# Patient Record
Sex: MALE | Race: WHITE | NOT HISPANIC OR LATINO | URBAN - METROPOLITAN AREA
[De-identification: names, ages, dates, MRNs, and addresses within clinical notes are randomized per-mention and may not be internally consistent; named-entity substitution may affect disease eponyms.]

---

## 2019-03-14 ENCOUNTER — APPOINTMENT (RX ONLY)
Dept: URBAN - METROPOLITAN AREA CLINIC 87 | Facility: CLINIC | Age: 74
Setting detail: DERMATOLOGY
End: 2019-03-14

## 2019-03-14 DIAGNOSIS — D22 MELANOCYTIC NEVI: ICD-10-CM

## 2019-03-14 DIAGNOSIS — L57.0 ACTINIC KERATOSIS: ICD-10-CM

## 2019-03-14 DIAGNOSIS — Z71.89 OTHER SPECIFIED COUNSELING: ICD-10-CM

## 2019-03-14 DIAGNOSIS — L57.8 OTHER SKIN CHANGES DUE TO CHRONIC EXPOSURE TO NONIONIZING RADIATION: ICD-10-CM

## 2019-03-14 DIAGNOSIS — L81.4 OTHER MELANIN HYPERPIGMENTATION: ICD-10-CM

## 2019-03-14 DIAGNOSIS — D18.0 HEMANGIOMA: ICD-10-CM

## 2019-03-14 DIAGNOSIS — L82.1 OTHER SEBORRHEIC KERATOSIS: ICD-10-CM

## 2019-03-14 DIAGNOSIS — L82.0 INFLAMED SEBORRHEIC KERATOSIS: ICD-10-CM

## 2019-03-14 PROBLEM — D22.62 MELANOCYTIC NEVI OF LEFT UPPER LIMB, INCLUDING SHOULDER: Status: ACTIVE | Noted: 2019-03-14

## 2019-03-14 PROBLEM — D18.01 HEMANGIOMA OF SKIN AND SUBCUTANEOUS TISSUE: Status: ACTIVE | Noted: 2019-03-14

## 2019-03-14 PROCEDURE — ? LIQUID NITROGEN

## 2019-03-14 PROCEDURE — ? SUNSCREEN RECOMMENDATIONS

## 2019-03-14 PROCEDURE — ? COUNSELING

## 2019-03-14 PROCEDURE — ? INVENTORY

## 2019-03-14 PROCEDURE — ? PRESCRIPTION

## 2019-03-14 PROCEDURE — 99214 OFFICE O/P EST MOD 30 MIN: CPT | Mod: 25

## 2019-03-14 PROCEDURE — 99202 OFFICE O/P NEW SF 15 MIN: CPT

## 2019-03-14 PROCEDURE — 17110 DESTRUCTION B9 LES UP TO 14: CPT

## 2019-03-14 RX ORDER — FLUOROURACIL 2 G/40G
CREAM TOPICAL BID
Qty: 1 | Refills: 0 | Status: ERX | COMMUNITY
Start: 2019-03-14

## 2019-03-14 RX ADMIN — FLUOROURACIL: 2 CREAM TOPICAL at 00:00

## 2019-03-14 ASSESSMENT — LOCATION DETAILED DESCRIPTION DERM
LOCATION DETAILED: LEFT RADIAL DORSAL HAND
LOCATION DETAILED: LEFT THENAR EMINENCE
LOCATION DETAILED: RIGHT ELBOW
LOCATION DETAILED: LEFT THENAR EMINENCE
LOCATION DETAILED: LEFT RADIAL PALM
LOCATION DETAILED: LEFT ULNAR DORSAL HAND
LOCATION DETAILED: LEFT ULNAR DORSAL HAND
LOCATION DETAILED: MID-FRONTAL SCALP
LOCATION DETAILED: LEFT RADIAL DORSAL HAND
LOCATION DETAILED: LEFT RADIAL PALM

## 2019-03-14 ASSESSMENT — LOCATION SIMPLE DESCRIPTION DERM
LOCATION SIMPLE: LEFT HAND
LOCATION SIMPLE: ANTERIOR SCALP
LOCATION SIMPLE: RIGHT ELBOW
LOCATION SIMPLE: LEFT HAND

## 2019-03-14 ASSESSMENT — LOCATION ZONE DERM
LOCATION ZONE: HAND
LOCATION ZONE: SCALP
LOCATION ZONE: ARM
LOCATION ZONE: HAND

## 2019-03-14 NOTE — PROCEDURE: LIQUID NITROGEN
Consent: The patient's consent was obtained including but not limited to risks of crusting, scabbing, blistering, scarring, darker or lighter pigmentary change, recurrence, incomplete removal and infection.
Detail Level: Detailed
Post-Care Instructions: I reviewed with the patient in detail post-care instructions. Patient is to wear sunprotection, and avoid picking at any of the treated lesions. Pt may apply Vaseline to crusted or scabbing areas.
Medical Necessity Information: It is in your best interest to select a reason for this procedure from the list below. All of these items fulfill various CMS LCD requirements except the new and changing color options.
Number Of Freeze-Thaw Cycles: 2 freeze-thaw cycles
Include Z78.9 (Other Specified Conditions Influencing Health Status) As An Associated Diagnosis?: No
Duration Of Freeze Thaw-Cycle (Seconds): 15
Medical Necessity Clause: This procedure was medically necessary because the lesions that were treated were:

## 2019-03-28 ENCOUNTER — APPOINTMENT (RX ONLY)
Dept: URBAN - METROPOLITAN AREA CLINIC 87 | Facility: CLINIC | Age: 74
Setting detail: DERMATOLOGY
End: 2019-03-28

## 2019-03-28 DIAGNOSIS — L57.0 ACTINIC KERATOSIS: ICD-10-CM

## 2019-03-28 PROCEDURE — ? TREATMENT REGIMEN

## 2019-03-28 PROCEDURE — 99213 OFFICE O/P EST LOW 20 MIN: CPT

## 2019-03-28 PROCEDURE — ? OTHER

## 2019-03-28 PROCEDURE — ? COUNSELING

## 2019-03-28 ASSESSMENT — LOCATION SIMPLE DESCRIPTION DERM
LOCATION SIMPLE: ANTERIOR SCALP
LOCATION SIMPLE: RIGHT CHEEK

## 2019-03-28 ASSESSMENT — LOCATION ZONE DERM
LOCATION ZONE: SCALP
LOCATION ZONE: FACE

## 2019-03-28 ASSESSMENT — LOCATION DETAILED DESCRIPTION DERM
LOCATION DETAILED: RIGHT INFERIOR CENTRAL MALAR CHEEK
LOCATION DETAILED: MID-FRONTAL SCALP

## 2019-09-16 ENCOUNTER — APPOINTMENT (RX ONLY)
Dept: URBAN - METROPOLITAN AREA CLINIC 87 | Facility: CLINIC | Age: 74
Setting detail: DERMATOLOGY
End: 2019-09-16

## 2019-09-16 DIAGNOSIS — Z71.89 OTHER SPECIFIED COUNSELING: ICD-10-CM

## 2019-09-16 DIAGNOSIS — D485 NEOPLASM OF UNCERTAIN BEHAVIOR OF SKIN: ICD-10-CM

## 2019-09-16 DIAGNOSIS — D18.0 HEMANGIOMA: ICD-10-CM

## 2019-09-16 DIAGNOSIS — D22 MELANOCYTIC NEVI: ICD-10-CM

## 2019-09-16 DIAGNOSIS — L259 CONTACT DERMATITIS AND OTHER ECZEMA, UNSPECIFIED CAUSE: ICD-10-CM

## 2019-09-16 DIAGNOSIS — L81.4 OTHER MELANIN HYPERPIGMENTATION: ICD-10-CM

## 2019-09-16 DIAGNOSIS — L57.0 ACTINIC KERATOSIS: ICD-10-CM

## 2019-09-16 DIAGNOSIS — L82.1 OTHER SEBORRHEIC KERATOSIS: ICD-10-CM

## 2019-09-16 DIAGNOSIS — L57.8 OTHER SKIN CHANGES DUE TO CHRONIC EXPOSURE TO NONIONIZING RADIATION: ICD-10-CM

## 2019-09-16 PROBLEM — D18.01 HEMANGIOMA OF SKIN AND SUBCUTANEOUS TISSUE: Status: ACTIVE | Noted: 2019-09-16

## 2019-09-16 PROBLEM — L23.9 ALLERGIC CONTACT DERMATITIS, UNSPECIFIED CAUSE: Status: ACTIVE | Noted: 2019-09-16

## 2019-09-16 PROBLEM — D48.5 NEOPLASM OF UNCERTAIN BEHAVIOR OF SKIN: Status: ACTIVE | Noted: 2019-09-16

## 2019-09-16 PROBLEM — D22.62 MELANOCYTIC NEVI OF LEFT UPPER LIMB, INCLUDING SHOULDER: Status: ACTIVE | Noted: 2019-09-16

## 2019-09-16 PROCEDURE — ? INVENTORY

## 2019-09-16 PROCEDURE — 17003 DESTRUCT PREMALG LES 2-14: CPT

## 2019-09-16 PROCEDURE — ? DEFER

## 2019-09-16 PROCEDURE — ? COUNSELING

## 2019-09-16 PROCEDURE — ? SUNSCREEN RECOMMENDATIONS

## 2019-09-16 PROCEDURE — ? PRESCRIPTION

## 2019-09-16 PROCEDURE — 99214 OFFICE O/P EST MOD 30 MIN: CPT | Mod: 25

## 2019-09-16 PROCEDURE — 17000 DESTRUCT PREMALG LESION: CPT

## 2019-09-16 PROCEDURE — ? LIQUID NITROGEN

## 2019-09-16 RX ORDER — TRIAMCINOLONE ACETONIDE 1 MG/G
CREAM TOPICAL
Qty: 1 | Refills: 0 | Status: ERX | COMMUNITY
Start: 2019-09-16

## 2019-09-16 RX ORDER — EMOLLIENT COMBINATION NO.53
CREAM (GRAM) TOPICAL
Qty: 1 | Refills: 0 | Status: ERX | COMMUNITY
Start: 2019-09-16

## 2019-09-16 RX ADMIN — Medication: at 00:00

## 2019-09-16 RX ADMIN — TRIAMCINOLONE ACETONIDE: 1 CREAM TOPICAL at 00:00

## 2019-09-16 ASSESSMENT — LOCATION SIMPLE DESCRIPTION DERM
LOCATION SIMPLE: INFERIOR FOREHEAD
LOCATION SIMPLE: LEFT SCALP
LOCATION SIMPLE: LEFT HAND
LOCATION SIMPLE: RIGHT CHEEK
LOCATION SIMPLE: LEFT FOREHEAD
LOCATION SIMPLE: RIGHT FOREHEAD
LOCATION SIMPLE: SUPERIOR FOREHEAD
LOCATION SIMPLE: LEFT EAR

## 2019-09-16 ASSESSMENT — LOCATION DETAILED DESCRIPTION DERM
LOCATION DETAILED: LEFT FOREHEAD
LOCATION DETAILED: INFERIOR MID FOREHEAD
LOCATION DETAILED: LEFT THENAR EMINENCE
LOCATION DETAILED: LEFT CENTRAL FRONTAL SCALP
LOCATION DETAILED: LEFT RADIAL DORSAL HAND
LOCATION DETAILED: LEFT SCAPHA
LOCATION DETAILED: RIGHT CENTRAL MALAR CHEEK
LOCATION DETAILED: LEFT RADIAL PALM
LOCATION DETAILED: LEFT SUPERIOR FOREHEAD
LOCATION DETAILED: RIGHT INFERIOR CENTRAL MALAR CHEEK
LOCATION DETAILED: LEFT ULNAR DORSAL HAND
LOCATION DETAILED: RIGHT SUPERIOR FOREHEAD
LOCATION DETAILED: SUPERIOR MID FOREHEAD

## 2019-09-16 ASSESSMENT — LOCATION ZONE DERM
LOCATION ZONE: EAR
LOCATION ZONE: HAND
LOCATION ZONE: SCALP
LOCATION ZONE: FACE

## 2019-09-16 NOTE — PROCEDURE: LIQUID NITROGEN
Post-Care Instructions: POST-PROCEDURE CARE\\nThe procedure you have just had using liquid nitrogen is called cryosurgery. Liquid nitrogen is minus (-) 195.8O C and must be stored in special\\ncontainers. It evaporates on contact with the air and becomes water vapor, which is why it appears to smoke.\\n\\nCryosurgery is a surgical technique that avoids cutting, burning or the need for anesthesia. For selected lesions or growths, cryosurgery is the best\\ntreatment because of its safety, minimal post-surgical care and excellent cosmetic results.\\n\\nFollowing treatment, the lesion or growth will appear unchanged. Soon afterwards, the area will become red and slightly swollen. Within 24 to 48\\nhours, a blister or water bubble often appears. THIS IS NORMAL AND EXPECTED. If left alone, the blister will slowly resolve and the entire area\\nwill turn into a scab. Regardless of whether the blister breaks or not, the healing will continue as expected. The scab will fall off by itself when it is\\nready. From the day of cryosurgery to the day when the scab falls off is usually about three weeks. A faint pink spot will be present that will slowly\\nfade away.\\n\\nYou may carry on normal activities immediately after therapy including bathing. There are no restrictions, however, you should be careful not to\\nirritate or traumatize the area.\\n\\nWhen used for Wart Treatment:\\nWhen cryotherapy is used for warts, keep in mind that warts may be very stubborn! The virus causing the wart may be a strong strain, so the\\ntreatment may need to be repeated. Usually you will know if the wart is still present within three to four weeks, so you may return for another\\ntreatment. It is normal to expect blistering which may fill with blood and appear black.
Duration Of Freeze Thaw-Cycle (Seconds): 15
Render Note In Bullet Format When Appropriate: No
Consent: The patient's consent was obtained including but not limited to risks of crusting, scabbing, blistering, scarring, darker or lighter pigmentary change, recurrence, incomplete removal and infection.
Detail Level: Simple
Render Post-Care Instructions In Note?: yes
Number Of Freeze-Thaw Cycles: 2 freeze-thaw cycles

## 2019-09-24 ENCOUNTER — APPOINTMENT (RX ONLY)
Dept: URBAN - METROPOLITAN AREA CLINIC 87 | Facility: CLINIC | Age: 74
Setting detail: DERMATOLOGY
End: 2019-09-24

## 2019-09-24 DIAGNOSIS — D485 NEOPLASM OF UNCERTAIN BEHAVIOR OF SKIN: ICD-10-CM

## 2019-09-24 PROBLEM — D48.5 NEOPLASM OF UNCERTAIN BEHAVIOR OF SKIN: Status: ACTIVE | Noted: 2019-09-24

## 2019-09-24 PROCEDURE — 11104 PUNCH BX SKIN SINGLE LESION: CPT | Mod: 79

## 2019-09-24 PROCEDURE — 11105 PUNCH BX SKIN EA SEP/ADDL: CPT | Mod: 79

## 2019-09-24 PROCEDURE — ? BIOPSY BY PUNCH METHOD

## 2019-09-24 ASSESSMENT — LOCATION SIMPLE DESCRIPTION DERM: LOCATION SIMPLE: RIGHT CHEEK

## 2019-09-24 ASSESSMENT — LOCATION DETAILED DESCRIPTION DERM: LOCATION DETAILED: RIGHT CENTRAL MALAR CHEEK

## 2019-09-24 ASSESSMENT — LOCATION ZONE DERM: LOCATION ZONE: FACE

## 2019-09-24 NOTE — PROCEDURE: BIOPSY BY PUNCH METHOD
Patient Will Remove Sutures At Home?: No
Anesthesia Volume In Cc (Will Not Render If 0): 0.5
Wound Care: Petrolatum
Billing Type: Third-Party Bill
Epidermal Sutures: 5-0 Nylon
X Depth Of Punch In Cm (Optional): 0
Post-Care Instructions: I reviewed with the patient in detail post-care instructions. Patient is to keep the biopsy site dry overnight, and then apply bacitracin twice daily until healed. Patient may apply hydrogen peroxide soaks to remove any crusting.
Dressing: bandage
Lab Facility: 3
Hemostasis: None
Lab: 6
Was A Bandage Applied: Yes
Biopsy Type: H and E
Size Of Lesion In Cm (Optional): 1
Body Location Override (Optional - Billing Will Still Be Based On Selected Body Map Location If Applicable): right central medial malar cheek
Consent: Written consent was obtained and risks were reviewed including but not limited to scarring, infection, bleeding, scabbing, incomplete removal, nerve damage and allergy to anesthesia.
Anesthesia Type: 1% lidocaine with epinephrine
Home Suture Removal Text: Patient was provided a home suture removal kit and will remove their sutures at home.  If they have any questions or difficulties they will call the office.
Detail Level: Detailed
Notification Instructions: Patient will be notified of biopsy results. However, patient instructed to call the office if not contacted within 2 weeks.
Suture Removal: 14 days
Epidermal Sutures: 4-0 Nylon
Body Location Override (Optional - Billing Will Still Be Based On Selected Body Map Location If Applicable): right central lateral malar cheek

## 2019-10-01 ENCOUNTER — APPOINTMENT (RX ONLY)
Dept: URBAN - METROPOLITAN AREA CLINIC 87 | Facility: CLINIC | Age: 74
Setting detail: DERMATOLOGY
End: 2019-10-01

## 2019-10-01 DIAGNOSIS — Z48.02 ENCOUNTER FOR REMOVAL OF SUTURES: ICD-10-CM

## 2019-10-01 PROCEDURE — ? SUTURE REMOVAL (GLOBAL PERIOD)

## 2019-10-01 PROCEDURE — 99024 POSTOP FOLLOW-UP VISIT: CPT

## 2019-10-01 ASSESSMENT — LOCATION SIMPLE DESCRIPTION DERM: LOCATION SIMPLE: RIGHT CHEEK

## 2019-10-01 ASSESSMENT — LOCATION ZONE DERM: LOCATION ZONE: FACE

## 2019-10-01 ASSESSMENT — LOCATION DETAILED DESCRIPTION DERM: LOCATION DETAILED: RIGHT CENTRAL MALAR CHEEK

## 2019-10-16 ENCOUNTER — APPOINTMENT (RX ONLY)
Dept: URBAN - METROPOLITAN AREA CLINIC 87 | Facility: CLINIC | Age: 74
Setting detail: DERMATOLOGY
End: 2019-10-16

## 2019-10-16 DIAGNOSIS — L57.0 ACTINIC KERATOSIS: ICD-10-CM

## 2019-10-16 PROCEDURE — ? COUNSELING

## 2019-10-16 PROCEDURE — ? TREATMENT REGIMEN

## 2019-10-16 PROCEDURE — ? OTHER

## 2019-10-16 PROCEDURE — 99213 OFFICE O/P EST LOW 20 MIN: CPT

## 2019-10-16 ASSESSMENT — LOCATION DETAILED DESCRIPTION DERM: LOCATION DETAILED: RIGHT CENTRAL MALAR CHEEK

## 2019-10-16 ASSESSMENT — LOCATION SIMPLE DESCRIPTION DERM: LOCATION SIMPLE: RIGHT CHEEK

## 2019-10-16 ASSESSMENT — LOCATION ZONE DERM: LOCATION ZONE: FACE

## 2019-10-16 NOTE — PROCEDURE: OTHER
Other (Free Text): RIGHT CENTRAL LATERAL MALAR CHEEK
Note Text (......Xxx Chief Complaint.): This diagnosis correlates with the
Detail Level: Zone

## 2019-10-16 NOTE — PROCEDURE: TREATMENT REGIMEN
Initiate Treatment: Efudex 5% cream Apply to AA on the right central lateral malar cheek twice daily x3 weeks.
Detail Level: Detailed
Plan: Patient has Efudex Rx at home to begin treatment.

## 2019-10-17 ENCOUNTER — RX ONLY (OUTPATIENT)
Age: 74
Setting detail: RX ONLY
End: 2019-10-17

## 2019-10-17 RX ORDER — FLUOROURACIL 2 G/40G
CREAM TOPICAL BID
Qty: 1 | Refills: 0 | Status: ERX | COMMUNITY
Start: 2019-10-17

## 2019-12-05 ENCOUNTER — APPOINTMENT (RX ONLY)
Dept: URBAN - METROPOLITAN AREA CLINIC 87 | Facility: CLINIC | Age: 74
Setting detail: DERMATOLOGY
End: 2019-12-05

## 2019-12-05 DIAGNOSIS — L57.0 ACTINIC KERATOSIS: ICD-10-CM

## 2019-12-05 PROCEDURE — ? TREATMENT REGIMEN

## 2019-12-05 PROCEDURE — ? COUNSELING

## 2019-12-05 PROCEDURE — 17000 DESTRUCT PREMALG LESION: CPT

## 2019-12-05 PROCEDURE — ? LIQUID NITROGEN

## 2019-12-05 PROCEDURE — ? OTHER

## 2019-12-05 ASSESSMENT — LOCATION ZONE DERM: LOCATION ZONE: FACE

## 2019-12-05 ASSESSMENT — LOCATION DETAILED DESCRIPTION DERM: LOCATION DETAILED: RIGHT CENTRAL MALAR CHEEK

## 2019-12-05 ASSESSMENT — LOCATION SIMPLE DESCRIPTION DERM: LOCATION SIMPLE: RIGHT CHEEK

## 2019-12-05 NOTE — PROCEDURE: TREATMENT REGIMEN
Plan: Patient has Efudex Rx at home to begin treatment.
Detail Level: Detailed
Discontinue Regimen: Efudex 5% cream Apply to AA on the right central lateral malar cheek twice daily x3 weeks.

## 2019-12-05 NOTE — PROCEDURE: OTHER
Note Text (......Xxx Chief Complaint.): This diagnosis correlates with the
Detail Level: Zone
Other (Free Text): RIGHT CENTRAL LATERAL MALAR CHEEK

## 2019-12-05 NOTE — PROCEDURE: LIQUID NITROGEN
Render Post-Care Instructions In Note?: yes
Number Of Freeze-Thaw Cycles: 2 freeze-thaw cycles
Consent: The patient's consent was obtained including but not limited to risks of crusting, scabbing, blistering, scarring, darker or lighter pigmentary change, recurrence, incomplete removal and infection.
Detail Level: Simple
Render Note In Bullet Format When Appropriate: No
Post-Care Instructions: POST-PROCEDURE CARE\\nThe procedure you have just had using liquid nitrogen is called cryosurgery. Liquid nitrogen is minus (-) 195.8O C and must be stored in special\\ncontainers. It evaporates on contact with the air and becomes water vapor, which is why it appears to smoke.\\n\\nCryosurgery is a surgical technique that avoids cutting, burning or the need for anesthesia. For selected lesions or growths, cryosurgery is the best\\ntreatment because of its safety, minimal post-surgical care and excellent cosmetic results.\\n\\nFollowing treatment, the lesion or growth will appear unchanged. Soon afterwards, the area will become red and slightly swollen. Within 24 to 48\\nhours, a blister or water bubble often appears. THIS IS NORMAL AND EXPECTED. If left alone, the blister will slowly resolve and the entire area\\nwill turn into a scab. Regardless of whether the blister breaks or not, the healing will continue as expected. The scab will fall off by itself when it is\\nready. From the day of cryosurgery to the day when the scab falls off is usually about three weeks. A faint pink spot will be present that will slowly\\nfade away.\\n\\nYou may carry on normal activities immediately after therapy including bathing. There are no restrictions, however, you should be careful not to\\nirritate or traumatize the area.\\n\\nWhen used for Wart Treatment:\\nWhen cryotherapy is used for warts, keep in mind that warts may be very stubborn! The virus causing the wart may be a strong strain, so the\\ntreatment may need to be repeated. Usually you will know if the wart is still present within three to four weeks, so you may return for another\\ntreatment. It is normal to expect blistering which may fill with blood and appear black.
Duration Of Freeze Thaw-Cycle (Seconds): 15

## 2020-03-10 ENCOUNTER — APPOINTMENT (RX ONLY)
Dept: URBAN - METROPOLITAN AREA CLINIC 87 | Facility: CLINIC | Age: 75
Setting detail: DERMATOLOGY
End: 2020-03-10

## 2020-03-10 DIAGNOSIS — L57.8 OTHER SKIN CHANGES DUE TO CHRONIC EXPOSURE TO NONIONIZING RADIATION: ICD-10-CM

## 2020-03-10 DIAGNOSIS — Z71.89 OTHER SPECIFIED COUNSELING: ICD-10-CM

## 2020-03-10 DIAGNOSIS — D18.0 HEMANGIOMA: ICD-10-CM

## 2020-03-10 DIAGNOSIS — L57.0 ACTINIC KERATOSIS: ICD-10-CM

## 2020-03-10 DIAGNOSIS — L91.8 OTHER HYPERTROPHIC DISORDERS OF THE SKIN: ICD-10-CM

## 2020-03-10 DIAGNOSIS — D22 MELANOCYTIC NEVI: ICD-10-CM

## 2020-03-10 DIAGNOSIS — L82.1 OTHER SEBORRHEIC KERATOSIS: ICD-10-CM

## 2020-03-10 DIAGNOSIS — L81.4 OTHER MELANIN HYPERPIGMENTATION: ICD-10-CM

## 2020-03-10 PROBLEM — D22.62 MELANOCYTIC NEVI OF LEFT UPPER LIMB, INCLUDING SHOULDER: Status: ACTIVE | Noted: 2020-03-10

## 2020-03-10 PROBLEM — D18.01 HEMANGIOMA OF SKIN AND SUBCUTANEOUS TISSUE: Status: ACTIVE | Noted: 2020-03-10

## 2020-03-10 PROCEDURE — 99214 OFFICE O/P EST MOD 30 MIN: CPT | Mod: 25

## 2020-03-10 PROCEDURE — ? COUNSELING

## 2020-03-10 PROCEDURE — 17000 DESTRUCT PREMALG LESION: CPT

## 2020-03-10 PROCEDURE — ? LIQUID NITROGEN

## 2020-03-10 PROCEDURE — ? SUNSCREEN RECOMMENDATIONS

## 2020-03-10 PROCEDURE — ? DEFER

## 2020-03-10 PROCEDURE — 17003 DESTRUCT PREMALG LES 2-14: CPT

## 2020-03-10 ASSESSMENT — LOCATION SIMPLE DESCRIPTION DERM
LOCATION SIMPLE: RIGHT AXILLARY VAULT
LOCATION SIMPLE: FRONTAL SCALP
LOCATION SIMPLE: LEFT HAND
LOCATION SIMPLE: LEFT AXILLARY VAULT
LOCATION SIMPLE: SCALP
LOCATION SIMPLE: LEFT SCALP
LOCATION SIMPLE: ANTERIOR SCALP
LOCATION SIMPLE: RIGHT SCALP
LOCATION SIMPLE: POSTERIOR SCALP
LOCATION SIMPLE: LEFT TEMPLE

## 2020-03-10 ASSESSMENT — LOCATION DETAILED DESCRIPTION DERM
LOCATION DETAILED: RIGHT SUPERIOR OCCIPITAL SCALP
LOCATION DETAILED: LEFT CENTRAL PARIETAL SCALP
LOCATION DETAILED: LEFT RADIAL PALM
LOCATION DETAILED: LEFT CENTRAL FRONTAL SCALP
LOCATION DETAILED: LEFT AXILLARY VAULT
LOCATION DETAILED: LEFT RADIAL DORSAL HAND
LOCATION DETAILED: RIGHT MEDIAL FRONTAL SCALP
LOCATION DETAILED: MID-FRONTAL SCALP
LOCATION DETAILED: MEDIAL FRONTAL SCALP
LOCATION DETAILED: RIGHT SUPERIOR PARIETAL SCALP
LOCATION DETAILED: POSTERIOR MID-PARIETAL SCALP
LOCATION DETAILED: LEFT ULNAR DORSAL HAND
LOCATION DETAILED: LEFT THENAR EMINENCE
LOCATION DETAILED: LEFT CENTRAL TEMPLE
LOCATION DETAILED: RIGHT AXILLARY VAULT

## 2020-03-10 ASSESSMENT — LOCATION ZONE DERM
LOCATION ZONE: FACE
LOCATION ZONE: SCALP
LOCATION ZONE: AXILLAE
LOCATION ZONE: HAND

## 2020-03-10 NOTE — PROCEDURE: LIQUID NITROGEN
Number Of Freeze-Thaw Cycles: 2 freeze-thaw cycles
Post-Care Instructions: POST-PROCEDURE CARE\\nThe procedure you have just had using liquid nitrogen is called cryosurgery. Liquid nitrogen is minus (-) 195.8O C and must be stored in special\\ncontainers. It evaporates on contact with the air and becomes water vapor, which is why it appears to smoke.\\n\\nCryosurgery is a surgical technique that avoids cutting, burning or the need for anesthesia. For selected lesions or growths, cryosurgery is the best\\ntreatment because of its safety, minimal post-surgical care and excellent cosmetic results.\\n\\nFollowing treatment, the lesion or growth will appear unchanged. Soon afterwards, the area will become red and slightly swollen. Within 24 to 48\\nhours, a blister or water bubble often appears. THIS IS NORMAL AND EXPECTED. If left alone, the blister will slowly resolve and the entire area\\nwill turn into a scab. Regardless of whether the blister breaks or not, the healing will continue as expected. The scab will fall off by itself when it is\\nready. From the day of cryosurgery to the day when the scab falls off is usually about three weeks. A faint pink spot will be present that will slowly\\nfade away.\\n\\nYou may carry on normal activities immediately after therapy including bathing. There are no restrictions, however, you should be careful not to\\nirritate or traumatize the area.\\n\\nWhen used for Wart Treatment:\\nWhen cryotherapy is used for warts, keep in mind that warts may be very stubborn! The virus causing the wart may be a strong strain, so the\\ntreatment may need to be repeated. Usually you will know if the wart is still present within three to four weeks, so you may return for another\\ntreatment. It is normal to expect blistering which may fill with blood and appear black.
Duration Of Freeze Thaw-Cycle (Seconds): 15
Render Post-Care Instructions In Note?: yes
Detail Level: Simple
Consent: The patient's consent was obtained including but not limited to risks of crusting, scabbing, blistering, scarring, darker or lighter pigmentary change, recurrence, incomplete removal and infection.
Render Note In Bullet Format When Appropriate: No

## 2020-03-10 NOTE — PROCEDURE: DEFER
Instructions (Optional): 2 hour incubation period
Introduction Text (Please End With A Colon): The following procedure was deferred:
Procedure To Be Performed At Next Visit: PDT Blue Light
Detail Level: Detailed

## 2021-01-29 ENCOUNTER — APPOINTMENT (RX ONLY)
Dept: URBAN - METROPOLITAN AREA CLINIC 87 | Facility: CLINIC | Age: 76
Setting detail: DERMATOLOGY
End: 2021-01-29

## 2021-01-29 VITALS — TEMPERATURE: 96.9 F

## 2021-01-29 DIAGNOSIS — L57.0 ACTINIC KERATOSIS: ICD-10-CM

## 2021-01-29 DIAGNOSIS — D18.0 HEMANGIOMA: ICD-10-CM

## 2021-01-29 DIAGNOSIS — L21.8 OTHER SEBORRHEIC DERMATITIS: ICD-10-CM | Status: INADEQUATELY CONTROLLED

## 2021-01-29 DIAGNOSIS — Z71.89 OTHER SPECIFIED COUNSELING: ICD-10-CM

## 2021-01-29 DIAGNOSIS — L81.4 OTHER MELANIN HYPERPIGMENTATION: ICD-10-CM

## 2021-01-29 DIAGNOSIS — L82.1 OTHER SEBORRHEIC KERATOSIS: ICD-10-CM

## 2021-01-29 DIAGNOSIS — D22 MELANOCYTIC NEVI: ICD-10-CM

## 2021-01-29 PROBLEM — D18.01 HEMANGIOMA OF SKIN AND SUBCUTANEOUS TISSUE: Status: ACTIVE | Noted: 2021-01-29

## 2021-01-29 PROBLEM — D22.5 MELANOCYTIC NEVI OF TRUNK: Status: ACTIVE | Noted: 2021-01-29

## 2021-01-29 PROBLEM — D22.72 MELANOCYTIC NEVI OF LEFT LOWER LIMB, INCLUDING HIP: Status: ACTIVE | Noted: 2021-01-29

## 2021-01-29 PROCEDURE — 17003 DESTRUCT PREMALG LES 2-14: CPT

## 2021-01-29 PROCEDURE — ? COUNSELING

## 2021-01-29 PROCEDURE — ? OBSERVATION AND MEASURE

## 2021-01-29 PROCEDURE — ? PHOTO-DOCUMENTATION

## 2021-01-29 PROCEDURE — 17000 DESTRUCT PREMALG LESION: CPT

## 2021-01-29 PROCEDURE — ? ADDITIONAL NOTES

## 2021-01-29 PROCEDURE — 99214 OFFICE O/P EST MOD 30 MIN: CPT | Mod: 25

## 2021-01-29 PROCEDURE — ? TREATMENT REGIMEN

## 2021-01-29 PROCEDURE — ? PRESCRIPTION

## 2021-01-29 PROCEDURE — ? LIQUID NITROGEN

## 2021-01-29 RX ORDER — KETOCONAZOLE 20 MG/G
CREAM TOPICAL QD
Qty: 1 | Refills: 5 | Status: ERX | COMMUNITY
Start: 2021-01-29

## 2021-01-29 RX ADMIN — KETOCONAZOLE: 20 CREAM TOPICAL at 00:00

## 2021-01-29 ASSESSMENT — LOCATION DETAILED DESCRIPTION DERM
LOCATION DETAILED: RIGHT INFERIOR LATERAL LOWER BACK
LOCATION DETAILED: RIGHT BUTTOCK
LOCATION DETAILED: LEFT LATERAL PLANTAR 1ST TOE
LOCATION DETAILED: RIGHT BUTTOCK
LOCATION DETAILED: RIGHT INFERIOR LATERAL LOWER BACK
LOCATION DETAILED: LEFT INFERIOR LATERAL LOWER BACK
LOCATION DETAILED: RIGHT MEDIAL FRONTAL SCALP
LOCATION DETAILED: RIGHT INFERIOR MEDIAL LOWER BACK
LOCATION DETAILED: LEFT LATERAL MALAR CHEEK
LOCATION DETAILED: LEFT LATERAL PLANTAR 1ST TOE
LOCATION DETAILED: RIGHT INFERIOR MEDIAL LOWER BACK
LOCATION DETAILED: LEFT MEDIAL FRONTAL SCALP
LOCATION DETAILED: LEFT INFERIOR LATERAL LOWER BACK
LOCATION DETAILED: MID-FRONTAL SCALP
LOCATION DETAILED: MID-FRONTAL SCALP
LOCATION DETAILED: RIGHT SUPERIOR FOREHEAD
LOCATION DETAILED: RIGHT INFERIOR TEMPLE
LOCATION DETAILED: LEFT MEDIAL FRONTAL SCALP

## 2021-01-29 ASSESSMENT — LOCATION ZONE DERM
LOCATION ZONE: TOE
LOCATION ZONE: SCALP
LOCATION ZONE: SCALP
LOCATION ZONE: TOE
LOCATION ZONE: TRUNK
LOCATION ZONE: TRUNK
LOCATION ZONE: FACE

## 2021-01-29 ASSESSMENT — LOCATION SIMPLE DESCRIPTION DERM
LOCATION SIMPLE: RIGHT TEMPLE
LOCATION SIMPLE: RIGHT FOREHEAD
LOCATION SIMPLE: LEFT LOWER BACK
LOCATION SIMPLE: LEFT LOWER BACK
LOCATION SIMPLE: ANTERIOR SCALP
LOCATION SIMPLE: RIGHT LOWER BACK
LOCATION SIMPLE: LEFT SCALP
LOCATION SIMPLE: PLANTAR SURFACE OF LEFT 1ST TOE
LOCATION SIMPLE: LEFT SCALP
LOCATION SIMPLE: RIGHT BUTTOCK
LOCATION SIMPLE: RIGHT LOWER BACK
LOCATION SIMPLE: PLANTAR SURFACE OF LEFT 1ST TOE
LOCATION SIMPLE: ANTERIOR SCALP
LOCATION SIMPLE: LEFT CHEEK
LOCATION SIMPLE: RIGHT BUTTOCK
LOCATION SIMPLE: RIGHT SCALP

## 2021-05-06 LAB
ALBUMIN SERPL-MCNC: NORMAL G/DL
ALP BLD-CCNC: NORMAL U/L
ALT SERPL-CCNC: NORMAL U/L
ANION GAP SERPL CALCULATED.3IONS-SCNC: NORMAL MMOL/L
AST SERPL-CCNC: NORMAL U/L
BILIRUB SERPL-MCNC: NORMAL MG/DL
BUN BLDV-MCNC: 17 MG/DL
CALCIUM SERPL-MCNC: 9.4 MG/DL
CHLORIDE BLD-SCNC: 100 MMOL/L
CHOLESTEROL, TOTAL: 216 MG/DL
CHOLESTEROL/HDL RATIO: ABNORMAL
CO2: 26 MMOL/L
CREAT SERPL-MCNC: 1.03 MG/DL
GFR CALCULATED: NORMAL
GLUCOSE BLD-MCNC: 98 MG/DL
HDLC SERPL-MCNC: 24 MG/DL (ref 35–70)
LDL CHOLESTEROL CALCULATED: 21.1 MG/DL (ref 0–160)
NONHDLC SERPL-MCNC: ABNORMAL MG/DL
POTASSIUM SERPL-SCNC: 4.9 MMOL/L
SODIUM BLD-SCNC: 140 MMOL/L
TOTAL PROTEIN: NORMAL
TRIGL SERPL-MCNC: 96 MG/DL
VLDLC SERPL CALC-MCNC: ABNORMAL MG/DL

## 2021-07-09 ENCOUNTER — OFFICE VISIT (OUTPATIENT)
Dept: FAMILY MEDICINE CLINIC | Age: 76
End: 2021-07-09
Payer: MEDICARE

## 2021-07-09 DIAGNOSIS — K44.9 HIATAL HERNIA: ICD-10-CM

## 2021-07-09 DIAGNOSIS — E78.49 OTHER HYPERLIPIDEMIA: ICD-10-CM

## 2021-07-09 DIAGNOSIS — I10 ESSENTIAL HYPERTENSION: Primary | ICD-10-CM

## 2021-07-09 DIAGNOSIS — I25.118 CORONARY ARTERY DISEASE OF NATIVE ARTERY OF NATIVE HEART WITH STABLE ANGINA PECTORIS (HCC): ICD-10-CM

## 2021-07-09 PROCEDURE — G8427 DOCREV CUR MEDS BY ELIG CLIN: HCPCS | Performed by: FAMILY MEDICINE

## 2021-07-09 PROCEDURE — 99203 OFFICE O/P NEW LOW 30 MIN: CPT | Performed by: FAMILY MEDICINE

## 2021-07-09 PROCEDURE — 1123F ACP DISCUSS/DSCN MKR DOCD: CPT | Performed by: FAMILY MEDICINE

## 2021-07-09 PROCEDURE — 4040F PNEUMOC VAC/ADMIN/RCVD: CPT | Performed by: FAMILY MEDICINE

## 2021-07-09 PROCEDURE — 3017F COLORECTAL CA SCREEN DOC REV: CPT | Performed by: FAMILY MEDICINE

## 2021-07-09 PROCEDURE — G8420 CALC BMI NORM PARAMETERS: HCPCS | Performed by: FAMILY MEDICINE

## 2021-07-09 PROCEDURE — 1036F TOBACCO NON-USER: CPT | Performed by: FAMILY MEDICINE

## 2021-07-09 RX ORDER — ASPIRIN 81 MG/1
81 TABLET ORAL DAILY
COMMUNITY

## 2021-07-09 RX ORDER — ATORVASTATIN CALCIUM 40 MG/1
40 TABLET, FILM COATED ORAL DAILY
COMMUNITY
End: 2022-06-01 | Stop reason: SDUPTHER

## 2021-07-09 RX ORDER — OMEPRAZOLE 20 MG/1
20 CAPSULE, DELAYED RELEASE ORAL
Qty: 30 CAPSULE | Refills: 5 | Status: ON HOLD | OUTPATIENT
Start: 2021-07-09 | End: 2021-10-27 | Stop reason: HOSPADM

## 2021-07-09 RX ORDER — TADALAFIL 10 MG/1
10 TABLET ORAL PRN
COMMUNITY
End: 2021-08-31

## 2021-07-09 NOTE — PROGRESS NOTES
OFFICE NOTE    7/9/21  Name: Venus Singleton. MWK:[id number]   Sex:male   Age:76 y.o. SUBJECTIVE  Chief Complaint   Patient presents with    Advice Only       HPI  Pedrito Burdick came in to establish. He and his wife former Refugio Elliott have lived in [place] for last 14 years. About that time was advised he had multivessel CAD but apparently wasn't good candidate for stenting or CABG at that time. Has lived a healthy lifestyle in [place] and stayed out of trouble. Stopped his sstatin about a year ago,, along with ASA which he felt was aggravating his HH. Developed substernal chest pain with heavy activity, relieved by belching which he attribute to his hiatal hernia. Went to PMD how sent him to Cardiolgist who rand an NM stress that indicated significant ischemia. He refused cath in [place] as they wanted to come [place] form [place] till Labor day    Review of Systems   Constitutional: Negative for appetite change, fever and unexpected weight change. HENT: Negative for congestion, ear pain, hearing loss, sinus pain, sore throat and trouble swallowing. Eyes: Negative for photophobia, redness and visual disturbance. Respiratory: Negative for cough, shortness of breath and wheezing. Cardiovascular: Positive for chest pain. Negative for palpitations and leg swelling. Gastrointestinal: Negative for abdominal pain, blood in stool, constipation, diarrhea and vomiting. Gas and belching from [facility]. Now with exertional chest pain   Endocrine: Negative for cold intolerance, polydipsia and polyuria. Genitourinary: Negative for difficulty urinating, genital sores, hematuria and urgency. Musculoskeletal: Negative for arthralgias, back pain and joint swelling. Skin: Negative for pallor and rash. Allergic/Immunologic: Negative for food allergies. Neurological: Negative for dizziness, tremors, syncope and headaches. Hematological: Negative for adenopathy. Does not bruise/bleed easily.    Psychiatric/Behavioral: Negative for agitation, dysphoric mood, hallucinations and sleep disturbance. All other systems reviewed and are negative. Current Outpatient Medications:     aspirin 81 MG EC tablet, Take 81 mg by mouth daily, Disp: , Rfl:     atorvastatin (LIPITOR) 40 MG tablet, Take 40 mg by mouth daily, Disp: , Rfl:     tadalafil (CIALIS) 10 MG tablet, Take 10 mg by mouth as needed for Erectile Dysfunction, Disp: , Rfl:     Coenzyme Q10 (COQ-10) 100 MG CAPS, Take by mouth, Disp: , Rfl:     omeprazole (PRILOSEC) 20 MG delayed release capsule, Take 1 capsule by mouth every morning (before breakfast), Disp: 30 capsule, Rfl: 5  Allergies   Allergen Reactions    Dust Mite Extract        No past medical history on file. No past surgical history on file. No family history on file. Social History     Tobacco History     Smoking Status  Never Smoker    Smokeless Tobacco Use  Never Used          Alcohol History     Alcohol Use Status  Not Asked          Drug Use     Drug Use Status  Not Asked          Sexual Activity     Sexually Active  Not Asked                OBJECTIVE  Vitals:    07/09/21 0020 07/09/21 1652   BP: 135/80    Pulse:  64   Temp:  97 °F (36.1 °C)   TempSrc:  Temporal   SpO2:  97%   Weight:  158 lb 9.6 oz (71.9 kg)   Height:  5' 8\" (1.727 m)        Body mass index is 24.12 kg/m².     Orders Placed This Encounter   Procedures    Lipid Panel     Standing Status:   Future     Standing Expiration Date:   7/9/2022     Order Specific Question:   Is Patient Fasting?/# of Hours     Answer:   fasting    ALT     Standing Status:   Future     Standing Expiration Date:   7/9/2022    AST     Standing Status:   Future     Standing Expiration Date:   7/9/2022    CBC Auto Differential     Standing Status:   Future     Standing Expiration Date:   7/9/2022    HIGH SENSITIVITY CRP     Standing Status:   Future     Standing Expiration Date:   7/9/2022   [id number], [address] [facility], DO, Cardiology, [place]     Referral Priority:   Routine     Referral Type:   Eval and Treat     Referral Reason:   Specialty Services Required     Referred to Provider:   Tameka Mota DO     Requested Specialty:   Cardiology     Number of Visits Requested:   1        EXAM   Physical Exam  Vitals reviewed. Constitutional:       Appearance: Normal appearance. He is normal weight. HENT:      Right Ear: Tympanic membrane normal.      Left Ear: Tympanic membrane normal.   Eyes:      Conjunctiva/sclera: Conjunctivae normal.   Neck:      Vascular: No carotid bruit. Cardiovascular:      Rate and Rhythm: Normal rate and regular rhythm. Pulses: Normal pulses. Heart sounds: No murmur heard. Pulmonary:      Effort: Pulmonary effort is normal.      Breath sounds: Normal breath sounds. No rales. Lymphadenopathy:      Cervical: No cervical adenopathy. Skin:     Coloration: Skin is not jaundiced. Findings: No bruising or rash. Neurological:      General: No focal deficit present. Mental Status: He is alert and oriented to person, place, and time. Psychiatric:         Mood and Affect: Mood normal.         Behavior: Behavior normal.           Melany Reilly was seen today for advice only. Diagnoses and all orders for this visit:    Essential hypertension   on metoprolol 25 qd of succinate and Isorbid 30 mg per day  Coronary artery disease of native artery of native heart with stable angina pectoris ([address]  -     Lipid Panel; Future  -     ALT; Future  -     AST; Future  -     CBC Auto Differential; Future  -     [address] DO, Cardiology, [place]  Will add ASA 81 mg per day  Other hyperlipidemia  -     Lipid Panel; Future  -     ALT; Future  -     AST; Future  -     HIGH SENSITIVITY CRP; Future    Hiatal hernia  -     omeprazole (PRILOSEC) 20 MG delayed release capsule; Take 1 capsule by mouth every morning (before breakfast)  -     CBC Auto Differential; Future  Will treat this empirically so can take ASA.  Will need formal exam to establish set up (Came in at 4:45 on Friday when I was on [employer]. If persistent chest pain to ER immediately        No follow-ups on file.     Electronically signed by Mackenzie Burch MD on 7/9/21 at 5:17 PM EDT

## 2021-07-10 VITALS
SYSTOLIC BLOOD PRESSURE: 135 MMHG | BODY MASS INDEX: 24.04 KG/M2 | DIASTOLIC BLOOD PRESSURE: 80 MMHG | HEIGHT: 68 IN | OXYGEN SATURATION: 97 % | HEART RATE: 64 BPM | TEMPERATURE: 97 F | WEIGHT: 158.6 LBS

## 2021-07-10 ASSESSMENT — ENCOUNTER SYMPTOMS
CONSTIPATION: 0
SORE THROAT: 0
BLOOD IN STOOL: 0
DIARRHEA: 0
WHEEZING: 0
SHORTNESS OF BREATH: 0
COUGH: 0
PHOTOPHOBIA: 0
ABDOMINAL PAIN: 0
EYE REDNESS: 0
BACK PAIN: 0
VOMITING: 0
SINUS PAIN: 0
TROUBLE SWALLOWING: 0

## 2021-07-12 DIAGNOSIS — E78.49 OTHER HYPERLIPIDEMIA: ICD-10-CM

## 2021-07-12 DIAGNOSIS — K44.9 HIATAL HERNIA: ICD-10-CM

## 2021-07-12 DIAGNOSIS — I25.118 CORONARY ARTERY DISEASE OF NATIVE ARTERY OF NATIVE HEART WITH STABLE ANGINA PECTORIS (HCC): ICD-10-CM

## 2021-07-12 LAB
ALT SERPL-CCNC: 21 U/L (ref 0–40)
AST SERPL-CCNC: 22 U/L (ref 0–39)
BASOPHILS ABSOLUTE: 0.04 E9/L (ref 0–0.2)
BASOPHILS RELATIVE PERCENT: 0.6 % (ref 0–2)
C-REACTIVE PROTEIN, HIGH SENSITIVITY: <0.3 MG/L (ref 0–3)
CHOLESTEROL, TOTAL: 115 MG/DL (ref 0–199)
EOSINOPHILS ABSOLUTE: 0.17 E9/L (ref 0.05–0.5)
EOSINOPHILS RELATIVE PERCENT: 2.5 % (ref 0–6)
HCT VFR BLD CALC: 44.3 % (ref 37–54)
HDLC SERPL-MCNC: 51 MG/DL
HEMOGLOBIN: 14.9 G/DL (ref 12.5–16.5)
IMMATURE GRANULOCYTES #: 0.02 E9/L
IMMATURE GRANULOCYTES %: 0.3 % (ref 0–5)
LDL CHOLESTEROL CALCULATED: 54 MG/DL (ref 0–99)
LYMPHOCYTES ABSOLUTE: 1.32 E9/L (ref 1.5–4)
LYMPHOCYTES RELATIVE PERCENT: 19.6 % (ref 20–42)
MCH RBC QN AUTO: 30.7 PG (ref 26–35)
MCHC RBC AUTO-ENTMCNC: 33.6 % (ref 32–34.5)
MCV RBC AUTO: 91.2 FL (ref 80–99.9)
MONOCYTES ABSOLUTE: 0.61 E9/L (ref 0.1–0.95)
MONOCYTES RELATIVE PERCENT: 9 % (ref 2–12)
NEUTROPHILS ABSOLUTE: 4.59 E9/L (ref 1.8–7.3)
NEUTROPHILS RELATIVE PERCENT: 68 % (ref 43–80)
PDW BLD-RTO: 12.8 FL (ref 11.5–15)
PLATELET # BLD: 145 E9/L (ref 130–450)
PMV BLD AUTO: 11 FL (ref 7–12)
RBC # BLD: 4.86 E12/L (ref 3.8–5.8)
TRIGL SERPL-MCNC: 51 MG/DL (ref 0–149)
VLDLC SERPL CALC-MCNC: 10 MG/DL
WBC # BLD: 6.8 E9/L (ref 4.5–11.5)

## 2021-07-21 ENCOUNTER — TELEPHONE (OUTPATIENT)
Dept: ADMINISTRATIVE | Age: 76
End: 2021-07-21

## 2021-07-21 NOTE — TELEPHONE ENCOUNTER
NP scheduled from the [facility]. Patient Appointment Form:      PCP: Dr. Rey Rodrigues  Referring: Dr. Rey Rodrigues    Has the Patient:    Seen a Cardiologist? Yes [place] Dr. Radha Carter May 2021     Had a heart catheterization? Yes 14 years ago - Pt bringing in disc    Had heart surgery? No    Had a stress test or nuclear stress test? Yes April 2021 [place] - brining in report to [place]     Had an echocardiogram?     Had a vascular ultrasound? No    Had a 24/48 heart monitor or extended cardiac event monitor? No    Had recent blood work in the last 6 months? Yes 7/12/21 Epic PCP     Had a pacemaker/ICD/ILR implant? No    Seen an Electrophysiologist? No        Will send records via: Pt to bring in [facility] recs/disc to [place] prior to his upcoming NP apt with Dr. Karo De Jesus. Date & time of appointment:  7/30/21 @ 1:15 PM with Dr. Karo De Jesus.

## 2021-08-31 ENCOUNTER — OFFICE VISIT (OUTPATIENT)
Dept: CARDIOLOGY CLINIC | Age: 76
End: 2021-08-31
Payer: MEDICARE

## 2021-08-31 VITALS
WEIGHT: 161.8 LBS | BODY MASS INDEX: 24.52 KG/M2 | HEART RATE: 58 BPM | RESPIRATION RATE: 18 BRPM | HEIGHT: 68 IN | SYSTOLIC BLOOD PRESSURE: 150 MMHG | DIASTOLIC BLOOD PRESSURE: 76 MMHG

## 2021-08-31 DIAGNOSIS — Z01.818 PREOPERATIVE TESTING: ICD-10-CM

## 2021-08-31 DIAGNOSIS — I25.5 ISCHEMIC CARDIOMYOPATHY: ICD-10-CM

## 2021-08-31 DIAGNOSIS — R07.2 PRECORDIAL PAIN: ICD-10-CM

## 2021-08-31 DIAGNOSIS — I25.10 CORONARY ARTERY DISEASE, UNSPECIFIED VESSEL OR LESION TYPE, UNSPECIFIED WHETHER ANGINA PRESENT, UNSPECIFIED WHETHER NATIVE OR TRANSPLANTED HEART: Primary | ICD-10-CM

## 2021-08-31 PROCEDURE — 3017F COLORECTAL CA SCREEN DOC REV: CPT | Performed by: INTERNAL MEDICINE

## 2021-08-31 PROCEDURE — 1036F TOBACCO NON-USER: CPT | Performed by: INTERNAL MEDICINE

## 2021-08-31 PROCEDURE — 4040F PNEUMOC VAC/ADMIN/RCVD: CPT | Performed by: INTERNAL MEDICINE

## 2021-08-31 PROCEDURE — G8427 DOCREV CUR MEDS BY ELIG CLIN: HCPCS | Performed by: INTERNAL MEDICINE

## 2021-08-31 PROCEDURE — 93000 ELECTROCARDIOGRAM COMPLETE: CPT | Performed by: INTERNAL MEDICINE

## 2021-08-31 PROCEDURE — G8420 CALC BMI NORM PARAMETERS: HCPCS | Performed by: INTERNAL MEDICINE

## 2021-08-31 PROCEDURE — 1123F ACP DISCUSS/DSCN MKR DOCD: CPT | Performed by: INTERNAL MEDICINE

## 2021-08-31 PROCEDURE — 99205 OFFICE O/P NEW HI 60 MIN: CPT | Performed by: INTERNAL MEDICINE

## 2021-08-31 RX ORDER — ZINC GLUCONATE 50 MG
50 TABLET ORAL DAILY
COMMUNITY
End: 2021-12-21

## 2021-08-31 RX ORDER — MULTIVIT-MIN/IRON/FOLIC ACID/K 18-600-40
500 CAPSULE ORAL 2 TIMES DAILY
COMMUNITY
End: 2021-12-21

## 2021-08-31 RX ORDER — CHLORAL HYDRATE 500 MG
1000 CAPSULE ORAL DAILY
COMMUNITY
End: 2021-11-19

## 2021-08-31 RX ORDER — METOPROLOL SUCCINATE 25 MG/1
25 TABLET, EXTENDED RELEASE ORAL DAILY
Status: ON HOLD | COMMUNITY
End: 2021-10-27 | Stop reason: HOSPADM

## 2021-08-31 RX ORDER — ISOSORBIDE MONONITRATE 30 MG/1
30 TABLET, EXTENDED RELEASE ORAL DAILY
Status: ON HOLD | COMMUNITY
End: 2021-10-27 | Stop reason: HOSPADM

## 2021-08-31 RX ORDER — FAMOTIDINE 20 MG/1
20 TABLET, FILM COATED ORAL PRN
COMMUNITY
End: 2021-12-03

## 2021-08-31 NOTE — PROGRESS NOTES
CHIEF COMPLAINT: CAD/Chest pain    HISTORY OF PRESENT ILLNESS: Patient is a 78 y.o. male seen at the request of Herbert Jackson MD.      Patient with long standing known CAD reported by 2007 cath at which time he had a  LAD. Was active and without issues for years. More recently he has been having chest pain and SOB with higher levels of exertion. Evaluation in [place] led to stress with septal reversible defect, apical scar and a severely impaired LVEF. Some CP with exertion. No SOB. History reviewed. No pertinent past medical history. There is no problem list on file for this patient. Allergies   Allergen Reactions    Dust Mite Extract        Current Outpatient Medications   Medication Sig Dispense Refill    Omega-3 Fatty Acids (FISH OIL) 1000 MG CAPS Take 1,000 mg by mouth daily      zinc gluconate 50 MG tablet Take 50 mg by mouth daily      Ascorbic Acid (VITAMIN C) 500 MG CAPS Take 500 mg by mouth 2 times daily      metoprolol succinate (TOPROL XL) 25 MG extended release tablet Take 25 mg by mouth daily      isosorbide mononitrate (IMDUR) 30 MG extended release tablet Take 30 mg by mouth daily      famotidine (PEPCID) 20 MG tablet Take 20 mg by mouth as needed      aspirin 81 MG EC tablet Take 81 mg by mouth daily      atorvastatin (LIPITOR) 40 MG tablet Take 40 mg by mouth daily      Coenzyme Q10 (COQ-10) 100 MG CAPS Take by mouth      omeprazole (PRILOSEC) 20 MG delayed release capsule Take 1 capsule by mouth every morning (before breakfast) 30 capsule 5     No current facility-administered medications for this visit.        Social History     Socioeconomic History    Marital status:      Spouse name: Not on file    Number of children: Not on file    Years of education: Not on file    Highest education level: Not on file   Occupational History    Not on file   Tobacco Use    Smoking status: Never Smoker    Smokeless tobacco: Never Used   Vaping Use    Vaping Use: Never used   Substance and Sexual Activity    Alcohol use: Yes     Comment: occ.  Drug use: Never    Sexual activity: Yes     Partners: Female   Other Topics Concern    Not on file   Social History Narrative    Not on file     Social Determinants of Health     Financial Resource Strain:     Difficulty of Paying Living Expenses:    Food Insecurity:     Worried About Running Out of Food in the Last Year:     [address] in the Last Year:    Transportation Needs:     Lack of Transportation (Medical):  Lack of Transportation (Non-Medical):    Physical Activity:     Days of Exercise per Week:     Minutes of Exercise per Session:    Stress:     Feeling of Stress :    Social Connections:     Frequency of Communication with Friends and Family:     Frequency of Social Gatherings with Friends and Family:     Attends Denominational Services:     Active Member of Clubs or Organizations:     Attends Club or Organization Meetings:     Marital Status:    Intimate Partner Violence:     Fear of Current or Ex-Partner:     Emotionally Abused:     Physically Abused:     Sexually Abused:        History reviewed. No pertinent family history. Review of Systems:  Heart: as above   Lungs: as above   Eyes: denies changes in vision or discharge. Ears: denies changes in hearing or pain. Nose: denies epistaxis or masses   Throat: denies sore throat or trouble swallowing. Neuro: denies numbness, tingling, tremors. Skin: denies rashes or itching. : denies hematuria, dysuria   GI: denies vomiting, diarrhea   Psych: denies mood changed, anxiety, depression. All other systems negative. Physical Exam   BP (!) 150/76   Pulse 58   Resp 18   Ht 5' 8\" (1.727 m)   Wt 161 lb 12.8 oz (73.4 kg)   BMI 24.60 kg/m²   Constitutional: Oriented to person, place, and time. Well-developed and well-nourished. No distress. Head: Normocephalic and atraumatic.    Eyes: EOM are normal. Pupils are equal, round, and reactive to light. Neck: Normal range of motion. Neck supple. No hepatojugular reflux and no JVD present. Carotid bruit is not present. No tracheal deviation present. No thyromegaly present. Cardiovascular: Normal rate, regular rhythm, normal heart sounds and intact distal pulses. Exam reveals no gallop and no friction rub. No murmur heard. Pulmonary/Chest: Effort normal and breath sounds normal. No respiratory distress. No wheezes. No rales. No tenderness. Abdominal: Soft. Bowel sounds are normal. No distension and no mass. No tenderness. No rebound and no guarding. Musculoskeletal: Normal range of motion. No edema and no tenderness. Lymphadenopathy:   No cervical adenopathy. No groin adenopathy. Neurological: Alert and oriented to person, place, and time. Skin: Skin is warm and dry. No rash noted. Not diaphoretic. No erythema. Psychiatric: Normal mood and affect. Behavior is normal.     EKG personally reviewed 09/01/21:  normal sinus rhythm, nonspecific ST and T waves changes, LBBB. Stress 4/28/2021 with partially reversible anteroapical (fixed) and septal (reversible) defect, LVEF 36% in [address] [place] [address]. ASSESSMENT AND PLAN:  There is no problem list on file for this patient. 1. CAD with  of LAD ([facility] cath 2007): Stress with LAD ischemia and impaired LVEF4/28/2021. Echo. Recommend cath. AUC 7/56. ASA/statin/imdur/BB. 2. VHD: Echo.     3. ICMP: Echo. Will apply guideline driven medical regimen accordingly. 4. HTN: Observe. 5. Lipids: Statin. Vonda Aguilar D.O.   Cardiologist  Cardiology, [address] [place] [address]

## 2021-08-31 NOTE — HPI: EVALUATION OF SKIN LESION(S)
Hpi Title: Evaluation of Skin Lesions
Have Your Spot(S) Been Treated In The Past?: has not been treated
Continue Regimen: Aklief 0.005 % topical cream \\nDays Supply: 30\\nSig: Apply to face daily at bedtime (apotheco)
Hide Differin Products: No
Action 1: Continue
Detail Level: Zone

## 2021-09-01 ENCOUNTER — TELEPHONE (OUTPATIENT)
Dept: CARDIOLOGY CLINIC | Age: 76
End: 2021-09-01

## 2021-09-01 NOTE — TELEPHONE ENCOUNTER
Patient is scheduled for a cardiac cath on 9/9 at   11:30am arrival time is 9:30am  Patient had COVID vaccine on 5/3  No prior auth required/Medicare  Labs to be drawn on 9/8  Patient was given and understood instructions. [facility] called for pt. 1 hour ETA.

## 2021-09-07 ENCOUNTER — TELEPHONE (OUTPATIENT)
Dept: CARDIOLOGY | Age: 76
End: 2021-09-07

## 2021-09-08 ENCOUNTER — TELEPHONE (OUTPATIENT)
Dept: CARDIAC CATH/INVASIVE PROCEDURES | Age: 76
End: 2021-09-08

## 2021-09-08 NOTE — TELEPHONE ENCOUNTER
Reminded patient of scheduled procedure on  9/9. Instructions given and COVID questionnaire completed.

## 2021-09-09 ENCOUNTER — APPOINTMENT (OUTPATIENT)
Dept: CT IMAGING | Age: 76
DRG: 287 | End: 2021-09-09
Attending: INTERNAL MEDICINE
Payer: MEDICARE

## 2021-09-09 ENCOUNTER — HOSPITAL ENCOUNTER (INPATIENT)
Dept: CARDIAC CATH/INVASIVE PROCEDURES | Age: 76
LOS: 1 days | Discharge: HOME | DRG: 287 | End: 2021-09-10
Attending: INTERNAL MEDICINE | Admitting: INTERNAL MEDICINE
Payer: MEDICARE

## 2021-09-09 DIAGNOSIS — I25.10 CAD IN NATIVE ARTERY: ICD-10-CM

## 2021-09-09 LAB
BACTERIA: NORMAL /HPF
BILIRUBIN URINE: NEGATIVE
BLOOD, URINE: ABNORMAL
CLARITY: CLEAR
COLOR: YELLOW
GLUCOSE URINE: NEGATIVE MG/DL
KETONES, URINE: NEGATIVE MG/DL
LEUKOCYTE ESTERASE, URINE: NEGATIVE
NITRITE, URINE: NEGATIVE
PH UA: 5.5 (ref 5–9)
PROTEIN UA: NEGATIVE MG/DL
RBC UA: NORMAL /HPF (ref 0–2)
SPECIFIC GRAVITY UA: 1.02 (ref 1–1.03)
UROBILINOGEN, URINE: 0.2 E.U./DL
WBC UA: NORMAL /HPF (ref 0–5)

## 2021-09-09 PROCEDURE — C1894 INTRO/SHEATH, NON-LASER: HCPCS

## 2021-09-09 PROCEDURE — C1769 GUIDE WIRE: HCPCS

## 2021-09-09 PROCEDURE — 2500000003 HC RX 250 WO HCPCS

## 2021-09-09 PROCEDURE — 93458 L HRT ARTERY/VENTRICLE ANGIO: CPT | Performed by: INTERNAL MEDICINE

## 2021-09-09 PROCEDURE — 71250 CT THORAX DX C-: CPT

## 2021-09-09 PROCEDURE — B2151ZZ FLUOROSCOPY OF LEFT HEART USING LOW OSMOLAR CONTRAST: ICD-10-PCS | Performed by: INTERNAL MEDICINE

## 2021-09-09 PROCEDURE — 2709999900 HC NON-CHARGEABLE SUPPLY

## 2021-09-09 PROCEDURE — 4A023N7 MEASUREMENT OF CARDIAC SAMPLING AND PRESSURE, LEFT HEART, PERCUTANEOUS APPROACH: ICD-10-PCS | Performed by: INTERNAL MEDICINE

## 2021-09-09 PROCEDURE — G0379 DIRECT REFER HOSPITAL OBSERV: HCPCS

## 2021-09-09 PROCEDURE — G0378 HOSPITAL OBSERVATION PER HR: HCPCS

## 2021-09-09 PROCEDURE — 6370000000 HC RX 637 (ALT 250 FOR IP): Performed by: FAMILY MEDICINE

## 2021-09-09 PROCEDURE — 81001 URINALYSIS AUTO W/SCOPE: CPT

## 2021-09-09 PROCEDURE — 6360000002 HC RX W HCPCS

## 2021-09-09 PROCEDURE — 87088 URINE BACTERIA CULTURE: CPT

## 2021-09-09 PROCEDURE — B2111ZZ FLUOROSCOPY OF MULTIPLE CORONARY ARTERIES USING LOW OSMOLAR CONTRAST: ICD-10-PCS | Performed by: INTERNAL MEDICINE

## 2021-09-09 RX ORDER — PANTOPRAZOLE SODIUM 40 MG/1
40 TABLET, DELAYED RELEASE ORAL
Status: DISCONTINUED | OUTPATIENT
Start: 2021-09-10 | End: 2021-09-10 | Stop reason: HOSPADM

## 2021-09-09 RX ORDER — ATORVASTATIN CALCIUM 40 MG/1
40 TABLET, FILM COATED ORAL DAILY
Status: DISCONTINUED | OUTPATIENT
Start: 2021-09-09 | End: 2021-09-10 | Stop reason: HOSPADM

## 2021-09-09 RX ORDER — SODIUM CHLORIDE 9 MG/ML
INJECTION, SOLUTION INTRAVENOUS CONTINUOUS
Status: DISCONTINUED | OUTPATIENT
Start: 2021-09-09 | End: 2021-09-10 | Stop reason: HOSPADM

## 2021-09-09 RX ORDER — METOPROLOL SUCCINATE 25 MG/1
25 TABLET, EXTENDED RELEASE ORAL DAILY
Status: DISCONTINUED | OUTPATIENT
Start: 2021-09-09 | End: 2021-09-10 | Stop reason: HOSPADM

## 2021-09-09 RX ORDER — ISOSORBIDE MONONITRATE 30 MG/1
30 TABLET, EXTENDED RELEASE ORAL DAILY
Status: DISCONTINUED | OUTPATIENT
Start: 2021-09-09 | End: 2021-09-10 | Stop reason: HOSPADM

## 2021-09-09 RX ORDER — FAMOTIDINE 20 MG/1
20 TABLET, FILM COATED ORAL DAILY
Status: DISCONTINUED | OUTPATIENT
Start: 2021-09-09 | End: 2021-09-10 | Stop reason: HOSPADM

## 2021-09-09 RX ORDER — SODIUM CHLORIDE 0.9 % (FLUSH) 0.9 %
5-40 SYRINGE (ML) INJECTION PRN
Status: CANCELLED | OUTPATIENT
Start: 2021-09-09

## 2021-09-09 RX ORDER — ACETAMINOPHEN 325 MG/1
650 TABLET ORAL EVERY 4 HOURS PRN
Status: CANCELLED | OUTPATIENT
Start: 2021-09-09

## 2021-09-09 RX ORDER — SODIUM CHLORIDE 0.9 % (FLUSH) 0.9 %
5-40 SYRINGE (ML) INJECTION EVERY 12 HOURS SCHEDULED
Status: CANCELLED | OUTPATIENT
Start: 2021-09-09

## 2021-09-09 RX ORDER — SODIUM CHLORIDE 9 MG/ML
25 INJECTION, SOLUTION INTRAVENOUS PRN
Status: CANCELLED | OUTPATIENT
Start: 2021-09-09

## 2021-09-09 RX ADMIN — ATORVASTATIN CALCIUM 40 MG: 40 TABLET, FILM COATED ORAL at 16:56

## 2021-09-09 RX ADMIN — FAMOTIDINE 20 MG: 20 TABLET, FILM COATED ORAL at 16:56

## 2021-09-09 NOTE — PROCEDURES
[address]                  [address] [facility]  [facility]                            CARDIAC CATHETERIZATION    PATIENT NAME: Ry STAUFFER                     :        1945  MED REC NO:   [id number]                            ROOM:  ACCOUNT NO:   [de-identified]                           ADMIT DATE: 2021  PROVIDER:     Ny Richey MD    DATE OF PROCEDURE:  2021    PROCEDURES:  1. Coronary angiography. 2.  Left heart catheterization. 3.  Left ventriculography. 4.  Conscious sedation using Versed and fentanyl. Indication #56, score of 7. INDICATION FOR PROCEDURE:  The patient is a 59-year-old male with  history of chronic total occlusion of LAD documented on cardiac  catheterization done in  in [place]. The patient has been  having worsening dyspnea on exertion lately. The patient had stress  test in 2021 in [place] and that was abnormal.  The patient did not  followup with any further cardiac workup since he was moving to [place]. Then, the patient for the last few weeks has been having worsening  dyspnea on exertion even though he is very active, so he was evaluated  by his cardiologist who referred him for cardiac catheterization for  further evaluation. DESCRIPTION OF PROCEDURE:  After the appropriate informed consent, the  right wrist area was prepped and draped in the usual sterile fashion. A  timeout was called. The right wrist area was locally anesthetized with  2 mL of 2% lidocaine. A 21-gauge needle was used to access the right  radial artery. A 6-Croatian introducer sheath was used to cannulate the  right radial artery. A 6-Croatian JL3.5 and 6-Croatian JR4 catheters were  used for coronary angiography in multiple projections. A 6-Croatian  pigtail catheter was used for left heart catheterization and left  ventriculography in the OLGUIN 30 projection. ANGIOGRAPHIC FINDINGS:  1.   The left main coronary artery arises normally from the left sinus of  Valsalva. It is a good size vessel with mild distal disease. It  bifurcates into the left anterior descending artery and left circumflex  artery. 2.  The left anterior descending artery has subtotal occlusion in the  ostium. It gives off two mid size diagonal branches before it had total  occlusion. The second diagonal branch actually has also significant  disease involving the proximal and mid segment. There are left-to-left  and right-to-left collaterals to the distal LAD and distal diagonal  branch. 3.  The left circumflex artery is a large vessel that gives off three OM  branches. The first one is small. The second one is mid size with  diffuse disease in the proximal segment. The third one is large with  90% stenosis in the mid segment with aneurysmal dilatation, also the  ostium has 50% to 60%. The left circumflex artery itself has 50% to 60%  disease just before the takeoff of the third OM branch. 4.  The right coronary artery arises normally from the right sinus of  Valsalva. It is a good size vessel that is diffusely diseased in the  mid and distal segment. The right PLV is a mid size vessel with ostial  disease. The right PDA also has a diffuse disease. There are  right-to-left collaterals noted through acute marginal branch and right  PDA. Left heart catheterization showed an LVEDP of 16 and there was no  gradient across the aortic valve on pullback. Left ventriculography showed estimated ejection fraction of 50% with  anterior wall hypokinesis. There was 2+ angiographic mitral valve  regurgitation noted. At the end of the procedure, the catheter and the wire were pulled out  from the body, the sheath was pulled out from the body and a Vasc Band  was applied to the right wrist area with effective hemostasis. COMPLICATIONS:  None. ESTIMATED TOTAL BLOOD LOSS:  15 to 20 mL.     MEDICATIONS USED DURING THE PROCEDURE:  We used intraarterial verapamil  to prevent vasospasm. We used intraarterial heparin for  anticoagulation. CONSCIOUS SEDATION:  We used Versed and fentanyl for conscious sedation. First dose was given at 12:16, procedure ended at 12:37. There was 21  minutes of direct face-to-face supervision during conscious sedation  administration. Total contrast used was 63 mL of Isovue. Total fluoroscopy time was 3.3 minutes. IMPRESSION:  1. Severe multivessel disease involving LAD, diagonal branch, OM  branch, and RCA. 2.  Mildly reduced left ventricular systolic function with anterior wall  hypokinesis. 3.  Slightly elevated LVEDP. RECOMMENDATIONS:  CT Surgery evaluation for possible CABG.         Fabrice Clancy MD    D: 09/09/2021 13:06:08       T: 09/09/2021 14:16:26     JOSSY/MYESHA_LA NENA_LILIAM  Job#: [id number]     Doc#: [id number]    CC:  Stiven Smith MD

## 2021-09-10 ENCOUNTER — APPOINTMENT (OUTPATIENT)
Dept: INTERVENTIONAL RADIOLOGY/VASCULAR | Age: 76
DRG: 287 | End: 2021-09-10
Attending: INTERNAL MEDICINE
Payer: MEDICARE

## 2021-09-10 ENCOUNTER — APPOINTMENT (OUTPATIENT)
Dept: MRI IMAGING | Age: 76
DRG: 287 | End: 2021-09-10
Attending: INTERNAL MEDICINE
Payer: MEDICARE

## 2021-09-10 ENCOUNTER — APPOINTMENT (OUTPATIENT)
Dept: ULTRASOUND IMAGING | Age: 76
DRG: 287 | End: 2021-09-10
Attending: INTERNAL MEDICINE
Payer: MEDICARE

## 2021-09-10 VITALS
DIASTOLIC BLOOD PRESSURE: 73 MMHG | WEIGHT: 161 LBS | HEIGHT: 68 IN | SYSTOLIC BLOOD PRESSURE: 165 MMHG | RESPIRATION RATE: 18 BRPM | OXYGEN SATURATION: 96 % | TEMPERATURE: 97 F | HEART RATE: 60 BPM | BODY MASS INDEX: 24.4 KG/M2

## 2021-09-10 DIAGNOSIS — I25.10 CAD IN NATIVE ARTERY: Primary | ICD-10-CM

## 2021-09-10 PROBLEM — R91.8 PULMONARY NODULES: Chronic | Status: ACTIVE | Noted: 2021-09-10

## 2021-09-10 PROBLEM — I20.0 UNSTABLE ANGINA (HCC): Status: ACTIVE | Noted: 2021-09-10

## 2021-09-10 LAB
ANION GAP SERPL CALCULATED.3IONS-SCNC: 11 MMOL/L (ref 7–16)
BUN BLDV-MCNC: 17 MG/DL (ref 6–23)
CALCIUM SERPL-MCNC: 9.1 MG/DL (ref 8.6–10.2)
CHLORIDE BLD-SCNC: 102 MMOL/L (ref 98–107)
CO2: 27 MMOL/L (ref 22–29)
CREAT SERPL-MCNC: 1.1 MG/DL (ref 0.7–1.2)
GFR AFRICAN AMERICAN: >60
GFR NON-AFRICAN AMERICAN: >60 ML/MIN/1.73
GLUCOSE BLD-MCNC: 89 MG/DL (ref 74–99)
HBA1C MFR BLD: 5.1 % (ref 4–5.6)
HCT VFR BLD CALC: 47.8 % (ref 37–54)
HEMOGLOBIN: 16.5 G/DL (ref 12.5–16.5)
LV EF: 56 %
LVEF MODALITY: NORMAL
MCH RBC QN AUTO: 30.7 PG (ref 26–35)
MCHC RBC AUTO-ENTMCNC: 34.5 % (ref 32–34.5)
MCV RBC AUTO: 88.8 FL (ref 80–99.9)
PDW BLD-RTO: 12.3 FL (ref 11.5–15)
PLATELET # BLD: 144 E9/L (ref 130–450)
PMV BLD AUTO: 10.2 FL (ref 7–12)
POTASSIUM SERPL-SCNC: 3.8 MMOL/L (ref 3.5–5)
RBC # BLD: 5.38 E12/L (ref 3.8–5.8)
SODIUM BLD-SCNC: 140 MMOL/L (ref 132–146)
WBC # BLD: 7.8 E9/L (ref 4.5–11.5)

## 2021-09-10 PROCEDURE — 6370000000 HC RX 637 (ALT 250 FOR IP): Performed by: FAMILY MEDICINE

## 2021-09-10 PROCEDURE — 93880 EXTRACRANIAL BILAT STUDY: CPT

## 2021-09-10 PROCEDURE — 94375 RESPIRATORY FLOW VOLUME LOOP: CPT

## 2021-09-10 PROCEDURE — 93880 EXTRACRANIAL BILAT STUDY: CPT | Performed by: RADIOLOGY

## 2021-09-10 PROCEDURE — 2060000000 HC ICU INTERMEDIATE R&B

## 2021-09-10 PROCEDURE — 36415 COLL VENOUS BLD VENIPUNCTURE: CPT

## 2021-09-10 PROCEDURE — 80048 BASIC METABOLIC PNL TOTAL CA: CPT

## 2021-09-10 PROCEDURE — 75561 CARDIAC MRI FOR MORPH W/DYE: CPT

## 2021-09-10 PROCEDURE — 6370000000 HC RX 637 (ALT 250 FOR IP): Performed by: INTERNAL MEDICINE

## 2021-09-10 PROCEDURE — 85027 COMPLETE CBC AUTOMATED: CPT

## 2021-09-10 PROCEDURE — 99222 1ST HOSP IP/OBS MODERATE 55: CPT | Performed by: THORACIC SURGERY (CARDIOTHORACIC VASCULAR SURGERY)

## 2021-09-10 PROCEDURE — 94729 DIFFUSING CAPACITY: CPT

## 2021-09-10 PROCEDURE — A9579 GAD-BASE MR CONTRAST NOS,1ML: HCPCS | Performed by: RADIOLOGY

## 2021-09-10 PROCEDURE — 75561 CARDIAC MRI FOR MORPH W/DYE: CPT | Performed by: INTERNAL MEDICINE

## 2021-09-10 PROCEDURE — 83036 HEMOGLOBIN GLYCOSYLATED A1C: CPT

## 2021-09-10 PROCEDURE — 6360000004 HC RX CONTRAST MEDICATION: Performed by: RADIOLOGY

## 2021-09-10 PROCEDURE — 93922 UPR/L XTREMITY ART 2 LEVELS: CPT

## 2021-09-10 RX ORDER — LISINOPRIL 10 MG/1
20 TABLET ORAL DAILY
Status: DISCONTINUED | OUTPATIENT
Start: 2021-09-10 | End: 2021-09-10 | Stop reason: HOSPADM

## 2021-09-10 RX ORDER — LISINOPRIL 10 MG/1
10 TABLET ORAL DAILY
Status: DISCONTINUED | OUTPATIENT
Start: 2021-09-10 | End: 2021-09-10

## 2021-09-10 RX ORDER — ASPIRIN 81 MG/1
81 TABLET ORAL DAILY
Status: DISCONTINUED | OUTPATIENT
Start: 2021-09-10 | End: 2021-09-10 | Stop reason: HOSPADM

## 2021-09-10 RX ORDER — LISINOPRIL 10 MG/1
10 TABLET ORAL DAILY
Qty: 30 TABLET | Refills: 3 | Status: SHIPPED | OUTPATIENT
Start: 2021-09-11 | End: 2021-09-10

## 2021-09-10 RX ORDER — LISINOPRIL 20 MG/1
20 TABLET ORAL DAILY
Qty: 30 TABLET | Refills: 3 | Status: SHIPPED | OUTPATIENT
Start: 2021-09-11 | End: 2021-09-20

## 2021-09-10 RX ADMIN — ISOSORBIDE MONONITRATE 30 MG: 30 TABLET, EXTENDED RELEASE ORAL at 09:38

## 2021-09-10 RX ADMIN — ATORVASTATIN CALCIUM 40 MG: 40 TABLET, FILM COATED ORAL at 09:38

## 2021-09-10 RX ADMIN — FAMOTIDINE 20 MG: 20 TABLET, FILM COATED ORAL at 09:38

## 2021-09-10 RX ADMIN — GADOTERIDOL 30 ML: 279.3 INJECTION, SOLUTION INTRAVENOUS at 17:25

## 2021-09-10 RX ADMIN — PANTOPRAZOLE SODIUM 40 MG: 40 TABLET, DELAYED RELEASE ORAL at 05:38

## 2021-09-10 RX ADMIN — METOPROLOL SUCCINATE 25 MG: 25 TABLET, EXTENDED RELEASE ORAL at 09:38

## 2021-09-10 RX ADMIN — LISINOPRIL 20 MG: 10 TABLET ORAL at 09:41

## 2021-09-10 RX ADMIN — ASPIRIN 81 MG: 81 TABLET, COATED ORAL at 09:38

## 2021-09-10 NOTE — PLAN OF CARE
Ok for discharge once echo or CMR completed. Increase lisinopril to 20 mg daily. Follow up with Dr. Luis Prasad as outpatient. If LVEF<50% please page cardiology for further recommendations.       Aman Hendricks MD, [facility]  Interventional Cardiology/Structural Heart Disease  Office: [phone]  Coordinator: Shantelle Hernandez

## 2021-09-10 NOTE — PROCEDURES
9/10 Please complete the MRI checklist so we schedule pt for MRI once reviewed and cleared, thank you

## 2021-09-10 NOTE — H&P
lobe pulmonary nodules measuring up to 6-mm in size.  These are nonspecific   and most likely benign.  However, in the absence of prior imaging studies to   determine stability, follow-up recommendations are as below. RECOMMENDATIONS:   Multiple pulmonary nodules. Most severe: 6 mm right solid pulmonary nodule   within the upper lobe. Recommend a non-contrast Chest CT at 3-6 months. If   patient is high risk for malignancy, recommend an additional non-contrast   Chest CT at 18-24 months; if patient is low risk for malignancy a   non-contrast Chest CT at 18-24 months is optional.   These guidelines do not apply to immunocompromised patients and patients with   cancer. Follow up in patients with significant comorbidities as clinically   warranted. For lung cancer screening, adhere to Lung-RADS guidelines. Reference: Radiology. 2017; 284(1):228-43. Cath report  1. Severe multivessel disease involving LAD, diagonal branch, OM   branch, and RCA. 2.  Mildly reduced left ventricular systolic function with anterior wall   hypokinesis. 3. Slightly elevated LVEDP. EKG:  I've personally reviewed the patient's EKG:  NSR LBBB    Telemetry:  I've personally reviewed the patient's telemetry:  NSR no arrhythmias     ASSESSMENT/PLAN:  Principal Problem:    Unstable angina (HCC)  Active Problems:    CAD in native artery    Pulmonary nodules  Resolved Problems:    * No resolved hospital problems.  *      Pre-CABG testing    ASA, statin, metop, ACEI     Trying to get cMRI done today    Code status: Full  Requires inpatient level of care  Buddy Willis MD    7:11 AM  9/10/2021

## 2021-09-10 NOTE — PLAN OF CARE
Spoke with patient's wife about expectations of inpatient rehab following potential open heart surgery, pending results of cardiac MRI. All questions answered and reviewed.

## 2021-09-10 NOTE — DISCHARGE SUMMARY
Physician Discharge Summary     Patient ID:  Irene Gaitan  [id number]  56 y.o.  1945    Admit date: 9/9/2021    Discharge date and time:  9/10/2021     Admission Diagnoses:    Unstable angina [facility])     Discharge Diagnoses:   Principal Problem:    Unstable angina ([address]  Active Problems:    CAD in native artery    Pulmonary nodules  Resolved Problems:    * No resolved hospital problems. *       Consults: cardiology, CTS    Procedures:   C  1. Severe multivessel disease involving LAD, diagonal branch, OM  branch, and RCA. 2.  Mildly reduced left ventricular systolic function with anterior wall  hypokinesis. 3.  Slightly elevated LVEDP. Hospital Course:   Patient presented with exertional dyspnea consistent with unstable angina and a positive stress test as outpatient. He underwent cardiac catheterization showing severe multivessel disease. He was seen by cardiothoracic surgery and they will see him as outpatient to arrange CABG.      Discharge Exam:  Vitals:    09/09/21 1505 09/09/21 2130 09/10/21 0536 09/10/21 0741   BP: (!) 158/77 (!) 156/70 (!) 168/79 (!) 165/73   Pulse: 55 60 53 60   Resp: 16 16 16 18   Temp: 96.8 °F (36 °C) 97 °F (36.1 °C) 96 °F (35.6 °C) 97 °F (36.1 °C)   TempSrc: Temporal Temporal Temporal Temporal   SpO2: 96% 96% 97% 96%   Weight:       Height:            General appearance: NAD, conversant  HEENT: AT/NC, MMM  Neck: FROM, supple  Lungs: Clear to auscultation, WOB normal  CV: RRR, no MRGs  Abdomen: Soft, non-tender; no masses or HSM, +BS  Extremities: No peripheral edema or digital cyanosis  Skin: no rash, lesions or ulcers  Psych: Calm and cooperative  Neuro: Alert and interactive, nonfocal     Condition:  Stable     Disposition: home    Patient Instructions:   Current Discharge Medication List      START taking these medications    Details   lisinopril (PRINIVIL;ZESTRIL) 20 MG tablet Take 1 tablet by mouth daily Pharmacy: disregard prior 10 mg script  Qty: 30 tablet, Refills: 3 CONTINUE these medications which have NOT CHANGED    Details   Omega-3 Fatty Acids (FISH OIL) 1000 MG CAPS Take 1,000 mg by mouth daily      zinc gluconate 50 MG tablet Take 50 mg by mouth daily      Ascorbic Acid (VITAMIN C) 500 MG CAPS Take 500 mg by mouth 2 times daily      metoprolol succinate (TOPROL XL) 25 MG extended release tablet Take 25 mg by mouth daily      isosorbide mononitrate (IMDUR) 30 MG extended release tablet Take 30 mg by mouth daily      famotidine (PEPCID) 20 MG tablet Take 20 mg by mouth as needed      aspirin 81 MG EC tablet Take 81 mg by mouth daily      atorvastatin (LIPITOR) 40 MG tablet Take 40 mg by mouth daily      Coenzyme Q10 (COQ-10) 100 MG CAPS Take by mouth      omeprazole (PRILOSEC) 20 MG delayed release capsule Take 1 capsule by mouth every morning (before breakfast)  Qty: 30 capsule, Refills: 5    Associated Diagnoses: Hiatal hernia                Activity: activity as tolerated  Diet: cardiac diet    Follow-up with PCP in 1 week.     Signed:  Jose Bower MD    9/10/2021  3:07 PM

## 2021-09-10 NOTE — CONSULTS
CTS Consult    Patient name: Lane Doan    Reason for consult: CAD    Referring Physician: Dr. Andrae Bentley    Primary Care Physician: Edna Garcia MD    Date of service: 9/10/2021    Chief Complaint: Chest pressure    HPI: 78year old with known CAD including  of the LAD since at least 2006 who was having some chest pressure. He states this pressure was relieved by belching. He also has a history of a HH. He denies CP, SOB, RILEY, LE edema, N/V, F/C, orthopnea, PND and syncope. Allergies: Allergies   Allergen Reactions    Dust Mite Extract        Home medications:    Current Facility-Administered Medications   Medication Dose Route Frequency Provider Last Rate Last Admin    aspirin EC tablet 81 mg  81 mg Oral Daily Delma Latham MD        lisinopril (PRINIVIL;ZESTRIL) tablet 10 mg  10 mg Oral Daily Delma Latham MD        perflutren lipid microspheres (DEFINITY) injection 1.65 mg  1.5 mL IntraVENous ONCE PRN Delma Latham MD        0.9 % sodium chloride infusion   IntraVENous Continuous Delma Latham MD        0.9 % sodium chloride infusion   IntraVENous Continuous Delma Latham MD        atorvastatin (LIPITOR) tablet 40 mg  40 mg Oral Daily John Pean Learn, APRN - CNP   40 mg at 09/09/21 1656    famotidine (PEPCID) tablet 20 mg  20 mg Oral Daily John Pean Learn, APRN - CNP   20 mg at 09/09/21 1656    isosorbide mononitrate (IMDUR) extended release tablet 30 mg  30 mg Oral Daily John Pean Learn, APRN - CNP        metoprolol succinate (TOPROL XL) extended release tablet 25 mg  25 mg Oral Daily John Pean Learn, APRN - CNP        pantoprazole (PROTONIX) tablet 40 mg  40 mg Oral QAM AC John Pean Learn, APRN - CNP   40 mg at 09/10/21 1909    perflutren lipid microspheres (DEFINITY) injection 1.65 mg  1.5 mL IntraVENous ONCE PRN Lisette Curran DO           Past Medical History:  History reviewed. No pertinent past medical history. Past Surgical History:  History reviewed.  No pertinent surgical history. Social History:  Social History     Socioeconomic History    Marital status:      Spouse name: Not on file    Number of children: Not on file    Years of education: Not on file    Highest education level: Not on file   Occupational History    Not on file   Tobacco Use    Smoking status: Never Smoker    Smokeless tobacco: Never Used   Vaping Use    Vaping Use: Never used   Substance and Sexual Activity    Alcohol use: Yes     Comment: occ.  Drug use: Never    Sexual activity: Yes     Partners: Female   Other Topics Concern    Not on file   Social History Narrative    Not on file     Social Determinants of Health     Financial Resource Strain:     Difficulty of Paying Living Expenses:    Food Insecurity:     Worried About Running Out of Food in the Last Year:     [address] in the Last Year:    Transportation Needs:     Lack of Transportation (Medical):  Lack of Transportation (Non-Medical):    Physical Activity:     Days of Exercise per Week:     Minutes of Exercise per Session:    Stress:     Feeling of Stress :    Social Connections:     Frequency of Communication with Friends and Family:     Frequency of Social Gatherings with Friends and Family:     Attends Baptism Services:     Active Member of Clubs or Organizations:     Attends Club or Organization Meetings:     Marital Status:    Intimate Partner Violence:     Fear of Current or Ex-Partner:     Emotionally Abused:     Physically Abused:     Sexually Abused:        Family History:  History reviewed. No pertinent family history. Review of Systems:  Constitutional: Denies fevers, chills, or weight loss. HEENT: Denies visual changes or hearing loss. Heart: As per HPI. Lungs: Denies shortness of breath, cough, or wheezing. Gastrointestinal: Denies nausea, vomiting, constipation, or diarrhea. Genitourinary: dysuria or hematuria. Psychiatric: Patient denies anxiety or depression. Neurologic: Patient denies weakness of the extremities, dizziness, or headaches. All other ROS checked and found to be negative. Labs:  Recent Labs     09/08/21  0905 09/10/21  0704   WBC 7.0 7.8   HGB 16.3 16.5   HCT 46.8 47.8    144      Recent Labs     09/08/21  0905   BUN 14   CREATININE 1.1       Objective:  Vitals BP (!) 165/73   Pulse 60   Temp 97 °F (36.1 °C) (Temporal)   Resp 18   Ht 5' 8\" (1.727 m)   Wt 161 lb (73 kg)   SpO2 96%   BMI 24.48 kg/m²   General Appearance: Pleasant 78y.o. year old male who appears stated age. Communicates well, no acute distress. HEENT: Head is normocephalic, atraumatic. EOMs intact, PERRL. Trachea midline. Lungs: Normal respiratory rate and normal effort. He is not in respiratory distress. Breath sounds clear to auscultation. No wheezes. Heart: Normal rate. Regular rhythm. S1 normal and S2 normal. Negative for murmur. Chest: Symmetric chest wall expansion. Extremities: Normal range of motion. Neurological: Patient is alert and oriented to person, place and time. Patient has normal reflexes. Skin: Warm and dry. Abdomen: Abdomen is soft and non-distended. Bowel sounds are normal. There is no abdominal tenderness tenderness. There is no guarding. There is no mass. Pulses: Distal pulses are intact. Skin: Warm and dry without lesions. Assessment: CAD        Plan:   His RCA is diffusely diseased- we would graft it but patency would be low. His OM is a great target, and his LAD is known CT for 15 years- it hopefully would be underfilled but might not be. Full pre op work up. I would like him to have an outpatient cardiac MRI. His apex is apparently scar- but I would like to know exactly how much. I will see him as an outpatient after cardiac MRI. Ok for discharge from my standpoint.   Willie Santiago MD      Electronically signed by Willie Santiago MD on 9/10/2021 at 8:13 AM

## 2021-09-10 NOTE — PLAN OF CARE
Problem: Cardiac:  Goal: Ability to maintain vital signs within normal range will improve  Description: Ability to maintain vital signs within normal range will improve  Outcome: Completed  Goal: Cardiovascular alteration will improve  Description: Cardiovascular alteration will improve  Outcome: Completed     Problem: Health Behavior:  Goal: Will modify at least one risk factor affecting health status  Description: Will modify at least one risk factor affecting health status  Outcome: Completed  Goal: Identification of resources available to assist in meeting health care needs will improve  Description: Identification of resources available to assist in meeting health care needs will improve  Outcome: Completed

## 2021-09-12 LAB — URINE CULTURE, ROUTINE: NORMAL

## 2021-09-13 ENCOUNTER — TELEPHONE (OUTPATIENT)
Dept: CARDIOLOGY CLINIC | Age: 76
End: 2021-09-13

## 2021-09-13 ENCOUNTER — TELEPHONE (OUTPATIENT)
Dept: FAMILY MEDICINE CLINIC | Age: 76
End: 2021-09-13

## 2021-09-13 NOTE — TELEPHONE ENCOUNTER
[facility] Transitions Initial Follow Up Call    Outreach made within 2 business days of discharge: Yes    Patient: Jarret Noonan Patient : 1945   MRN: <[id number]>  Reason for Admission: There are no discharge diagnoses documented for the most recent discharge. Discharge Date: 9/10/21       Spoke with: Ez Edwards     Discharge department/facility: [facility] Interactive Patient Contact:  Was patient able to fill all prescriptions: Yes  Was patient instructed to bring all medications to the follow-up visit: Yes  Is patient taking all medications as directed in the discharge summary?  Yes  Does patient understand their discharge instructions: Yes  Does patient have questions or concerns that need addressed prior to 7-14 day follow up office visit: no    Scheduled appointment with PCP within 7-14 days    Follow Up  Future Appointments   Date Time Provider Yoselin Falk   2021 10:30 AM Crescencio Allen MD CARDIO SURG [facility]   2021  9:00 AM [facility] ECHO  [place] [address] [place] [address]

## 2021-09-13 NOTE — TELEPHONE ENCOUNTER
Cardiac cath was done on 9/9 and patient is scheduled to see  tomorrow 9/14, patient is asking if he still needs the echo scheduled 9/29,please advise

## 2021-09-14 ENCOUNTER — INITIAL CONSULT (OUTPATIENT)
Dept: CARDIOTHORACIC SURGERY | Age: 76
End: 2021-09-14
Payer: MEDICARE

## 2021-09-14 VITALS
HEIGHT: 68 IN | HEART RATE: 59 BPM | SYSTOLIC BLOOD PRESSURE: 170 MMHG | DIASTOLIC BLOOD PRESSURE: 83 MMHG | WEIGHT: 157 LBS | BODY MASS INDEX: 23.79 KG/M2

## 2021-09-14 DIAGNOSIS — I25.10 CAD IN NATIVE ARTERY: Primary | ICD-10-CM

## 2021-09-14 PROCEDURE — 1123F ACP DISCUSS/DSCN MKR DOCD: CPT | Performed by: THORACIC SURGERY (CARDIOTHORACIC VASCULAR SURGERY)

## 2021-09-14 PROCEDURE — G8420 CALC BMI NORM PARAMETERS: HCPCS | Performed by: THORACIC SURGERY (CARDIOTHORACIC VASCULAR SURGERY)

## 2021-09-14 PROCEDURE — 1036F TOBACCO NON-USER: CPT | Performed by: THORACIC SURGERY (CARDIOTHORACIC VASCULAR SURGERY)

## 2021-09-14 PROCEDURE — 3017F COLORECTAL CA SCREEN DOC REV: CPT | Performed by: THORACIC SURGERY (CARDIOTHORACIC VASCULAR SURGERY)

## 2021-09-14 PROCEDURE — 4040F PNEUMOC VAC/ADMIN/RCVD: CPT | Performed by: THORACIC SURGERY (CARDIOTHORACIC VASCULAR SURGERY)

## 2021-09-14 PROCEDURE — G8427 DOCREV CUR MEDS BY ELIG CLIN: HCPCS | Performed by: THORACIC SURGERY (CARDIOTHORACIC VASCULAR SURGERY)

## 2021-09-14 PROCEDURE — 1111F DSCHRG MED/CURRENT MED MERGE: CPT | Performed by: THORACIC SURGERY (CARDIOTHORACIC VASCULAR SURGERY)

## 2021-09-14 PROCEDURE — 99214 OFFICE O/P EST MOD 30 MIN: CPT | Performed by: THORACIC SURGERY (CARDIOTHORACIC VASCULAR SURGERY)

## 2021-09-14 ASSESSMENT — ENCOUNTER SYMPTOMS
COUGH: 0
CONSTIPATION: 0
SHORTNESS OF BREATH: 1
ABDOMINAL PAIN: 0

## 2021-09-14 NOTE — H&P
This is a 78year old with known CAD including  of the LAD since at least 2006 who was having some chest pressure and was recently admitted earlier this month to hospital [facility] states this pressure was relieved by belching. He also has a history of a Hiatal hernia. Dorothey Pimple was done and showed MVCAD. He underwent cardiac MRI to eval for viability due to reported apical \"scar\". This was done and shows normal LV size and function with EF 56% without evidence of prior infarct. He admits to worsening RILEY, but denies CP, SOB at rest, LE edema, N/V, F/C, orthopnea, PND and syncope.     PMH  HTN  Hiatal hernia    No past surgical history on file. Social History     Tobacco Use    Smoking status: Never Smoker    Smokeless tobacco: Never Used   Vaping Use    Vaping Use: Never used   Substance Use Topics    Alcohol use: Yes     Comment: occ.  Drug use: Never     No current facility-administered medications for this encounter.     Current Outpatient Medications:     lisinopril (PRINIVIL;ZESTRIL) 20 MG tablet, Take 1 tablet by mouth daily Pharmacy: disregard prior 10 mg script, Disp: 30 tablet, Rfl: 3    Omega-3 Fatty Acids (FISH OIL) 1000 MG CAPS, Take 1,000 mg by mouth daily, Disp: , Rfl:     zinc gluconate 50 MG tablet, Take 50 mg by mouth daily, Disp: , Rfl:     Ascorbic Acid (VITAMIN C) 500 MG CAPS, Take 500 mg by mouth 2 times daily, Disp: , Rfl:     metoprolol succinate (TOPROL XL) 25 MG extended release tablet, Take 25 mg by mouth daily, Disp: , Rfl:     isosorbide mononitrate (IMDUR) 30 MG extended release tablet, Take 30 mg by mouth daily, Disp: , Rfl:     famotidine (PEPCID) 20 MG tablet, Take 20 mg by mouth as needed, Disp: , Rfl:     aspirin 81 MG EC tablet, Take 81 mg by mouth daily, Disp: , Rfl:     atorvastatin (LIPITOR) 40 MG tablet, Take 40 mg by mouth daily, Disp: , Rfl:     Coenzyme Q10 (COQ-10) 100 MG CAPS, Take by mouth, Disp: , Rfl:     omeprazole (PRILOSEC) 20 MG delayed release capsule, Take 1 capsule by mouth every morning (before breakfast), Disp: 30 capsule, Rfl: 5    Allergies   Allergen Reactions    Dust Mite Extract        Review of Systems   Constitutional: Negative for chills and fever. Respiratory: Positive for shortness of breath. Negative for cough. Cardiovascular: Negative for palpitations. CP at time of admission, none currently   Gastrointestinal: Negative for abdominal pain and constipation. Neurological: Negative for syncope and light-headedness.         Physical Exam  Constitutional:       General: He is not in acute distress. Cardiovascular:      Rate and Rhythm: Normal rate and regular rhythm. Pulmonary:      Effort: Pulmonary effort is normal.      Breath sounds: Normal breath sounds. Abdominal:      General: Abdomen is flat. Tenderness: There is no guarding. Musculoskeletal:         General: No swelling. Skin:     General: Skin is warm and dry. Neurological:      General: No focal deficit present. Mental Status: He is alert and oriented to person, place, and time. Psychiatric:         Mood and Affect: Mood normal.         Behavior: Behavior normal.            Assessment:   CAD                Plan:   MRI suggests everything is viable. CABG to LAD, OM and PDA (PDA is diffusely disease but I will try to graft into it to increase inflow). All risks, benefits, alternatives and potential complications explained thoroughly including, but not limited to, bleeding, infection, lung injury, kidney injury, stroke, heart attack, prolonged ventilation, wound complication, need for re-operation, and death, and the patient agrees to proceed.   PAT on 9/23 with OR 9/27 at 7am.

## 2021-09-14 NOTE — PROGRESS NOTES
Patient states has received the last dose of the COVID vaccine and is 2 weeks post last dose. Patient instructed to bring the [employer] card or a picture of the card,  PAT visit. Patient verbalized understanding.

## 2021-09-14 NOTE — PROGRESS NOTES
Subjective:      Patient ID: Flaquita Tran is a 78 y.o. male. CC: CAD, CP    This is a 78year old with known CAD including  of the LAD since at least 2006 who was having some chest pressure and was recently admitted last week. He states this pressure was relieved by belching. He also has a history of a Hiatal hernia. Cath was done and showed MVCAD. He underwent cardiac MRI to eval for viability due to reported apical \"scar\". This was done and shows normal LV size and function with EF 56% without evidence of prior infarct. He admits to worsening RILEY, but denies CP, SOB at rest, LE edema, N/V, F/C, orthopnea, PND and syncope. HPI    Review of Systems   Constitutional: Negative for chills and fever. Respiratory: Positive for shortness of breath. Negative for cough. Cardiovascular: Negative for palpitations. CP at time of admission, none currently   Gastrointestinal: Negative for abdominal pain and constipation. Neurological: Negative for syncope and light-headedness. Objective:   Physical Exam  Constitutional:       General: He is not in acute distress. Cardiovascular:      Rate and Rhythm: Normal rate and regular rhythm. Pulmonary:      Effort: Pulmonary effort is normal.      Breath sounds: Normal breath sounds. Abdominal:      General: Abdomen is flat. Tenderness: There is no guarding. Musculoskeletal:         General: No swelling. Skin:     General: Skin is warm and dry. Neurological:      General: No focal deficit present. Mental Status: He is alert and oriented to person, place, and time. Psychiatric:         Mood and Affect: Mood normal.         Behavior: Behavior normal.         Assessment:      CAD      Plan:      MRI suggests everything is viable. CABG to LAD, OM and PDA (PDA is diffusely disease but I will try to graft into it to increase inflow).   All risks, benefits, alternatives and potential complications explained thoroughly including, but not limited to, bleeding, infection, lung injury, kidney injury, stroke, heart attack, prolonged ventilation, wound complication, need for re-operation, and death, and the patient agrees to proceed.   PAT on 9/23 with OR 9/27 at 7am.

## 2021-09-16 ENCOUNTER — TELEPHONE (OUTPATIENT)
Dept: CARDIOTHORACIC SURGERY | Age: 76
End: 2021-09-16

## 2021-09-21 ENCOUNTER — OFFICE VISIT (OUTPATIENT)
Dept: FAMILY MEDICINE CLINIC | Age: 76
End: 2021-09-21
Payer: MEDICARE

## 2021-09-21 ENCOUNTER — TELEPHONE (OUTPATIENT)
Dept: NON INVASIVE DIAGNOSTICS | Age: 76
End: 2021-09-21

## 2021-09-21 VITALS
WEIGHT: 160.4 LBS | DIASTOLIC BLOOD PRESSURE: 82 MMHG | TEMPERATURE: 96.9 F | BODY MASS INDEX: 24.39 KG/M2 | SYSTOLIC BLOOD PRESSURE: 134 MMHG | HEART RATE: 65 BPM | OXYGEN SATURATION: 97 %

## 2021-09-21 DIAGNOSIS — I10 ESSENTIAL HYPERTENSION: ICD-10-CM

## 2021-09-21 DIAGNOSIS — I25.119 CORONARY ARTERY DISEASE INVOLVING NATIVE CORONARY ARTERY OF NATIVE HEART WITH ANGINA PECTORIS (HCC): Primary | ICD-10-CM

## 2021-09-21 DIAGNOSIS — K21.00 GASTROESOPHAGEAL REFLUX DISEASE WITH ESOPHAGITIS WITHOUT HEMORRHAGE: ICD-10-CM

## 2021-09-21 DIAGNOSIS — E78.49 OTHER HYPERLIPIDEMIA: ICD-10-CM

## 2021-09-21 PROCEDURE — 99495 TRANSJ CARE MGMT MOD F2F 14D: CPT | Performed by: FAMILY MEDICINE

## 2021-09-21 PROCEDURE — 1111F DSCHRG MED/CURRENT MED MERGE: CPT | Performed by: FAMILY MEDICINE

## 2021-09-21 ASSESSMENT — PATIENT HEALTH QUESTIONNAIRE - PHQ9
SUM OF ALL RESPONSES TO PHQ QUESTIONS 1-9: 0
SUM OF ALL RESPONSES TO PHQ QUESTIONS 1-9: 0
2. FEELING DOWN, DEPRESSED OR HOPELESS: 0
SUM OF ALL RESPONSES TO PHQ9 QUESTIONS 1 & 2: 0
1. LITTLE INTEREST OR PLEASURE IN DOING THINGS: 0
SUM OF ALL RESPONSES TO PHQ QUESTIONS 1-9: 0

## 2021-09-21 NOTE — PROGRESS NOTES
breakfast)             zinc gluconate 50 MG tablet  Take 50 mg by mouth daily                   Medications marked \"taking\" at this time  No outpatient medications have been marked as taking for the 9/21/21 encounter (Office Visit) with Abdiel Dominique MD.    was discharged on lisinopril 20 mg, did not fill and is not taking. Spent some time discussing his BP and need to be on this. Agreed to take 10 mg per day    Medications patient taking as of now reconciled against medications ordered at time of hospital discharge: Yes    Chief Complaint   Patient presents with    Follow-Up from [place] [address] Other     was ordered lisinopril 20mg daily at hospital, but not taking       History of Present illness - Follow up of Hospital diagnosis(es):Was diagnosed with multivessel CAD and scheduled for CABG on 9-24 under Dr. Jerad Franklin. Denies chest pain with ADLs, Says nothing he has to do before surgery that would require much effort    Inpatient course: Discharge summary reviewed- see chart. Interval history/Current status: Denies chest pain with normal ADLs. On metoprolol and Isosorbide mononitrate    A comprehensive review of systems was negative except for what was noted in the HPI. Vitals:    09/21/21 1122 09/21/21 1205   BP: (!) 164/82 134/82   Pulse: 65    Temp: 96.9 °F (36.1 °C)    TempSrc: Temporal    SpO2: 97%    Weight: 160 lb 6.4 oz (72.8 kg)      Body mass index is 24.39 kg/m². Wt Readings from Last 3 Encounters:   09/21/21 160 lb 6.4 oz (72.8 kg)   09/14/21 157 lb (71.2 kg)   09/09/21 161 lb (73 kg)     BP Readings from Last 3 Encounters:   09/21/21 134/82   09/14/21 (!) 170/83   09/10/21 (!) 165/73        Physical Exam:  HEENT wnl. No thyroid enlargement or carotid bruits. Elevated BP in office, fairly normal at home  Lungs clear to A&P, Cors RSR without murmurs, pulses normal. Abdomen without organomegally, masses or bruits. ROM of major joints normal. . No skin lesions.  Normal Neurological exam. Psych some tendency to do what he wants and ignore medical advice. Does follow a healthy lifestyle    Assessment/Plan:  1. Coronary artery disease involving native coronary artery of native heart with angina pectoris ([facility] [address]  Scheduled for 2-3 vessel CABG on Monday morning.   - GA DISCHARGE MEDS RECONCILED W/ CURRENT OUTPATIENT MED LIST    2. Other hyperlipidemia  Had restarted his statin prior to being seen    3. Essential hypertension  White coat component. Advised him to bring his cuff in so we can compare to ours. Agreed to start lisinopril 10 mg per day    4. Gastroesophageal reflux disease with esophagitis without hemorrhage  Confuses this with angina sometimes.  Discussed how to tell differance        Medical Decision Making: moderate complexity

## 2021-09-23 ENCOUNTER — HOSPITAL ENCOUNTER (OUTPATIENT)
Dept: PREADMISSION TESTING | Age: 76
Discharge: HOME | End: 2021-09-23
Payer: MEDICARE

## 2021-09-23 ENCOUNTER — HOSPITAL ENCOUNTER (OUTPATIENT)
Dept: NON INVASIVE DIAGNOSTICS | Age: 76
Discharge: HOME | DRG: 235 | End: 2021-09-23
Payer: MEDICARE

## 2021-09-23 VITALS
HEART RATE: 66 BPM | DIASTOLIC BLOOD PRESSURE: 89 MMHG | BODY MASS INDEX: 23.79 KG/M2 | OXYGEN SATURATION: 97 % | TEMPERATURE: 97.1 F | WEIGHT: 157 LBS | HEIGHT: 68 IN | SYSTOLIC BLOOD PRESSURE: 183 MMHG

## 2021-09-23 DIAGNOSIS — I25.10 CAD IN NATIVE ARTERY: ICD-10-CM

## 2021-09-23 LAB
ABO/RH: NORMAL
ALBUMIN SERPL-MCNC: 4.2 G/DL (ref 3.5–5.2)
ALP BLD-CCNC: 87 U/L (ref 40–129)
ALT SERPL-CCNC: 24 U/L (ref 0–40)
ANION GAP SERPL CALCULATED.3IONS-SCNC: 6 MMOL/L (ref 7–16)
ANTIBODY SCREEN: NORMAL
APTT: 32.5 SEC (ref 24.5–35.1)
AST SERPL-CCNC: 20 U/L (ref 0–39)
BILIRUB SERPL-MCNC: 0.8 MG/DL (ref 0–1.2)
BILIRUBIN URINE: NEGATIVE
BLOOD, URINE: NEGATIVE
BUN BLDV-MCNC: 14 MG/DL (ref 6–23)
CALCIUM SERPL-MCNC: 8.9 MG/DL (ref 8.6–10.2)
CHLORIDE BLD-SCNC: 102 MMOL/L (ref 98–107)
CLARITY: CLEAR
CO2: 31 MMOL/L (ref 22–29)
COLOR: YELLOW
CREAT SERPL-MCNC: 1 MG/DL (ref 0.7–1.2)
GFR AFRICAN AMERICAN: >60
GFR NON-AFRICAN AMERICAN: >60 ML/MIN/1.73
GLUCOSE BLD-MCNC: 96 MG/DL (ref 74–99)
GLUCOSE URINE: NEGATIVE MG/DL
HCT VFR BLD CALC: 44.6 % (ref 37–54)
HEMOGLOBIN: 15.4 G/DL (ref 12.5–16.5)
INR BLD: 1.1
KETONES, URINE: NEGATIVE MG/DL
LEUKOCYTE ESTERASE, URINE: NEGATIVE
LV EF: 55 %
LVEF MODALITY: NORMAL
MCH RBC QN AUTO: 30.5 PG (ref 26–35)
MCHC RBC AUTO-ENTMCNC: 34.5 % (ref 32–34.5)
MCV RBC AUTO: 88.3 FL (ref 80–99.9)
NITRITE, URINE: NEGATIVE
PDW BLD-RTO: 12.4 FL (ref 11.5–15)
PH UA: 6.5 (ref 5–9)
PLATELET # BLD: 141 E9/L (ref 130–450)
PMV BLD AUTO: 10.1 FL (ref 7–12)
POTASSIUM SERPL-SCNC: 4.2 MMOL/L (ref 3.5–5)
PROTEIN UA: NEGATIVE MG/DL
PROTHROMBIN TIME: 12 SEC (ref 9.3–12.4)
RBC # BLD: 5.05 E12/L (ref 3.8–5.8)
SODIUM BLD-SCNC: 139 MMOL/L (ref 132–146)
SPECIFIC GRAVITY UA: 1.01 (ref 1–1.03)
TOTAL PROTEIN: 6.6 G/DL (ref 6.4–8.3)
UROBILINOGEN, URINE: 0.2 E.U./DL
WBC # BLD: 7.3 E9/L (ref 4.5–11.5)

## 2021-09-23 PROCEDURE — 36415 COLL VENOUS BLD VENIPUNCTURE: CPT

## 2021-09-23 PROCEDURE — 93306 TTE W/DOPPLER COMPLETE: CPT

## 2021-09-23 PROCEDURE — 85610 PROTHROMBIN TIME: CPT

## 2021-09-23 PROCEDURE — 87088 URINE BACTERIA CULTURE: CPT

## 2021-09-23 PROCEDURE — 87081 CULTURE SCREEN ONLY: CPT

## 2021-09-23 PROCEDURE — 86850 RBC ANTIBODY SCREEN: CPT

## 2021-09-23 PROCEDURE — 86900 BLOOD TYPING SEROLOGIC ABO: CPT

## 2021-09-23 PROCEDURE — 85730 THROMBOPLASTIN TIME PARTIAL: CPT

## 2021-09-23 PROCEDURE — 86901 BLOOD TYPING SEROLOGIC RH(D): CPT

## 2021-09-23 PROCEDURE — 81003 URINALYSIS AUTO W/O SCOPE: CPT

## 2021-09-23 PROCEDURE — 85027 COMPLETE CBC AUTOMATED: CPT

## 2021-09-23 PROCEDURE — P9016 RBC LEUKOCYTES REDUCED: HCPCS

## 2021-09-23 PROCEDURE — 80053 COMPREHEN METABOLIC PANEL: CPT

## 2021-09-23 PROCEDURE — 86923 COMPATIBILITY TEST ELECTRIC: CPT

## 2021-09-23 RX ORDER — LISINOPRIL 10 MG/1
10 TABLET ORAL DAILY
Status: ON HOLD | COMMUNITY
End: 2021-10-27 | Stop reason: HOSPADM

## 2021-09-24 DIAGNOSIS — R91.1 PULMONARY NODULE: Primary | ICD-10-CM

## 2021-09-24 LAB — MRSA CULTURE ONLY: NORMAL

## 2021-09-24 NOTE — PROGRESS NOTES
Pre-operative testing review:   --carotids with no significant stenosis  --jazmin 0.92 on right and 0.97 on left   --ct chest reviewed- mult pulm nodules noted - will need to refer to to pulm as outpatient   --TTE with no significant LV dysfunction or valvular abnormalities  --pft with FEV1 113 percent of predicted and DLCO 81 percent of predicted  --hgA1c 5.1        [id number] Score for Atrial Fibrillation Stroke Risk   Risk   Factors  Component Value   C CHF  0   H HTN  1   A2 Age >= 75  0   D DM  0   S2 Prior Stroke/TIA  0   V Vascular Disease  0   A Age 74-69  1   Sc Sex  0    [id number]  Score  2       Disclaimer:   Definition and scores for CHADS2 and [id number]:    CHADS2 acronym  Score    Congestive HF  1  Hypertension   1  Age ? 75 years  1  Diabetes mellitus  1  Stroke/TIA/TE  2  Maximum score  6    [id number] acronym     Score  Congestive HF      1  Hypertension       1  Age ? 75 years      2  Diabetes mellitus      1  Stroke/TIA/TE      2  Vascular disease (prior MI, PAD, or aortic plaque) 1  Age 72 to 76 years      1  Sex category      female=1, male=0    Maximum score      9    Risk Score calculation is dependent on accuracy of patient problem list and past encounter diagnosis.

## 2021-09-25 LAB — URINE CULTURE, ROUTINE: NORMAL

## 2021-09-26 ENCOUNTER — ANESTHESIA EVENT (OUTPATIENT)
Dept: OPERATING ROOM | Age: 76
DRG: 235 | End: 2021-09-26
Payer: MEDICARE

## 2021-09-27 ENCOUNTER — APPOINTMENT (OUTPATIENT)
Dept: GENERAL RADIOLOGY | Age: 76
DRG: 235 | End: 2021-09-27
Attending: THORACIC SURGERY (CARDIOTHORACIC VASCULAR SURGERY)
Payer: MEDICARE

## 2021-09-27 ENCOUNTER — ANESTHESIA (OUTPATIENT)
Dept: OPERATING ROOM | Age: 76
DRG: 235 | End: 2021-09-27
Payer: MEDICARE

## 2021-09-27 ENCOUNTER — HOSPITAL ENCOUNTER (INPATIENT)
Age: 76
LOS: 16 days | Discharge: NURSING, REHAB OR LONG-TERM CARE | DRG: 235 | End: 2021-10-13
Attending: THORACIC SURGERY (CARDIOTHORACIC VASCULAR SURGERY) | Admitting: THORACIC SURGERY (CARDIOTHORACIC VASCULAR SURGERY)
Payer: MEDICARE

## 2021-09-27 VITALS — SYSTOLIC BLOOD PRESSURE: 144 MMHG | DIASTOLIC BLOOD PRESSURE: 63 MMHG | TEMPERATURE: 98.4 F | OXYGEN SATURATION: 100 %

## 2021-09-27 DIAGNOSIS — Z95.1 S/P CABG (CORONARY ARTERY BYPASS GRAFT): Primary | ICD-10-CM

## 2021-09-27 DIAGNOSIS — N17.9 AKI (ACUTE KIDNEY INJURY) (HCC): ICD-10-CM

## 2021-09-27 LAB
AADO2: 149.7 MMHG
AADO2: 532.1 MMHG
AADO2: 574 MMHG
AADO2: 589.8 MMHG
AADO2: 89.3 MMHG
ACTIVATED CLOTTING TIME: 154 SECONDS (ref 99–130)
ACTIVATED CLOTTING TIME: 508 SECONDS (ref 99–130)
ACTIVATED CLOTTING TIME: 618 SECONDS (ref 99–130)
ACTIVATED CLOTTING TIME: 727 SECONDS (ref 99–130)
ACTIVATED CLOTTING TIME: 98 SECONDS (ref 99–130)
ACTIVATED CLOTTING TIME: >1005 SECONDS (ref 99–130)
ANION GAP SERPL CALCULATED.3IONS-SCNC: 8 MMOL/L (ref 7–16)
ANION GAP: 12 MMOL/L (ref 7–16)
ANION GAP: 15 MMOL/L (ref 7–16)
APTT: 30.7 SEC (ref 24.5–35.1)
B.E.: -0.4 MMOL/L (ref -3–0)
B.E.: -10.9 MMOL/L (ref -3–3)
B.E.: -11.1 MMOL/L (ref -3–3)
B.E.: -2.4 MMOL/L (ref -3–0)
B.E.: -3.4 MMOL/L (ref -3–3)
B.E.: -5.1 MMOL/L (ref -3–3)
B.E.: -5.2 MMOL/L (ref -3–3)
B.E.: -7.8 MMOL/L (ref -3–3)
B.E.: 0.2 MMOL/L (ref -3–0)
B.E.: 0.9 MMOL/L (ref -3–0)
BUN BLDV-MCNC: 12 MG/DL (ref 6–23)
CALCIUM SERPL-MCNC: 8.6 MG/DL (ref 8.6–10.2)
CARDIOPULMONARY BYPASS: NO
CARDIOPULMONARY BYPASS: YES
CHLORIDE BLD-SCNC: 105 MMOL/L (ref 98–107)
CO2: 23 MMOL/L (ref 22–29)
COHB: 0 % (ref 0–1.5)
COHB: 0.2 % (ref 0–1.5)
COHB: 0.3 % (ref 0–1.5)
CREAT SERPL-MCNC: 0.8 MG/DL (ref 0.7–1.2)
CRITICAL NOTIFICATION: YES
CRITICAL NOTIFICATION: YES
CRITICAL: ABNORMAL
DATE ANALYZED: ABNORMAL
DATE OF COLLECTION: ABNORMAL
DEVICE: ABNORMAL
FIO2: 100 %
FIO2: 40 %
FIO2: 60 %
GFR AFRICAN AMERICAN: >60
GFR NON-AFRICAN AMERICAN: >60 ML/MIN/1.73
GFR, ESTIMATED: >60 ML/MIN/1.73
GLUCOSE BLD-MCNC: 121 MG/DL (ref 74–99)
GLUCOSE BLD-MCNC: 125 MG/DL (ref 74–99)
GLUCOSE BLD-MCNC: 151 MG/DL (ref 74–99)
GLUCOSE BLD-MCNC: 193 MG/DL (ref 74–99)
GLUCOSE BLD-MCNC: 218 MG/DL (ref 74–99)
HCO3 ARTERIAL: 20.3 MMOL/L (ref 22–26)
HCO3 ARTERIAL: 21.2 MMOL/L (ref 22–26)
HCO3 ARTERIAL: 22.4 MMOL/L (ref 22–26)
HCO3 ARTERIAL: 22.9 MMOL/L (ref 22–26)
HCO3: 14.1 MMOL/L (ref 22–26)
HCO3: 15.5 MMOL/L (ref 22–26)
HCO3: 17.8 MMOL/L (ref 22–26)
HCO3: 20 MMOL/L (ref 22–26)
HCO3: 20.2 MMOL/L (ref 22–26)
HCO3: 21.2 MMOL/L (ref 22–26)
HCT (EST): 25 % (ref 37–54)
HCT (EST): 25 % (ref 37–54)
HCT (EST): 26 % (ref 37–54)
HCT (EST): 32 % (ref 37–54)
HCT VFR BLD CALC: 33 % (ref 37–54)
HCT VFR BLD CALC: 37.9 % (ref 37–54)
HEMOGLOBIN: 11.6 G/DL (ref 12.5–16.5)
HEMOGLOBIN: 13.4 G/DL (ref 12.5–16.5)
HGB, (EST): 10.8 G/DL (ref 12.5–15.5)
HGB, (EST): 8.5 G/DL (ref 12.5–15.5)
HGB, (EST): 8.6 G/DL (ref 12.5–15.5)
HGB, (EST): 8.8 G/DL (ref 12.5–15.5)
HHB: 1.6 % (ref 0–5)
HHB: 1.9 % (ref 0–5)
HHB: 2.3 % (ref 0–5)
HHB: 2.6 % (ref 0–5)
HHB: 6.5 % (ref 0–5)
HHB: 7.8 % (ref 0–5)
INR BLD: 1.8
LAB: ABNORMAL
LACTIC ACID: 6.7 MMOL/L (ref 0.5–2.2)
LACTIC ACID: 7.9 MMOL/L (ref 0.5–2.2)
Lab: ABNORMAL
MAGNESIUM: 2 MG/DL (ref 1.6–2.6)
MAGNESIUM: 2.4 MG/DL (ref 1.6–2.6)
MCH RBC QN AUTO: 30.7 PG (ref 26–35)
MCHC RBC AUTO-ENTMCNC: 35.4 % (ref 32–34.5)
MCV RBC AUTO: 86.9 FL (ref 80–99.9)
METER GLUCOSE: 119 MG/DL (ref 74–99)
METER GLUCOSE: 122 MG/DL (ref 74–99)
METER GLUCOSE: 124 MG/DL (ref 74–99)
METHB: 0.1 % (ref 0–1.5)
METHB: 0.3 % (ref 0–1.5)
METHB: 0.4 % (ref 0–1.5)
METHB: 0.6 % (ref 0–1.5)
MODE: ABNORMAL
MODE: AC
O2 SATURATION: 100 % (ref 92–98.5)
O2 SATURATION: 92.2 % (ref 92–98.5)
O2 SATURATION: 93.5 % (ref 92–98.5)
O2 SATURATION: 97.4 % (ref 92–98.5)
O2 SATURATION: 97.7 % (ref 92–98.5)
O2 SATURATION: 98.1 % (ref 92–98.5)
O2 SATURATION: 98.4 % (ref 92–98.5)
O2HB: 91.7 % (ref 94–97)
O2HB: 92.8 % (ref 94–97)
O2HB: 96.5 % (ref 94–97)
O2HB: 96.8 % (ref 94–97)
O2HB: 97.5 % (ref 94–97)
O2HB: 98 % (ref 94–97)
OPERATOR ID: 3224
OPERATOR ID: 3224
OPERATOR ID: 7278
OPERATOR ID: ABNORMAL
PATIENT TEMP: 37 C
PCO2 ARTERIAL: 20.3 MMHG (ref 35–45)
PCO2 ARTERIAL: 22.2 MMHG (ref 35–45)
PCO2 ARTERIAL: 26.5 MMHG (ref 35–45)
PCO2 ARTERIAL: 37.6 MMHG (ref 35–45)
PCO2: 29.7 MMHG (ref 35–45)
PCO2: 36.5 MMHG (ref 35–45)
PCO2: 36.5 MMHG (ref 35–45)
PCO2: 36.7 MMHG (ref 35–45)
PCO2: 37.9 MMHG (ref 35–45)
PCO2: 38.3 MMHG (ref 35–45)
PDW BLD-RTO: 12.1 FL (ref 11.5–15)
PEEP/CPAP: 5 CMH2O
PEEP/CPAP: 5 CMH2O
PEEP/CPAP: 7 CMH2O
PFO2: 0.69 MMHG/%
PFO2: 0.77 MMHG/%
PFO2: 1.19 MMHG/%
PFO2: 3.56 MMHG/%
PFO2: 3.72 MMHG/%
PH BLOOD GAS: 7.25 (ref 7.35–7.45)
PH BLOOD GAS: 7.29 (ref 7.35–7.45)
PH BLOOD GAS: 7.3 (ref 7.35–7.45)
PH BLOOD GAS: 7.34 (ref 7.35–7.45)
PH BLOOD GAS: 7.34 (ref 7.35–7.45)
PH BLOOD GAS: 7.38 (ref 7.35–7.45)
PH BLOOD GAS: 7.38 (ref 7.35–7.45)
PH BLOOD GAS: 7.54 (ref 7.35–7.45)
PH BLOOD GAS: 7.59 (ref 7.35–7.45)
PH BLOOD GAS: 7.61 (ref 7.35–7.45)
PLATELET # BLD: 80 E9/L (ref 130–450)
PLATELET CONFIRMATION: NORMAL
PMV BLD AUTO: 10.8 FL (ref 7–12)
PO2 ARTERIAL: 285.4 MMHG (ref 60–80)
PO2 ARTERIAL: 390.9 MMHG (ref 60–80)
PO2 ARTERIAL: 418.4 MMHG (ref 60–80)
PO2 ARTERIAL: 422.7 MMHG (ref 60–80)
PO2: 119.2 MMHG (ref 75–100)
PO2: 131.7 MMHG (ref 75–100)
PO2: 142.3 MMHG (ref 75–100)
PO2: 223 MMHG (ref 75–100)
PO2: 68.5 MMHG (ref 75–100)
PO2: 77.5 MMHG (ref 75–100)
POC BUN: 12 MG/DL (ref 8–23)
POC BUN: 12 MG/DL (ref 8–23)
POC BUN: 13 MG/DL (ref 8–23)
POC BUN: 13 MG/DL (ref 8–23)
POC CHLORIDE: 100 MMOL/L (ref 100–108)
POC CHLORIDE: 104 MMOL/L (ref 100–108)
POC CHLORIDE: 96 MMOL/L (ref 100–108)
POC CHLORIDE: 97 MMOL/L (ref 100–108)
POC CO2: 20.6 MMOL/L (ref 22–29)
POC CO2: 21.5 MMOL/L (ref 22–29)
POC CO2: 23.1 MMOL/L (ref 22–29)
POC CO2: 23.1 MMOL/L (ref 22–29)
POC CREATININE: 0.9 MG/DL (ref 0.7–1.2)
POC CREATININE: 0.9 MG/DL (ref 0.7–1.2)
POC CREATININE: 1 MG/DL (ref 0.7–1.2)
POC CREATININE: 1.1 MG/DL (ref 0.7–1.2)
POC IONIZED CALCIUM: 0.9
POC IONIZED CALCIUM: 0.9
POC IONIZED CALCIUM: 1
POC IONIZED CALCIUM: 1.3
POC LACTIC ACID: 2.3
POC LACTIC ACID: 3.1
POC LACTIC ACID: 3.3
POC LACTIC ACID: 4.7
POC SODIUM: 129 MMOL/L (ref 132–146)
POC SODIUM: 132 MMOL/L (ref 132–146)
POC SODIUM: 136 MMOL/L (ref 132–146)
POC SODIUM: 139 MMOL/L (ref 132–146)
POTASSIUM SERPL-SCNC: 3.6 MMOL/L (ref 3.5–5.5)
POTASSIUM SERPL-SCNC: 3.66 MMOL/L (ref 3.5–5)
POTASSIUM SERPL-SCNC: 3.77 MMOL/L (ref 3.5–5)
POTASSIUM SERPL-SCNC: 3.86 MMOL/L (ref 3.5–5)
POTASSIUM SERPL-SCNC: 3.9 MMOL/L (ref 3.5–5)
POTASSIUM SERPL-SCNC: 4.5 MMOL/L (ref 3.5–5.5)
POTASSIUM SERPL-SCNC: 4.6 MMOL/L (ref 3.5–5.5)
POTASSIUM SERPL-SCNC: 4.7 MMOL/L (ref 3.5–5)
POTASSIUM SERPL-SCNC: 5.7 MMOL/L (ref 3.5–5.5)
PROTHROMBIN TIME: 19.2 SEC (ref 9.3–12.4)
PS: 14 CMH20
PS: 5 CMH20
RBC # BLD: 4.36 E12/L (ref 3.8–5.8)
RI(T): 0.63
RI(T): 0.67
RI(T): 4.46
RI(T): 7.41
RI(T): 8.61
RR MECHANICAL: 10 B/MIN
SODIUM BLD-SCNC: 136 MMOL/L (ref 132–146)
SOURCE, BLOOD GAS: ABNORMAL
THB: 11.1 G/DL (ref 11.5–16.5)
THB: 11.3 G/DL (ref 11.5–16.5)
THB: 11.6 G/DL (ref 11.5–16.5)
THB: 12.5 G/DL (ref 11.5–16.5)
THB: 12.8 G/DL (ref 11.5–16.5)
THB: 13.5 G/DL (ref 11.5–16.5)
TIME ANALYZED: 1146
TIME ANALYZED: 1307
TIME ANALYZED: 1418
TIME ANALYZED: 2112
TIME ANALYZED: 2115
TIME ANALYZED: 2217
VT MECHANICAL: 500 ML
WBC # BLD: 26.1 E9/L (ref 4.5–11.5)

## 2021-09-27 PROCEDURE — 5A02210 ASSISTANCE WITH CARDIAC OUTPUT USING BALLOON PUMP, CONTINUOUS: ICD-10-PCS | Performed by: THORACIC SURGERY (CARDIOTHORACIC VASCULAR SURGERY)

## 2021-09-27 PROCEDURE — 7100000001 HC PACU RECOVERY - ADDTL 15 MIN

## 2021-09-27 PROCEDURE — 36415 COLL VENOUS BLD VENIPUNCTURE: CPT

## 2021-09-27 PROCEDURE — 2580000003 HC RX 258: Performed by: NURSE PRACTITIONER

## 2021-09-27 PROCEDURE — 6370000000 HC RX 637 (ALT 250 FOR IP): Performed by: PHYSICIAN ASSISTANT

## 2021-09-27 PROCEDURE — 6360000002 HC RX W HCPCS: Performed by: NURSE PRACTITIONER

## 2021-09-27 PROCEDURE — 3700000001 HC ADD 15 MINUTES (ANESTHESIA): Performed by: THORACIC SURGERY (CARDIOTHORACIC VASCULAR SURGERY)

## 2021-09-27 PROCEDURE — 88304 TISSUE EXAM BY PATHOLOGIST: CPT

## 2021-09-27 PROCEDURE — 82805 BLOOD GASES W/O2 SATURATION: CPT

## 2021-09-27 PROCEDURE — 85347 COAGULATION TIME ACTIVATED: CPT

## 2021-09-27 PROCEDURE — 36556 INSERT NON-TUNNEL CV CATH: CPT

## 2021-09-27 PROCEDURE — 2580000003 HC RX 258

## 2021-09-27 PROCEDURE — P9045 ALBUMIN (HUMAN), 5%, 250 ML: HCPCS

## 2021-09-27 PROCEDURE — APPSS30 APP SPLIT SHARED TIME 16-30 MINUTES: Performed by: NURSE PRACTITIONER

## 2021-09-27 PROCEDURE — P9045 ALBUMIN (HUMAN), 5%, 250 ML: HCPCS | Performed by: NURSE PRACTITIONER

## 2021-09-27 PROCEDURE — 84132 ASSAY OF SERUM POTASSIUM: CPT

## 2021-09-27 PROCEDURE — 6360000002 HC RX W HCPCS: Performed by: THORACIC SURGERY (CARDIOTHORACIC VASCULAR SURGERY)

## 2021-09-27 PROCEDURE — 6360000002 HC RX W HCPCS

## 2021-09-27 PROCEDURE — 33518 CABG ARTERY-VEIN TWO: CPT | Performed by: PHYSICIAN ASSISTANT

## 2021-09-27 PROCEDURE — 02100Z9 BYPASS CORONARY ARTERY, ONE ARTERY FROM LEFT INTERNAL MAMMARY, OPEN APPROACH: ICD-10-PCS | Performed by: THORACIC SURGERY (CARDIOTHORACIC VASCULAR SURGERY)

## 2021-09-27 PROCEDURE — 32551 INSERTION OF CHEST TUBE: CPT

## 2021-09-27 PROCEDURE — 2580000003 HC RX 258: Performed by: THORACIC SURGERY (CARDIOTHORACIC VASCULAR SURGERY)

## 2021-09-27 PROCEDURE — 33572 OPEN CORONARY ENDARTERECTOMY: CPT | Performed by: THORACIC SURGERY (CARDIOTHORACIC VASCULAR SURGERY)

## 2021-09-27 PROCEDURE — 3E080GC INTRODUCTION OF OTHER THERAPEUTIC SUBSTANCE INTO HEART, OPEN APPROACH: ICD-10-PCS | Performed by: THORACIC SURGERY (CARDIOTHORACIC VASCULAR SURGERY)

## 2021-09-27 PROCEDURE — 2720000010 HC SURG SUPPLY STERILE: Performed by: THORACIC SURGERY (CARDIOTHORACIC VASCULAR SURGERY)

## 2021-09-27 PROCEDURE — 82803 BLOOD GASES ANY COMBINATION: CPT

## 2021-09-27 PROCEDURE — 2500000003 HC RX 250 WO HCPCS: Performed by: THORACIC SURGERY (CARDIOTHORACIC VASCULAR SURGERY)

## 2021-09-27 PROCEDURE — 3600000008 HC SURGERY OHS BASE: Performed by: THORACIC SURGERY (CARDIOTHORACIC VASCULAR SURGERY)

## 2021-09-27 PROCEDURE — 37799 UNLISTED PX VASCULAR SURGERY: CPT

## 2021-09-27 PROCEDURE — 33572 OPEN CORONARY ENDARTERECTOMY: CPT | Performed by: PHYSICIAN ASSISTANT

## 2021-09-27 PROCEDURE — 2500000003 HC RX 250 WO HCPCS: Performed by: NURSE PRACTITIONER

## 2021-09-27 PROCEDURE — 3600000018 HC SURGERY OHS ADDTL 15MIN: Performed by: THORACIC SURGERY (CARDIOTHORACIC VASCULAR SURGERY)

## 2021-09-27 PROCEDURE — 71045 X-RAY EXAM CHEST 1 VIEW: CPT

## 2021-09-27 PROCEDURE — 06BQ4ZZ EXCISION OF LEFT SAPHENOUS VEIN, PERCUTANEOUS ENDOSCOPIC APPROACH: ICD-10-PCS | Performed by: THORACIC SURGERY (CARDIOTHORACIC VASCULAR SURGERY)

## 2021-09-27 PROCEDURE — 2000000000 HC ICU R&B

## 2021-09-27 PROCEDURE — 2580000003 HC RX 258: Performed by: ANESTHESIOLOGY

## 2021-09-27 PROCEDURE — 021109W BYPASS CORONARY ARTERY, TWO ARTERIES FROM AORTA WITH AUTOLOGOUS VENOUS TISSUE, OPEN APPROACH: ICD-10-PCS | Performed by: THORACIC SURGERY (CARDIOTHORACIC VASCULAR SURGERY)

## 2021-09-27 PROCEDURE — 2709999900 HC NON-CHARGEABLE SUPPLY: Performed by: THORACIC SURGERY (CARDIOTHORACIC VASCULAR SURGERY)

## 2021-09-27 PROCEDURE — C1729 CATH, DRAINAGE: HCPCS | Performed by: THORACIC SURGERY (CARDIOTHORACIC VASCULAR SURGERY)

## 2021-09-27 PROCEDURE — C9113 INJ PANTOPRAZOLE SODIUM, VIA: HCPCS | Performed by: PHYSICIAN ASSISTANT

## 2021-09-27 PROCEDURE — 99233 SBSQ HOSP IP/OBS HIGH 50: CPT | Performed by: INTERNAL MEDICINE

## 2021-09-27 PROCEDURE — 85027 COMPLETE CBC AUTOMATED: CPT

## 2021-09-27 PROCEDURE — 36620 INSERTION CATHETER ARTERY: CPT

## 2021-09-27 PROCEDURE — 83735 ASSAY OF MAGNESIUM: CPT

## 2021-09-27 PROCEDURE — 85014 HEMATOCRIT: CPT

## 2021-09-27 PROCEDURE — P9047 ALBUMIN (HUMAN), 25%, 50ML: HCPCS | Performed by: THORACIC SURGERY (CARDIOTHORACIC VASCULAR SURGERY)

## 2021-09-27 PROCEDURE — 94002 VENT MGMT INPAT INIT DAY: CPT

## 2021-09-27 PROCEDURE — 94640 AIRWAY INHALATION TREATMENT: CPT

## 2021-09-27 PROCEDURE — 2580000003 HC RX 258: Performed by: PHYSICIAN ASSISTANT

## 2021-09-27 PROCEDURE — 85610 PROTHROMBIN TIME: CPT

## 2021-09-27 PROCEDURE — 6370000000 HC RX 637 (ALT 250 FOR IP): Performed by: ANESTHESIOLOGY

## 2021-09-27 PROCEDURE — 83605 ASSAY OF LACTIC ACID: CPT

## 2021-09-27 PROCEDURE — 6360000002 HC RX W HCPCS: Performed by: PHYSICIAN ASSISTANT

## 2021-09-27 PROCEDURE — 6370000000 HC RX 637 (ALT 250 FOR IP): Performed by: THORACIC SURGERY (CARDIOTHORACIC VASCULAR SURGERY)

## 2021-09-27 PROCEDURE — 3700000000 HC ANESTHESIA ATTENDED CARE: Performed by: THORACIC SURGERY (CARDIOTHORACIC VASCULAR SURGERY)

## 2021-09-27 PROCEDURE — 94660 CPAP INITIATION&MGMT: CPT

## 2021-09-27 PROCEDURE — 2500000003 HC RX 250 WO HCPCS

## 2021-09-27 PROCEDURE — 82962 GLUCOSE BLOOD TEST: CPT

## 2021-09-27 PROCEDURE — 80048 BASIC METABOLIC PNL TOTAL CA: CPT

## 2021-09-27 PROCEDURE — 33533 CABG ARTERIAL SINGLE: CPT | Performed by: THORACIC SURGERY (CARDIOTHORACIC VASCULAR SURGERY)

## 2021-09-27 PROCEDURE — 85730 THROMBOPLASTIN TIME PARTIAL: CPT

## 2021-09-27 PROCEDURE — 94664 DEMO&/EVAL PT USE INHALER: CPT

## 2021-09-27 PROCEDURE — C1713 ANCHOR/SCREW BN/BN,TIS/BN: HCPCS | Performed by: THORACIC SURGERY (CARDIOTHORACIC VASCULAR SURGERY)

## 2021-09-27 PROCEDURE — 85018 HEMOGLOBIN: CPT

## 2021-09-27 PROCEDURE — P9045 ALBUMIN (HUMAN), 5%, 250 ML: HCPCS | Performed by: PHYSICIAN ASSISTANT

## 2021-09-27 PROCEDURE — 33533 CABG ARTERIAL SINGLE: CPT | Performed by: PHYSICIAN ASSISTANT

## 2021-09-27 PROCEDURE — 02L70CK OCCLUSION OF LEFT ATRIAL APPENDAGE WITH EXTRALUMINAL DEVICE, OPEN APPROACH: ICD-10-PCS | Performed by: THORACIC SURGERY (CARDIOTHORACIC VASCULAR SURGERY)

## 2021-09-27 PROCEDURE — 99291 CRITICAL CARE FIRST HOUR: CPT | Performed by: NURSE PRACTITIONER

## 2021-09-27 PROCEDURE — A4648 IMPLANTABLE TISSUE MARKER: HCPCS | Performed by: THORACIC SURGERY (CARDIOTHORACIC VASCULAR SURGERY)

## 2021-09-27 PROCEDURE — 33518 CABG ARTERY-VEIN TWO: CPT | Performed by: THORACIC SURGERY (CARDIOTHORACIC VASCULAR SURGERY)

## 2021-09-27 PROCEDURE — 5A1955Z RESPIRATORY VENTILATION, GREATER THAN 96 CONSECUTIVE HOURS: ICD-10-PCS | Performed by: THORACIC SURGERY (CARDIOTHORACIC VASCULAR SURGERY)

## 2021-09-27 PROCEDURE — 2500000003 HC RX 250 WO HCPCS: Performed by: PHYSICIAN ASSISTANT

## 2021-09-27 PROCEDURE — 7100000000 HC PACU RECOVERY - FIRST 15 MIN

## 2021-09-27 PROCEDURE — 33508 ENDOSCOPIC VEIN HARVEST: CPT | Performed by: THORACIC SURGERY (CARDIOTHORACIC VASCULAR SURGERY)

## 2021-09-27 PROCEDURE — 6360000002 HC RX W HCPCS: Performed by: ANESTHESIOLOGY

## 2021-09-27 DEVICE — LOCKING SCREW,AXS,SELF-DRILLING
Type: IMPLANTABLE DEVICE | Site: STERNUM | Status: FUNCTIONAL
Brand: AXS, SMARTLOCK

## 2021-09-27 DEVICE — DEVICE OCCL CLP L35MM PLUNG GRP FLX SHFT FOR GILLINOV: Type: IMPLANTABLE DEVICE | Site: HEART | Status: FUNCTIONAL

## 2021-09-27 RX ORDER — ALBUMIN (HUMAN) 12.5 G/50ML
50 SOLUTION INTRAVENOUS ONCE
Status: COMPLETED | OUTPATIENT
Start: 2021-09-27 | End: 2021-09-27

## 2021-09-27 RX ORDER — TAMSULOSIN HYDROCHLORIDE 0.4 MG/1
0.4 CAPSULE ORAL DAILY
Status: DISCONTINUED | OUTPATIENT
Start: 2021-09-28 | End: 2021-10-07

## 2021-09-27 RX ORDER — SODIUM CHLORIDE 9 MG/ML
25 INJECTION, SOLUTION INTRAVENOUS PRN
Status: DISCONTINUED | OUTPATIENT
Start: 2021-09-27 | End: 2021-09-29 | Stop reason: SDUPTHER

## 2021-09-27 RX ORDER — ALBUMIN, HUMAN INJ 5% 5 %
SOLUTION INTRAVENOUS
Status: COMPLETED
Start: 2021-09-27 | End: 2021-09-27

## 2021-09-27 RX ORDER — SODIUM CHLORIDE 9 MG/ML
30 INJECTION, SOLUTION INTRAVENOUS CONTINUOUS
Status: DISCONTINUED | OUTPATIENT
Start: 2021-09-27 | End: 2021-10-04

## 2021-09-27 RX ORDER — CALCIUM CHLORIDE 100 MG/ML
INJECTION INTRAVENOUS; INTRAVENTRICULAR PRN
Status: DISCONTINUED | OUTPATIENT
Start: 2021-09-27 | End: 2021-09-27 | Stop reason: SDUPTHER

## 2021-09-27 RX ORDER — ASPIRIN 81 MG/1
81 TABLET ORAL DAILY
Status: DISCONTINUED | OUTPATIENT
Start: 2021-09-28 | End: 2021-09-28

## 2021-09-27 RX ORDER — 0.9 % SODIUM CHLORIDE 0.9 %
250 INTRAVENOUS SOLUTION INTRAVENOUS CONTINUOUS PRN
Status: DISCONTINUED | OUTPATIENT
Start: 2021-09-27 | End: 2021-10-07

## 2021-09-27 RX ORDER — KETOROLAC TROMETHAMINE 30 MG/ML
INJECTION, SOLUTION INTRAMUSCULAR; INTRAVENOUS
Status: DISPENSED
Start: 2021-09-27 | End: 2021-09-28

## 2021-09-27 RX ORDER — ALBUMIN, HUMAN INJ 5% 5 %
25 SOLUTION INTRAVENOUS PRN
Status: DISCONTINUED | OUTPATIENT
Start: 2021-09-27 | End: 2021-10-10

## 2021-09-27 RX ORDER — ATORVASTATIN CALCIUM 40 MG/1
40 TABLET, FILM COATED ORAL DAILY
Status: DISCONTINUED | OUTPATIENT
Start: 2021-09-27 | End: 2021-10-13 | Stop reason: HOSPADM

## 2021-09-27 RX ORDER — SENNA PLUS 8.6 MG/1
1 TABLET ORAL NIGHTLY
Status: DISCONTINUED | OUTPATIENT
Start: 2021-09-28 | End: 2021-09-27 | Stop reason: SDUPTHER

## 2021-09-27 RX ORDER — CHLORHEXIDINE GLUCONATE 0.12 MG/ML
15 RINSE ORAL 2 TIMES DAILY
Status: DISCONTINUED | OUTPATIENT
Start: 2021-09-27 | End: 2021-09-28

## 2021-09-27 RX ORDER — MEPERIDINE HYDROCHLORIDE 50 MG/ML
25 INJECTION INTRAMUSCULAR; INTRAVENOUS; SUBCUTANEOUS
Status: ACTIVE | OUTPATIENT
Start: 2021-09-27 | End: 2021-09-27

## 2021-09-27 RX ORDER — SODIUM CHLORIDE 9 MG/ML
10 INJECTION INTRAVENOUS DAILY
Status: COMPLETED | OUTPATIENT
Start: 2021-09-27 | End: 2021-09-27

## 2021-09-27 RX ORDER — NICOTINE POLACRILEX 4 MG
15 LOZENGE BUCCAL PRN
Status: DISCONTINUED | OUTPATIENT
Start: 2021-09-27 | End: 2021-10-10

## 2021-09-27 RX ORDER — PROPOFOL 10 MG/ML
10 INJECTION, EMULSION INTRAVENOUS CONTINUOUS PRN
Status: DISCONTINUED | OUTPATIENT
Start: 2021-09-27 | End: 2021-09-28

## 2021-09-27 RX ORDER — SODIUM CHLORIDE 0.9 % (FLUSH) 0.9 %
10 SYRINGE (ML) INJECTION EVERY 12 HOURS SCHEDULED
Status: DISCONTINUED | OUTPATIENT
Start: 2021-09-27 | End: 2021-09-27 | Stop reason: HOSPADM

## 2021-09-27 RX ORDER — CHLORHEXIDINE GLUCONATE 0.12 MG/ML
15 RINSE ORAL ONCE
Status: COMPLETED | OUTPATIENT
Start: 2021-09-27 | End: 2021-09-27

## 2021-09-27 RX ORDER — INSULIN GLARGINE 100 [IU]/ML
0.15 INJECTION, SOLUTION SUBCUTANEOUS NIGHTLY
Status: DISCONTINUED | OUTPATIENT
Start: 2021-09-28 | End: 2021-09-28

## 2021-09-27 RX ORDER — VECURONIUM BROMIDE 1 MG/ML
INJECTION, POWDER, LYOPHILIZED, FOR SOLUTION INTRAVENOUS PRN
Status: DISCONTINUED | OUTPATIENT
Start: 2021-09-27 | End: 2021-09-27 | Stop reason: SDUPTHER

## 2021-09-27 RX ORDER — PANTOPRAZOLE SODIUM 40 MG/1
40 TABLET, DELAYED RELEASE ORAL DAILY
Status: DISCONTINUED | OUTPATIENT
Start: 2021-09-28 | End: 2021-09-28

## 2021-09-27 RX ORDER — PROTAMINE SULFATE 10 MG/ML
INJECTION, SOLUTION INTRAVENOUS PRN
Status: DISCONTINUED | OUTPATIENT
Start: 2021-09-27 | End: 2021-09-27 | Stop reason: SDUPTHER

## 2021-09-27 RX ORDER — ACETAMINOPHEN 650 MG/1
650 SUPPOSITORY RECTAL EVERY 4 HOURS PRN
Status: DISCONTINUED | OUTPATIENT
Start: 2021-09-27 | End: 2021-10-10

## 2021-09-27 RX ORDER — MAGNESIUM SULFATE IN WATER 40 MG/ML
2000 INJECTION, SOLUTION INTRAVENOUS PRN
Status: DISCONTINUED | OUTPATIENT
Start: 2021-09-27 | End: 2021-10-10

## 2021-09-27 RX ORDER — CEFAZOLIN SODIUM 1 G/3ML
INJECTION, POWDER, FOR SOLUTION INTRAMUSCULAR; INTRAVENOUS PRN
Status: DISCONTINUED | OUTPATIENT
Start: 2021-09-27 | End: 2021-09-27 | Stop reason: SDUPTHER

## 2021-09-27 RX ORDER — SODIUM CHLORIDE 9 MG/ML
INJECTION, SOLUTION INTRAVENOUS CONTINUOUS
Status: DISCONTINUED | OUTPATIENT
Start: 2021-09-27 | End: 2021-09-27

## 2021-09-27 RX ORDER — SODIUM CHLORIDE 0.9 % (FLUSH) 0.9 %
10 SYRINGE (ML) INJECTION PRN
Status: DISCONTINUED | OUTPATIENT
Start: 2021-09-27 | End: 2021-09-29 | Stop reason: SDUPTHER

## 2021-09-27 RX ORDER — PROPOFOL 10 MG/ML
INJECTION, EMULSION INTRAVENOUS PRN
Status: DISCONTINUED | OUTPATIENT
Start: 2021-09-27 | End: 2021-09-27 | Stop reason: SDUPTHER

## 2021-09-27 RX ORDER — ACETAMINOPHEN 325 MG/1
650 TABLET ORAL EVERY 4 HOURS PRN
Status: DISCONTINUED | OUTPATIENT
Start: 2021-09-27 | End: 2021-10-10

## 2021-09-27 RX ORDER — SODIUM CHLORIDE 0.9 % (FLUSH) 0.9 %
10 SYRINGE (ML) INJECTION PRN
Status: DISCONTINUED | OUTPATIENT
Start: 2021-09-27 | End: 2021-09-27 | Stop reason: HOSPADM

## 2021-09-27 RX ORDER — VASOPRESSIN 20 U/ML
INJECTION PARENTERAL PRN
Status: DISCONTINUED | OUTPATIENT
Start: 2021-09-27 | End: 2021-09-27 | Stop reason: SDUPTHER

## 2021-09-27 RX ORDER — KETOROLAC TROMETHAMINE 30 MG/ML
15 INJECTION, SOLUTION INTRAMUSCULAR; INTRAVENOUS ONCE
Status: COMPLETED | OUTPATIENT
Start: 2021-09-27 | End: 2021-09-27

## 2021-09-27 RX ORDER — LIDOCAINE HYDROCHLORIDE 20 MG/ML
INJECTION, SOLUTION INTRAVENOUS PRN
Status: DISCONTINUED | OUTPATIENT
Start: 2021-09-27 | End: 2021-09-27 | Stop reason: SDUPTHER

## 2021-09-27 RX ORDER — SODIUM CHLORIDE 0.9 % (FLUSH) 0.9 %
10 SYRINGE (ML) INJECTION EVERY 12 HOURS SCHEDULED
Status: DISCONTINUED | OUTPATIENT
Start: 2021-09-27 | End: 2021-09-29 | Stop reason: SDUPTHER

## 2021-09-27 RX ORDER — CHLORHEXIDINE GLUCONATE 4 G/100ML
SOLUTION TOPICAL ONCE
Status: DISCONTINUED | OUTPATIENT
Start: 2021-09-27 | End: 2021-09-27 | Stop reason: HOSPADM

## 2021-09-27 RX ORDER — CLOPIDOGREL BISULFATE 75 MG/1
75 TABLET ORAL DAILY
Status: DISCONTINUED | OUTPATIENT
Start: 2021-09-28 | End: 2021-10-06

## 2021-09-27 RX ORDER — ONDANSETRON 2 MG/ML
4 INJECTION INTRAMUSCULAR; INTRAVENOUS EVERY 8 HOURS PRN
Status: DISCONTINUED | OUTPATIENT
Start: 2021-09-27 | End: 2021-10-10

## 2021-09-27 RX ORDER — PHENYLEPHRINE HYDROCHLORIDE 10 MG/ML
INJECTION INTRAVENOUS PRN
Status: DISCONTINUED | OUTPATIENT
Start: 2021-09-27 | End: 2021-09-27 | Stop reason: SDUPTHER

## 2021-09-27 RX ORDER — PROPOFOL 10 MG/ML
INJECTION, EMULSION INTRAVENOUS
Status: COMPLETED
Start: 2021-09-27 | End: 2021-09-27

## 2021-09-27 RX ORDER — AMINOCAPROIC ACID 250 MG/ML
INJECTION, SOLUTION INTRAVENOUS PRN
Status: DISCONTINUED | OUTPATIENT
Start: 2021-09-27 | End: 2021-09-27 | Stop reason: SDUPTHER

## 2021-09-27 RX ORDER — EPINEPHRINE 1 MG/ML
INJECTION INTRAMUSCULAR; INTRAVENOUS; SUBCUTANEOUS PRN
Status: DISCONTINUED | OUTPATIENT
Start: 2021-09-27 | End: 2021-09-27 | Stop reason: SDUPTHER

## 2021-09-27 RX ORDER — SODIUM CHLORIDE 9 MG/ML
25 INJECTION, SOLUTION INTRAVENOUS PRN
Status: DISCONTINUED | OUTPATIENT
Start: 2021-09-27 | End: 2021-09-27 | Stop reason: HOSPADM

## 2021-09-27 RX ORDER — IPRATROPIUM BROMIDE AND ALBUTEROL SULFATE 2.5; .5 MG/3ML; MG/3ML
1 SOLUTION RESPIRATORY (INHALATION)
Status: DISCONTINUED | OUTPATIENT
Start: 2021-09-27 | End: 2021-10-13 | Stop reason: HOSPADM

## 2021-09-27 RX ORDER — DEXTROSE MONOHYDRATE 50 MG/ML
100 INJECTION, SOLUTION INTRAVENOUS PRN
Status: DISCONTINUED | OUTPATIENT
Start: 2021-09-27 | End: 2021-10-10

## 2021-09-27 RX ORDER — ALBUMIN, HUMAN INJ 5% 5 %
25 SOLUTION INTRAVENOUS ONCE
Status: COMPLETED | OUTPATIENT
Start: 2021-09-27 | End: 2021-09-27

## 2021-09-27 RX ORDER — SODIUM CHLORIDE, SODIUM LACTATE, POTASSIUM CHLORIDE, CALCIUM CHLORIDE 600; 310; 30; 20 MG/100ML; MG/100ML; MG/100ML; MG/100ML
INJECTION, SOLUTION INTRAVENOUS CONTINUOUS PRN
Status: DISCONTINUED | OUTPATIENT
Start: 2021-09-27 | End: 2021-09-27 | Stop reason: SDUPTHER

## 2021-09-27 RX ORDER — FENTANYL CITRATE 50 UG/ML
25 INJECTION, SOLUTION INTRAMUSCULAR; INTRAVENOUS
Status: DISCONTINUED | OUTPATIENT
Start: 2021-09-27 | End: 2021-09-28

## 2021-09-27 RX ORDER — PANTOPRAZOLE SODIUM 40 MG/10ML
40 INJECTION, POWDER, LYOPHILIZED, FOR SOLUTION INTRAVENOUS DAILY
Status: COMPLETED | OUTPATIENT
Start: 2021-09-27 | End: 2021-09-27

## 2021-09-27 RX ORDER — MIDAZOLAM HYDROCHLORIDE 1 MG/ML
INJECTION INTRAMUSCULAR; INTRAVENOUS PRN
Status: DISCONTINUED | OUTPATIENT
Start: 2021-09-27 | End: 2021-09-27 | Stop reason: SDUPTHER

## 2021-09-27 RX ORDER — ASPIRIN 300 MG/1
300 SUPPOSITORY RECTAL ONCE
Status: COMPLETED | OUTPATIENT
Start: 2021-09-27 | End: 2021-09-27

## 2021-09-27 RX ORDER — OXYCODONE HYDROCHLORIDE 10 MG/1
10 TABLET ORAL EVERY 4 HOURS PRN
Status: DISCONTINUED | OUTPATIENT
Start: 2021-09-27 | End: 2021-10-10

## 2021-09-27 RX ORDER — FENTANYL CITRATE 0.05 MG/ML
INJECTION, SOLUTION INTRAMUSCULAR; INTRAVENOUS PRN
Status: DISCONTINUED | OUTPATIENT
Start: 2021-09-27 | End: 2021-09-27 | Stop reason: SDUPTHER

## 2021-09-27 RX ORDER — POTASSIUM CHLORIDE 29.8 MG/ML
20 INJECTION INTRAVENOUS PRN
Status: DISCONTINUED | OUTPATIENT
Start: 2021-09-27 | End: 2021-10-13 | Stop reason: HOSPADM

## 2021-09-27 RX ORDER — OXYCODONE HYDROCHLORIDE 5 MG/1
5 TABLET ORAL EVERY 4 HOURS PRN
Status: DISCONTINUED | OUTPATIENT
Start: 2021-09-27 | End: 2021-10-10

## 2021-09-27 RX ORDER — FENTANYL CITRATE 50 UG/ML
50 INJECTION, SOLUTION INTRAMUSCULAR; INTRAVENOUS
Status: DISCONTINUED | OUTPATIENT
Start: 2021-09-27 | End: 2021-09-28

## 2021-09-27 RX ORDER — DEXTROSE MONOHYDRATE 25 G/50ML
12.5 INJECTION, SOLUTION INTRAVENOUS PRN
Status: DISCONTINUED | OUTPATIENT
Start: 2021-09-27 | End: 2021-10-10

## 2021-09-27 RX ORDER — ALBUMIN, HUMAN INJ 5% 5 %
25 SOLUTION INTRAVENOUS ONCE
Status: COMPLETED | OUTPATIENT
Start: 2021-09-27 | End: 2021-09-28

## 2021-09-27 RX ORDER — HEPARIN SODIUM 10000 [USP'U]/ML
INJECTION, SOLUTION INTRAVENOUS; SUBCUTANEOUS PRN
Status: DISCONTINUED | OUTPATIENT
Start: 2021-09-27 | End: 2021-09-27 | Stop reason: SDUPTHER

## 2021-09-27 RX ORDER — SENNA AND DOCUSATE SODIUM 50; 8.6 MG/1; MG/1
1 TABLET, FILM COATED ORAL 2 TIMES DAILY
Status: DISCONTINUED | OUTPATIENT
Start: 2021-09-27 | End: 2021-09-28

## 2021-09-27 RX ADMIN — CALCIUM CHLORIDE 1 G: 100 INJECTION INTRAVENOUS; INTRAVENTRICULAR at 10:23

## 2021-09-27 RX ADMIN — EPINEPHRINE 3 MCG/MIN: 1 INJECTION INTRAMUSCULAR; INTRAVENOUS; SUBCUTANEOUS at 21:55

## 2021-09-27 RX ADMIN — EPINEPHRINE 10 MCG: 1 INJECTION INTRAMUSCULAR; INTRAVENOUS; SUBCUTANEOUS at 08:16

## 2021-09-27 RX ADMIN — METHYLENE BLUE 108 MG: 5 INJECTION INTRAVENOUS at 20:45

## 2021-09-27 RX ADMIN — FENTANYL CITRATE 25 MCG: 0.05 INJECTION, SOLUTION INTRAMUSCULAR; INTRAVENOUS at 13:39

## 2021-09-27 RX ADMIN — ALBUMIN (HUMAN) 25 G: 12.5 INJECTION, SOLUTION INTRAVENOUS at 13:29

## 2021-09-27 RX ADMIN — MUPIROCIN: 20 OINTMENT TOPICAL at 13:57

## 2021-09-27 RX ADMIN — ALBUMIN (HUMAN) 50 G: 0.25 INJECTION, SOLUTION INTRAVENOUS at 22:38

## 2021-09-27 RX ADMIN — PROPOFOL 10 MCG/KG/MIN: 10 INJECTION, EMULSION INTRAVENOUS at 11:30

## 2021-09-27 RX ADMIN — Medication 0.04 MCG/KG/MIN: at 09:59

## 2021-09-27 RX ADMIN — PROPOFOL 150 MG: 10 INJECTION, EMULSION INTRAVENOUS at 06:57

## 2021-09-27 RX ADMIN — VASOPRESSIN 1 UNITS: 20 INJECTION INTRAVENOUS at 09:54

## 2021-09-27 RX ADMIN — PHENYLEPHRINE HYDROCHLORIDE 100 MCG: 10 INJECTION, SOLUTION INTRAVENOUS at 07:15

## 2021-09-27 RX ADMIN — SODIUM CHLORIDE 30 ML/HR: 9 INJECTION, SOLUTION INTRAVENOUS at 11:32

## 2021-09-27 RX ADMIN — PHENYLEPHRINE HYDROCHLORIDE 100 MCG: 10 INJECTION, SOLUTION INTRAVENOUS at 07:07

## 2021-09-27 RX ADMIN — PHENYLEPHRINE HYDROCHLORIDE 100 MCG: 10 INJECTION, SOLUTION INTRAVENOUS at 08:07

## 2021-09-27 RX ADMIN — KETOROLAC TROMETHAMINE 15 MG: 30 INJECTION, SOLUTION INTRAMUSCULAR at 21:15

## 2021-09-27 RX ADMIN — AMIODARONE HYDROCHLORIDE 0.5 MG/MIN: 50 INJECTION, SOLUTION INTRAVENOUS at 19:05

## 2021-09-27 RX ADMIN — FENTANYL CITRATE 150 MCG: 50 INJECTION, SOLUTION INTRAMUSCULAR; INTRAVENOUS at 06:57

## 2021-09-27 RX ADMIN — HYDROXOCOBALAMIN 5 G: 5 INJECTION, POWDER, LYOPHILIZED, FOR SOLUTION INTRAVENOUS at 20:52

## 2021-09-27 RX ADMIN — IPRATROPIUM BROMIDE AND ALBUTEROL SULFATE 1 AMPULE: .5; 2.5 SOLUTION RESPIRATORY (INHALATION) at 22:36

## 2021-09-27 RX ADMIN — PHENYLEPHRINE HYDROCHLORIDE 50 MCG: 10 INJECTION, SOLUTION INTRAVENOUS at 07:24

## 2021-09-27 RX ADMIN — PROTAMINE SULFATE 350 MG: 10 INJECTION, SOLUTION INTRAVENOUS at 10:17

## 2021-09-27 RX ADMIN — SODIUM CHLORIDE, POTASSIUM CHLORIDE, SODIUM LACTATE AND CALCIUM CHLORIDE: 600; 310; 30; 20 INJECTION, SOLUTION INTRAVENOUS at 06:38

## 2021-09-27 RX ADMIN — ALBUMIN (HUMAN) 50 G: 0.25 INJECTION, SOLUTION INTRAVENOUS at 19:39

## 2021-09-27 RX ADMIN — SODIUM BICARBONATE 100 MEQ: 84 INJECTION INTRAVENOUS at 21:18

## 2021-09-27 RX ADMIN — ALBUMIN (HUMAN) 25 G: 12.5 INJECTION, SOLUTION INTRAVENOUS at 11:38

## 2021-09-27 RX ADMIN — Medication 2000 MG: at 06:59

## 2021-09-27 RX ADMIN — EPINEPHRINE 10 MCG: 1 INJECTION INTRAMUSCULAR; INTRAVENOUS; SUBCUTANEOUS at 08:20

## 2021-09-27 RX ADMIN — VECURONIUM BROMIDE 20 MG: 10 INJECTION, POWDER, LYOPHILIZED, FOR SOLUTION INTRAVENOUS at 06:57

## 2021-09-27 RX ADMIN — VASOPRESSIN 0.06 UNITS/MIN: 20 INJECTION INTRAVENOUS at 23:55

## 2021-09-27 RX ADMIN — VASOPRESSIN 0.02 UNITS/MIN: 20 INJECTION INTRAVENOUS at 19:39

## 2021-09-27 RX ADMIN — PHENYLEPHRINE HYDROCHLORIDE 100 MCG: 10 INJECTION, SOLUTION INTRAVENOUS at 08:05

## 2021-09-27 RX ADMIN — SODIUM CHLORIDE, PRESERVATIVE FREE 10 ML: 5 INJECTION INTRAVENOUS at 11:49

## 2021-09-27 RX ADMIN — HYDROXOCOBALAMIN 5 G: 5 INJECTION, POWDER, LYOPHILIZED, FOR SOLUTION INTRAVENOUS at 10:24

## 2021-09-27 RX ADMIN — PHENYLEPHRINE HYDROCHLORIDE 100 MCG: 10 INJECTION, SOLUTION INTRAVENOUS at 08:12

## 2021-09-27 RX ADMIN — SODIUM CHLORIDE 4 UNITS/HR: 9 INJECTION, SOLUTION INTRAVENOUS at 08:33

## 2021-09-27 RX ADMIN — SODIUM CHLORIDE: 9 INJECTION, SOLUTION INTRAVENOUS at 06:38

## 2021-09-27 RX ADMIN — PHENYLEPHRINE HYDROCHLORIDE 100 MCG: 10 INJECTION, SOLUTION INTRAVENOUS at 07:49

## 2021-09-27 RX ADMIN — VASOPRESSIN 1 UNITS: 20 INJECTION INTRAVENOUS at 09:52

## 2021-09-27 RX ADMIN — Medication 10 ML: at 11:52

## 2021-09-27 RX ADMIN — VECURONIUM BROMIDE 5 MG: 10 INJECTION, POWDER, LYOPHILIZED, FOR SOLUTION INTRAVENOUS at 09:49

## 2021-09-27 RX ADMIN — FENTANYL CITRATE 150 MCG: 50 INJECTION, SOLUTION INTRAMUSCULAR; INTRAVENOUS at 10:38

## 2021-09-27 RX ADMIN — PHENYLEPHRINE HYDROCHLORIDE 100 MCG: 10 INJECTION, SOLUTION INTRAVENOUS at 08:30

## 2021-09-27 RX ADMIN — FENTANYL CITRATE 100 MCG: 50 INJECTION, SOLUTION INTRAMUSCULAR; INTRAVENOUS at 10:36

## 2021-09-27 RX ADMIN — Medication 10 ML: at 19:55

## 2021-09-27 RX ADMIN — AMINOCAPROIC ACID 5000 MG: 250 INJECTION, SOLUTION INTRAVENOUS at 07:03

## 2021-09-27 RX ADMIN — FENTANYL CITRATE 500 MCG: 50 INJECTION, SOLUTION INTRAMUSCULAR; INTRAVENOUS at 07:39

## 2021-09-27 RX ADMIN — 0.12% CHLORHEXIDINE GLUCONATE 15 ML: 1.2 RINSE ORAL at 06:08

## 2021-09-27 RX ADMIN — Medication 5 MCG/MIN: at 11:37

## 2021-09-27 RX ADMIN — SODIUM CHLORIDE: 9 INJECTION, SOLUTION INTRAVENOUS at 06:08

## 2021-09-27 RX ADMIN — PHENYLEPHRINE HYDROCHLORIDE 100 MCG: 10 INJECTION, SOLUTION INTRAVENOUS at 07:59

## 2021-09-27 RX ADMIN — LIDOCAINE HYDROCHLORIDE 100 MG: 20 INJECTION, SOLUTION INTRAVENOUS at 06:57

## 2021-09-27 RX ADMIN — MIDAZOLAM 2 MG: 1 INJECTION INTRAMUSCULAR; INTRAVENOUS at 06:35

## 2021-09-27 RX ADMIN — AMINOCAPROIC ACID 1 G/HR: 250 INJECTION, SOLUTION INTRAVENOUS at 07:14

## 2021-09-27 RX ADMIN — 0.12% CHLORHEXIDINE GLUCONATE 15 ML: 1.2 RINSE ORAL at 11:48

## 2021-09-27 RX ADMIN — SODIUM CHLORIDE, POTASSIUM CHLORIDE, SODIUM LACTATE AND CALCIUM CHLORIDE: 600; 310; 30; 20 INJECTION, SOLUTION INTRAVENOUS at 08:14

## 2021-09-27 RX ADMIN — VASOPRESSIN 0.06 UNITS/MIN: 20 INJECTION INTRAVENOUS at 10:08

## 2021-09-27 RX ADMIN — Medication 2000 MG: at 18:18

## 2021-09-27 RX ADMIN — SODIUM BICARBONATE 100 MEQ: 84 INJECTION, SOLUTION INTRAVENOUS at 22:38

## 2021-09-27 RX ADMIN — ALBUMIN (HUMAN) 25 G: 12.5 INJECTION, SOLUTION INTRAVENOUS at 12:12

## 2021-09-27 RX ADMIN — IPRATROPIUM BROMIDE AND ALBUTEROL SULFATE 1 AMPULE: .5; 2.5 SOLUTION RESPIRATORY (INHALATION) at 17:36

## 2021-09-27 RX ADMIN — CEFAZOLIN 2000 MG: 1 INJECTION, POWDER, FOR SOLUTION INTRAMUSCULAR; INTRAVENOUS at 10:31

## 2021-09-27 RX ADMIN — ONDANSETRON 4 MG: 2 INJECTION INTRAMUSCULAR; INTRAVENOUS at 20:26

## 2021-09-27 RX ADMIN — ASPIRIN 300 MG: 300 SUPPOSITORY RECTAL at 12:16

## 2021-09-27 RX ADMIN — HYDROCORTISONE SODIUM SUCCINATE 100 MG: 100 INJECTION, POWDER, FOR SOLUTION INTRAMUSCULAR; INTRAVENOUS at 21:40

## 2021-09-27 RX ADMIN — PHENYLEPHRINE HYDROCHLORIDE 300 MCG: 10 INJECTION, SOLUTION INTRAVENOUS at 08:23

## 2021-09-27 RX ADMIN — POTASSIUM CHLORIDE 20 MEQ: 400 INJECTION, SOLUTION INTRAVENOUS at 18:58

## 2021-09-27 RX ADMIN — PHENYLEPHRINE HYDROCHLORIDE 100 MCG: 10 INJECTION, SOLUTION INTRAVENOUS at 07:19

## 2021-09-27 RX ADMIN — FENTANYL CITRATE 25 MCG: 0.05 INJECTION, SOLUTION INTRAMUSCULAR; INTRAVENOUS at 15:30

## 2021-09-27 RX ADMIN — METHYLENE BLUE 0.5 MG/KG/HR: 5 INJECTION INTRAVENOUS at 20:45

## 2021-09-27 RX ADMIN — PANTOPRAZOLE SODIUM 40 MG: 40 INJECTION, POWDER, FOR SOLUTION INTRAVENOUS at 11:48

## 2021-09-27 RX ADMIN — FENTANYL CITRATE 25 MCG: 0.05 INJECTION, SOLUTION INTRAMUSCULAR; INTRAVENOUS at 17:28

## 2021-09-27 RX ADMIN — MIDAZOLAM 2 MG: 1 INJECTION INTRAMUSCULAR; INTRAVENOUS at 06:47

## 2021-09-27 RX ADMIN — FENTANYL CITRATE 100 MCG: 50 INJECTION, SOLUTION INTRAMUSCULAR; INTRAVENOUS at 07:26

## 2021-09-27 RX ADMIN — HEPARIN SODIUM 30000 UNITS: 10000 INJECTION INTRAVENOUS; SUBCUTANEOUS at 08:09

## 2021-09-27 RX ADMIN — PHENYLEPHRINE HYDROCHLORIDE 100 MCG: 10 INJECTION, SOLUTION INTRAVENOUS at 07:56

## 2021-09-27 ASSESSMENT — PULMONARY FUNCTION TESTS
PIF_VALUE: 16
PIF_VALUE: 1
PIF_VALUE: 1
PIF_VALUE: 2
PIF_VALUE: 16
PIF_VALUE: 11
PIF_VALUE: 1
PIF_VALUE: 16
PIF_VALUE: 14
PIF_VALUE: 0
PIF_VALUE: 0
PIF_VALUE: 16
PIF_VALUE: 1
PIF_VALUE: 1
PIF_VALUE: 15
PIF_VALUE: 18
PIF_VALUE: 0
PIF_VALUE: 14
PIF_VALUE: 15
PIF_VALUE: 1
PIF_VALUE: 17
PIF_VALUE: 0
PIF_VALUE: 16
PIF_VALUE: 17
PIF_VALUE: 1
PIF_VALUE: 0
PIF_VALUE: 0
PIF_VALUE: 17
PIF_VALUE: 18
PIF_VALUE: 14
PIF_VALUE: 1
PIF_VALUE: 15
PIF_VALUE: 18
PIF_VALUE: 1
PIF_VALUE: 17
PIF_VALUE: 0
PIF_VALUE: 20
PIF_VALUE: 18
PIF_VALUE: 1
PIF_VALUE: 17
PIF_VALUE: 18
PIF_VALUE: 16
PIF_VALUE: 17
PIF_VALUE: 15
PIF_VALUE: 19
PIF_VALUE: 1
PIF_VALUE: 18
PIF_VALUE: 14
PIF_VALUE: 16
PIF_VALUE: 14
PIF_VALUE: 15
PIF_VALUE: 16
PIF_VALUE: 1
PIF_VALUE: 0
PIF_VALUE: 1
PIF_VALUE: 2
PIF_VALUE: 12
PIF_VALUE: 16
PIF_VALUE: 16
PIF_VALUE: 1
PIF_VALUE: 1
PIF_VALUE: 17
PIF_VALUE: 18
PIF_VALUE: 2
PIF_VALUE: 16
PIF_VALUE: 0
PIF_VALUE: 17
PIF_VALUE: 1
PIF_VALUE: 0
PIF_VALUE: 0
PIF_VALUE: 18
PIF_VALUE: 18
PIF_VALUE: 17
PIF_VALUE: 15
PIF_VALUE: 14
PIF_VALUE: 17
PIF_VALUE: 15
PIF_VALUE: 15
PIF_VALUE: 1
PIF_VALUE: 14
PIF_VALUE: 1
PIF_VALUE: 14
PIF_VALUE: 1
PIF_VALUE: 0
PIF_VALUE: 16
PIF_VALUE: 16
PIF_VALUE: 18
PIF_VALUE: 17
PIF_VALUE: 1
PIF_VALUE: 15
PIF_VALUE: 14
PIF_VALUE: 15
PIF_VALUE: 15
PIF_VALUE: 14
PIF_VALUE: 17
PIF_VALUE: 16
PIF_VALUE: 15
PIF_VALUE: 15
PIF_VALUE: 16
PIF_VALUE: 15
PIF_VALUE: 1
PIF_VALUE: 1
PIF_VALUE: 0
PIF_VALUE: 1
PIF_VALUE: 15
PIF_VALUE: 1
PIF_VALUE: 15
PIF_VALUE: 16
PIF_VALUE: 1
PIF_VALUE: 0
PIF_VALUE: 17
PIF_VALUE: 17
PIF_VALUE: 16
PIF_VALUE: 15
PIF_VALUE: 1
PIF_VALUE: 15
PIF_VALUE: 17
PIF_VALUE: 15
PIF_VALUE: 16
PIF_VALUE: 1
PIF_VALUE: 1
PIF_VALUE: 17
PIF_VALUE: 18
PIF_VALUE: 17
PIF_VALUE: 16
PIF_VALUE: 15
PIF_VALUE: 17
PIF_VALUE: 15
PIF_VALUE: 16
PIF_VALUE: 1
PIF_VALUE: 15
PIF_VALUE: 1
PIF_VALUE: 18
PIF_VALUE: 21
PIF_VALUE: 1
PIF_VALUE: 18
PIF_VALUE: 17
PIF_VALUE: 17
PIF_VALUE: 1
PIF_VALUE: 4
PIF_VALUE: 16
PIF_VALUE: 16
PIF_VALUE: 17
PIF_VALUE: 15
PIF_VALUE: 1
PIF_VALUE: 16
PIF_VALUE: 14
PIF_VALUE: 16
PIF_VALUE: 1
PIF_VALUE: 0
PIF_VALUE: 1
PIF_VALUE: 19
PIF_VALUE: 18
PIF_VALUE: 1
PIF_VALUE: 17
PIF_VALUE: 15
PIF_VALUE: 19
PIF_VALUE: 2
PIF_VALUE: 18
PIF_VALUE: 19
PIF_VALUE: 0
PIF_VALUE: 1
PIF_VALUE: 18
PIF_VALUE: 1
PIF_VALUE: 15
PIF_VALUE: 1
PIF_VALUE: 1
PIF_VALUE: 17
PIF_VALUE: 1
PIF_VALUE: 15
PIF_VALUE: 15
PIF_VALUE: 19
PIF_VALUE: 16
PIF_VALUE: 16
PIF_VALUE: 2
PIF_VALUE: 4
PIF_VALUE: 18
PIF_VALUE: 15
PIF_VALUE: 17
PIF_VALUE: 1
PIF_VALUE: 20
PIF_VALUE: 1
PIF_VALUE: 4
PIF_VALUE: 16
PIF_VALUE: 0
PIF_VALUE: 19
PIF_VALUE: 16
PIF_VALUE: 15
PIF_VALUE: 2
PIF_VALUE: 1
PIF_VALUE: 17
PIF_VALUE: 1
PIF_VALUE: 14
PIF_VALUE: 15
PIF_VALUE: 15
PIF_VALUE: 2
PIF_VALUE: 17
PIF_VALUE: 14
PIF_VALUE: 15
PIF_VALUE: 16
PIF_VALUE: 1
PIF_VALUE: 1
PIF_VALUE: 11
PIF_VALUE: 17
PIF_VALUE: 2
PIF_VALUE: 17
PIF_VALUE: 1
PIF_VALUE: 17
PIF_VALUE: 1
PIF_VALUE: 18
PIF_VALUE: 18
PIF_VALUE: 19
PIF_VALUE: 1
PIF_VALUE: 2
PIF_VALUE: 18
PIF_VALUE: 2
PIF_VALUE: 0
PIF_VALUE: 17
PIF_VALUE: 22
PIF_VALUE: 17
PIF_VALUE: 0
PIF_VALUE: 17
PIF_VALUE: 17
PIF_VALUE: 15
PIF_VALUE: 19
PIF_VALUE: 15
PIF_VALUE: 16
PIF_VALUE: 0
PIF_VALUE: 18
PIF_VALUE: 18
PIF_VALUE: 2
PIF_VALUE: 17
PIF_VALUE: 16
PIF_VALUE: 15
PIF_VALUE: 16
PIF_VALUE: 2
PIF_VALUE: 1
PIF_VALUE: 14
PIF_VALUE: 17
PIF_VALUE: 2
PIF_VALUE: 4
PIF_VALUE: 16
PIF_VALUE: 15
PIF_VALUE: 19
PIF_VALUE: 18
PIF_VALUE: 18
PIF_VALUE: 16
PIF_VALUE: 16
PIF_VALUE: 17
PIF_VALUE: 14
PIF_VALUE: 1
PIF_VALUE: 2
PIF_VALUE: 2
PIF_VALUE: 1
PIF_VALUE: 1
PIF_VALUE: 17
PIF_VALUE: 17
PIF_VALUE: 2
PIF_VALUE: 2
PIF_VALUE: 1
PIF_VALUE: 18
PIF_VALUE: 16
PIF_VALUE: 16
PIF_VALUE: 14
PIF_VALUE: 15
PIF_VALUE: 14
PIF_VALUE: 1
PIF_VALUE: 17
PIF_VALUE: 1
PIF_VALUE: 17
PIF_VALUE: 16
PIF_VALUE: 13
PIF_VALUE: 16

## 2021-09-27 ASSESSMENT — PAIN DESCRIPTION - ONSET
ONSET: ON-GOING
ONSET: ON-GOING

## 2021-09-27 ASSESSMENT — PAIN SCALES - GENERAL
PAINLEVEL_OUTOF10: 10
PAINLEVEL_OUTOF10: 5
PAINLEVEL_OUTOF10: 8
PAINLEVEL_OUTOF10: 8
PAINLEVEL_OUTOF10: 7
PAINLEVEL_OUTOF10: 10
PAINLEVEL_OUTOF10: 9
PAINLEVEL_OUTOF10: 7

## 2021-09-27 ASSESSMENT — PAIN DESCRIPTION - LOCATION
LOCATION: CHEST
LOCATION: CHEST
LOCATION: STERNUM
LOCATION: CHEST
LOCATION: CHEST
LOCATION: STERNUM
LOCATION: STERNUM

## 2021-09-27 ASSESSMENT — PAIN DESCRIPTION - PAIN TYPE
TYPE: SURGICAL PAIN

## 2021-09-27 ASSESSMENT — PAIN DESCRIPTION - DESCRIPTORS
DESCRIPTORS: ACHING;DISCOMFORT
DESCRIPTORS: ACHING;CONSTANT
DESCRIPTORS: ACHING;DISCOMFORT
DESCRIPTORS: ACHING;DISCOMFORT

## 2021-09-27 ASSESSMENT — PAIN - FUNCTIONAL ASSESSMENT
PAIN_FUNCTIONAL_ASSESSMENT: ACTIVITIES ARE NOT PREVENTED
PAIN_FUNCTIONAL_ASSESSMENT: 0-10

## 2021-09-27 ASSESSMENT — PAIN DESCRIPTION - FREQUENCY
FREQUENCY: INTERMITTENT
FREQUENCY: CONTINUOUS

## 2021-09-27 ASSESSMENT — PAIN DESCRIPTION - ORIENTATION
ORIENTATION: MID

## 2021-09-27 ASSESSMENT — PAIN DESCRIPTION - PROGRESSION
CLINICAL_PROGRESSION: NOT CHANGED
CLINICAL_PROGRESSION: NOT CHANGED

## 2021-09-27 NOTE — OP NOTE
[address]                  [address] [id number],  [facility]                                OPERATIVE REPORT    PATIENT NAME: Elisa Rowan                     :        1945  MED REC NO:   [id number]                            ROOM:       [facility]  ACCOUNT NO:   [de-identified]                           ADMIT DATE: 2021  PROVIDER:     Jerry Arce MD    DATE OF PROCEDURE:  2021    PREOPERATIVE DIAGNOSES:  Severe multivessel coronary artery disease,  hyperlipidemia, hypertension, unstable angina, reflux disease. POSTOPERATIVE DIAGNOSES:  Severe multivessel coronary artery disease,  hyperlipidemia, hypertension, unstable angina, reflux disease. INDICATIONS:  Severe multivessel coronary artery disease,  hyperlipidemia, hypertension, unstable angina, reflux disease. SURGEON:  Jerry Arce MD    ASSISTANT:  SAULO Ojeda (no other resident was adequately  trained to be able to assist). Cleven Card was present and assisting  throughout the entire operation from the first incision to the last  suture and all parts in between. SECOND ASSISTANT:  ROBERT Toney harvested the vein). COMPLICATIONS:  None, tolerated well. ESTIMATED BLOOD LOSS:  Approximately 1000 mL. ANESTHESIA:  General endotracheal.    ANESTHESIA ATTENDING:  Andre Ordaz DO    SPECIMENS:  Right carotid endarterectomy. DRAINS:  Two in the mediastinum, one in the left chest, and one in the  right chest.    OPERATIONS:  1. Sternotomy. 2.  Coronary artery bypass grafting x3; left internal mammary artery to  the LAD, saphenous vein graft to the obtuse marginal, saphenous vein  graft to the right coronary artery. 3.  Extensive right coronary artery endarterectomy. 4.  Left atrial appendage exclusion with a 35 mm AtriClip. 5.  Endoscopic harvesting of left lower extremity greater saphenous  vein.   6.  Rigid internal fixation of the sternum using mygall plates x2.  7.  [id number]. HISTORY:  This is a 24-year-old man with history of  of the LAD which  has been known for at least 15 years. The patient presented back with  increasing chest pain and was found to have continued  of his LAD  with a 90% lesion of the obtuse marginal and severe diffuse disease of  his right coronary artery and PDA. He was referred for coronary artery  bypass grafting. The patient was described the procedure in full  including risks and complications, including but not limited to  bleeding, infection, need for reoperation, hemothorax, pneumothorax,  stroke, myocardial infarction, and death. The patient agreed to  proceed. FINDINGS:  Preoperative EMMA revealed preserved ejection fraction without  any significant valvular abnormalities. Intraoperatively, left internal  mammary artery was a good conduit for bypass. The vein was a good  conduit for bypass. The LAD was intramyocardial, relatively  thick-walled vessel and chronically totally occluded, was about 1.8 mm  and a fair target for bypass. The obtuse marginal was a 2 mm vessel and  excellent target for bypass. The right coronary artery was horrendously  diffusely diseased. Extensive endarterectomy totalling about 3.5 inches  was performed about the mid to distal right coronary artery all the way  down through the bifurcation into the posterolateral and PDA. We then  did a large spatulated vein to the remaining vessel. Because of this  extensor endarterectomy and difficulty in sewing the distal anastomosis,  a [id number] certainly indicated. The patient is at high risk for  postoperative atrial fibrillation, so we did a left atrial appendage  exclusion. The patient was  from cardiopulmonary bypass  without the assistance of inotropic support. The patient had  significant vasoplegia, was on high doses of Levophed and vasopressin.    We gave a B12 kit and were able to come off both drips almost  immediately. Due to the vasoplegia we did fail coming off bypass two  separate times, but then once we were able to adjust his medications we  came off bypass without problem at all. The patient was transferred to  cardiothoracic intensive care unit in stable but guarded condition. All  sponge, instruments, and needle counts were correct at the end of the  case. DESCRIPTION OF OPERATION:  After adequate informed consent was obtained  and adequate preoperative antibiotics were given, the patient was  brought to the operating room in stable condition. He was laid in the  supine position and induced under general endotracheal anesthesia by  Anesthesia staff. Ybarra catheter and adequate monitoring lines were  placed. The patient's chest, abdomen, pelvis, and lower extremities  were prepped and draped in the usual sterile fashion. Left lower  extremity greater saphenous vein was harvested in an endoscopic  technique and prepared on the back table for anastomosis. These wounds  were closed in multiple layers of absorbable stitch. Standard  sternotomy was performed. Left internal mammary artery was harvested in  a pedicle fashion. The patient was systemically heparinized. The  mammary was clipped distally and transected. Excellent pulsatile flow  was noted. It was injected with papaverine, clipped distally and placed  in the left chest.  A standard retractor was placed. The pericardium  was entered and tacked to the chest wall. After ensuring adequate ACT,  the patient was instituted on a cardiopulmonary bypass by cannulating  the ascending aorta using a 18-Monegasque aortic cannula and the right  atrial appendage using a two-stage venous cannula. Retrograde catheter  and root vent were placed. The aorta was cross-clamped and the heart  was arrested with cold blood antegrade and retrograde Buckberg  cardioplegia. Excellent diastolic arrest was noted. Topical ice was  used.   Cardioplegia was given intermittently throughout the remainder of  the operation. The vein was brought into the field and was grafted to  the obtuse marginal using 7-0 Prolene. It was brought to the ascending  aorta and a proximal anastomosis was created using 6-0 Prolene. We then  dissected out the right coronary artery and the above findings were  noted. Extensive endarterectomy was performed totalling about 3.5  inches and including the bifurcation in the PDA and the PL. This was  taken out as one piece and sent off the table for permanent pathological  examination. Resulting defect and the artery was then grafted using the  vein and large spatulation totalling about 2 inches using 7-0 Prolene. We tested this graft and injected nicely without leaks. It was then  brought to the ascending aorta and a proximal anastomosis was created  using 6-0 Prolene. The mammary was then brought into the field, it was  grafted to the LAD in its midportion using 7-0 Prolene. The pedicle was  tacked to the epicardium. A 35-mm AtriClip was placed at the base of  the left atrial appendage. Warm antegrade and retrograde hotshots were  given. The cross-clamp was released. The patient eventually returned  to sinus rhythm. Ventricular and atrial pacing wires were placed. Two  drains were placed in the mediastinum, one in the left chest and one in  the right chest.  We attempted come off bypass two separate times;  however, the patient suffered from significant vasoplegia. We were on  significant doses of Levophed as well as vasopressin. Once on these  doses, we were able to come off bypass without difficulty. Once this  was done, the patient was decannulated in the usual fashion and  protamine was given. We then gave B12 kit and we were able to come off  the vasopressin and Levophed almost immediately. Hemodynamics were good  and echo showed normal ventricular function. Mediastinum was inspected  for hemostasis.   Hemostasis was achieved using Bovie electrocautery and  stitches. With the patient warm, hemodynamically stable and in sinus  rhythm; I closed the chest using #6 steel wires including double wires. Because the patient's sternum was a relatively osteoporotic, I elected  to do rigid internal fixation of the sternum using Shon plates x2. The remainder of the wound was closed in multiple layers of absorbable  stitch. Dressings were applied over top. The patient tolerated the  procedure well. The patient was transferred to cardiothoracic intensive  care unit in stable but guarded condition. All sponge, instruments, and  needle counts were correct at the end of the case.         [place] MD    D: 09/27/2021 11:51:47       T: 09/27/2021 14:17:44     [id number]  Job#: [id number]     Doc#: [id number]    CC:  MD Nathanael Terrell MD

## 2021-09-27 NOTE — FLOWSHEET NOTE
Sinus tachyscardia 115 on the monitor, Levophed at 5, NP notified.  Stat H%H ordered and Amiodarone Gtt at 0.5mg

## 2021-09-27 NOTE — PROGRESS NOTES
Inpatient [id number] [facility] Cardiology Progress Note    Date of Service: 9/27/2021    Reason for Follow-up: Post-CABG management     Mr. Shawn Weldon is a 78year old male who follows with Dr. Kathy Abbott. Patient was seen in OP with Dr. Lucy Fierro on 8/31/2021 for exertional chest pain. Patient underwent an abnormal stress test in [place] in 4/28/2021. Patient has a history of  of the LAD since 2006. He underwent a C (9/2021) showing MVCAD --> underwent cardiac MRI to evaluate viability due to reported apical \"scar\"  --> showing normal LV size and function with EF 56% without evidence of prior infarct. Patient was evaluated by CTS and today 9/27/2021 underwent Sternotomy/CABG x 3 (LIMA-LAD, SVG-OM, SVG-RCA)/Extensive RCA endarterectomy/MALU exclusion with 35 mm AtriClip/EVH LLE/Rigid internal fixation of the sternum with Claunch plates x 6/48 modifier (9/27/2021). SUBJECTIVE: Patient is awake and complaining of incisional pain. Denies SOB. OBJECTIVE: Sitting up in bed, extubated and appears to be in no apparent distress. No family at the bedside. HOME MEDICATIONS:  Prior to Admission medications    Medication Sig Start Date End Date Taking?  Authorizing Provider   lisinopril (PRINIVIL;ZESTRIL) 10 MG tablet Take 10 mg by mouth daily   Yes Historical Provider, MD   metoprolol succinate (TOPROL XL) 25 MG extended release tablet Take 25 mg by mouth daily   Yes Historical Provider, MD   isosorbide mononitrate (IMDUR) 30 MG extended release tablet Take 30 mg by mouth daily   Yes Historical Provider, MD   aspirin 81 MG EC tablet Take 81 mg by mouth daily   Yes Historical Provider, MD   atorvastatin (LIPITOR) 40 MG tablet Take 40 mg by mouth daily   Yes Historical Provider, MD   omeprazole (PRILOSEC) 20 MG delayed release capsule Take 1 capsule by mouth every morning (before breakfast) 7/9/21  Yes Tasia Cortez MD   Omega-3 Fatty Acids (FISH OIL) 1000 MG CAPS Take 1,000 mg by mouth daily    Historical Provider, MD   zinc gluconate 50 MG tablet Take 50 mg by mouth daily    Historical Provider, MD   Ascorbic Acid (VITAMIN C) 500 MG CAPS Take 500 mg by mouth 2 times daily    Historical Provider, MD   famotidine (PEPCID) 20 MG tablet Take 20 mg by mouth as needed    Historical Provider, MD   Coenzyme Q10 (COQ-10) 100 MG CAPS Take by mouth 3 times daily     Historical Provider, MD       CURRENT MEDICATIONS:      Current Facility-Administered Medications:     atorvastatin (LIPITOR) tablet 40 mg, 40 mg, Oral, Daily, Aye Miles PA-C    0.9 % sodium chloride infusion, 30 mL/hr, IntraVENous, Continuous, Aye Miles PA-C, Last Rate: 30 mL/hr at 09/27/21 1132, 30 mL/hr at 09/27/21 1132    sodium chloride flush 0.9 % injection 10 mL, 10 mL, IntraVENous, 2 times per day, Aye Miles PA-C, 10 mL at 09/27/21 1152    sodium chloride flush 0.9 % injection 10 mL, 10 mL, IntraVENous, PRN, Aye Miles PA-C    0.9 % sodium chloride infusion, 25 mL, IntraVENous, PRN, Aye Miles PA-C    ondansetron [facility]) injection 4 mg, 4 mg, IntraVENous, Q8H PRN, Aye Miles PA-C    [START ON 9/28/2021] aspirin EC tablet 81 mg, 81 mg, Oral, Daily, Aye Miles PA-C    acetaminophen (TYLENOL) tablet 650 mg, 650 mg, Oral, Q4H PRN, Aye Miles PA-C    acetaminophen (TYLENOL) suppository 650 mg, 650 mg, Rectal, Q4H PRN, Aye Miles PA-C    oxyCODONE (ROXICODONE) immediate release tablet 5 mg, 5 mg, Oral, Q4H PRN **OR** oxyCODONE HCl (OXY-IR) immediate release tablet 10 mg, 10 mg, Oral, Q4H PRN, Aye Miles PA-C    fentaNYL (SUBLIMAZE) injection 25 mcg, 25 mcg, IntraVENous, Q1H PRN, 25 mcg at 09/27/21 1339 **OR** fentaNYL (SUBLIMAZE) injection 50 mcg, 50 mcg, IntraVENous, Q1H PRN, Aye Miles PA-C    meperidine (DEMEROL) injection 25 mg, 25 mg, IntraVENous, Once PRN, Aye Miles PA-C    chlorhexidine (PERIDEX) 0.12 % solution 15 mL, 15 mL, Mouth/Throat, BID, Aye Miles PA-C, 15 mL at 09/27/21 1148    [START ON 9/28/2021] magnesium oxide (MAG-OX) tablet 400 mg, 400 mg, Oral, Daily, Tone Summers PA-C    mupirocin (BACTROBAN) 2 % ointment, , Nasal, BID, Tone Summers PA-C    propofol injection, 10 mcg/kg/min, IntraVENous, Continuous PRN, Tone Summers PA-C, Stopped at 09/27/21 1230    sennosides-docusate sodium (SENOKOT-S) 8.6-50 MG tablet 1 tablet, 1 tablet, Oral, BID, Tone Summers PA-C    magnesium hydroxide (MILK OF MAGNESIA) 400 MG/5ML suspension 30 mL, 30 mL, Oral, Daily PRN, Tone Summers PA-C    [START ON 9/28/2021] pantoprazole (PROTONIX) tablet 40 mg, 40 mg, Oral, Daily, Tone Summers PA-C    ceFAZolin (ANCEF) 2000 mg in sterile water 20 mL IV syringe, 2,000 mg, IntraVENous, Q8H, Tone Summers PA-C    potassium chloride 20 mEq/50 mL IVPB [facility]), 20 mEq, IntraVENous, PRN, Tone Summers PA-C    magnesium sulfate 2000 mg in 50 mL IVPB premix, 2,000 mg, IntraVENous, PRN, Tone Summers PA-C    ipratropium-albuterol (DUONEB) nebulizer solution 1 ampule, 1 ampule, Inhalation, Q4H WA, Tone Summers PA-C    albumin human 5 % IV solution 25 g, 25 g, IntraVENous, PRN, Tnoe Summers PA-C, Last Rate: 500 mL/hr at 09/27/21 1329, 25 g at 09/27/21 1329    0.9 % sodium chloride bolus, 250 mL, IntraVENous, Continuous PRN, Tone Summers PA-C    norepinephrine (LEVOPHED) 16 mg in dextrose 5% 250 mL infusion, 2 mcg/min, IntraVENous, Continuous PRN, Tone Summers PA-C, Last Rate: 3.8 mL/hr at 09/27/21 1330, 4 mcg/min at 09/27/21 1330    insulin regular (HUMULIN R;NOVOLIN R) 100 Units in sodium chloride 0.9 % 100 mL infusion, 1 Units/hr, IntraVENous, Continuous, Tone Summers PA-C    [START ON 9/28/2021] insulin glargine (LANTUS) injection vial 11 Units, 0.15 Units/kg, SubCUTAneous, Nightly, Tone Summers PA-C    glucose (GLUTOSE) 40 % oral gel 15 g, 15 g, Oral, PRN, Sebastian Bunch PA-C    dextrose 50 % IV solution, 12.5 g, IntraVENous, PRN, Feliciano Nelson PA-C    glucagon (rDNA) injection 1 mg, 1 mg, IntraMUSCular, PRN, Feliciano Nelson PA-C    dextrose 5 % solution, 100 mL/hr, IntraVENous, PRN, Feliciano Nelson PA-C    nitroPRUSSide (NIPRIDE) 50 mg in dextrose 5 % 250 mL infusion, 0.1-3 mcg/kg/min, IntraVENous, Continuous PRN, Feliciano Nelson PA-C    calcium gluconate 1,000 mg in dextrose 5 % 100 mL IVPB, 1,000 mg, IntraVENous, PRN, Feliciano Nelson PA-C    [START ON 9/28/2021] clopidogrel (PLAVIX) tablet 75 mg, 75 mg, Oral, Daily, Feliciano Nelson PA-C    insulin lispro (HUMALOG) injection vial 0-3 Units, 0-3 Units, SubCUTAneous, Q4H, Feliciano Nelson PA-C    [START ON 9/28/2021] tamsulosin (FLOMAX) capsule 0.4 mg, 0.4 mg, Oral, Daily, Feliciano Nelson PA-C    vasopressin 20 Units in dextrose 5 % 100 mL infusion, 0.01-0.1 Units/min, IntraVENous, Continuous, Mirlande Rodriguez, APRN - CNP      ALLERGIES:  Dust mite extract    REVIEW OF SYSTEMS:     · Constitutional: Denies fevers, chills, night sweats, and generalized fatigue. Denies significant weight loss or weight gain. · HEENT: Denies headaches, nose bleeds, rhinorrhea, sore throat. Denies blurred vision. Denies dysphagia, odynophagia. · Musculoskeletal: Denies falls, pain to BLE with ambulation. Denies muscle weakness. · Neurological: Denies dizziness and lightheadedness, numbness and tingling. Denies focal neurological deficits. · Cardiovascular: See HPI. Denies dizziness, syncope. Denies PND, orthopnea, peripheral edema. · Respiratory: Denies shortness of breath at rest or with exertion. Denies cough, hemoptysis. · Gastrointestinal: Denies heartburn, nausea/vomiting, diarrhea and constipation, black/bloody, and tarry stools. PHYSICAL EXAM:   /70   Pulse 82   Temp 97 °F (36.1 °C) (Temporal)   Resp 18   Ht 5' 8\" (1.727 m)   Wt 157 lb (71.2 kg)   SpO2 (!) 86%   BMI 23.87 kg/m²   CONST:  Well developed, well nourished elderly male who appears stated age.  Awake but drowsy appearing. No acute distress. HEENT:   Head- Normocephalic, atraumatic. Eyes- Conjunctivae pink, anicteric. Throat- Oral mucosa pink and moist.  Neck-  No stridor, trachea midline, no jugular venous distention. CHEST: Chest symmetrical and non-tender to palpation. No accessory muscle use or intercostal retractions. MSI dressing intact with no drainage. +Chest tubes draining serosanguinous drainage. Pacer box connected to pacer wires; on an functioning. RESPIRATORY: Lung sounds - clear throughout fields. No wheezing, rales or rhonchi. On 3L NC O2.   CARDIOVASCULAR:     No carotid bruit. Heart Inspection- shows no noted pulsations. Heart Palpation- no heaves or thrills; PMI is non-displaced. Heart Ausculation- Regular rate and rhythm, no murmur. No s3, s4 or rub. PV: LLE with ACE wrap. No lower extremity edema. No varicosities. Pedal pulses palpable, no clubbing or cyanosis. ABDOMEN: Soft, non-tender to light palpation. Bowel sounds present. MS: Good muscle strength and tone. No atrophy or abnormal movements. SKIN: Warm and dry. No statis dermatitis or ulcers. NEURO / PSYCH: Awake but drowsy appearing. Speech is clear and in no acute distress. : Bright red blood in jaimes catheter.        DATA:    Telemetry: SR.     Diagnostic:          Intake/Output Summary (Last 24 hours) at 9/27/2021 1353  Last data filed at 9/27/2021 1329  Gross per 24 hour   Intake 3950 ml   Output 2035 ml   Net 1915 ml       Labs:   CBC:   Recent Labs     09/27/21  1130   WBC 26.1*   HGB 13.4   HCT 37.9   PLT 80*     BMP:   Recent Labs     09/27/21  1106 09/27/21  1106 09/27/21  1130 09/27/21  1130 09/27/21  1146 09/27/21  1307   NA  --   --  136  --   --   --    K 3.6   < > 3.9   < > 3.77 3.66   CO2  --   --  23  --   --   --    BUN  --   --  12  --   --   --    CREATININE 0.9  --  0.8  --   --   --    LABGLOM  --   --  >60  --   --   --    CALCIUM  --   --  8.6  --   --   --     < > = values in this interval not displayed. Mag:   Recent Labs     09/27/21  1130   MG 2.4     Phos: No results for input(s): PHOS in the last 72 hours. TSH: No results for input(s): TSH in the last 72 hours. HgA1c:   Lab Results   Component Value Date    LABA1C 5.1 09/10/2021     No results found for: EAG  BNP: No results for input(s): BNP in the last 72 hours. PT/INR:   Recent Labs     09/27/21  1130   PROTIME 19.2*   INR 1.8     APTT:  Recent Labs     09/27/21  1130   APTT 30.7   FASTING LIPID PANEL:  Lab Results   Component Value Date    CHOL 115 07/12/2021    HDL 51 07/12/2021    LDLCALC 54 07/12/2021    TRIG 51 07/12/2021     CXR: 9/27/2021  1. Postoperative changes of the thorax   2. Satisfactory position of the support lines and tubes. 3. There is no pneumothorax.           TTE: 9/23/2021 (Dr. Hermelindo Deleon):   Left ventricular internal dimensions were normal in diastole and systole. Abnormal (paradoxical) motion consistent with left bundle branch block. Normal left ventricular ejection fraction. Mild thickening of the mitral valve leaflets with reduced leaflet   excursion. Mild centrally directed mitral regurgitation. Mild tricuspid regurgitation. LVEF 55%     ASSESSMENT:  1. Multivessel CAD s/p S/p CABGx3 (LIMA-LAD, SVG-OM, SVG-RCA)/Extensive RCA endarterectomy/MALU AtriClip  2. Normal LVEF on TTE 9/23/2021. Hx of LVEF 35-40% on Stress test (4/28/2021, [place]) --> no prior TTE. 3. Acute hypoxic respiratory failure: now extubated. On supplemental O2 (3L)  4. Hypotension: on Levo. 5. cLBBB  6. HLD  7. Hx of Hiatal hernia   8. Acute Thrombocytopenia   9. Leukocytosis   10. Hematuria (in jaimes catheter)       RECOMMENDATIONS:  1. Continue current management as per CTS. 2. Wean Levophed as tolerated  3. Monitor H/H  4. Will be available as needed    The above case and recommendations to be discussed with Dr. Geraldo Gomez.     [facility] Cardiology    Electronically signed by JUAN MIGUEL Lr - CNP on 9/27/2021 at 1:53 PM   ______________________________________________________________________  Cardiology attending attestation:  I have independently interviewed and examined the patient. I personally reviewed pertinent laboratory values and diagnostic testing (including, if applicable, chest xray, electrocardiogram, telemetry, echocardiogram, stress testing, and coronary angiography). I have reviewed the above documentation completed by JUAN MIGUEL Calderon CNP including past medical, social, and family history and agree with the findings, assessment and plan except where noted. Plan formulated under my direct supervision. I participated in all aspects of the medical decision making. Please see my additional contributions to the HPI, physical exam, and assessment / medical decision making:  _______________________________________________________________________    Patient doing well post surgery, on minimal hemodynamic support with norepinephrine. He was extubated. He has some expected chest discomfort. Physical Exam:  /70   Pulse 92   Temp 98 °F (36.7 °C) (Temporal)   Resp 22   Ht 5' 8\" (1.727 m)   Wt 158 lb 11.7 oz (72 kg)   SpO2 100%   BMI 24.14 kg/m²   General appearance: Groggy, awake, looks uncomfortable, no acute respiratory distress  Skin: Intact, no rash  ENMT: Moist mucous membranes  Neck: Supple, no carotid bruits. Normal jugular venous pressure, S1  Lungs: Clear to auscultation bilaterally. No wheezes, rales, or rhonchi  Cardiac: Regular rhythm with a normal rate, normal S1&S2, pericardial friction rub noted. Sternotomy wound dressed. Pacer wires and chest tubes in place. Abdomen: Soft, positive bowel sounds, nontender  Extremities: Moves all extremities x 4, no lower extremity edema  Neurologic: No focal motor deficits apparent, normal mood and affect    Telemetry: Sinus rhythm    Impression:   1.  Status post three-vessel CABG    Recommendations:     Postoperative care per CT surgery   Aspirin, clopidogrel, statin   Wean hemodynamic support as able   Aggressive risk factor modification   Outpatient follow-up with Dr. Aleks Bonilla Will be available as needed, please call with questions    Thank you for the consultation. Please do not hesitate to call with questions.     Mumtaz Delarosa MD, [facility] Cardiology

## 2021-09-27 NOTE — ANESTHESIA PRE PROCEDURE
Department of Anesthesiology  Preprocedure Note       Name:  Trish Cannon   Age:  78 y.o.  :  1945                                          MRN:  [id number]         Date:  2021      Surgeon: Edwina Ham):  Warren Najjar, MD    Procedure: Procedure(s):  CABG CORONARY ARTERY BYPASS, EMMA    Medications prior to admission:   Prior to Admission medications    Medication Sig Start Date End Date Taking?  Authorizing Provider   lisinopril (PRINIVIL;ZESTRIL) 10 MG tablet Take 10 mg by mouth daily   Yes Historical Provider, MD   metoprolol succinate (TOPROL XL) 25 MG extended release tablet Take 25 mg by mouth daily   Yes Historical Provider, MD   isosorbide mononitrate (IMDUR) 30 MG extended release tablet Take 30 mg by mouth daily   Yes Historical Provider, MD   aspirin 81 MG EC tablet Take 81 mg by mouth daily   Yes Historical Provider, MD   atorvastatin (LIPITOR) 40 MG tablet Take 40 mg by mouth daily   Yes Historical Provider, MD   omeprazole (PRILOSEC) 20 MG delayed release capsule Take 1 capsule by mouth every morning (before breakfast) 21  Yes Geo Galeano MD   Omega-3 Fatty Acids (FISH OIL) 1000 MG CAPS Take 1,000 mg by mouth daily    Historical Provider, MD   zinc gluconate 50 MG tablet Take 50 mg by mouth daily    Historical Provider, MD   Ascorbic Acid (VITAMIN C) 500 MG CAPS Take 500 mg by mouth 2 times daily    Historical Provider, MD   famotidine (PEPCID) 20 MG tablet Take 20 mg by mouth as needed    Historical Provider, MD   Coenzyme Q10 (COQ-10) 100 MG CAPS Take by mouth 3 times daily     Historical Provider, MD       Current medications:    Current Facility-Administered Medications   Medication Dose Route Frequency Provider Last Rate Last Admin    0.9 % sodium chloride infusion   IntraVENous Continuous Warren Najjar, MD        0.9 % sodium chloride infusion  25 mL IntraVENous PRN Warren Najjar, MD        ceFAZolin (ANCEF) 2000 mg in sterile water 20 mL IV syringe  2,000 mg IntraVENous On Call to Robbie Allen MD        chlorhexidine (HIBICLENS) 4 % liquid   Topical Once Nehal Gardner MD        chlorhexidine (PERIDEX) 0.12 % solution 15 mL  15 mL Mouth/Throat Once Nehla Gardner MD        sodium chloride flush 0.9 % injection 10 mL  10 mL IntraVENous 2 times per day Nehal Gardner MD        sodium chloride flush 0.9 % injection 10 mL  10 mL IntraVENous PRN Nehal Gardner MD           Allergies: Allergies   Allergen Reactions    Dust Mite Extract        Problem List:    Patient Active Problem List   Diagnosis Code    CAD in native artery I25.10    Pulmonary nodules R91.8    Unstable angina (HCC) I20.0    Other hyperlipidemia E78.49    Essential hypertension I10    Gastroesophageal reflux disease with esophagitis without hemorrhage K21.00       Past Medical History:        Diagnosis Date    CAD (coronary artery disease)     Hiatal hernia     Hyperlipidemia     Hypertension        Past Surgical History:        Procedure Laterality Date    TONSILLECTOMY  1952       Social History:    Social History     Tobacco Use    Smoking status: Never Smoker    Smokeless tobacco: Never Used   Substance Use Topics    Alcohol use: Yes     Comment: occ.                                 Counseling given: Not Answered      Vital Signs (Current):   Vitals:    09/27/21 0545   Pulse: 67   Resp: 17   Temp: 96.9 °F (36.1 °C)   TempSrc: Temporal   SpO2: 97%   Weight: 157 lb (71.2 kg)   Height: 5' 8\" (1.727 m)                                              BP Readings from Last 3 Encounters:   09/23/21 (!) 183/89   09/21/21 134/82   09/14/21 (!) 170/83       NPO Status: Time of last liquid consumption: 2100                        Time of last solid consumption: 1800                        Date of last liquid consumption: 09/26/21                        Date of last solid food consumption: 09/26/21    BMI:   Wt Readings from Last 3 Encounters:   09/27/21 157 lb (71.2 kg)   09/20/21 157 lb (71.2 kg)   09/21/21 160 lb 6.4 oz (72.8 kg)     Body mass index is 23.87 kg/m². CBC:   Lab Results   Component Value Date    WBC 7.3 09/23/2021    RBC 5.05 09/23/2021    HGB 15.4 09/23/2021    HCT 44.6 09/23/2021    MCV 88.3 09/23/2021    RDW 12.4 09/23/2021     09/23/2021       CMP:   Lab Results   Component Value Date     09/23/2021    K 4.2 09/23/2021     09/23/2021    CO2 31 09/23/2021    BUN 14 09/23/2021    CREATININE 1.0 09/23/2021    GFRAA >60 09/23/2021    LABGLOM >60 09/23/2021    GLUCOSE 96 09/23/2021    PROT 6.6 09/23/2021    CALCIUM 8.9 09/23/2021    BILITOT 0.8 09/23/2021    ALKPHOS 87 09/23/2021    AST 20 09/23/2021    ALT 24 09/23/2021       POC Tests: No results for input(s): POCGLU, POCNA, POCK, POCCL, POCBUN, POCHEMO, POCHCT in the last 72 hours.     Coags:   Lab Results   Component Value Date    PROTIME 12.0 09/23/2021    INR 1.1 09/23/2021    APTT 32.5 09/23/2021       HCG (If Applicable): No results found for: PREGTESTUR, PREGSERUM, HCG, HCGQUANT     ABGs: No results found for: PHART, PO2ART, XFI0UHD, LPV1CCT, BEART, S1HDMSWL     Type & Screen (If Applicable):  No results found for: LABABO, LABRH    Drug/Infectious Status (If Applicable):  No results found for: HIV, HEPCAB    COVID-19 Screening (If Applicable): No results found for: COVID19        Anesthesia Evaluation  Patient summary reviewed and Nursing notes reviewed no history of anesthetic complications:   Airway: Mallampati: II  TM distance: >3 FB   Neck ROM: full  Mouth opening: > = 3 FB Dental:      Comment: None loose    Pulmonary:normal exam  breath sounds clear to auscultation                            ROS comment: Multiple nodules   Cardiovascular:  Exercise tolerance: good (>4 METS),   (+) hypertension:, angina:, CAD:,       ECG reviewed  Rhythm: regular  Rate: normal  Echocardiogram reviewed         Beta Blocker:  Dose within 24 Hrs         Neuro/Psych:   Negative Neuro/Psych ROS              GI/Hepatic/Renal:   (+) hiatal hernia, Endo/Other:                     Abdominal:             Vascular: negative vascular ROS. Other Findings:           Anesthesia Plan      general     ASA 4       Induction: intravenous. arterial line, central line, CVP and EMMA  MIPS: Postoperative opioids intended, Prophylactic antiemetics administered and Postoperative ventilation. Anesthetic plan and risks discussed with patient. Plan discussed with CRNA.                   Roberth Ramirez DO   9/27/2021

## 2021-09-27 NOTE — PROGRESS NOTES
Patient was extubated to 3 liters/min via nasal cannula. Breath Sounds post extubation were clear/diminished. Stridor was not present post extubation. Patient suctioned before extubating.  ETT cuff deflated prior to extubation    Performed by  Mariana Gregorio RCP

## 2021-09-27 NOTE — PLAN OF CARE
Problem: Falls - Risk of:  Goal: Will remain free from falls  Description: Will remain free from falls  Outcome: Met This Shift  Goal: Absence of physical injury  Description: Absence of physical injury  Outcome: Met This Shift     Problem:  Activity Intolerance:  Goal: Able to perform prescribed physical activity  Description: Able to perform prescribed physical activity  Outcome: Ongoing  Goal: Ability to tolerate increased activity will improve  Description: Ability to tolerate increased activity will improve  Outcome: Ongoing     Problem: Anxiety:  Goal: Level of anxiety will decrease  Description: Level of anxiety will decrease  Outcome: Met This Shift     Problem: Cardiac Output - Decreased:  Goal: Cardiac output within specified parameters  Description: Cardiac output within specified parameters  Outcome: Ongoing  Goal: Hemodynamic stability will improve  Description: Hemodynamic stability will improve  Outcome: Ongoing     Problem: Fluid Volume - Imbalance:  Goal: Chest tube drainage is within specified parameters  Description: Chest tube drainage is within specified parameters  Outcome: Met This Shift     Problem: Gas Exchange - Impaired:  Goal: Levels of oxygenation will improve  Description: Levels of oxygenation will improve  Outcome: Met This Shift  Goal: Ability to maintain adequate ventilation will improve  Description: Ability to maintain adequate ventilation will improve  Outcome: Met This Shift

## 2021-09-27 NOTE — ANESTHESIA PROCEDURE NOTES
Central Venous Line:    A central venous line was placed using ultrasound guidance, in the OR for the following indication(s): central venous access. Sterility preparation included the following: hand hygiene performed prior to procedure, maximum sterile barriers used and sterile technique used to drape from head to toe. The patient was placed in Trendelenburg position. The right internal jugular vein was prepped. The site was prepped with Chloraprep. A 8.5 Fr (size), introducer slick was placed. During the procedure, the following specific steps were taken: target vein identified, needle advanced into vein and blood aspirated and guidewire advanced into vein. Intravenous verification was obtained by ultrasound and venous blood return. Post insertion care included: all ports aspirated, all ports flushed easily, guidewire removed intact, Biopatch applied, line sutured in place and dressing applied. During the procedure the patient experienced: patient tolerated procedure well with no complications.       Anesthesia type: local..No  Staffing  Performed: Anesthesiologist   Anesthesiologist: Melina Ortiz DO  Preanesthetic Checklist  Completed: patient identified, IV checked, site marked, risks and benefits discussed, surgical consent, monitors and equipment checked, pre-op evaluation, timeout performed, anesthesia consent given, oxygen available and patient being monitored

## 2021-09-27 NOTE — PROGRESS NOTES
CVICU Admission Note    Name: Emilia Victoria  MRN: [id number]    CC: Postoperative Critical Care Management     Indication for Surgery/Procedure: CAD  LVEF:  NML    RVF:  NML    Important/Relevant PMH/PSH:  HTN, HLD, LBBB    Procedure/Surgeries: 9/27/2021 Sternotomy/CABG x 3 (LIMA-LAD, SVG-OM, SVG-RCA)/Extensive RCA endarterectomy/MALU exclusion with 35 mm AtriClip/EVH LLE/Rigid internal fixation of the sternum with Shon plates x 6/04 modifier    Pacing wires: Atrial and Ventricular       Physical Exam:    /70   Pulse 78   Temp 97 °F (36.1 °C) (Temporal)   Resp 10   Ht 5' 8\" (1.727 m)   Wt 157 lb (71.2 kg)   SpO2 100%   BMI 23.87 kg/m²     Recent Labs     09/27/21  1130   WBC 26.1*   RBC 4.36   HGB 13.4   HCT 37.9   MCV 86.9   MCH 30.7   MCHC 35.4*   RDW 12.1   PLT 80*   MPV 10.8     Recent Labs     09/27/21  1130 09/27/21  1130 09/27/21  1146     --   --    K 3.9   < > 3.77     --   --    CO2 23  --   --    BUN 12  --   --    CREATININE 0.8  --   --    GLUCOSE 125*  --   --    CALCIUM 8.6  --   --     < > = values in this interval not displayed. Post operative CXR:          Atelectasis, No pneumothorax noted, No pleural effusions. ETT/lines/drains appear to be in proper position. Final Radiology report pending. General Appearance: Arrived to ICU intubated on levophed   Eyes: PERRL  Pulmonary: Diminished bibasilar.   No wheezes, no accessory muscle use noted   Ventilator: Mode: AC/VC, 60 FiO2, 5 PEEP, 500 Vt   Cardiovascular: RRR, no heaves or thrills palpated   Telemetry: SR  Abdomen: Soft, OG to LIWS  Extremities: BLE with +PTsignals, no edema   Neurologic/Psych: Sedated   Skin: Warm and dry, Erythema 2/2 Vit B12   Incision: MSI with cheng dressing intact, LLE SVG sites with ace wrap on    Assessment/Plan: Day of Surgery     1. CAD S/p CABGx3 (LIMA-LAD, SVG-OM, SVG-RCA)/Extensive RCA endarterectomy/MALU AtriClip  - DAPT, Lipitor   - Perioperative Ancef  - Monitor chest tube output and hemodynamics closely    2. Acute Pulmonary Insufficiency Following Surgery    - Expected 2/2 surgery  - Intubated on ventilator  - Wean vent settings and extubate patient once awake, following commands, SANTOS, and no signs of bleeding  - ABGs per protocol and PRN     3. Hypotension   -likely 2/2 to vasoplegia post CPB-required levophed and vasopressin and ultimately IV B12.  -On 5mcq/min Norepi, target maps >65. IVF prn     4. Acute Post Operative Pain   - PRN fentanyl for pain management until able to take PO     This patient has a high probability of sudden deterioration, which requires preparedness to urgently intervene. I participated in the decision making process and managed the direct care of the patient that required frequent assessment and treatment. I personally spent 38 minutes of critical care time treating the patient.        Electronically signed by JUAN MIGUEL Monroe CNP on 9/27/2021 at 12:25 PM

## 2021-09-27 NOTE — BRIEF OP NOTE
Brief Postoperative Note    Francesca Calles  YOB: 1945  [id number]    Pre-operative Diagnosis: CAD    Post-operative Diagnosis: Same    Operation: Sternotomy/CABG x 3 (LIMA-LAD, SVG-OM, SVG-RCA)/Extensive RCA endarterectomy/MALU exclusion with 35 mm AtriClip/EVH LLE/Rigid internal fixation of the sternum with [place] plates x 0/00 modifier    Anesthesia: General    Surgeon: Henrietta Tapia    Assistants: Iker/Akua    Estimated Blood Loss: 5515    Complications: None    Specimens:   ID Type Source Tests Collected by Time Destination   A : ENDARTERECTOMY Tissue Heart SURGICAL PATHOLOGY Jacquie Self MD 9/27/2021 0919        Implants:  Implant Name Type Inv.  Item Serial No.  Lot No. LRB No. Used Action   DEVICE OCCL CLP L35MM PLUNG GRP FLX SHFT FOR GILLINOV  DEVICE OCCL CLP L35MM PLUNG GRP FLX SHFT FOR GILLINOV  [employer] [id number] N/A 1 Implanted         Drains:   Chest Tube 1 Anterior Mediastinal 32 [place] (Active)       Chest Tube 2 Anterior Mediastinal 32 [place] (Active)       Chest Tube 3 Anterior Pleural 28 [place] (Active)       Chest Tube 4 Anterior Pleural 19 [place] (Active)       Urethral Catheter Temperature probe 16 fr (Active)       Findings: diffusely diseased RCA, severe vasoplegia    Electronically signed by Jacquie Self MD on 9/27/2021 at 10:52 AM     #[id number]

## 2021-09-28 ENCOUNTER — APPOINTMENT (OUTPATIENT)
Dept: GENERAL RADIOLOGY | Age: 76
DRG: 235 | End: 2021-09-28
Attending: THORACIC SURGERY (CARDIOTHORACIC VASCULAR SURGERY)
Payer: MEDICARE

## 2021-09-28 ENCOUNTER — ANESTHESIA EVENT (OUTPATIENT)
Dept: ICU | Age: 76
DRG: 235 | End: 2021-09-28
Payer: MEDICARE

## 2021-09-28 ENCOUNTER — ANESTHESIA (OUTPATIENT)
Dept: ICU | Age: 76
DRG: 235 | End: 2021-09-28
Payer: MEDICARE

## 2021-09-28 PROBLEM — N17.9 AKI (ACUTE KIDNEY INJURY) (HCC): Status: ACTIVE | Noted: 2021-09-28

## 2021-09-28 PROBLEM — Z95.1 S/P CABG (CORONARY ARTERY BYPASS GRAFT): Status: ACTIVE | Noted: 2021-09-28

## 2021-09-28 PROBLEM — E83.51 HYPOCALCEMIA: Status: ACTIVE | Noted: 2021-09-28

## 2021-09-28 PROBLEM — E87.20 LACTIC ACIDEMIA: Status: ACTIVE | Noted: 2021-09-28

## 2021-09-28 PROBLEM — J96.01 ACUTE RESPIRATORY FAILURE WITH HYPOXIA (HCC): Status: ACTIVE | Noted: 2021-09-28

## 2021-09-28 PROBLEM — E87.6 HYPOKALEMIA: Status: ACTIVE | Noted: 2021-09-28

## 2021-09-28 LAB
AADO2: 126.7 MMHG
AADO2: 136.7 MMHG
AADO2: 140.9 MMHG
AADO2: 145 MMHG
AADO2: 237.9 MMHG
AADO2: 396.8 MMHG
AADO2: 426.5 MMHG
AADO2: 446 MMHG
AADO2: 458.3 MMHG
AADO2: 505.7 MMHG
AADO2: 509.3 MMHG
AADO2: 579.5 MMHG
AADO2: 590.1 MMHG
ALBUMIN SERPL-MCNC: 4.1 G/DL (ref 3.5–5.2)
ALBUMIN SERPL-MCNC: 4.7 G/DL (ref 3.5–5.2)
ALP BLD-CCNC: 28 U/L (ref 40–129)
ALP BLD-CCNC: 37 U/L (ref 40–129)
ALT SERPL-CCNC: 103 U/L (ref 0–40)
ALT SERPL-CCNC: 24 U/L (ref 0–40)
ANION GAP SERPL CALCULATED.3IONS-SCNC: 13 MMOL/L (ref 7–16)
ANION GAP SERPL CALCULATED.3IONS-SCNC: 19 MMOL/L (ref 7–16)
ANION GAP SERPL CALCULATED.3IONS-SCNC: 20 MMOL/L (ref 7–16)
ANION GAP SERPL CALCULATED.3IONS-SCNC: 22 MMOL/L (ref 7–16)
AST SERPL-CCNC: 268 U/L (ref 0–39)
AST SERPL-CCNC: 81 U/L (ref 0–39)
B.E.: -1.4 MMOL/L (ref -3–3)
B.E.: -10.7 MMOL/L (ref -3–3)
B.E.: -11.1 MMOL/L (ref -3–3)
B.E.: -2.1 MMOL/L (ref -3–3)
B.E.: -4.6 MMOL/L (ref -3–3)
B.E.: -9.6 MMOL/L (ref -3–3)
B.E.: -9.7 MMOL/L (ref -3–3)
B.E.: -9.8 MMOL/L (ref -3–3)
B.E.: 0 MMOL/L (ref -3–3)
B.E.: 1.7 MMOL/L (ref -3–3)
B.E.: 1.7 MMOL/L (ref -3–3)
B.E.: 1.8 MMOL/L (ref -3–3)
B.E.: 2.6 MMOL/L (ref -3–3)
BASOPHILS ABSOLUTE: 0.01 E9/L (ref 0–0.2)
BASOPHILS RELATIVE PERCENT: 0.1 % (ref 0–2)
BILIRUB SERPL-MCNC: 1.1 MG/DL (ref 0–1.2)
BILIRUB SERPL-MCNC: 1.9 MG/DL (ref 0–1.2)
BUN BLDV-MCNC: 18 MG/DL (ref 6–23)
BUN BLDV-MCNC: 22 MG/DL (ref 6–23)
BUN BLDV-MCNC: 26 MG/DL (ref 6–23)
BUN BLDV-MCNC: 30 MG/DL (ref 6–23)
CALCIUM IONIZED: 1.04 MMOL/L (ref 1.15–1.33)
CALCIUM SERPL-MCNC: 7.6 MG/DL (ref 8.6–10.2)
CALCIUM SERPL-MCNC: 7.8 MG/DL (ref 8.6–10.2)
CALCIUM SERPL-MCNC: 8.4 MG/DL (ref 8.6–10.2)
CALCIUM SERPL-MCNC: 8.7 MG/DL (ref 8.6–10.2)
CHLORIDE BLD-SCNC: 104 MMOL/L (ref 98–107)
CHLORIDE BLD-SCNC: 105 MMOL/L (ref 98–107)
CHLORIDE BLD-SCNC: 107 MMOL/L (ref 98–107)
CHLORIDE BLD-SCNC: 107 MMOL/L (ref 98–107)
CK MB: 94 NG/ML (ref 0–7.7)
CO2: 18 MMOL/L (ref 22–29)
CO2: 22 MMOL/L (ref 22–29)
CO2: 23 MMOL/L (ref 22–29)
CO2: 27 MMOL/L (ref 22–29)
COHB: 0.1 % (ref 0–1.5)
COHB: 0.1 % (ref 0–1.5)
COHB: 0.2 % (ref 0–1.5)
COHB: 0.3 % (ref 0–1.5)
CORTISOL TOTAL: 55.65 MCG/DL (ref 2.68–18.4)
CREAT SERPL-MCNC: 1.4 MG/DL (ref 0.7–1.2)
CREAT SERPL-MCNC: 1.6 MG/DL (ref 0.7–1.2)
CREAT SERPL-MCNC: 1.7 MG/DL (ref 0.7–1.2)
CREAT SERPL-MCNC: 1.8 MG/DL (ref 0.7–1.2)
CREATININE URINE: 127 MG/DL (ref 40–278)
CRITICAL: ABNORMAL
DATE ANALYZED: ABNORMAL
DATE OF COLLECTION: ABNORMAL
EKG ATRIAL RATE: 94 BPM
EKG P AXIS: 60 DEGREES
EKG P-R INTERVAL: 186 MS
EKG Q-T INTERVAL: 436 MS
EKG QRS DURATION: 132 MS
EKG QTC CALCULATION (BAZETT): 545 MS
EKG R AXIS: -37 DEGREES
EKG T AXIS: 106 DEGREES
EKG VENTRICULAR RATE: 94 BPM
EOSINOPHILS ABSOLUTE: 0 E9/L (ref 0.05–0.5)
EOSINOPHILS RELATIVE PERCENT: 0 % (ref 0–6)
FIO2: 100 %
FIO2: 40 %
FIO2: 60 %
FIO2: 80 %
FIO2: 90 %
GFR AFRICAN AMERICAN: 45
GFR AFRICAN AMERICAN: 48
GFR AFRICAN AMERICAN: 51
GFR AFRICAN AMERICAN: 60
GFR NON-AFRICAN AMERICAN: 37 ML/MIN/1.73
GFR NON-AFRICAN AMERICAN: 40 ML/MIN/1.73
GFR NON-AFRICAN AMERICAN: 42 ML/MIN/1.73
GFR NON-AFRICAN AMERICAN: 49 ML/MIN/1.73
GLUCOSE BLD-MCNC: 111 MG/DL (ref 74–99)
GLUCOSE BLD-MCNC: 172 MG/DL (ref 74–99)
GLUCOSE BLD-MCNC: 231 MG/DL (ref 74–99)
GLUCOSE BLD-MCNC: 278 MG/DL (ref 74–99)
HCO3: 14.8 MMOL/L (ref 22–26)
HCO3: 15.5 MMOL/L (ref 22–26)
HCO3: 15.9 MMOL/L (ref 22–26)
HCO3: 16 MMOL/L (ref 22–26)
HCO3: 16.4 MMOL/L (ref 22–26)
HCO3: 21.5 MMOL/L (ref 22–26)
HCO3: 22.2 MMOL/L (ref 22–26)
HCO3: 22.9 MMOL/L (ref 22–26)
HCO3: 23.7 MMOL/L (ref 22–26)
HCO3: 24.7 MMOL/L (ref 22–26)
HCO3: 24.9 MMOL/L (ref 22–26)
HCO3: 25.4 MMOL/L (ref 22–26)
HCO3: 26.2 MMOL/L (ref 22–26)
HCT VFR BLD CALC: 25.5 % (ref 37–54)
HCT VFR BLD CALC: 28.4 % (ref 37–54)
HCT VFR BLD CALC: 29.4 % (ref 37–54)
HEMOGLOBIN: 10 G/DL (ref 12.5–16.5)
HEMOGLOBIN: 8.8 G/DL (ref 12.5–16.5)
HEMOGLOBIN: 9.7 G/DL (ref 12.5–16.5)
HHB: 15.3 % (ref 0–5)
HHB: 16 % (ref 0–5)
HHB: 2.4 % (ref 0–5)
HHB: 2.4 % (ref 0–5)
HHB: 2.5 % (ref 0–5)
HHB: 2.6 % (ref 0–5)
HHB: 2.8 % (ref 0–5)
HHB: 3 % (ref 0–5)
HHB: 3.4 % (ref 0–5)
HHB: 3.4 % (ref 0–5)
HHB: 3.6 % (ref 0–5)
HHB: 5 % (ref 0–5)
HHB: 8.5 % (ref 0–5)
IMMATURE GRANULOCYTES #: 0.15 E9/L
IMMATURE GRANULOCYTES %: 0.9 % (ref 0–5)
LAB: ABNORMAL
LACTIC ACID: 11.5 MMOL/L (ref 0.5–2.2)
LACTIC ACID: 12.1 MMOL/L (ref 0.5–2.2)
LACTIC ACID: 12.3 MMOL/L (ref 0.5–2.2)
LACTIC ACID: 12.5 MMOL/L (ref 0.5–2.2)
LACTIC ACID: 12.7 MMOL/L (ref 0.5–2.2)
LACTIC ACID: 13 MMOL/L (ref 0.5–2.2)
LACTIC ACID: 13.5 MMOL/L (ref 0.5–2.2)
LACTIC ACID: 13.7 MMOL/L (ref 0.5–2.2)
LACTIC ACID: 5.2 MMOL/L (ref 0.5–2.2)
LACTIC ACID: 5.7 MMOL/L (ref 0.5–2.2)
LACTIC ACID: 5.8 MMOL/L (ref 0.5–2.2)
LACTIC ACID: 6.4 MMOL/L (ref 0.5–2.2)
LACTIC ACID: 6.5 MMOL/L (ref 0.5–2.2)
LACTIC ACID: 8.7 MMOL/L (ref 0.5–2.2)
LACTIC ACID: 9.6 MMOL/L (ref 0.5–2.2)
LIPASE: 7 U/L (ref 13–60)
LYMPHOCYTES ABSOLUTE: 0.86 E9/L (ref 1.5–4)
LYMPHOCYTES RELATIVE PERCENT: 5.1 % (ref 20–42)
Lab: ABNORMAL
MAGNESIUM: 2.1 MG/DL (ref 1.6–2.6)
MCH RBC QN AUTO: 30.6 PG (ref 26–35)
MCH RBC QN AUTO: 31.1 PG (ref 26–35)
MCH RBC QN AUTO: 31.3 PG (ref 26–35)
MCHC RBC AUTO-ENTMCNC: 34 % (ref 32–34.5)
MCHC RBC AUTO-ENTMCNC: 34.2 % (ref 32–34.5)
MCHC RBC AUTO-ENTMCNC: 34.5 % (ref 32–34.5)
MCV RBC AUTO: 88.5 FL (ref 80–99.9)
MCV RBC AUTO: 91 FL (ref 80–99.9)
MCV RBC AUTO: 91.9 FL (ref 80–99.9)
METER GLUCOSE: 111 MG/DL (ref 74–99)
METER GLUCOSE: 123 MG/DL (ref 74–99)
METER GLUCOSE: 132 MG/DL (ref 74–99)
METER GLUCOSE: 164 MG/DL (ref 74–99)
METER GLUCOSE: 166 MG/DL (ref 74–99)
METER GLUCOSE: 174 MG/DL (ref 74–99)
METER GLUCOSE: 196 MG/DL (ref 74–99)
METER GLUCOSE: 196 MG/DL (ref 74–99)
METER GLUCOSE: 204 MG/DL (ref 74–99)
METER GLUCOSE: 205 MG/DL (ref 74–99)
METER GLUCOSE: 207 MG/DL (ref 74–99)
METER GLUCOSE: 230 MG/DL (ref 74–99)
METER GLUCOSE: 233 MG/DL (ref 74–99)
METER GLUCOSE: 240 MG/DL (ref 74–99)
METER GLUCOSE: 263 MG/DL (ref 74–99)
METER GLUCOSE: 315 MG/DL (ref 74–99)
METER GLUCOSE: 72 MG/DL (ref 74–99)
METER GLUCOSE: 98 MG/DL (ref 74–99)
METHB: 0.4 % (ref 0–1.5)
METHB: 0.5 % (ref 0–1.5)
METHB: 0.6 % (ref 0–1.5)
METHB: 0.7 % (ref 0–1.5)
METHB: 0.7 % (ref 0–1.5)
METHB: 0.8 % (ref 0–1.5)
MODE: ABNORMAL
MODE: AC
MONOCYTES ABSOLUTE: 0.73 E9/L (ref 0.1–0.95)
MONOCYTES RELATIVE PERCENT: 4.4 % (ref 2–12)
NEUTROPHILS ABSOLUTE: 15 E9/L (ref 1.8–7.3)
NEUTROPHILS RELATIVE PERCENT: 89.5 % (ref 43–80)
O2 CONTENT: 13.9 ML/DL
O2 SATURATION: 83.9 % (ref 92–98.5)
O2 SATURATION: 84.6 % (ref 92–98.5)
O2 SATURATION: 91.4 % (ref 92–98.5)
O2 SATURATION: 95 % (ref 92–98.5)
O2 SATURATION: 96.4 % (ref 92–98.5)
O2 SATURATION: 96.6 % (ref 92–98.5)
O2 SATURATION: 96.6 % (ref 92–98.5)
O2 SATURATION: 97 % (ref 92–98.5)
O2 SATURATION: 97.2 % (ref 92–98.5)
O2 SATURATION: 97.4 % (ref 92–98.5)
O2 SATURATION: 97.5 % (ref 92–98.5)
O2 SATURATION: 97.6 % (ref 92–98.5)
O2 SATURATION: 97.6 % (ref 92–98.5)
O2HB: 83.2 % (ref 94–97)
O2HB: 83.9 % (ref 94–97)
O2HB: 90.6 % (ref 94–97)
O2HB: 94.2 % (ref 94–97)
O2HB: 95.5 % (ref 94–97)
O2HB: 95.8 % (ref 94–97)
O2HB: 95.9 % (ref 94–97)
O2HB: 96.2 % (ref 94–97)
O2HB: 96.3 % (ref 94–97)
O2HB: 96.5 % (ref 94–97)
O2HB: 96.8 % (ref 94–97)
O2HB: 96.8 % (ref 94–97)
O2HB: 96.9 % (ref 94–97)
OPERATOR ID: 1926
OPERATOR ID: 5100
OPERATOR ID: ABNORMAL
PATIENT TEMP: 37 C
PCO2: 29.9 MMHG (ref 35–45)
PCO2: 31.1 MMHG (ref 35–45)
PCO2: 32.8 MMHG (ref 35–45)
PCO2: 33.1 MMHG (ref 35–45)
PCO2: 33.9 MMHG (ref 35–45)
PCO2: 34.3 MMHG (ref 35–45)
PCO2: 34.4 MMHG (ref 35–45)
PCO2: 34.7 MMHG (ref 35–45)
PCO2: 35.9 MMHG (ref 35–45)
PCO2: 36 MMHG (ref 35–45)
PCO2: 36.4 MMHG (ref 35–45)
PCO2: 41.2 MMHG (ref 35–45)
PCO2: 53.9 MMHG (ref 35–45)
PDW BLD-RTO: 13.2 FL (ref 11.5–15)
PDW BLD-RTO: 13.4 FL (ref 11.5–15)
PDW BLD-RTO: 13.5 FL (ref 11.5–15)
PEEP/CPAP: 7 CMH2O
PEEP/CPAP: 8 CMH2O
PFO2: 0.55 MMHG/%
PFO2: 0.57 MMHG/%
PFO2: 0.85 MMHG/%
PFO2: 1.12 MMHG/%
PFO2: 1.43 MMHG/%
PFO2: 1.44 MMHG/%
PFO2: 1.45 MMHG/%
PFO2: 1.46 MMHG/%
PFO2: 2.25 MMHG/%
PFO2: 2.28 MMHG/%
PFO2: 2.46 MMHG/%
PFO2: 2.5 MMHG/%
PFO2: 2.77 MMHG/%
PH BLOOD GAS: 7.22 (ref 7.35–7.45)
PH BLOOD GAS: 7.25 (ref 7.35–7.45)
PH BLOOD GAS: 7.27 (ref 7.35–7.45)
PH BLOOD GAS: 7.28 (ref 7.35–7.45)
PH BLOOD GAS: 7.29 (ref 7.35–7.45)
PH BLOOD GAS: 7.32 (ref 7.35–7.45)
PH BLOOD GAS: 7.41 (ref 7.35–7.45)
PH BLOOD GAS: 7.45 (ref 7.35–7.45)
PH BLOOD GAS: 7.47 (ref 7.35–7.45)
PH BLOOD GAS: 7.48 (ref 7.35–7.45)
PH BLOOD GAS: 7.5 (ref 7.35–7.45)
PIP: 14 CMH2O
PLATELET # BLD: 49 E9/L (ref 130–450)
PLATELET # BLD: 93 E9/L (ref 130–450)
PLATELET # BLD: 94 E9/L (ref 130–450)
PLATELET CONFIRMATION: NORMAL
PMV BLD AUTO: 11.1 FL (ref 7–12)
PMV BLD AUTO: 11.7 FL (ref 7–12)
PMV BLD AUTO: 11.8 FL (ref 7–12)
PO2: 110.8 MMHG (ref 75–100)
PO2: 115.9 MMHG (ref 75–100)
PO2: 129 MMHG (ref 75–100)
PO2: 134.9 MMHG (ref 75–100)
PO2: 144.4 MMHG (ref 75–100)
PO2: 146.3 MMHG (ref 75–100)
PO2: 54.6 MMHG (ref 75–100)
PO2: 56.7 MMHG (ref 75–100)
PO2: 68.3 MMHG (ref 75–100)
PO2: 89.2 MMHG (ref 75–100)
PO2: 91.2 MMHG (ref 75–100)
PO2: 98.6 MMHG (ref 75–100)
PO2: 99.9 MMHG (ref 75–100)
POTASSIUM SERPL-SCNC: 2.8 MMOL/L (ref 3.5–5)
POTASSIUM SERPL-SCNC: 3.1 MMOL/L (ref 3.5–5)
POTASSIUM SERPL-SCNC: 3.3 MMOL/L (ref 3.5–5)
POTASSIUM SERPL-SCNC: 4.22 MMOL/L (ref 3.5–5)
POTASSIUM SERPL-SCNC: 4.3 MMOL/L (ref 3.5–5)
RBC # BLD: 2.88 E12/L (ref 3.8–5.8)
RBC # BLD: 3.12 E12/L (ref 3.8–5.8)
RBC # BLD: 3.2 E12/L (ref 3.8–5.8)
RI(T): 1.14
RI(T): 1.39
RI(T): 1.41
RI(T): 1.59
RI(T): 1.76
RI(T): 10.41
RI(T): 10.61
RI(T): 3.42
RI(T): 3.46
RI(T): 3.53
RI(T): 3.55
RI(T): 4.78
RI(T): 653 %
RR MECHANICAL: 14 B/MIN
RR MECHANICAL: 16 B/MIN
RR MECHANICAL: 16 B/MIN
RR MECHANICAL: 18 B/MIN
RR MECHANICAL: 22 B/MIN
RR MECHANICAL: 22 B/MIN
SODIUM BLD-SCNC: 144 MMOL/L (ref 132–146)
SODIUM BLD-SCNC: 147 MMOL/L (ref 132–146)
SODIUM BLD-SCNC: 147 MMOL/L (ref 132–146)
SODIUM BLD-SCNC: 149 MMOL/L (ref 132–146)
SODIUM URINE: <20 MMOL/L
SOURCE, BLOOD GAS: ABNORMAL
THB: 10 G/DL (ref 11.5–16.5)
THB: 10.2 G/DL (ref 11.5–16.5)
THB: 10.2 G/DL (ref 11.5–16.5)
THB: 10.3 G/DL (ref 11.5–16.5)
THB: 10.8 G/DL (ref 11.5–16.5)
THB: 10.8 G/DL (ref 11.5–16.5)
THB: 10.9 G/DL (ref 11.5–16.5)
THB: 11 G/DL (ref 11.5–16.5)
THB: 11 G/DL (ref 11.5–16.5)
THB: 9.3 G/DL (ref 11.5–16.5)
THB: 9.5 G/DL (ref 11.5–16.5)
THB: 9.6 G/DL (ref 11.5–16.5)
THB: 9.6 G/DL (ref 11.5–16.5)
TIME ANALYZED: 1008
TIME ANALYZED: 1219
TIME ANALYZED: 13
TIME ANALYZED: 1453
TIME ANALYZED: 1634
TIME ANALYZED: 1804
TIME ANALYZED: 2018
TIME ANALYZED: 203
TIME ANALYZED: 2214
TIME ANALYZED: 442
TIME ANALYZED: 705
TIME ANALYZED: 753
TIME ANALYZED: 902
TOTAL PROTEIN: 4.7 G/DL (ref 6.4–8.3)
TOTAL PROTEIN: 5.3 G/DL (ref 6.4–8.3)
TROPONIN, HIGH SENSITIVITY: 1545 NG/L (ref 0–11)
TSH SERPL DL<=0.05 MIU/L-ACNC: 0.62 UIU/ML (ref 0.27–4.2)
VT MECHANICAL: 450 ML
VT MECHANICAL: 500 ML
WBC # BLD: 11.7 E9/L (ref 4.5–11.5)
WBC # BLD: 16.8 E9/L (ref 4.5–11.5)
WBC # BLD: 18.8 E9/L (ref 4.5–11.5)

## 2021-09-28 PROCEDURE — 6370000000 HC RX 637 (ALT 250 FOR IP): Performed by: PHYSICIAN ASSISTANT

## 2021-09-28 PROCEDURE — 2500000003 HC RX 250 WO HCPCS

## 2021-09-28 PROCEDURE — 2500000003 HC RX 250 WO HCPCS: Performed by: SURGERY

## 2021-09-28 PROCEDURE — 93308 TTE F-UP OR LMTD: CPT

## 2021-09-28 PROCEDURE — 85027 COMPLETE CBC AUTOMATED: CPT

## 2021-09-28 PROCEDURE — 99291 CRITICAL CARE FIRST HOUR: CPT | Performed by: SURGERY

## 2021-09-28 PROCEDURE — 94660 CPAP INITIATION&MGMT: CPT

## 2021-09-28 PROCEDURE — 2580000003 HC RX 258

## 2021-09-28 PROCEDURE — 93005 ELECTROCARDIOGRAM TRACING: CPT | Performed by: STUDENT IN AN ORGANIZED HEALTH CARE EDUCATION/TRAINING PROGRAM

## 2021-09-28 PROCEDURE — 83735 ASSAY OF MAGNESIUM: CPT

## 2021-09-28 PROCEDURE — 74022 RADEX COMPL AQT ABD SERIES: CPT

## 2021-09-28 PROCEDURE — 84300 ASSAY OF URINE SODIUM: CPT

## 2021-09-28 PROCEDURE — P9045 ALBUMIN (HUMAN), 5%, 250 ML: HCPCS | Performed by: PHYSICIAN ASSISTANT

## 2021-09-28 PROCEDURE — 82533 TOTAL CORTISOL: CPT

## 2021-09-28 PROCEDURE — 99291 CRITICAL CARE FIRST HOUR: CPT | Performed by: NURSE PRACTITIONER

## 2021-09-28 PROCEDURE — 82553 CREATINE MB FRACTION: CPT

## 2021-09-28 PROCEDURE — 37799 UNLISTED PX VASCULAR SURGERY: CPT

## 2021-09-28 PROCEDURE — 6360000002 HC RX W HCPCS: Performed by: PHYSICIAN ASSISTANT

## 2021-09-28 PROCEDURE — 6360000002 HC RX W HCPCS: Performed by: SURGERY

## 2021-09-28 PROCEDURE — 71045 X-RAY EXAM CHEST 1 VIEW: CPT

## 2021-09-28 PROCEDURE — 94640 AIRWAY INHALATION TREATMENT: CPT

## 2021-09-28 PROCEDURE — 6360000002 HC RX W HCPCS

## 2021-09-28 PROCEDURE — 2000000000 HC ICU R&B

## 2021-09-28 PROCEDURE — 2500000003 HC RX 250 WO HCPCS: Performed by: THORACIC SURGERY (CARDIOTHORACIC VASCULAR SURGERY)

## 2021-09-28 PROCEDURE — 83690 ASSAY OF LIPASE: CPT

## 2021-09-28 PROCEDURE — P9047 ALBUMIN (HUMAN), 25%, 50ML: HCPCS | Performed by: NURSE PRACTITIONER

## 2021-09-28 PROCEDURE — 82962 GLUCOSE BLOOD TEST: CPT

## 2021-09-28 PROCEDURE — 6360000002 HC RX W HCPCS: Performed by: THORACIC SURGERY (CARDIOTHORACIC VASCULAR SURGERY)

## 2021-09-28 PROCEDURE — 99233 SBSQ HOSP IP/OBS HIGH 50: CPT | Performed by: INTERNAL MEDICINE

## 2021-09-28 PROCEDURE — 2500000003 HC RX 250 WO HCPCS: Performed by: NURSE ANESTHETIST, CERTIFIED REGISTERED

## 2021-09-28 PROCEDURE — 2580000003 HC RX 258: Performed by: NURSE PRACTITIONER

## 2021-09-28 PROCEDURE — 6360000002 HC RX W HCPCS: Performed by: STUDENT IN AN ORGANIZED HEALTH CARE EDUCATION/TRAINING PROGRAM

## 2021-09-28 PROCEDURE — P9045 ALBUMIN (HUMAN), 5%, 250 ML: HCPCS | Performed by: NURSE PRACTITIONER

## 2021-09-28 PROCEDURE — C9113 INJ PANTOPRAZOLE SODIUM, VIA: HCPCS | Performed by: NURSE PRACTITIONER

## 2021-09-28 PROCEDURE — 84132 ASSAY OF SERUM POTASSIUM: CPT

## 2021-09-28 PROCEDURE — 31500 INSERT EMERGENCY AIRWAY: CPT

## 2021-09-28 PROCEDURE — 82805 BLOOD GASES W/O2 SATURATION: CPT

## 2021-09-28 PROCEDURE — 6370000000 HC RX 637 (ALT 250 FOR IP): Performed by: NURSE PRACTITIONER

## 2021-09-28 PROCEDURE — 33970 AORTIC CIRCULATION ASSIST: CPT | Performed by: THORACIC SURGERY (CARDIOTHORACIC VASCULAR SURGERY)

## 2021-09-28 PROCEDURE — 80048 BASIC METABOLIC PNL TOTAL CA: CPT

## 2021-09-28 PROCEDURE — 2580000003 HC RX 258: Performed by: THORACIC SURGERY (CARDIOTHORACIC VASCULAR SURGERY)

## 2021-09-28 PROCEDURE — 85025 COMPLETE CBC W/AUTO DIFF WBC: CPT

## 2021-09-28 PROCEDURE — 2580000003 HC RX 258: Performed by: PHYSICIAN ASSISTANT

## 2021-09-28 PROCEDURE — 83605 ASSAY OF LACTIC ACID: CPT

## 2021-09-28 PROCEDURE — 82570 ASSAY OF URINE CREATININE: CPT

## 2021-09-28 PROCEDURE — 2500000003 HC RX 250 WO HCPCS: Performed by: NURSE PRACTITIONER

## 2021-09-28 PROCEDURE — 2500000003 HC RX 250 WO HCPCS: Performed by: PHYSICIAN ASSISTANT

## 2021-09-28 PROCEDURE — 36556 INSERT NON-TUNNEL CV CATH: CPT | Performed by: SURGERY

## 2021-09-28 PROCEDURE — 6360000004 HC RX CONTRAST MEDICATION: Performed by: THORACIC SURGERY (CARDIOTHORACIC VASCULAR SURGERY)

## 2021-09-28 PROCEDURE — 2580000003 HC RX 258: Performed by: STUDENT IN AN ORGANIZED HEALTH CARE EDUCATION/TRAINING PROGRAM

## 2021-09-28 PROCEDURE — 84443 ASSAY THYROID STIM HORMONE: CPT

## 2021-09-28 PROCEDURE — 94644 CONT INHLJ TX 1ST HOUR: CPT

## 2021-09-28 PROCEDURE — 94645 CONT INHLJ TX EACH ADDL HOUR: CPT

## 2021-09-28 PROCEDURE — P9045 ALBUMIN (HUMAN), 5%, 250 ML: HCPCS | Performed by: SURGERY

## 2021-09-28 PROCEDURE — 2580000003 HC RX 258: Performed by: SURGERY

## 2021-09-28 PROCEDURE — 94003 VENT MGMT INPAT SUBQ DAY: CPT

## 2021-09-28 PROCEDURE — 31500 INSERT EMERGENCY AIRWAY: CPT | Performed by: NURSE ANESTHETIST, CERTIFIED REGISTERED

## 2021-09-28 PROCEDURE — 36415 COLL VENOUS BLD VENIPUNCTURE: CPT

## 2021-09-28 PROCEDURE — 02HV33Z INSERTION OF INFUSION DEVICE INTO SUPERIOR VENA CAVA, PERCUTANEOUS APPROACH: ICD-10-PCS | Performed by: THORACIC SURGERY (CARDIOTHORACIC VASCULAR SURGERY)

## 2021-09-28 PROCEDURE — 80053 COMPREHEN METABOLIC PANEL: CPT

## 2021-09-28 PROCEDURE — 6360000002 HC RX W HCPCS: Performed by: NURSE PRACTITIONER

## 2021-09-28 PROCEDURE — 84484 ASSAY OF TROPONIN QUANT: CPT

## 2021-09-28 PROCEDURE — 82330 ASSAY OF CALCIUM: CPT

## 2021-09-28 RX ORDER — MIDAZOLAM HYDROCHLORIDE 1 MG/ML
INJECTION INTRAMUSCULAR; INTRAVENOUS
Status: COMPLETED
Start: 2021-09-28 | End: 2021-09-28

## 2021-09-28 RX ORDER — ALBUMIN, HUMAN INJ 5% 5 %
50 SOLUTION INTRAVENOUS ONCE
Status: COMPLETED | OUTPATIENT
Start: 2021-09-28 | End: 2021-09-28

## 2021-09-28 RX ORDER — ETOMIDATE 2 MG/ML
INJECTION INTRAVENOUS PRN
Status: DISCONTINUED | OUTPATIENT
Start: 2021-09-28 | End: 2021-09-29 | Stop reason: HOSPADM

## 2021-09-28 RX ORDER — MINERAL OIL AND WHITE PETROLATUM 150; 830 MG/G; MG/G
OINTMENT OPHTHALMIC PRN
Status: DISCONTINUED | OUTPATIENT
Start: 2021-09-28 | End: 2021-10-13 | Stop reason: HOSPADM

## 2021-09-28 RX ORDER — ONDANSETRON 2 MG/ML
4 INJECTION INTRAMUSCULAR; INTRAVENOUS ONCE
Status: COMPLETED | OUTPATIENT
Start: 2021-09-28 | End: 2021-09-28

## 2021-09-28 RX ORDER — DOBUTAMINE HYDROCHLORIDE 400 MG/100ML
INJECTION INTRAVENOUS
Status: COMPLETED
Start: 2021-09-28 | End: 2021-09-28

## 2021-09-28 RX ORDER — SUCCINYLCHOLINE CHLORIDE 20 MG/ML
INJECTION INTRAMUSCULAR; INTRAVENOUS
Status: DISCONTINUED
Start: 2021-09-28 | End: 2021-09-28 | Stop reason: WASHOUT

## 2021-09-28 RX ORDER — ALBUMIN (HUMAN) 12.5 G/50ML
25 SOLUTION INTRAVENOUS ONCE
Status: COMPLETED | OUTPATIENT
Start: 2021-09-28 | End: 2021-09-28

## 2021-09-28 RX ORDER — DOBUTAMINE HYDROCHLORIDE 400 MG/100ML
7.5 INJECTION INTRAVENOUS CONTINUOUS
Status: DISCONTINUED | OUTPATIENT
Start: 2021-09-28 | End: 2021-10-06

## 2021-09-28 RX ORDER — ROCURONIUM BROMIDE 10 MG/ML
0.6 INJECTION, SOLUTION INTRAVENOUS ONCE
Status: COMPLETED | OUTPATIENT
Start: 2021-09-28 | End: 2021-09-29

## 2021-09-28 RX ORDER — MIDAZOLAM HYDROCHLORIDE 2 MG/2ML
2 INJECTION, SOLUTION INTRAMUSCULAR; INTRAVENOUS ONCE
Status: COMPLETED | OUTPATIENT
Start: 2021-09-28 | End: 2021-09-28

## 2021-09-28 RX ORDER — CHLORHEXIDINE GLUCONATE 0.12 MG/ML
15 RINSE ORAL 2 TIMES DAILY
Status: DISCONTINUED | OUTPATIENT
Start: 2021-09-28 | End: 2021-10-13 | Stop reason: HOSPADM

## 2021-09-28 RX ORDER — FENTANYL CITRATE 50 UG/ML
12.5 INJECTION, SOLUTION INTRAMUSCULAR; INTRAVENOUS
Status: DISCONTINUED | OUTPATIENT
Start: 2021-09-28 | End: 2021-10-10

## 2021-09-28 RX ORDER — SODIUM CHLORIDE 9 MG/ML
10 INJECTION INTRAVENOUS DAILY
Status: DISCONTINUED | OUTPATIENT
Start: 2021-09-28 | End: 2021-10-02

## 2021-09-28 RX ORDER — PANTOPRAZOLE SODIUM 40 MG/10ML
40 INJECTION, POWDER, LYOPHILIZED, FOR SOLUTION INTRAVENOUS DAILY
Status: DISCONTINUED | OUTPATIENT
Start: 2021-09-28 | End: 2021-10-02

## 2021-09-28 RX ORDER — ASPIRIN 81 MG/1
81 TABLET, CHEWABLE ORAL DAILY
Status: DISCONTINUED | OUTPATIENT
Start: 2021-09-29 | End: 2021-10-10

## 2021-09-28 RX ORDER — DOCUSATE SODIUM 50 MG/5ML
100 LIQUID ORAL 2 TIMES DAILY
Status: DISCONTINUED | OUTPATIENT
Start: 2021-09-28 | End: 2021-10-10

## 2021-09-28 RX ADMIN — EPINEPHRINE 3 MCG/MIN: 1 INJECTION INTRAMUSCULAR; INTRAVENOUS; SUBCUTANEOUS at 21:39

## 2021-09-28 RX ADMIN — SODIUM BICARBONATE 50 MEQ: 84 INJECTION, SOLUTION INTRAVENOUS at 00:41

## 2021-09-28 RX ADMIN — CALCIUM GLUCONATE 1000 MG: 98 INJECTION, SOLUTION INTRAVENOUS at 18:18

## 2021-09-28 RX ADMIN — ENOXAPARIN SODIUM 40 MG: 40 INJECTION SUBCUTANEOUS at 10:42

## 2021-09-28 RX ADMIN — IPRATROPIUM BROMIDE AND ALBUTEROL SULFATE 1 AMPULE: .5; 2.5 SOLUTION RESPIRATORY (INHALATION) at 11:35

## 2021-09-28 RX ADMIN — SODIUM CHLORIDE 30 ML/HR: 9 INJECTION, SOLUTION INTRAVENOUS at 18:20

## 2021-09-28 RX ADMIN — VASOPRESSIN 0.02 UNITS/MIN: 20 INJECTION INTRAVENOUS at 23:17

## 2021-09-28 RX ADMIN — MINERAL OIL AND PETROLATUM: 150; 830 OINTMENT OPHTHALMIC at 11:34

## 2021-09-28 RX ADMIN — Medication 10 ML: at 21:28

## 2021-09-28 RX ADMIN — PANTOPRAZOLE SODIUM 40 MG: 40 INJECTION, POWDER, FOR SOLUTION INTRAVENOUS at 10:42

## 2021-09-28 RX ADMIN — POTASSIUM CHLORIDE 20 MEQ: 400 INJECTION, SOLUTION INTRAVENOUS at 05:15

## 2021-09-28 RX ADMIN — METHYLENE BLUE 0.5 MG/KG/HR: 5 INJECTION INTRAVENOUS at 06:08

## 2021-09-28 RX ADMIN — ROCURONIUM BROMIDE 43 MG: 50 INJECTION, SOLUTION INTRAVENOUS at 08:22

## 2021-09-28 RX ADMIN — SODIUM BICARBONATE 100 MEQ: 84 INJECTION INTRAVENOUS at 07:59

## 2021-09-28 RX ADMIN — ALBUMIN (HUMAN) 25 G: 12.5 INJECTION, SOLUTION INTRAVENOUS at 02:16

## 2021-09-28 RX ADMIN — WATER 10 ML: 1 INJECTION INTRAMUSCULAR; INTRAVENOUS; SUBCUTANEOUS at 13:38

## 2021-09-28 RX ADMIN — SODIUM CHLORIDE, PRESERVATIVE FREE 10 ML: 5 INJECTION INTRAVENOUS at 10:42

## 2021-09-28 RX ADMIN — Medication 2000 MG: at 18:20

## 2021-09-28 RX ADMIN — AMIODARONE HYDROCHLORIDE 0.5 MG/MIN: 50 INJECTION, SOLUTION INTRAVENOUS at 08:15

## 2021-09-28 RX ADMIN — AMIODARONE HYDROCHLORIDE 0.5 MG/MIN: 50 INJECTION, SOLUTION INTRAVENOUS at 23:18

## 2021-09-28 RX ADMIN — PIPERACILLIN AND TAZOBACTAM 3375 MG: 3; .375 INJECTION, POWDER, LYOPHILIZED, FOR SOLUTION INTRAVENOUS at 15:16

## 2021-09-28 RX ADMIN — SODIUM CHLORIDE 14.7 UNITS/HR: 9 INJECTION, SOLUTION INTRAVENOUS at 09:23

## 2021-09-28 RX ADMIN — EPOPROSTENOL 50 NG/KG/MIN: 1.5 INJECTION, POWDER, LYOPHILIZED, FOR SOLUTION INTRAVENOUS at 09:32

## 2021-09-28 RX ADMIN — IPRATROPIUM BROMIDE AND ALBUTEROL SULFATE 1 AMPULE: .5; 2.5 SOLUTION RESPIRATORY (INHALATION) at 20:01

## 2021-09-28 RX ADMIN — MUPIROCIN: 20 OINTMENT TOPICAL at 09:37

## 2021-09-28 RX ADMIN — POTASSIUM CHLORIDE 20 MEQ: 400 INJECTION, SOLUTION INTRAVENOUS at 18:20

## 2021-09-28 RX ADMIN — POTASSIUM CHLORIDE 20 MEQ: 400 INJECTION, SOLUTION INTRAVENOUS at 17:19

## 2021-09-28 RX ADMIN — ALBUMIN (HUMAN) 25 G: 12.5 INJECTION, SOLUTION INTRAVENOUS at 13:17

## 2021-09-28 RX ADMIN — EPOPROSTENOL 50 NG/KG/MIN: 1.5 INJECTION, POWDER, LYOPHILIZED, FOR SOLUTION INTRAVENOUS at 15:42

## 2021-09-28 RX ADMIN — Medication 100 MEQ: at 07:59

## 2021-09-28 RX ADMIN — EPOPROSTENOL 50 NG/KG/MIN: 1.5 INJECTION, POWDER, LYOPHILIZED, FOR SOLUTION INTRAVENOUS at 22:27

## 2021-09-28 RX ADMIN — MIDAZOLAM 2 MG/HR: 5 INJECTION, SOLUTION INTRAMUSCULAR; INTRAVENOUS at 08:08

## 2021-09-28 RX ADMIN — PERFLUTREN 1.65 MG: 6.52 INJECTION, SUSPENSION INTRAVENOUS at 07:36

## 2021-09-28 RX ADMIN — POTASSIUM CHLORIDE 20 MEQ: 400 INJECTION, SOLUTION INTRAVENOUS at 12:42

## 2021-09-28 RX ADMIN — PIPERACILLIN AND TAZOBACTAM 3375 MG: 3; .375 INJECTION, POWDER, LYOPHILIZED, FOR SOLUTION INTRAVENOUS at 08:25

## 2021-09-28 RX ADMIN — ALBUMIN (HUMAN) 25 G: 0.25 INJECTION, SOLUTION INTRAVENOUS at 21:42

## 2021-09-28 RX ADMIN — ALBUMIN (HUMAN) 50 G: 12.5 INJECTION, SOLUTION INTRAVENOUS at 03:47

## 2021-09-28 RX ADMIN — ETOMIDATE 10 MG: 2 INJECTION, SOLUTION INTRAVENOUS at 07:42

## 2021-09-28 RX ADMIN — IPRATROPIUM BROMIDE AND ALBUTEROL SULFATE 1 AMPULE: .5; 2.5 SOLUTION RESPIRATORY (INHALATION) at 15:01

## 2021-09-28 RX ADMIN — SODIUM CHLORIDE 5.19 UNITS/HR: 9 INJECTION, SOLUTION INTRAVENOUS at 00:33

## 2021-09-28 RX ADMIN — MIDAZOLAM HYDROCHLORIDE 2 MG: 1 INJECTION, SOLUTION INTRAMUSCULAR; INTRAVENOUS at 08:00

## 2021-09-28 RX ADMIN — MIDAZOLAM 2 MG: 1 INJECTION INTRAMUSCULAR; INTRAVENOUS at 07:55

## 2021-09-28 RX ADMIN — SODIUM BICARBONATE: 84 INJECTION, SOLUTION INTRAVENOUS at 05:34

## 2021-09-28 RX ADMIN — Medication 2000 MG: at 02:07

## 2021-09-28 RX ADMIN — HYDROCORTISONE SODIUM SUCCINATE 100 MG: 100 INJECTION, POWDER, FOR SOLUTION INTRAMUSCULAR; INTRAVENOUS at 13:38

## 2021-09-28 RX ADMIN — Medication 10 ML: at 09:39

## 2021-09-28 RX ADMIN — HYDROCORTISONE SODIUM SUCCINATE 100 MG: 100 INJECTION, POWDER, FOR SOLUTION INTRAMUSCULAR; INTRAVENOUS at 06:08

## 2021-09-28 RX ADMIN — CISATRACURIUM BESYLATE 2 MCG/KG/MIN: 200 INJECTION INTRAVENOUS at 10:11

## 2021-09-28 RX ADMIN — CHLORHEXIDINE GLUCONATE 15 ML: 1.2 RINSE ORAL at 21:28

## 2021-09-28 RX ADMIN — DOBUTAMINE HYDROCHLORIDE 2.5 MCG/KG/MIN: 400 INJECTION INTRAVENOUS at 08:31

## 2021-09-28 RX ADMIN — CHLORHEXIDINE GLUCONATE 15 ML: 1.2 RINSE ORAL at 11:33

## 2021-09-28 RX ADMIN — MIDAZOLAM HYDROCHLORIDE 2 MG: 2 INJECTION, SOLUTION INTRAMUSCULAR; INTRAVENOUS at 07:55

## 2021-09-28 RX ADMIN — VASOPRESSIN 0.08 UNITS/MIN: 20 INJECTION INTRAVENOUS at 08:42

## 2021-09-28 RX ADMIN — IPRATROPIUM BROMIDE AND ALBUTEROL SULFATE 1 AMPULE: .5; 2.5 SOLUTION RESPIRATORY (INHALATION) at 08:07

## 2021-09-28 RX ADMIN — MUPIROCIN: 20 OINTMENT TOPICAL at 21:27

## 2021-09-28 RX ADMIN — CALCIUM CHLORIDE 1000 MG: 100 INJECTION INTRAVENOUS; INTRAVENTRICULAR at 11:50

## 2021-09-28 RX ADMIN — ALBUMIN (HUMAN) 25 G: 0.25 INJECTION, SOLUTION INTRAVENOUS at 17:53

## 2021-09-28 RX ADMIN — ONDANSETRON 4 MG: 2 INJECTION INTRAMUSCULAR; INTRAVENOUS at 00:36

## 2021-09-28 RX ADMIN — POTASSIUM CHLORIDE 20 MEQ: 400 INJECTION, SOLUTION INTRAVENOUS at 11:44

## 2021-09-28 RX ADMIN — VASOPRESSIN 0.08 UNITS/MIN: 20 INJECTION INTRAVENOUS at 04:53

## 2021-09-28 RX ADMIN — HYDROCORTISONE SODIUM SUCCINATE 100 MG: 100 INJECTION, POWDER, FOR SOLUTION INTRAMUSCULAR; INTRAVENOUS at 22:29

## 2021-09-28 RX ADMIN — DOBUTAMINE IN DEXTROSE 2.5 MCG/KG/MIN: 400 INJECTION, SOLUTION INTRAVENOUS at 08:31

## 2021-09-28 RX ADMIN — CALCIUM GLUCONATE 1000 MG: 98 INJECTION, SOLUTION INTRAVENOUS at 05:34

## 2021-09-28 RX ADMIN — Medication 2000 MG: at 10:20

## 2021-09-28 RX ADMIN — Medication 400 MG: at 09:37

## 2021-09-28 ASSESSMENT — PAIN SCALES - GENERAL
PAINLEVEL_OUTOF10: 0

## 2021-09-28 ASSESSMENT — PULMONARY FUNCTION TESTS
PIF_VALUE: 23
PIF_VALUE: 22
PIF_VALUE: 24
PIF_VALUE: 23
PIF_VALUE: 22
PIF_VALUE: 23
PIF_VALUE: 22
PIF_VALUE: 22
PIF_VALUE: 23
PIF_VALUE: 22
PIF_VALUE: 23
PIF_VALUE: 28
PIF_VALUE: 24
PIF_VALUE: 22
PIF_VALUE: 23
PIF_VALUE: 22
PIF_VALUE: 23
PIF_VALUE: 23
PIF_VALUE: 22
PIF_VALUE: 23
PIF_VALUE: 22

## 2021-09-28 NOTE — PROCEDURES
Left femoral area prepped and draped. Left common femoral artery accessed with cook needle under ultrasound guidance. IABP placed using modified Seldinger technique. Sutured in place. Dressing applied. Tolerated well.   Rosario Choe MD

## 2021-09-28 NOTE — ANESTHESIA PROCEDURE NOTES
Airway  Date/Time: 9/28/2021 7:43 AM  Urgency: urgent    Airway not difficult    General Information and Staff    Patient location during procedure: ICU  Resident/CRNA: JUAN MIGUEL Stevenson - BHAKTI  Other anesthesia staff: Herman Rasmussen RN  Performed: resident/CRNA     Consent for Airway (if performed for an anesthetic, see related documentation for consents)  Patient identity confirmed: per hospital policy  Consent: No emergent situation. Verbal consent obtained. Consent given by: patient      Code status verified:yes  Indications and Patient Condition  Indications for airway management: respiratory failure  Spontaneous ventilation: present  Sedation level: deep  Preoxygenated: yes  Patient position: sniffing  MILS not maintained throughout  Mask difficulty assessment: vent by bag mask    Final Airway Details  Final airway type: endotracheal airway      Successful airway: ETT  Cuffed: yes   Successful intubation technique: video laryngoscopy  Facilitating devices/methods: intubating stylet  Endotracheal tube insertion site: oral  Blade type: Manufacturers' Inventory. Blade size: #4  ETT size (mm): 8.0  Cormack-Lehane Classification: grade I - full view of glottis  Placement verified by: chest auscultation and capnometry   Measured from: lips  ETT to lips (cm): 22  Number of attempts at approach: 1  Ventilation between attempts: bag mask  Number of other approaches attempted: 1    Other Attempts  Unsuccessful attempted airways: endotracheal tube  Unsuccessful attempted endotracheal techniques: direct laryngoscopy    Additional Comments  After 10 mg Etomidate IV, direct laryngoscopy performed by SRNA who was unable to visualize vocal cords. Villalba video laryngoscope used for successful intubation x 1 attempt by CRNA, + BBS, + ETCO2. ETT secured 22 cm at lip line.   no

## 2021-09-28 NOTE — PROGRESS NOTES
[facility]  SURGICAL INTENSIVE CARE UNIT (SICU)  ATTENDING PHYSICIAN CRITICAL CARE PROGRESS NOTE     I have examined the patient, reviewed the record,and discussed the case with the APN/  Resident. I have reviewed all relevant labs and imaging data. The following summarizes my clinical findings and independent assessment. Date of admission:  9/27/2021    CC: Respiratory failure and vasoplegia s/p CABG     HOSPITAL COURSE:     53-year-old gentleman with a past medical history of HTN, HL, unstable angina, with severe multivessel coronary artery disease who is status post CABG x3 with left internal mammary to LAD, saphenous vein graft to obtuse marginal, saphenous vein graft to right coronary artery with extensive right coronary artery endarterectomy and left atrial appendage clipping on 9/27/2021.     Critical care consulted for rising lactic acidosis, increasing pressor requirement, increasing oxygen requirement. Postop patient went into A. fib with RVR. Started on amiodarone gtt. Patient was originally started on low-dose levo. Vasopressin was added midday and epinephrine was also added. Patient currently on BiPAP, with levo at 7, vaso- .06, epinephrine at 5.      Lactic acid persistently 12-13 overnight. Started to down trend,  500cc Albumin given , Bicarb gtt started      New Imaging Reviewed:   Chest Xray Pacer wires, Left chest tube, mediastinal drain , and sternal wires in pace      Physical Exam:  Physical Exam  HENT:      Head: Normocephalic. Eyes:      Extraocular Movements: Extraocular movements intact. Pupils: Pupils are equal, round, and reactive to light. Cardiovascular:      Rate and Rhythm: Normal rate. Comments: Pacer wires in place , sternal dressing c/d/i ,mediastinal and chest tube in place no air leak   Pulmonary:      Comments: No visual distress resting comfortably on bipap   Abdominal:      General: Abdomen is flat. There is no distension. Tenderness: There is no abdominal tenderness. There is no guarding or rebound. Musculoskeletal:         General: Normal range of motion. Cervical back: Neck supple. Comments: LLE dressing clean    Skin:     General: Skin is warm. Neurological:      General: No focal deficit present. Mental Status: He is alert. Psychiatric:         Mood and Affect: Mood normal.         Assessment   Active Problems:    CAD in native artery    S/P CABG (coronary artery bypass graft)    Acute respiratory failure with hypoxia (HCC)    Lactic acidemia    NGUYỄN (acute kidney injury) ([address]    Hypokalemia    Hypocalcemia  Resolved Problems:    * No resolved hospital problems.  *      Plan   GI:  NPO , Senakot    Neuro: prn Oxycodone   prn fentanyl     Renal: NGUYỄN additional 500cc 5% albumin ordered , adequate UOP, Monitor Urine Output, Q 6 hr labs and prn   Musculoskeletal: bedrest while unstable  ,   AM-PAC Score pending  Pulmonary: Aggressive pulmonary hygiene , Chest Xray Daily ABG Q 6 hours  bipap,  High risk needed for intubation, Monitor RR and Maintain SpO2 > 92%   ID: No Indication for Antibiotics      Heme: No indication for Transfusion ,   Monitor Hb Maintain >9   Cardiac: Monitor Hemodynamics s/p CABG likely severe vasogenic shock - Methylene blue vasopressin, Levo and epinephrine, on hydrocortisone 100mg Q 8hrs,  Lactic acidosis peaked at 13.7 ASA , lipator , plavix , amio drip secondary to a fib   Endocrine: Maintain glucose <180 , Insulin gtt , nightly lantus  and check TSH     DVT Prophylaxis: PCDs, Chemoprophylaxis when ok with CT   Ulcer Prophylaxis: PPI   Tubes and Lines: Central Line, Ybarra for critical care management  and Mediastinal tubes,    Seizure proph:     No Indication  Ancillary consults:   CT surgery    Family Update:         As available   CODE Status:       Full Code    Dispo: ICU     Ugo Pereyra MD    Critical Care: 70  minutes evaluating and managing patient with Respiratory Failure , Severe vasoplegia,  Hemodynamic Instability, Severe Metabolic Derangements  and At risk for further deterioration and death.  time exclusive of teaching and procedures

## 2021-09-28 NOTE — PROGRESS NOTES
Physical Therapy    PT consult to evaluate/treat received and appreciated. Pt chart reviewed and evaluation attempted. Pt is currently on hold d/t medical/respiratory status. Will check back as able. Thank you.         Luis Eduardo Parks, PT, DPT   [id number]

## 2021-09-28 NOTE — PROGRESS NOTES
Worsening hemodynamics. Probable aspiration. Intubated now, will place IABP, add Flolan. Supportive care.   Amarilys Ruffin MD

## 2021-09-28 NOTE — CARE COORDINATION
SOCIAL WORK/CASEMANAGEMENT TRANSITION OF CARE [id number], [address], [place], [phone]): I spoke with wife to complete the assessment. Pt is on a vent in cv. The couple is retired and lives as of this year lives in [place] in the cold months. They just fixed up a mobile home themselves to live in [facility] [place] during the summer months. Pt is not a . Dr. Cody Henderson Is the pcp and pt saw him last week. The couple has children in [place] and [place] and extended family in the area here. Pt was independent pta with no dme or hhc. There are 3 steps to enter the mobile home and a walk in shower. Went over need for 24/7 care from family with hhc if able to ambulate 400' vs rui. Sw/cm to follow.  Joao Cameron, RANJIT  9/28/2021

## 2021-09-28 NOTE — CONSULTS
Surgical Intensive Care Unit  Daily Progress Note  Date of admission:  9/27/2021  Reason for ICU transfer:      29-year-old gentleman with a past medical history of HTN, HL, unstable angina, with severe multivessel coronary artery disease who is status post CABG x3 with left internal mammary to LAD, saphenous vein graft to obtuse marginal, saphenous vein graft to right coronary artery with extensive right coronary artery endarterectomy and left atrial appendage clipping on 9/27/2021. Critical care consulted for rising lactic acidosis, increasing pressor requirement, increasing oxygen requirement. Postop patient went into A. fib with RVR. Started on amiodarone gtt. Patient was originally started on low-dose levo. Vasopressin was added midday and epinephrine was also added. Patient currently on BiPAP, with levo at 7, vaso- .06, epinephrine at 5. Lactic acid persistently 12-13 overnight. Physical Exam:  BP (!) 112/59   Pulse 95   Temp 97.2 °F (36.2 °C) (Axillary)   Resp 18   Ht 5' 8\" (1.727 m)   Wt 158 lb 11.7 oz (72 kg)   SpO2 93%   BMI 24.14 kg/m²   CONSTITUTIONAL:  awake, alert and cooperative  EYES:  pupils equal, round and reactive to light  NECK: Right IJ CVC and supple, symmetrical, trachea midline  LUNGS: On BiPAP but no respiratory distress, lung sounds clear bilaterally, diminished bilaterally  CARDIOVASCULAR: Normal sinus rhythm  ABDOMEN:  Soft, nontender, nondistended no rigidity rebound or guarding  CHEST: Mediastinal and left pleural chest tube  GENITAL/URINARY: Ybarra catheter in place  MUSCULOSKELETAL:  There is no redness, warmth, or swelling of the joints. Full range of motion noted. Motor strength is 5 out of 5 all extremities bilaterally. Tone is normal.  NEUROLOGIC:  Awake, alert, oriented to name, place and time. Cranial nerves II-XII are grossly intact. Motor is 5 out of 5 bilaterally. Cerebellar finger to nose, heel to shin intact. Sensory is intact.   Babinski down going, Romberg negative, and gait is normal.  SKIN:  no bruising or bleeding    ASSESSMENT / PLAN:  · Neuro:  Pain -Tylenol, fentanyl as needed, oxycodone as needed  · CV:  -  Monitor hemodynamics, ASA 81, Lipitor 40 mg daily, Plavix, amiodarone gtt., Levophed gtt., epinephrine gtt., vasopressin gtt. methylene blue  · Pulm:  -  Monitor RR, SpO2, on BiPAP currently  · GI:  Protonix, Senokot-S,  · Renal:  -  Monitor UOP, FENA pending, NGUYỄN, NS at 30 mils an hour, sodium bicarb gtt 100 mL an hour  · ID:   -  Monitor for SIRS, Ancef  · Endo:  -  Monitor glucose, Solu-Cortef 100 every 8, Lantus 11 units nightly, insulin gtt. · MSK: -PT OT when able      Bowel regimen: Senokot  DVT proph:  Holding per cardiothoracic surgery  GI proph:  Protonix   Glucose protocol: SSI, Lantus  Mouth/eye care: Per unit protocol  Ybarra:   Day 1, keep in place for critical care monitoring of fluid balance. CVC sites:  Right IJ CVC day 1, right femoral CVC day 0, Day # 0  Ancillary consults: Critical care, CT surgery  Family Update: Spoke with patient at bedside overnight 9/28, no family discussion by critical care team overnight. Hospital course:  9/28/21  -status post CABG x3 9/27. Increasing pressor requirement and lactic acidosis. NGUYỄN-signs of global perfusion. Currently on BiPAP.     Electronically signed by Kiersten Goodwin MD on 9/28/2021 at 5:52 AM

## 2021-09-28 NOTE — PROGRESS NOTES
Patient with progressive hypotension. Followed by hypoxia. B12 and Methylene blue given. Back on Vaso and Levo. ABG with acidosis. CXR clear. Patient awake and alert. Does not appear to be bleeding. Check lactate. Add low dose Epi.   Beronica Truong MD

## 2021-09-28 NOTE — PROGRESS NOTES
CVICU Progress Note    Name: Lelo Munoz  MRN: [id number]    CC: Postoperative Critical Care Management      Indication for Surgery/Procedure: CAD  LVEF:  NML    RVF:  NML     Important/Relevant PMH/PSH:  HTN, HLD, LBBB     Procedure/Surgeries: 9/27/2021 Sternotomy/CABG x 3 (LIMA-LAD, SVG-OM, SVG-RCA)/Extensive RCA endarterectomy/MALU exclusion with 35 mm AtriClip/EVH LLE/Rigid internal fixation of the sternum with Shon plates x 5/58 modifier     Pacing wires: Atrial and Ventricular          Intake/Output Summary (Last 24 hours) at 9/28/2021 0923  Last data filed at 9/28/2021 0900  Gross per 24 hour   Intake 7753.36 ml   Output 3760 ml   Net 3993.36 ml       Recent Labs     09/27/21  1130 09/27/21  1130 09/27/21  1856 09/28/21  0200 09/28/21  0600   WBC 26.1*  --   --  16.8* 18.8*   HGB 13.4   < > 11.6* 10.0* 9.7*   HCT 37.9   < > 33.0* 29.4* 28.4*   PLT 80*  --   --  93* 94*    < > = values in this interval not displayed. Lab Results   Component Value Date     09/28/2021    K 3.1 09/28/2021     09/28/2021    CO2 18 09/28/2021    BUN 18 09/28/2021    CREATININE 1.4 09/28/2021    GLUCOSE 278 09/28/2021    CALCIUM 7.8 09/28/2021         Physical Exam:    /60   Pulse 119   Temp 97.5 °F (36.4 °C) (Axillary)   Resp 18   Ht 5' 8\" (1.727 m)   Wt 158 lb 11.7 oz (72 kg)   SpO2 93%   BMI 24.14 kg/m²       CXR Findings:       Impression   1. Worsening right perihilar and right lower lobe airspace disease.  There is   mild left perihilar airspace disease. 2. This finding can represent asymmetrical edema.  Pneumonia cannot be   excluded. 3. There is no right or left pneumothorax   4. The support lines and tubes are stable in position.             -  CXR personally viewed and interpreted by ICU Nurse Practitioner, agree with above findings       General: Progressive hypotension overnight with lactic acidosis and hypoxia. Pt reintubated this morning, STAT echo completed at bedside.  IABP placed per Dr. Mj Francis. Eyes: PERRL, anicteric   Pulmonary: Diminished right >left. No wheezes, no accessory muscle use noted   Ventilator: Mode: AC/VC 22, 100 FiO2, 8 PEEP, 500 Vt   Cardiovascular:  RRR, no heaves or thrills on palpation  Tele: ST   Abdomen: Soft, nondistended, hypoactive BS   Extremities: LLE +DP/PT signals, RLE +PT signal, intermittent DP signal, no edema   Neurologic/Psych: Sedated, NMBA  Skin: Warm and dry  Incisions: MSI JAYCEE intact, LLE SVG sites with ace wrap, left groin C/D/I      Lines:   ETT 9/28  Right femoral TLC 9/28  Left femoral IABP 9/28  Right IJ 9/27  Right brachial Arterial line 9/27  Ybarra 9/27      Assessment/Plan: POD #1  1. CAD S/p CABGx3 (LIMA-LAD, SVG-OM, SVG-RCA)/Extensive RCA endarterectomy/MALU AtriClip  - ASA (Hold Plavix), Lipitor   - Perioperative Ancef  - Continue to monitor chest tube output  - Amio gtt for afib prophylaxis   - Lovenox started for VTE prophylaxis      2. Acute Hypoxic Respiratory Failure  - Extubated DOS, tolerating 2L NC post extubation, acute hypoxia requiring NRB, NIV and reintubation 9/28  - CXR with new right sided infiltrate- ?aspirtaion, Empiric Zosyn started   - Continued hypoxia post intubation on 100%FiO2, 8PEEP, pt given Rocuronium and started on Nimbex infusion, started on inhaled Flolan with improvement in saturations   - ABGs q 2 hours and PRN      3. Cardiogenic Shock   - post CPB required levophed and vasopressin and ultimately IV B12 intraop, postop ICU stable on Norepi 5mcg; acute decompensation with profound hypotension required escalation of vasopressors, given Vit B12 and Methylene Blue, started on epinephrine infusion and given stress steroids   - STAT Echo with akinesis of apical inferior and septal walls, EF 45-50%;  Severe RV dysfunction, Moderate MR  - IABP placed at bedside; 1:1 frequency with immediate improvement in hemodynamics, levophed weaned off   - Dobutamine infusion started @ 2.5mcg, increased to 5mcg, Epi remains at 3mcg, weaning vasopressin as able for MAP >65, inhaled flolan   - Tbili 1.9, lactic 12.3 prior to IABP placement>>9.3 post IABP insertion  - Trend LA, CMP      4. Lactic Acidosis  - Worsening lactic overnight, peaked at 13.5, General Surgery consulted for evaluation, abdominal exam unremarkable, started on bicarb gtt   - Continue to trend    5. NGUYỄN   - Baseline Scr 0.8, 1.4 this morning 2/2 above  - Started on Bicarb gtt overnight, decrease to 50cc/hr, monitor- likely will d/c later today   - UOP adequate, monitor closely, IVF PRN,     6. Acute Post Operative Pain   - PRN fentanyl for pain management    7. Stress Hyperglycemia  - No h/o DM  - RHI infusion for BG >180 per protocol  while on inopressor support     8. Thrombocytopenia  - Plts 94K, no signs of bleeding  - monitor     9. Acute blood loss anemia  - H/H 9.7/28.4, monitor and transfuse if needed to maintain Hgb >8       This patient has a high probability of sudden deterioration, which requires preparedness to urgently intervene. I participated in the decision making process and managed the direct care of the patient that required frequent assessment and treatment. I personally spent 67 minutes of critical care time treating the patient. VTE Prophylaxis: Pharmacologic/Mechanical:  Yes, SCDs, lovenox   Line infection prevention: Can CVC or arterial line be removed: yes  Continued need for urinary catheter:  Yes - clinical indication: Patient post major surgery requiring fluid balance and input and output measurement.     Dispo: CVICU    Electronically signed by JUAN MIGUEL Lara - CNP on 9/28/2021 at 9:23 AM

## 2021-09-28 NOTE — PROGRESS NOTES
Dr Mj Francis and Dr. Nelly London at bedside with patient who is hypotensive, hypoxic, and acidotic. Orders per chart. Will continue to monitor.

## 2021-09-28 NOTE — PLAN OF CARE
Problem: Falls - Risk of:  Goal: Will remain free from falls  Description: Will remain free from falls  9/27/2021 2302 by Onelia John RN  Outcome: Met This Shift     Problem: Falls - Risk of:  Goal: Absence of physical injury  Description: Absence of physical injury  9/27/2021 2302 by Onelia John RN  Outcome: Met This Shift     Problem: Activity Intolerance:  Goal: Able to perform prescribed physical activity  Description: Able to perform prescribed physical activity  9/27/2021 2302 by Onelia John RN  Outcome: Met This Shift     Problem:  Activity Intolerance:  Goal: Ability to tolerate increased activity will improve  Description: Ability to tolerate increased activity will improve  9/27/2021 2302 by Onelia John RN  Outcome: Met This Shift

## 2021-09-28 NOTE — PROGRESS NOTES
Date: 9/28/2021    Time: 12:21 AM    Patient Placed On BIPAP/CPAP/ Non-Invasive Ventilation? Pt wearing bipap from previous shift    If no must comment. Facial area red/color change? No           If YES are Blister/Lesion present? No   If yes must notify nursing staff  BIPAP/CPAP skin barrier?   Yes    Skin barrier type:mepilexlite       Comments:        Pedro Brooks RCP

## 2021-09-28 NOTE — PROGRESS NOTES
OCCUPATIONAL THERAPY    Date:2021  Patient Name: Main Matute  MRN: [id number]  : 1945  Room: [address] [place] [address]              Chart reviewed. Pt on hold this am due to medical/ respiratory status this am  Will re-attempt at later time. Thank you for consult.     Fadumo Mendoza, OTR/L [id number]

## 2021-09-28 NOTE — PROGRESS NOTES
RN spoke with Dr Mora Garcia who gave orders for albumin 25% and vasopressin to start at 0.02. will administered and continue to monitor.

## 2021-09-28 NOTE — PLAN OF CARE
Problem: Falls - Risk of:  Goal: Will remain free from falls  Description: Will remain free from falls  8/63/0210 [id number] by Yonatan Ball RN  Outcome: Met This Shift  9/27/2021 2302 by Davie Romero RN  Outcome: Met This Shift  Goal: Absence of physical injury  Description: Absence of physical injury  7/31/7632 2955 by Yonatan Ball RN  Outcome: Met This Shift  9/27/2021 2302 by Davie Romero RN  Outcome: Met This Shift     Problem:  Activity Intolerance:  Goal: Able to perform prescribed physical activity  Description: Able to perform prescribed physical activity  3/98/6265 0466 by Yonatan Ball RN  Outcome: Not Met This Shift  9/27/2021 2302 by Davie Romero RN  Outcome: Met This Shift  Goal: Ability to tolerate increased activity will improve  Description: Ability to tolerate increased activity will improve  7/30/6843 7197 by Yonatan Ball RN  Outcome: Not Met This Shift  9/27/2021 2302 by Davie Romero RN  Outcome: Met This Shift     Problem: Anxiety:  Goal: Level of anxiety will decrease  Description: Level of anxiety will decrease  Outcome: Met This Shift     Problem: Cardiac Output - Decreased:  Goal: Cardiac output within specified parameters  Description: Cardiac output within specified parameters  Outcome: Ongoing  Goal: Hemodynamic stability will improve  Description: Hemodynamic stability will improve  Outcome: Ongoing     Problem: Gas Exchange - Impaired:  Goal: Levels of oxygenation will improve  Description: Levels of oxygenation will improve  Outcome: Not Met This Shift  Goal: Ability to maintain adequate ventilation will improve  Description: Ability to maintain adequate ventilation will improve  Outcome: Not Met This Shift

## 2021-09-28 NOTE — PROGRESS NOTES
INPATIENT CARDIOLOGY FOLLOW-UP    Name: Leah Toney    Age: 78 y.o. Date of Admission: 9/27/2021  5:25 AM    Date of Service: 9/28/2021    Primary Cardiologist: Dr. Venus Mathews    Chief Complaint: Follow-up for post CABG, cardiogenic shock    Interim History:  Interim events noted. Lactic acidosis and worsening respiratory status and hemodynamic instability requiring escalation of pressors and inotropic support, and ultimately reintubation and placement of intra-aortic balloon pump this morning. Since then lactic acid is been improving. Repeat limited echo showed apical inferior wall motion abnormality with EF in the 40s. No pericardial effusion.     Review of Systems:   Unable to obtain    Problem List:  Patient Active Problem List   Diagnosis    CAD in native artery    Pulmonary nodules    Unstable angina (HCC)    Other hyperlipidemia    Essential hypertension    Gastroesophageal reflux disease with esophagitis without hemorrhage    S/P CABG (coronary artery bypass graft)    Acute respiratory failure with hypoxia (HCC)    Lactic acidemia    NGUYỄN (acute kidney injury) ([facility] [address]    Hypokalemia    Hypocalcemia       Current Medications:    Current Facility-Administered Medications:     piperacillin-tazobactam (ZOSYN) 3,375 mg in dextrose 5 % 100 mL IVPB extended infusion (mini-bag), 3,375 mg, IntraVENous, Q8H, Radha Dale MD, Last Rate: 25 mL/hr at 09/28/21 1516, 3,375 mg at 09/28/21 1516    midazolam (VERSED) 100 mg in dextrose 5 % 100 mL infusion, 1-10 mg/hr, IntraVENous, Continuous, Glenis RAMIREZ Gianfrate, DO, Last Rate: 3 mL/hr at 09/28/21 0931, 3 mg/hr at 09/28/21 0931    fentaNYL (SUBLIMAZE) injection 12.5 mcg, 12.5 mcg, IntraVENous, Q1H PRN, Gimaykel C Gianfrate, DO    DOBUTamine (DOBUTREX) 1000 mg in dextrose 5 % 250 mL infusion, 5 mcg/kg/min, IntraVENous, Continuous, JUAN MIGUEL Patricio - CNP, Last Rate: 5.4 mL/hr at 09/28/21 0900, 5 mcg/kg/min at 09/28/21 0900    epoprostenol 1,500 mcg in sodium chloride 0.9 % 50 mL nebulization solution, 50 ng/kg/min (Ideal), Nebulization, Continuous, Sallie Tubbs APRN - CNP, Last Rate: 6.8 mL/hr at 09/28/21 1542, 50 ng/kg/min at 09/28/21 1542    cisatracurium besylate (NIMBEX) 200 mg in sodium chloride 0.9 % 100 mL infusion, 0.5-10 mcg/kg/min, IntraVENous, Continuous, Sallie Tubbs APRN - CNP, Last Rate: 4.3 mL/hr at 09/28/21 1011, 2 mcg/kg/min at 09/28/21 1011    hydrocortisone sodium succinate PF (SOLU-CORTEF) injection 100 mg, 100 mg, IntraVENous, Q8H, Sallieangélica Tubbs, APRN - CNP, 100 mg at 09/28/21 1338    enoxaparin (LOVENOX) injection 40 mg, 40 mg, SubCUTAneous, Daily, Sallie Tubbs APRGEOVANY - CNP, 40 mg at 09/28/21 1042    pantoprazole (PROTONIX) injection 40 mg, 40 mg, IntraVENous, Daily, 40 mg at 09/28/21 1042 **AND** sodium chloride (PF) 0.9 % injection 10 mL, 10 mL, IntraVENous, Daily, Sallie Tubbs APRN - CNP, 10 mL at 09/28/21 1042    [START ON 9/29/2021] aspirin chewable tablet 81 mg, 81 mg, Oral, Daily, Sallie Tubbs APRN - CNP    lubrifresh P.M. (artificial tears) ophthalmic ointment, , Both Eyes, PRN, Kim Giron APRN - CNP, Given at 09/28/21 1134    chlorhexidine (PERIDEX) 0.12 % solution 15 mL, 15 mL, Mouth/Throat, BID, Sallie Tubbs, APRN - CNP, 15 mL at 09/28/21 1133    docusate sodium (COLACE) 150 MG/15ML liquid 100 mg, 100 mg, Oral, BID, Sallieangélica Tubbs, APRN - CNP    sennosides (SENOKOT) 8.8 MG/5ML syrup 5 mL, 5 mL, Oral, BID, Sallieangélica Tubbs, APRN - CNP    albumin human 25 % IV solution 25 g, 25 g, IntraVENous, Once, JUAN MIGUEL Ramachandran - CNP    atorvastatin (LIPITOR) tablet 40 mg, 40 mg, Oral, Daily, Marbin Bunch PA-C    0.9 % sodium chloride infusion, 30 mL/hr, IntraVENous, Continuous, Lupis Stubbs PA-C, Last Rate: 30 mL/hr at 09/27/21 1132, 30 mL/hr at 09/27/21 1132    sodium chloride flush 0.9 % injection 10 mL, 10 mL, IntraVENous, 2 times per day, Thethaddeus Stubbs PA-C, 10 mL at 09/28/21 oz (72 kg)   SpO2 99%   BMI 24.14 kg/m²   Wt Readings from Last 3 Encounters:   09/27/21 158 lb 11.7 oz (72 kg)   09/20/21 157 lb (71.2 kg)   09/21/21 160 lb 6.4 oz (72.8 kg)     Appearance: Intubated and sedated  Skin: Intact, no rash  Head: Normocephalic, atraumatic  Eyes: EOMI, no conjunctival erythema  ENMT: No pharyngeal erythema, MMM, no rhinorrhea  Neck: Supple, no elevated JVP, no carotid bruits  Lungs: Diminished  Cardiac: PMI nondisplaced, Regular rhythm with a normal rate, S1 & S2 normal, no murmurs, pericardial rub. Sternal incision dressed. Pacer wires, chest tubes  Abdomen: Soft, nontender, +bowel sounds  Extremities: Moves all extremities x 4, no lower extremity edema  Neurologic: No focal motor deficits apparent  Peripheral Pulses: Intact posterior tibial pulses bilaterally    Intake/Output:    Intake/Output Summary (Last 24 hours) at 9/28/2021 1627  Last data filed at 9/28/2021 1600  Gross per 24 hour   Intake 6241.27 ml   Output 1895 ml   Net 4346.27 ml     I/O this shift:  In: -   Out: 15 [Urine:15]    Laboratory Tests:  Recent Labs     09/27/21  1130 09/27/21  1146 09/27/21  2205 09/28/21  0200 09/28/21  1000     --   --  147* 147*   K 3.9   < > 4.7 3.1* 2.8*     --   --  107 105   CO2 23  --   --  18* 23   BUN 12  --   --  18 22   CREATININE 0.8  --   --  1.4* 1.7*   GLUCOSE 125*  --   --  278* 231*   CALCIUM 8.6  --   --  7.8* 7.6*    < > = values in this interval not displayed.      Lab Results   Component Value Date    MG 2.1 09/28/2021     Recent Labs     09/28/21  0200   ALKPHOS 37*   ALT 24   AST 81*   PROT 5.3*   BILITOT 1.9*   LABALBU 4.7     Recent Labs     09/27/21  1130 09/27/21  1130 09/27/21  1856 09/28/21  0200 09/28/21  0600   WBC 26.1*  --   --  16.8* 18.8*   RBC 4.36  --   --  3.20* 3.12*   HGB 13.4   < > 11.6* 10.0* 9.7*   HCT 37.9   < > 33.0* 29.4* 28.4*   MCV 86.9  --   --  91.9 91.0   MCH 30.7  --   --  31.3 31.1   MCHC 35.4*  --   --  34.0 34.2   RDW 12.1  -- --  13.2 13.5   PLT 80*  --   --  93* 94*   MPV 10.8  --   --  11.1 11.7    < > = values in this interval not displayed. Lab Results   Component Value Date    CKMB 94.0 (H) 2021     Lab Results   Component Value Date    INR 1.8 2021    INR 1.1 2021    INR 1.2 2021    PROTIME 19.2 (H) 2021    PROTIME 12.0 2021    PROTIME 12.7 (H) 2021     Lab Results   Component Value Date    TSH 0.621 2021     Lab Results   Component Value Date    LABA1C 5.1 09/10/2021     No results found for: EAG  Lab Results   Component Value Date    CHOL 115 2021    CHOL 216 2021     Lab Results   Component Value Date    TRIG 51 2021    TRIG 96 2021     Lab Results   Component Value Date    HDL 51 2021    HDL 24 (A) 2021     Lab Results   Component Value Date    LDLCALC 54 2021    LDLCALC 21.1 2021     Lab Results   Component Value Date    LABVLDL 10 2021     No results found for: CHOLHDLRATIO  No results for input(s): PROBNP in the last 72 hours. Cardiac Tests:    EK2021: Sinus rhythm 94 beats minute. Left axis. Left bundle branch block QRS duration 132 ms. Similar to prior    Telemetry: Sinus tach low 100s. Some nonsustained VT    Chest X-ray:       Impression   1. Worsening right perihilar and right lower lobe airspace disease.  There is   mild left perihilar airspace disease. 2. This finding can represent asymmetrical edema.  Pneumonia cannot be   excluded. 3. There is no right or left pneumothorax. 4. The support lines and tubes are stable in position. Echocardiogram:   Limited echo 2021   Summary   Limited echo to evaluate for pericardial effusion and LV function. LV systolic function is mildly reduced. Ejection fraction is visually estimated at 45-50%. There is akinesis of the apical inferior and apical septal walls. Dilated right ventricle, systolic function is severely reduced.    Moderate mitral regurgitation is present. Compared to preop echo from 9/23/21, LV dysfunction and wall motion   abnormalities are new and MR appears slightly worse. Stress test:      Cardiac catheterization:   Left heart catheterization 9/9/2021  ANGIOGRAPHIC FINDINGS:  1. The left main coronary artery arises normally from the left sinus of  Valsalva. It is a good size vessel with mild distal disease. It  bifurcates into the left anterior descending artery and left circumflex  artery. 2.  The left anterior descending artery has subtotal occlusion in the  ostium. It gives off two mid size diagonal branches before it had total  occlusion. The second diagonal branch actually has also significant  disease involving the proximal and mid segment. There are left-to-left  and right-to-left collaterals to the distal LAD and distal diagonal  branch. 3.  The left circumflex artery is a large vessel that gives off three OM  branches. The first one is small. The second one is mid size with  diffuse disease in the proximal segment. The third one is large with  90% stenosis in the mid segment with aneurysmal dilatation, also the  ostium has 50% to 60%. The left circumflex artery itself has 50% to 60%  disease just before the takeoff of the third OM branch. 4.  The right coronary artery arises normally from the right sinus of  Valsalva. It is a good size vessel that is diffusely diseased in the  mid and distal segment. The right PLV is a mid size vessel with ostial  disease. The right PDA also has a diffuse disease. There are  right-to-left collaterals noted through acute marginal branch and right  PDA.    ----------------------------------------------------------------------------------------------------------------------------------------------------------------  IMPRESSION:  1. Multivessel CAD s/p S/p CABGx3 (LIMA-LAD, SVG-OM, SVG-RCA)/Extensive RCA endarterectomy/MALU AtriClip 9/26/2021  2.  Cardiogenic shock, severe vasoplegia s/p IABP 1:1, currently on dobutamine, epinephrine, vasopressin. Norepinephrine weaned  3. LV dysfunction EF 45% with apical inferior and apical septal akinesis, severe RV dysfunction  4. Moderate MR  5. NSVT: On amiodarone for A. fib prophylaxis  6. Acute hypoxic respiratory failure: Reintubated, paralyzed, epoprostenol  7. Lactic acidosis, improving with intra-aortic balloon pump  8. NGUYỄN creatinine 0.8 -> 1.7 hemodynamically mediated  9. Chronic left bundle branch block  10. HLD  11. Hx of Hiatal hernia   12. Thrombocytopenia platelets 27P  13. Acute blood loss anemia hemoglobin 13.4-9.7  14. Leukocytosis     RECOMMENDATIONS:  Patient critically ill but now stabilizing after IABP and inotropic, vasopressor, and vasodilator therapy.  Continue current therapy per CT surgery and critical care service   Wean inotropic/vasopressor support as able   Discussed with Dr. Mora Garcia earlier today    Mumtaz Delarosa MD, [address] [facility]    NOTE: This report was transcribed using voice recognition software. Every effort was made to ensure accuracy; however, inadvertent computerized transcription errors may be present.

## 2021-09-28 NOTE — PROCEDURES
Deni Kiser is a 78 y.o. male patient. No diagnosis found. Past Medical History:   Diagnosis Date    CAD (coronary artery disease)     Hiatal hernia     Hyperlipidemia     Hypertension      Blood pressure (!) 102/50, pulse 94, temperature 97.2 °F (36.2 °C), temperature source Axillary, resp. rate (!) 34, height 5' 8\" (1.727 m), weight 158 lb 11.7 oz (72 kg), SpO2 94 %. Central Line    Date/Time: 9/28/2021 5:55 AM  Performed by: Tru Cox MD  Authorized by: Tru Cox MD   Consent: Written consent obtained. Risks and benefits: risks, benefits and alternatives were discussed  Consent given by: patient  Patient understanding: patient states understanding of the procedure being performed  Patient consent: the patient's understanding of the procedure matches consent given  Required items: required blood products, implants, devices, and special equipment available  Patient identity confirmed: provided demographic data, verbally with patient and arm band  Time out: Immediately prior to procedure a \"time out\" was called to verify the correct patient, procedure, equipment, support staff and site/side marked as required.   Indications: vascular access  Anesthesia: local infiltration    Anesthesia:  Local Anesthetic: lidocaine 1% with epinephrine  Anesthetic total: 3 mL    Sedation:  Patient sedated: no    Preparation: skin prepped with 2% chlorhexidine  Skin prep agent dried: skin prep agent completely dried prior to procedure  Sterile barriers: all five maximum sterile barriers used - cap, mask, sterile gown, sterile gloves, and large sterile sheet  Hand hygiene: hand hygiene performed prior to central venous catheter insertion  Location details: right femoral  Patient position: reverse Trendelenburg  Catheter type: triple lumen  Catheter size: 7 Fr  Pre-procedure: landmarks identified  Ultrasound guidance: yes  Sterile ultrasound techniques: sterile gel and sterile probe covers were used  Number of attempts: 1  Successful placement: yes  Post-procedure: line sutured and dressing applied  Assessment: blood return through all ports and free fluid flow  Patient tolerance: patient tolerated the procedure well with no immediate complications      Dr. Brewer Card was present for this procedure    Gabriel Hicks MD  9/28/2021

## 2021-09-29 ENCOUNTER — APPOINTMENT (OUTPATIENT)
Dept: GENERAL RADIOLOGY | Age: 76
DRG: 235 | End: 2021-09-29
Attending: THORACIC SURGERY (CARDIOTHORACIC VASCULAR SURGERY)
Payer: MEDICARE

## 2021-09-29 LAB
AADO2: 118.7 MMHG
AADO2: 135.1 MMHG
AADO2: 140.7 MMHG
AADO2: 146.9 MMHG
AADO2: 161.8 MMHG
AADO2: 205.1 MMHG
AADO2: 252 MMHG
AADO2: 337.7 MMHG
AADO2: 423.2 MMHG
AADO2: 549.8 MMHG
ABO/RH: NORMAL
ABO/RH: NORMAL
ALBUMIN SERPL-MCNC: 4.2 G/DL (ref 3.5–5.2)
ALBUMIN SERPL-MCNC: 4.3 G/DL (ref 3.5–5.2)
ALP BLD-CCNC: 33 U/L (ref 40–129)
ALP BLD-CCNC: 36 U/L (ref 40–129)
ALT SERPL-CCNC: 42 U/L (ref 0–40)
ALT SERPL-CCNC: 66 U/L (ref 0–40)
ANION GAP SERPL CALCULATED.3IONS-SCNC: 10 MMOL/L (ref 7–16)
ANION GAP SERPL CALCULATED.3IONS-SCNC: 13 MMOL/L (ref 7–16)
ANION GAP SERPL CALCULATED.3IONS-SCNC: 17 MMOL/L (ref 7–16)
ANION GAP SERPL CALCULATED.3IONS-SCNC: 7 MMOL/L (ref 7–16)
ANTIBODY SCREEN: NORMAL
AST SERPL-CCNC: 140 U/L (ref 0–39)
AST SERPL-CCNC: 213 U/L (ref 0–39)
B.E.: -0.4 MMOL/L (ref -3–3)
B.E.: -1.4 MMOL/L (ref -3–3)
B.E.: 0.3 MMOL/L (ref -3–3)
B.E.: 0.5 MMOL/L (ref -3–3)
B.E.: 0.9 MMOL/L (ref -3–3)
B.E.: 1 MMOL/L (ref -3–3)
B.E.: 1.7 MMOL/L (ref -3–3)
B.E.: 2.3 MMOL/L (ref -3–3)
B.E.: 2.4 MMOL/L (ref -3–3)
B.E.: 3.6 MMOL/L (ref -3–3)
BILIRUB SERPL-MCNC: 0.9 MG/DL (ref 0–1.2)
BILIRUB SERPL-MCNC: 1.1 MG/DL (ref 0–1.2)
BLOOD BANK DISPENSE STATUS: NORMAL
BLOOD BANK PRODUCT CODE: NORMAL
BPU ID: NORMAL
BUN BLDV-MCNC: 32 MG/DL (ref 6–23)
BUN BLDV-MCNC: 35 MG/DL (ref 6–23)
BUN BLDV-MCNC: 41 MG/DL (ref 6–23)
BUN BLDV-MCNC: 45 MG/DL (ref 6–23)
CALCIUM SERPL-MCNC: 8.3 MG/DL (ref 8.6–10.2)
CALCIUM SERPL-MCNC: 8.4 MG/DL (ref 8.6–10.2)
CALCIUM SERPL-MCNC: 8.5 MG/DL (ref 8.6–10.2)
CALCIUM SERPL-MCNC: 8.5 MG/DL (ref 8.6–10.2)
CHLORIDE BLD-SCNC: 103 MMOL/L (ref 98–107)
CHLORIDE BLD-SCNC: 104 MMOL/L (ref 98–107)
CHLORIDE BLD-SCNC: 104 MMOL/L (ref 98–107)
CHLORIDE BLD-SCNC: 106 MMOL/L (ref 98–107)
CO2: 25 MMOL/L (ref 22–29)
CO2: 27 MMOL/L (ref 22–29)
CO2: 28 MMOL/L (ref 22–29)
CO2: 29 MMOL/L (ref 22–29)
COHB: 0.1 % (ref 0–1.5)
COHB: 0.2 % (ref 0–1.5)
COHB: 0.3 % (ref 0–1.5)
CREAT SERPL-MCNC: 1.9 MG/DL (ref 0.7–1.2)
CREAT SERPL-MCNC: 2 MG/DL (ref 0.7–1.2)
CREAT SERPL-MCNC: 2 MG/DL (ref 0.7–1.2)
CREAT SERPL-MCNC: 2.1 MG/DL (ref 0.7–1.2)
CRITICAL: ABNORMAL
DATE ANALYZED: ABNORMAL
DATE OF COLLECTION: ABNORMAL
DESCRIPTION BLOOD BANK: NORMAL
DR. NOTIFY: NORMAL
FIO2: 100 %
FIO2: 40 %
FIO2: 50 %
FIO2: 60 %
FIO2: 80 %
FIO2: 90 %
GFR AFRICAN AMERICAN: 38
GFR AFRICAN AMERICAN: 40
GFR AFRICAN AMERICAN: 40
GFR AFRICAN AMERICAN: 42
GFR NON-AFRICAN AMERICAN: 31 ML/MIN/1.73
GFR NON-AFRICAN AMERICAN: 33 ML/MIN/1.73
GFR NON-AFRICAN AMERICAN: 33 ML/MIN/1.73
GFR NON-AFRICAN AMERICAN: 35 ML/MIN/1.73
GLUCOSE BLD-MCNC: 141 MG/DL (ref 74–99)
GLUCOSE BLD-MCNC: 142 MG/DL (ref 74–99)
GLUCOSE BLD-MCNC: 152 MG/DL (ref 74–99)
GLUCOSE BLD-MCNC: 178 MG/DL (ref 74–99)
HCO3: 22.6 MMOL/L (ref 22–26)
HCO3: 24.3 MMOL/L (ref 22–26)
HCO3: 24.4 MMOL/L (ref 22–26)
HCO3: 24.5 MMOL/L (ref 22–26)
HCO3: 25 MMOL/L (ref 22–26)
HCO3: 25 MMOL/L (ref 22–26)
HCO3: 26.1 MMOL/L (ref 22–26)
HCO3: 26.4 MMOL/L (ref 22–26)
HCO3: 26.6 MMOL/L (ref 22–26)
HCO3: 27.3 MMOL/L (ref 22–26)
HCT VFR BLD CALC: 23.8 % (ref 37–54)
HCT VFR BLD CALC: 24.8 % (ref 37–54)
HEMOGLOBIN: 8.1 G/DL (ref 12.5–16.5)
HEMOGLOBIN: 8.3 G/DL (ref 12.5–16.5)
HHB: 1.9 % (ref 0–5)
HHB: 1.9 % (ref 0–5)
HHB: 2.2 % (ref 0–5)
HHB: 2.6 % (ref 0–5)
HHB: 2.8 % (ref 0–5)
HHB: 3.7 % (ref 0–5)
HHB: 3.8 % (ref 0–5)
HHB: 32.3 % (ref 0–5)
HHB: 4.1 % (ref 0–5)
HHB: 4.4 % (ref 0–5)
LAB: ABNORMAL
LACTIC ACID: 2.2 MMOL/L (ref 0.5–2.2)
LACTIC ACID: 2.3 MMOL/L (ref 0.5–2.2)
LACTIC ACID: 2.4 MMOL/L (ref 0.5–2.2)
LACTIC ACID: 3 MMOL/L (ref 0.5–2.2)
LACTIC ACID: 3.3 MMOL/L (ref 0.5–2.2)
LACTIC ACID: 3.4 MMOL/L (ref 0.5–2.2)
LACTIC ACID: 4.7 MMOL/L (ref 0.5–2.2)
LACTIC ACID: 5.3 MMOL/L (ref 0.5–2.2)
LACTIC ACID: 5.9 MMOL/L (ref 0.5–2.2)
LACTIC ACID: 6.9 MMOL/L (ref 0.5–2.2)
LACTIC ACID: 7.1 MMOL/L (ref 0.5–2.2)
Lab: ABNORMAL
MAGNESIUM: 1.8 MG/DL (ref 1.6–2.6)
MCH RBC QN AUTO: 30.2 PG (ref 26–35)
MCH RBC QN AUTO: 31.4 PG (ref 26–35)
MCHC RBC AUTO-ENTMCNC: 33.5 % (ref 32–34.5)
MCHC RBC AUTO-ENTMCNC: 34 % (ref 32–34.5)
MCV RBC AUTO: 90.2 FL (ref 80–99.9)
MCV RBC AUTO: 92.2 FL (ref 80–99.9)
METER GLUCOSE: 123 MG/DL (ref 74–99)
METER GLUCOSE: 135 MG/DL (ref 74–99)
METER GLUCOSE: 137 MG/DL (ref 74–99)
METER GLUCOSE: 149 MG/DL (ref 74–99)
METER GLUCOSE: 150 MG/DL (ref 74–99)
METER GLUCOSE: 153 MG/DL (ref 74–99)
METER GLUCOSE: 158 MG/DL (ref 74–99)
METER GLUCOSE: 162 MG/DL (ref 74–99)
METER GLUCOSE: 180 MG/DL (ref 74–99)
METER GLUCOSE: 184 MG/DL (ref 74–99)
METER GLUCOSE: 189 MG/DL (ref 74–99)
METER GLUCOSE: 97 MG/DL (ref 74–99)
METHB: 0.4 % (ref 0–1.5)
METHB: 0.5 % (ref 0–1.5)
METHB: 0.6 % (ref 0–1.5)
METHB: 0.7 % (ref 0–1.5)
MODE: AC
O2 SATURATION: 67.5 % (ref 92–98.5)
O2 SATURATION: 95.6 % (ref 92–98.5)
O2 SATURATION: 95.9 % (ref 92–98.5)
O2 SATURATION: 96.2 % (ref 92–98.5)
O2 SATURATION: 96.3 % (ref 92–98.5)
O2 SATURATION: 97.2 % (ref 92–98.5)
O2 SATURATION: 97.4 % (ref 92–98.5)
O2 SATURATION: 97.8 % (ref 92–98.5)
O2 SATURATION: 98.1 % (ref 92–98.5)
O2 SATURATION: 98.1 % (ref 92–98.5)
O2HB: 67 % (ref 94–97)
O2HB: 94.9 % (ref 94–97)
O2HB: 95.2 % (ref 94–97)
O2HB: 95.2 % (ref 94–97)
O2HB: 95.4 % (ref 94–97)
O2HB: 96.3 % (ref 94–97)
O2HB: 96.6 % (ref 94–97)
O2HB: 96.9 % (ref 94–97)
O2HB: 97.2 % (ref 94–97)
O2HB: 97.3 % (ref 94–97)
OPERATOR ID: 421
OPERATOR ID: ABNORMAL
PATIENT TEMP: 37 C
PCO2: 33.4 MMHG (ref 35–45)
PCO2: 35.2 MMHG (ref 35–45)
PCO2: 36 MMHG (ref 35–45)
PCO2: 37.5 MMHG (ref 35–45)
PCO2: 37.7 MMHG (ref 35–45)
PCO2: 37.7 MMHG (ref 35–45)
PCO2: 38.4 MMHG (ref 35–45)
PCO2: 40 MMHG (ref 35–45)
PCO2: 40.1 MMHG (ref 35–45)
PCO2: 44.3 MMHG (ref 35–45)
PDW BLD-RTO: 13.8 FL (ref 11.5–15)
PDW BLD-RTO: 14.7 FL (ref 11.5–15)
PEEP/CPAP: 10 CMH2O
PEEP/CPAP: 8 CMH2O
PFO2: 0.79 MMHG/%
PFO2: 0.94 MMHG/%
PFO2: 1.74 MMHG/%
PFO2: 1.95 MMHG/%
PFO2: 2.13 MMHG/%
PFO2: 2.2 MMHG/%
PFO2: 2.33 MMHG/%
PFO2: 2.42 MMHG/%
PFO2: 2.8 MMHG/%
PFO2: 2.83 MMHG/%
PH BLOOD GAS: 7.37 (ref 7.35–7.45)
PH BLOOD GAS: 7.43 (ref 7.35–7.45)
PH BLOOD GAS: 7.44 (ref 7.35–7.45)
PH BLOOD GAS: 7.44 (ref 7.35–7.45)
PH BLOOD GAS: 7.45 (ref 7.35–7.45)
PH BLOOD GAS: 7.46 (ref 7.35–7.45)
PH BLOOD GAS: 7.48 (ref 7.35–7.45)
PH BLOOD GAS: 7.48 (ref 7.35–7.45)
PLATELET # BLD: 41 E9/L (ref 130–450)
PLATELET # BLD: 43 E9/L (ref 130–450)
PLATELET CONFIRMATION: NORMAL
PLATELET CONFIRMATION: NORMAL
PMV BLD AUTO: 12.1 FL (ref 7–12)
PMV BLD AUTO: 12.3 FL (ref 7–12)
PO2: 113.1 MMHG (ref 75–100)
PO2: 116.8 MMHG (ref 75–100)
PO2: 139.8 MMHG (ref 75–100)
PO2: 156.6 MMHG (ref 75–100)
PO2: 170.6 MMHG (ref 75–100)
PO2: 31.7 MMHG (ref 75–100)
PO2: 87.8 MMHG (ref 75–100)
PO2: 93.1 MMHG (ref 75–100)
PO2: 93.9 MMHG (ref 75–100)
PO2: 97 MMHG (ref 75–100)
POTASSIUM SERPL-SCNC: 4.03 MMOL/L (ref 3.5–5)
POTASSIUM SERPL-SCNC: 4.3 MMOL/L (ref 3.5–5)
POTASSIUM SERPL-SCNC: 4.38 MMOL/L (ref 3.5–5)
POTASSIUM SERPL-SCNC: 4.4 MMOL/L (ref 3.5–5)
POTASSIUM SERPL-SCNC: 4.53 MMOL/L (ref 3.5–5)
RBC # BLD: 2.58 E12/L (ref 3.8–5.8)
RBC # BLD: 2.75 E12/L (ref 3.8–5.8)
RI(T): 1.05
RI(T): 1.16
RI(T): 1.39
RI(T): 1.51
RI(T): 1.67
RI(T): 1.98
RI(T): 2.16
RI(T): 2.7
RI(T): 5.86
RI(T): 6.47
RR MECHANICAL: 14 B/MIN
SODIUM BLD-SCNC: 141 MMOL/L (ref 132–146)
SODIUM BLD-SCNC: 143 MMOL/L (ref 132–146)
SODIUM BLD-SCNC: 144 MMOL/L (ref 132–146)
SODIUM BLD-SCNC: 145 MMOL/L (ref 132–146)
SOURCE, BLOOD GAS: ABNORMAL
THB: 8.8 G/DL (ref 11.5–16.5)
THB: 8.8 G/DL (ref 11.5–16.5)
THB: 8.9 G/DL (ref 11.5–16.5)
THB: 8.9 G/DL (ref 11.5–16.5)
THB: 9 G/DL (ref 11.5–16.5)
THB: 9.1 G/DL (ref 11.5–16.5)
THB: 9.2 G/DL (ref 11.5–16.5)
THB: 9.7 G/DL (ref 11.5–16.5)
TIME ANALYZED: 11
TIME ANALYZED: 1153
TIME ANALYZED: 1417
TIME ANALYZED: 1755
TIME ANALYZED: 211
TIME ANALYZED: 2235
TIME ANALYZED: 409
TIME ANALYZED: 604
TIME ANALYZED: 856
TIME ANALYZED: 937
TOTAL PROTEIN: 4.6 G/DL (ref 6.4–8.3)
TOTAL PROTEIN: 5.3 G/DL (ref 6.4–8.3)
VT MECHANICAL: 500 ML
WBC # BLD: 17.8 E9/L (ref 4.5–11.5)
WBC # BLD: 18 E9/L (ref 4.5–11.5)

## 2021-09-29 PROCEDURE — 83605 ASSAY OF LACTIC ACID: CPT

## 2021-09-29 PROCEDURE — C1751 CATH, INF, PER/CENT/MIDLINE: HCPCS

## 2021-09-29 PROCEDURE — 6370000000 HC RX 637 (ALT 250 FOR IP): Performed by: PHYSICIAN ASSISTANT

## 2021-09-29 PROCEDURE — P9059 PLASMA, FRZ BETWEEN 8-24HOUR: HCPCS

## 2021-09-29 PROCEDURE — 94003 VENT MGMT INPAT SUBQ DAY: CPT

## 2021-09-29 PROCEDURE — 94645 CONT INHLJ TX EACH ADDL HOUR: CPT

## 2021-09-29 PROCEDURE — 02HV33Z INSERTION OF INFUSION DEVICE INTO SUPERIOR VENA CAVA, PERCUTANEOUS APPROACH: ICD-10-PCS | Performed by: THORACIC SURGERY (CARDIOTHORACIC VASCULAR SURGERY)

## 2021-09-29 PROCEDURE — 71045 X-RAY EXAM CHEST 1 VIEW: CPT

## 2021-09-29 PROCEDURE — 82962 GLUCOSE BLOOD TEST: CPT

## 2021-09-29 PROCEDURE — 86850 RBC ANTIBODY SCREEN: CPT

## 2021-09-29 PROCEDURE — P9045 ALBUMIN (HUMAN), 5%, 250 ML: HCPCS | Performed by: NURSE PRACTITIONER

## 2021-09-29 PROCEDURE — 6360000002 HC RX W HCPCS: Performed by: NURSE PRACTITIONER

## 2021-09-29 PROCEDURE — 80048 BASIC METABOLIC PNL TOTAL CA: CPT

## 2021-09-29 PROCEDURE — 37799 UNLISTED PX VASCULAR SURGERY: CPT

## 2021-09-29 PROCEDURE — 2580000003 HC RX 258: Performed by: PHYSICIAN ASSISTANT

## 2021-09-29 PROCEDURE — 2500000003 HC RX 250 WO HCPCS: Performed by: NURSE PRACTITIONER

## 2021-09-29 PROCEDURE — 80053 COMPREHEN METABOLIC PANEL: CPT

## 2021-09-29 PROCEDURE — 2500000003 HC RX 250 WO HCPCS: Performed by: PHYSICIAN ASSISTANT

## 2021-09-29 PROCEDURE — 86900 BLOOD TYPING SEROLOGIC ABO: CPT

## 2021-09-29 PROCEDURE — 6360000002 HC RX W HCPCS: Performed by: THORACIC SURGERY (CARDIOTHORACIC VASCULAR SURGERY)

## 2021-09-29 PROCEDURE — 2500000003 HC RX 250 WO HCPCS

## 2021-09-29 PROCEDURE — 85027 COMPLETE CBC AUTOMATED: CPT

## 2021-09-29 PROCEDURE — 6370000000 HC RX 637 (ALT 250 FOR IP): Performed by: NURSE PRACTITIONER

## 2021-09-29 PROCEDURE — 2580000003 HC RX 258: Performed by: NURSE PRACTITIONER

## 2021-09-29 PROCEDURE — 36415 COLL VENOUS BLD VENIPUNCTURE: CPT

## 2021-09-29 PROCEDURE — 86901 BLOOD TYPING SEROLOGIC RH(D): CPT

## 2021-09-29 PROCEDURE — 6360000002 HC RX W HCPCS: Performed by: PHYSICIAN ASSISTANT

## 2021-09-29 PROCEDURE — 2580000003 HC RX 258: Performed by: STUDENT IN AN ORGANIZED HEALTH CARE EDUCATION/TRAINING PROGRAM

## 2021-09-29 PROCEDURE — 76937 US GUIDE VASCULAR ACCESS: CPT

## 2021-09-29 PROCEDURE — P9047 ALBUMIN (HUMAN), 25%, 50ML: HCPCS | Performed by: THORACIC SURGERY (CARDIOTHORACIC VASCULAR SURGERY)

## 2021-09-29 PROCEDURE — 83735 ASSAY OF MAGNESIUM: CPT

## 2021-09-29 PROCEDURE — 99291 CRITICAL CARE FIRST HOUR: CPT | Performed by: NURSE PRACTITIONER

## 2021-09-29 PROCEDURE — 2580000003 HC RX 258: Performed by: THORACIC SURGERY (CARDIOTHORACIC VASCULAR SURGERY)

## 2021-09-29 PROCEDURE — 2000000000 HC ICU R&B

## 2021-09-29 PROCEDURE — C9113 INJ PANTOPRAZOLE SODIUM, VIA: HCPCS | Performed by: NURSE PRACTITIONER

## 2021-09-29 PROCEDURE — 84132 ASSAY OF SERUM POTASSIUM: CPT

## 2021-09-29 PROCEDURE — 6360000002 HC RX W HCPCS: Performed by: STUDENT IN AN ORGANIZED HEALTH CARE EDUCATION/TRAINING PROGRAM

## 2021-09-29 PROCEDURE — 94640 AIRWAY INHALATION TREATMENT: CPT

## 2021-09-29 PROCEDURE — 82805 BLOOD GASES W/O2 SATURATION: CPT

## 2021-09-29 PROCEDURE — P9016 RBC LEUKOCYTES REDUCED: HCPCS

## 2021-09-29 PROCEDURE — 36430 TRANSFUSION BLD/BLD COMPNT: CPT

## 2021-09-29 PROCEDURE — 36569 INSJ PICC 5 YR+ W/O IMAGING: CPT

## 2021-09-29 PROCEDURE — 86922 COMPATIBILITY TEST ANTIGLOB: CPT

## 2021-09-29 PROCEDURE — P9073 PLATELETS PHERESIS PATH REDU: HCPCS

## 2021-09-29 RX ORDER — SODIUM CHLORIDE 0.9 % (FLUSH) 0.9 %
5-40 SYRINGE (ML) INJECTION PRN
Status: DISCONTINUED | OUTPATIENT
Start: 2021-09-29 | End: 2021-10-10

## 2021-09-29 RX ORDER — ROCURONIUM BROMIDE 10 MG/ML
INJECTION, SOLUTION INTRAVENOUS
Status: COMPLETED
Start: 2021-09-29 | End: 2021-09-29

## 2021-09-29 RX ORDER — ROCURONIUM BROMIDE 10 MG/ML
0.6 INJECTION, SOLUTION INTRAVENOUS ONCE
Status: DISCONTINUED | OUTPATIENT
Start: 2021-09-29 | End: 2021-10-07

## 2021-09-29 RX ORDER — HEPARIN SODIUM (PORCINE) LOCK FLUSH IV SOLN 100 UNIT/ML 100 UNIT/ML
3 SOLUTION INTRAVENOUS EVERY 12 HOURS SCHEDULED
Status: DISCONTINUED | OUTPATIENT
Start: 2021-09-29 | End: 2021-10-13 | Stop reason: HOSPADM

## 2021-09-29 RX ORDER — ALBUMIN, HUMAN INJ 5% 5 %
25 SOLUTION INTRAVENOUS ONCE
Status: COMPLETED | OUTPATIENT
Start: 2021-09-29 | End: 2021-09-29

## 2021-09-29 RX ORDER — ALBUMIN (HUMAN) 12.5 G/50ML
50 SOLUTION INTRAVENOUS ONCE
Status: COMPLETED | OUTPATIENT
Start: 2021-09-29 | End: 2021-09-29

## 2021-09-29 RX ORDER — LIDOCAINE HYDROCHLORIDE 10 MG/ML
5 INJECTION, SOLUTION EPIDURAL; INFILTRATION; INTRACAUDAL; PERINEURAL ONCE
Status: DISCONTINUED | OUTPATIENT
Start: 2021-09-29 | End: 2021-10-10

## 2021-09-29 RX ORDER — HEPARIN SODIUM (PORCINE) LOCK FLUSH IV SOLN 100 UNIT/ML 100 UNIT/ML
3 SOLUTION INTRAVENOUS PRN
Status: DISCONTINUED | OUTPATIENT
Start: 2021-09-29 | End: 2021-10-13 | Stop reason: HOSPADM

## 2021-09-29 RX ORDER — SODIUM CHLORIDE 0.9 % (FLUSH) 0.9 %
5-40 SYRINGE (ML) INJECTION EVERY 12 HOURS SCHEDULED
Status: DISCONTINUED | OUTPATIENT
Start: 2021-09-29 | End: 2021-10-10

## 2021-09-29 RX ORDER — SODIUM CHLORIDE 9 MG/ML
25 INJECTION, SOLUTION INTRAVENOUS PRN
Status: DISCONTINUED | OUTPATIENT
Start: 2021-09-29 | End: 2021-10-13 | Stop reason: HOSPADM

## 2021-09-29 RX ORDER — SODIUM CHLORIDE 9 MG/ML
INJECTION, SOLUTION INTRAVENOUS PRN
Status: DISCONTINUED | OUTPATIENT
Start: 2021-09-29 | End: 2021-10-07

## 2021-09-29 RX ADMIN — ASPIRIN 81 MG CHEWABLE TABLET 81 MG: 81 TABLET CHEWABLE at 08:37

## 2021-09-29 RX ADMIN — INSULIN LISPRO 3 UNITS: 100 INJECTION, SOLUTION INTRAVENOUS; SUBCUTANEOUS at 12:52

## 2021-09-29 RX ADMIN — EPINEPHRINE 2.5 MCG/MIN: 1 INJECTION INTRAMUSCULAR; INTRAVENOUS; SUBCUTANEOUS at 22:14

## 2021-09-29 RX ADMIN — ALBUMIN (HUMAN) 25 G: 12.5 INJECTION, SOLUTION INTRAVENOUS at 12:38

## 2021-09-29 RX ADMIN — CISATRACURIUM BESYLATE 2.5 MCG/KG/MIN: 200 INJECTION INTRAVENOUS at 06:12

## 2021-09-29 RX ADMIN — MUPIROCIN: 20 OINTMENT TOPICAL at 08:37

## 2021-09-29 RX ADMIN — MIDAZOLAM 4 MG/HR: 5 INJECTION, SOLUTION INTRAMUSCULAR; INTRAVENOUS at 11:45

## 2021-09-29 RX ADMIN — Medication 400 MG: at 08:37

## 2021-09-29 RX ADMIN — ROCURONIUM BROMIDE 43 MG: 50 INJECTION, SOLUTION INTRAVENOUS at 09:00

## 2021-09-29 RX ADMIN — MUPIROCIN: 20 OINTMENT TOPICAL at 21:12

## 2021-09-29 RX ADMIN — INSULIN LISPRO 3 UNITS: 100 INJECTION, SOLUTION INTRAVENOUS; SUBCUTANEOUS at 21:19

## 2021-09-29 RX ADMIN — CALCIUM GLUCONATE 1000 MG: 98 INJECTION, SOLUTION INTRAVENOUS at 17:51

## 2021-09-29 RX ADMIN — EPOPROSTENOL 50 NG/KG/MIN: 1.5 INJECTION, POWDER, LYOPHILIZED, FOR SOLUTION INTRAVENOUS at 18:28

## 2021-09-29 RX ADMIN — CHLORHEXIDINE GLUCONATE 15 ML: 1.2 RINSE ORAL at 08:37

## 2021-09-29 RX ADMIN — DOBUTAMINE IN DEXTROSE 7.5 MCG/KG/MIN: 400 INJECTION, SOLUTION INTRAVENOUS at 22:04

## 2021-09-29 RX ADMIN — IPRATROPIUM BROMIDE AND ALBUTEROL SULFATE 1 AMPULE: .5; 2.5 SOLUTION RESPIRATORY (INHALATION) at 21:27

## 2021-09-29 RX ADMIN — IPRATROPIUM BROMIDE AND ALBUTEROL SULFATE 1 AMPULE: .5; 2.5 SOLUTION RESPIRATORY (INHALATION) at 11:18

## 2021-09-29 RX ADMIN — SENNOSIDES 5 ML: 8.8 SYRUP ORAL at 08:37

## 2021-09-29 RX ADMIN — IPRATROPIUM BROMIDE AND ALBUTEROL SULFATE 1 AMPULE: .5; 2.5 SOLUTION RESPIRATORY (INHALATION) at 16:12

## 2021-09-29 RX ADMIN — DOCUSATE SODIUM 100 MG: 50 LIQUID ORAL at 21:11

## 2021-09-29 RX ADMIN — AMIODARONE HYDROCHLORIDE 0.5 MG/MIN: 50 INJECTION, SOLUTION INTRAVENOUS at 14:14

## 2021-09-29 RX ADMIN — DOCUSATE SODIUM 100 MG: 50 LIQUID ORAL at 08:37

## 2021-09-29 RX ADMIN — SENNOSIDES 5 ML: 8.8 SYRUP ORAL at 21:11

## 2021-09-29 RX ADMIN — PANTOPRAZOLE SODIUM 40 MG: 40 INJECTION, POWDER, FOR SOLUTION INTRAVENOUS at 08:37

## 2021-09-29 RX ADMIN — SODIUM CHLORIDE, PRESERVATIVE FREE 10 ML: 5 INJECTION INTRAVENOUS at 08:37

## 2021-09-29 RX ADMIN — EPOPROSTENOL 50 NG/KG/MIN: 1.5 INJECTION, POWDER, LYOPHILIZED, FOR SOLUTION INTRAVENOUS at 05:07

## 2021-09-29 RX ADMIN — ATORVASTATIN CALCIUM 40 MG: 40 TABLET, FILM COATED ORAL at 21:11

## 2021-09-29 RX ADMIN — EPOPROSTENOL 50 NG/KG/MIN: 1.5 INJECTION, POWDER, LYOPHILIZED, FOR SOLUTION INTRAVENOUS at 11:47

## 2021-09-29 RX ADMIN — Medication 10 ML: at 21:12

## 2021-09-29 RX ADMIN — HYDROCORTISONE SODIUM SUCCINATE 100 MG: 100 INJECTION, POWDER, FOR SOLUTION INTRAMUSCULAR; INTRAVENOUS at 06:06

## 2021-09-29 RX ADMIN — HYDROCORTISONE SODIUM SUCCINATE 100 MG: 100 INJECTION, POWDER, FOR SOLUTION INTRAMUSCULAR; INTRAVENOUS at 22:58

## 2021-09-29 RX ADMIN — SODIUM CHLORIDE 2.1 UNITS/HR: 9 INJECTION, SOLUTION INTRAVENOUS at 01:15

## 2021-09-29 RX ADMIN — HYDROCORTISONE SODIUM SUCCINATE 100 MG: 100 INJECTION, POWDER, FOR SOLUTION INTRAMUSCULAR; INTRAVENOUS at 13:31

## 2021-09-29 RX ADMIN — ALBUMIN (HUMAN) 50 G: 0.25 INJECTION, SOLUTION INTRAVENOUS at 07:56

## 2021-09-29 RX ADMIN — PIPERACILLIN AND TAZOBACTAM 3375 MG: 3; .375 INJECTION, POWDER, LYOPHILIZED, FOR SOLUTION INTRAVENOUS at 00:33

## 2021-09-29 RX ADMIN — PIPERACILLIN AND TAZOBACTAM 3375 MG: 3; .375 INJECTION, POWDER, LYOPHILIZED, FOR SOLUTION INTRAVENOUS at 08:49

## 2021-09-29 RX ADMIN — PIPERACILLIN AND TAZOBACTAM 3375 MG: 3; .375 INJECTION, POWDER, LYOPHILIZED, FOR SOLUTION INTRAVENOUS at 17:52

## 2021-09-29 RX ADMIN — CHLORHEXIDINE GLUCONATE 15 ML: 1.2 RINSE ORAL at 21:11

## 2021-09-29 RX ADMIN — Medication 10 ML: at 08:38

## 2021-09-29 RX ADMIN — IPRATROPIUM BROMIDE AND ALBUTEROL SULFATE 1 AMPULE: .5; 2.5 SOLUTION RESPIRATORY (INHALATION) at 06:14

## 2021-09-29 RX ADMIN — Medication 2000 MG: at 02:27

## 2021-09-29 RX ADMIN — INSULIN LISPRO 3 UNITS: 100 INJECTION, SOLUTION INTRAVENOUS; SUBCUTANEOUS at 10:30

## 2021-09-29 ASSESSMENT — PULMONARY FUNCTION TESTS
PIF_VALUE: 25
PIF_VALUE: 32
PIF_VALUE: 27
PIF_VALUE: 26
PIF_VALUE: 27
PIF_VALUE: 29
PIF_VALUE: 23
PIF_VALUE: 31
PIF_VALUE: 26
PIF_VALUE: 26
PIF_VALUE: 23
PIF_VALUE: 30
PIF_VALUE: 22
PIF_VALUE: 22
PIF_VALUE: 27
PIF_VALUE: 26
PIF_VALUE: 21
PIF_VALUE: 28
PIF_VALUE: 30
PIF_VALUE: 31
PIF_VALUE: 29
PIF_VALUE: 30
PIF_VALUE: 26
PIF_VALUE: 22
PIF_VALUE: 27
PIF_VALUE: 26
PIF_VALUE: 22
PIF_VALUE: 25
PIF_VALUE: 26
PIF_VALUE: 28
PIF_VALUE: 23
PIF_VALUE: 21
PIF_VALUE: 34
PIF_VALUE: 23
PIF_VALUE: 22
PIF_VALUE: 28
PIF_VALUE: 27
PIF_VALUE: 28
PIF_VALUE: 31
PIF_VALUE: 26
PIF_VALUE: 22
PIF_VALUE: 31
PIF_VALUE: 26
PIF_VALUE: 30
PIF_VALUE: 30
PIF_VALUE: 26
PIF_VALUE: 27
PIF_VALUE: 23
PIF_VALUE: 27
PIF_VALUE: 29
PIF_VALUE: 22
PIF_VALUE: 27
PIF_VALUE: 26

## 2021-09-29 ASSESSMENT — PAIN SCALES - GENERAL
PAINLEVEL_OUTOF10: 0

## 2021-09-29 NOTE — PLAN OF CARE
Problem: Gas Exchange - Impaired:  Goal: Levels of oxygenation will improve  Description: Levels of oxygenation will improve  Outcome: Met This Shift     Problem: Gas Exchange - Impaired:  Goal: Ability to maintain adequate ventilation will improve  Description: Ability to maintain adequate ventilation will improve  Outcome: Met This Shift     Problem: Cardiac Output - Decreased:  Goal: Cardiac output within specified parameters  Description: Cardiac output within specified parameters  Outcome: Ongoing     Problem: Cardiac Output - Decreased:  Goal: Hemodynamic stability will improve  Description: Hemodynamic stability will improve  Outcome: Ongoing     Problem: Fluid Volume - Imbalance:  Goal: Ability to achieve a balanced intake and output will improve  Description: Ability to achieve a balanced intake and output will improve  Outcome: Ongoing     Problem: Fluid Volume - Imbalance:  Goal: Chest tube drainage is within specified parameters  Description: Chest tube drainage is within specified parameters  Outcome: Ongoing     Problem: Tissue Perfusion - Cardiopulmonary, Altered:  Goal: Hemodynamic stability will improve  Description: Hemodynamic stability will improve  Outcome: Ongoing     Problem: Tissue Perfusion - Cardiopulmonary, Altered:  Goal: Will show no evidence of cardiac arrhythmias  Description: Will show no evidence of cardiac arrhythmias  Outcome: Ongoing

## 2021-09-29 NOTE — PROGRESS NOTES
intubation    Nutrition Interventions:   Food and/or Nutrient Delivery:  Continue NPO  Nutrition Education/Counseling:  Education not indicated   Coordination of Nutrition Care:  Continue to monitor while inpatient    Goals:  nutrition progression       Nutrition Monitoring and Evaluation:   Food/Nutrient Intake Outcomes:  Diet Advancement/Tolerance  Physical Signs/Symptoms Outcomes:  Biochemical Data, GI Status, Fluid Status or Edema, Hemodynamic Status, Nutrition Focused Physical Findings, Skin, Weight     Discharge Planning:     Too soon to determine     Electronically signed by Lynette Mead MS, RD, LD on 9/29/21 at 11:09 AM EDT    Contact: [phone]

## 2021-09-29 NOTE — PROGRESS NOTES
Vascular Access Procedure Note    Procedure Date:   9/29/2021    Pre-procedure Verification/Time-Out:  The proposed risks versus benefits of this procedure were discussed in detail by the physician with wife,Tonya. written consent was obtained from wife, Macey Dhillon Relevant documentation was reviewed prior to procedure including signed consent form and medications. All necessary equipment for procedure is available at time of procedure yes. An audible time out was done at 1030AM by team members, correctly identifying patients name, medical record number, correct side, correct site, and correct procedure to be performed with registered nurse members of the procedure team all in agreement.         Indication for Procedure:   Reason for Insertion: other multi meds    ASA Assessment (Required for Moderate & Deep Sedation):  ASA 4 - Patient with severe systemic disease that is a constant threat to life    Procedure:   Reason for Consultation: power PICC line    Clinician Performing Procedure:   Huyen Spencer rn    Assistant:  none    Sedation:   Analgesia Used: lidocaine 1%    Procedure Details/Findings:  Catheter Thai Size: 5.5  Lot Number: [id number]  Product #: [id number]  Expiration Date: 05/31/2022  Maximum Barrier Precautions: cap, eye shield, full body drape, gloves, gown, handwashing and mask  Skin Prep: chlorhexidine  Technique: modified seldinger and ultrasound guided with VPS  Attempts: 1  Exposed (cm): 1  Total (cm): 41  Placement Site: left basilic vein  Vessel Size: 0.55  Dressing: securement device, transparent dressing and biopatch  Blood Return: Yes   Ultrasound Guidance: Yes   Arm Circumference Mid-Bicep (cm): 28  Chest X-Ray Ordered: chest x-ray  End Placement: svc    Complications:   none     Post-operative Condition:  unstable  Patient Tolerated Procedure: well     Comments/Post-operative Education:   Post Procedure Interventions: no blood pressure sign placed above bed    Kina Johnson

## 2021-09-29 NOTE — PROGRESS NOTES
Physical Therapy    PT consult to evaluate/treat received and appreciated. Pt chart reviewed and discussed with nursing staff this AM.  Pt is currently not appropriate for PT services d/t medical instability, intubation/sedation, and currently on paralytic. PT will sign off for now. Please re-order when pt able to participate in PT session. Thank you.         Rosanne Bourne, PT, DPT   [id number]

## 2021-09-29 NOTE — FLOWSHEET NOTE
Ruling out possible blood transfusion reaction, patient became hypoxic and hypotensive during blood transfusion.

## 2021-09-29 NOTE — PROGRESS NOTES
CVICU Progress Note    Name: Pepe Griffiths  MRN: [id number]    CC: Postoperative Critical Care Management      Indication for Surgery/Procedure: CAD  LVEF:  NML    RVF:  NML     Important/Relevant PMH/PSH:  HTN, HLD, LBBB     Procedure/Surgeries: 9/27/2021 Sternotomy/CABG x 3 (LIMA-LAD, SVG-OM, SVG-RCA)/Extensive RCA endarterectomy/MALU exclusion with 35 mm AtriClip/EVH LLE/Rigid internal fixation of the sternum with [place] plates x 0/50 modifier     Pacing wires: Atrial and Ventricular        Intake/Output Summary (Last 24 hours) at 9/29/2021 0809  Last data filed at 9/29/2021 0600  Gross per 24 hour   Intake 3150.64 ml   Output 1651 ml   Net 1499.64 ml       Recent Labs     09/28/21  0600 09/28/21  1605 09/29/21  0400   WBC 18.8* 11.7* 17.8*   HGB 9.7* 8.8* 8.1*   HCT 28.4* 25.5* 23.8*   PLT 94* 49* 43*      Lab Results   Component Value Date     09/29/2021    K 4.4 09/29/2021     09/29/2021    CO2 27 09/29/2021    BUN 32 09/29/2021    CREATININE 1.9 09/29/2021    GLUCOSE 178 09/29/2021    CALCIUM 8.5 09/29/2021         Physical Exam:    /60   Pulse 97   Temp 98.5 °F (36.9 °C) (Axillary)   Resp 14   Ht 5' 8\" (1.727 m)   Wt 158 lb 11.7 oz (72 kg)   SpO2 99%   BMI 24.14 kg/m²       CXR Findings:     FINDINGS:   The ETT is 8 cm above the benoit.  The feeding tube is inserted with its tip   below the diaphragm and beyond the field of view.  A right IJ catheter is   seen with its tip at the superior cavoatrial junction.  There are chest tubes   in place.  The patient is status post median sternotomy.  The   cardiomediastinal silhouette is stable.  There are patchy airspace opacities,   right more than left, may be related to pulmonary edema versus pneumonia. There is no pleural effusion. Bharati Clement is no pneumothorax. Bharati Clement is no acute   osseous abnormality.      -CXR personally viewed and interpreted by ICU Nurse Practitioner, agree with above findings     General: Critically ill, IABP 1:2, epinephrine, dobutamine, vasopressin gtts for hemodynamic support. RHI and amiodarone infusions. Eyes: PERRL, anicteric   Pulmonary: Diminished bibasilar right>left. No wheezes, no accessory muscle use noted   Ventilator: Mode: AC/VC 16, 40% FiO2, 8 PEEP, 500 Vt   Cardiovascular:  RRR, no heaves or thrills on palpation  Tele: SR 90s   Abdomen: Soft, nondistended, hypoactive BS, OG to LIWS   Extremities: BLE +DP/PT signals, +edema bilateral hands   Neurologic/Psych: NMBA, sedation   Skin: Warm and dry  Incisions: MSI JAYCEE intact, LLE SVG sites well approximated, left femoral IABP C/D/I     Lines:   ETT 9/28  Right femoral TLC 9/28  Left femoral IABP 9/28  Right IJ 9/27  Right brachial Arterial line 9/27  Ybarra 9/27     Assessment/Plan: POD #2    1. CAD S/p CABGx3 (LIMA-LAD, SVG-OM, SVG-RCA)/Extensive RCA endarterectomy/MALU AtriClip  - ASA (Hold Plavix), Lipitor   - Continue to monitor chest tube output (MS 650cc/24hours, Pleurals with 250cc/24 hours)  - Amio gtt for afib prophylaxis   - Lovenox d/c'd  - Place PICC today, remove right femoral TLC once placed      2. Acute Hypoxic Respiratory Failure  - Extubated DOS, tolerating 2L NC post extubation, acute hypoxia requiring NRB, NIV and reintubation 9/28  - NMBA and iFlolan started 9/28 for hypoxia  - CXR with right sided infiltrate- ?aspirtaion, Empiric Zosyn day 2  - Stop Nimbex infusion, monitor oxygenation, continue ETT   - ABGs q 4 hours and PRN      3. Cardiogenic Shock   - post CPB required levophed and vasopressin and ultimately IV B12 intraop, postop ICU stable on Norepi 5mcg; acute decompensation with profound hypotension required escalation of vasopressors, given Vit B12 and Methylene Blue, started on epinephrine infusion and given stress steroids   - POD1: STAT Echo with akinesis of apical inferior and septal walls, EF 45-50%; Severe RV dysfunction, Moderate MR; IABP placed at bedside, 1:1 with improvement in hemodynamics, levophed weaned off.  Dobutamine @5mcg, Epi @ 3mcg, vaso weaned to 0.02 units, inhaled flolan   - POD2: IABP placed 1:2, dobutamine increased to 7.5mcg to facilitate weaning vasopressin, epinephrine as able   - Tbili down to 0.9, AST/ALT mildly elevated (reactive), lactic 5.3 trending down  - Trend LA, CMP, maintain MAP >65      4. Lactic Acidosis  - Worsening lactic night of surgery, peaked at 13.5, evaluated by General Surgery, abdominal exam unremarkable  - Lactic acidosis remains, small increase overnight but now down trending, 5.3; monitor   - Continue to trend     5. NGUYỄN   - 2/2 above  - Baseline Scr 0.8  - Scr 1.9 today with decreased UOP overnight, albumin ordered, transfuse one unit PRBCs  - monitor UOP, lab, avoid hypotension and nephrotoxic agents      6. Acute Post Operative Pain   - PRN fentanyl for pain management, versed gtt for sedation      7. Stress Hyperglycemia  - No h/o DM  - RHI infusion for BG >180 per protocol  while on inopressor support      8. Thrombocytopenia  - Plts 42K, likely consumptive  - Lovenox stopped, monitor      9. Acute blood loss anemia  - H/H 8.1/23.8, transfuse one unit RBCs today given inopressor requirements   - monitor and transfuse if needed to maintain Hgb >8      10. Leukocytosis  - WBC 17.8, afebrile  - On empiric Zosyn, day 2  - f/u Blood cultures sent 9/28      This patient has a high probability of sudden deterioration, which requires preparedness to urgently intervene. I participated in the decision making process and managed the direct care of the patient that required frequent assessment and treatment. I personally spent 52 minutes of critical care time treating the patient.        VTE Prophylaxis: Pharmacologic/Mechanical:  Yes, SCDs   Line infection prevention: Can CVC or arterial line be removed: remove right femoral TLC once PICC placed   Continued need for urinary catheter:  Yes - clinical indication: Patient post major surgery requiring fluid balance and input and output measurement.     Dispo: CVICU     Electronically signed by JUAN MIGUEL Mejia CNP on 9/29/2021 at 8:09 AM

## 2021-09-29 NOTE — PROGRESS NOTES
Dr. Manish Rodriguez contacted for lactic acid of 7.1, aware that this has increased from the prior one of 5.9. Updated on current medications and dosages. 0315: No orders received.

## 2021-09-30 ENCOUNTER — APPOINTMENT (OUTPATIENT)
Dept: GENERAL RADIOLOGY | Age: 76
DRG: 235 | End: 2021-09-30
Attending: THORACIC SURGERY (CARDIOTHORACIC VASCULAR SURGERY)
Payer: MEDICARE

## 2021-09-30 LAB
AADO2: 237.4 MMHG
AADO2: 241 MMHG
AADO2: 364.6 MMHG
AADO2: 416.6 MMHG
AADO2: 538.3 MMHG
AADO2: 571.7 MMHG
AADO2: 597.3 MMHG
ALBUMIN SERPL-MCNC: 3.8 G/DL (ref 3.5–5.2)
ALBUMIN SERPL-MCNC: 4.1 G/DL (ref 3.5–5.2)
ALP BLD-CCNC: 46 U/L (ref 40–129)
ALP BLD-CCNC: 56 U/L (ref 40–129)
ALT SERPL-CCNC: 44 U/L (ref 0–40)
ALT SERPL-CCNC: 55 U/L (ref 0–40)
ANION GAP SERPL CALCULATED.3IONS-SCNC: 14 MMOL/L (ref 7–16)
ANION GAP SERPL CALCULATED.3IONS-SCNC: 6 MMOL/L (ref 7–16)
ANION GAP SERPL CALCULATED.3IONS-SCNC: 9 MMOL/L (ref 7–16)
ANION GAP SERPL CALCULATED.3IONS-SCNC: 9 MMOL/L (ref 7–16)
AST SERPL-CCNC: 109 U/L (ref 0–39)
AST SERPL-CCNC: 111 U/L (ref 0–39)
B.E.: -0.5 MMOL/L (ref -3–3)
B.E.: -1 MMOL/L (ref -3–3)
B.E.: 0.8 MMOL/L (ref -3–3)
B.E.: 1.1 MMOL/L (ref -3–3)
B.E.: 1.2 MMOL/L (ref -3–3)
B.E.: 3 MMOL/L (ref -3–3)
B.E.: 3.3 MMOL/L (ref -3–3)
BILIRUB SERPL-MCNC: 1.5 MG/DL (ref 0–1.2)
BILIRUB SERPL-MCNC: 1.7 MG/DL (ref 0–1.2)
BUN BLDV-MCNC: 45 MG/DL (ref 6–23)
BUN BLDV-MCNC: 55 MG/DL (ref 6–23)
BUN BLDV-MCNC: 59 MG/DL (ref 6–23)
BUN BLDV-MCNC: 68 MG/DL (ref 6–23)
CALCIUM SERPL-MCNC: 7.8 MG/DL (ref 8.6–10.2)
CALCIUM SERPL-MCNC: 8.4 MG/DL (ref 8.6–10.2)
CALCIUM SERPL-MCNC: 8.6 MG/DL (ref 8.6–10.2)
CALCIUM SERPL-MCNC: 8.8 MG/DL (ref 8.6–10.2)
CHLORIDE BLD-SCNC: 103 MMOL/L (ref 98–107)
CHLORIDE BLD-SCNC: 103 MMOL/L (ref 98–107)
CHLORIDE BLD-SCNC: 104 MMOL/L (ref 98–107)
CHLORIDE BLD-SCNC: 105 MMOL/L (ref 98–107)
CO2: 24 MMOL/L (ref 22–29)
CO2: 28 MMOL/L (ref 22–29)
CO2: 28 MMOL/L (ref 22–29)
CO2: 30 MMOL/L (ref 22–29)
COHB: 0.1 % (ref 0–1.5)
COHB: 0.3 % (ref 0–1.5)
COMMENT: ABNORMAL
CORTISOL TOTAL: 33.09 MCG/DL (ref 2.68–18.4)
CREAT SERPL-MCNC: 2.2 MG/DL (ref 0.7–1.2)
CREAT SERPL-MCNC: 2.3 MG/DL (ref 0.7–1.2)
CREAT SERPL-MCNC: 2.4 MG/DL (ref 0.7–1.2)
CREAT SERPL-MCNC: 2.4 MG/DL (ref 0.7–1.2)
CRITICAL: ABNORMAL
DATE ANALYZED: ABNORMAL
DATE OF COLLECTION: ABNORMAL
FIO2: 100 %
FIO2: 50 %
FIO2: 60 %
FIO2: 80 %
GFR AFRICAN AMERICAN: 32
GFR AFRICAN AMERICAN: 32
GFR AFRICAN AMERICAN: 34
GFR AFRICAN AMERICAN: 36
GFR NON-AFRICAN AMERICAN: 27 ML/MIN/1.73
GFR NON-AFRICAN AMERICAN: 27 ML/MIN/1.73
GFR NON-AFRICAN AMERICAN: 28 ML/MIN/1.73
GFR NON-AFRICAN AMERICAN: 29 ML/MIN/1.73
GLUCOSE BLD-MCNC: 142 MG/DL (ref 74–99)
GLUCOSE BLD-MCNC: 145 MG/DL (ref 74–99)
GLUCOSE BLD-MCNC: 160 MG/DL (ref 74–99)
GLUCOSE BLD-MCNC: 171 MG/DL (ref 74–99)
HCO3: 22.5 MMOL/L (ref 22–26)
HCO3: 24.2 MMOL/L (ref 22–26)
HCO3: 24.6 MMOL/L (ref 22–26)
HCO3: 25.6 MMOL/L (ref 22–26)
HCO3: 25.6 MMOL/L (ref 22–26)
HCO3: 27.2 MMOL/L (ref 22–26)
HCO3: 27.6 MMOL/L (ref 22–26)
HCT VFR BLD CALC: 24.4 % (ref 37–54)
HCT VFR BLD CALC: 24.8 % (ref 37–54)
HEMOGLOBIN: 8 G/DL (ref 12.5–16.5)
HEMOGLOBIN: 8.2 G/DL (ref 12.5–16.5)
HHB: 1.5 % (ref 0–5)
HHB: 1.9 % (ref 0–5)
HHB: 17.9 % (ref 0–5)
HHB: 2.3 % (ref 0–5)
HHB: 2.8 % (ref 0–5)
HHB: 5.9 % (ref 0–5)
HHB: 7.6 % (ref 0–5)
LAB: ABNORMAL
LACTIC ACID: 1.3 MMOL/L (ref 0.5–2.2)
LACTIC ACID: 1.4 MMOL/L (ref 0.5–2.2)
LACTIC ACID: 1.5 MMOL/L (ref 0.5–2.2)
LACTIC ACID: 1.5 MMOL/L (ref 0.5–2.2)
LACTIC ACID: 1.6 MMOL/L (ref 0.5–2.2)
LACTIC ACID: 1.6 MMOL/L (ref 0.5–2.2)
LACTIC ACID: 1.7 MMOL/L (ref 0.5–2.2)
LACTIC ACID: 1.8 MMOL/L (ref 0.5–2.2)
LACTIC ACID: 1.9 MMOL/L (ref 0.5–2.2)
LACTIC ACID: 1.9 MMOL/L (ref 0.5–2.2)
LV EF: 45 %
LVEF MODALITY: NORMAL
Lab: ABNORMAL
MCH RBC QN AUTO: 29.7 PG (ref 26–35)
MCH RBC QN AUTO: 29.8 PG (ref 26–35)
MCHC RBC AUTO-ENTMCNC: 32.8 % (ref 32–34.5)
MCHC RBC AUTO-ENTMCNC: 33.1 % (ref 32–34.5)
MCV RBC AUTO: 90.2 FL (ref 80–99.9)
MCV RBC AUTO: 90.7 FL (ref 80–99.9)
METER GLUCOSE: 128 MG/DL (ref 74–99)
METER GLUCOSE: 134 MG/DL (ref 74–99)
METER GLUCOSE: 143 MG/DL (ref 74–99)
METER GLUCOSE: 145 MG/DL (ref 74–99)
METER GLUCOSE: 153 MG/DL (ref 74–99)
METER GLUCOSE: 64 MG/DL (ref 74–99)
METHB: 0.3 % (ref 0–1.5)
METHB: 0.3 % (ref 0–1.5)
METHB: 0.4 % (ref 0–1.5)
METHB: 0.4 % (ref 0–1.5)
METHB: 0.6 % (ref 0–1.5)
METHB: 0.6 % (ref 0–1.5)
METHB: 0.7 % (ref 0–1.5)
MODE: AC
O2 SATURATION: 82 % (ref 92–98.5)
O2 SATURATION: 92.3 % (ref 92–98.5)
O2 SATURATION: 94 % (ref 92–98.5)
O2 SATURATION: 97.2 % (ref 92–98.5)
O2 SATURATION: 97.7 % (ref 92–98.5)
O2 SATURATION: 98.1 % (ref 92–98.5)
O2 SATURATION: 98.5 % (ref 92–98.5)
O2HB: 81.4 % (ref 94–97)
O2HB: 91.5 % (ref 94–97)
O2HB: 93.1 % (ref 94–97)
O2HB: 96.5 % (ref 94–97)
O2HB: 96.8 % (ref 94–97)
O2HB: 97.5 % (ref 94–97)
O2HB: 98.1 % (ref 94–97)
OPERATOR ID: 1632
OPERATOR ID: 1874
OPERATOR ID: 1926
OPERATOR ID: 7278
OPERATOR ID: 7278
PATHOLOGIST REPORT, TRANSFUSION REACTION: NORMAL
PATIENT TEMP: 37 C
PCO2: 30.3 MMHG (ref 35–45)
PCO2: 35.4 MMHG (ref 35–45)
PCO2: 38.9 MMHG (ref 35–45)
PCO2: 39.7 MMHG (ref 35–45)
PCO2: 40 MMHG (ref 35–45)
PCO2: 42.6 MMHG (ref 35–45)
PCO2: 42.7 MMHG (ref 35–45)
PDW BLD-RTO: 14.7 FL (ref 11.5–15)
PDW BLD-RTO: 14.9 FL (ref 11.5–15)
PEEP/CPAP: 10 CMH2O
PFO2: 0.48 MMHG/%
PFO2: 0.76 MMHG/%
PFO2: 1.07 MMHG/%
PFO2: 1.26 MMHG/%
PFO2: 1.8 MMHG/%
PFO2: 2.21 MMHG/%
PFO2: 2.41 MMHG/%
PH BLOOD GAS: 7.37 (ref 7.35–7.45)
PH BLOOD GAS: 7.42 (ref 7.35–7.45)
PH BLOOD GAS: 7.43 (ref 7.35–7.45)
PH BLOOD GAS: 7.43 (ref 7.35–7.45)
PH BLOOD GAS: 7.46 (ref 7.35–7.45)
PH BLOOD GAS: 7.46 (ref 7.35–7.45)
PH BLOOD GAS: 7.49 (ref 7.35–7.45)
PLATELET # BLD: 44 E9/L (ref 130–450)
PLATELET # BLD: 45 E9/L (ref 130–450)
PLATELET CONFIRMATION: NORMAL
PLATELET CONFIRMATION: NORMAL
PMV BLD AUTO: 12.5 FL (ref 7–12)
PMV BLD AUTO: 12.7 FL (ref 7–12)
PO2: 107 MMHG (ref 75–100)
PO2: 132.6 MMHG (ref 75–100)
PO2: 144.1 MMHG (ref 75–100)
PO2: 241.1 MMHG (ref 75–100)
PO2: 48.1 MMHG (ref 75–100)
PO2: 63.2 MMHG (ref 75–100)
PO2: 76.3 MMHG (ref 75–100)
POTASSIUM SERPL-SCNC: 4.1 MMOL/L (ref 3.5–5)
POTASSIUM SERPL-SCNC: 4.13 MMOL/L (ref 3.5–5)
POTASSIUM SERPL-SCNC: 4.2 MMOL/L (ref 3.5–5)
POTASSIUM SERPL-SCNC: 4.3 MMOL/L (ref 3.5–5)
POTASSIUM SERPL-SCNC: 4.3 MMOL/L (ref 3.5–5)
RBC # BLD: 2.69 E12/L (ref 3.8–5.8)
RBC # BLD: 2.75 E12/L (ref 3.8–5.8)
RI(T): 1.73
RI(T): 1.79
RI(T): 12.42
RI(T): 2.53
RI(T): 3.81
RI(T): 5.03
RI(T): 7.49
RR MECHANICAL: 14 B/MIN
RR MECHANICAL: 22 B/MIN
SODIUM BLD-SCNC: 140 MMOL/L (ref 132–146)
SODIUM BLD-SCNC: 140 MMOL/L (ref 132–146)
SODIUM BLD-SCNC: 141 MMOL/L (ref 132–146)
SODIUM BLD-SCNC: 142 MMOL/L (ref 132–146)
SOURCE, BLOOD GAS: ABNORMAL
THB: 8.3 G/DL (ref 11.5–16.5)
THB: 8.6 G/DL (ref 11.5–16.5)
THB: 8.8 G/DL (ref 11.5–16.5)
THB: 9 G/DL (ref 11.5–16.5)
THB: 9 G/DL (ref 11.5–16.5)
THB: 9.1 G/DL (ref 11.5–16.5)
THB: 9.1 G/DL (ref 11.5–16.5)
TIME ANALYZED: 1440
TIME ANALYZED: 1716
TIME ANALYZED: 2014
TIME ANALYZED: 233
TIME ANALYZED: 556
TIME ANALYZED: 846
TIME ANALYZED: 9
TOTAL PROTEIN: 4.8 G/DL (ref 6.4–8.3)
TOTAL PROTEIN: 4.9 G/DL (ref 6.4–8.3)
VT MECHANICAL: 500 ML
WBC # BLD: 20 E9/L (ref 4.5–11.5)
WBC # BLD: 20.4 E9/L (ref 4.5–11.5)

## 2021-09-30 PROCEDURE — 0BC78ZZ EXTIRPATION OF MATTER FROM LEFT MAIN BRONCHUS, VIA NATURAL OR ARTIFICIAL OPENING ENDOSCOPIC: ICD-10-PCS | Performed by: INTERNAL MEDICINE

## 2021-09-30 PROCEDURE — 0BC38ZZ EXTIRPATION OF MATTER FROM RIGHT MAIN BRONCHUS, VIA NATURAL OR ARTIFICIAL OPENING ENDOSCOPIC: ICD-10-PCS | Performed by: INTERNAL MEDICINE

## 2021-09-30 PROCEDURE — 2500000003 HC RX 250 WO HCPCS: Performed by: NURSE PRACTITIONER

## 2021-09-30 PROCEDURE — 82533 TOTAL CORTISOL: CPT

## 2021-09-30 PROCEDURE — 6360000002 HC RX W HCPCS: Performed by: PHYSICIAN ASSISTANT

## 2021-09-30 PROCEDURE — 2580000003 HC RX 258: Performed by: PHYSICIAN ASSISTANT

## 2021-09-30 PROCEDURE — 0BC88ZZ EXTIRPATION OF MATTER FROM LEFT UPPER LOBE BRONCHUS, VIA NATURAL OR ARTIFICIAL OPENING ENDOSCOPIC: ICD-10-PCS | Performed by: INTERNAL MEDICINE

## 2021-09-30 PROCEDURE — P9047 ALBUMIN (HUMAN), 25%, 50ML: HCPCS | Performed by: NURSE PRACTITIONER

## 2021-09-30 PROCEDURE — 71045 X-RAY EXAM CHEST 1 VIEW: CPT

## 2021-09-30 PROCEDURE — P9047 ALBUMIN (HUMAN), 25%, 50ML: HCPCS | Performed by: THORACIC SURGERY (CARDIOTHORACIC VASCULAR SURGERY)

## 2021-09-30 PROCEDURE — 99222 1ST HOSP IP/OBS MODERATE 55: CPT | Performed by: SURGERY

## 2021-09-30 PROCEDURE — 6360000002 HC RX W HCPCS: Performed by: STUDENT IN AN ORGANIZED HEALTH CARE EDUCATION/TRAINING PROGRAM

## 2021-09-30 PROCEDURE — 2580000003 HC RX 258: Performed by: NURSE PRACTITIONER

## 2021-09-30 PROCEDURE — 87206 SMEAR FLUORESCENT/ACID STAI: CPT

## 2021-09-30 PROCEDURE — 6360000002 HC RX W HCPCS: Performed by: NURSE PRACTITIONER

## 2021-09-30 PROCEDURE — 94645 CONT INHLJ TX EACH ADDL HOUR: CPT

## 2021-09-30 PROCEDURE — 86022 PLATELET ANTIBODIES: CPT

## 2021-09-30 PROCEDURE — 82962 GLUCOSE BLOOD TEST: CPT

## 2021-09-30 PROCEDURE — 80053 COMPREHEN METABOLIC PANEL: CPT

## 2021-09-30 PROCEDURE — 6360000004 HC RX CONTRAST MEDICATION

## 2021-09-30 PROCEDURE — 37799 UNLISTED PX VASCULAR SURGERY: CPT

## 2021-09-30 PROCEDURE — 0B578ZZ DESTRUCTION OF LEFT MAIN BRONCHUS, VIA NATURAL OR ARTIFICIAL OPENING ENDOSCOPIC: ICD-10-PCS | Performed by: INTERNAL MEDICINE

## 2021-09-30 PROCEDURE — 0BCB8ZZ EXTIRPATION OF MATTER FROM LEFT LOWER LOBE BRONCHUS, VIA NATURAL OR ARTIFICIAL OPENING ENDOSCOPIC: ICD-10-PCS | Performed by: INTERNAL MEDICINE

## 2021-09-30 PROCEDURE — 93306 TTE W/DOPPLER COMPLETE: CPT

## 2021-09-30 PROCEDURE — 87205 SMEAR GRAM STAIN: CPT

## 2021-09-30 PROCEDURE — 2000000000 HC ICU R&B

## 2021-09-30 PROCEDURE — 94003 VENT MGMT INPAT SUBQ DAY: CPT

## 2021-09-30 PROCEDURE — 6370000000 HC RX 637 (ALT 250 FOR IP): Performed by: NURSE PRACTITIONER

## 2021-09-30 PROCEDURE — 0BC68ZZ EXTIRPATION OF MATTER FROM RIGHT LOWER LOBE BRONCHUS, VIA NATURAL OR ARTIFICIAL OPENING ENDOSCOPIC: ICD-10-PCS | Performed by: INTERNAL MEDICINE

## 2021-09-30 PROCEDURE — 83605 ASSAY OF LACTIC ACID: CPT

## 2021-09-30 PROCEDURE — 99223 1ST HOSP IP/OBS HIGH 75: CPT | Performed by: INTERNAL MEDICINE

## 2021-09-30 PROCEDURE — 02HV33Z INSERTION OF INFUSION DEVICE INTO SUPERIOR VENA CAVA, PERCUTANEOUS APPROACH: ICD-10-PCS | Performed by: THORACIC SURGERY (CARDIOTHORACIC VASCULAR SURGERY)

## 2021-09-30 PROCEDURE — 82805 BLOOD GASES W/O2 SATURATION: CPT

## 2021-09-30 PROCEDURE — 3609027000 HC BRONCHOSCOPY: Performed by: INTERNAL MEDICINE

## 2021-09-30 PROCEDURE — 89051 BODY FLUID CELL COUNT: CPT

## 2021-09-30 PROCEDURE — C9113 INJ PANTOPRAZOLE SODIUM, VIA: HCPCS | Performed by: NURSE PRACTITIONER

## 2021-09-30 PROCEDURE — 87102 FUNGUS ISOLATION CULTURE: CPT

## 2021-09-30 PROCEDURE — 99291 CRITICAL CARE FIRST HOUR: CPT | Performed by: NURSE PRACTITIONER

## 2021-09-30 PROCEDURE — 2580000003 HC RX 258: Performed by: THORACIC SURGERY (CARDIOTHORACIC VASCULAR SURGERY)

## 2021-09-30 PROCEDURE — 84132 ASSAY OF SERUM POTASSIUM: CPT

## 2021-09-30 PROCEDURE — 2709999900 HC NON-CHARGEABLE SUPPLY: Performed by: INTERNAL MEDICINE

## 2021-09-30 PROCEDURE — 94640 AIRWAY INHALATION TREATMENT: CPT

## 2021-09-30 PROCEDURE — 2500000003 HC RX 250 WO HCPCS: Performed by: PHYSICIAN ASSISTANT

## 2021-09-30 PROCEDURE — 6370000000 HC RX 637 (ALT 250 FOR IP): Performed by: PHYSICIAN ASSISTANT

## 2021-09-30 PROCEDURE — 2580000003 HC RX 258: Performed by: STUDENT IN AN ORGANIZED HEALTH CARE EDUCATION/TRAINING PROGRAM

## 2021-09-30 PROCEDURE — 3609011400 HC BRONCHOSCOPY CRYOTHERAPY: Performed by: INTERNAL MEDICINE

## 2021-09-30 PROCEDURE — 80048 BASIC METABOLIC PNL TOTAL CA: CPT

## 2021-09-30 PROCEDURE — 6360000004 HC RX CONTRAST MEDICATION: Performed by: THORACIC SURGERY (CARDIOTHORACIC VASCULAR SURGERY)

## 2021-09-30 PROCEDURE — 87070 CULTURE OTHR SPECIMN AEROBIC: CPT

## 2021-09-30 PROCEDURE — 6360000002 HC RX W HCPCS: Performed by: THORACIC SURGERY (CARDIOTHORACIC VASCULAR SURGERY)

## 2021-09-30 PROCEDURE — 0BC48ZZ EXTIRPATION OF MATTER FROM RIGHT UPPER LOBE BRONCHUS, VIA NATURAL OR ARTIFICIAL OPENING ENDOSCOPIC: ICD-10-PCS | Performed by: INTERNAL MEDICINE

## 2021-09-30 PROCEDURE — 0BC58ZZ EXTIRPATION OF MATTER FROM RIGHT MIDDLE LOBE BRONCHUS, VIA NATURAL OR ARTIFICIAL OPENING ENDOSCOPIC: ICD-10-PCS | Performed by: INTERNAL MEDICINE

## 2021-09-30 PROCEDURE — 36415 COLL VENOUS BLD VENIPUNCTURE: CPT

## 2021-09-30 PROCEDURE — 85027 COMPLETE CBC AUTOMATED: CPT

## 2021-09-30 RX ORDER — ALBUMIN (HUMAN) 12.5 G/50ML
SOLUTION INTRAVENOUS
Status: DISPENSED
Start: 2021-09-30 | End: 2021-10-01

## 2021-09-30 RX ORDER — ALBUMIN (HUMAN) 12.5 G/50ML
50 SOLUTION INTRAVENOUS ONCE
Status: COMPLETED | OUTPATIENT
Start: 2021-09-30 | End: 2021-09-30

## 2021-09-30 RX ORDER — ALBUMIN (HUMAN) 12.5 G/50ML
25 SOLUTION INTRAVENOUS ONCE
Status: COMPLETED | OUTPATIENT
Start: 2021-09-30 | End: 2021-09-30

## 2021-09-30 RX ADMIN — HYDROCORTISONE SODIUM SUCCINATE 100 MG: 100 INJECTION, POWDER, FOR SOLUTION INTRAMUSCULAR; INTRAVENOUS at 06:54

## 2021-09-30 RX ADMIN — EPOPROSTENOL 50 NG/KG/MIN: 1.5 INJECTION, POWDER, LYOPHILIZED, FOR SOLUTION INTRAVENOUS at 21:40

## 2021-09-30 RX ADMIN — EPOPROSTENOL 50 NG/KG/MIN: 1.5 INJECTION, POWDER, LYOPHILIZED, FOR SOLUTION INTRAVENOUS at 07:49

## 2021-09-30 RX ADMIN — MIDAZOLAM 4 MG/HR: 5 INJECTION, SOLUTION INTRAMUSCULAR; INTRAVENOUS at 10:59

## 2021-09-30 RX ADMIN — MUPIROCIN: 20 OINTMENT TOPICAL at 22:35

## 2021-09-30 RX ADMIN — Medication 10 ML: at 08:23

## 2021-09-30 RX ADMIN — HYDROCORTISONE SODIUM SUCCINATE 100 MG: 100 INJECTION, POWDER, FOR SOLUTION INTRAMUSCULAR; INTRAVENOUS at 13:42

## 2021-09-30 RX ADMIN — DOCUSATE SODIUM 100 MG: 50 LIQUID ORAL at 08:22

## 2021-09-30 RX ADMIN — SODIUM CHLORIDE, PRESERVATIVE FREE 10 ML: 5 INJECTION INTRAVENOUS at 08:22

## 2021-09-30 RX ADMIN — EPOPROSTENOL 50 NG/KG/MIN: 1.5 INJECTION, POWDER, LYOPHILIZED, FOR SOLUTION INTRAVENOUS at 01:08

## 2021-09-30 RX ADMIN — ASPIRIN 81 MG CHEWABLE TABLET 81 MG: 81 TABLET CHEWABLE at 08:21

## 2021-09-30 RX ADMIN — CHLORHEXIDINE GLUCONATE 15 ML: 1.2 RINSE ORAL at 22:35

## 2021-09-30 RX ADMIN — Medication 10 ML: at 22:34

## 2021-09-30 RX ADMIN — HYDROCORTISONE SODIUM SUCCINATE 100 MG: 100 INJECTION, POWDER, FOR SOLUTION INTRAMUSCULAR; INTRAVENOUS at 22:38

## 2021-09-30 RX ADMIN — SODIUM CHLORIDE 30 ML/HR: 9 INJECTION, SOLUTION INTRAVENOUS at 14:32

## 2021-09-30 RX ADMIN — ATORVASTATIN CALCIUM 40 MG: 40 TABLET, FILM COATED ORAL at 22:34

## 2021-09-30 RX ADMIN — IPRATROPIUM BROMIDE AND ALBUTEROL SULFATE 1 AMPULE: .5; 2.5 SOLUTION RESPIRATORY (INHALATION) at 07:51

## 2021-09-30 RX ADMIN — INSULIN LISPRO 3 UNITS: 100 INJECTION, SOLUTION INTRAVENOUS; SUBCUTANEOUS at 22:38

## 2021-09-30 RX ADMIN — CISATRACURIUM BESYLATE 3 MCG/KG/MIN: 200 INJECTION INTRAVENOUS at 01:37

## 2021-09-30 RX ADMIN — ALBUMIN (HUMAN) 50 G: 0.25 INJECTION, SOLUTION INTRAVENOUS at 06:53

## 2021-09-30 RX ADMIN — ALBUMIN (HUMAN) 25 G: 0.25 INJECTION, SOLUTION INTRAVENOUS at 18:25

## 2021-09-30 RX ADMIN — Medication 400 MG: at 08:21

## 2021-09-30 RX ADMIN — CHLORHEXIDINE GLUCONATE 15 ML: 1.2 RINSE ORAL at 08:23

## 2021-09-30 RX ADMIN — SENNOSIDES 5 ML: 8.8 SYRUP ORAL at 22:35

## 2021-09-30 RX ADMIN — DOCUSATE SODIUM 100 MG: 50 LIQUID ORAL at 22:35

## 2021-09-30 RX ADMIN — INSULIN LISPRO 3 UNITS: 100 INJECTION, SOLUTION INTRAVENOUS; SUBCUTANEOUS at 09:01

## 2021-09-30 RX ADMIN — AMIODARONE HYDROCHLORIDE 0.5 MG/MIN: 50 INJECTION, SOLUTION INTRAVENOUS at 12:58

## 2021-09-30 RX ADMIN — EPOPROSTENOL 50 NG/KG/MIN: 1.5 INJECTION, POWDER, LYOPHILIZED, FOR SOLUTION INTRAVENOUS at 14:32

## 2021-09-30 RX ADMIN — PERFLUTREN 0.32 MG: 6.52 INJECTION, SUSPENSION INTRAVENOUS at 12:54

## 2021-09-30 RX ADMIN — MUPIROCIN: 20 OINTMENT TOPICAL at 08:22

## 2021-09-30 RX ADMIN — PIPERACILLIN AND TAZOBACTAM 3375 MG: 3; .375 INJECTION, POWDER, LYOPHILIZED, FOR SOLUTION INTRAVENOUS at 01:18

## 2021-09-30 RX ADMIN — IPRATROPIUM BROMIDE AND ALBUTEROL SULFATE 1 AMPULE: .5; 2.5 SOLUTION RESPIRATORY (INHALATION) at 19:55

## 2021-09-30 RX ADMIN — SENNOSIDES 5 ML: 8.8 SYRUP ORAL at 08:23

## 2021-09-30 RX ADMIN — IPRATROPIUM BROMIDE AND ALBUTEROL SULFATE 1 AMPULE: .5; 2.5 SOLUTION RESPIRATORY (INHALATION) at 16:08

## 2021-09-30 RX ADMIN — CALCIUM GLUCONATE 1000 MG: 98 INJECTION, SOLUTION INTRAVENOUS at 07:16

## 2021-09-30 RX ADMIN — PIPERACILLIN AND TAZOBACTAM 3375 MG: 3; .375 INJECTION, POWDER, LYOPHILIZED, FOR SOLUTION INTRAVENOUS at 17:41

## 2021-09-30 RX ADMIN — INSULIN LISPRO 3 UNITS: 100 INJECTION, SOLUTION INTRAVENOUS; SUBCUTANEOUS at 05:45

## 2021-09-30 RX ADMIN — DEXTROSE MONOHYDRATE 12.5 G: 25 INJECTION, SOLUTION INTRAVENOUS at 17:28

## 2021-09-30 RX ADMIN — CISATRACURIUM BESYLATE 3 MCG/KG/MIN: 200 INJECTION INTRAVENOUS at 12:59

## 2021-09-30 RX ADMIN — SODIUM CHLORIDE, PRESERVATIVE FREE 300 UNITS: 5 INJECTION INTRAVENOUS at 08:22

## 2021-09-30 RX ADMIN — PANTOPRAZOLE SODIUM 40 MG: 40 INJECTION, POWDER, FOR SOLUTION INTRAVENOUS at 08:22

## 2021-09-30 RX ADMIN — IPRATROPIUM BROMIDE AND ALBUTEROL SULFATE 1 AMPULE: .5; 2.5 SOLUTION RESPIRATORY (INHALATION) at 11:40

## 2021-09-30 RX ADMIN — PIPERACILLIN AND TAZOBACTAM 3375 MG: 3; .375 INJECTION, POWDER, LYOPHILIZED, FOR SOLUTION INTRAVENOUS at 08:42

## 2021-09-30 ASSESSMENT — PAIN SCALES - GENERAL
PAINLEVEL_OUTOF10: 0

## 2021-09-30 ASSESSMENT — PULMONARY FUNCTION TESTS
PIF_VALUE: 26
PIF_VALUE: 40
PIF_VALUE: 30
PIF_VALUE: 27
PIF_VALUE: 30
PIF_VALUE: 29
PIF_VALUE: 29
PIF_VALUE: 25
PIF_VALUE: 25
PIF_VALUE: 26
PIF_VALUE: 30
PIF_VALUE: 26
PIF_VALUE: 27
PIF_VALUE: 24
PIF_VALUE: 26
PIF_VALUE: 29
PIF_VALUE: 24
PIF_VALUE: 29
PIF_VALUE: 26
PIF_VALUE: 30
PIF_VALUE: 28
PIF_VALUE: 26
PIF_VALUE: 25
PIF_VALUE: 25
PIF_VALUE: 29
PIF_VALUE: 29
PIF_VALUE: 25
PIF_VALUE: 29
PIF_VALUE: 28
PIF_VALUE: 24
PIF_VALUE: 29
PIF_VALUE: 30
PIF_VALUE: 31
PIF_VALUE: 29
PIF_VALUE: 25
PIF_VALUE: 25
PIF_VALUE: 29
PIF_VALUE: 24
PIF_VALUE: 29
PIF_VALUE: 25
PIF_VALUE: 40
PIF_VALUE: 30
PIF_VALUE: 30
PIF_VALUE: 26
PIF_VALUE: 27
PIF_VALUE: 29
PIF_VALUE: 30
PIF_VALUE: 27

## 2021-09-30 NOTE — PROGRESS NOTES
Bronchoscopy complete, pictures taken and sample of aspirate taken as well. Patient beginning to normalize, BP HR and SPO2 becoming more stable.

## 2021-09-30 NOTE — PROGRESS NOTES
Patient adequately paralyzed. Blood pressure improved, SPO2 now at 93% on 60% FIO2. Patient remains tachycardic, will continue to monitor.

## 2021-09-30 NOTE — PROGRESS NOTES
Patient noted to be breathing 17 breaths per minute, respiratory at the bedside. Patient became tachycardic and SPO2 continued to decrease. BP at this time is stable. Sallie NP notified and at the bedside. Aware that Nimbex was increased as well as versed, this action okay. FIO2 increased from 80% to 100%.

## 2021-09-30 NOTE — PLAN OF CARE
Problem: Falls - Risk of:  Goal: Will remain free from falls  Description: Will remain free from falls  Outcome: Met This Shift  Goal: Absence of physical injury  Description: Absence of physical injury  Outcome: Met This Shift     Problem:  Activity Intolerance:  Goal: Able to perform prescribed physical activity  Description: Able to perform prescribed physical activity  Outcome: Not Met This Shift  Goal: Ability to tolerate increased activity will improve  Description: Ability to tolerate increased activity will improve  Outcome: Not Met This Shift     Problem: Anxiety:  Goal: Level of anxiety will decrease  Description: Level of anxiety will decrease  Outcome: Met This Shift     Problem: Cardiac Output - Decreased:  Goal: Cardiac output within specified parameters  Description: Cardiac output within specified parameters  5/20/2594 0286 by Issa Willis RN  Outcome: Ongoing  9/29/2021 2147 by Ca Gomez RN  Outcome: Ongoing  Goal: Hemodynamic stability will improve  Description: Hemodynamic stability will improve  4/68/6110 5642 by Issa Willis RN  Outcome: Ongoing  9/29/2021 2147 by Ca Gomez RN  Outcome: Ongoing     Problem: Fluid Volume - Imbalance:  Goal: Ability to achieve a balanced intake and output will improve  Description: Ability to achieve a balanced intake and output will improve  Outcome: Ongoing  Goal: Chest tube drainage is within specified parameters  Description: Chest tube drainage is within specified parameters  2/69/6312 7629 by Issa Willis RN  Outcome: Met This Shift  9/29/2021 2147 by Ca Gomez RN  Outcome: Ongoing     Problem: Tissue Perfusion - Cardiopulmonary, Altered:  Goal: Absence of angina  Description: Absence of angina  Outcome: Met This Shift  Goal: Hemodynamic stability will improve  Description: Hemodynamic stability will improve  4/67/7832 8672 by Issa Willis RN  Outcome: Not Met This Shift  9/29/2021 2147 by Ca Gomez RN  Outcome: Ongoing  Goal: Will show no evidence of cardiac arrhythmias  Description: Will show no evidence of cardiac arrhythmias  6/63/3665 7329 by Sandy Hayward RN  Outcome: Met This Shift  9/29/2021 2147 by Ronnie Salazar RN  Outcome: Ongoing

## 2021-09-30 NOTE — CONSULTS
Department of Internal Medicine  Nephrology Attending Consult Note      Reason for Consult: Increasing creatinine levels  Requesting Physician:  Nathanael WAGGONER    CHIEF COMPLAINT:      History Obtained From:  electronic medical record    HISTORY OF PRESENT ILLNESS: Briefly Mr. Shawn Weldon is a 68-year-old man with history of HTN, CAD with recent status cardiac catheterization done on September 9 which showed severe multivessel disease, hyperlipidemia, hiatal hernia, who was admitted for elective CABG on September 27, 2021. On admission his creatinine level was 0.8 mg/dL and post surgery his creatinine has progressively increased up to 2.3 mg/dL, reason for this consultation. Postoperatively she developed persistent hypotension, lactic acidosis and respiratory failure for which he was reintubated. Since then she has required multiple pressors and he was placed on Aortic balloon pump. He had received 1 dose of ketorolac perioperatively. His urine output has significantly decrease and is being about 500 cc/day in the last 48 hours and his fluid balance presently is over 9 L.         Past Medical History:        Diagnosis Date    CAD (coronary artery disease)     Hiatal hernia     Hyperlipidemia     Hypertension      Past Surgical History:        Procedure Laterality Date    CORONARY ARTERY BYPASS GRAFT N/A 9/27/2021    CABG CORONARY ARTERY BYPASS  X 3 EVH, EMMA performed by Sadiq Mckeon MD at [address] [place] [address]     Current Medications:    Current Facility-Administered Medications: perflutren lipid microspheres (DEFINITY) injection 1.65 mg, 1.5 mL, IntraVENous, ONCE PRN  0.9 % sodium chloride infusion, , IntraVENous, PRN  lidocaine PF 1 % injection 5 mL, 5 mL, IntraDERmal, Once  sodium chloride flush 0.9 % injection 5-40 mL, 5-40 mL, IntraVENous, 2 times per day  sodium chloride flush 0.9 % injection 5-40 mL, 5-40 mL, IntraVENous, PRN  0.9 % sodium chloride infusion, 25 mL, IntraVENous, PRN  heparin flush 100 UNIT/ML injection 300 Units, 3 mL, IntraVENous, 2 times per day  heparin flush 100 UNIT/ML injection 300 Units, 3 mL, IntraCATHeter, PRN  rocuronium (ZEMURON) injection 43 mg, 0.6 mg/kg, IntraVENous, Once  cisatracurium besylate (NIMBEX) 200 mg in sodium chloride 0.9 % 100 mL infusion, 0.5-10 mcg/kg/min, IntraVENous, Continuous  piperacillin-tazobactam (ZOSYN) 3,375 mg in dextrose 5 % 100 mL IVPB extended infusion (mini-bag), 3,375 mg, IntraVENous, Q8H  midazolam (VERSED) 100 mg in dextrose 5 % 100 mL infusion, 1-10 mg/hr, IntraVENous, Continuous  fentaNYL (SUBLIMAZE) injection 12.5 mcg, 12.5 mcg, IntraVENous, Q1H PRN  DOBUTamine (DOBUTREX) 1000 mg in dextrose 5 % 250 mL infusion, 7.5 mcg/kg/min, IntraVENous, Continuous  epoprostenol 1,500 mcg in sodium chloride 0.9 % 50 mL nebulization solution, 50 ng/kg/min (Ideal), Nebulization, Continuous  hydrocortisone sodium succinate PF (SOLU-CORTEF) injection 100 mg, 100 mg, IntraVENous, Q8H  pantoprazole (PROTONIX) injection 40 mg, 40 mg, IntraVENous, Daily **AND** sodium chloride (PF) 0.9 % injection 10 mL, 10 mL, IntraVENous, Daily  aspirin chewable tablet 81 mg, 81 mg, Oral, Daily  lubrifresh P.M. (artificial tears) ophthalmic ointment, , Both Eyes, PRN  chlorhexidine (PERIDEX) 0.12 % solution 15 mL, 15 mL, Mouth/Throat, BID  docusate sodium (COLACE) 150 MG/15ML liquid 100 mg, 100 mg, Oral, BID  sennosides (SENOKOT) 8.8 MG/5ML syrup 5 mL, 5 mL, Oral, BID  insulin lispro (HUMALOG) injection vial 0-3 Units, 0-3 Units, SubCUTAneous, Q4H  atorvastatin (LIPITOR) tablet 40 mg, 40 mg, Oral, Daily  0.9 % sodium chloride infusion, 30 mL/hr, IntraVENous, Continuous  ondansetron (ZOFRAN) injection 4 mg, 4 mg, IntraVENous, Q8H PRN  acetaminophen (TYLENOL) tablet 650 mg, 650 mg, Oral, Q4H PRN  acetaminophen (TYLENOL) suppository 650 mg, 650 mg, Rectal, Q4H PRN  oxyCODONE (ROXICODONE) immediate release tablet 5 mg, 5 mg, Oral, Q4H PRN **OR** oxyCODONE HCl (OXY-IR) immediate release tablet 10 mg, 10 mg, Oral, Q4H PRN  magnesium oxide (MAG-OX) tablet 400 mg, 400 mg, Oral, Daily  mupirocin (BACTROBAN) 2 % ointment, , Nasal, BID  magnesium hydroxide (MILK OF MAGNESIA) 400 MG/5ML suspension 30 mL, 30 mL, Oral, Daily PRN  potassium chloride 20 mEq/50 mL IVPB (Central Line), 20 mEq, IntraVENous, PRN  magnesium sulfate 2000 mg in 50 mL IVPB premix, 2,000 mg, IntraVENous, PRN  ipratropium-albuterol (DUONEB) nebulizer solution 1 ampule, 1 ampule, Inhalation, Q4H [place]  albumin human 5 % IV solution 25 g, 25 g, IntraVENous, PRN  0.9 % sodium chloride bolus, 250 mL, IntraVENous, Continuous PRN  norepinephrine (LEVOPHED) 16 mg in dextrose 5% 250 mL infusion, 2 mcg/min, IntraVENous, Continuous PRN  insulin regular (HUMULIN R;NOVOLIN R) 100 Units in sodium chloride 0.9 % 100 mL infusion, 1 Units/hr, IntraVENous, Continuous  glucose (GLUTOSE) 40 % oral gel 15 g, 15 g, Oral, PRN  dextrose 50 % IV solution, 12.5 g, IntraVENous, PRN  glucagon (rDNA) injection 1 mg, 1 mg, IntraMUSCular, PRN  dextrose 5 % solution, 100 mL/hr, IntraVENous, PRN  nitroPRUSSide (NIPRIDE) 50 mg in dextrose 5 % 250 mL infusion, 0.1-3 mcg/kg/min, IntraVENous, Continuous PRN  calcium gluconate 1,000 mg in dextrose 5 % 100 mL IVPB, 1,000 mg, IntraVENous, PRN  [Held by provider] clopidogrel (PLAVIX) tablet 75 mg, 75 mg, Oral, Daily  [Held by provider] tamsulosin (FLOMAX) capsule 0.4 mg, 0.4 mg, Oral, Daily  vasopressin 20 Units in dextrose 5 % 100 mL infusion, 0.01-0.1 Units/min, IntraVENous, Continuous  amiodarone (CORDARONE) 450 mg in dextrose 5 % 250 mL infusion, 0.5 mg/min, IntraVENous, Continuous  EPINEPHrine (EPINEPHrine HCL) 5 mg in dextrose 5 % 250 mL infusion, 3 mcg/min, IntraVENous, Continuous  Allergies:  Dust mite extract    Social History:    TOBACCO:   reports that he has never smoked. He has never used smokeless tobacco.  ETOH:   reports current alcohol use. Family History:   No family history on file.   REVIEW OF SYSTEMS:    Review of systems not obtained due to patient factors - intubation  PHYSICAL EXAM:      Vitals:    VITALS:  BP (!) 131/48   Pulse 112   Temp 99 °F (37.2 °C) (Axillary)   Resp 14   Ht 5' 8\" (1.727 m)   Wt 158 lb 11.7 oz (72 kg)   SpO2 100%   BMI 24.14 kg/m²   24HR INTAKE/OUTPUT:    Intake/Output Summary (Last 24 hours) at 9/30/2021 1027  Last data filed at 9/30/2021 1000  Gross per 24 hour   Intake 3180.36 ml   Output 1084 ml   Net 2096.36 ml       Constitutional: Patient is intubated, sedated  HEENT: Pupils are equal reactive, endotracheal tube in place  Respiratory: Decreased breath sounds at the bases  Cardiovascular/Edema: Heart sounds are regular  Gastrointestinal: Abdomen soft  Neurologic: Patient sedated  Other: Trace edema      DATA:    CBC:   Lab Results   Component Value Date    WBC 20.0 09/30/2021    RBC 2.75 09/30/2021    HGB 8.2 09/30/2021    HCT 24.8 09/30/2021    MCV 90.2 09/30/2021    MCH 29.8 09/30/2021    MCHC 33.1 09/30/2021    RDW 14.7 09/30/2021    PLT 44 09/30/2021    MPV 12.7 09/30/2021     CMP:    Lab Results   Component Value Date     09/30/2021    K 4.2 09/30/2021     09/30/2021    CO2 28 09/30/2021    BUN 45 09/30/2021    CREATININE 2.2 09/30/2021    GFRAA 36 09/30/2021    LABGLOM 29 09/30/2021    GLUCOSE 160 09/30/2021    PROT 4.8 09/30/2021    LABALBU 3.8 09/30/2021    CALCIUM 7.8 09/30/2021    BILITOT 1.5 09/30/2021    ALKPHOS 46 09/30/2021     09/30/2021    ALT 44 09/30/2021     Magnesium:    Lab Results   Component Value Date    MG 1.8 09/29/2021     Phosphorus:  No results found for: PHOS     Radiology Review:      CXR 9/30/2021   1. No interval change in the right perihilar airspace disease.    2. Stable position of the support lines and tubes.  There is no pneumothorax.             IMPRESSION/RECOMMENDATIONS:      Briefly Mr. Fatmata Altman is a 81-year-old man with history of HTN, CAD with recent status cardiac catheterization done on September 9 which showed severe multivessel disease, hyperlipidemia, hiatal hernia, who was admitted for elective CABG on September 27, 2021. On admission his creatinine level was 0.8 mg/dL and post surgery his creatinine has progressively increased up to 2.3 mg/dL, reason for this consultation. Postoperatively she developed persistent hypotension, lactic acidosis and respiratory failure for which he was reintubated. Since then she has required multiple pressors and he was placed on IABP. He had received 1 dose of ketorolac perioperatively. His urine output has significantly decrease and is being about 500 cc/day in the last 48 hours and his fluid balance presently is over 9 L. NGUYỄN stage III (by urine output and probably by creatinine level), CABG associated NGUYỄN most likely ischemic ATN, oliguric. Presently with significant positive fluid balance. Although no indications for dialysis today (no hyperkalemia, no metabolic acidosis) most likely he will require CRRT in the next 24 to 48 hours. Creatinine level may not be a good indicator of renal function at this time in view of large positive fluid balance. Elevated bicarbonate level, due to recent bicarb administration the presence of NGUYỄN  Cardiogenic shock, multiple pressors and IABP  ----------------------------------------------------  Respiratory failure status post intubation  CAD status post CABG x3 September 27, 2021  Probably pneumonia, on piperacillin-tazobactam  Normocytic anemia  Nutrition, n.p.o.    Plan:    Monitor renal function for recovery  No strong indication for dialysis today  Likely need of continuous renal replacement therapy in the next 24 to 48 hours    Thank you very much Juhi Delacruz for allowing us to participate in the care of Mr. Reyes.

## 2021-09-30 NOTE — OP NOTE
[id number] [employer] with Cryotherapy  PROCEDURE NOTE    DATE OF PROCEDURE: 9/30 / 2021    INDICATIONS & HISTORY:  Mucus plugs ,multiple mucus plugs     PREOPERATIVE DIAGNOSIS:    CABG   POSTOPERATIVE DIAGNOSES:  Large mucus plugs       PROCEDURE PERFORMED:   1-Diagnotic, Fiberoptic Bronchoscopy  2-Removal of Large mucus plugs left and right,Large mucus plug in left main broncgus and multiple mucus plugs in the right   3- use cryotherapy rto break Left main bronchus mucus plug and suction multiple others right side            SURGEON:    Ramona Land     ASSISTANT:   Bronchoscopy Nursing/Technical Team/OR Team    SEDATION:  propofol   Regional Anesthesia,       ANESTHESIA:    Lidocaine 2% ,       ANESTHESIOLOGIST:  Per EPIC Note    SPECIMENS:  [x] Bronchial sample(s) for      Fungal smear & culture,   Acid-fast bacillus Smear and Culture,    Gram stain, C&S,    PCP               Cytology            Description of Procedure: The patient was taken to the endoscopy suite, [facility]  and the procedure verified as Flexible Fiberoptic Bronchoscopy with Cryotherapy         After the patient was controlled with sedation bronchoscope was introduced through ET * without difficulty. The scope was then passed into the trachea. Additional 2% lidocaine was used topically within the airway. Careful inspection of the tracheal lumen was accomplished. The scope was sequentially passed into all bronchial airways.            Endobronchial findings:     Trachea:  Normal mucosa, he the part seen outside the ET tube  Aurea  Normal mucosa     Right Main Stem Bronchus large mucus plugs obstructing about 50-60 % of RMB    Right Upper Lobe Bronchi mucus plugs ,  Right Middle Lobe Bronchi  Normal mucosa mucus plugs ,  Right Lower Lobe Bronchi (including the Superior segment)  Normal mucosa mucus plugs ,     Left Main Stem Bronchus Normal mucosa ,large mucus plug obstructing 80 %   Left Upper Lobe Bronchus, Upper Division ,mucus plugs ,thik secretions cleaned from mucus plugs   Left Upper Lobe Bronchus, Lingula  Normal mucosa,thick secretions   Left Lower Lobe Bronchus (including the Superior segment)   mucus plugs thick secretions           Washing RLL    COMPLICATIONS:  Estimated blood loss less than **CC    IMPRESSIONS:   Bilateral ,left more than right mucus plugs obstructing almost all segments partially or completely     RECOMMENDATIONS:   The Patient was taken to the recovery area in satisfactory condition. Await final test/sample results.         Shauna Morales MD

## 2021-09-30 NOTE — PROGRESS NOTES
CVICU Progress Note    Name: Mia Goss  MRN: [id number]    CC: Postoperative Critical Care Management     Indication for Surgery/Procedure: CAD  LVEF:  NML    RVF:  NML     Important/Relevant PMH/PSH:   LAD 2007 cath with impaired LVEF, HTN, HLD, LBBB, pulmonary nodules      Procedure/Surgeries: 9/27/2021 Sternotomy/CABG x 3 (LIMA-LAD, SVG-OM, SVG-RCA)/Extensive RCA endarterectomy/MALU exclusion with 35 mm AtriClip/EVH LLE/Rigid internal fixation of the sternum with [place] plates x 5/81 modifier     Pacing wires: Atrial and Ventricular        Intake/Output Summary (Last 24 hours) at 9/30/2021 1010  Last data filed at 9/30/2021 0900  Gross per 24 hour   Intake 3180.36 ml   Output 1069 ml   Net 2111.36 ml       Recent Labs     09/29/21  0400 09/29/21  1600 09/30/21  0415   WBC 17.8* 18.0* 20.0*   HGB 8.1* 8.3* 8.2*   HCT 23.8* 24.8* 24.8*   PLT 43* 41* 44*      Lab Results   Component Value Date     09/30/2021    K 4.2 09/30/2021     09/30/2021    CO2 28 09/30/2021    BUN 45 09/30/2021    CREATININE 2.2 09/30/2021    GLUCOSE 160 09/30/2021    CALCIUM 7.8 09/30/2021         Physical Exam:    BP (!) 131/48   Pulse 112   Temp 99 °F (37.2 °C) (Axillary)   Resp 14   Ht 5' 8\" (1.727 m)   Wt 158 lb 11.7 oz (72 kg)   SpO2 100%   BMI 24.14 kg/m²       CXR Findings: 9/30/2021      Impression:     1. No interval change in the right perihilar airspace disease. 2. Stable position of the support lines and tubes.  There is no pneumothorax. ---  CXR personally viewed and interpreted by ICU Nurse Practitioner, agree with above findings     General: Critically ill on full vent support, IABP, inotropics   Eyes: PERRL, anicteric   Pulmonary: Diminished bibasilar.  No wheezes, no accessory muscle use noted   Ventilator: Mode: AC/VC, 100 FiO2, 10 PEEP, 500 Vt, inhaled epoprostenol   Cardiovascular:  RRR, no heaves or thrills on palpation  Tele: SR  Abdomen: Soft, OG to LIWS  Extremities: BLE with DP/PT signals  Neurologic/Psych: Sedated, NMBA  Skin: Warm and dry  Incisions: MSI with cheng dressing intact, LLE SVG sites well approximated, left femoral IABP C/D/I       Lines:   ETT 9/28  Right femoral TLC 9/28  Left femoral IABP 9/28  Right IJ 9/27  Right brachial Arterial line 9/27  Ybarra 9/27      Assessment/Plan: POD #3     1. CAD S/p CABGx3 (LIMA-LAD, SVG-OM, SVG-RCA)/Extensive RCA endarterectomy/MALU AtriClip  - ASA (Hold Plavix), Lipitor   - Chest tube to sxn  - Amio gtt for afib prophylaxis   - PICC LUE 09/29/2021     2. Acute Hypoxic Respiratory Failure  - Extubated DOS, tolerating 2L NC post extubation, acute hypoxia requiring NRB, NIV and reintubation 9/28  - NMBA and iFlolan started 9/28   -Intermittently with episodes of acute hypoxia, this morning sats acutely dropped into 70-80s. Placed back on 100%  -RV contributing? Repeat echo today with bubble study   -TOF 1/4, BIS ~40   -On full vent support at 100% Fio2, 10 peep  -Empiric Zosyn   -systemic steroids   -CXR improved from yesterday   -slowly wean vent settings as tolerated   -consider pulmonary consult      3. Cardiogenic Shock   - post CPB required levophed and vasopressin and ultimately IV B12 intraop, postop ICU stable on Norepi 5mcg; acute decompensation with profound hypotension required escalation of vasopressors, given Vit B12 and Methylene Blue, started on epinephrine infusion and given stress steroids   - POD1: STAT Echo with akinesis of apical inferior and septal walls, EF 45-50%; Severe RV dysfunction, Moderate MR; IABP placed at bedside, 1:1 with improvement in hemodynamics, levophed weaned off.  Dobutamine @5mcg, Epi @ 3mcg, vaso weaned to 0.02 units, inhaled flolan   - POD2: IABP placed 1:2, dobutamine increased to 7.5mcg to facilitate weaning vasopressin, epinephrine as able   - Tbili down to 0.9, AST/ALT mildly elevated (reactive), lactic 5.3 trending down  -POD3 off pressors, on 7.5 Dobutamine and 3 epi, IABP 1:2, lactic normal. Repeat echo today      4. Lactic Acidosis  - Worsening lactic night of surgery, peaked at 13.5 in setting of Cardiogenic shock  -LA normal      5. NGUYỄN   - 2/2 above  - Baseline Scr 0.8  - Scr up to 2.2 in setting of critical illness, albumin prn -supportive care. nephrology consult      6. Acute Post Operative Pain   - PRN fentanyl for pain management, versed gtt for sedation      7. Stress Hyperglycemia  - No h/o DM  - RHI infusion for BG >180 per protocol  while on inopressor support      8. Thrombocytopenia  - Plts downtrending 44K, likely consumptive, no s/s of bleeding. Consider HIT if plts dont improve      9. Acute blood loss anemia  - H/H 8.2/24  - monitor and transfuse if needed to maintain Hgb >8      10. Leukocytosis  - WBC 20K, likely reactive, also on steriods, will send BCx, afebrile   - On empiric Zosyn        Wife updated at bedside       VTE Prophylaxis: Pharmacologic/Mechanical: Yes, SCDs   Line infection prevention: Can CVC or arterial line be removed: No  Continued need for urinary catheter: Yes - clinical indication: Patient post major surgery requiring fluid balance and input and output measurement. This patient has a high probability of sudden deterioration, which requires preparedness to urgently intervene. I participated in the decision making process and managed the direct care of the patient that required frequent assessment and treatment. I personally spent 45 minutes of critical care time treating the patient.        Dispo: CVICU, critically ill, supportive care    Electronically signed by JUAN MIGUEL Draper CNP on 9/30/2021 at 10:10 AM

## 2021-09-30 NOTE — PLAN OF CARE
Problem: Cardiac Output - Decreased:  Goal: Cardiac output within specified parameters  Description: Cardiac output within specified parameters  Outcome: Ongoing     Problem: Cardiac Output - Decreased:  Goal: Hemodynamic stability will improve  Description: Hemodynamic stability will improve  Outcome: Ongoing     Problem: Fluid Volume - Imbalance:  Goal: Chest tube drainage is within specified parameters  Description: Chest tube drainage is within specified parameters  Outcome: Ongoing     Problem: Gas Exchange - Impaired:  Goal: Levels of oxygenation will improve  Description: Levels of oxygenation will improve  Outcome: Ongoing     Problem: Gas Exchange - Impaired:  Goal: Ability to maintain adequate ventilation will improve  Description: Ability to maintain adequate ventilation will improve  Outcome: Ongoing     Problem: Tissue Perfusion - Cardiopulmonary, Altered:  Goal: Hemodynamic stability will improve  Description: Hemodynamic stability will improve  Outcome: Ongoing     Problem: Tissue Perfusion - Cardiopulmonary, Altered:  Goal: Will show no evidence of cardiac arrhythmias  Description: Will show no evidence of cardiac arrhythmias  Outcome: Ongoing

## 2021-09-30 NOTE — PROGRESS NOTES
Endo nurse at the bedside to assist Dr. Arun Rodriguez with bronchoscopy setting up for cryotherapy to remove large mucous plug.

## 2021-09-30 NOTE — PROGRESS NOTES
Patient began breathing above ventilator rate at 22 breaths/min. TOF at this time was 3/4. Nimbex titrated please see MAR. Respiratory at the bedside. FIO2 increased to 60% due to SPO2 being 89%. Improved to 91%. Patient's BP decreased during this time as well, see vitals.

## 2021-10-01 ENCOUNTER — APPOINTMENT (OUTPATIENT)
Dept: GENERAL RADIOLOGY | Age: 76
DRG: 235 | End: 2021-10-01
Attending: THORACIC SURGERY (CARDIOTHORACIC VASCULAR SURGERY)
Payer: MEDICARE

## 2021-10-01 PROBLEM — N18.6 ENCOUNTER REGARDING VASCULAR ACCESS FOR DIALYSIS FOR ESRD (HCC): Status: ACTIVE | Noted: 2021-10-01

## 2021-10-01 PROBLEM — Z99.2 ENCOUNTER REGARDING VASCULAR ACCESS FOR DIALYSIS FOR ESRD (HCC): Status: ACTIVE | Noted: 2021-10-01

## 2021-10-01 LAB
AADO2: 115.1 MMHG
AADO2: 117.3 MMHG
AADO2: 123.9 MMHG
AADO2: 132.7 MMHG
AADO2: 134.4 MMHG
AADO2: 195.1 MMHG
ALBUMIN SERPL-MCNC: 3.6 G/DL (ref 3.5–5.2)
ALBUMIN SERPL-MCNC: 3.7 G/DL (ref 3.5–5.2)
ALBUMIN SERPL-MCNC: 3.8 G/DL (ref 3.5–5.2)
ALBUMIN SERPL-MCNC: 3.8 G/DL (ref 3.5–5.2)
ALP BLD-CCNC: 68 U/L (ref 40–129)
ALP BLD-CCNC: 70 U/L (ref 40–129)
ALP BLD-CCNC: 75 U/L (ref 40–129)
ALP BLD-CCNC: 77 U/L (ref 40–129)
ALT SERPL-CCNC: 42 U/L (ref 0–40)
ALT SERPL-CCNC: 44 U/L (ref 0–40)
ALT SERPL-CCNC: 46 U/L (ref 0–40)
ALT SERPL-CCNC: 64 U/L (ref 0–40)
ANION GAP SERPL CALCULATED.3IONS-SCNC: 10 MMOL/L (ref 7–16)
ANION GAP SERPL CALCULATED.3IONS-SCNC: 11 MMOL/L (ref 7–16)
ANION GAP SERPL CALCULATED.3IONS-SCNC: 13 MMOL/L (ref 7–16)
ANION GAP SERPL CALCULATED.3IONS-SCNC: 9 MMOL/L (ref 7–16)
APPEARANCE FLUID: NORMAL
AST SERPL-CCNC: 50 U/L (ref 0–39)
AST SERPL-CCNC: 54 U/L (ref 0–39)
AST SERPL-CCNC: 55 U/L (ref 0–39)
AST SERPL-CCNC: 99 U/L (ref 0–39)
B.E.: -0.3 MMOL/L (ref -3–3)
B.E.: -1.6 MMOL/L (ref -3–3)
B.E.: 1.2 MMOL/L (ref -3–3)
B.E.: 1.5 MMOL/L (ref -3–3)
B.E.: 1.6 MMOL/L (ref -3–3)
B.E.: 1.7 MMOL/L (ref -3–3)
BASO FLUID: 0 %
BILIRUB SERPL-MCNC: 1.9 MG/DL (ref 0–1.2)
BILIRUB SERPL-MCNC: 2 MG/DL (ref 0–1.2)
BILIRUB SERPL-MCNC: 2.2 MG/DL (ref 0–1.2)
BILIRUB SERPL-MCNC: 2.4 MG/DL (ref 0–1.2)
BLOOD BANK DISPENSE STATUS: NORMAL
BLOOD BANK PRODUCT CODE: NORMAL
BPU ID: NORMAL
BUN BLDV-MCNC: 67 MG/DL (ref 6–23)
BUN BLDV-MCNC: 72 MG/DL (ref 6–23)
BUN BLDV-MCNC: 73 MG/DL (ref 6–23)
BUN BLDV-MCNC: 79 MG/DL (ref 6–23)
BUN BLDV-MCNC: 81 MG/DL (ref 6–23)
BUN BLDV-MCNC: 83 MG/DL (ref 6–23)
CALCIUM IONIZED: 1.06 MMOL/L (ref 1.15–1.33)
CALCIUM IONIZED: 1.14 MMOL/L (ref 1.15–1.33)
CALCIUM SERPL-MCNC: 8 MG/DL (ref 8.6–10.2)
CALCIUM SERPL-MCNC: 8.2 MG/DL (ref 8.6–10.2)
CALCIUM SERPL-MCNC: 8.3 MG/DL (ref 8.6–10.2)
CALCIUM SERPL-MCNC: 8.4 MG/DL (ref 8.6–10.2)
CALCIUM SERPL-MCNC: 8.5 MG/DL (ref 8.6–10.2)
CALCIUM SERPL-MCNC: 8.6 MG/DL (ref 8.6–10.2)
CELL COUNT FLUID TYPE: NORMAL
CHLORIDE BLD-SCNC: 102 MMOL/L (ref 98–107)
CHLORIDE BLD-SCNC: 103 MMOL/L (ref 98–107)
CHLORIDE BLD-SCNC: 104 MMOL/L (ref 98–107)
CHLORIDE BLD-SCNC: 105 MMOL/L (ref 98–107)
CHLORIDE URINE RANDOM: <20 MMOL/L
CO2: 25 MMOL/L (ref 22–29)
CO2: 25 MMOL/L (ref 22–29)
CO2: 26 MMOL/L (ref 22–29)
CO2: 27 MMOL/L (ref 22–29)
CO2: 28 MMOL/L (ref 22–29)
CO2: 28 MMOL/L (ref 22–29)
COHB: 0.2 % (ref 0–1.5)
COHB: 0.3 % (ref 0–1.5)
COHB: 0.5 % (ref 0–1.5)
COLOR FLUID: NORMAL
CREAT SERPL-MCNC: 2.3 MG/DL (ref 0.7–1.2)
CREAT SERPL-MCNC: 2.4 MG/DL (ref 0.7–1.2)
CREAT SERPL-MCNC: 2.5 MG/DL (ref 0.7–1.2)
CREAT SERPL-MCNC: 2.6 MG/DL (ref 0.7–1.2)
CREAT SERPL-MCNC: 2.6 MG/DL (ref 0.7–1.2)
CREAT SERPL-MCNC: 2.7 MG/DL (ref 0.7–1.2)
CREATININE URINE: 156 MG/DL (ref 40–278)
CRITICAL: ABNORMAL
DATE ANALYZED: ABNORMAL
DATE OF COLLECTION: ABNORMAL
DESCRIPTION BLOOD BANK: NORMAL
EOSINOPHIL FLUID: 0 %
FIO2: 40 %
FIO2: 70 %
GFR AFRICAN AMERICAN: 28
GFR AFRICAN AMERICAN: 29
GFR AFRICAN AMERICAN: 29
GFR AFRICAN AMERICAN: 31
GFR AFRICAN AMERICAN: 32
GFR AFRICAN AMERICAN: 34
GFR NON-AFRICAN AMERICAN: 23 ML/MIN/1.73
GFR NON-AFRICAN AMERICAN: 24 ML/MIN/1.73
GFR NON-AFRICAN AMERICAN: 24 ML/MIN/1.73
GFR NON-AFRICAN AMERICAN: 25 ML/MIN/1.73
GFR NON-AFRICAN AMERICAN: 27 ML/MIN/1.73
GFR NON-AFRICAN AMERICAN: 28 ML/MIN/1.73
GLUCOSE BLD-MCNC: 123 MG/DL (ref 74–99)
GLUCOSE BLD-MCNC: 129 MG/DL (ref 74–99)
GLUCOSE BLD-MCNC: 143 MG/DL (ref 74–99)
GLUCOSE BLD-MCNC: 145 MG/DL (ref 74–99)
GLUCOSE BLD-MCNC: 146 MG/DL (ref 74–99)
GLUCOSE BLD-MCNC: 149 MG/DL (ref 74–99)
HCO3: 22 MMOL/L (ref 22–26)
HCO3: 22.9 MMOL/L (ref 22–26)
HCO3: 23.9 MMOL/L (ref 22–26)
HCO3: 24.2 MMOL/L (ref 22–26)
HCO3: 24.4 MMOL/L (ref 22–26)
HCO3: 24.6 MMOL/L (ref 22–26)
HCT VFR BLD CALC: 22.8 % (ref 37–54)
HCT VFR BLD CALC: 23.7 % (ref 37–54)
HCT VFR BLD CALC: 25.4 % (ref 37–54)
HEMOGLOBIN: 7.6 G/DL (ref 12.5–16.5)
HEMOGLOBIN: 7.9 G/DL (ref 12.5–16.5)
HEMOGLOBIN: 8.7 G/DL (ref 12.5–16.5)
HHB: 1.5 % (ref 0–5)
HHB: 1.7 % (ref 0–5)
HHB: 2.3 % (ref 0–5)
HHB: 2.5 % (ref 0–5)
HHB: 2.7 % (ref 0–5)
HHB: 2.9 % (ref 0–5)
INR BLD: 2
LAB: ABNORMAL
LACTIC ACID: 1.2 MMOL/L (ref 0.5–2.2)
LACTIC ACID: 1.3 MMOL/L (ref 0.5–2.2)
LACTIC ACID: 1.4 MMOL/L (ref 0.5–2.2)
LACTIC ACID: 1.5 MMOL/L (ref 0.5–2.2)
LACTIC ACID: 2 MMOL/L (ref 0.5–2.2)
LYMPHOCYTES, BODY FLUID: 40 %
Lab: ABNORMAL
MAGNESIUM: 2.1 MG/DL (ref 1.6–2.6)
MAGNESIUM: 2.3 MG/DL (ref 1.6–2.6)
MCH RBC QN AUTO: 29.3 PG (ref 26–35)
MCH RBC QN AUTO: 30.5 PG (ref 26–35)
MCHC RBC AUTO-ENTMCNC: 33.3 % (ref 32–34.5)
MCHC RBC AUTO-ENTMCNC: 33.3 % (ref 32–34.5)
MCV RBC AUTO: 87.8 FL (ref 80–99.9)
MCV RBC AUTO: 91.6 FL (ref 80–99.9)
METER GLUCOSE: 115 MG/DL (ref 74–99)
METER GLUCOSE: 119 MG/DL (ref 74–99)
METER GLUCOSE: 127 MG/DL (ref 74–99)
METER GLUCOSE: 135 MG/DL (ref 74–99)
METER GLUCOSE: 145 MG/DL (ref 74–99)
METER GLUCOSE: 155 MG/DL (ref 74–99)
METER GLUCOSE: 97 MG/DL (ref 74–99)
METHB: 0 % (ref 0–1.5)
METHB: 0.4 % (ref 0–1.5)
METHB: 0.4 % (ref 0–1.5)
METHB: 0.5 % (ref 0–1.5)
METHB: 0.5 % (ref 0–1.5)
METHB: 0.6 % (ref 0–1.5)
MODE: AC
MONOCYTE, FLUID: 45 %
NEUTROPHIL, FLUID: 15 %
NUCLEATED CELLS FLUID: 388 /UL
O2 CONTENT: 13.1 ML/DL
O2 SATURATION: 97.1 % (ref 92–98.5)
O2 SATURATION: 97.3 % (ref 92–98.5)
O2 SATURATION: 97.5 % (ref 92–98.5)
O2 SATURATION: 97.7 % (ref 92–98.5)
O2 SATURATION: 98.3 % (ref 92–98.5)
O2 SATURATION: 98.5 % (ref 92–98.5)
O2HB: 96.3 % (ref 94–97)
O2HB: 96.6 % (ref 94–97)
O2HB: 97 % (ref 94–97)
O2HB: 97 % (ref 94–97)
O2HB: 97.6 % (ref 94–97)
O2HB: 97.6 % (ref 94–97)
OPERATOR ID: 1093
OPERATOR ID: 1093
OPERATOR ID: 1874
OPERATOR ID: 1874
OPERATOR ID: 3224
OPERATOR ID: 3224
PATIENT TEMP: 37 C
PCO2: 29.1 MMHG (ref 35–45)
PCO2: 29.8 MMHG (ref 35–45)
PCO2: 31.4 MMHG (ref 35–45)
PCO2: 31.8 MMHG (ref 35–45)
PCO2: 32.4 MMHG (ref 35–45)
PCO2: 33.9 MMHG (ref 35–45)
PDW BLD-RTO: 14.9 FL (ref 11.5–15)
PDW BLD-RTO: 16.1 FL (ref 11.5–15)
PEEP/CPAP: 10 CMH2O
PEEP/CPAP: 10 CMH2O
PEEP/CPAP: 7 CMH2O
PEEP/CPAP: 7 CMH2O
PEEP/CPAP: 8 CMH2O
PFO2: 2.66 MMHG/%
PFO2: 2.66 MMHG/%
PFO2: 2.85 MMHG/%
PFO2: 2.97 MMHG/%
PFO2: 3.09 MMHG/%
PFO2: 3.65 MMHG/%
PH BLOOD GAS: 7.45 (ref 7.35–7.45)
PH BLOOD GAS: 7.47 (ref 7.35–7.45)
PH BLOOD GAS: 7.48 (ref 7.35–7.45)
PH BLOOD GAS: 7.51 (ref 7.35–7.45)
PH BLOOD GAS: 7.53 (ref 7.35–7.45)
PH BLOOD GAS: 7.53 (ref 7.35–7.45)
PHOSPHORUS: 2.5 MG/DL (ref 2.5–4.5)
PHOSPHORUS: 2.7 MG/DL (ref 2.5–4.5)
PLATELET # BLD: 41 E9/L (ref 130–450)
PLATELET # BLD: 61 E9/L (ref 130–450)
PLATELET CONFIRMATION: NORMAL
PLATELET CONFIRMATION: NORMAL
PMV BLD AUTO: 11.1 FL (ref 7–12)
PMV BLD AUTO: 12.2 FL (ref 7–12)
PO2: 106.4 MMHG (ref 75–100)
PO2: 106.5 MMHG (ref 75–100)
PO2: 114 MMHG (ref 75–100)
PO2: 118.9 MMHG (ref 75–100)
PO2: 123.5 MMHG (ref 75–100)
PO2: 255.2 MMHG (ref 75–100)
POTASSIUM SERPL-SCNC: 3.9 MMOL/L (ref 3.5–5)
POTASSIUM SERPL-SCNC: 4 MMOL/L (ref 3.5–5)
POTASSIUM SERPL-SCNC: 4 MMOL/L (ref 3.5–5)
POTASSIUM SERPL-SCNC: 4.1 MMOL/L (ref 3.5–5)
POTASSIUM SERPL-SCNC: 4.3 MMOL/L (ref 3.5–5)
POTASSIUM SERPL-SCNC: 4.4 MMOL/L (ref 3.5–5)
POTASSIUM, UR: 90.4 MMOL/L
PROTHROMBIN TIME: 22.5 SEC (ref 9.3–12.4)
RBC # BLD: 2.49 E12/L (ref 3.8–5.8)
RBC # BLD: 2.7 E12/L (ref 3.8–5.8)
RBC FLUID: 2512 /UL
RI(T): 0.76
RI(T): 0.99
RI(T): 1.09
RI(T): 1.25
RI(T): 1.26
RI(T): 93 %
RR MECHANICAL: 14 B/MIN
RR MECHANICAL: 18 B/MIN
SODIUM BLD-SCNC: 139 MMOL/L (ref 132–146)
SODIUM BLD-SCNC: 139 MMOL/L (ref 132–146)
SODIUM BLD-SCNC: 140 MMOL/L (ref 132–146)
SODIUM URINE: <20 MMOL/L
SOURCE, BLOOD GAS: ABNORMAL
THB: 8.3 G/DL (ref 11.5–16.5)
THB: 8.4 G/DL (ref 11.5–16.5)
THB: 8.4 G/DL (ref 11.5–16.5)
THB: 8.5 G/DL (ref 11.5–16.5)
THB: 8.7 G/DL (ref 11.5–16.5)
THB: 9.4 G/DL (ref 11.5–16.5)
TIME ANALYZED: 1157
TIME ANALYZED: 1739
TIME ANALYZED: 18
TIME ANALYZED: 1958
TIME ANALYZED: 410
TIME ANALYZED: 836
TOTAL PROTEIN: 4.9 G/DL (ref 6.4–8.3)
TOTAL PROTEIN: 5.1 G/DL (ref 6.4–8.3)
TOTAL PROTEIN: 5.1 G/DL (ref 6.4–8.3)
TOTAL PROTEIN: 5.4 G/DL (ref 6.4–8.3)
UREA NITROGEN, UR: 890 MG/DL (ref 800–1666)
VT MECHANICAL: 500 ML
WBC # BLD: 18.3 E9/L (ref 4.5–11.5)
WBC # BLD: 19.5 E9/L (ref 4.5–11.5)

## 2021-10-01 PROCEDURE — 85014 HEMATOCRIT: CPT

## 2021-10-01 PROCEDURE — 94003 VENT MGMT INPAT SUBQ DAY: CPT

## 2021-10-01 PROCEDURE — 6360000002 HC RX W HCPCS: Performed by: STUDENT IN AN ORGANIZED HEALTH CARE EDUCATION/TRAINING PROGRAM

## 2021-10-01 PROCEDURE — 84133 ASSAY OF URINE POTASSIUM: CPT

## 2021-10-01 PROCEDURE — 6370000000 HC RX 637 (ALT 250 FOR IP): Performed by: PHYSICIAN ASSISTANT

## 2021-10-01 PROCEDURE — 36430 TRANSFUSION BLD/BLD COMPNT: CPT

## 2021-10-01 PROCEDURE — 99233 SBSQ HOSP IP/OBS HIGH 50: CPT | Performed by: INTERNAL MEDICINE

## 2021-10-01 PROCEDURE — 2580000003 HC RX 258: Performed by: NURSE PRACTITIONER

## 2021-10-01 PROCEDURE — 84300 ASSAY OF URINE SODIUM: CPT

## 2021-10-01 PROCEDURE — 6370000000 HC RX 637 (ALT 250 FOR IP): Performed by: NURSE PRACTITIONER

## 2021-10-01 PROCEDURE — 82330 ASSAY OF CALCIUM: CPT

## 2021-10-01 PROCEDURE — 85018 HEMOGLOBIN: CPT

## 2021-10-01 PROCEDURE — 2000000000 HC ICU R&B

## 2021-10-01 PROCEDURE — 6360000002 HC RX W HCPCS: Performed by: NURSE PRACTITIONER

## 2021-10-01 PROCEDURE — 71045 X-RAY EXAM CHEST 1 VIEW: CPT

## 2021-10-01 PROCEDURE — 83735 ASSAY OF MAGNESIUM: CPT

## 2021-10-01 PROCEDURE — 6360000002 HC RX W HCPCS: Performed by: THORACIC SURGERY (CARDIOTHORACIC VASCULAR SURGERY)

## 2021-10-01 PROCEDURE — 80048 BASIC METABOLIC PNL TOTAL CA: CPT

## 2021-10-01 PROCEDURE — 84100 ASSAY OF PHOSPHORUS: CPT

## 2021-10-01 PROCEDURE — 2500000003 HC RX 250 WO HCPCS: Performed by: NURSE PRACTITIONER

## 2021-10-01 PROCEDURE — 2580000003 HC RX 258: Performed by: THORACIC SURGERY (CARDIOTHORACIC VASCULAR SURGERY)

## 2021-10-01 PROCEDURE — 2580000003 HC RX 258: Performed by: STUDENT IN AN ORGANIZED HEALTH CARE EDUCATION/TRAINING PROGRAM

## 2021-10-01 PROCEDURE — 94640 AIRWAY INHALATION TREATMENT: CPT

## 2021-10-01 PROCEDURE — 93005 ELECTROCARDIOGRAM TRACING: CPT | Performed by: THORACIC SURGERY (CARDIOTHORACIC VASCULAR SURGERY)

## 2021-10-01 PROCEDURE — 37799 UNLISTED PX VASCULAR SURGERY: CPT

## 2021-10-01 PROCEDURE — 2580000003 HC RX 258: Performed by: PHYSICIAN ASSISTANT

## 2021-10-01 PROCEDURE — 82436 ASSAY OF URINE CHLORIDE: CPT

## 2021-10-01 PROCEDURE — C9113 INJ PANTOPRAZOLE SODIUM, VIA: HCPCS | Performed by: NURSE PRACTITIONER

## 2021-10-01 PROCEDURE — 99291 CRITICAL CARE FIRST HOUR: CPT | Performed by: NURSE PRACTITIONER

## 2021-10-01 PROCEDURE — 85027 COMPLETE CBC AUTOMATED: CPT

## 2021-10-01 PROCEDURE — 82570 ASSAY OF URINE CREATININE: CPT

## 2021-10-01 PROCEDURE — 36415 COLL VENOUS BLD VENIPUNCTURE: CPT

## 2021-10-01 PROCEDURE — 94645 CONT INHLJ TX EACH ADDL HOUR: CPT

## 2021-10-01 PROCEDURE — 80053 COMPREHEN METABOLIC PANEL: CPT

## 2021-10-01 PROCEDURE — 83605 ASSAY OF LACTIC ACID: CPT

## 2021-10-01 PROCEDURE — 85610 PROTHROMBIN TIME: CPT

## 2021-10-01 PROCEDURE — 82962 GLUCOSE BLOOD TEST: CPT

## 2021-10-01 PROCEDURE — 82805 BLOOD GASES W/O2 SATURATION: CPT

## 2021-10-01 PROCEDURE — 2580000003 HC RX 258: Performed by: INTERNAL MEDICINE

## 2021-10-01 PROCEDURE — 6360000002 HC RX W HCPCS: Performed by: PHYSICIAN ASSISTANT

## 2021-10-01 PROCEDURE — 84540 ASSAY OF URINE/UREA-N: CPT

## 2021-10-01 RX ORDER — 0.9 % SODIUM CHLORIDE 0.9 %
1000 INTRAVENOUS SOLUTION INTRAVENOUS
Status: ACTIVE | OUTPATIENT
Start: 2021-10-01 | End: 2021-10-01

## 2021-10-01 RX ORDER — ANTICOAGULANT SODIUM CITRATE SOLUTION 4 G/100ML
3 SOLUTION INTRAVENOUS
Status: ACTIVE | OUTPATIENT
Start: 2021-10-01 | End: 2021-10-01

## 2021-10-01 RX ORDER — POTASSIUM CHLORIDE 29.8 MG/ML
20 INJECTION INTRAVENOUS PRN
Status: DISCONTINUED | OUTPATIENT
Start: 2021-10-01 | End: 2021-10-10

## 2021-10-01 RX ORDER — SODIUM CHLORIDE 9 MG/ML
INJECTION, SOLUTION INTRAVENOUS CONTINUOUS
Status: DISCONTINUED | OUTPATIENT
Start: 2021-10-01 | End: 2021-10-04

## 2021-10-01 RX ORDER — SODIUM CHLORIDE 9 MG/ML
INJECTION, SOLUTION INTRAVENOUS PRN
Status: DISCONTINUED | OUTPATIENT
Start: 2021-10-01 | End: 2021-10-07

## 2021-10-01 RX ORDER — MAGNESIUM SULFATE 1 G/100ML
1000 INJECTION INTRAVENOUS PRN
Status: DISCONTINUED | OUTPATIENT
Start: 2021-10-01 | End: 2021-10-05

## 2021-10-01 RX ADMIN — Medication 10 ML: at 09:27

## 2021-10-01 RX ADMIN — PIPERACILLIN AND TAZOBACTAM 3375 MG: 3; .375 INJECTION, POWDER, LYOPHILIZED, FOR SOLUTION INTRAVENOUS at 17:25

## 2021-10-01 RX ADMIN — DOBUTAMINE IN DEXTROSE 7.5 MCG/KG/MIN: 400 INJECTION, SOLUTION INTRAVENOUS at 02:27

## 2021-10-01 RX ADMIN — CHLORHEXIDINE GLUCONATE 15 ML: 1.2 RINSE ORAL at 09:28

## 2021-10-01 RX ADMIN — SODIUM CHLORIDE: 9 INJECTION, SOLUTION INTRAVENOUS at 09:40

## 2021-10-01 RX ADMIN — MIDAZOLAM 4 MG/HR: 5 INJECTION, SOLUTION INTRAMUSCULAR; INTRAVENOUS at 09:45

## 2021-10-01 RX ADMIN — INSULIN LISPRO 3 UNITS: 100 INJECTION, SOLUTION INTRAVENOUS; SUBCUTANEOUS at 05:42

## 2021-10-01 RX ADMIN — SENNOSIDES 5 ML: 8.8 SYRUP ORAL at 20:10

## 2021-10-01 RX ADMIN — PIPERACILLIN AND TAZOBACTAM 3375 MG: 3; .375 INJECTION, POWDER, LYOPHILIZED, FOR SOLUTION INTRAVENOUS at 01:20

## 2021-10-01 RX ADMIN — MUPIROCIN: 20 OINTMENT TOPICAL at 09:18

## 2021-10-01 RX ADMIN — CISATRACURIUM BESYLATE 4 MCG/KG/MIN: 200 INJECTION INTRAVENOUS at 02:28

## 2021-10-01 RX ADMIN — AMIODARONE HYDROCHLORIDE 0.25 MG/MIN: 50 INJECTION, SOLUTION INTRAVENOUS at 17:30

## 2021-10-01 RX ADMIN — SENNOSIDES 5 ML: 8.8 SYRUP ORAL at 09:26

## 2021-10-01 RX ADMIN — DOCUSATE SODIUM 100 MG: 50 LIQUID ORAL at 20:10

## 2021-10-01 RX ADMIN — HYDROCORTISONE SODIUM SUCCINATE 100 MG: 100 INJECTION, POWDER, FOR SOLUTION INTRAMUSCULAR; INTRAVENOUS at 17:26

## 2021-10-01 RX ADMIN — EPOPROSTENOL 50 NG/KG/MIN: 1.5 INJECTION, POWDER, LYOPHILIZED, FOR SOLUTION INTRAVENOUS at 05:28

## 2021-10-01 RX ADMIN — ATORVASTATIN CALCIUM 40 MG: 40 TABLET, FILM COATED ORAL at 20:10

## 2021-10-01 RX ADMIN — EPOPROSTENOL 50 NG/KG/MIN: 1.5 INJECTION, POWDER, LYOPHILIZED, FOR SOLUTION INTRAVENOUS at 19:10

## 2021-10-01 RX ADMIN — DOCUSATE SODIUM 100 MG: 50 LIQUID ORAL at 09:18

## 2021-10-01 RX ADMIN — IPRATROPIUM BROMIDE AND ALBUTEROL SULFATE 1 AMPULE: .5; 2.5 SOLUTION RESPIRATORY (INHALATION) at 08:27

## 2021-10-01 RX ADMIN — CALCIUM GLUCONATE 1000 MG: 98 INJECTION, SOLUTION INTRAVENOUS at 05:50

## 2021-10-01 RX ADMIN — MUPIROCIN: 20 OINTMENT TOPICAL at 20:10

## 2021-10-01 RX ADMIN — CHLORHEXIDINE GLUCONATE 15 ML: 1.2 RINSE ORAL at 20:10

## 2021-10-01 RX ADMIN — IPRATROPIUM BROMIDE AND ALBUTEROL SULFATE 1 AMPULE: .5; 2.5 SOLUTION RESPIRATORY (INHALATION) at 15:58

## 2021-10-01 RX ADMIN — PANTOPRAZOLE SODIUM 40 MG: 40 INJECTION, POWDER, FOR SOLUTION INTRAVENOUS at 09:30

## 2021-10-01 RX ADMIN — Medication 10 ML: at 20:10

## 2021-10-01 RX ADMIN — PIPERACILLIN AND TAZOBACTAM 3375 MG: 3; .375 INJECTION, POWDER, LYOPHILIZED, FOR SOLUTION INTRAVENOUS at 09:18

## 2021-10-01 RX ADMIN — HYDROCORTISONE SODIUM SUCCINATE 100 MG: 100 INJECTION, POWDER, FOR SOLUTION INTRAMUSCULAR; INTRAVENOUS at 05:42

## 2021-10-01 RX ADMIN — INSULIN LISPRO 3 UNITS: 100 INJECTION, SOLUTION INTRAVENOUS; SUBCUTANEOUS at 13:14

## 2021-10-01 RX ADMIN — EPOPROSTENOL 50 NG/KG/MIN: 1.5 INJECTION, POWDER, LYOPHILIZED, FOR SOLUTION INTRAVENOUS at 11:45

## 2021-10-01 RX ADMIN — IPRATROPIUM BROMIDE AND ALBUTEROL SULFATE 1 AMPULE: .5; 2.5 SOLUTION RESPIRATORY (INHALATION) at 12:25

## 2021-10-01 RX ADMIN — SODIUM CHLORIDE, PRESERVATIVE FREE 10 ML: 5 INJECTION INTRAVENOUS at 09:30

## 2021-10-01 ASSESSMENT — PULMONARY FUNCTION TESTS
PIF_VALUE: 21
PIF_VALUE: 23
PIF_VALUE: 22
PIF_VALUE: 23
PIF_VALUE: 23
PIF_VALUE: 24
PIF_VALUE: 25
PIF_VALUE: 24
PIF_VALUE: 25
PIF_VALUE: 23
PIF_VALUE: 24
PIF_VALUE: 21
PIF_VALUE: 23
PIF_VALUE: 25
PIF_VALUE: 22
PIF_VALUE: 21
PIF_VALUE: 24
PIF_VALUE: 24
PIF_VALUE: 23
PIF_VALUE: 20
PIF_VALUE: 22
PIF_VALUE: 23
PIF_VALUE: 24
PIF_VALUE: 23
PIF_VALUE: 22
PIF_VALUE: 22
PIF_VALUE: 25
PIF_VALUE: 22

## 2021-10-01 ASSESSMENT — PAIN SCALES - GENERAL
PAINLEVEL_OUTOF10: 0

## 2021-10-01 NOTE — PLAN OF CARE
Problem: Skin Integrity:  Goal: Absence of new skin breakdown  10/1/2021 0939 by Rashad Bunn RN  Outcome: Met This Shift     Problem: Skin Integrity:  Goal: Will show no infection signs and symptoms  Outcome: Met This Shift     Problem: Pain:  Goal: Pain level will decrease  Outcome: Met This Shift     Problem: Pain:  Goal: Pain level will decrease  Outcome: Met This Shift     Problem: Falls - Risk of:  Goal: Will remain free from falls  Outcome: Met This Shift     Problem: Falls - Risk of:  Goal: Absence of physical injury  Outcome: Met This Shift

## 2021-10-01 NOTE — PROGRESS NOTES
Comprehensive Nutrition Assessment    Type and Reason for Visit:  Reassess    Nutrition Recommendations/Plan: Continue NPO, Start Tube feeding as medically feasible    TF rec:   Renal @ 40 ml/hr + 2 protein modulars daily  Will provide 960 ml tv, 1440kcals, 78 gm pro, 698 ml free water (1648 kcals & 130 gm pro w/ 2 pro mods)  Regimen will meet 97% est calorie needs & 100% est protein needs    Nutrition Assessment:  Pt remains nutritionally at risk admit w/ CAD s/p CABG x3 w/ post-op cardiogenic shock/acute resp failure, remains intubated. Noted NGUYỄN pending CVVHD. Pt s/p bronch. Will provide TF rec and continue to monitor. Malnutrition Assessment:  Malnutrition Status:   At risk for malnutrition (Comment)    Context:  Acute Illness     Findings of the 6 clinical characteristics of malnutrition:  Energy Intake:  Mild decrease in energy intake (Comment)  Weight Loss:  No significant weight loss     Body Fat Loss:  No significant body fat loss     Muscle Mass Loss:  No significant muscle mass loss    Fluid Accumulation:  No significant fluid accumulation     Strength:  Not Performed    Estimated Daily Nutrient Needs:  Energy (kcal):  PS3B 1704; 7889-4711; Weight Used for Energy Requirements:  Current     Protein (g):  105-125; Weight Used for Protein Requirements:  Current (1.5-1.8)        Fluid (ml/day):  per critical care/renal management; Method Used for Fluid Requirements:         Nutrition Related Findings:  pt intubated, OGT LIS, hypoactive BS, soft abd, CT x4, +1 pitting edema, +I/Os, bili 1.9      Wounds:  Multiple, Surgical Incision       Current Nutrition Therapies:    No diet orders on file    Anthropometric Measures:  · Height: 5' 8\" (172.7 cm)  · Current Body Weight: 158 lb (71.7 kg) (9/27 bed)   · Admission Body Weight: 158 lb (71.7 kg) (9/27 first measured)    · Usual Body Weight: 158 lb (71.7 kg) (7/2021 actual per EMR)     · Ideal Body Weight: 154 lbs; % Ideal Body Weight 102.6 %   · BMI: 24  · BMI Categories: Normal Weight (BMI 18.5-24. 9)       Nutrition Diagnosis:   · Inadequate oral intake related to impaired respiratory function as evidenced by NPO or clear liquid status due to medical condition, intubation    Nutrition Interventions:   Food and/or Nutrient Delivery:  Continue NPO, Start Tube Feeding (TF rec: Renal @ 40 ml/hr + 2 protein modulars daily. Will provide 960 ml tv, 1440kcals, 78 gm pro, 698 ml free water (1648 kcals & 130 gm pro w/ 2 pro mods))  Nutrition Education/Counseling:  Education not appropriate   Coordination of Nutrition Care:  Continue to monitor while inpatient    Goals:  nutrition progression       Nutrition Monitoring and Evaluation:   Food/Nutrient Intake Outcomes:  Diet Advancement/Tolerance  Physical Signs/Symptoms Outcomes:  Biochemical Data, GI Status, Fluid Status or Edema, Hemodynamic Status, Nutrition Focused Physical Findings, Skin, Weight     Discharge Planning:     Too soon to determine     Electronically signed by Lynette Mead MS, RD, LD on 10/1/21 at 10:18 AM EDT    Contact: [phone]

## 2021-10-01 NOTE — PROGRESS NOTES
VASCULAR SURGERY  DAILY PROGRESS NOTE    Date:10/1/2021       MCCT:[id number]  Patient [facility]     YOB: 1945     Age:76 y.o. Chief Complaint:   CABG    Subjective:  Right fem templine placed overnight, no issues with the site, has not used line yet    Objective:  BP (!) 131/48   Pulse 102   Temp 99.8 °F (37.7 °C) (Axillary)   Resp 15   Ht 5' 8\" (1.727 m)   Wt 158 lb 11.7 oz (72 kg)   SpO2 100%   BMI 24.14 kg/m²   Temp (24hrs), Av.2 °F (37.3 °C), Min:98.9 °F (37.2 °C), Max:99.8 °F (37.7 °C)      I/O (24Hr):  I/O last 3 completed shifts: In: 2726.4 [I.V.:1896.4; NG/GT:30; IV Piggyback:800]  Out: 8431 [Urine:427; Emesis/NG output:500; Chest Tube:120]     GENERAL:  No acute distress. Unresponsive on vent  LUNGS:  No cough. Nonlabored breathing on vent  CARDIOVASC:  Tachy rate, no cyanosis. ABDOMEN:  Soft, non-distended, non-tender. EXTREMITIES:  No edema, no deformities.  Right femoral templine site c/d/i without hematoma or bruising    Assessment:  78 y.o. male with renal failure    Plan:  R fem templine placed   -Please call vascular surgery for issues with the line  -Recommend exchange or removal in 7 to 10 days  -Please call if longer-term access is needed    Electronically signed by Severo Momin MD on 10/1/2021 at 7:33 AM

## 2021-10-01 NOTE — PROGRESS NOTES
INPATIENT CARDIOLOGY FOLLOW-UP    Name: Francesca Calles    Age: 78 y.o. Date of Admission: 9/27/2021  5:25 AM    Date of Service: 10/1/2021    Primary Cardiologist: Dr. Austen Mathew    Chief Complaint: Follow-up for post CABG, cardiogenic shock    Interim History:  Interim events noted. Has shown slow and steady improvement, with weaning of vasopressors, normalization of lactic acid, de-escalation of IABP, discontinuation of paralytics. Had bronchoscopy yesterday for large mucous plugs and hypoxemia has improved.     Another repeat limited echo yesterday showed similar EF of 45% and improvement of RV function to near normal.    Review of Systems:   Unable to obtain    Problem List:  Patient Active Problem List   Diagnosis    CAD in native artery    Pulmonary nodules    Unstable angina (HCC)    Other hyperlipidemia    Essential hypertension    Gastroesophageal reflux disease with esophagitis without hemorrhage    S/P CABG (coronary artery bypass graft)    Acute respiratory failure with hypoxia (HCC)    Lactic acidemia    NGUYỄN (acute kidney injury) ([facility] [address]    Hypokalemia    Hypocalcemia    Cardiogenic shock (HCC)    Thrombocytopenia (HCC)    Leukocytosis       Current Medications:    Current Facility-Administered Medications:     0.9 % sodium chloride bolus, 1,000 mL, IntraVENous, Once PRN, Ayanna Mathur MD    prismaSol BGK 4/0/1.2 5,000 mL solution, , Dialysis, Continuous, Ayanna Vivar MD, Held at 10/01/21 0836    0.9 % sodium chloride infusion, , IntraVENous, Continuous, Ayanna Mathur MD, Last Rate: 100 mL/hr at 10/01/21 0940, New Bag at 10/01/21 0940    potassium chloride 20 mEq/50 mL IVPB (Central Line), 20 mEq, IntraVENous, PRN, Ayanna Mathur MD    magnesium sulfate 1000 mg in dextrose 5% 100 mL IVPB, 1,000 mg, IntraVENous, PRN, Ayanna Mathur MD    calcium gluconate 1000 mg in dextrose 5% 100 mL IVPB, 1,000 mg, IntraVENous, PRN **OR** calcium gluconate 2,000 mg in dextrose 5 % 100 mL IVPB, 2,000 mg, IntraVENous, PRN **OR** calcium gluconate 3,000 mg in dextrose 5 % 100 mL IVPB, 3,000 mg, IntraVENous, PRN **OR** calcium gluconate 4,000 mg in dextrose 5 % 100 mL IVPB, 4,000 mg, IntraVENous, PRN, Ayanna Mathur MD    sodium phosphate 6 mmol in dextrose 5 % 250 mL IVPB, 6 mmol, IntraVENous, PRN **OR** sodium phosphate 12 mmol in dextrose 5 % 250 mL IVPB, 12 mmol, IntraVENous, PRN **OR** sodium phosphate 18 mmol in dextrose 5 % 500 mL IVPB, 18 mmol, IntraVENous, PRN **OR** sodium phosphate 24 mmol in dextrose 5 % 500 mL IVPB, 24 mmol, IntraVENous, PRN, Ayanna Mathur MD    anticoagulant sodium citrate 4 GM/100ML solution 0.12 g, 3 mL, IntraCATHeter, Once PRN, Skip Miramontes MD    calcium chloride 8,000 mg in sodium chloride 0.9 % 1,000 mL infusion, , IntraVENous, Continuous, Ayanna Montesinos MD, Held at 10/01/21 0836    hydrocortisone sodium succinate PF (SOLU-CORTEF) injection 100 mg, 100 mg, IntraVENous, Q12H, Sallie Tubbs APRN - CNP    0.9 % sodium chloride infusion, , IntraVENous, PRN, Sallie Arley, APRN - CNP    0.9 % sodium chloride infusion, , IntraVENous, PRN, Sallie Arley, APRN - CNP    lidocaine PF 1 % injection 5 mL, 5 mL, IntraDERmal, Once, Sallie Tubbs APRN - CNP    sodium chloride flush 0.9 % injection 5-40 mL, 5-40 mL, IntraVENous, 2 times per day, Sallie Tubbs, APRN - CNP, 10 mL at 10/01/21 0927    sodium chloride flush 0.9 % injection 5-40 mL, 5-40 mL, IntraVENous, PRN, Sallie Arley, APRN - CNP    0.9 % sodium chloride infusion, 25 mL, IntraVENous, PRN, Sallie Arley, APRN - CNP    heparin flush 100 UNIT/ML injection 300 Units, 3 mL, IntraVENous, 2 times per day, Sallie Arley, APRN - CNP, 300 Units at 09/30/21 0822    heparin flush 100 UNIT/ML injection 300 Units, 3 mL, IntraCATHeter, PRN, JUAN MIGUEL Patricio CNP    rocuronium (ZEMURON) injection 43 mg, 0.6 mg/kg, IntraVENous, Once, JUAN MIGUEL Valentine - VASILE   cisatracurium besylate (NIMBEX) 200 mg in sodium chloride 0.9 % 100 mL infusion, 0.5-10 mcg/kg/min, IntraVENous, Continuous, JUAN MIGUEL Patricio - CNP, Stopped at 10/01/21 0740    piperacillin-tazobactam (ZOSYN) 3,375 mg in dextrose 5 % 100 mL IVPB extended infusion (mini-bag), 3,375 mg, IntraVENous, Q8H, Estelle Kang MD, Last Rate: 25 mL/hr at 10/01/21 0918, 3,375 mg at 10/01/21 0918    midazolam (VERSED) 100 mg in dextrose 5 % 100 mL infusion, 1-10 mg/hr, IntraVENous, Continuous, Glenis Golden DO, Last Rate: 4 mL/hr at 10/01/21 0945, 4 mg/hr at 10/01/21 0945    fentaNYL (SUBLIMAZE) injection 12.5 mcg, 12.5 mcg, IntraVENous, Q1H PRN, Glenis Golden, DO    DOBUTamine (DOBUTREX) 1000 mg in dextrose 5 % 250 mL infusion, 7.5 mcg/kg/min, IntraVENous, Continuous, JUAN MIGUEL Patricio - CNP, Last Rate: 8.1 mL/hr at 10/01/21 0227, 7.5 mcg/kg/min at 10/01/21 0227    epoprostenol 1,500 mcg in sodium chloride 0.9 % 50 mL nebulization solution, 50 ng/kg/min (Ideal), Nebulization, Continuous, Sallie Tubbs APRN - CNP, Last Rate: 6.8 mL/hr at 10/01/21 0528, 50 ng/kg/min at 10/01/21 0528    pantoprazole (PROTONIX) injection 40 mg, 40 mg, IntraVENous, Daily, 40 mg at 10/01/21 0930 **AND** sodium chloride (PF) 0.9 % injection 10 mL, 10 mL, IntraVENous, Daily, Sallieangélica Tubbs APRN - CNP, 10 mL at 10/01/21 0930    aspirin chewable tablet 81 mg, 81 mg, Oral, Daily, Sallie Tubbs APRN - CNP, 81 mg at 09/30/21 0821    lubrifresh P.M. (artificial tears) ophthalmic ointment, , Both Eyes, PRN, Harshad Gell, APRN - CNP, Given at 09/28/21 1134    chlorhexidine (PERIDEX) 0.12 % solution 15 mL, 15 mL, Mouth/Throat, BID, Sallie Tubbs APRN - CNP, 15 mL at 10/01/21 0928    docusate sodium (COLACE) 150 MG/15ML liquid 100 mg, 100 mg, Oral, BID, JUAN MIGUEL Patricio - CNP, 100 mg at 10/01/21 0918    sennosides (SENOKOT) 8.8 MG/5ML syrup 5 mL, 5 mL, Oral, BID, Sallie Tubbs APRN - CNP, 5 mL at 10/01/21 1319    insulin lispro (HUMALOG) injection vial 0-3 Units, 0-3 Units, SubCUTAneous, Q4H, Sallie Tubbs, APRN - CNP, 3 Units at 10/01/21 0542    atorvastatin (LIPITOR) tablet 40 mg, 40 mg, Oral, Daily, Edgar Chakraborty PA-C, 40 mg at 09/30/21 2234    0.9 % sodium chloride infusion, 30 mL/hr, IntraVENous, Continuous, Edgar Chakraborty PA-C, Last Rate: 30 mL/hr at 09/30/21 1432, 30 mL/hr at 09/30/21 1432    [facility]) injection 4 mg, 4 mg, IntraVENous, Q8H PRN, Edgar Chakraborty PA-C, 4 mg at 09/27/21 2026    acetaminophen (TYLENOL) tablet 650 mg, 650 mg, Oral, Q4H PRN, Edgar Chakraborty PA-C    acetaminophen (TYLENOL) suppository 650 mg, 650 mg, Rectal, Q4H PRN, Edgar Chakraborty PA-C    oxyCODONE (ROXICODONE) immediate release tablet 5 mg, 5 mg, Oral, Q4H PRN **OR** oxyCODONE HCl (OXY-IR) immediate release tablet 10 mg, 10 mg, Oral, Q4H PRN, Edgar Chakraborty PA-C    magnesium oxide (MAG-OX) tablet 400 mg, 400 mg, Oral, Daily, Edgar Chakraborty PA-C, 400 mg at 09/30/21 6565    mupirocin (BACTROBAN) 2 % ointment, , Nasal, BID, Edgar Chakraborty PA-C, Given at 10/01/21 0018    magnesium hydroxide (MILK OF MAGNESIA) 400 MG/5ML suspension 30 mL, 30 mL, Oral, Daily PRN, Edgar Chakraborty PA-C    potassium chloride 20 mEq/50 mL IVPB [facility]), 20 mEq, IntraVENous, PRN, Edgar Chakraborty PA-C, Stopped at 09/28/21 1920    magnesium sulfate 2000 mg in 50 mL IVPB premix, 2,000 mg, IntraVENous, PRN, Edgar Chakraborty PA-C    ipratropium-albuterol (DUONEB) nebulizer solution 1 ampule, 1 ampule, Inhalation, Q4H WA, Edgar Chakraborty PA-C, 1 ampule at 10/01/21 0827    albumin human 5 % IV solution 25 g, 25 g, IntraVENous, PRN, Edgar Chakraborty PA-C, Stopped at 09/28/21 0306    0.9 % sodium chloride bolus, 250 mL, IntraVENous, Continuous PRN, Edgar Chakraborty PA-C    norepinephrine (LEVOPHED) 16 mg in dextrose 5% 250 mL infusion, 2 mcg/min, IntraVENous, Continuous PRN, Edgar Chakraborty PA-C, Stopped at 09/28/21 0902    insulin regular (HUMULIN R;NOVOLIN R) 100 Units in sodium chloride 0.9 % 100 mL infusion, 1 Units/hr, IntraVENous, Continuous, SAULO Frank-JAMES, Stopped at 09/29/21 0808    glucose (GLUTOSE) 40 % oral gel 15 g, 15 g, Oral, PRN, Joao Mcguire, PA-C    dextrose 50 % IV solution, 12.5 g, IntraVENous, PRN, Joao Mcguire, PA-C, 12.5 g at 09/30/21 1728    glucagon (rDNA) injection 1 mg, 1 mg, IntraMUSCular, PRN, Yanciella , PA-C    dextrose 5 % solution, 100 mL/hr, IntraVENous, PRN, SAULO Frank-C    nitroPRUSSide (NIPRIDE) 50 mg in dextrose 5 % 250 mL infusion, 0.1-3 mcg/kg/min, IntraVENous, Continuous PRN, SAULO Frank-C    calcium gluconate 1,000 mg in dextrose 5 % 100 mL IVPB, 1,000 mg, IntraVENous, PRN, SAULO Frank-C, Stopped at 10/01/21 0707    [Held by provider] clopidogrel (PLAVIX) tablet 75 mg, 75 mg, Oral, Daily, SAULO Frank-C    [Held by provider] tamsulosin (FLOMAX) capsule 0.4 mg, 0.4 mg, Oral, Daily, [facility] SAULO Mcguire-C    amiodarone (CORDARONE) 450 mg in dextrose 5 % 250 mL infusion, 0.25 mg/min, IntraVENous, Continuous, JUAN MIGUEL Patricio CNP, Last Rate: 8.3 mL/hr at 09/30/21 1536, 0.25 mg/min at 09/30/21 1536    EPINEPHrine (EPINEPHrine HCL) 5 mg in dextrose 5 % 250 mL infusion, 3 mcg/min, IntraVENous, Continuous, Abbey Ley MD, Stopped at 10/01/21 0745    Physical Exam:  BP (!) 131/48   Pulse 103   Temp 98.3 °F (36.8 °C) (Axillary)   Resp 17   Ht 5' 8\" (1.727 m)   Wt 158 lb 11.7 oz (72 kg)   SpO2 100%   BMI 24.14 kg/m²   Wt Readings from Last 3 Encounters:   09/27/21 158 lb 11.7 oz (72 kg)   09/20/21 157 lb (71.2 kg)   09/21/21 160 lb 6.4 oz (72.8 kg)     Appearance: Intubated and sedated  Skin: Intact, no rash  Head: Normocephalic, atraumatic  Eyes: EOMI, no conjunctival erythema  ENMT: No pharyngeal erythema, MMM, no rhinorrhea  Neck: Supple, no elevated JVP, no carotid bruits  Lungs: Diminished  Cardiac: PMI nondisplaced, Regular rhythm with a normal rate, S1 & S2 normal, no murmurs. Sternal incision dressed.   Pacer wires, chest tubes  Abdomen: Soft, nontender, +bowel sounds  Extremities: Moves all extremities x 4, no lower extremity edema, IABP in place  Neurologic: No focal motor deficits apparent  Peripheral Pulses: Intact posterior tibial pulses bilaterally    Intake/Output:    Intake/Output Summary (Last 24 hours) at 10/1/2021 1137  Last data filed at 10/1/2021 1100  Gross per 24 hour   Intake 2491.35 ml   Output 1182 ml   Net 1309.35 ml     I/O this shift:  In: 65 [I.V.:10; NG/GT:55]  Out: 245 [Urine:170; Chest Tube:75]    Laboratory Tests:  Recent Labs     09/30/21  2200 10/01/21  0405 10/01/21  1000    140 139   K 4.3 4.4 4.3    105 103   CO2 24 26 25   BUN 68* 67* 72*   CREATININE 2.4* 2.6* 2.3*   GLUCOSE 171* 146* 143*   CALCIUM 8.4* 8.0* 8.3*     Lab Results   Component Value Date    MG 2.3 10/01/2021     Recent Labs     09/30/21 0415 09/30/21  1610 10/01/21  0405   ALKPHOS 46 56 68   ALT 44* 55* 64*   * 109* 99*   PROT 4.8* 4.9* 5.1*   BILITOT 1.5* 1.7* 1.9*   LABALBU 3.8 4.1 3.8     Recent Labs     09/30/21 0415 09/30/21  1610 10/01/21  0405   WBC 20.0* 20.4* 19.5*   RBC 2.75* 2.69* 2.49*   HGB 8.2* 8.0* 7.6*   HCT 24.8* 24.4* 22.8*   MCV 90.2 90.7 91.6   MCH 29.8 29.7 30.5   MCHC 33.1 32.8 33.3   RDW 14.7 14.9 14.9   PLT 44* 45* 41*   MPV 12.7* 12.5* 12.2*     Lab Results   Component Value Date    CKMB 94.0 (H) 09/28/2021     Lab Results   Component Value Date    INR 2.0 10/01/2021    INR 1.8 09/27/2021    INR 1.1 09/23/2021    PROTIME 22.5 (H) 10/01/2021    PROTIME 19.2 (H) 09/27/2021    PROTIME 12.0 09/23/2021     Lab Results   Component Value Date    TSH 0.621 09/28/2021     Lab Results   Component Value Date    LABA1C 5.1 09/10/2021     No results found for: EAG  Lab Results   Component Value Date    CHOL 115 07/12/2021    CHOL 216 05/06/2021     Lab Results   Component Value Date    TRIG 51 2021    TRIG 96 2021     Lab Results   Component Value Date    HDL 51 2021    HDL 24 (A) 2021     Lab Results   Component Value Date    LDLCALC 54 2021    LDLCALC 21.1 2021     Lab Results   Component Value Date    LABVLDL 10 2021     No results found for: CHOLHDLRATIO  No results for input(s): PROBNP in the last 72 hours. Cardiac Tests:    EK2021: Sinus rhythm 94 beats minute. Left axis. Left bundle branch block QRS duration 132 ms. Similar to prior    Telemetry: Sinus tach low 100s    Chest X-ray:       Impression   1. Worsening right perihilar and right lower lobe airspace disease.  There is   mild left perihilar airspace disease. 2. This finding can represent asymmetrical edema.  Pneumonia cannot be   excluded. 3. There is no right or left pneumothorax. 4. The support lines and tubes are stable in position. Echocardiogram:   Limited echo 2021   Summary   LV systolic function is mildly reduced. Ejection fraction is visually estimated at 45%. Severe hypokinesis of apical inferior and apical septal walls. Mildly dilated right ventricle. Right ventricle global systolic function is near normal.      Compared to echo from 21, LV function and wall motion appear similar,   while RV function looks significantly improved. Limited echo 2021   Summary   Limited echo to evaluate for pericardial effusion and LV function. LV systolic function is mildly reduced. Ejection fraction is visually estimated at 45-50%. There is akinesis of the apical inferior and apical septal walls. Dilated right ventricle, systolic function is severely reduced. Moderate mitral regurgitation is present. Compared to preop echo from 21, LV dysfunction and wall motion   abnormalities are new and MR appears slightly worse. Stress test:      Cardiac catheterization:   Left heart catheterization 2021  ANGIOGRAPHIC FINDINGS:  1.   The left main coronary artery arises normally from the left sinus of  Valsalva. It is a good size vessel with mild distal disease. It  bifurcates into the left anterior descending artery and left circumflex  artery. 2.  The left anterior descending artery has subtotal occlusion in the  ostium. It gives off two mid size diagonal branches before it had total  occlusion. The second diagonal branch actually has also significant  disease involving the proximal and mid segment. There are left-to-left  and right-to-left collaterals to the distal LAD and distal diagonal  branch. 3.  The left circumflex artery is a large vessel that gives off three OM  branches. The first one is small. The second one is mid size with  diffuse disease in the proximal segment. The third one is large with  90% stenosis in the mid segment with aneurysmal dilatation, also the  ostium has 50% to 60%. The left circumflex artery itself has 50% to 60%  disease just before the takeoff of the third OM branch. 4.  The right coronary artery arises normally from the right sinus of  Valsalva. It is a good size vessel that is diffusely diseased in the  mid and distal segment. The right PLV is a mid size vessel with ostial  disease. The right PDA also has a diffuse disease. There are  right-to-left collaterals noted through acute marginal branch and right  PDA.    ----------------------------------------------------------------------------------------------------------------------------------------------------------------  IMPRESSION:  1. Multivessel CAD s/p S/p CABGx3 (LIMA-LAD, SVG-OM, SVG-RCA)/Extensive RCA endarterectomy/MALU AtriClip 9/26/2021  2. Cardiogenic shock, s/p IABP 1:2, currently on dobutamine; epinephrine, vasopressin, norepinephrine weaned off  3. LV dysfunction EF 45% with apical inferior and apical septal akinesis. Had transient severe RV dysfunction which has recovered  4.  Moderate MR  5. NSVT: On amiodarone for A. fib prophylaxis  6. Acute hypoxic respiratory failure: Reintubated. S/p bronc 9/30/2021 with large mucous plugs  7. Lactic acidosis, peaked at 12, normalized  8. NGUYỄN creatinine 0.8 -> 2.3 hemodynamically mediated  9. Chronic left bundle branch block  10. HLD  11. Hx of Hiatal hernia   12. Thrombocytopenia platelets 99->15K getting transfused  13. Acute blood loss anemia hemoglobin 13.4-7.6 getting transfused  14. Leukocytosis     RECOMMENDATIONS:  Patient critically ill but showing continued improvement in hemodynamics and respiratory status.  Continue de-escalation of hemodynamic support as tolerated   Possible removal of IABP today   Possible plan for dialysis but urine output seems to be picking up, temporary line is in place if needed   Further care per CVICU and CT surgery, pulmonary, nephrology   Will be available as needed over the weekend, please call with questions    Makayla Hayes MD, [address] [place] [address] Cardiology    NOTE: This report was transcribed using voice recognition software. Every effort was made to ensure accuracy; however, inadvertent computerized transcription errors may be present.

## 2021-10-01 NOTE — PROGRESS NOTES
Ventilator settings returned to initial settings before bronchoscopy. Rate 14, FIO2 of 40% due to 0000 ABG. Per Dr. Franchesca Pereira ok to wean FIO2 if patient tolerating.

## 2021-10-01 NOTE — PROGRESS NOTES
[facility]                                                                 Division of Pulmonary, [address] [facility]                                                                       Pulmonary &health [facility]                                                                   Pulmonary Consult Note              Reason for Consultation:severe hypoxia ,possible mucus plug   CC : vent/resp failure   HPI:   Patient doing very well as off paralytic and his Oxygenation has improved a lot   S/p removal large mucus plug  No fever or chills  On 40 5  Off paralytic   Wife at bedside  No events  ABG look oj       PHYSICAL EXAMINATION:     VITAL SIGNS:  BP (!) 123/47   Pulse 104   Temp 98.2 °F (36.8 °C)   Resp 16   Ht 5' 8\" (1.727 m)   Wt 158 lb 11.7 oz (72 kg)   SpO2 100%   BMI 24.14 kg/m²   Wt Readings from Last 3 Encounters:   09/27/21 158 lb 11.7 oz (72 kg)   09/20/21 157 lb (71.2 kg)   09/21/21 160 lb 6.4 oz (72.8 kg)     Temp Readings from Last 3 Encounters:   10/01/21 98.2 °F (36.8 °C)   09/27/21 98.4 °F (36.9 °C)   09/23/21 97.1 °F (36.2 °C) (Temporal)     TMAX:  BP Readings from Last 3 Encounters:   10/01/21 (!) 123/47   09/27/21 (!) 144/63   09/23/21 (!) 183/89     Pulse Readings from Last 3 Encounters:   10/01/21 104   09/23/21 66   09/21/21 65           INTAKE/OUTPUTS:  I/O last 3 completed shifts:   In: 2726.4 [I.V.:1896.4; NG/GT:30; IV Piggyback:800]  Out: 1047 [Urine:427; Emesis/NG output:500; Chest Tube:120]    Intake/Output Summary (Last 24 hours) at 10/1/2021 1352  Last data filed at 10/1/2021 1330  Gross per 24 hour   Intake 2701.35 ml   Output 1257 ml   Net 1444.35 ml       General Appearance: intubated and sedated   Eyes: pupils equal, round, and reactive to light, extraocular eye movements intact, conjunctivae normal and sclera anicteric   Neck: neck supple and non tender without mass, no thyromegaly, no thyroid nodules and no cervical adenopathy   Pulmonary/Chest:decrease breath sound bilateral   Cardiovascular: normal rate, regular rhythm, normal S1 and S2, no murmurs, rubs, clicks or gallops, distal pulses intact, no carotid bruits, no murmurs, no gallops, no carotid bruits and no JVD   Abdomen: obese, soft, non-tender, non-distended, normal bowel sounds, no masses or organomegaly   Extremities: no edema   Musculoskeletal: normal range of motion, no joint swelling, deformity or tenderness   Neurologic: reflexes normal and symmetric, no cranial nerve deficit noted    LABS/IMAGING:    CBC:  Lab Results   Component Value Date    WBC 19.5 (H) 10/01/2021    HGB 8.7 (L) 10/01/2021    HCT 25.4 (L) 10/01/2021    MCV 91.6 10/01/2021    PLT 41 (L) 10/01/2021    LYMPHOPCT 5.1 (L) 09/28/2021    RBC 2.49 (L) 10/01/2021    MCH 30.5 10/01/2021    MCHC 33.3 10/01/2021    RDW 14.9 10/01/2021    NEUTOPHILPCT 89.5 (H) 09/28/2021    MONOPCT 4.4 09/28/2021    BASOPCT 0.1 09/28/2021    NEUTROABS 15.00 (H) 09/28/2021    LYMPHSABS 0.86 (L) 09/28/2021    MONOSABS 0.73 09/28/2021    EOSABS 0.00 (L) 09/28/2021    BASOSABS 0.01 09/28/2021       Recent Labs     10/01/21  1145 10/01/21  0405 09/30/21  1610 09/30/21  0415 09/30/21  0415   WBC  --  19.5* 20.4*  --  20.0*   HGB 8.7* 7.6* 8.0*   < > 8.2*   HCT 25.4* 22.8* 24.4*   < > 24.8*   MCV  --  91.6 90.7  --  90.2   PLT  --  41* 45*  --  44*    < > = values in this interval not displayed.        BMP:   Recent Labs     09/30/21  2200 10/01/21  0405 10/01/21  0820 10/01/21  1000    140  --  139   K 4.3 4.4  --  4.3    105  --  103   CO2 24 26  --  25   PHOS  --   --  2.7  --    BUN 68* 67*  --  72*   CREATININE 2.4* 2.6*  --  2.3*       MG:   Lab Results   Component Value Date    MG 2.3 10/01/2021     Ca/Phos:   Lab Results   Component Value Date    CALCIUM 8.3 (L) 10/01/2021    PHOS 2.7 10/01/2021     Amylase: No results found for: AMYLASE  Lipase:   Lab Results Component Value Date    LIPASE 7 (L) 09/28/2021     LIVER PROFILE:   Recent Labs     09/30/21  0415 09/30/21  1610 10/01/21  0405   * 109* 99*   ALT 44* 55* 64*   BILITOT 1.5* 1.7* 1.9*   ALKPHOS 46 56 68       PT/INR:   Recent Labs     10/01/21  1000   PROTIME 22.5*   INR 2.0     APTT: No results for input(s): APTT in the last 72 hours. Cardiac Enzymes:  Lab Results   Component Value Date    CKMB 94.0 (H) 09/28/2021       Hgb A1C:   Lab Results   Component Value Date    LABA1C 5.1 09/10/2021     No results found for: EAG  CRISTIANA: No results found for: CRISTIANA  ESR: No results found for: SEDRATE  CRP: No results found for: CRP  D Dimer: No results found for: DDIMER  Folate and B12: No results found for: [id number], No results found for: FOLATE              PROBLEM LIST:  Patient Active Problem List   Diagnosis    CAD in native artery    Pulmonary nodules    Unstable angina (HCC)    Other hyperlipidemia    Essential hypertension    Gastroesophageal reflux disease with esophagitis without hemorrhage    S/P CABG (coronary artery bypass graft)    Acute respiratory failure with hypoxia (HCC)    Lactic acidemia    NGUYỄN (acute kidney injury) ([facility] [address]    Hypokalemia    Hypocalcemia    Cardiogenic shock (HCC)    Thrombocytopenia (HCC)    Leukocytosis               ASSESSMENT:  1.) Acute Respiratory failure   2.)cardiogenic shock vs other types of shock,septic ,etc   3. )Large mucus plug obstruction left main bronchus and RUL   4. )CAD s/P CABG   5.)NGUYỄN       PLAN:  *-S/p Bronchoscopy shows large mucus plug ,can not remove all with suction and has to use Cryotherapy to remove large mucus plug,which was multiple and blocking multi segments     Aggressive pulmonary toilet   BD  Hypertonic saline   Chest vest when possible   Wean steroids \diuresis as per renal ,seem fluid overload,renal following diuresis as needed    *_,CRP and procal,  *- will send sputum culture  Adjust vent in coordiantaion of critical care team ,will help if needed  Discuss with wife in [facility], [facility]

## 2021-10-01 NOTE — PROGRESS NOTES
Dr. Gillian Goldstein called and will start CVVHD today, he will place the orders. Using CVVHD because patient received cyanocobalamin and to start providing fluid balance.

## 2021-10-01 NOTE — PLAN OF CARE
Problem: Cardiac Output - Decreased:  Goal: Hemodynamic stability will improve  Description: Hemodynamic stability will improve  Outcome: Met This Shift     Problem: Skin Integrity:  Goal: Absence of new skin breakdown  Description: Absence of new skin breakdown  Outcome: Met This Shift     Problem: Cardiac Output - Decreased:  Goal: Cardiac output within specified parameters  Description: Cardiac output within specified parameters  Outcome: Ongoing     Problem: Gas Exchange - Impaired:  Goal: Levels of oxygenation will improve  Description: Levels of oxygenation will improve  Outcome: Ongoing  Note: Patient had bronchoscopy performed yesterday, 9/30 with Dr. Wall Found     Problem: Gas Exchange - Impaired:  Goal: Ability to maintain adequate ventilation will improve  Description: Ability to maintain adequate ventilation will improve  Outcome: Ongoing     Problem: Tissue Perfusion - Cardiopulmonary, Altered:  Goal: Hemodynamic stability will improve  Description: Hemodynamic stability will improve  Outcome: Ongoing     Problem: Tissue Perfusion - Cardiopulmonary, Altered:  Goal: Will show no evidence of cardiac arrhythmias  Description: Will show no evidence of cardiac arrhythmias  Outcome: Ongoing

## 2021-10-01 NOTE — PROGRESS NOTES
Pharmacy Consultation Note  (Renal Dosing and Monitoring)    Initial consult date: 10/1/21  Consulting physician/provider: Dr Leona Merlin  Reason for consult: Renal dosing of medications in CRRT    Ht Readings from Last 1 Encounters:   09/29/21 5' 8\" (1.727 m)     Wt Readings from Last 1 Encounters:   09/27/21 158 lb 11.7 oz (72 kg)       Age/Gender  Allergy Information   78 y.o. male  Dust mite extract               Estimated Creatinine Clearance: 24 mL/min (A) (based on SCr of 2.6 mg/dL (H)). Pt to start CVVHD      Intake/Output Summary (Last 24 hours) at 10/1/2021 0800  Last data filed at 10/1/2021 0700  Gross per 24 hour   Intake 2726.35 ml   Output 992 ml   Net 1734.35 ml     Urine output over the last 24 hours: 0.2 mL/kg/hr (427 mL total)    Assessment:  · 79 yo M admitted 9/27 s/p CABG x3/RCA endarterectomy/MALU exclusion with CT Surgery  · Consulted by Dr. Garrett Crews to renally dose/monitor medications in CVVHD   · Temporary HD line placed 9/30    Plan:  · Continue Piperacillin/tazobactam 3.375 gm IV q8h  · No renal adjustments needed at this time    Will continue to follow. Thank you for the consult.     Adeline Thomas, PharmD, BCPS, BCCCP  10/1/2021  8:08 AM

## 2021-10-01 NOTE — PROGRESS NOTES
chloride      cisatracurium (NIMBEX) infusion Stopped (10/01/21 0740)    midazolam 4 mg/hr (10/01/21 0945)    DOBUTamine 7.5 mcg/kg/min (10/01/21 0227)    epoprostenol (VELETRI) nebulization solution 50 ng/kg/min (10/01/21 1145)    sodium chloride 30 mL/hr (09/30/21 1432)    sodium chloride      norepinephrine Stopped (09/28/21 0902)    insulin Stopped (09/29/21 1544)    dextrose      nitroprusside (NIPRIDE) 50 mg in D5W infusion      amiodarone 450mg/250ml D5W infusion 0.25 mg/min (09/30/21 1536)    EPINEPHrine infusion Stopped (10/01/21 0745)     PRN Meds:.sodium chloride, potassium chloride, magnesium sulfate, calcium gluconate **OR** calcium gluconate **OR** calcium gluconate **OR** calcium gluconate, sodium phosphate IVPB **OR** sodium phosphate IVPB **OR** sodium phosphate IVPB **OR** sodium phosphate IVPB, anticoagulant sodium citrate, sodium chloride, sodium chloride, sodium chloride flush, sodium chloride, heparin flush, fentanNYL, artificial tears, ondansetron, acetaminophen, acetaminophen, oxyCODONE **OR** oxyCODONE, magnesium hydroxide, potassium chloride, magnesium sulfate, albumin human, sodium chloride, norepinephrine, glucose, dextrose, glucagon (rDNA), dextrose, nitroprusside (NIPRIDE) 50 mg in D5W infusion, calcium gluconate IVPB    DATA:    CBC:   Lab Results   Component Value Date    WBC 19.5 10/01/2021    RBC 2.49 10/01/2021    HGB 8.7 10/01/2021    HCT 25.4 10/01/2021    MCV 91.6 10/01/2021    MCH 30.5 10/01/2021    MCHC 33.3 10/01/2021    RDW 14.9 10/01/2021    PLT 41 10/01/2021    MPV 12.2 10/01/2021     CMP:    Lab Results   Component Value Date     10/01/2021    K 4.3 10/01/2021     10/01/2021    CO2 25 10/01/2021    BUN 72 10/01/2021    CREATININE 2.3 10/01/2021    GFRAA 34 10/01/2021    LABGLOM 28 10/01/2021    GLUCOSE 143 10/01/2021    PROT 5.1 10/01/2021    LABALBU 3.8 10/01/2021    CALCIUM 8.3 10/01/2021    BILITOT 1.9 10/01/2021    ALKPHOS 68 10/01/2021    AST 99 10/01/2021    ALT 64 10/01/2021     Magnesium:    Lab Results   Component Value Date    MG 2.3 10/01/2021     Phosphorus:    Lab Results   Component Value Date    PHOS 2.7 10/01/2021        Radiology Review:      CXR 9/30/2021   1. No interval change in the right perihilar airspace disease. 2. Stable position of the support lines and tubes.  There is no pneumothorax.         Chest x-ray October 1, 2021   Parenchymal infiltrates are similar to subtly improved.  Continued follow-up   recommended.               BRIEF SUMMARY OF INITIAL CONSULT:    Briefly Mr. David Rowell is a 53-year-old man with history of HTN, CAD with recent status cardiac catheterization done on September 9 which showed severe multivessel disease, hyperlipidemia, hiatal hernia, who was admitted for elective CABG on September 27, 2021. On admission his creatinine level was 0.8 mg/dL and post surgery his creatinine has progressively increased up to 2.3 mg/dL, reason for this consultation. Postoperatively she developed persistent hypotension, lactic acidosis and respiratory failure for which he was reintubated. Since then she has required multiple pressors and he was placed on IABP. He had received 1 dose of ketorolac perioperatively. His urine output has significantly decrease and is being about 500 cc/day in the last 48 hours and his fluid balance presently is over 9 L. Problems resolved:        IMPRESSION/RECOMMENDATIONS:      1. NGUYỄN stage III (by urine output and probably by creatinine level), CABG associated NGUYỄN, ischemic ATN, oliguric. Temporary dialysis catheter was placed overnight. Urine output quite borderline despite large fluid balance >10 L. Creatinine relatively stable or improved. We will hold initiation of CVVHD today and continue to monitor.     2. Respiratory alkalosis (pH: 7.475, PCO2: 28.2) with metabolic alkalosis (bicarbonate administration)  3. Cardiogenic shock, hemodynamically more stable, pressor support decrease, still on IABP  ----------------------------------------------------  4. Respiratory failure status post intubation  5. CAD status post CABG x3 September 27, 2021  6. Probably pneumonia, on piperacillin-tazobactam  7. Transaminitis with hyperbilirubinemia, possibly shock liver versus congestive liver  8. Normocytic anemia, status post surgery, getting transfused  9. Thrombocytopenia  10.  Nutrition, n.p.o.    Plan:    · Continue to monitor renal function for recovery  · Hold initiation of CRRT   · Obtain proBNP  · Obtain urine indices

## 2021-10-01 NOTE — PROCEDURES
Nery Paul is a 78 y.o. male patient. 1. S/P CABG (coronary artery bypass graft)      Past Medical History:   Diagnosis Date    CAD (coronary artery disease)     Hiatal hernia     Hyperlipidemia     Hypertension      Blood pressure (!) 131/48, pulse 110, temperature 98.9 °F (37.2 °C), temperature source Axillary, resp. rate 18, height 5' 8\" (1.727 m), weight 158 lb 11.7 oz (72 kg), SpO2 100 %. Central Line    Date/Time: 9/30/2021 8:52 PM  Performed by: Mindi Smyth MD  Authorized by: Mindi Smyth MD   Consent: Written consent obtained. Risks and benefits: risks, benefits and alternatives were discussed  Consent given by: power of   Required items: required blood products, implants, devices, and special equipment available  Patient identity confirmed: arm band and provided demographic data  Time out: Immediately prior to procedure a \"time out\" was called to verify the correct patient, procedure, equipment, support staff and site/side marked as required.   Indications: vascular access  Anesthesia: local infiltration    Anesthesia:  Local Anesthetic: lidocaine 1% with epinephrine  Anesthetic total: 5 mL    Sedation:  Patient sedated: yes  Analgesia: see MAR for details    Preparation: skin prepped with 2% chlorhexidine  Skin prep agent dried: skin prep agent completely dried prior to procedure  Sterile barriers: all five maximum sterile barriers used - cap, mask, sterile gown, sterile gloves, and large sterile sheet  Hand hygiene: hand hygiene performed prior to central venous catheter insertion  Location details: right femoral  Patient position: flat  Catheter type: double lumen  Catheter size: 14 Fr  Pre-procedure: landmarks identified  Ultrasound guidance: yes  Sterile ultrasound techniques: sterile gel and sterile probe covers were used  Number of attempts: 1  Successful placement: yes  Post-procedure: line sutured and dressing applied  Assessment: blood return through all ports and free fluid flow  Patient tolerance: patient tolerated the procedure well with no immediate complications          Renard Dave MD  9/30/2021

## 2021-10-01 NOTE — CONSULTS
Vascular Surgery Consultation Note    Reason for Consult: Need for temporary hemodialysis line. HPI :    This is a 78 y.o. male who is admitted to the hospital for treatment of triple-vessel CABG. Patient in cardiogenic shock status post operation. Currently intubated and paralyzed. Currently patient has stage III NGUYỄN most likely ischemic ATN oliguric. Nephrology team consulted and requesting temporary hemodialysis access.     ROS : Negative if blank [], Positive if [x]  General Vascular   [] Fevers [] Claudication (Blocks)   [] Chills [] Rest Pain   [] Weight Loss [] Tissue Loss   [] Chest Pain [] Clotting Disorder    [] SOB at rest [] Leg Swelling   [] SOB with exertion [] DVT/PE      [] Nausea    [] Vomitting [] Stroke/TIA   [] Abdominal Pain [] Focal weakness   [] Melena [] Slurred Speech   [] Hematochezia [] Vision Changes   [] Hematuria    [] Dysuria [x] Hx of Central Catheters   [] Wears Glasses/Contacts  [] Dialysis and If so date initiated   [] Blindness     []  Hand Dominant   [] Difficulty swallowing        Past Medical History:   Diagnosis Date    CAD (coronary artery disease)     Hiatal hernia     Hyperlipidemia     Hypertension         Past Surgical History:   Procedure Laterality Date    CORONARY ARTERY BYPASS GRAFT N/A 9/27/2021    CABG CORONARY ARTERY BYPASS  X 3 EVH, EMMA performed by Rosario Choe MD at [address] [place] [address]     Current Medications:    sodium chloride      sodium chloride      cisatracurium (NIMBEX) infusion 3.5 mcg/kg/min (09/30/21 1622)    midazolam 5 mg/hr (09/30/21 1638)    DOBUTamine 7.5 mcg/kg/min (09/29/21 2204)    epoprostenol (VELETRI) nebulization solution 50 ng/kg/min (09/30/21 1432)    sodium chloride 30 mL/hr (09/30/21 1432)    sodium chloride      norepinephrine Stopped (09/28/21 0902)    insulin Stopped (09/29/21 0808)    dextrose      nitroprusside (NIPRIDE) 50 mg in D5W infusion      vasopressin (Septic Shock) infusion 0.02 Units/min (09/30/21 2048)    amiodarone 450mg/250ml D5W infusion 0.25 mg/min (09/30/21 1536)    EPINEPHrine infusion 1.5 mcg/min (09/30/21 1325)      sodium chloride, sodium chloride flush, sodium chloride, heparin flush, fentanNYL, artificial tears, ondansetron, acetaminophen, acetaminophen, oxyCODONE **OR** oxyCODONE, magnesium hydroxide, potassium chloride, magnesium sulfate, albumin human, sodium chloride, norepinephrine, glucose, dextrose, glucagon (rDNA), dextrose, nitroprusside (NIPRIDE) 50 mg in D5W infusion, calcium gluconate IVPB    butamben-tetracaine-benzocaine        albumin human        lidocaine PF  5 mL IntraDERmal Once    sodium chloride flush  5-40 mL IntraVENous 2 times per day    heparin flush  3 mL IntraVENous 2 times per day    rocuronium  0.6 mg/kg IntraVENous Once    piperacillin-tazobactam  3,375 mg IntraVENous Q8H    hydrocortisone sodium succinate PF  100 mg IntraVENous Q8H    pantoprazole  40 mg IntraVENous Daily    And    sodium chloride (PF)  10 mL IntraVENous Daily    aspirin  81 mg Oral Daily    chlorhexidine  15 mL Mouth/Throat BID    docusate sodium  100 mg Oral BID    sennosides  5 mL Oral BID    insulin lispro  0-3 Units SubCUTAneous Q4H    atorvastatin  40 mg Oral Daily    magnesium oxide  400 mg Oral Daily    mupirocin   Nasal BID    ipratropium-albuterol  1 ampule Inhalation Q4H WA    [Held by provider] clopidogrel  75 mg Oral Daily    [Held by provider] tamsulosin  0.4 mg Oral Daily        Allergies:  Dust mite extract    Social History     Socioeconomic History    Marital status:      Spouse name: Not on file    Number of children: Not on file    Years of education: Not on file    Highest education level: Not on file   Occupational History    Not on file   Tobacco Use    Smoking status: Never Smoker    Smokeless tobacco: Never Used   Vaping Use    Vaping Use: Never used   Substance and Sexual Activity    Alcohol use: Yes     Comment: occ.    Drug use: Never    Sexual activity: Yes     Partners: Female   Other Topics Concern    Not on file   Social History Narrative    Not on file     Social Determinants of Health     Financial Resource Strain:     Difficulty of Paying Living Expenses:    Food Insecurity:     Worried About Running Out of Food in the Last Year:     [address] in the Last Year:    Transportation Needs:     Lack of Transportation (Medical):  Lack of Transportation (Non-Medical):    Physical Activity:     Days of Exercise per Week:     Minutes of Exercise per Session:    Stress:     Feeling of Stress :    Social Connections:     Frequency of Communication with Friends and Family:     Frequency of Social Gatherings with Friends and Family:     Attends Nondenominational Services:     Active Member of Clubs or Organizations:     Attends Club or Organization Meetings:     Marital Status:    Intimate Partner Violence:     Fear of Current or Ex-Partner:     Emotionally Abused:     Physically Abused:     Sexually Abused:         No family history on file.     PHYSICAL EXAM:    BP (!) 131/48   Pulse 108   Temp 98.9 °F (37.2 °C) (Axillary)   Resp 22   Ht 5' 8\" (1.727 m)   Wt 158 lb 11.7 oz (72 kg)   SpO2 100%   BMI 24.14 kg/m²   CONSTIT sclera nonicteric  ENT:  normocepalic, without obvious abnormality  NECK:  supple, symmetrical, trachea midline,no jugular venous distension, no carotid bruits  HEMATOLOGIC/LYMPHATICS:  no cervical lymphadenopathy  LUNGS: On ventilator, good air exchange  CARDIOVASCULAR:  regular rate and rhythm   ABDOMEN:  soft, non-distended, non-tender, Aorta is not palpable  SKIN:  no bruising or bleeding  EXTREMITIES:                 R femoral 2+ L femoral 2+                       LABS:    Lab Results   Component Value Date    WBC 20.4 (H) 09/30/2021    HGB 8.0 (L) 09/30/2021    HCT 24.4 (L) 09/30/2021    PLT 45 (L) 09/30/2021    PROTIME 19.2 (H) 09/27/2021    INR 1.8 09/27/2021    K 4.13 09/30/2021    BUN 59 (H) 09/30/2021    CREATININE 2.4 (H) 09/30/2021       RADIOLOGY:    Assesment/Plan    Right femoral temporal hemodialysis line placed at bedside 9/30  Okay for hemodialysis through this line.     Santa Ruiz MD    Pt seen and examined  Temp paramjit in place  No hematoma    Please call if tunn line needed    Shikha Saucedo MD

## 2021-10-01 NOTE — CARE COORDINATION
SOCIAL WORK/CASEMANAGEMENT TRANSITION OF CARE [id number] [facility], [address], [place], [phone]): renal holding initiation of cvvhd today and will monitor pt who is on a vent after open heart surgery. Both [facility] and  [facility] are following pt.  RANJIT Pascual  10/1/2021

## 2021-10-01 NOTE — PROGRESS NOTES
CVICU Progress Note    Name: Alex Griffin  MRN: [id number]    CC: Postoperative Critical Care Management      Indication for Surgery/Procedure: CAD  LVEF:  NML    RVF:  NML     Important/Relevant PMH/PSH:   LAD 2007 cath with impaired LVEF, HTN, HLD, LBBB, pulmonary nodules      Procedure/Surgeries: 9/27/2021 Sternotomy/CABG x 3 (LIMA-LAD, SVG-OM, SVG-RCA)/Extensive RCA endarterectomy/MALU exclusion with 35 mm AtriClip/EVH LLE/Rigid internal fixation of the sternum with Shon plates x 1/65 modifier     Pacing wires: Atrial and Ventricular     Intake/Output Summary (Last 24 hours) at 10/1/2021 0913  Last data filed at 10/1/2021 0800  Gross per 24 hour   Intake 2426.35 ml   Output 1052 ml   Net 1374.35 ml       Recent Labs     09/30/21  0415 09/30/21  1610 10/01/21  0405   WBC 20.0* 20.4* 19.5*   HGB 8.2* 8.0* 7.6*   HCT 24.8* 24.4* 22.8*   PLT 44* 45* 41*      Lab Results   Component Value Date     10/01/2021    K 4.4 10/01/2021     10/01/2021    CO2 26 10/01/2021    BUN 67 10/01/2021    CREATININE 2.6 10/01/2021    GLUCOSE 146 10/01/2021    CALCIUM 8.0 10/01/2021         Physical Exam:    BP (!) 131/48   Pulse 106   Temp 99 °F (37.2 °C) (Axillary)   Resp 17   Ht 5' 8\" (1.727 m)   Wt 158 lb 11.7 oz (72 kg)   SpO2 100%   BMI 24.14 kg/m²       CXR Findings: 10/1/2021     FINDINGS:   EKG leads overlie the chest.  Numerous overlying wires partially obscure   detail.  Support tubes and lines remain in place.  No identified   pneumothorax.  Right greater than left parenchymal infiltrates persist and   appear similar to subtly improved.  No other interval change.         - CXR personally viewed and interpreted by ICU Nurse Practitioner, agree with above findings     General:   Eyes: PERRL, anicteric   Pulmonary: Diminished bibasilar.  No wheezes, no accessory muscle use noted   Ventilator: Mode: AC/VC, 40% FiO2, 8 PEEP, 500 Vt   Cardiovascular:  RRR, no heaves or thrills on palpation  Tele: ST  Abdomen: Soft, nondistened, OG to LIWS    Extremities: BLE +DP/PT signals, 1+ peripheral edema   Neurologic/Psych: Sedation  Skin: Warm and dry  Incisions: MSI JAYCEE intact, LLE SVG sites well approximated, Left femoral IABP C/D/I     Lines:   ETT 9/28  Left femoral IABP 9/28  Right IJ 9/27  Right brachial Arterial line 9/27  Ybarra 9/27  Right femoral Temporary Dialysis Catheter 9/30  PICC LUE 09/29/2021      Assessment/Plan: POD #4    1. CAD S/p CABGx3 (LIMA-LAD, SVG-OM, SVG-RCA)/Extensive RCA endarterectomy/MALU AtriClip  - ASA (Hold Plavix), Lipitor   - Will remove chest tubes today   - Amio gtt for afib prophylaxis      2. Acute Hypoxic Respiratory Failure  - Extubated DOS, tolerating 2L NC post extubation, acute hypoxia requiring NRB, NIV and reintubation 9/28  - NMBA and iFlolan started 9/28   -Intermittently with episodes of acute hypoxia, last episode 9/30 evening-- CXR with complete left lung white out; pulmonology consulted for STAT bronch, s/p cryotherapy bronch for large obstructive mucous plug left and right main stem  - Vent settings improved overnight s/p bronch, down to 40% FiO2, 8 PEEP   -TOF 1/4, BIS ~40; stop NMBA this AM   -On empiric Zosyn, follow up bronchial washing    -weaning systemic steroids, continue flolan    - ABGs as ordered and PRN, supportive care      3. Cardiogenic Shock   - post CPB required levophed and vasopressin and ultimately IV B12 intraop, postop ICU stable on Norepi 5mcg; acute decompensation with profound hypotension required escalation of vasopressors, given Vit B12 and Methylene Blue, started on epinephrine infusion and given stress steroids   - POD1: STAT Echo with akinesis of apical inferior and septal walls, EF 45-50%;  Severe RV dysfunction, Moderate MR; IABP placed at bedside, 1:1 with improvement in hemodynamics, levophed weaned German@Teevox @ 3mcg, vaso weaned to 0.02 units, inhaled flolan   - POD2: IABP placed 1:2, dobutamine increased to 7.5mcg to facilitate weaning vasopressin, epinephrine as able   - Tbili down to 0.9, AST/ALT mildly elevated (reactive), lactic 5.3 trending down  -POD3 off pressors, on 7.5 Dobutamine and 3 epi, IABP 1:2, lactic normal. Repeat echo with EF 45%, RV near normal function    - POD4 Dobutamine @ 7.5, Epinephrine @ 0.5, wean epi off; weaning stress steroids; IABP remains 1:2, will discuss possible removal today with CTS- send INR     4. Lactic Acidosis  - Worsening lactic night of surgery, peaked at 13.5 in setting of Cardiogenic shock  -LA normal      5. NGUYỄN   - 2/2 above  - Baseline Scr 0.8  - Scr up to 2.6 in setting of critical illness  -supportive care. nephrology consulted, discussed likely need for CVVHD given fluid overload (+10.5L) and oliguria  - temporary dialysis catheter placed 9/30, CVVHD on hold for now      6. Acute Post Operative Pain   - PRN fentanyl for pain management, versed gtt for sedation      7. Stress Hyperglycemia  - No h/o DM  - SSI q 4 hours, restart RHI infusion for BG >180 per protocol     8. Thrombocytopenia  - Plts downtrending 41K, likely consumptive, no s/s of bleeding.   - follow up with HIT panel sent 9/30     9. Acute blood loss anemia  - H/H 7.6/22.8, transfuse one unit this morning   - monitor and transfuse if needed to maintain Hgb >8      10. Leukocytosis  - WBC 19.5K, likely reactive, afebrile weaning systemic steroids  - F/u BCx  - On empiric Zosyn     11. At risk for malnutrition   - Will need TFs started soon, NMBA stopped this AM, monitor and start trickle TFs later today      Wife updated at bedside       This patient has a high probability of sudden deterioration, which requires preparedness to urgently intervene. I participated in the decision making process and managed the direct care of the patient that required frequent assessment and treatment. I personally spent 44 minutes of critical care time treating the patient.        VTE Prophylaxis: Pharmacologic/Mechanical:  Yes, SCDs   Line infection prevention: Can CVC or arterial line be removed: no  Continued need for urinary catheter:  Yes - clinical indication: Patient post major surgery requiring fluid balance and input and output measurement.     Dispo: CVICU     Electronically signed by JUAN MIGUEL Elkins CNP on 10/1/2021 at 9:13 AM

## 2021-10-01 NOTE — PROGRESS NOTES
MS chest tubes removed without difficulty. Pleural drains remain to wall suction. FFP, platelets transfused prior to IABP removal, however when IABP on standby patient became hypotensive with MAP ~60. Discussed with Dr. Aiden Cevallos, will maintain IABP 1:2, MAP ~75. Pt remains on Dobutamine 7.5mcg/min, iFlolan.

## 2021-10-01 NOTE — PROGRESS NOTES
Dr. Abhijit Ron aware of repeat ABG after bronch. OK with patient being placed on a rate of 18 at 70% FIO2. OK with ABGs remaining q4 hours and may decrease to q8 hours after normal. CTS has ABGs ordered q4 hours, will continue this. OK to continue to wean FIO2 as patient tolerates.

## 2021-10-02 ENCOUNTER — APPOINTMENT (OUTPATIENT)
Dept: GENERAL RADIOLOGY | Age: 76
DRG: 235 | End: 2021-10-02
Attending: THORACIC SURGERY (CARDIOTHORACIC VASCULAR SURGERY)
Payer: MEDICARE

## 2021-10-02 LAB
AADO2: 109.2 MMHG
AADO2: 112.5 MMHG
AADO2: 118.3 MMHG
AADO2: 120.7 MMHG
AADO2: 123.8 MMHG
AADO2: 125.5 MMHG
AADO2: 138.5 MMHG
ALBUMIN SERPL-MCNC: 3.3 G/DL (ref 3.5–5.2)
ALBUMIN SERPL-MCNC: 3.5 G/DL (ref 3.5–5.2)
ALBUMIN SERPL-MCNC: 3.5 G/DL (ref 3.5–5.2)
ALP BLD-CCNC: 87 U/L (ref 40–129)
ALP BLD-CCNC: 88 U/L (ref 40–129)
ALP BLD-CCNC: 91 U/L (ref 40–129)
ALP BLD-CCNC: 93 U/L (ref 40–129)
ALP BLD-CCNC: 94 U/L (ref 40–129)
ALT SERPL-CCNC: 26 U/L (ref 0–40)
ALT SERPL-CCNC: 29 U/L (ref 0–40)
ALT SERPL-CCNC: 33 U/L (ref 0–40)
ALT SERPL-CCNC: 33 U/L (ref 0–40)
ALT SERPL-CCNC: 37 U/L (ref 0–40)
ANION GAP SERPL CALCULATED.3IONS-SCNC: 11 MMOL/L (ref 7–16)
ANION GAP SERPL CALCULATED.3IONS-SCNC: 12 MMOL/L (ref 7–16)
ANION GAP SERPL CALCULATED.3IONS-SCNC: 6 MMOL/L (ref 7–16)
ANION GAP SERPL CALCULATED.3IONS-SCNC: 7 MMOL/L (ref 7–16)
ANION GAP SERPL CALCULATED.3IONS-SCNC: 8 MMOL/L (ref 7–16)
ANION GAP SERPL CALCULATED.3IONS-SCNC: 9 MMOL/L (ref 7–16)
AST SERPL-CCNC: 29 U/L (ref 0–39)
AST SERPL-CCNC: 31 U/L (ref 0–39)
AST SERPL-CCNC: 39 U/L (ref 0–39)
AST SERPL-CCNC: 39 U/L (ref 0–39)
AST SERPL-CCNC: 43 U/L (ref 0–39)
B.E.: 0 MMOL/L (ref -3–3)
B.E.: 0.7 MMOL/L (ref -3–3)
B.E.: 1 MMOL/L (ref -3–3)
B.E.: 1.3 MMOL/L (ref -3–3)
B.E.: 1.7 MMOL/L (ref -3–3)
B.E.: 2 MMOL/L (ref -3–3)
B.E.: 2.6 MMOL/L (ref -3–3)
BILIRUB SERPL-MCNC: 2.2 MG/DL (ref 0–1.2)
BILIRUB SERPL-MCNC: 2.2 MG/DL (ref 0–1.2)
BILIRUB SERPL-MCNC: 2.4 MG/DL (ref 0–1.2)
BILIRUB SERPL-MCNC: 2.4 MG/DL (ref 0–1.2)
BILIRUB SERPL-MCNC: 2.5 MG/DL (ref 0–1.2)
BUN BLDV-MCNC: 106 MG/DL (ref 6–23)
BUN BLDV-MCNC: 85 MG/DL (ref 6–23)
BUN BLDV-MCNC: 85 MG/DL (ref 6–23)
BUN BLDV-MCNC: 87 MG/DL (ref 6–23)
BUN BLDV-MCNC: 94 MG/DL (ref 6–23)
BUN BLDV-MCNC: 98 MG/DL (ref 6–23)
CALCIUM IONIZED: 1.09 MMOL/L (ref 1.15–1.33)
CALCIUM IONIZED: 1.12 MMOL/L (ref 1.15–1.33)
CALCIUM IONIZED: 1.14 MMOL/L (ref 1.15–1.33)
CALCIUM IONIZED: 1.16 MMOL/L (ref 1.15–1.33)
CALCIUM SERPL-MCNC: 7.8 MG/DL (ref 8.6–10.2)
CALCIUM SERPL-MCNC: 8.1 MG/DL (ref 8.6–10.2)
CALCIUM SERPL-MCNC: 8.3 MG/DL (ref 8.6–10.2)
CALCIUM SERPL-MCNC: 8.5 MG/DL (ref 8.6–10.2)
CALCIUM SERPL-MCNC: 8.6 MG/DL (ref 8.6–10.2)
CALCIUM SERPL-MCNC: 8.7 MG/DL (ref 8.6–10.2)
CHLORIDE BLD-SCNC: 100 MMOL/L (ref 98–107)
CHLORIDE BLD-SCNC: 103 MMOL/L (ref 98–107)
CHLORIDE BLD-SCNC: 103 MMOL/L (ref 98–107)
CHLORIDE BLD-SCNC: 104 MMOL/L (ref 98–107)
CHLORIDE BLD-SCNC: 105 MMOL/L (ref 98–107)
CHLORIDE BLD-SCNC: 105 MMOL/L (ref 98–107)
CO2: 25 MMOL/L (ref 22–29)
CO2: 26 MMOL/L (ref 22–29)
CO2: 27 MMOL/L (ref 22–29)
CO2: 28 MMOL/L (ref 22–29)
CO2: 29 MMOL/L (ref 22–29)
CO2: 29 MMOL/L (ref 22–29)
COHB: 0.1 % (ref 0–1.5)
COHB: 0.2 % (ref 0–1.5)
COHB: 0.2 % (ref 0–1.5)
COHB: 0.3 % (ref 0–1.5)
COHB: 0.9 % (ref 0–1.5)
CREAT SERPL-MCNC: 2.7 MG/DL (ref 0.7–1.2)
CREAT SERPL-MCNC: 2.8 MG/DL (ref 0.7–1.2)
CREAT SERPL-MCNC: 2.9 MG/DL (ref 0.7–1.2)
CREAT SERPL-MCNC: 2.9 MG/DL (ref 0.7–1.2)
CREAT SERPL-MCNC: 3 MG/DL (ref 0.7–1.2)
CREAT SERPL-MCNC: 3.4 MG/DL (ref 0.7–1.2)
CRITICAL: ABNORMAL
DATE ANALYZED: ABNORMAL
DATE OF COLLECTION: ABNORMAL
FIO2: 40 %
GFR AFRICAN AMERICAN: 22
GFR AFRICAN AMERICAN: 25
GFR AFRICAN AMERICAN: 26
GFR AFRICAN AMERICAN: 26
GFR AFRICAN AMERICAN: 27
GFR AFRICAN AMERICAN: 28
GFR NON-AFRICAN AMERICAN: 18 ML/MIN/1.73
GFR NON-AFRICAN AMERICAN: 21 ML/MIN/1.73
GFR NON-AFRICAN AMERICAN: 22 ML/MIN/1.73
GFR NON-AFRICAN AMERICAN: 23 ML/MIN/1.73
GLUCOSE BLD-MCNC: 132 MG/DL (ref 74–99)
GLUCOSE BLD-MCNC: 141 MG/DL (ref 74–99)
GLUCOSE BLD-MCNC: 146 MG/DL (ref 74–99)
GLUCOSE BLD-MCNC: 148 MG/DL (ref 74–99)
GLUCOSE BLD-MCNC: 150 MG/DL (ref 74–99)
GLUCOSE BLD-MCNC: 154 MG/DL (ref 74–99)
HCO3: 22.4 MMOL/L (ref 22–26)
HCO3: 23.8 MMOL/L (ref 22–26)
HCO3: 24 MMOL/L (ref 22–26)
HCO3: 24.1 MMOL/L (ref 22–26)
HCO3: 24.3 MMOL/L (ref 22–26)
HCO3: 24.9 MMOL/L (ref 22–26)
HCO3: 25.4 MMOL/L (ref 22–26)
HCT VFR BLD CALC: 26.2 % (ref 37–54)
HCT VFR BLD CALC: 26.4 % (ref 37–54)
HCT VFR BLD CALC: 27.1 % (ref 37–54)
HEMOGLOBIN: 8.7 G/DL (ref 12.5–16.5)
HEMOGLOBIN: 8.8 G/DL (ref 12.5–16.5)
HEMOGLOBIN: 9.2 G/DL (ref 12.5–16.5)
HHB: 1.7 % (ref 0–5)
HHB: 1.8 % (ref 0–5)
HHB: 2.3 % (ref 0–5)
HHB: 2.4 % (ref 0–5)
HHB: 2.5 % (ref 0–5)
HHB: 2.5 % (ref 0–5)
HHB: 3 % (ref 0–5)
INR BLD: 1.7
LAB: ABNORMAL
LACTIC ACID: 1.1 MMOL/L (ref 0.5–2.2)
LACTIC ACID: 1.2 MMOL/L (ref 0.5–2.2)
LACTIC ACID: 1.3 MMOL/L (ref 0.5–2.2)
Lab: ABNORMAL
MAGNESIUM: 2.3 MG/DL (ref 1.6–2.6)
MCH RBC QN AUTO: 28.8 PG (ref 26–35)
MCH RBC QN AUTO: 29.5 PG (ref 26–35)
MCHC RBC AUTO-ENTMCNC: 33.2 % (ref 32–34.5)
MCHC RBC AUTO-ENTMCNC: 33.9 % (ref 32–34.5)
MCV RBC AUTO: 86.8 FL (ref 80–99.9)
MCV RBC AUTO: 86.9 FL (ref 80–99.9)
METER GLUCOSE: 101 MG/DL (ref 74–99)
METER GLUCOSE: 127 MG/DL (ref 74–99)
METER GLUCOSE: 137 MG/DL (ref 74–99)
METER GLUCOSE: 140 MG/DL (ref 74–99)
METER GLUCOSE: 143 MG/DL (ref 74–99)
METER GLUCOSE: 144 MG/DL (ref 74–99)
METHB: 0.2 % (ref 0–1.5)
METHB: 0.3 % (ref 0–1.5)
METHB: 0.6 % (ref 0–1.5)
MODE: AC
O2 CONTENT: 13 ML/DL
O2 CONTENT: 13.9 ML/DL
O2 SATURATION: 97 % (ref 92–98.5)
O2 SATURATION: 97.5 % (ref 92–98.5)
O2 SATURATION: 97.5 % (ref 92–98.5)
O2 SATURATION: 97.6 % (ref 92–98.5)
O2 SATURATION: 97.7 % (ref 92–98.5)
O2 SATURATION: 98.2 % (ref 92–98.5)
O2 SATURATION: 98.3 % (ref 92–98.5)
O2HB: 96.4 % (ref 94–97)
O2HB: 96.5 % (ref 94–97)
O2HB: 97 % (ref 94–97)
O2HB: 97 % (ref 94–97)
O2HB: 97.1 % (ref 94–97)
O2HB: 97.3 % (ref 94–97)
O2HB: 97.9 % (ref 94–97)
OPERATOR ID: 1741
OPERATOR ID: 1741
OPERATOR ID: ABNORMAL
PATIENT TEMP: 37 C
PCO2: 28.4 MMHG (ref 35–45)
PCO2: 31 MMHG (ref 35–45)
PCO2: 31.5 MMHG (ref 35–45)
PCO2: 32.4 MMHG (ref 35–45)
PCO2: 32.6 MMHG (ref 35–45)
PDW BLD-RTO: 16.1 FL (ref 11.5–15)
PDW BLD-RTO: 16.1 FL (ref 11.5–15)
PEEP/CPAP: 6 CMH2O
PEEP/CPAP: 7 CMH2O
PFO2: 2.48 MMHG/%
PFO2: 2.84 MMHG/%
PFO2: 2.93 MMHG/%
PFO2: 2.97 MMHG/%
PFO2: 2.99 MMHG/%
PFO2: 3.18 MMHG/%
PFO2: 3.21 MMHG/%
PH BLOOD GAS: 7.48 (ref 7.35–7.45)
PH BLOOD GAS: 7.49 (ref 7.35–7.45)
PH BLOOD GAS: 7.5 (ref 7.35–7.45)
PH BLOOD GAS: 7.5 (ref 7.35–7.45)
PH BLOOD GAS: 7.51 (ref 7.35–7.45)
PHOSPHORUS: 2.7 MG/DL (ref 2.5–4.5)
PHOSPHORUS: 3.3 MG/DL (ref 2.5–4.5)
PHOSPHORUS: 3.5 MG/DL (ref 2.5–4.5)
PLATELET # BLD: 48 E9/L (ref 130–450)
PLATELET # BLD: 55 E9/L (ref 130–450)
PLATELET CONFIRMATION: NORMAL
PLATELET CONFIRMATION: NORMAL
PMV BLD AUTO: 10.7 FL (ref 7–12)
PMV BLD AUTO: 11.2 FL (ref 7–12)
PO2: 113.5 MMHG (ref 75–100)
PO2: 117.2 MMHG (ref 75–100)
PO2: 118.7 MMHG (ref 75–100)
PO2: 119.4 MMHG (ref 75–100)
PO2: 127.1 MMHG (ref 75–100)
PO2: 128.5 MMHG (ref 75–100)
PO2: 99.2 MMHG (ref 75–100)
POTASSIUM SERPL-SCNC: 3.66 MMOL/L (ref 3.5–5)
POTASSIUM SERPL-SCNC: 3.68 MMOL/L (ref 3.5–5)
POTASSIUM SERPL-SCNC: 3.77 MMOL/L (ref 3.5–5)
POTASSIUM SERPL-SCNC: 3.86 MMOL/L (ref 3.5–5)
POTASSIUM SERPL-SCNC: 3.9 MMOL/L (ref 3.5–5)
POTASSIUM SERPL-SCNC: 4 MMOL/L (ref 3.5–5)
POTASSIUM SERPL-SCNC: 4 MMOL/L (ref 3.5–5)
POTASSIUM SERPL-SCNC: 4.1 MMOL/L (ref 3.5–5)
POTASSIUM SERPL-SCNC: 4.3 MMOL/L (ref 3.5–5)
POTASSIUM SERPL-SCNC: 4.3 MMOL/L (ref 3.5–5)
PROTHROMBIN TIME: 18.9 SEC (ref 9.3–12.4)
RBC # BLD: 3.02 E12/L (ref 3.8–5.8)
RBC # BLD: 3.12 E12/L (ref 3.8–5.8)
RI(T): 0.85
RI(T): 0.99
RI(T): 1.03
RI(T): 1.11
RI(T): 1.4
RI(T): 104 %
RI(T): 88 %
RR MECHANICAL: 14 B/MIN
SODIUM BLD-SCNC: 136 MMOL/L (ref 132–146)
SODIUM BLD-SCNC: 138 MMOL/L (ref 132–146)
SODIUM BLD-SCNC: 140 MMOL/L (ref 132–146)
SODIUM BLD-SCNC: 140 MMOL/L (ref 132–146)
SODIUM BLD-SCNC: 141 MMOL/L (ref 132–146)
SODIUM BLD-SCNC: 142 MMOL/L (ref 132–146)
SOURCE, BLOOD GAS: ABNORMAL
THB: 10 G/DL (ref 11.5–16.5)
THB: 10.1 G/DL (ref 11.5–16.5)
THB: 10.2 G/DL (ref 11.5–16.5)
THB: 9.3 G/DL (ref 11.5–16.5)
THB: 9.9 G/DL (ref 11.5–16.5)
TIME ANALYZED: 1411
TIME ANALYZED: 1817
TIME ANALYZED: 2016
TIME ANALYZED: 2340
TIME ANALYZED: 4
TIME ANALYZED: 404
TIME ANALYZED: 834
TOTAL PROTEIN: 4.8 G/DL (ref 6.4–8.3)
TOTAL PROTEIN: 4.9 G/DL (ref 6.4–8.3)
TOTAL PROTEIN: 4.9 G/DL (ref 6.4–8.3)
TOTAL PROTEIN: 5.1 G/DL (ref 6.4–8.3)
TOTAL PROTEIN: 5.2 G/DL (ref 6.4–8.3)
VT MECHANICAL: 500 ML
WBC # BLD: 17 E9/L (ref 4.5–11.5)
WBC # BLD: 17.9 E9/L (ref 4.5–11.5)

## 2021-10-02 PROCEDURE — 2580000003 HC RX 258: Performed by: THORACIC SURGERY (CARDIOTHORACIC VASCULAR SURGERY)

## 2021-10-02 PROCEDURE — 6360000002 HC RX W HCPCS: Performed by: INTERNAL MEDICINE

## 2021-10-02 PROCEDURE — 84132 ASSAY OF SERUM POTASSIUM: CPT

## 2021-10-02 PROCEDURE — 2580000003 HC RX 258: Performed by: SURGERY

## 2021-10-02 PROCEDURE — 99233 SBSQ HOSP IP/OBS HIGH 50: CPT | Performed by: INTERNAL MEDICINE

## 2021-10-02 PROCEDURE — 6360000002 HC RX W HCPCS: Performed by: STUDENT IN AN ORGANIZED HEALTH CARE EDUCATION/TRAINING PROGRAM

## 2021-10-02 PROCEDURE — 71045 X-RAY EXAM CHEST 1 VIEW: CPT

## 2021-10-02 PROCEDURE — 5A1D70Z PERFORMANCE OF URINARY FILTRATION, INTERMITTENT, LESS THAN 6 HOURS PER DAY: ICD-10-PCS | Performed by: THORACIC SURGERY (CARDIOTHORACIC VASCULAR SURGERY)

## 2021-10-02 PROCEDURE — 94645 CONT INHLJ TX EACH ADDL HOUR: CPT

## 2021-10-02 PROCEDURE — 6370000000 HC RX 637 (ALT 250 FOR IP): Performed by: NURSE PRACTITIONER

## 2021-10-02 PROCEDURE — 37799 UNLISTED PX VASCULAR SURGERY: CPT

## 2021-10-02 PROCEDURE — 85018 HEMOGLOBIN: CPT

## 2021-10-02 PROCEDURE — 36415 COLL VENOUS BLD VENIPUNCTURE: CPT

## 2021-10-02 PROCEDURE — 82962 GLUCOSE BLOOD TEST: CPT

## 2021-10-02 PROCEDURE — 2580000003 HC RX 258: Performed by: NURSE PRACTITIONER

## 2021-10-02 PROCEDURE — 82805 BLOOD GASES W/O2 SATURATION: CPT

## 2021-10-02 PROCEDURE — 83605 ASSAY OF LACTIC ACID: CPT

## 2021-10-02 PROCEDURE — 6360000002 HC RX W HCPCS: Performed by: SURGERY

## 2021-10-02 PROCEDURE — 2500000003 HC RX 250 WO HCPCS: Performed by: NURSE PRACTITIONER

## 2021-10-02 PROCEDURE — 80053 COMPREHEN METABOLIC PANEL: CPT

## 2021-10-02 PROCEDURE — 6370000000 HC RX 637 (ALT 250 FOR IP): Performed by: PHYSICIAN ASSISTANT

## 2021-10-02 PROCEDURE — 83735 ASSAY OF MAGNESIUM: CPT

## 2021-10-02 PROCEDURE — C9113 INJ PANTOPRAZOLE SODIUM, VIA: HCPCS | Performed by: SURGERY

## 2021-10-02 PROCEDURE — 82330 ASSAY OF CALCIUM: CPT

## 2021-10-02 PROCEDURE — 6360000002 HC RX W HCPCS: Performed by: THORACIC SURGERY (CARDIOTHORACIC VASCULAR SURGERY)

## 2021-10-02 PROCEDURE — 84100 ASSAY OF PHOSPHORUS: CPT

## 2021-10-02 PROCEDURE — 85027 COMPLETE CBC AUTOMATED: CPT

## 2021-10-02 PROCEDURE — 6360000002 HC RX W HCPCS: Performed by: NURSE PRACTITIONER

## 2021-10-02 PROCEDURE — 80048 BASIC METABOLIC PNL TOTAL CA: CPT

## 2021-10-02 PROCEDURE — 6360000002 HC RX W HCPCS: Performed by: PHYSICIAN ASSISTANT

## 2021-10-02 PROCEDURE — 2580000003 HC RX 258: Performed by: PHYSICIAN ASSISTANT

## 2021-10-02 PROCEDURE — 85014 HEMATOCRIT: CPT

## 2021-10-02 PROCEDURE — 2000000000 HC ICU R&B

## 2021-10-02 PROCEDURE — 94003 VENT MGMT INPAT SUBQ DAY: CPT

## 2021-10-02 PROCEDURE — 2580000003 HC RX 258: Performed by: STUDENT IN AN ORGANIZED HEALTH CARE EDUCATION/TRAINING PROGRAM

## 2021-10-02 PROCEDURE — 94640 AIRWAY INHALATION TREATMENT: CPT

## 2021-10-02 PROCEDURE — 85610 PROTHROMBIN TIME: CPT

## 2021-10-02 RX ORDER — FUROSEMIDE 10 MG/ML
80 INJECTION INTRAMUSCULAR; INTRAVENOUS ONCE
Status: COMPLETED | OUTPATIENT
Start: 2021-10-02 | End: 2021-10-02

## 2021-10-02 RX ORDER — SODIUM CHLORIDE 9 MG/ML
10 INJECTION INTRAVENOUS 2 TIMES DAILY
Status: DISCONTINUED | OUTPATIENT
Start: 2021-10-02 | End: 2021-10-10

## 2021-10-02 RX ORDER — PANTOPRAZOLE SODIUM 40 MG/10ML
40 INJECTION, POWDER, LYOPHILIZED, FOR SOLUTION INTRAVENOUS 2 TIMES DAILY
Status: DISCONTINUED | OUTPATIENT
Start: 2021-10-02 | End: 2021-10-10

## 2021-10-02 RX ORDER — SUCRALFATE 1 G/1
1 TABLET ORAL EVERY 6 HOURS SCHEDULED
Status: DISCONTINUED | OUTPATIENT
Start: 2021-10-02 | End: 2021-10-02

## 2021-10-02 RX ADMIN — SENNOSIDES 5 ML: 8.8 SYRUP ORAL at 20:55

## 2021-10-02 RX ADMIN — BUMETANIDE 0.5 MG/HR: 0.25 INJECTION INTRAMUSCULAR; INTRAVENOUS at 10:23

## 2021-10-02 RX ADMIN — SODIUM CHLORIDE, PRESERVATIVE FREE 10 ML: 5 INJECTION INTRAVENOUS at 20:54

## 2021-10-02 RX ADMIN — CHLORHEXIDINE GLUCONATE 15 ML: 1.2 RINSE ORAL at 20:54

## 2021-10-02 RX ADMIN — CHLORHEXIDINE GLUCONATE 15 ML: 1.2 RINSE ORAL at 10:32

## 2021-10-02 RX ADMIN — CALCIUM GLUCONATE 1000 MG: 98 INJECTION, SOLUTION INTRAVENOUS at 18:17

## 2021-10-02 RX ADMIN — MIDAZOLAM 5 MG/HR: 5 INJECTION, SOLUTION INTRAMUSCULAR; INTRAVENOUS at 06:32

## 2021-10-02 RX ADMIN — POTASSIUM CHLORIDE 20 MEQ: 400 INJECTION, SOLUTION INTRAVENOUS at 20:56

## 2021-10-02 RX ADMIN — CALCIUM GLUCONATE 1000 MG: 98 INJECTION, SOLUTION INTRAVENOUS at 02:14

## 2021-10-02 RX ADMIN — AMIODARONE HYDROCHLORIDE 0.25 MG/MIN: 50 INJECTION, SOLUTION INTRAVENOUS at 20:55

## 2021-10-02 RX ADMIN — DOCUSATE SODIUM 100 MG: 50 LIQUID ORAL at 10:19

## 2021-10-02 RX ADMIN — PIPERACILLIN AND TAZOBACTAM 3375 MG: 3; .375 INJECTION, POWDER, LYOPHILIZED, FOR SOLUTION INTRAVENOUS at 17:30

## 2021-10-02 RX ADMIN — PANTOPRAZOLE SODIUM 40 MG: 40 INJECTION, POWDER, FOR SOLUTION INTRAVENOUS at 10:15

## 2021-10-02 RX ADMIN — EPOPROSTENOL 40 NG/KG/MIN: 1.5 INJECTION, POWDER, LYOPHILIZED, FOR SOLUTION INTRAVENOUS at 17:33

## 2021-10-02 RX ADMIN — Medication 10 ML: at 21:00

## 2021-10-02 RX ADMIN — POTASSIUM CHLORIDE 20 MEQ: 29.8 INJECTION, SOLUTION INTRAVENOUS at 14:42

## 2021-10-02 RX ADMIN — MIDAZOLAM 4 MG/HR: 5 INJECTION, SOLUTION INTRAMUSCULAR; INTRAVENOUS at 20:56

## 2021-10-02 RX ADMIN — IPRATROPIUM BROMIDE AND ALBUTEROL SULFATE 1 AMPULE: .5; 2.5 SOLUTION RESPIRATORY (INHALATION) at 20:53

## 2021-10-02 RX ADMIN — OXYCODONE 5 MG: 5 TABLET ORAL at 20:55

## 2021-10-02 RX ADMIN — FUROSEMIDE 80 MG: 10 INJECTION, SOLUTION INTRAMUSCULAR; INTRAVENOUS at 07:54

## 2021-10-02 RX ADMIN — MINERAL OIL AND PETROLATUM: 150; 830 OINTMENT OPHTHALMIC at 20:41

## 2021-10-02 RX ADMIN — PANTOPRAZOLE SODIUM 40 MG: 40 INJECTION, POWDER, FOR SOLUTION INTRAVENOUS at 05:13

## 2021-10-02 RX ADMIN — ATORVASTATIN CALCIUM 40 MG: 40 TABLET, FILM COATED ORAL at 20:55

## 2021-10-02 RX ADMIN — IPRATROPIUM BROMIDE AND ALBUTEROL SULFATE 1 AMPULE: .5; 2.5 SOLUTION RESPIRATORY (INHALATION) at 08:00

## 2021-10-02 RX ADMIN — PIPERACILLIN AND TAZOBACTAM 3375 MG: 3; .375 INJECTION, POWDER, LYOPHILIZED, FOR SOLUTION INTRAVENOUS at 00:30

## 2021-10-02 RX ADMIN — EPOPROSTENOL 40 NG/KG/MIN: 1.5 INJECTION, POWDER, LYOPHILIZED, FOR SOLUTION INTRAVENOUS at 09:22

## 2021-10-02 RX ADMIN — DOBUTAMINE IN DEXTROSE 7 MCG/KG/MIN: 400 INJECTION, SOLUTION INTRAVENOUS at 09:25

## 2021-10-02 RX ADMIN — Medication 400 MG: at 07:59

## 2021-10-02 RX ADMIN — PANTOPRAZOLE SODIUM 40 MG: 40 INJECTION, POWDER, FOR SOLUTION INTRAVENOUS at 20:54

## 2021-10-02 RX ADMIN — SENNOSIDES 5 ML: 8.8 SYRUP ORAL at 10:19

## 2021-10-02 RX ADMIN — HYDROCORTISONE SODIUM SUCCINATE 100 MG: 100 INJECTION, POWDER, FOR SOLUTION INTRAMUSCULAR; INTRAVENOUS at 05:13

## 2021-10-02 RX ADMIN — PIPERACILLIN AND TAZOBACTAM 3375 MG: 3; .375 INJECTION, POWDER, LYOPHILIZED, FOR SOLUTION INTRAVENOUS at 10:15

## 2021-10-02 RX ADMIN — DOCUSATE SODIUM 100 MG: 50 LIQUID ORAL at 20:55

## 2021-10-02 RX ADMIN — FENTANYL CITRATE 12.5 MCG: 0.05 INJECTION, SOLUTION INTRAMUSCULAR; INTRAVENOUS at 16:15

## 2021-10-02 RX ADMIN — HYDROCORTISONE SODIUM SUCCINATE 100 MG: 100 INJECTION, POWDER, FOR SOLUTION INTRAMUSCULAR; INTRAVENOUS at 18:17

## 2021-10-02 RX ADMIN — IPRATROPIUM BROMIDE AND ALBUTEROL SULFATE 1 AMPULE: .5; 2.5 SOLUTION RESPIRATORY (INHALATION) at 11:07

## 2021-10-02 RX ADMIN — Medication 10 ML: at 10:37

## 2021-10-02 ASSESSMENT — PAIN SCALES - GENERAL
PAINLEVEL_OUTOF10: 0
PAINLEVEL_OUTOF10: 8
PAINLEVEL_OUTOF10: 0

## 2021-10-02 ASSESSMENT — PULMONARY FUNCTION TESTS
PIF_VALUE: 24
PIF_VALUE: 22
PIF_VALUE: 21
PIF_VALUE: 30
PIF_VALUE: 20
PIF_VALUE: 22
PIF_VALUE: 19
PIF_VALUE: 19
PIF_VALUE: 21
PIF_VALUE: 20
PIF_VALUE: 19
PIF_VALUE: 19
PIF_VALUE: 20
PIF_VALUE: 21
PIF_VALUE: 20
PIF_VALUE: 22
PIF_VALUE: 26

## 2021-10-02 NOTE — FLOWSHEET NOTE
Spoke to , update him in 2 hours on the patients urine output after the one time dose of lasix is given. If less then a litter of urine removed he recommends CVVHD and will speak to Rizwan Kilgore before starting. If greater then 1L will start Bumex drip and hold off on CVVHD at this time.

## 2021-10-02 NOTE — PLAN OF CARE
Problem: Falls - Risk of:  Goal: Will remain free from falls  Description: Will remain free from falls  10/1/2021 2133 by Charles Pradhan RN  Outcome: Met This Shift     Problem: Falls - Risk of:  Goal: Absence of physical injury  Description: Absence of physical injury  10/1/2021 2133 by Charles Pradhan RN  Outcome: Met This Shift     Problem: Activity Intolerance:  Goal: Able to perform prescribed physical activity  Description: Able to perform prescribed physical activity  Outcome: Met This Shift     Problem:  Activity Intolerance:  Goal: Ability to tolerate increased activity will improve  Description: Ability to tolerate increased activity will improve  Outcome: Met This Shift     Problem: Cardiac Output - Decreased:  Goal: Cardiac output within specified parameters  Description: Cardiac output within specified parameters  Outcome: Met This Shift     Problem: Cardiac Output - Decreased:  Goal: Hemodynamic stability will improve  Description: Hemodynamic stability will improve  Outcome: Met This Shift     Problem: Skin Integrity:  Goal: Will show no infection signs and symptoms  Description: Will show no infection signs and symptoms  10/1/2021 2133 by Charles Pradhan RN  Outcome: Met This Shift     Problem: Skin Integrity:  Goal: Absence of new skin breakdown  Description: Absence of new skin breakdown  10/1/2021 2133 by Charles Pradhan RN  Outcome: Met This Shift

## 2021-10-02 NOTE — PROGRESS NOTES
Pharmacy Consultation Note  (Renal Dosing and Monitoring)    Initial consult date: 10/1/21  Consulting physician/provider: Dr Leonor Andrade  Reason for consult: Renal dosing of medications in CRRT    Ht Readings from Last 1 Encounters:   10/01/21 5' 8\" (1.727 m)     Wt Readings from Last 1 Encounters:   09/27/21 158 lb 11.7 oz (72 kg)       Age/Gender  Allergy Information   78 y.o. male  Dust mite extract               Estimated Creatinine Clearance: 22 mL/min (A) (based on SCr of 2.9 mg/dL (H)). Intake/Output Summary (Last 24 hours) at 10/2/2021 0747  Last data filed at 10/2/2021 0700  Gross per 24 hour   Intake 7393.44 ml   Output 1365 ml   Net 6028.44 ml     Urine output over the last 24 hours: 0.4 mL/kg/hr (730 mL total)    Assessment:  · 77 yo M admitted 9/27 s/p CABG x3/RCA endarterectomy/MALU exclusion with CT Surgery  · Consulted by Dr. Kirby Partida to renally dose/monitor medications in CVVHD   · Temporary HD line placed 9/30  · CVVHD not initiated 10/1    Plan:  · Continue Piperacillin/tazobactam 3.375 gm IV q8h for now. May need to decrease to q12h if CVVHD remains on hold and renal function worsens further  · No renal adjustments needed at this time    Will continue to follow.       Adore Hicks, PharmD, BCPS, BCCCP  10/2/2021  7:50 AM

## 2021-10-02 NOTE — CONSULTS
Inpatient cardiac rehab consult post surgery received and appreciated. After speaking with nursing staff today, pt is not current appropriate for activity/ambulation d/t medical instability, intubation and sedation. Once pt is stable enough for activity, please re-order cardiac rehab to assist pt in their recovery. Thank you for the referral to care for your patient.

## 2021-10-02 NOTE — PROGRESS NOTES
sodium chloride 100 mL/hr at 10/01/21 0940    IV infusion builder Stopped (10/01/21 3796)    sodium chloride      sodium chloride      sodium chloride      sodium chloride      cisatracurium (NIMBEX) infusion Stopped (10/01/21 0740)    midazolam 5 mg/hr (10/02/21 0441)    DOBUTamine 7 mcg/kg/min (10/02/21 0925)    epoprostenol (VELETRI) nebulization solution 40 ng/kg/min (10/02/21 0922)    sodium chloride 30 mL/hr (10/01/21 2000)    sodium chloride      norepinephrine Stopped (09/28/21 0902)    insulin Stopped (09/29/21 8413)    dextrose      nitroprusside (NIPRIDE) 50 mg in D5W infusion      amiodarone 450mg/250ml D5W infusion 0.25 mg/min (10/01/21 2000)    EPINEPHrine infusion Stopped (10/01/21 0745)     PRN Meds:.potassium chloride, magnesium sulfate, calcium gluconate **OR** calcium gluconate **OR** calcium gluconate **OR** calcium gluconate, sodium phosphate IVPB **OR** sodium phosphate IVPB **OR** sodium phosphate IVPB **OR** sodium phosphate IVPB, sodium chloride, sodium chloride, sodium chloride, sodium chloride flush, sodium chloride, heparin flush, fentanNYL, artificial tears, ondansetron, acetaminophen, acetaminophen, oxyCODONE **OR** oxyCODONE, magnesium hydroxide, potassium chloride, magnesium sulfate, albumin human, sodium chloride, norepinephrine, glucose, dextrose, glucagon (rDNA), dextrose, nitroprusside (NIPRIDE) 50 mg in D5W infusion, calcium gluconate IVPB    DATA:    CBC:   Lab Results   Component Value Date    WBC 17.9 10/02/2021    RBC 3.02 10/02/2021    HGB 8.7 10/02/2021    HCT 26.2 10/02/2021    MCV 86.8 10/02/2021    MCH 28.8 10/02/2021    MCHC 33.2 10/02/2021    RDW 16.1 10/02/2021    PLT 55 10/02/2021    MPV 11.2 10/02/2021     CMP:    Lab Results   Component Value Date     10/02/2021    K 3.9 10/02/2021     10/02/2021    CO2 29 10/02/2021    BUN 94 10/02/2021    CREATININE 2.9 10/02/2021    GFRAA 26 10/02/2021    LABGLOM 21 10/02/2021    GLUCOSE 150 10/02/2021    PROT 4.8 10/02/2021    LABALBU 3.3 10/02/2021    CALCIUM 8.6 10/02/2021    BILITOT 2.2 10/02/2021    ALKPHOS 93 10/02/2021    AST 39 10/02/2021    ALT 33 10/02/2021     Magnesium:    Lab Results   Component Value Date    MG 2.3 10/02/2021     Phosphorus:    Lab Results   Component Value Date    PHOS 2.7 10/02/2021        Radiology Review:      CXR 9/30/2021   1. No interval change in the right perihilar airspace disease. 2. Stable position of the support lines and tubes.  There is no pneumothorax.         Chest x-ray October 1, 2021   Parenchymal infiltrates are similar to subtly improved.  Continued follow-up   recommended.               BRIEF SUMMARY OF INITIAL CONSULT:    Briefly Mr. Nataly Decker is a 77-year-old man with history of HTN, CAD with recent status cardiac catheterization done on September 9 which showed severe multivessel disease, hyperlipidemia, hiatal hernia, who was admitted for elective CABG on September 27, 2021. On admission his creatinine level was 0.8 mg/dL and post surgery his creatinine has progressively increased up to 2.3 mg/dL, reason for this consultation. Postoperatively she developed persistent hypotension, lactic acidosis and respiratory failure for which he was reintubated. Since then she has required multiple pressors and he was placed on IABP. He had received 1 dose of ketorolac perioperatively. His urine output has significantly decrease and is being about 500 cc/day in the last 48 hours and his fluid balance presently is over 9 L. Problems resolved:        IMPRESSION/RECOMMENDATIONS:      1. NGUYỄN stage III (by urine output and probably by creatinine level), CABG associated NGUYỄN, ischemic ATN, non-oliguric. Temporary dialysis catheter was placed 9/30/21 (right femoral). Urine output quite borderline despite large fluid balance >17 L. Creatinine relatively stable or improved. We will hold initiation of CVVHD today and continue to monitor.  Creatinine remains relatively stable 2.9 mg/dl. Urine output picking up a bit, still largely +fluid balance. Gave 80 mg IV lasix with good response. Will start bumex drip at 0.5 mg/hr. FEUrea 18.8% c/w pre-renal.    2. Respiratory alkalosis (pH: 7.501, PCO2: 09.7) with metabolic alkalosis (bicarbonate administration)  3. Cardiogenic shock, hemodynamically more stable, pressor support decrease, still on IABP  ----------------------------------------------------  4. Respiratory failure status post intubation  5. CAD status post CABG x3 September 27, 2021  6. Probably pneumonia, on piperacillin-tazobactam  7. Transaminitis with hyperbilirubinemia, possibly shock liver versus congestive liver  8. Normocytic anemia, status post surgery, getting transfused  9. Thrombocytopenia  10.  Nutrition, n.p.o.    Plan:    · Start bumex drip 0.5 mg/hr  · BMP , Mg, Phos q 6 hours for 1 day, discussed with the ICU RN  · Continue to monitor renal function for recovery  · Hold initiation of CRRT   · Obtain proBNP   Discussed with the ICU team    Azar Davis MD

## 2021-10-02 NOTE — PROGRESS NOTES
[address] [facility], [facility]                                                                       [facility]                                                                   Pulmonary Consult Note              Reason for Consultation:severe hypoxia ,possible mucus plug   CC : vent/resp failure   HPI:   Patient doing very well as off paralytic and his Oxygenation has improved a lot   S/p removal large mucus plug  No fever or chills  On 40 % PEEP 7   Off paralytic   Wife at bedside  No events  ABG look ok   CXR with pulmonary edema /effusion     PHYSICAL EXAMINATION:     VITAL SIGNS:  BP (!) 139/45   Pulse 93   Temp 97.8 °F (36.6 °C)   Resp 22   Ht 5' 8\" (1.727 m)   Wt 158 lb 11.7 oz (72 kg)   SpO2 97%   BMI 24.14 kg/m²   Wt Readings from Last 3 Encounters:   09/27/21 158 lb 11.7 oz (72 kg)   09/20/21 157 lb (71.2 kg)   09/21/21 160 lb 6.4 oz (72.8 kg)     Temp Readings from Last 3 Encounters:   10/02/21 97.8 °F (36.6 °C)   09/27/21 98.4 °F (36.9 °C)   09/23/21 97.1 °F (36.2 °C) (Temporal)     TMAX:  BP Readings from Last 3 Encounters:   10/02/21 (!) 139/45   09/27/21 (!) 144/63   09/23/21 (!) 183/89     Pulse Readings from Last 3 Encounters:   10/02/21 93   09/23/21 66   09/21/21 65           INTAKE/OUTPUTS:  I/O last 3 completed shifts: In: 7393.4 [I.V.:4653.6; Blood:1833.3; NG/GT:55; IV Piggyback:851.5]  Out: 0129 [Urine:730;  Other:300; Chest Tube:335]    Intake/Output Summary (Last 24 hours) at 10/2/2021 1303  Last data filed at 10/2/2021 1200  Gross per 24 hour   Intake 7328.44 ml   Output 1345 ml   Net 5983.44 ml       General Appearance: intubated and sedated   Eyes: pupils equal, round, and reactive to light, extraocular eye movements intact, conjunctivae normal and sclera anicteric   Neck: neck supple and non tender without mass, no thyromegaly, no thyroid nodules and no cervical adenopathy   Pulmonary/Chest:decrease breath sound bilateral   Cardiovascular: normal rate, regular rhythm, normal S1 and S2, no murmurs, rubs, clicks or gallops, distal pulses intact, no carotid bruits, no murmurs, no gallops, no carotid bruits and no JVD   Abdomen: obese, soft, non-tender, non-distended, normal bowel sounds, no masses or organomegaly   Extremities: no edema   Musculoskeletal: normal range of motion, no joint swelling, deformity or tenderness   Neurologic: reflexes normal and symmetric, no cranial nerve deficit noted    LABS/IMAGING:    CBC:  Lab Results   Component Value Date    WBC 17.9 (H) 10/02/2021    HGB 8.7 (L) 10/02/2021    HCT 26.2 (L) 10/02/2021    MCV 86.8 10/02/2021    PLT 55 (L) 10/02/2021    LYMPHOPCT 5.1 (L) 09/28/2021    RBC 3.02 (L) 10/02/2021    MCH 28.8 10/02/2021    MCHC 33.2 10/02/2021    RDW 16.1 (H) 10/02/2021    NEUTOPHILPCT 89.5 (H) 09/28/2021    MONOPCT 4.4 09/28/2021    BASOPCT 0.1 09/28/2021    NEUTROABS 15.00 (H) 09/28/2021    LYMPHSABS 0.86 (L) 09/28/2021    MONOSABS 0.73 09/28/2021    EOSABS 0.00 (L) 09/28/2021    BASOSABS 0.01 09/28/2021       Recent Labs     10/02/21  0400 10/02/21  0050 10/01/21  1630 10/01/21  1145 10/01/21  0405   WBC 17.9*  --  18.3*  --  19.5*   HGB 8.7* 8.8* 7.9*   < > 7.6*   HCT 26.2* 26.4* 23.7*   < > 22.8*   MCV 86.8  --  87.8  --  91.6   PLT 55*  --  61*  --  41*    < > = values in this interval not displayed.        BMP:   Recent Labs     10/01/21  0820 10/01/21  1000 10/01/21  1811 10/01/21  2210 10/02/21  0400 10/02/21  0600 10/02/21  1000   NA  --    < > 139   < > 136 140 140   K  --    < > 4.0   < > 4.0 4.0 3.9   CL  --    < > 102   < > 100 105 105   CO2  --    < > 28   < > 25 27 29   PHOS 2.7  --  2.5  --   --  2.7  --    BUN  --    < > 81*   < > 85* 87* 94*   CREATININE  --    < > 2.6*   < > 2.8* 2.9* 2.9*    < > = values in this interval not displayed. MG:   Lab Results   Component Value Date    MG 2.3 10/02/2021     Ca/Phos:   Lab Results   Component Value Date    CALCIUM 8.6 10/02/2021    PHOS 2.7 10/02/2021     Amylase: No results found for: AMYLASE  Lipase:   Lab Results   Component Value Date    LIPASE 7 (L) 09/28/2021     LIVER PROFILE:   Recent Labs     10/02/21  0050 10/02/21  0400 10/02/21  0600   AST 43* 39 39   ALT 37 33 33   BILITOT 2.4* 2.2* 2.2*   ALKPHOS 88 87 93       PT/INR:   Recent Labs     10/01/21  1000 10/02/21  0412   PROTIME 22.5* 18.9*   INR 2.0 1.7     APTT: No results for input(s): APTT in the last 72 hours. Cardiac Enzymes:  Lab Results   Component Value Date    CKMB 94.0 (H) 09/28/2021       Hgb A1C:   Lab Results   Component Value Date    LABA1C 5.1 09/10/2021     No results found for: EAG  CRISTIANA: No results found for: CRISTIANA  ESR: No results found for: SEDRATE  CRP: No results found for: CRP  D Dimer: No results found for: DDIMER  Folate and B12: No results found for: [id number], No results found for: FOLATE              PROBLEM LIST:  Patient Active Problem List   Diagnosis    CAD in native artery    Pulmonary nodules    Unstable angina (HCC)    Other hyperlipidemia    Essential hypertension    Gastroesophageal reflux disease with esophagitis without hemorrhage    S/P CABG (coronary artery bypass graft)    Acute respiratory failure with hypoxia (HCC)    Lactic acidemia    NGUYỄN (acute kidney injury) ([address]    Hypokalemia    Hypocalcemia    Cardiogenic shock (HCC)    Thrombocytopenia ([address]    Leukocytosis    Encounter regarding vascular access for dialysis for ESRD ([address]               ASSESSMENT:  1.) Acute Respiratory failure   2.)cardiogenic shock vs other types of shock,septic ,etc   3. )Large mucus plug obstruction left main bronchus and RUL   4. )CAD s/P CABG   5.)NGUYỄN       PLAN:  *-S/p Bronchoscopy shows large mucus plug ,can not remove all with suction and has to use Cryotherapy to remove large mucus plug,which was multiple and blocking multi segments   Need aggressive diuresis and may need bumex drip  Will look with US and if large may drain  but believe bumex will do the job   Aggressive pulmonary toilet   BD  Hypertonic saline   Chest vest when possible   Wean steroids \diuresis as per renal ,seem fluid overload,renal following diuresis as needed    *_,CRP and procal,  *- will send sputum culture  Adjust vent in coordiantaion of critical care team ,will help if needed  Discuss with wife in [facility], [facility]

## 2021-10-02 NOTE — FLOWSHEET NOTE
Patient has been on low intermittent suction, on the initial assessment he had been putting out bile/ green around 3pm the patient started putting bright red blood out of OG approximately 50 cc. The patient also had a large bowel movement that was black and tarry. A hemoccult was obtained and was positive.

## 2021-10-02 NOTE — FLOWSHEET NOTE
Patient has been having intermittent problems with the timing of the IABP over night, leads changed, lead wires replaced as well, multiple attempts at trouble shooting. Around 4am the patients wave form was abnormal and was unable to fix despite multiple attempts. Stat chest x-ray was ordered to check placement of balloon and the IABP representative was called. It was determined that the balloon pump was most likely siting up agents the aortic wall, was able to fix wave form, augmentations, and blood pressure with the assistance of the Rep.

## 2021-10-02 NOTE — PROGRESS NOTES
Stable. HDs good. On 7.5 dobut, IABP 1:2, Flolan at 50, Fi02 40% and 7 PEEP. Drop PEEP to 6, Dobut to 7, and wean flolan slowly. Lactate normal, other labs as expected. D/W Dr. Amato Crew. Lasix given and he is making some urine- maybe he would benefit from a lasix drip prior to starting CVVHD? Try to get Dobut to 5 and Flolan to 30 by tomorrow morning. Continue IABP through the weekend. Virgilio Bello MD      Procedure:  IABP paused. Advanced 3 CM. Prepped and re-sutured. Adrian well.   Virgilio Bello MD

## 2021-10-03 ENCOUNTER — APPOINTMENT (OUTPATIENT)
Dept: GENERAL RADIOLOGY | Age: 76
DRG: 235 | End: 2021-10-03
Attending: THORACIC SURGERY (CARDIOTHORACIC VASCULAR SURGERY)
Payer: MEDICARE

## 2021-10-03 LAB
AADO2: 116.1 MMHG
AADO2: 118.6 MMHG
AADO2: 124.2 MMHG
AADO2: 131.6 MMHG
AADO2: 93.4 MMHG
ALBUMIN SERPL-MCNC: 3.2 G/DL (ref 3.5–5.2)
ALBUMIN SERPL-MCNC: 3.3 G/DL (ref 3.5–5.2)
ALBUMIN SERPL-MCNC: 3.4 G/DL (ref 3.5–5.2)
ALBUMIN SERPL-MCNC: 3.4 G/DL (ref 3.5–5.2)
ALP BLD-CCNC: 79 U/L (ref 40–129)
ALP BLD-CCNC: 80 U/L (ref 40–129)
ALP BLD-CCNC: 85 U/L (ref 40–129)
ALP BLD-CCNC: 88 U/L (ref 40–129)
ALT SERPL-CCNC: 18 U/L (ref 0–40)
ALT SERPL-CCNC: 19 U/L (ref 0–40)
ALT SERPL-CCNC: 21 U/L (ref 0–40)
ALT SERPL-CCNC: 24 U/L (ref 0–40)
ANION GAP SERPL CALCULATED.3IONS-SCNC: 10 MMOL/L (ref 7–16)
ANION GAP SERPL CALCULATED.3IONS-SCNC: 13 MMOL/L (ref 7–16)
ANION GAP SERPL CALCULATED.3IONS-SCNC: 13 MMOL/L (ref 7–16)
ANION GAP SERPL CALCULATED.3IONS-SCNC: 14 MMOL/L (ref 7–16)
AST SERPL-CCNC: 21 U/L (ref 0–39)
AST SERPL-CCNC: 21 U/L (ref 0–39)
AST SERPL-CCNC: 24 U/L (ref 0–39)
AST SERPL-CCNC: 26 U/L (ref 0–39)
B.E.: 0.1 MMOL/L (ref -3–3)
B.E.: 0.6 MMOL/L (ref -3–3)
B.E.: 1.7 MMOL/L (ref -3–3)
B.E.: 2.1 MMOL/L (ref -3–3)
B.E.: 3.1 MMOL/L (ref -3–3)
BILIRUB SERPL-MCNC: 2 MG/DL (ref 0–1.2)
BILIRUB SERPL-MCNC: 2.2 MG/DL (ref 0–1.2)
BILIRUB SERPL-MCNC: 2.3 MG/DL (ref 0–1.2)
BILIRUB SERPL-MCNC: 2.3 MG/DL (ref 0–1.2)
BUN BLDV-MCNC: 109 MG/DL (ref 6–23)
BUN BLDV-MCNC: 110 MG/DL (ref 6–23)
BUN BLDV-MCNC: 112 MG/DL (ref 6–23)
BUN BLDV-MCNC: 119 MG/DL (ref 6–23)
CALCIUM IONIZED: 1.1 MMOL/L (ref 1.15–1.33)
CALCIUM IONIZED: 1.14 MMOL/L (ref 1.15–1.33)
CALCIUM IONIZED: 1.15 MMOL/L (ref 1.15–1.33)
CALCIUM IONIZED: 1.17 MMOL/L (ref 1.15–1.33)
CALCIUM SERPL-MCNC: 8.2 MG/DL (ref 8.6–10.2)
CALCIUM SERPL-MCNC: 8.5 MG/DL (ref 8.6–10.2)
CALCIUM SERPL-MCNC: 8.6 MG/DL (ref 8.6–10.2)
CALCIUM SERPL-MCNC: 8.7 MG/DL (ref 8.6–10.2)
CHLORIDE BLD-SCNC: 102 MMOL/L (ref 98–107)
CHLORIDE BLD-SCNC: 102 MMOL/L (ref 98–107)
CHLORIDE BLD-SCNC: 103 MMOL/L (ref 98–107)
CHLORIDE BLD-SCNC: 104 MMOL/L (ref 98–107)
CO2: 26 MMOL/L (ref 22–29)
CO2: 27 MMOL/L (ref 22–29)
CO2: 27 MMOL/L (ref 22–29)
CO2: 28 MMOL/L (ref 22–29)
COHB: 0 % (ref 0–1.5)
COHB: 0.2 % (ref 0–1.5)
COHB: 0.3 % (ref 0–1.5)
COHB: 0.3 % (ref 0–1.5)
COHB: 0.5 % (ref 0–1.5)
CREAT SERPL-MCNC: 3.2 MG/DL (ref 0.7–1.2)
CREAT SERPL-MCNC: 3.4 MG/DL (ref 0.7–1.2)
CREAT SERPL-MCNC: 3.4 MG/DL (ref 0.7–1.2)
CREAT SERPL-MCNC: 3.5 MG/DL (ref 0.7–1.2)
CRITICAL: ABNORMAL
CULTURE, RESPIRATORY: NORMAL
DATE ANALYZED: ABNORMAL
DATE OF COLLECTION: ABNORMAL
FIO2: 40 %
GFR AFRICAN AMERICAN: 21
GFR AFRICAN AMERICAN: 22
GFR AFRICAN AMERICAN: 22
GFR AFRICAN AMERICAN: 23
GFR NON-AFRICAN AMERICAN: 17 ML/MIN/1.73
GFR NON-AFRICAN AMERICAN: 18 ML/MIN/1.73
GFR NON-AFRICAN AMERICAN: 18 ML/MIN/1.73
GFR NON-AFRICAN AMERICAN: 19 ML/MIN/1.73
GLUCOSE BLD-MCNC: 147 MG/DL (ref 74–99)
GLUCOSE BLD-MCNC: 148 MG/DL (ref 74–99)
GLUCOSE BLD-MCNC: 151 MG/DL (ref 74–99)
GLUCOSE BLD-MCNC: 163 MG/DL (ref 74–99)
HCO3: 23.1 MMOL/L (ref 22–26)
HCO3: 23.3 MMOL/L (ref 22–26)
HCO3: 24.4 MMOL/L (ref 22–26)
HCO3: 25 MMOL/L (ref 22–26)
HCO3: 25.9 MMOL/L (ref 22–26)
HCT VFR BLD CALC: 26.6 % (ref 37–54)
HCT VFR BLD CALC: 27.2 % (ref 37–54)
HCT VFR BLD CALC: 28.4 % (ref 37–54)
HEMOGLOBIN: 8.9 G/DL (ref 12.5–16.5)
HEMOGLOBIN: 9 G/DL (ref 12.5–16.5)
HEMOGLOBIN: 9.4 G/DL (ref 12.5–16.5)
HEPARIN PF4 ANTIBODY: 0.04 OD
HHB: 2.1 % (ref 0–5)
HHB: 2.4 % (ref 0–5)
HHB: 2.6 % (ref 0–5)
HHB: 2.7 % (ref 0–5)
HHB: 2.9 % (ref 0–5)
LAB: ABNORMAL
LACTIC ACID: 1 MMOL/L (ref 0.5–2.2)
LACTIC ACID: 1.1 MMOL/L (ref 0.5–2.2)
LACTIC ACID: 1.4 MMOL/L (ref 0.5–2.2)
LACTIC ACID: 1.4 MMOL/L (ref 0.5–2.2)
Lab: ABNORMAL
MAGNESIUM: 2.2 MG/DL (ref 1.6–2.6)
MAGNESIUM: 2.3 MG/DL (ref 1.6–2.6)
MCH RBC QN AUTO: 28.5 PG (ref 26–35)
MCH RBC QN AUTO: 28.6 PG (ref 26–35)
MCH RBC QN AUTO: 28.8 PG (ref 26–35)
MCHC RBC AUTO-ENTMCNC: 33.1 % (ref 32–34.5)
MCHC RBC AUTO-ENTMCNC: 33.1 % (ref 32–34.5)
MCHC RBC AUTO-ENTMCNC: 33.5 % (ref 32–34.5)
MCV RBC AUTO: 86.1 FL (ref 80–99.9)
MCV RBC AUTO: 86.1 FL (ref 80–99.9)
MCV RBC AUTO: 86.3 FL (ref 80–99.9)
METER GLUCOSE: 123 MG/DL (ref 74–99)
METER GLUCOSE: 124 MG/DL (ref 74–99)
METER GLUCOSE: 136 MG/DL (ref 74–99)
METER GLUCOSE: 139 MG/DL (ref 74–99)
METER GLUCOSE: 159 MG/DL (ref 74–99)
METER GLUCOSE: 160 MG/DL (ref 74–99)
METHB: 0.1 % (ref 0–1.5)
METHB: 0.2 % (ref 0–1.5)
METHB: 0.3 % (ref 0–1.5)
METHB: 0.4 % (ref 0–1.5)
METHB: 0.4 % (ref 0–1.5)
MODE: AC
O2 SATURATION: 97.1 % (ref 92–98.5)
O2 SATURATION: 97.3 % (ref 92–98.5)
O2 SATURATION: 97.4 % (ref 92–98.5)
O2 SATURATION: 97.6 % (ref 92–98.5)
O2 SATURATION: 97.9 % (ref 92–98.5)
O2HB: 96.5 % (ref 94–97)
O2HB: 96.7 % (ref 94–97)
O2HB: 97 % (ref 94–97)
O2HB: 97 % (ref 94–97)
O2HB: 97.4 % (ref 94–97)
OPERATOR ID: 1741
OPERATOR ID: 187
OPERATOR ID: 7291
OPERATOR ID: ABNORMAL
OPERATOR ID: ABNORMAL
PATIENT TEMP: 37 C
PCO2: 30.6 MMHG (ref 35–45)
PCO2: 31.4 MMHG (ref 35–45)
PCO2: 31.6 MMHG (ref 35–45)
PCO2: 32.9 MMHG (ref 35–45)
PCO2: 33.1 MMHG (ref 35–45)
PDW BLD-RTO: 15.6 FL (ref 11.5–15)
PDW BLD-RTO: 15.9 FL (ref 11.5–15)
PDW BLD-RTO: 15.9 FL (ref 11.5–15)
PEEP/CPAP: 6 CMH2O
PFO2: 2.64 MMHG/%
PFO2: 2.87 MMHG/%
PFO2: 3.01 MMHG/%
PFO2: 3.03 MMHG/%
PFO2: 3.67 MMHG/%
PH BLOOD GAS: 7.48 (ref 7.35–7.45)
PH BLOOD GAS: 7.5 (ref 7.35–7.45)
PH BLOOD GAS: 7.5 (ref 7.35–7.45)
PH BLOOD GAS: 7.51 (ref 7.35–7.45)
PH BLOOD GAS: 7.51 (ref 7.35–7.45)
PHOSPHORUS: 3.7 MG/DL (ref 2.5–4.5)
PHOSPHORUS: 3.7 MG/DL (ref 2.5–4.5)
PHOSPHORUS: 3.9 MG/DL (ref 2.5–4.5)
PHOSPHORUS: 4 MG/DL (ref 2.5–4.5)
PLATELET # BLD: 40 E9/L (ref 130–450)
PLATELET # BLD: 45 E9/L (ref 130–450)
PLATELET # BLD: 46 E9/L (ref 130–450)
PLATELET CONFIRMATION: NORMAL
PMV BLD AUTO: 11.6 FL (ref 7–12)
PMV BLD AUTO: 12.1 FL (ref 7–12)
PMV BLD AUTO: 12.7 FL (ref 7–12)
PO2: 105.5 MMHG (ref 75–100)
PO2: 114.9 MMHG (ref 75–100)
PO2: 120.3 MMHG (ref 75–100)
PO2: 121.3 MMHG (ref 75–100)
PO2: 146.6 MMHG (ref 75–100)
POTASSIUM SERPL-SCNC: 3.5 MMOL/L (ref 3.5–5)
POTASSIUM SERPL-SCNC: 3.8 MMOL/L (ref 3.5–5)
POTASSIUM SERPL-SCNC: 4 MMOL/L (ref 3.5–5)
POTASSIUM SERPL-SCNC: 4 MMOL/L (ref 3.5–5)
POTASSIUM SERPL-SCNC: 4.1 MMOL/L (ref 3.5–5)
RBC # BLD: 3.09 E12/L (ref 3.8–5.8)
RBC # BLD: 3.15 E12/L (ref 3.8–5.8)
RBC # BLD: 3.3 E12/L (ref 3.8–5.8)
RI(T): 0.64
RI(T): 0.96
RI(T): 0.99
RI(T): 1.08
RI(T): 1.25
RR MECHANICAL: 14 B/MIN
SMEAR, RESPIRATORY: NORMAL
SODIUM BLD-SCNC: 141 MMOL/L (ref 132–146)
SODIUM BLD-SCNC: 141 MMOL/L (ref 132–146)
SODIUM BLD-SCNC: 143 MMOL/L (ref 132–146)
SODIUM BLD-SCNC: 144 MMOL/L (ref 132–146)
SOURCE, BLOOD GAS: ABNORMAL
THB: 10.2 G/DL (ref 11.5–16.5)
THB: 10.3 G/DL (ref 11.5–16.5)
TIME ANALYZED: 1210
TIME ANALYZED: 1616
TIME ANALYZED: 2016
TIME ANALYZED: 357
TIME ANALYZED: 832
TOTAL PROTEIN: 4.7 G/DL (ref 6.4–8.3)
TOTAL PROTEIN: 4.8 G/DL (ref 6.4–8.3)
TOTAL PROTEIN: 5.2 G/DL (ref 6.4–8.3)
TOTAL PROTEIN: 5.3 G/DL (ref 6.4–8.3)
VT MECHANICAL: 500 ML
WBC # BLD: 16.4 E9/L (ref 4.5–11.5)
WBC # BLD: 16.7 E9/L (ref 4.5–11.5)
WBC # BLD: 16.8 E9/L (ref 4.5–11.5)

## 2021-10-03 PROCEDURE — 2580000003 HC RX 258: Performed by: NURSE PRACTITIONER

## 2021-10-03 PROCEDURE — 94003 VENT MGMT INPAT SUBQ DAY: CPT

## 2021-10-03 PROCEDURE — 2580000003 HC RX 258: Performed by: PHYSICIAN ASSISTANT

## 2021-10-03 PROCEDURE — 37799 UNLISTED PX VASCULAR SURGERY: CPT

## 2021-10-03 PROCEDURE — 6370000000 HC RX 637 (ALT 250 FOR IP): Performed by: NURSE PRACTITIONER

## 2021-10-03 PROCEDURE — 83605 ASSAY OF LACTIC ACID: CPT

## 2021-10-03 PROCEDURE — 6360000002 HC RX W HCPCS: Performed by: INTERNAL MEDICINE

## 2021-10-03 PROCEDURE — 80053 COMPREHEN METABOLIC PANEL: CPT

## 2021-10-03 PROCEDURE — 2000000000 HC ICU R&B

## 2021-10-03 PROCEDURE — 71045 X-RAY EXAM CHEST 1 VIEW: CPT

## 2021-10-03 PROCEDURE — 6360000002 HC RX W HCPCS: Performed by: STUDENT IN AN ORGANIZED HEALTH CARE EDUCATION/TRAINING PROGRAM

## 2021-10-03 PROCEDURE — 94645 CONT INHLJ TX EACH ADDL HOUR: CPT

## 2021-10-03 PROCEDURE — 84132 ASSAY OF SERUM POTASSIUM: CPT

## 2021-10-03 PROCEDURE — 83735 ASSAY OF MAGNESIUM: CPT

## 2021-10-03 PROCEDURE — 6370000000 HC RX 637 (ALT 250 FOR IP): Performed by: PHYSICIAN ASSISTANT

## 2021-10-03 PROCEDURE — 2580000003 HC RX 258: Performed by: SURGERY

## 2021-10-03 PROCEDURE — 6360000002 HC RX W HCPCS: Performed by: THORACIC SURGERY (CARDIOTHORACIC VASCULAR SURGERY)

## 2021-10-03 PROCEDURE — 6360000002 HC RX W HCPCS: Performed by: NURSE PRACTITIONER

## 2021-10-03 PROCEDURE — 82962 GLUCOSE BLOOD TEST: CPT

## 2021-10-03 PROCEDURE — 6360000002 HC RX W HCPCS: Performed by: SURGERY

## 2021-10-03 PROCEDURE — 99231 SBSQ HOSP IP/OBS SF/LOW 25: CPT | Performed by: SURGERY

## 2021-10-03 PROCEDURE — 2580000003 HC RX 258: Performed by: STUDENT IN AN ORGANIZED HEALTH CARE EDUCATION/TRAINING PROGRAM

## 2021-10-03 PROCEDURE — 82330 ASSAY OF CALCIUM: CPT

## 2021-10-03 PROCEDURE — 85027 COMPLETE CBC AUTOMATED: CPT

## 2021-10-03 PROCEDURE — 6370000000 HC RX 637 (ALT 250 FOR IP): Performed by: THORACIC SURGERY (CARDIOTHORACIC VASCULAR SURGERY)

## 2021-10-03 PROCEDURE — 2500000003 HC RX 250 WO HCPCS: Performed by: NURSE PRACTITIONER

## 2021-10-03 PROCEDURE — 6360000002 HC RX W HCPCS

## 2021-10-03 PROCEDURE — 6360000002 HC RX W HCPCS: Performed by: PHYSICIAN ASSISTANT

## 2021-10-03 PROCEDURE — 82805 BLOOD GASES W/O2 SATURATION: CPT

## 2021-10-03 PROCEDURE — 84100 ASSAY OF PHOSPHORUS: CPT

## 2021-10-03 PROCEDURE — 94640 AIRWAY INHALATION TREATMENT: CPT

## 2021-10-03 PROCEDURE — 2580000003 HC RX 258: Performed by: INTERNAL MEDICINE

## 2021-10-03 PROCEDURE — 99233 SBSQ HOSP IP/OBS HIGH 50: CPT | Performed by: INTERNAL MEDICINE

## 2021-10-03 PROCEDURE — 36415 COLL VENOUS BLD VENIPUNCTURE: CPT

## 2021-10-03 PROCEDURE — 2580000003 HC RX 258: Performed by: THORACIC SURGERY (CARDIOTHORACIC VASCULAR SURGERY)

## 2021-10-03 PROCEDURE — 2580000003 HC RX 258

## 2021-10-03 PROCEDURE — C9113 INJ PANTOPRAZOLE SODIUM, VIA: HCPCS | Performed by: SURGERY

## 2021-10-03 RX ORDER — AMIODARONE HYDROCHLORIDE 200 MG/1
400 TABLET ORAL 2 TIMES DAILY
Status: DISCONTINUED | OUTPATIENT
Start: 2021-10-03 | End: 2021-10-13 | Stop reason: HOSPADM

## 2021-10-03 RX ADMIN — MINERAL OIL AND PETROLATUM: 150; 830 OINTMENT OPHTHALMIC at 20:47

## 2021-10-03 RX ADMIN — PIPERACILLIN AND TAZOBACTAM 3375 MG: 3; .375 INJECTION, POWDER, LYOPHILIZED, FOR SOLUTION INTRAVENOUS at 23:07

## 2021-10-03 RX ADMIN — DOCUSATE SODIUM 100 MG: 50 LIQUID ORAL at 20:47

## 2021-10-03 RX ADMIN — SODIUM CHLORIDE 30 ML/HR: 9 INJECTION, SOLUTION INTRAVENOUS at 02:06

## 2021-10-03 RX ADMIN — IPRATROPIUM BROMIDE AND ALBUTEROL SULFATE 1 AMPULE: .5; 2.5 SOLUTION RESPIRATORY (INHALATION) at 11:19

## 2021-10-03 RX ADMIN — IPRATROPIUM BROMIDE AND ALBUTEROL SULFATE 1 AMPULE: .5; 2.5 SOLUTION RESPIRATORY (INHALATION) at 07:37

## 2021-10-03 RX ADMIN — SODIUM CHLORIDE, PRESERVATIVE FREE 300 UNITS: 5 INJECTION INTRAVENOUS at 20:47

## 2021-10-03 RX ADMIN — HYDROCORTISONE SODIUM SUCCINATE 100 MG: 100 INJECTION, POWDER, FOR SOLUTION INTRAMUSCULAR; INTRAVENOUS at 04:51

## 2021-10-03 RX ADMIN — SENNOSIDES 5 ML: 8.8 SYRUP ORAL at 20:47

## 2021-10-03 RX ADMIN — MINERAL OIL AND PETROLATUM: 150; 830 OINTMENT OPHTHALMIC at 05:13

## 2021-10-03 RX ADMIN — DOBUTAMINE IN DEXTROSE 3 MCG/KG/MIN: 400 INJECTION, SOLUTION INTRAVENOUS at 21:55

## 2021-10-03 RX ADMIN — POTASSIUM CHLORIDE 20 MEQ: 400 INJECTION, SOLUTION INTRAVENOUS at 09:09

## 2021-10-03 RX ADMIN — HYDROCORTISONE SODIUM SUCCINATE 100 MG: 100 INJECTION, POWDER, FOR SOLUTION INTRAMUSCULAR; INTRAVENOUS at 19:36

## 2021-10-03 RX ADMIN — Medication 10 ML: at 20:48

## 2021-10-03 RX ADMIN — SENNOSIDES 5 ML: 8.8 SYRUP ORAL at 09:13

## 2021-10-03 RX ADMIN — DOCUSATE SODIUM 100 MG: 50 LIQUID ORAL at 09:29

## 2021-10-03 RX ADMIN — CHLORHEXIDINE GLUCONATE 15 ML: 1.2 RINSE ORAL at 20:44

## 2021-10-03 RX ADMIN — EPOPROSTENOL 20 NG/KG/MIN: 1.5 INJECTION, POWDER, LYOPHILIZED, FOR SOLUTION INTRAVENOUS at 06:22

## 2021-10-03 RX ADMIN — INSULIN LISPRO 3 UNITS: 100 INJECTION, SOLUTION INTRAVENOUS; SUBCUTANEOUS at 04:02

## 2021-10-03 RX ADMIN — INSULIN LISPRO 3 UNITS: 100 INJECTION, SOLUTION INTRAVENOUS; SUBCUTANEOUS at 23:55

## 2021-10-03 RX ADMIN — CALCIUM GLUCONATE 2000 MG: 98 INJECTION, SOLUTION INTRAVENOUS at 02:04

## 2021-10-03 RX ADMIN — IPRATROPIUM BROMIDE AND ALBUTEROL SULFATE 1 AMPULE: .5; 2.5 SOLUTION RESPIRATORY (INHALATION) at 15:10

## 2021-10-03 RX ADMIN — PANTOPRAZOLE SODIUM 40 MG: 40 INJECTION, POWDER, FOR SOLUTION INTRAVENOUS at 09:11

## 2021-10-03 RX ADMIN — ATORVASTATIN CALCIUM 40 MG: 40 TABLET, FILM COATED ORAL at 20:47

## 2021-10-03 RX ADMIN — FENTANYL CITRATE 12.5 MCG: 0.05 INJECTION, SOLUTION INTRAMUSCULAR; INTRAVENOUS at 04:51

## 2021-10-03 RX ADMIN — MIDAZOLAM 4 MG/HR: 5 INJECTION, SOLUTION INTRAMUSCULAR; INTRAVENOUS at 21:56

## 2021-10-03 RX ADMIN — PIPERACILLIN AND TAZOBACTAM 3375 MG: 3; .375 INJECTION, POWDER, LYOPHILIZED, FOR SOLUTION INTRAVENOUS at 00:38

## 2021-10-03 RX ADMIN — CALCIUM GLUCONATE 1000 MG: 98 INJECTION, SOLUTION INTRAVENOUS at 13:28

## 2021-10-03 RX ADMIN — PANTOPRAZOLE SODIUM 40 MG: 40 INJECTION, POWDER, FOR SOLUTION INTRAVENOUS at 20:47

## 2021-10-03 RX ADMIN — SODIUM CHLORIDE, PRESERVATIVE FREE 10 ML: 5 INJECTION INTRAVENOUS at 20:48

## 2021-10-03 RX ADMIN — POTASSIUM CHLORIDE 20 MEQ: 29.8 INJECTION, SOLUTION INTRAVENOUS at 19:11

## 2021-10-03 RX ADMIN — CALCIUM GLUCONATE 1000 MG: 98 INJECTION, SOLUTION INTRAVENOUS at 19:11

## 2021-10-03 RX ADMIN — IPRATROPIUM BROMIDE AND ALBUTEROL SULFATE 1 AMPULE: .5; 2.5 SOLUTION RESPIRATORY (INHALATION) at 20:09

## 2021-10-03 RX ADMIN — BUMETANIDE 0.5 MG/HR: 0.25 INJECTION INTRAMUSCULAR; INTRAVENOUS at 04:51

## 2021-10-03 RX ADMIN — Medication 400 MG: at 09:13

## 2021-10-03 RX ADMIN — AMIODARONE HYDROCHLORIDE 400 MG: 200 TABLET ORAL at 09:32

## 2021-10-03 RX ADMIN — POTASSIUM CHLORIDE 20 MEQ: 400 INJECTION, SOLUTION INTRAVENOUS at 10:04

## 2021-10-03 RX ADMIN — CHLORHEXIDINE GLUCONATE 15 ML: 1.2 RINSE ORAL at 09:11

## 2021-10-03 RX ADMIN — POTASSIUM CHLORIDE 20 MEQ: 29.8 INJECTION, SOLUTION INTRAVENOUS at 23:07

## 2021-10-03 RX ADMIN — POTASSIUM CHLORIDE 20 MEQ: 29.8 INJECTION, SOLUTION INTRAVENOUS at 11:37

## 2021-10-03 RX ADMIN — AMIODARONE HYDROCHLORIDE 400 MG: 200 TABLET ORAL at 20:47

## 2021-10-03 RX ADMIN — ASPIRIN 81 MG CHEWABLE TABLET 81 MG: 81 TABLET CHEWABLE at 09:12

## 2021-10-03 RX ADMIN — PIPERACILLIN AND TAZOBACTAM 3375 MG: 3; .375 INJECTION, POWDER, LYOPHILIZED, FOR SOLUTION INTRAVENOUS at 13:28

## 2021-10-03 RX ADMIN — SODIUM CHLORIDE, PRESERVATIVE FREE 10 ML: 5 INJECTION INTRAVENOUS at 09:28

## 2021-10-03 ASSESSMENT — PAIN SCALES - GENERAL
PAINLEVEL_OUTOF10: 0
PAINLEVEL_OUTOF10: 4
PAINLEVEL_OUTOF10: 0

## 2021-10-03 ASSESSMENT — PULMONARY FUNCTION TESTS
PIF_VALUE: 20
PIF_VALUE: 20
PIF_VALUE: 19
PIF_VALUE: 18
PIF_VALUE: 20
PIF_VALUE: 18
PIF_VALUE: 20
PIF_VALUE: 19
PIF_VALUE: 20
PIF_VALUE: 20
PIF_VALUE: 19
PIF_VALUE: 20
PIF_VALUE: 19
PIF_VALUE: 20
PIF_VALUE: 19
PIF_VALUE: 19
PIF_VALUE: 18
PIF_VALUE: 20
PIF_VALUE: 20
PIF_VALUE: 19
PIF_VALUE: 17
PIF_VALUE: 18
PIF_VALUE: 20
PIF_VALUE: 19
PIF_VALUE: 20
PIF_VALUE: 18
PIF_VALUE: 19

## 2021-10-03 NOTE — PROGRESS NOTES
Down to 4.5 of Dobut and 20 of Flolan. MAP is 90 and 75 under the balloon. UOP over 3 liters for 24 hours on Bumex. No further drop in Hb. Hopefully IABP out tomorrow. Trickle tube feeds. Supportive care.   John Jenkins MD

## 2021-10-03 NOTE — PROGRESS NOTES
General Surgery Progress Note    Asked to re-see for possible GI bleed after some maroon colored output was suctioned from NG tube. Hemoglobin continues to rise: 7.9 --> 8.7 --> 9.2. No clinically significant bleeding. Abdomen is soft.     PPI BID  Trickle Tube feeds when able   Monitor H/H  Overall care per primary  No surgical intervention planned at this time    Electronically signed by Ruben Fraire MD on 10/2/21 at 11:42 PM JENYT    Jason Murillo MD

## 2021-10-03 NOTE — PROGRESS NOTES
Department of Internal Medicine  Nephrology Progress Note      Events reviewed with the ICU RN. UO 3.2 L over the last 24 hrs. SUBJECTIVE: WE are following Mr. [address] for NGUYỄN. Remains intubated.  Wife at bedside    PHYSICAL EXAM:      Vitals:    VITALS:  BP (!) 139/45   Pulse 96   Temp 97.5 °F (36.4 °C) (Bladder)   Resp 16   Ht 5' 8\" (1.727 m)   Wt 158 lb 11.7 oz (72 kg)   SpO2 99%   BMI 24.14 kg/m²   24HR INTAKE/OUTPUT:      Intake/Output Summary (Last 24 hours) at 10/3/2021 0936  Last data filed at 10/3/2021 0900  Gross per 24 hour   Intake 1759.63 ml   Output 4000 ml   Net -2240.37 ml     Access: right femoral temporary dialysis catheter  Constitutional: Patient is intubated, sedated, Fio2 at 40 %  HEENT: Pupils are equal reactive, endotracheal tube in place  Respiratory: Decreased breath sounds at the bases  Cardiovascular/Edema: Heart sounds are regular  Gastrointestinal: Abdomen soft  Neurologic: Patient sedated  Other: Trace edema      Scheduled Meds:   piperacillin-tazobactam  3,375 mg IntraVENous Q12H    amiodarone  400 mg Oral BID    pantoprazole  40 mg IntraVENous BID    And    sodium chloride (PF)  10 mL IntraVENous BID    hydrocortisone sodium succinate PF  100 mg IntraVENous Q12H    lidocaine PF  5 mL IntraDERmal Once    sodium chloride flush  5-40 mL IntraVENous 2 times per day    heparin flush  3 mL IntraVENous 2 times per day    rocuronium  0.6 mg/kg IntraVENous Once    aspirin  81 mg Oral Daily    chlorhexidine  15 mL Mouth/Throat BID    docusate sodium  100 mg Oral BID    sennosides  5 mL Oral BID    insulin lispro  0-3 Units SubCUTAneous Q4H    atorvastatin  40 mg Oral Daily    magnesium oxide  400 mg Oral Daily    ipratropium-albuterol  1 ampule Inhalation Q4H WA    [Held by provider] clopidogrel  75 mg Oral Daily    [Held by provider] tamsulosin  0.4 mg Oral Daily     Continuous Infusions:   bumetanide 0.1 mg/mL infusion 0.5 mg/hr (10/03/21 9847)    dialysis builder Stopped (10/01/21 9601)    sodium chloride 100 mL/hr at 10/01/21 0940    IV infusion builder Stopped (10/01/21 5360)    sodium chloride      sodium chloride      sodium chloride      sodium chloride      cisatracurium (NIMBEX) infusion Stopped (10/01/21 0740)    midazolam 3 mg/hr (10/03/21 0005)    DOBUTamine 4.5 mcg/kg/min (10/03/21 0730)    epoprostenol (VELETRI) nebulization solution 20 ng/kg/min (10/03/21 0622)    sodium chloride 30 mL/hr (10/03/21 0206)    sodium chloride      norepinephrine Stopped (09/28/21 0902)    insulin Stopped (09/29/21 0808)    dextrose      nitroprusside (NIPRIDE) 50 mg in D5W infusion       PRN Meds:.potassium chloride, magnesium sulfate, calcium gluconate **OR** calcium gluconate **OR** calcium gluconate **OR** calcium gluconate, sodium phosphate IVPB **OR** sodium phosphate IVPB **OR** sodium phosphate IVPB **OR** sodium phosphate IVPB, sodium chloride, sodium chloride, sodium chloride, sodium chloride flush, sodium chloride, heparin flush, fentanNYL, artificial tears, ondansetron, acetaminophen, acetaminophen, oxyCODONE **OR** oxyCODONE, magnesium hydroxide, potassium chloride, magnesium sulfate, albumin human, sodium chloride, norepinephrine, glucose, dextrose, glucagon (rDNA), dextrose, nitroprusside (NIPRIDE) 50 mg in D5W infusion, calcium gluconate IVPB    DATA:    CBC:   Lab Results   Component Value Date    WBC 16.4 10/03/2021    RBC 3.15 10/03/2021    HGB 9.0 10/03/2021    HCT 27.2 10/03/2021    MCV 86.3 10/03/2021    MCH 28.6 10/03/2021    MCHC 33.1 10/03/2021    RDW 15.9 10/03/2021    PLT 45 10/03/2021    MPV 12.1 10/03/2021     CMP:    Lab Results   Component Value Date     10/03/2021    K 3.50 10/03/2021     10/03/2021    CO2 28 10/03/2021     10/03/2021    CREATININE 3.2 10/03/2021    GFRAA 23 10/03/2021    LABGLOM 19 10/03/2021    GLUCOSE 163 10/03/2021    PROT 4.7 10/03/2021    LABALBU 3.2 10/03/2021    CALCIUM 8.7 10/03/2021 BILITOT 2.3 10/03/2021    ALKPHOS 85 10/03/2021    AST 26 10/03/2021    ALT 24 10/03/2021     Magnesium:    Lab Results   Component Value Date    MG 2.2 10/03/2021     Phosphorus:    Lab Results   Component Value Date    PHOS 3.7 10/03/2021        Radiology Review:      CXR 9/30/2021   1. No interval change in the right perihilar airspace disease. 2. Stable position of the support lines and tubes.  There is no pneumothorax.         Chest x-ray October 1, 2021   Parenchymal infiltrates are similar to subtly improved.  Continued follow-up   recommended.               BRIEF SUMMARY OF INITIAL CONSULT:    Briefly Mr. Shawn Weldon is a 44-year-old man with history of HTN, CAD with recent status cardiac catheterization done on September 9 which showed severe multivessel disease, hyperlipidemia, hiatal hernia, who was admitted for elective CABG on September 27, 2021. On admission his creatinine level was 0.8 mg/dL and post surgery his creatinine has progressively increased up to 2.3 mg/dL, reason for this consultation. Postoperatively she developed persistent hypotension, lactic acidosis and respiratory failure for which he was reintubated. Since then she has required multiple pressors and he was placed on IABP. He had received 1 dose of ketorolac perioperatively. His urine output has significantly decrease and is being about 500 cc/day in the last 48 hours and his fluid balance presently is over 9 L. Problems resolved:        IMPRESSION/RECOMMENDATIONS:      1. NGUYỄN stage III (by urine output and probably by creatinine level), CABG associated NGUYỄN, ischemic ATN, non-oliguric. Temporary dialysis catheter was placed 9/30/21 (right femoral). Urine output quite borderline despite large fluid balance >17 L. Creatinine relatively stable or improved. We will hold initiation of CVVHD  and continue to monitor. FEUrea 18.8% c/w pre-renal.  · Creatinine 3.2 mg/dl today. Excellent urine output on Bumex drip 3.2L.  Will decrease bumex drip to 0.25 mg/hr. 2. Respiratory alkalosis (pH: 7.481, PCO2: 66.8) with metabolic alkalosis (bicarbonate administration)  3. Cardiogenic shock, hemodynamically more stable, pressor support decrease, still on IABP. Tentative plan for removal of IABP  ----------------------------------------------------  4. Respiratory failure status post intubation  5. CAD status post CABG x3 September 27, 2021  6. Probably pneumonia, on piperacillin-tazobactam  7. Transaminitis with hyperbilirubinemia, possibly shock liver versus congestive liver  8. Normocytic anemia, status post surgery, getting transfused  9. Thrombocytopenia  10.  Nutrition, starting trickle feeds today    Plan:    · Decrease bumex drip to 0.25 mg/hr  · CMP q 6 hours for 1 day  · Replace potassium  · Replace calcium IV  · Continue to monitor renal function for recovery  · Hold initiation of CRRT   · Obtain proBNP in am       Janki Zaldivar MD

## 2021-10-03 NOTE — PROGRESS NOTES
Department of Internal Medicine  Nephrology Progress Note      Events reviewed. SUBJECTIVE: WE are following Mr. Mehul [address] for NGUYỄN. Remains intubated.      PHYSICAL EXAM:      Vitals:    VITALS:  BP (!) 139/45   Pulse 86   Temp 98.2 °F (36.8 °C) (Bladder)   Resp 18   Ht 5' 8\" (1.727 m)   Wt 158 lb 11.7 oz (72 kg)   SpO2 98%   BMI 24.14 kg/m²   24HR INTAKE/OUTPUT:      Intake/Output Summary (Last 24 hours) at 10/4/2021 4624  Last data filed at 10/4/2021 0600  Gross per 24 hour   Intake 2268.96 ml   Output 4445 ml   Net -2176.04 ml     Access: right femoral temporary dialysis catheter  Constitutional: Patient is intubated, sedated, Fio2 at 40 %  HEENT: Pupils are equal reactive, endotracheal tube in place  Respiratory: Decreased breath sounds at the bases  Cardiovascular/Edema: Heart sounds are regular  Gastrointestinal: Abdomen soft  Neurologic: Patient sedated  Other: ++ edema      Scheduled Meds:   piperacillin-tazobactam  3,375 mg IntraVENous Q12H    amiodarone  400 mg Oral BID    pantoprazole  40 mg IntraVENous BID    And    sodium chloride (PF)  10 mL IntraVENous BID    hydrocortisone sodium succinate PF  100 mg IntraVENous Q12H    lidocaine PF  5 mL IntraDERmal Once    sodium chloride flush  5-40 mL IntraVENous 2 times per day    heparin flush  3 mL IntraVENous 2 times per day    rocuronium  0.6 mg/kg IntraVENous Once    aspirin  81 mg Oral Daily    chlorhexidine  15 mL Mouth/Throat BID    docusate sodium  100 mg Oral BID    sennosides  5 mL Oral BID    insulin lispro  0-3 Units SubCUTAneous Q4H    atorvastatin  40 mg Oral Daily    magnesium oxide  400 mg Oral Daily    ipratropium-albuterol  1 ampule Inhalation Q4H WA    [Held by provider] clopidogrel  75 mg Oral Daily    [Held by provider] tamsulosin  0.4 mg Oral Daily     Continuous Infusions:   bumetanide 0.1 mg/mL infusion Stopped (10/04/21 0606)    dialysis builder Stopped (10/01/21 9694)    sodium chloride 100 mL/hr at 10/01/21 0940    IV infusion builder Stopped (10/01/21 9208)    sodium chloride      sodium chloride      sodium chloride      sodium chloride      cisatracurium (NIMBEX) infusion Stopped (10/01/21 0740)    midazolam 3 mg/hr (10/04/21 0346)    DOBUTamine 3 mcg/kg/min (10/03/21 2155)    epoprostenol (VELETRI) nebulization solution Stopped (10/03/21 1600)    sodium chloride 30 mL/hr (10/03/21 0206)    sodium chloride      norepinephrine Stopped (09/28/21 0902)    insulin Stopped (09/29/21 0808)    dextrose      nitroprusside (NIPRIDE) 50 mg in D5W infusion       PRN Meds:.potassium chloride, magnesium sulfate, calcium gluconate **OR** calcium gluconate **OR** calcium gluconate **OR** calcium gluconate, sodium phosphate IVPB **OR** sodium phosphate IVPB **OR** sodium phosphate IVPB **OR** sodium phosphate IVPB, sodium chloride, sodium chloride, sodium chloride, sodium chloride flush, sodium chloride, heparin flush, fentanNYL, artificial tears, ondansetron, acetaminophen, acetaminophen, oxyCODONE **OR** oxyCODONE, magnesium hydroxide, potassium chloride, magnesium sulfate, albumin human, sodium chloride, norepinephrine, glucose, dextrose, glucagon (rDNA), dextrose, nitroprusside (NIPRIDE) 50 mg in D5W infusion, calcium gluconate IVPB    DATA:    CBC:   Lab Results   Component Value Date    WBC 14.3 10/04/2021    RBC 3.11 10/04/2021    HGB 8.9 10/04/2021    HCT 26.9 10/04/2021    MCV 86.5 10/04/2021    MCH 28.6 10/04/2021    MCHC 33.1 10/04/2021    RDW 15.5 10/04/2021    PLT 37 10/04/2021    MPV 12.7 10/04/2021     CMP:    Lab Results   Component Value Date     10/04/2021    K 3.7 10/04/2021     10/04/2021    CO2 26 10/04/2021     10/04/2021    CREATININE 3.6 10/04/2021    GFRAA 20 10/04/2021    LABGLOM 17 10/04/2021    GLUCOSE 159 10/04/2021    PROT 5.0 10/04/2021    LABALBU 3.2 10/04/2021    CALCIUM 8.1 10/04/2021    BILITOT 1.9 10/04/2021    ALKPHOS 75 10/04/2021    AST 21 10/04/2021    ALT 17 10/04/2021     Magnesium:    Lab Results   Component Value Date    MG 2.2 10/04/2021     Phosphorus:    Lab Results   Component Value Date    PHOS 3.7 10/04/2021        Radiology Review:      CXR 10/3/21   1. Median sternotomy changes. 2. Bilateral pleuroparenchymal opacities that could be related to pleural   effusion and edema.  The appearance of the chest is about the same or   slightly worse.                     BRIEF SUMMARY OF INITIAL CONSULT:    Briefly Mr. Lillian Fish is a 73-year-old man with history of HTN, CAD with recent status cardiac catheterization done on September 9 which showed severe multivessel disease, hyperlipidemia, hiatal hernia, who was admitted for elective CABG on September 27, 2021. On admission his creatinine level was 0.8 mg/dL and post surgery his creatinine has progressively increased up to 2.3 mg/dL, reason for this consultation. Postoperatively she developed persistent hypotension, lactic acidosis and respiratory failure for which he was reintubated. Since then she has required multiple pressors and he was placed on IABP. He had received 1 dose of ketorolac perioperatively. His urine output has significantly decrease and is being about 500 cc/day in the last 48 hours and his fluid balance presently is over 9 L. Problems resolved:        IMPRESSION/RECOMMENDATIONS:      1. NGUYỄN stage III, CABG associated NGUYỄN, ischemic ATN, non-oliguric. FEUrea 18.8%. Temporary dialysis catheter was placed 9/30/21 (right femoral). Holding initiation of CVVHD. Renal function relatively stable with excellent diuresis. 2. Respiratory alkalosis (pH: 7.481, PCO2: 93.4) with metabolic alkalosis (bicarbonate administration)  3. Cardiogenic shock, hemodynamically more stable, pressor support decrease, still on IABP. Tentative plan for removal of IABP. Pro-BP 16,936  ---------------------------------------------------------  4. Respiratory failure status post intubation  5.  CAD status post CABG x3 September 27, 2021  6. Probably pneumonia, on piperacillin-tazobactam  7. Transaminitis with hyperbilirubinemia, possibly shock liver versus congestive liver  8. Normocytic anemia, status post surgery, getting transfused  9. Thrombocytopenia  10.  Nutrition, TF at 10 cc/hour, free water at 30 cc/4 hours     Plan:    · Restart bumex drip at 0.25 mg/hr  · Continue to monitor renal function for recovery  · Hold initiation of CRRT

## 2021-10-03 NOTE — PLAN OF CARE
Problem: Cardiac Output - Decreased:  Goal: Hemodynamic stability will improve  Description: Hemodynamic stability will improve  10/3/2021 1133 by Irina Thorne RN  Outcome: Met This Shift  10/3/2021 0220 by José Silva RN  Outcome: Met This Shift     Problem: Gas Exchange - Impaired:  Goal: Levels of oxygenation will improve  Description: Levels of oxygenation will improve  10/3/2021 1133 by Irina Thorne RN  Outcome: Met This Shift  10/3/2021 0220 by José Silva RN  Outcome: Met This Shift

## 2021-10-03 NOTE — PROGRESS NOTES
Pharmacy Consultation Note  (Renal Dosing and Monitoring)    Initial consult date: 10/1/21  Consulting physician/provider: Dr Tiffany Bajwa  Reason for consult: Renal dosing of medications in CRRT    Ht Readings from Last 1 Encounters:   10/01/21 5' 8\" (1.727 m)     Wt Readings from Last 1 Encounters:   09/27/21 158 lb 11.7 oz (72 kg)       Age/Gender  Allergy Information   78 y.o. male  Dust mite extract               Estimated Creatinine Clearance: 20 mL/min (A) (based on SCr of 3.2 mg/dL (H)). Intake/Output Summary (Last 24 hours) at 10/3/2021 0748  Last data filed at 10/3/2021 0600  Gross per 24 hour   Intake 1759.63 ml   Output 3560 ml   Net -1800.37 ml     Urine output over the last 24 hours: 0.4 mL/kg/hr (730 mL total)    Assessment:  · 79 yo M admitted 9/27 s/p CABG x3/RCA endarterectomy/MALU exclusion with CT Surgery  · Consulted by Dr. Tone Manriquez to renally dose/monitor medications in CVVHD   · Temporary HD line placed 9/30  · CVVHD not initiated 10/1; currently on bumetanide infusion  · SCr 3.2 today; CrCl 20 mL/hr    Plan:  · Adjust Piperacillin/tazobactam 3.375 gm IV to q12h for now. · No additional renal adjustments needed at this time    Will continue to follow.       Rosalva Lorenz, PharmD, BCPS, BCCCP  10/3/2021  7:49 AM

## 2021-10-03 NOTE — FLOWSHEET NOTE
Notified Daniel Clement about the patients critical BUN, and current status. Awaiting response.  Will continue to monitor

## 2021-10-03 NOTE — PROGRESS NOTES
[address] [facility] Pulmonary, [address] [place] [facility] [facility]                                                                       Pulmonary &health [facility]                                                                   Pulmonary Consult Note              Reason for Consultation:severe hypoxia ,possible mucus plug   CC : vent/resp failure   HPI:   Continue to improve  UOP 3 L with bumex drip  S/p removal large mucus plug  No fever or chills  On 40 % PEEP 7   No events except some drop in hb   ABG look ok   CXR with pulmonary edema /effusion     PHYSICAL EXAMINATION:     VITAL SIGNS:  BP (!) 139/45   Pulse 89   Temp 98.3 °F (36.8 °C) (Axillary)   Resp 14   Ht 5' 8\" (1.727 m)   Wt 158 lb 11.7 oz (72 kg)   SpO2 99%   BMI 24.14 kg/m²   Wt Readings from Last 3 Encounters:   09/27/21 158 lb 11.7 oz (72 kg)   09/20/21 157 lb (71.2 kg)   09/21/21 160 lb 6.4 oz (72.8 kg)     Temp Readings from Last 3 Encounters:   10/03/21 98.3 °F (36.8 °C) (Axillary)   09/27/21 98.4 °F (36.9 °C)   09/23/21 97.1 °F (36.2 °C) (Temporal)     TMAX:  BP Readings from Last 3 Encounters:   10/02/21 (!) 139/45   09/27/21 (!) 144/63   09/23/21 (!) 183/89     Pulse Readings from Last 3 Encounters:   10/03/21 89   09/23/21 66   09/21/21 65           INTAKE/OUTPUTS:  I/O last 3 completed shifts:   In: 1869.8 [I.V.:986.4; Other:20; NG/GT:150; IV Piggyback:713.3]  Out: 2454 [Urine:4380; Emesis/NG output:250; Chest Tube:30]    Intake/Output Summary (Last 24 hours) at 10/3/2021 1957  Last data filed at 10/3/2021 1800  Gross per 24 hour   Intake 1769.76 ml   Output 4655 ml   Net -2885.24 ml       General Appearance: intubated and sedated   Eyes: pupils equal, round, and reactive to light, extraocular eye movements intact, conjunctivae normal and sclera anicteric   Neck: neck supple and non tender without mass, no thyromegaly, no thyroid nodules and no cervical adenopathy   Pulmonary/Chest:decrease breath sound bilateral   Cardiovascular: normal rate, regular rhythm, normal S1 and S2, no murmurs, rubs, clicks or gallops, distal pulses intact, no carotid bruits, no murmurs, no gallops, no carotid bruits and no JVD   Abdomen: obese, soft, non-tender, non-distended, normal bowel sounds, no masses or organomegaly   Extremities: no edema   Musculoskeletal: normal range of motion, no joint swelling, deformity or tenderness   Neurologic: reflexes normal and symmetric, no cranial nerve deficit noted    LABS/IMAGING:    CBC:  Lab Results   Component Value Date    WBC 16.7 (H) 10/03/2021    HGB 9.4 (L) 10/03/2021    HCT 28.4 (L) 10/03/2021    MCV 86.1 10/03/2021    PLT 40 (L) 10/03/2021    LYMPHOPCT 5.1 (L) 09/28/2021    RBC 3.30 (L) 10/03/2021    MCH 28.5 10/03/2021    MCHC 33.1 10/03/2021    RDW 15.6 (H) 10/03/2021    NEUTOPHILPCT 89.5 (H) 09/28/2021    MONOPCT 4.4 09/28/2021    BASOPCT 0.1 09/28/2021    NEUTROABS 15.00 (H) 09/28/2021    LYMPHSABS 0.86 (L) 09/28/2021    MONOSABS 0.73 09/28/2021    EOSABS 0.00 (L) 09/28/2021    BASOSABS 0.01 09/28/2021       Recent Labs     10/03/21  1600 10/03/21  0400 10/02/21  2330   WBC 16.7* 16.4* 16.8*   HGB 9.4* 9.0* 8.9*   HCT 28.4* 27.2* 26.6*   MCV 86.1 86.3 86.1   PLT 40* 45* 46*       BMP:   Recent Labs     10/03/21  0400 10/03/21  0400 10/03/21  0832 10/03/21  1005 10/03/21  1600     --   --  144 141   K 4.0   < > 3.50 4.1 4.0     --   --  104 102   CO2 28  --   --  27 26   PHOS 3.7  --   --  4.0 3.9   *  --   --  110* 112*   CREATININE 3.2*  --   --  3.4* 3.4*    < > = values in this interval not displayed.        MG:   Lab Results   Component Value Date    MG 2.2 10/03/2021     Ca/Phos:   Lab Results   Component Value Date    CALCIUM 8.2 (L) 10/03/2021    PHOS 3.9 10/03/2021     Amylase: No results found for: AMYLASE  Lipase:   Lab Results Component Value Date    LIPASE 7 (L) 09/28/2021     LIVER PROFILE:   Recent Labs     10/03/21  0400 10/03/21  1005 10/03/21  1600   AST 26 24 21   ALT 24 21 19   BILITOT 2.3* 2.3* 2.0*   ALKPHOS 85 88 80       PT/INR:   Recent Labs     10/01/21  1000 10/02/21  0412   PROTIME 22.5* 18.9*   INR 2.0 1.7     APTT: No results for input(s): APTT in the last 72 hours. Cardiac Enzymes:  Lab Results   Component Value Date    CKMB 94.0 (H) 09/28/2021       Hgb A1C:   Lab Results   Component Value Date    LABA1C 5.1 09/10/2021     No results found for: EAG  CRISTIANA: No results found for: CRISTIANA  ESR: No results found for: SEDRATE  CRP: No results found for: CRP  D Dimer: No results found for: DDIMER  Folate and B12: No results found for: [id number], No results found for: FOLATE              PROBLEM LIST:  Patient Active Problem List   Diagnosis    CAD in native artery    Pulmonary nodules    Unstable angina (HCC)    Other hyperlipidemia    Essential hypertension    Gastroesophageal reflux disease with esophagitis without hemorrhage    S/P CABG (coronary artery bypass graft)    Acute respiratory failure with hypoxia (HCC)    Lactic acidemia    NGUY[address]N (acute kidney injury) ([address]    Hypokalemia    Hypocalcemia    Cardiogenic shock (HCC)    Thrombocytopenia ([address]    Leukocytosis    Encounter regarding vascular access for dialysis for ESRD ([facility]               ASSESSMENT:  1.) Acute Respiratory failure   2.)cardiogenic shock vs other types of shock,septic ,etc   3. )Large mucus plug obstruction left main bronchus and RUL   4. )CAD s/P CABG   5.)NGUYỄN       PLAN:  *-S/p Bronchoscopy ,removal mucus plug   *- CXR seems with bilateral effusion a,will give Bumex another day   Will look with US and if large may drain,hope that  bumex will do the job   Aggressive pulmonary toilet   BD  Weaning dobutamine   Hypertonic saline   Chest vest when possible   Wean steroids \diuresis as per renal ,seem fluid overload,renal following diuresis as needed    *- so far negative sputum culture  Adjust vent in coordiantaion of critical care team ,will help if needed  Discuss with wife in details yesterday   Rest by ICU team       Lara Chavez MD  Pulmonary/Critical care Medicine   [facility]

## 2021-10-03 NOTE — PLAN OF CARE
Problem: Falls - Risk of:  Goal: Will remain free from falls  Description: Will remain free from falls  Outcome: Met This Shift     Problem: Cardiac Output - Decreased:  Goal: Cardiac output within specified parameters  Description: Cardiac output within specified parameters  Outcome: Met This Shift  Goal: Hemodynamic stability will improve  Description: Hemodynamic stability will improve  Outcome: Met This Shift     Problem: Fluid Volume - Imbalance:  Goal: Chest tube drainage is within specified parameters  Description: Chest tube drainage is within specified parameters  Outcome: Met This Shift     Problem: Gas Exchange - Impaired:  Goal: Levels of oxygenation will improve  Description: Levels of oxygenation will improve  Outcome: Met This Shift     Problem: Fluid Volume - Imbalance:  Goal: Ability to achieve a balanced intake and output will improve  Description: Ability to achieve a balanced intake and output will improve  Outcome: Ongoing

## 2021-10-04 ENCOUNTER — APPOINTMENT (OUTPATIENT)
Dept: GENERAL RADIOLOGY | Age: 76
DRG: 235 | End: 2021-10-04
Attending: THORACIC SURGERY (CARDIOTHORACIC VASCULAR SURGERY)
Payer: MEDICARE

## 2021-10-04 LAB
AADO2: 114.8 MMHG
AADO2: 132.8 MMHG
AADO2: 136.6 MMHG
AADO2: 145.5 MMHG
ABO/RH: NORMAL
ALBUMIN SERPL-MCNC: 3.2 G/DL (ref 3.5–5.2)
ALP BLD-CCNC: 75 U/L (ref 40–129)
ALT SERPL-CCNC: 17 U/L (ref 0–40)
ANION GAP SERPL CALCULATED.3IONS-SCNC: 12 MMOL/L (ref 7–16)
ANION GAP SERPL CALCULATED.3IONS-SCNC: 12 MMOL/L (ref 7–16)
ANION GAP SERPL CALCULATED.3IONS-SCNC: 14 MMOL/L (ref 7–16)
ANTIBODY SCREEN: NORMAL
APTT: 24.7 SEC (ref 24.5–35.1)
AST SERPL-CCNC: 21 U/L (ref 0–39)
B.E.: 1.7 MMOL/L (ref -3–3)
B.E.: 2 MMOL/L (ref -3–3)
B.E.: 2 MMOL/L (ref -3–3)
B.E.: 3.1 MMOL/L (ref -3–3)
BILIRUB SERPL-MCNC: 1.9 MG/DL (ref 0–1.2)
BLOOD BANK DISPENSE STATUS: NORMAL
BLOOD BANK PRODUCT CODE: NORMAL
BPU ID: NORMAL
BUN BLDV-MCNC: 122 MG/DL (ref 6–23)
BUN BLDV-MCNC: 123 MG/DL (ref 6–23)
BUN BLDV-MCNC: 129 MG/DL (ref 6–23)
CALCIUM IONIZED: 1.16 MMOL/L (ref 1.15–1.33)
CALCIUM SERPL-MCNC: 8.1 MG/DL (ref 8.6–10.2)
CALCIUM SERPL-MCNC: 8.7 MG/DL (ref 8.6–10.2)
CALCIUM SERPL-MCNC: 8.8 MG/DL (ref 8.6–10.2)
CHLORIDE BLD-SCNC: 103 MMOL/L (ref 98–107)
CHLORIDE BLD-SCNC: 103 MMOL/L (ref 98–107)
CHLORIDE BLD-SCNC: 104 MMOL/L (ref 98–107)
CO2: 26 MMOL/L (ref 22–29)
CO2: 30 MMOL/L (ref 22–29)
CO2: 30 MMOL/L (ref 22–29)
COHB: 0.4 % (ref 0–1.5)
COHB: 0.4 % (ref 0–1.5)
COHB: 0.5 % (ref 0–1.5)
COHB: 0.6 % (ref 0–1.5)
CREAT SERPL-MCNC: 3.6 MG/DL (ref 0.7–1.2)
CREAT SERPL-MCNC: 3.6 MG/DL (ref 0.7–1.2)
CREAT SERPL-MCNC: 3.8 MG/DL (ref 0.7–1.2)
CRITICAL: ABNORMAL
DATE ANALYZED: ABNORMAL
DATE OF COLLECTION: ABNORMAL
DESCRIPTION BLOOD BANK: NORMAL
EKG ATRIAL RATE: 103 BPM
EKG P AXIS: 61 DEGREES
EKG P-R INTERVAL: 162 MS
EKG Q-T INTERVAL: 312 MS
EKG QRS DURATION: 126 MS
EKG QTC CALCULATION (BAZETT): 408 MS
EKG R AXIS: -44 DEGREES
EKG T AXIS: 113 DEGREES
EKG VENTRICULAR RATE: 103 BPM
FIO2: 40 %
GFR AFRICAN AMERICAN: 19
GFR AFRICAN AMERICAN: 20
GFR AFRICAN AMERICAN: 20
GFR NON-AFRICAN AMERICAN: 16 ML/MIN/1.73
GFR NON-AFRICAN AMERICAN: 17 ML/MIN/1.73
GFR NON-AFRICAN AMERICAN: 17 ML/MIN/1.73
GLUCOSE BLD-MCNC: 159 MG/DL (ref 74–99)
GLUCOSE BLD-MCNC: 161 MG/DL (ref 74–99)
GLUCOSE BLD-MCNC: 163 MG/DL (ref 74–99)
HCO3: 23.7 MMOL/L (ref 22–26)
HCO3: 24.3 MMOL/L (ref 22–26)
HCO3: 24.6 MMOL/L (ref 22–26)
HCO3: 26 MMOL/L (ref 22–26)
HCT VFR BLD CALC: 26.9 % (ref 37–54)
HCT VFR BLD CALC: 28 % (ref 37–54)
HCT VFR BLD CALC: 28.1 % (ref 37–54)
HEMOGLOBIN: 8.9 G/DL (ref 12.5–16.5)
HEMOGLOBIN: 9.3 G/DL (ref 12.5–16.5)
HEMOGLOBIN: 9.5 G/DL (ref 12.5–16.5)
HHB: 2.4 % (ref 0–5)
HHB: 2.7 % (ref 0–5)
HHB: 2.9 % (ref 0–5)
HHB: 3.3 % (ref 0–5)
INR BLD: 1.5
LAB: ABNORMAL
LACTIC ACID: 1 MMOL/L (ref 0.5–2.2)
LACTIC ACID: 1.1 MMOL/L (ref 0.5–2.2)
LACTIC ACID: 1.3 MMOL/L (ref 0.5–2.2)
Lab: ABNORMAL
MAGNESIUM: 2.2 MG/DL (ref 1.6–2.6)
MCH RBC QN AUTO: 28.6 PG (ref 26–35)
MCH RBC QN AUTO: 29.1 PG (ref 26–35)
MCH RBC QN AUTO: 29.7 PG (ref 26–35)
MCHC RBC AUTO-ENTMCNC: 33.1 % (ref 32–34.5)
MCHC RBC AUTO-ENTMCNC: 33.1 % (ref 32–34.5)
MCHC RBC AUTO-ENTMCNC: 33.9 % (ref 32–34.5)
MCV RBC AUTO: 86.5 FL (ref 80–99.9)
MCV RBC AUTO: 87.5 FL (ref 80–99.9)
MCV RBC AUTO: 87.8 FL (ref 80–99.9)
METER GLUCOSE: 126 MG/DL (ref 74–99)
METER GLUCOSE: 139 MG/DL (ref 74–99)
METER GLUCOSE: 149 MG/DL (ref 74–99)
METER GLUCOSE: 154 MG/DL (ref 74–99)
METER GLUCOSE: 156 MG/DL (ref 74–99)
METHB: 0.2 % (ref 0–1.5)
METHB: 0.3 % (ref 0–1.5)
METHB: 0.3 % (ref 0–1.5)
METHB: 0.5 % (ref 0–1.5)
MODE: AC
O2 CONTENT: 14 ML/DL
O2 SATURATION: 96.7 % (ref 92–98.5)
O2 SATURATION: 97.1 % (ref 92–98.5)
O2 SATURATION: 97.3 % (ref 92–98.5)
O2 SATURATION: 97.6 % (ref 92–98.5)
O2HB: 95.9 % (ref 94–97)
O2HB: 96.3 % (ref 94–97)
O2HB: 96.4 % (ref 94–97)
O2HB: 96.9 % (ref 94–97)
OPERATOR ID: 2863
OPERATOR ID: 3095
OPERATOR ID: ABNORMAL
OPERATOR ID: ABNORMAL
PATIENT TEMP: 37 C
PCO2: 28.6 MMHG (ref 35–45)
PCO2: 30 MMHG (ref 35–45)
PCO2: 31.2 MMHG (ref 35–45)
PCO2: 33.7 MMHG (ref 35–45)
PDW BLD-RTO: 15.3 FL (ref 11.5–15)
PDW BLD-RTO: 15.5 FL (ref 11.5–15)
PDW BLD-RTO: 15.5 FL (ref 11.5–15)
PEEP/CPAP: 6 CMH2O
PFO2: 2.42 MMHG/%
PFO2: 2.5 MMHG/%
PFO2: 2.66 MMHG/%
PFO2: 3.15 MMHG/%
PH BLOOD GAS: 7.51 (ref 7.35–7.45)
PH BLOOD GAS: 7.51 (ref 7.35–7.45)
PH BLOOD GAS: 7.53 (ref 7.35–7.45)
PH BLOOD GAS: 7.54 (ref 7.35–7.45)
PHOSPHORUS: 3.7 MG/DL (ref 2.5–4.5)
PLATELET # BLD: 37 E9/L (ref 130–450)
PLATELET # BLD: 61 E9/L (ref 130–450)
PLATELET # BLD: 62 E9/L (ref 130–450)
PLATELET CONFIRMATION: NORMAL
PMV BLD AUTO: 11.7 FL (ref 7–12)
PMV BLD AUTO: 12.7 FL (ref 7–12)
PMV BLD AUTO: 12.7 FL (ref 7–12)
PO2: 106.5 MMHG (ref 75–100)
PO2: 125.9 MMHG (ref 75–100)
PO2: 96.8 MMHG (ref 75–100)
PO2: 99.8 MMHG (ref 75–100)
POTASSIUM SERPL-SCNC: 3.3 MMOL/L (ref 3.5–5)
POTASSIUM SERPL-SCNC: 3.4 MMOL/L (ref 3.5–5)
POTASSIUM SERPL-SCNC: 3.7 MMOL/L (ref 3.5–5)
PRO-BNP: ABNORMAL PG/ML (ref 0–450)
PROTHROMBIN TIME: 15.9 SEC (ref 9.3–12.4)
RBC # BLD: 3.11 E12/L (ref 3.8–5.8)
RBC # BLD: 3.2 E12/L (ref 3.8–5.8)
RBC # BLD: 3.2 E12/L (ref 3.8–5.8)
RI(T): 0.91
RI(T): 1.25
RI(T): 1.5
RI(T): 137 %
RR MECHANICAL: 14 B/MIN
SODIUM BLD-SCNC: 143 MMOL/L (ref 132–146)
SODIUM BLD-SCNC: 145 MMOL/L (ref 132–146)
SODIUM BLD-SCNC: 146 MMOL/L (ref 132–146)
SOURCE, BLOOD GAS: ABNORMAL
THB: 10.1 G/DL (ref 11.5–16.5)
THB: 10.2 G/DL (ref 11.5–16.5)
THB: 10.3 G/DL (ref 11.5–16.5)
THB: 9.8 G/DL (ref 11.5–16.5)
TIME ANALYZED: 1
TIME ANALYZED: 1220
TIME ANALYZED: 416
TIME ANALYZED: 836
TOTAL PROTEIN: 5 G/DL (ref 6.4–8.3)
VT MECHANICAL: 500 ML
WBC # BLD: 14.3 E9/L (ref 4.5–11.5)
WBC # BLD: 15.2 E9/L (ref 4.5–11.5)
WBC # BLD: 16.3 E9/L (ref 4.5–11.5)

## 2021-10-04 PROCEDURE — C9113 INJ PANTOPRAZOLE SODIUM, VIA: HCPCS | Performed by: SURGERY

## 2021-10-04 PROCEDURE — P9035 PLATELET PHERES LEUKOREDUCED: HCPCS

## 2021-10-04 PROCEDURE — 37799 UNLISTED PX VASCULAR SURGERY: CPT

## 2021-10-04 PROCEDURE — 82962 GLUCOSE BLOOD TEST: CPT

## 2021-10-04 PROCEDURE — 99291 CRITICAL CARE FIRST HOUR: CPT | Performed by: NURSE PRACTITIONER

## 2021-10-04 PROCEDURE — 33971 AORTIC CIRCULATION ASSIST: CPT | Performed by: THORACIC SURGERY (CARDIOTHORACIC VASCULAR SURGERY)

## 2021-10-04 PROCEDURE — 83880 ASSAY OF NATRIURETIC PEPTIDE: CPT

## 2021-10-04 PROCEDURE — 94640 AIRWAY INHALATION TREATMENT: CPT

## 2021-10-04 PROCEDURE — 2580000003 HC RX 258: Performed by: SURGERY

## 2021-10-04 PROCEDURE — 2580000003 HC RX 258: Performed by: NURSE PRACTITIONER

## 2021-10-04 PROCEDURE — 2580000003 HC RX 258

## 2021-10-04 PROCEDURE — 86850 RBC ANTIBODY SCREEN: CPT

## 2021-10-04 PROCEDURE — 6360000002 HC RX W HCPCS: Performed by: INTERNAL MEDICINE

## 2021-10-04 PROCEDURE — 84100 ASSAY OF PHOSPHORUS: CPT

## 2021-10-04 PROCEDURE — 6360000002 HC RX W HCPCS: Performed by: NURSE PRACTITIONER

## 2021-10-04 PROCEDURE — 86901 BLOOD TYPING SEROLOGIC RH(D): CPT

## 2021-10-04 PROCEDURE — 36430 TRANSFUSION BLD/BLD COMPNT: CPT

## 2021-10-04 PROCEDURE — 6370000000 HC RX 637 (ALT 250 FOR IP): Performed by: PHYSICIAN ASSISTANT

## 2021-10-04 PROCEDURE — 6360000002 HC RX W HCPCS: Performed by: SURGERY

## 2021-10-04 PROCEDURE — 2580000003 HC RX 258: Performed by: PHYSICIAN ASSISTANT

## 2021-10-04 PROCEDURE — 80053 COMPREHEN METABOLIC PANEL: CPT

## 2021-10-04 PROCEDURE — 80048 BASIC METABOLIC PNL TOTAL CA: CPT

## 2021-10-04 PROCEDURE — 86900 BLOOD TYPING SEROLOGIC ABO: CPT

## 2021-10-04 PROCEDURE — 99233 SBSQ HOSP IP/OBS HIGH 50: CPT | Performed by: INTERNAL MEDICINE

## 2021-10-04 PROCEDURE — 2000000000 HC ICU R&B

## 2021-10-04 PROCEDURE — 99231 SBSQ HOSP IP/OBS SF/LOW 25: CPT | Performed by: NURSE PRACTITIONER

## 2021-10-04 PROCEDURE — 83735 ASSAY OF MAGNESIUM: CPT

## 2021-10-04 PROCEDURE — 94003 VENT MGMT INPAT SUBQ DAY: CPT

## 2021-10-04 PROCEDURE — 36415 COLL VENOUS BLD VENIPUNCTURE: CPT

## 2021-10-04 PROCEDURE — 82330 ASSAY OF CALCIUM: CPT

## 2021-10-04 PROCEDURE — 83605 ASSAY OF LACTIC ACID: CPT

## 2021-10-04 PROCEDURE — 85027 COMPLETE CBC AUTOMATED: CPT

## 2021-10-04 PROCEDURE — 71045 X-RAY EXAM CHEST 1 VIEW: CPT

## 2021-10-04 PROCEDURE — 6360000002 HC RX W HCPCS

## 2021-10-04 PROCEDURE — 82805 BLOOD GASES W/O2 SATURATION: CPT

## 2021-10-04 PROCEDURE — 93010 ELECTROCARDIOGRAM REPORT: CPT | Performed by: INTERNAL MEDICINE

## 2021-10-04 PROCEDURE — 85730 THROMBOPLASTIN TIME PARTIAL: CPT

## 2021-10-04 PROCEDURE — 85610 PROTHROMBIN TIME: CPT

## 2021-10-04 PROCEDURE — 6360000002 HC RX W HCPCS: Performed by: PHYSICIAN ASSISTANT

## 2021-10-04 PROCEDURE — 2500000003 HC RX 250 WO HCPCS: Performed by: NURSE PRACTITIONER

## 2021-10-04 PROCEDURE — 6370000000 HC RX 637 (ALT 250 FOR IP): Performed by: NURSE PRACTITIONER

## 2021-10-04 PROCEDURE — 6370000000 HC RX 637 (ALT 250 FOR IP): Performed by: THORACIC SURGERY (CARDIOTHORACIC VASCULAR SURGERY)

## 2021-10-04 RX ORDER — SODIUM CHLORIDE 9 MG/ML
INJECTION, SOLUTION INTRAVENOUS PRN
Status: DISCONTINUED | OUTPATIENT
Start: 2021-10-04 | End: 2021-10-07

## 2021-10-04 RX ORDER — BISACODYL 10 MG
10 SUPPOSITORY, RECTAL RECTAL DAILY
Status: DISCONTINUED | OUTPATIENT
Start: 2021-10-04 | End: 2021-10-07

## 2021-10-04 RX ADMIN — CHLORHEXIDINE GLUCONATE 15 ML: 1.2 RINSE ORAL at 09:47

## 2021-10-04 RX ADMIN — INSULIN LISPRO 3 UNITS: 100 INJECTION, SOLUTION INTRAVENOUS; SUBCUTANEOUS at 04:20

## 2021-10-04 RX ADMIN — SODIUM CHLORIDE, PRESERVATIVE FREE 10 ML: 5 INJECTION INTRAVENOUS at 20:46

## 2021-10-04 RX ADMIN — IPRATROPIUM BROMIDE AND ALBUTEROL SULFATE 1 AMPULE: .5; 2.5 SOLUTION RESPIRATORY (INHALATION) at 20:33

## 2021-10-04 RX ADMIN — IPRATROPIUM BROMIDE AND ALBUTEROL SULFATE 1 AMPULE: .5; 2.5 SOLUTION RESPIRATORY (INHALATION) at 11:20

## 2021-10-04 RX ADMIN — HYDROCORTISONE SODIUM SUCCINATE 100 MG: 100 INJECTION, POWDER, FOR SOLUTION INTRAMUSCULAR; INTRAVENOUS at 05:37

## 2021-10-04 RX ADMIN — IPRATROPIUM BROMIDE AND ALBUTEROL SULFATE 1 AMPULE: .5; 2.5 SOLUTION RESPIRATORY (INHALATION) at 08:28

## 2021-10-04 RX ADMIN — AMIODARONE HYDROCHLORIDE 400 MG: 200 TABLET ORAL at 09:47

## 2021-10-04 RX ADMIN — CALCIUM GLUCONATE 1000 MG: 98 INJECTION, SOLUTION INTRAVENOUS at 07:00

## 2021-10-04 RX ADMIN — PANTOPRAZOLE SODIUM 40 MG: 40 INJECTION, POWDER, FOR SOLUTION INTRAVENOUS at 20:45

## 2021-10-04 RX ADMIN — Medication 10 ML: at 20:45

## 2021-10-04 RX ADMIN — ATORVASTATIN CALCIUM 40 MG: 40 TABLET, FILM COATED ORAL at 20:45

## 2021-10-04 RX ADMIN — PIPERACILLIN AND TAZOBACTAM 3375 MG: 3; .375 INJECTION, POWDER, LYOPHILIZED, FOR SOLUTION INTRAVENOUS at 12:26

## 2021-10-04 RX ADMIN — BUMETANIDE 0.25 MG/HR: 0.25 INJECTION INTRAMUSCULAR; INTRAVENOUS at 17:28

## 2021-10-04 RX ADMIN — SODIUM CHLORIDE, PRESERVATIVE FREE 300 UNITS: 5 INJECTION INTRAVENOUS at 20:46

## 2021-10-04 RX ADMIN — PIPERACILLIN AND TAZOBACTAM 3375 MG: 3; .375 INJECTION, POWDER, LYOPHILIZED, FOR SOLUTION INTRAVENOUS at 23:48

## 2021-10-04 RX ADMIN — AMIODARONE HYDROCHLORIDE 400 MG: 200 TABLET ORAL at 20:45

## 2021-10-04 RX ADMIN — BISACODYL 10 MG: 10 SUPPOSITORY RECTAL at 09:47

## 2021-10-04 RX ADMIN — INSULIN LISPRO 3 UNITS: 100 INJECTION, SOLUTION INTRAVENOUS; SUBCUTANEOUS at 09:46

## 2021-10-04 RX ADMIN — PANTOPRAZOLE SODIUM 40 MG: 40 INJECTION, POWDER, FOR SOLUTION INTRAVENOUS at 09:47

## 2021-10-04 RX ADMIN — IPRATROPIUM BROMIDE AND ALBUTEROL SULFATE 1 AMPULE: .5; 2.5 SOLUTION RESPIRATORY (INHALATION) at 16:49

## 2021-10-04 RX ADMIN — DOCUSATE SODIUM 100 MG: 50 LIQUID ORAL at 10:12

## 2021-10-04 RX ADMIN — POTASSIUM CHLORIDE 20 MEQ: 29.8 INJECTION, SOLUTION INTRAVENOUS at 05:47

## 2021-10-04 RX ADMIN — INSULIN LISPRO 3 UNITS: 100 INJECTION, SOLUTION INTRAVENOUS; SUBCUTANEOUS at 16:12

## 2021-10-04 RX ADMIN — POTASSIUM CHLORIDE 20 MEQ: 400 INJECTION, SOLUTION INTRAVENOUS at 17:14

## 2021-10-04 RX ADMIN — CHLORHEXIDINE GLUCONATE 15 ML: 1.2 RINSE ORAL at 20:45

## 2021-10-04 RX ADMIN — SODIUM CHLORIDE, PRESERVATIVE FREE 10 ML: 5 INJECTION INTRAVENOUS at 09:52

## 2021-10-04 RX ADMIN — SENNOSIDES 5 ML: 8.8 SYRUP ORAL at 10:12

## 2021-10-04 RX ADMIN — HYDROCORTISONE SODIUM SUCCINATE 50 MG: 100 INJECTION, POWDER, FOR SOLUTION INTRAMUSCULAR; INTRAVENOUS at 18:33

## 2021-10-04 RX ADMIN — Medication 10 ML: at 09:51

## 2021-10-04 ASSESSMENT — PAIN SCALES - GENERAL
PAINLEVEL_OUTOF10: 0

## 2021-10-04 ASSESSMENT — PULMONARY FUNCTION TESTS
PIF_VALUE: 20
PIF_VALUE: 19
PIF_VALUE: 20
PIF_VALUE: 26
PIF_VALUE: 19
PIF_VALUE: 20
PIF_VALUE: 20
PIF_VALUE: 19
PIF_VALUE: 20
PIF_VALUE: 20
PIF_VALUE: 21
PIF_VALUE: 20
PIF_VALUE: 19
PIF_VALUE: 19
PIF_VALUE: 20
PIF_VALUE: 19
PIF_VALUE: 19

## 2021-10-04 NOTE — PROGRESS NOTES
CVICU Progress Note    Name: Suzette Diallo  MRN: [id number]    CC: Postoperative Critical Care Management      Indication for Surgery/Procedure: CAD  LVEF:  NML    RVF:  NML     Important/Relevant PMH/PSH:   LAD 2007 cath with impaired LVEF, HTN, HLD, LBBB, pulmonary nodules      Procedure/Surgeries: 9/27/2021 Sternotomy/CABG x 3 (LIMA-LAD, SVG-OM, SVG-RCA)/Extensive RCA endarterectomy/MALU exclusion with 35 mm AtriClip/EVH LLE/Rigid internal fixation of the sternum with Shon plates x 6/65 modifier     Pacing wires: Atrial and Ventricular        Intake/Output Summary (Last 24 hours) at 10/4/2021 0809  Last data filed at 10/4/2021 0700  Gross per 24 hour   Intake 2368.96 ml   Output 4370 ml   Net -2001.04 ml       Recent Labs     10/03/21  0400 10/03/21  1600 10/04/21  0405   WBC 16.4* 16.7* 14.3*   HGB 9.0* 9.4* 8.9*   HCT 27.2* 28.4* 26.9*   PLT 45* 40* 37*      Lab Results   Component Value Date     10/04/2021    K 3.7 10/04/2021     10/04/2021    CO2 26 10/04/2021     10/04/2021    CREATININE 3.6 10/04/2021    GLUCOSE 159 10/04/2021    CALCIUM 8.1 10/04/2021         Physical Exam:    BP (!) 139/45   Pulse 88   Temp 98.2 °F (36.8 °C) (Bladder)   Resp 19   Ht 5' 8\" (1.727 m)   Wt 158 lb 11.7 oz (72 kg)   SpO2 97%   BMI 24.14 kg/m²       CXR Findings: 10/4/2021        Impression   1. Status post CABG.  The support lines and tubes are unchanged in position. 2. Mild bilateral pulmonary vascular congestion improved when compared to the   patient's prior study of 1 day earlier.         - CXR personally viewed and interpreted by ICU Nurse Practitioner, agree with above findings     General: Intubated, sedated. IABP 1:2, hemodynamics stable on dobutamine 2.5mcg. Plan to remove IABP today. Eyes: PERRL, anicteric   Pulmonary: Diminished bibasilar.  No wheezes, no accessory muscle use noted   Ventilator: Mode: AC/VC, 40% FiO2, 6 PEEP, 500 Vt   Cardiovascular:  RRR, no heaves or thrills on palpation  Tele: SR 80s  Abdomen: Soft, nondisteneded, +BS, + BM 10/2   Extremities: BLE +DP/PT signals, 1+ peripheral edema   Neurologic/Psych: Sedation   Skin: Warm and dry  Incisions: MSI JAYCEE intact, LLE SVG well approximated, IABP site C/D/I     Lines:   ETT 9/28  Left femoral IABP 9/28  Right IJ 9/27-- remove today   Right brachial Arterial line 9/27  Ybarra 9/27  Right femoral Temporary Dialysis Catheter 9/30  PICC LUE 09/29/2021    Assessment/Plan: POD #7    1. CAD S/p CABGx3 (LIMA-LAD, SVG-OM, SVG-RCA)/Extensive RCA endarterectomy/MALU AtriClip  - ASA (Hold Plavix), Lipitor   - Pleural drains remain to wall suction  - PO Amio for afib prophylaxis   - Remove Right IJ today      2. Acute Hypoxic Respiratory Failure  - Extubated DOS, tolerating 2L NC post extubation, acute hypoxia requiring NRB, NIV and reintubation 9/28  - NMBA and iFlolan started 9/28   -Intermittently with episodes of acute hypoxia, last episode 9/30 evening-- CXR with complete left lung white out; pulmonology consulted for STAT bronch, s/p cryotherapy bronch for large obstructive mucous plug left and right main stem; vent settings improved s/p bronch  - Flolan weaned off 10/3  -On empiric Zosyn, sputum culture negative  -weaning systemic steroids  - Continue ETT, lung protective ventilation   - ABGs as ordered and PRN, supportive care      3. Cardiogenic Shock   - post CPB required levophed and vasopressin and ultimately IV B12 intraop, postop ICU stable on Norepi 5mcg; acute decompensation with profound hypotension required escalation of vasopressors, given Vit B12 and Methylene Blue, started on epinephrine infusion and given stress steroids   - POD1: STAT Echo with akinesis of apical inferior and septal walls, EF 45-50%;  Severe RV dysfunction, Moderate MR; IABP placed at bedside, 1:1 with improvement in hemodynamics, levophed weaned [email or web address] @ 3mcg, vaso weaned to 0.02 units, inhaled flolan   - POD2: IABP placed 1:2, dobutamine increased to 7.5mcg to facilitate weaning vasopressin, epinephrine as able   - Tbili down to 0.9, AST/ALT mildly elevated (reactive), lactic 5.3 trending down  -POD3 off pressors, on 7.5 Dobutamine and 3 epi, IABP 1:2, lactic normal. Repeat echo with EF 45%, RV near normal function    - POD4 Dobutamine @ 7.5, Epinephrine weaned off, weaning stress steroids; IABP remains 1:2, hypotension with IABP on standby; will continue through weekend  -POD5 Started flolan and slow dobutamine weaning, IABP 1:2. Lasix challenge and Bumex gtt started. - POD6 Flolan weaned off, continue slow Dobutamine wean. - POD7 Plan to remove IABP, dobutamine at 2.5mcg.      4. Lactic Acidosis  - resolved, monitor once IABP out      5. NGUYỄN   - 2/2 above, baseline Scr 0.8  - Scr up to 3.6,   - nephrology following  - Bumex gtt on hold this AM, 4.5L UOP past 24 hours- restart bumex gtt today   - temporary dialysis catheter placed 9/30, CVVHD on hold for now      6. Acute Post Operative Pain   - PRN fentanyl for pain management, versed gtt for sedation      7. Stress Hyperglycemia  - No h/o DM  - SSI q 4 hours, restart RHI infusion for BG >180 per protocol     8. Thrombocytopenia  - Platelets 72D, ZCFNUF consumptive, no s/s of bleeding. Transfuse one 6 pack for IABP removal    - follow up with HIT panel sent 9/30     9. Acute blood loss anemia  - H/H 8.9/26.9  - monitor and transfuse if needed to maintain Hgb >8      10. Leukocytosis  - TQM 65.2U, afebrile; weaning systemic steroids  - On empiric Zosyn     11. At risk for malnutrition   - Tolerating trickle TFs, will advance slowly today to goal       12. Possible GI bleed  - Maroon colored OG output over weekend with +hemoccult  - Evaluated by general surgery, no intervention planned; PPI BID, monitor H/H     This patient has a high probability of sudden deterioration, which requires preparedness to urgently intervene.  I participated in the decision making process and managed the direct care of the patient that required frequent assessment and treatment. I personally spent 39 minutes of critical care time treating the patient. VTE Prophylaxis: Pharmacologic/Mechanical:  Yes, SCDs   Line infection prevention: Can CVC or arterial line be removed: yes, remove Right IJ   Continued need for urinary catheter: Yes - clinical indication: Patient post major surgery requiring fluid balance and input and output measurement.       Dispo: CVICU     Electronically signed by JUAN MIGUEL Hay CNP on 10/4/2021 at 8:09 AM

## 2021-10-04 NOTE — PLAN OF CARE
Problem: Falls - Risk of:  Goal: Will remain free from falls  Description: Will remain free from falls  Outcome: Met This Shift  Goal: Absence of physical injury  Description: Absence of physical injury  Outcome: Met This Shift     Problem: Anxiety:  Goal: Level of anxiety will decrease  Description: Level of anxiety will decrease  Outcome: Met This Shift     Problem: Cardiac Output - Decreased:  Goal: Cardiac output within specified parameters  Description: Cardiac output within specified parameters  Outcome: Met This Shift  Goal: Hemodynamic stability will improve  Description: Hemodynamic stability will improve  Outcome: Met This Shift     Problem: Fluid Volume - Imbalance:  Goal: Ability to achieve a balanced intake and output will improve  Description: Ability to achieve a balanced intake and output will improve  Outcome: Met This Shift  Goal: Chest tube drainage is within specified parameters  Description: Chest tube drainage is within specified parameters  Outcome: Met This Shift     Problem: Gas Exchange - Impaired:  Goal: Ability to maintain adequate ventilation will improve  Description: Ability to maintain adequate ventilation will improve  Outcome: Met This Shift     Problem: Pain:  Goal: Pain level will decrease  Description: Pain level will decrease  Outcome: Met This Shift  Goal: Control of acute pain  Description: Control of acute pain  Outcome: Met This Shift  Goal: Control of chronic pain  Description: Control of chronic pain  Outcome: Met This Shift     Problem: Tissue Perfusion - Cardiopulmonary, Altered:  Goal: Absence of angina  Description: Absence of angina  Outcome: Met This Shift  Goal: Hemodynamic stability will improve  Description: Hemodynamic stability will improve  Outcome: Met This Shift  Goal: Will show no evidence of cardiac arrhythmias  Description: Will show no evidence of cardiac arrhythmias  Outcome: Met This Shift     Problem: Pain:  Goal: Pain level will decrease  Description: Pain level will decrease  Outcome: Met This Shift     Problem: Skin Integrity:  Goal: Will show no infection signs and symptoms  Description: Will show no infection signs and symptoms  Outcome: Met This Shift  Goal: Absence of new skin breakdown  Description: Absence of new skin breakdown  Outcome: Met This Shift     Problem: Inadequate oral food/beverage intake (NI-2.1)  Goal: Food and/or Nutrient Delivery  Description: Individualized approach for food/nutrient provision.   10/4/2021 1017 by Addie Gu, ARMANDO, LD  Outcome: Met This Shift

## 2021-10-04 NOTE — PLAN OF CARE
Problem: Falls - Risk of:  Goal: Will remain free from falls  Description: Will remain free from falls  Outcome: Met This Shift     Problem:  Activity Intolerance:  Goal: Ability to tolerate increased activity will improve  Description: Ability to tolerate increased activity will improve  Outcome: Met This Shift     Problem: Cardiac Output - Decreased:  Goal: Hemodynamic stability will improve  Description: Hemodynamic stability will improve  10/3/2021 2138 by Almita Casanova RN  Outcome: Met This Shift     Problem: Fluid Volume - Imbalance:  Goal: Chest tube drainage is within specified parameters  Description: Chest tube drainage is within specified parameters  Outcome: Met This Shift     Problem: Tissue Perfusion - Cardiopulmonary, Altered:  Goal: Will show no evidence of cardiac arrhythmias  Description: Will show no evidence of cardiac arrhythmias  Outcome: Met This Shift

## 2021-10-04 NOTE — PROGRESS NOTES
Pharmacy Consultation Note  (Renal Dosing and Monitoring)    Initial consult date: 10/1/21  Consulting physician/provider: Dr Reno Brunson  Reason for consult: Renal dosing of medications in CRRT    Ht Readings from Last 1 Encounters:   10/01/21 5' 8\" (1.727 m)     Wt Readings from Last 1 Encounters:   09/27/21 158 lb 11.7 oz (72 kg)       Age/Gender  Allergy Information   78 y.o. male  Dust mite extract               Estimated Creatinine Clearance: 17 mL/min (A) (based on SCr of 3.6 mg/dL (H)). Intake/Output Summary (Last 24 hours) at 10/4/2021 0930  Last data filed at 10/4/2021 0700  Gross per 24 hour   Intake 2368.96 ml   Output 4145 ml   Net -1776.04 ml     Urine output over the last 24 hours: 2.7 mL/kg/hr (4590 mL total) - on bumetanide gtt    Assessment:  · 79 yo M admitted 9/27 s/p CABG x3/RCA endarterectomy/MALU exclusion with CT Surgery  · Consulted by Dr. Caitlyn Renteria to renally dose/monitor medications in CVVHD   · Temporary HD line placed 9/30  · CVVHD not initiated 10/1; currently bumetanide infusion on hold  · SCr 3.6 today; CrCl 17 mL/hr    Plan:  · Continue Piperacillin/tazobactam 3.375 gm IV to q12h   · No additional renal adjustments needed at this time    Will continue to follow.       Jasiel Manning, PharmD, BCPS, BCCCP  10/4/2021  9:32 AM

## 2021-10-04 NOTE — PROGRESS NOTES
IABP removal    In collaboration with Dr. Mike Miles, INR/platelet count reviewed prior to removal. Transfused with one 6 pack platelets. IABP removed without difficulty, tip intact. Pressure held for 30mins, hemostasis achieved. Pressure dressing applied. Bedside RN notified. Check site and pulses frequently.  HOB to remain <30 degrees and 6 hours bedrest.

## 2021-10-04 NOTE — PROGRESS NOTES
Sand bag removed after 6 hours post IABP removed from left femoral. Dressing dry and intact. No hematoma noted.

## 2021-10-04 NOTE — PROGRESS NOTES
Vascular Surgery Progress Note    Pt is being seen in f/u today regarding dialysis access    Subjective  Pt s/e. Remains intubated. IABP being removed at bedside. Excellent UOP on bumex. Initiation of dialysis remains on hold.      Current Medications:    sodium chloride      bumetanide 0.1 mg/mL infusion 0.25 mg/hr (10/04/21 0935)    dialysis builder Stopped (10/01/21 1752)    sodium chloride 100 mL/hr at 10/01/21 0940    IV infusion builder Stopped (10/01/21 0980)    sodium chloride      sodium chloride      sodium chloride      sodium chloride      cisatracurium (NIMBEX) infusion Stopped (10/01/21 0740)    midazolam 3 mg/hr (10/04/21 0346)    DOBUTamine 3 mcg/kg/min (10/03/21 2155)    epoprostenol (VELETRI) nebulization solution Stopped (10/03/21 1600)    sodium chloride      norepinephrine Stopped (09/28/21 0902)    insulin Stopped (09/29/21 0808)    dextrose      nitroprusside (NIPRIDE) 50 mg in D5W infusion        sodium chloride, potassium chloride, magnesium sulfate, calcium gluconate **OR** calcium gluconate **OR** calcium gluconate **OR** calcium gluconate, sodium phosphate IVPB **OR** sodium phosphate IVPB **OR** sodium phosphate IVPB **OR** sodium phosphate IVPB, sodium chloride, sodium chloride, sodium chloride, sodium chloride flush, sodium chloride, heparin flush, fentanNYL, artificial tears, ondansetron, acetaminophen, acetaminophen, oxyCODONE **OR** oxyCODONE, magnesium hydroxide, potassium chloride, magnesium sulfate, albumin human, sodium chloride, norepinephrine, glucose, dextrose, glucagon (rDNA), dextrose, nitroprusside (NIPRIDE) 50 mg in D5W infusion, calcium gluconate IVPB    bisacodyl  10 mg Rectal Daily    hydrocortisone sodium succinate PF  50 mg IntraVENous Q12H    piperacillin-tazobactam  3,375 mg IntraVENous Q12H    amiodarone  400 mg Oral BID    pantoprazole  40 mg IntraVENous BID    And    sodium chloride (PF)  10 mL IntraVENous BID    lidocaine PF  5 mL IntraDERmal Once    sodium chloride flush  5-40 mL IntraVENous 2 times per day    heparin flush  3 mL IntraVENous 2 times per day    rocuronium  0.6 mg/kg IntraVENous Once    aspirin  81 mg Oral Daily    chlorhexidine  15 mL Mouth/Throat BID    docusate sodium  100 mg Oral BID    sennosides  5 mL Oral BID    insulin lispro  0-3 Units SubCUTAneous Q4H    atorvastatin  40 mg Oral Daily    ipratropium-albuterol  1 ampule Inhalation Q4H WA    [Held by provider] clopidogrel  75 mg Oral Daily    [Held by provider] tamsulosin  0.4 mg Oral Daily      PHYSICAL EXAM:    BP (!) 139/45   Pulse 91   Temp 98.8 °F (37.1 °C) (Bladder)   Resp 12   Ht 5' 8\" (1.727 m)   Wt 158 lb 11.7 oz (72 kg)   SpO2 97%   BMI 24.14 kg/m²     Intake/Output Summary (Last 24 hours) at 10/4/2021 1017  Last data filed at 10/4/2021 0700  Gross per 24 hour   Intake 2368.96 ml   Output 3920 ml   Net -1551.04 ml        Gen intubated, sedated  CVS RRR  Resp AC 40%, PEEP 6  Abd soft, ndnt  R LE temporary line in place. No hematoma.      LABS:    Lab Results   Component Value Date    WBC 14.3 (H) 10/04/2021    HGB 8.9 (L) 10/04/2021    HCT 26.9 (L) 10/04/2021    PLT 37 (L) 10/04/2021    PROTIME 15.9 (H) 10/04/2021    INR 1.5 10/04/2021    APTT 24.7 10/04/2021    K 3.7 10/04/2021     (HH) 10/04/2021    CREATININE 3.6 (H) 10/04/2021     A/P dialysis access   -ok to use temporary catheter if needed for dialysis  -please let us know if a tunneled catheter will be necessary for long term hemodialysis              -recommend removal or exchange of temporary catheter 7-10 days post- insertion    Shaheed Allen, JUAN MIGUEL - CNP

## 2021-10-04 NOTE — CONSULTS
Comprehensive Nutrition Assessment    Type and Reason for Visit:  Reassess, Consult    Nutrition Recommendations/Plan: Due to changes to nutritional needs recommend Nepro (Renal) advanced as tolerated to goal rate of 35 ml/hr x 24 hr provide: 840 mlTV, 1512 kcal, 68 gm pro, and 609 ml FW. Additional FW per critical care/nephrology. Please consult for recs if CRRT is initiated as nutritional needs will change. Nutrition Assessment:  Pt remains nutritionally at risk as pt remains intubated. Pt admitted w/ CAD, s/p CABG x3 w/  post-op cardiogenic shock/acute resp failure. Ongoing NGUYỄN w/ possible need for CRRT. Pt s/p bronch w/ cryotherapy for large mucus plug. Will provide updated EN recommendations and monitor. Malnutrition Assessment:  Malnutrition Status: At risk for malnutrition (Comment)    Context:  Acute Illness     Findings of the 6 clinical characteristics of malnutrition:  Energy Intake:  Mild decrease in energy intake (Comment)  Weight Loss:  No significant weight loss     Body Fat Loss:  No significant body fat loss     Muscle Mass Loss:  No significant muscle mass loss    Fluid Accumulation:  No significant fluid accumulation     Strength:  Not Performed    Estimated Daily Nutrient Needs:  Energy (kcal):  8720-1275 ([id number]= 1586); Weight Used for Energy Requirements:  Admission     Protein (g):  60-75 (0.8-1.0 gm/kg d/t renal labs, if CRRT started needs will increase);  Weight Used for Protein Requirements:  Admission        Fluid (ml/day):  per critical care/renal management; Method Used for Fluid Requirements:  Other (Comment)      Nutrition Related Findings:  Pt intubated, OGT w/ EN running at [facility], abd soft, hypoactive BS, +12.4L I/O, +1 BUE and BLE edema, renal labs elevated, chest tube x2      Wounds:  Multiple, Surgical Incision       Current Nutrition Therapies:    Current Tube Feeding (TF) Orders:  · Feeding Route: Orogastric  · Formula: Renal Formula  · Schedule: Continuous  · Current TF & Flush Orders Provides: Nepro @ 10 ml/hr x 24 hr to provide: 240 mlTV, 432 kcal, 19 gm protein, 174 ml FW  · Goal TF & Flush Orders Provides: Nepro @ 40 ml/hr x 24 hr to provide: 960 mlTV, 1728 kcal, 78 gm protein, 696 ml FW    Anthropometric Measures:  · Height: 5' 8\" (172.7 cm)  · Current Body Weight: 158 lb 11.7 oz (72 kg) (9/27 bed scale)   · Admission Body Weight: 158 lb (71.7 kg) (9/27 first measured)    · Usual Body Weight: 158 lb (71.7 kg) (7/2021 actual per EMR)     · Ideal Body Weight: 154 lbs; % Ideal Body Weight 103.1 %   · BMI: 24.1  · Adjusted Body Weight: No Adjustment   · BMI Categories: Normal Weight (BMI 18.5-24. 9)       Nutrition Diagnosis:   · Inadequate oral intake related to impaired respiratory function as evidenced by NPO or clear liquid status due to medical condition, intubation    Nutrition Interventions:   Food and/or Nutrient Delivery:  Continue NPO, Modify Tube Feeding (Due to changes to nutritional needs recommend Nepro (Renal) advanced as tolerated to goal rate of 35 ml/hr x 24 hr provide: 840 mlTV, 1512 kcal, 68 gm pro, and 609 ml FW. Please consult for recs if CRRT is initiated as nutritional needs will change.)  Nutrition Education/Counseling:  Education not appropriate   Coordination of Nutrition Care:  Continue to monitor while inpatient    Goals:  EN tolerance at goal rate       Nutrition Monitoring and Evaluation:   Behavioral-Environmental Outcomes:  None Identified   Food/Nutrient Intake Outcomes:  Enteral Nutrition Intake/Tolerance  Physical Signs/Symptoms Outcomes:  Biochemical Data, GI Status, Fluid Status or Edema, Hemodynamic Status, Nutrition Focused Physical Findings, Skin, Weight     Discharge Planning:     Too soon to determine     Electronically signed by Marlena Hargrove RD, LD on 10/4/21 at 10:17 AM EDT    Contact: [phone]

## 2021-10-05 ENCOUNTER — APPOINTMENT (OUTPATIENT)
Dept: ULTRASOUND IMAGING | Age: 76
DRG: 235 | End: 2021-10-05
Attending: THORACIC SURGERY (CARDIOTHORACIC VASCULAR SURGERY)
Payer: MEDICARE

## 2021-10-05 ENCOUNTER — APPOINTMENT (OUTPATIENT)
Dept: CT IMAGING | Age: 76
DRG: 235 | End: 2021-10-05
Attending: THORACIC SURGERY (CARDIOTHORACIC VASCULAR SURGERY)
Payer: MEDICARE

## 2021-10-05 ENCOUNTER — APPOINTMENT (OUTPATIENT)
Dept: GENERAL RADIOLOGY | Age: 76
DRG: 235 | End: 2021-10-05
Attending: THORACIC SURGERY (CARDIOTHORACIC VASCULAR SURGERY)
Payer: MEDICARE

## 2021-10-05 LAB
AADO2: 100.8 MMHG
AADO2: 139 MMHG
ALBUMIN SERPL-MCNC: 3.1 G/DL (ref 3.5–5.2)
ALP BLD-CCNC: 72 U/L (ref 40–129)
ALT SERPL-CCNC: 18 U/L (ref 0–40)
ANION GAP SERPL CALCULATED.3IONS-SCNC: 10 MMOL/L (ref 7–16)
ANION GAP SERPL CALCULATED.3IONS-SCNC: 12 MMOL/L (ref 7–16)
ANION GAP SERPL CALCULATED.3IONS-SCNC: 17 MMOL/L (ref 7–16)
AST SERPL-CCNC: 23 U/L (ref 0–39)
B.E.: 3.6 MMOL/L (ref -3–3)
B.E.: 5.4 MMOL/L (ref -3–3)
BILIRUB SERPL-MCNC: 1.6 MG/DL (ref 0–1.2)
BUN BLDV-MCNC: 122 MG/DL (ref 6–23)
BUN BLDV-MCNC: 127 MG/DL (ref 6–23)
BUN BLDV-MCNC: 128 MG/DL (ref 6–23)
CALCIUM SERPL-MCNC: 7.9 MG/DL (ref 8.6–10.2)
CALCIUM SERPL-MCNC: 8.2 MG/DL (ref 8.6–10.2)
CALCIUM SERPL-MCNC: 8.5 MG/DL (ref 8.6–10.2)
CHLORIDE BLD-SCNC: 102 MMOL/L (ref 98–107)
CHLORIDE BLD-SCNC: 103 MMOL/L (ref 98–107)
CHLORIDE BLD-SCNC: 106 MMOL/L (ref 98–107)
CHOLESTEROL, TOTAL: 108 MG/DL (ref 0–199)
CO2: 28 MMOL/L (ref 22–29)
CO2: 32 MMOL/L (ref 22–29)
CO2: 34 MMOL/L (ref 22–29)
COHB: 0.1 % (ref 0–1.5)
COHB: 0.3 % (ref 0–1.5)
CREAT SERPL-MCNC: 3.5 MG/DL (ref 0.7–1.2)
CREAT SERPL-MCNC: 3.6 MG/DL (ref 0.7–1.2)
CREAT SERPL-MCNC: 3.6 MG/DL (ref 0.7–1.2)
CRITICAL: ABNORMAL
CRITICAL: ABNORMAL
DATE ANALYZED: ABNORMAL
DATE ANALYZED: ABNORMAL
DATE OF COLLECTION: ABNORMAL
DATE OF COLLECTION: ABNORMAL
FIO2: 40 %
FIO2: 40 %
GFR AFRICAN AMERICAN: 20
GFR AFRICAN AMERICAN: 20
GFR AFRICAN AMERICAN: 21
GFR NON-AFRICAN AMERICAN: 17 ML/MIN/1.73
GLUCOSE BLD-MCNC: 132 MG/DL (ref 74–99)
GLUCOSE BLD-MCNC: 165 MG/DL (ref 74–99)
GLUCOSE BLD-MCNC: 195 MG/DL (ref 74–99)
HCO3: 25.8 MMOL/L (ref 22–26)
HCO3: 27.5 MMOL/L (ref 22–26)
HCT VFR BLD CALC: 28.1 % (ref 37–54)
HDLC SERPL-MCNC: 41 MG/DL
HEMOGLOBIN: 9.3 G/DL (ref 12.5–16.5)
HHB: 2.1 % (ref 0–5)
HHB: 3.1 % (ref 0–5)
LAB: ABNORMAL
LAB: ABNORMAL
LACTIC ACID: 1.6 MMOL/L (ref 0.5–2.2)
LDL CHOLESTEROL CALCULATED: 48 MG/DL (ref 0–99)
LV EF: 40 %
LVEF MODALITY: NORMAL
Lab: ABNORMAL
Lab: ABNORMAL
MAGNESIUM: 2.2 MG/DL (ref 1.6–2.6)
MAGNESIUM: 2.3 MG/DL (ref 1.6–2.6)
MCH RBC QN AUTO: 29.2 PG (ref 26–35)
MCHC RBC AUTO-ENTMCNC: 33.1 % (ref 32–34.5)
MCV RBC AUTO: 88.1 FL (ref 80–99.9)
METER GLUCOSE: 137 MG/DL (ref 74–99)
METER GLUCOSE: 155 MG/DL (ref 74–99)
METER GLUCOSE: 176 MG/DL (ref 74–99)
METER GLUCOSE: 178 MG/DL (ref 74–99)
METER GLUCOSE: 183 MG/DL (ref 74–99)
METER GLUCOSE: 92 MG/DL (ref 74–99)
METHB: 0.2 % (ref 0–1.5)
METHB: 0.3 % (ref 0–1.5)
MODE: ABNORMAL
MODE: AC
O2 SATURATION: 96.9 % (ref 92–98.5)
O2 SATURATION: 97.9 % (ref 92–98.5)
O2HB: 96.6 % (ref 94–97)
O2HB: 97.3 % (ref 94–97)
OPERATOR ID: 3342
OPERATOR ID: 421
PATIENT TEMP: 37 C
PATIENT TEMP: 37 C
PCO2: 30.4 MMHG (ref 35–45)
PCO2: 31.4 MMHG (ref 35–45)
PDW BLD-RTO: 15.7 FL (ref 11.5–15)
PEEP/CPAP: 6 CMH2O
PEEP/CPAP: 6 CMH2O
PFO2: 2.53 MMHG/%
PFO2: 3.46 MMHG/%
PH BLOOD GAS: 7.55 (ref 7.35–7.45)
PH BLOOD GAS: 7.56 (ref 7.35–7.45)
PHOSPHORUS: 4 MG/DL (ref 2.5–4.5)
PHOSPHORUS: 4.4 MG/DL (ref 2.5–4.5)
PLATELET # BLD: 72 E9/L (ref 130–450)
PLATELET CONFIRMATION: NORMAL
PMV BLD AUTO: 12.1 FL (ref 7–12)
PO2: 101.2 MMHG (ref 75–100)
PO2: 138.3 MMHG (ref 75–100)
POTASSIUM SERPL-SCNC: 3.1 MMOL/L (ref 3.5–5)
POTASSIUM SERPL-SCNC: 3.2 MMOL/L (ref 3.5–5)
POTASSIUM SERPL-SCNC: 3.2 MMOL/L (ref 3.5–5)
POTASSIUM SERPL-SCNC: 3.3 MMOL/L (ref 3.5–5)
POTASSIUM SERPL-SCNC: 3.4 MMOL/L (ref 3.5–5)
POTASSIUM SERPL-SCNC: 3.6 MMOL/L (ref 3.5–5)
PS: 12 CMH20
RBC # BLD: 3.19 E12/L (ref 3.8–5.8)
RI(T): 0.73
RI(T): 1.37
RR MECHANICAL: 14 B/MIN
SODIUM BLD-SCNC: 146 MMOL/L (ref 132–146)
SODIUM BLD-SCNC: 147 MMOL/L (ref 132–146)
SODIUM BLD-SCNC: 151 MMOL/L (ref 132–146)
SOURCE, BLOOD GAS: ABNORMAL
SOURCE, BLOOD GAS: ABNORMAL
THB: 10 G/DL (ref 11.5–16.5)
THB: 9.8 G/DL (ref 11.5–16.5)
TIME ANALYZED: 1614
TIME ANALYZED: 407
TOTAL PROTEIN: 5.4 G/DL (ref 6.4–8.3)
TRIGL SERPL-MCNC: 93 MG/DL (ref 0–149)
VLDLC SERPL CALC-MCNC: 19 MG/DL
VT MECHANICAL: 500 ML
WBC # BLD: 13.7 E9/L (ref 4.5–11.5)

## 2021-10-05 PROCEDURE — 6360000002 HC RX W HCPCS: Performed by: INTERNAL MEDICINE

## 2021-10-05 PROCEDURE — 80048 BASIC METABOLIC PNL TOTAL CA: CPT

## 2021-10-05 PROCEDURE — 6360000002 HC RX W HCPCS: Performed by: PHYSICIAN ASSISTANT

## 2021-10-05 PROCEDURE — 94003 VENT MGMT INPAT SUBQ DAY: CPT

## 2021-10-05 PROCEDURE — 6370000000 HC RX 637 (ALT 250 FOR IP): Performed by: NURSE PRACTITIONER

## 2021-10-05 PROCEDURE — 99222 1ST HOSP IP/OBS MODERATE 55: CPT | Performed by: PSYCHIATRY & NEUROLOGY

## 2021-10-05 PROCEDURE — 71045 X-RAY EXAM CHEST 1 VIEW: CPT

## 2021-10-05 PROCEDURE — 84100 ASSAY OF PHOSPHORUS: CPT

## 2021-10-05 PROCEDURE — 6360000004 HC RX CONTRAST MEDICATION: Performed by: THORACIC SURGERY (CARDIOTHORACIC VASCULAR SURGERY)

## 2021-10-05 PROCEDURE — 82805 BLOOD GASES W/O2 SATURATION: CPT

## 2021-10-05 PROCEDURE — 80053 COMPREHEN METABOLIC PANEL: CPT

## 2021-10-05 PROCEDURE — 94640 AIRWAY INHALATION TREATMENT: CPT

## 2021-10-05 PROCEDURE — C9113 INJ PANTOPRAZOLE SODIUM, VIA: HCPCS | Performed by: SURGERY

## 2021-10-05 PROCEDURE — 84132 ASSAY OF SERUM POTASSIUM: CPT

## 2021-10-05 PROCEDURE — 85027 COMPLETE CBC AUTOMATED: CPT

## 2021-10-05 PROCEDURE — 93880 EXTRACRANIAL BILAT STUDY: CPT

## 2021-10-05 PROCEDURE — 36415 COLL VENOUS BLD VENIPUNCTURE: CPT

## 2021-10-05 PROCEDURE — 99291 CRITICAL CARE FIRST HOUR: CPT | Performed by: NURSE PRACTITIONER

## 2021-10-05 PROCEDURE — 83605 ASSAY OF LACTIC ACID: CPT

## 2021-10-05 PROCEDURE — 80061 LIPID PANEL: CPT

## 2021-10-05 PROCEDURE — 6360000002 HC RX W HCPCS

## 2021-10-05 PROCEDURE — 6370000000 HC RX 637 (ALT 250 FOR IP): Performed by: PHYSICIAN ASSISTANT

## 2021-10-05 PROCEDURE — 83735 ASSAY OF MAGNESIUM: CPT

## 2021-10-05 PROCEDURE — 2580000003 HC RX 258: Performed by: INTERNAL MEDICINE

## 2021-10-05 PROCEDURE — 37799 UNLISTED PX VASCULAR SURGERY: CPT

## 2021-10-05 PROCEDURE — 6360000002 HC RX W HCPCS: Performed by: SURGERY

## 2021-10-05 PROCEDURE — 82962 GLUCOSE BLOOD TEST: CPT

## 2021-10-05 PROCEDURE — 6370000000 HC RX 637 (ALT 250 FOR IP): Performed by: THORACIC SURGERY (CARDIOTHORACIC VASCULAR SURGERY)

## 2021-10-05 PROCEDURE — 6360000002 HC RX W HCPCS: Performed by: NURSE PRACTITIONER

## 2021-10-05 PROCEDURE — 2580000003 HC RX 258: Performed by: NURSE PRACTITIONER

## 2021-10-05 PROCEDURE — 2580000003 HC RX 258

## 2021-10-05 PROCEDURE — 99233 SBSQ HOSP IP/OBS HIGH 50: CPT | Performed by: INTERNAL MEDICINE

## 2021-10-05 PROCEDURE — 93306 TTE W/DOPPLER COMPLETE: CPT

## 2021-10-05 PROCEDURE — 70450 CT HEAD/BRAIN W/O DYE: CPT

## 2021-10-05 PROCEDURE — 2000000000 HC ICU R&B

## 2021-10-05 PROCEDURE — 2580000003 HC RX 258: Performed by: SURGERY

## 2021-10-05 RX ORDER — CHLOROTHIAZIDE SODIUM 500 MG/1
500 INJECTION INTRAVENOUS ONCE
Status: DISCONTINUED | OUTPATIENT
Start: 2021-10-05 | End: 2021-10-05 | Stop reason: ALTCHOICE

## 2021-10-05 RX ORDER — DEXTROSE MONOHYDRATE 50 MG/ML
INJECTION, SOLUTION INTRAVENOUS CONTINUOUS
Status: DISCONTINUED | OUTPATIENT
Start: 2021-10-05 | End: 2021-10-08

## 2021-10-05 RX ADMIN — ASPIRIN 81 MG CHEWABLE TABLET 81 MG: 81 TABLET CHEWABLE at 09:39

## 2021-10-05 RX ADMIN — AMIODARONE HYDROCHLORIDE 400 MG: 200 TABLET ORAL at 20:55

## 2021-10-05 RX ADMIN — INSULIN LISPRO 3 UNITS: 100 INJECTION, SOLUTION INTRAVENOUS; SUBCUTANEOUS at 01:01

## 2021-10-05 RX ADMIN — HYDROCORTISONE SODIUM SUCCINATE 50 MG: 100 INJECTION, POWDER, FOR SOLUTION INTRAMUSCULAR; INTRAVENOUS at 17:58

## 2021-10-05 RX ADMIN — AMIODARONE HYDROCHLORIDE 400 MG: 200 TABLET ORAL at 09:34

## 2021-10-05 RX ADMIN — IPRATROPIUM BROMIDE AND ALBUTEROL SULFATE 1 AMPULE: .5; 2.5 SOLUTION RESPIRATORY (INHALATION) at 15:49

## 2021-10-05 RX ADMIN — ATORVASTATIN CALCIUM 40 MG: 40 TABLET, FILM COATED ORAL at 20:55

## 2021-10-05 RX ADMIN — INSULIN LISPRO 3 UNITS: 100 INJECTION, SOLUTION INTRAVENOUS; SUBCUTANEOUS at 20:55

## 2021-10-05 RX ADMIN — SENNOSIDES 5 ML: 8.8 SYRUP ORAL at 21:04

## 2021-10-05 RX ADMIN — IPRATROPIUM BROMIDE AND ALBUTEROL SULFATE 1 AMPULE: .5; 2.5 SOLUTION RESPIRATORY (INHALATION) at 12:22

## 2021-10-05 RX ADMIN — SODIUM CHLORIDE, PRESERVATIVE FREE 10 ML: 5 INJECTION INTRAVENOUS at 09:34

## 2021-10-05 RX ADMIN — CHLORHEXIDINE GLUCONATE 15 ML: 1.2 RINSE ORAL at 20:55

## 2021-10-05 RX ADMIN — IPRATROPIUM BROMIDE AND ALBUTEROL SULFATE 1 AMPULE: .5; 2.5 SOLUTION RESPIRATORY (INHALATION) at 08:54

## 2021-10-05 RX ADMIN — POTASSIUM CHLORIDE 20 MEQ: 29.8 INJECTION, SOLUTION INTRAVENOUS at 11:33

## 2021-10-05 RX ADMIN — POTASSIUM CHLORIDE 20 MEQ: 29.8 INJECTION, SOLUTION INTRAVENOUS at 09:33

## 2021-10-05 RX ADMIN — HYDROCORTISONE SODIUM SUCCINATE 50 MG: 100 INJECTION, POWDER, FOR SOLUTION INTRAMUSCULAR; INTRAVENOUS at 06:25

## 2021-10-05 RX ADMIN — POTASSIUM CHLORIDE 20 MEQ: 29.8 INJECTION, SOLUTION INTRAVENOUS at 15:22

## 2021-10-05 RX ADMIN — IPRATROPIUM BROMIDE AND ALBUTEROL SULFATE 1 AMPULE: .5; 2.5 SOLUTION RESPIRATORY (INHALATION) at 19:45

## 2021-10-05 RX ADMIN — SODIUM CHLORIDE, PRESERVATIVE FREE 300 UNITS: 5 INJECTION INTRAVENOUS at 21:04

## 2021-10-05 RX ADMIN — INSULIN LISPRO 3 UNITS: 100 INJECTION, SOLUTION INTRAVENOUS; SUBCUTANEOUS at 09:32

## 2021-10-05 RX ADMIN — Medication 10 ML: at 20:55

## 2021-10-05 RX ADMIN — POTASSIUM CHLORIDE 20 MEQ: 400 INJECTION, SOLUTION INTRAVENOUS at 19:25

## 2021-10-05 RX ADMIN — POTASSIUM CHLORIDE 20 MEQ: 29.8 INJECTION, SOLUTION INTRAVENOUS at 04:41

## 2021-10-05 RX ADMIN — DEXTROSE MONOHYDRATE: 50 INJECTION, SOLUTION INTRAVENOUS at 23:14

## 2021-10-05 RX ADMIN — PANTOPRAZOLE SODIUM 40 MG: 40 INJECTION, POWDER, FOR SOLUTION INTRAVENOUS at 09:34

## 2021-10-05 RX ADMIN — INSULIN LISPRO 3 UNITS: 100 INJECTION, SOLUTION INTRAVENOUS; SUBCUTANEOUS at 12:34

## 2021-10-05 RX ADMIN — POTASSIUM CHLORIDE 20 MEQ: 29.8 INJECTION, SOLUTION INTRAVENOUS at 00:33

## 2021-10-05 RX ADMIN — Medication 10 ML: at 09:34

## 2021-10-05 RX ADMIN — PIPERACILLIN AND TAZOBACTAM 3375 MG: 3; .375 INJECTION, POWDER, LYOPHILIZED, FOR SOLUTION INTRAVENOUS at 12:34

## 2021-10-05 RX ADMIN — DOCUSATE SODIUM 100 MG: 50 LIQUID ORAL at 21:09

## 2021-10-05 RX ADMIN — PANTOPRAZOLE SODIUM 40 MG: 40 INJECTION, POWDER, FOR SOLUTION INTRAVENOUS at 20:55

## 2021-10-05 RX ADMIN — PERFLUTREN 1.65 MG: 6.52 INJECTION, SUSPENSION INTRAVENOUS at 09:06

## 2021-10-05 RX ADMIN — DEXTROSE MONOHYDRATE: 50 INJECTION, SOLUTION INTRAVENOUS at 06:24

## 2021-10-05 RX ADMIN — POTASSIUM CHLORIDE 20 MEQ: 400 INJECTION, SOLUTION INTRAVENOUS at 23:16

## 2021-10-05 RX ADMIN — POTASSIUM CHLORIDE 20 MEQ: 400 INJECTION, SOLUTION INTRAVENOUS at 20:28

## 2021-10-05 RX ADMIN — SODIUM CHLORIDE, PRESERVATIVE FREE 10 ML: 5 INJECTION INTRAVENOUS at 20:55

## 2021-10-05 RX ADMIN — CHLORHEXIDINE GLUCONATE 15 ML: 1.2 RINSE ORAL at 09:39

## 2021-10-05 RX ADMIN — POTASSIUM CHLORIDE 20 MEQ: 29.8 INJECTION, SOLUTION INTRAVENOUS at 05:42

## 2021-10-05 RX ADMIN — POTASSIUM CHLORIDE 20 MEQ: 29.8 INJECTION, SOLUTION INTRAVENOUS at 16:25

## 2021-10-05 ASSESSMENT — PAIN SCALES - GENERAL
PAINLEVEL_OUTOF10: 0

## 2021-10-05 ASSESSMENT — PULMONARY FUNCTION TESTS
PIF_VALUE: 20
PIF_VALUE: 21
PIF_VALUE: 19
PIF_VALUE: 20
PIF_VALUE: 19
PIF_VALUE: 21
PIF_VALUE: 20
PIF_VALUE: 20
PIF_VALUE: 19
PIF_VALUE: 22
PIF_VALUE: 19
PIF_VALUE: 19
PIF_VALUE: 20
PIF_VALUE: 19
PIF_VALUE: 20
PIF_VALUE: 21
PIF_VALUE: 22
PIF_VALUE: 20
PIF_VALUE: 19
PIF_VALUE: 22
PIF_VALUE: 20
PIF_VALUE: 21

## 2021-10-05 NOTE — PLAN OF CARE
Problem: Gas Exchange - Impaired:  Goal: Levels of oxygenation will improve  Description: Levels of oxygenation will improve  10/5/2021 1143 by Ventura Pablo RN  Outcome: Ongoing  10/4/2021 2236 by Tad Hinton RN  Outcome: Met This Shift  Goal: Ability to maintain adequate ventilation will improve  Description: Ability to maintain adequate ventilation will improve  10/5/2021 1143 by Ventura Pabol RN  Outcome: Ongoing  10/4/2021 2236 by Tad Hinton RN  Outcome: Met This Shift     Problem: Discharge Planning:  Goal: Discharged to appropriate level of care  Description: Discharged to appropriate level of care  10/4/2021 2236 by Tad Hinton RN  Outcome: Not Met This Shift     Problem:  Activity Intolerance:  Goal: Able to perform prescribed physical activity  Description: Able to perform prescribed physical activity  10/4/2021 2236 by Tad Hinton RN  Outcome: Not Met This Shift  Goal: Ability to tolerate increased activity will improve  Description: Ability to tolerate increased activity will improve  10/4/2021 2236 by Tad Hinton RN  Outcome: Not Met This Shift

## 2021-10-05 NOTE — PROGRESS NOTES
Department of Internal Medicine  Nephrology Progress Note      Events reviewed. SUBJECTIVE: WE are following Mr. Mehul [address] for NGUYỄN. Remains intubated.      PHYSICAL EXAM:      Vitals:    VITALS:  BP (!) 150/74   Pulse 87   Temp 99.1 °F (37.3 °C)   Resp 12   Ht 5' 8\" (1.727 m)   Wt 158 lb 11.7 oz (72 kg)   SpO2 98%   BMI 24.14 kg/m²   24HR INTAKE/OUTPUT:      Intake/Output Summary (Last 24 hours) at 10/5/2021 0947  Last data filed at 10/5/2021 0900  Gross per 24 hour   Intake 1049.69 ml   Output 5260 ml   Net -4210.31 ml     Access: right femoral temporary dialysis catheter  Constitutional: Patient is intubated, sedated, Fio2 at 40 %  HEENT: Pupils are equal reactive, endotracheal tube in place  Respiratory: Decreased breath sounds at the bases  Cardiovascular/Edema: Heart sounds are regular  Gastrointestinal: Abdomen soft  Neurologic: Patient sedated  Other: ++ edema      Scheduled Meds:   bisacodyl  10 mg Rectal Daily    hydrocortisone sodium succinate PF  50 mg IntraVENous Q12H    piperacillin-tazobactam  3,375 mg IntraVENous Q12H    amiodarone  400 mg Oral BID    pantoprazole  40 mg IntraVENous BID    And    sodium chloride (PF)  10 mL IntraVENous BID    lidocaine PF  5 mL IntraDERmal Once    sodium chloride flush  5-40 mL IntraVENous 2 times per day    heparin flush  3 mL IntraVENous 2 times per day    rocuronium  0.6 mg/kg IntraVENous Once    aspirin  81 mg Oral Daily    chlorhexidine  15 mL Mouth/Throat BID    docusate sodium  100 mg Oral BID    sennosides  5 mL Oral BID    insulin lispro  0-3 Units SubCUTAneous Q4H    atorvastatin  40 mg Oral Daily    ipratropium-albuterol  1 ampule Inhalation Q4H WA    [Held by provider] clopidogrel  75 mg Oral Daily    [Held by provider] tamsulosin  0.4 mg Oral Daily     Continuous Infusions:   dextrose 125 mL/hr at 10/05/21 0624    sodium chloride      bumetanide 0.1 mg/mL infusion 0.25 mg/hr (10/04/21 1951)    sodium chloride      sodium chloride      sodium chloride      sodium chloride      cisatracurium (NIMBEX) infusion Stopped (10/01/21 0740)    midazolam Stopped (10/04/21 1340)    DOBUTamine 1.5 mcg/kg/min (10/05/21 0740)    sodium chloride      norepinephrine Stopped (09/28/21 0902)    insulin Stopped (09/29/21 0808)    dextrose      nitroprusside (NIPRIDE) 50 mg in D5W infusion       PRN Meds:.perflutren lipid microspheres, sodium chloride, potassium chloride, magnesium sulfate, calcium gluconate **OR** calcium gluconate **OR** calcium gluconate **OR** calcium gluconate, sodium phosphate IVPB **OR** sodium phosphate IVPB **OR** sodium phosphate IVPB **OR** sodium phosphate IVPB, sodium chloride, sodium chloride, sodium chloride, sodium chloride flush, sodium chloride, heparin flush, fentanNYL, artificial tears, ondansetron, acetaminophen, acetaminophen, oxyCODONE **OR** oxyCODONE, magnesium hydroxide, potassium chloride, magnesium sulfate, albumin human, sodium chloride, norepinephrine, glucose, dextrose, glucagon (rDNA), dextrose, nitroprusside (NIPRIDE) 50 mg in D5W infusion, calcium gluconate IVPB    DATA:    CBC:   Lab Results   Component Value Date    WBC 13.7 10/05/2021    RBC 3.19 10/05/2021    HGB 9.3 10/05/2021    HCT 28.1 10/05/2021    MCV 88.1 10/05/2021    MCH 29.2 10/05/2021    MCHC 33.1 10/05/2021    RDW 15.7 10/05/2021    PLT 72 10/05/2021    MPV 12.1 10/05/2021     CMP:    Lab Results   Component Value Date     10/05/2021    K 3.2 10/05/2021     10/05/2021    CO2 28 10/05/2021     10/05/2021    CREATININE 3.6 10/05/2021    GFRAA 20 10/05/2021    LABGLOM 17 10/05/2021    GLUCOSE 132 10/05/2021    PROT 5.4 10/05/2021    LABALBU 3.1 10/05/2021    CALCIUM 7.9 10/05/2021    BILITOT 1.6 10/05/2021    ALKPHOS 72 10/05/2021    AST 23 10/05/2021    ALT 18 10/05/2021     Magnesium:    Lab Results   Component Value Date    MG 2.3 10/05/2021     Phosphorus:    Lab Results   Component Value Date    PHOS 4.0 10/05/2021        Radiology Review:      CXR 10/3/21   1. Median sternotomy changes. 2. Bilateral pleuroparenchymal opacities that could be related to pleural   effusion and edema.  The appearance of the chest is about the same or   slightly worse.                     BRIEF SUMMARY OF INITIAL CONSULT:    Briefly Mr. Wes Davenport is a 61-year-old man with history of HTN, CAD with recent status cardiac catheterization done on September 9 which showed severe multivessel disease, hyperlipidemia, hiatal hernia, who was admitted for elective CABG on September 27, 2021. On admission his creatinine level was 0.8 mg/dL and post surgery his creatinine has progressively increased up to 2.3 mg/dL, reason for this consultation. Postoperatively she developed persistent hypotension, lactic acidosis and respiratory failure for which he was reintubated. Since then she has required multiple pressors and he was placed on IABP. He had received 1 dose of ketorolac perioperatively. His urine output has significantly decrease and is being about 500 cc/day in the last 48 hours and his fluid balance presently is over 9 L. Problems resolved:    · Cardiogenic shock, hemodynamically more stable, pressor support decrease, still on IABP. Tentative plan for removal of IABP. Pro-BP 16,936    IMPRESSION/RECOMMENDATIONS:      1. NGUYỄN stage III, CABG associated NGUYỄN, ischemic ATN, non-oliguric. FEUrea 18.8%. Temporary dialysis catheter was placed 9/30/21 (right femoral). Holding initiation of CVVHD. Renal function is stable with excellent urine output and response to Bumex drip. No need of renal replacement therapy at this point and we will remove temporary dialysis catheter. 2. Hypernatremia with hypervolemia, 2/2 sodium containing IV fluid resuscitation and underlying heart failure. To continue natriuresis and to increase free water (add D5W)  3. Hypokalemia, 2/2 diuretics  4.  Respiratory alkalosis (pH: 7.546, PCO2: 44.4) with metabolic alkalosis (bicarbonate administration)    ---------------------------------------------------------  5. Respiratory failure status post intubation  6. CAD status post CABG x3 September 27, 2021  7. Probably pneumonia, on piperacillin-tazobactam  8. Transaminitis with hyperbilirubinemia, possibly shock liver versus congestive liver  9. Normocytic anemia, status post surgery, getting transfused  10. Thrombocytopenia  11.  Nutrition, TF at 10 cc/hour, free water at 30 cc/4 hours     Plan:    · Continue bumex drip at 0.25 mg/hr   · D5W at 125 cc/hour  · Monitor sodium levels  · Replace potassium per protocol  · Remove temporary dialysis catheter  · Continue to monitor renal function for recovery

## 2021-10-05 NOTE — PLAN OF CARE
Problem: Falls - Risk of:  Goal: Will remain free from falls  Description: Will remain free from falls  10/4/2021 2236 by Rancho Maya RN  Outcome: Met This Shift     Problem: Falls - Risk of:  Goal: Absence of physical injury  Description: Absence of physical injury  10/4/2021 2236 by Rancho Maya RN  Outcome: Met This Shift     Problem: Discharge Planning:  Goal: Discharged to appropriate level of care  Description: Discharged to appropriate level of care  Outcome: Not Met This Shift     Problem:  Activity Intolerance:  Goal: Able to perform prescribed physical activity  Description: Able to perform prescribed physical activity  Outcome: Not Met This Shift  Goal: Ability to tolerate increased activity will improve  Description: Ability to tolerate increased activity will improve  Outcome: Not Met This Shift     Problem: Cardiac Output - Decreased:  Goal: Cardiac output within specified parameters  Description: Cardiac output within specified parameters  10/4/2021 2236 by Rancho Maya RN  Outcome: Met This Shift     Problem: Cardiac Output - Decreased:  Goal: Hemodynamic stability will improve  Description: Hemodynamic stability will improve  10/4/2021 2236 by Rancho Maya RN  Outcome: Met This Shift     Problem: Fluid Volume - Imbalance:  Goal: Ability to achieve a balanced intake and output will improve  Description: Ability to achieve a balanced intake and output will improve  10/4/2021 2236 by Rnacho Maya RN  Outcome: Met This Shift     Problem: Fluid Volume - Imbalance:  Goal: Chest tube drainage is within specified parameters  Description: Chest tube drainage is within specified parameters  10/4/2021 2236 by Rancho Maya RN  Outcome: Met This Shift     Problem: Gas Exchange - Impaired:  Goal: Levels of oxygenation will improve  Description: Levels of oxygenation will improve  Outcome: Met This Shift     Problem: Gas Exchange - Impaired:  Goal: Ability to maintain adequate ventilation will improve  Description: Ability to maintain adequate ventilation will improve  10/4/2021 2236 by Lesli Man RN  Outcome: Met This Shift     Problem: Pain:  Goal: Pain level will decrease  Description: Pain level will decrease  10/4/2021 2236 by Lesli Man RN  Outcome: Met This Shift     Problem: Pain:  Goal: Control of acute pain  Description: Control of acute pain  10/4/2021 2236 by Lesli Man RN  Outcome: Met This Shift     Problem: Pain:  Goal: Pain level will decrease  Description: Pain level will decrease  10/4/2021 2236 by Lesli Man RN  Outcome: Met This Shift

## 2021-10-05 NOTE — CONSULTS
[facility]  Neurology Consult    Date:  10/5/2021  Patient Name:  Olaf Haynes  YOB: 1945  MRN: [id number]     PCP:  Fide Pimentel MD   Referring:  Cosmo Canavan, MD      Chief Complaint: elective CABG and neurology consulted for right-sided weakness    History obtained from: chart review    Assessment  Olaf Haynes is a 78 y.o. male with right-sided weakness, suspect left hemisphere stroke. Right side muscle strength improving. Light touch sensation intact and symmetric. CT head unremarkable. Plan  · MRI brain   · Continue ASA and statin  · US carotid  · neurocheck every 4 hours  · PT/OT        History of Present Illness:  Olaf Haynes is a 78 y.o. male with PMH of HTN, HLD, CAD, and ESRD, presenting for elective CABG on 9/27/2021. He had cardiac cath on 9/9/2021 showing severe multivessel disease. He developed persistent hypotension, lactic acidosis, NGUYỄN and respiratory failure after CABG and was intubated. He had large mucus plugs and bronchscopy was done on 9/30 to remove mucus plug. He also required multiple pressors and was placed on aortic balloon pump which was removed 10/4/2021. Patient is found to have weakness R > L off sedation and suspect CVA. CT head negative for acute intracranial abnormality. Stat echo demonstrates no LV mural thrombus. Patient is currently in CVICU. Remains intubated. Off sedation.          Review of Systems: brief ROS due to intubation  Eyes  · Double Vision: no  · Visions loss: no    Neurological  · Headaches: no  · Numbness: no  · Seizures: no      Medical History:   Past Medical History:   Diagnosis Date    CAD (coronary artery disease)     Encounter regarding vascular access for dialysis for ESRD ([facility] [address] 10/1/2021    Hiatal hernia     Hyperlipidemia     Hypertension         Surgical History:   Past Surgical History:   Procedure Laterality Date    BRONCHOSCOPY N/A 9/30/2021    BRONCHOSCOPY DIAGNOSTIC OR CELL 8 Marilu THOMASON performed by Brandee Soto MD at [address] [facility]  9/30/2021    BRONCHOSCOPY CRYOTHERAPY/LASER performed by Brandee Soto MD at [address] N/A 9/27/2021    CABG CORONARY ARTERY BYPASS  X 3 EVH, EMMA performed by Seth Cruz MD at [facility]        Family History:   No family history on file. Social History:  Social History     Tobacco Use    Smoking status: Never Smoker    Smokeless tobacco: Never Used   Vaping Use    Vaping Use: Never used   Substance Use Topics    Alcohol use: Yes     Comment: occ.     Drug use: Never        Current Medications:      Current Facility-Administered Medications   Medication Dose Route Frequency Provider Last Rate Last Admin    dextrose 5 % solution   IntraVENous Continuous Ayanna Mathur  mL/hr at 10/05/21 0624 New Bag at 10/05/21 0624    perflutren lipid microspheres (DEFINITY) injection 1.65 mg  1.5 mL IntraVENous ONCE PRN Seth Cruz MD        0.9 % sodium chloride infusion   IntraVENous PRN JUAN MIGUEL Maya - CNP        bisacodyl (DULCOLAX) suppository 10 mg  10 mg Rectal Daily JUAN MIGUEL Olivarez CNP   10 mg at 10/04/21 0947    hydrocortisone sodium succinate PF (SOLU-CORTEF) injection 50 mg  50 mg IntraVENous Q12H JUAN MIGUEL Patricio CNP   50 mg at 10/05/21 0625    piperacillin-tazobactam (ZOSYN) 3,375 mg in dextrose 5 % 100 mL IVPB extended infusion (mini-bag)  3,375 mg IntraVENous Q12H Konstantin Adan RPH 25 mL/hr at 10/05/21 1234 3,375 mg at 10/05/21 1234    amiodarone (CORDARONE) tablet 400 mg  400 mg Oral BID Seth Cruz MD   400 mg at 10/05/21 0934    pantoprazole (PROTONIX) injection 40 mg  40 mg IntraVENous BID Fabricio Lang MD   40 mg at 10/05/21 0934    And    sodium chloride (PF) 0.9 % injection 10 mL  10 mL IntraVENous BID Fabricio Lang MD   10 mL at 10/05/21 0934    bumetanide (BUMEX) 12.5 mg in sodium chloride 0.9 % 125 mL infusion  0.25 mg/hr IntraVENous Continuous JUAN MIGUEL Chavez - CNP 2.5 mL/hr at 10/04/21 1951 0.25 mg/hr at 10/04/21 1951    potassium chloride 20 mEq/50 mL IVPB (Central Line)  20 mEq IntraVENous PRN Ayanna Tejada MD 50 mL/hr at 10/05/21 1133 20 mEq at 10/05/21 1133    magnesium sulfate 1000 mg in dextrose 5% 100 mL IVPB  1,000 mg IntraVENous PRN Ayanna Tejada MD        calcium gluconate 1000 mg in dextrose 5% 100 mL IVPB  1,000 mg IntraVENous PRN Rashad Bloom MD   Stopped at 10/03/21 1727    Or    calcium gluconate 2,000 mg in dextrose 5 % 100 mL IVPB  2,000 mg IntraVENous PRN Ayanna Mathur MD        Or    calcium gluconate 3,000 mg in dextrose 5 % 100 mL IVPB  3,000 mg IntraVENous PRN Ayanna Mathur MD        Or    calcium gluconate 4,000 mg in dextrose 5 % 100 mL IVPB  4,000 mg IntraVENous PRN Ayanna Mathur MD        sodium phosphate 6 mmol in dextrose 5 % 250 mL IVPB  6 mmol IntraVENous PRN Ayanna Mathur MD        Or    sodium phosphate 12 mmol in dextrose 5 % 250 mL IVPB  12 mmol IntraVENous PRN Ayanna Mathur MD        Or    sodium phosphate 18 mmol in dextrose 5 % 500 mL IVPB  18 mmol IntraVENous PRN Ayanna Mathur MD        Or    sodium phosphate 24 mmol in dextrose 5 % 500 mL IVPB  24 mmol IntraVENous PRN Ayanna Mathur MD        0.9 % sodium chloride infusion   IntraVENous PRN JUAN MIGUEL Patricio - CNP        0.9 % sodium chloride infusion   IntraVENous PRN Bharati Crespo MD        0.9 % sodium chloride infusion   IntraVENous PRN JAUN MIGUEL Patricio - CNP        lidocaine PF 1 % injection 5 mL  5 mL IntraDERmal Once JUAN MIGUEL Valentine CNP        sodium chloride flush 0.9 % injection 5-40 mL  5-40 mL IntraVENous 2 times per day JUAN MIGUEL Valentine CNP   10 mL at 10/05/21 0934    sodium chloride flush 0.9 % injection 5-40 mL  5-40 mL IntraVENous PRN JUAN MIGUEL Patricio CNP        0.9 % sodium chloride infusion  25 mL IntraVENous PRN JUAN MIGUEL Valentine - CNP  heparin flush 100 UNIT/ML injection 300 Units  3 mL IntraVENous 2 times per day Anahi Raghu, APRN - CNP   300 Units at 10/04/21 2046    heparin flush 100 UNIT/ML injection 300 Units  3 mL IntraCATHeter PRN Sallie Tubbs, APRN - CNP        rocuronium (ZEMURON) injection 43 mg  0.6 mg/kg IntraVENous Once Anahi Raghu, APRN - CNP        cisatracurium besylate (NIMBEX) 200 mg in sodium chloride 0.9 % 100 mL infusion  0.5-10 mcg/kg/min IntraVENous Continuous Anahi Raghu, APRN - CNP   Stopped at 10/01/21 0740    midazolam (VERSED) 100 mg in dextrose 5 % 100 mL infusion  1-10 mg/hr IntraVENous Continuous Gianmarino C Gianfrate, DO   Stopped at 10/04/21 1340    fentaNYL (SUBLIMAZE) injection 12.5 mcg  12.5 mcg IntraVENous Q1H PRN Gianmarino C Gianfrate, DO   12.5 mcg at 10/03/21 0451    DOBUTamine (DOBUTREX) 1000 mg in dextrose 5 % 250 mL infusion  7.5 mcg/kg/min IntraVENous Continuous Sallie Arley, APRN - CNP 1.1 mL/hr at 10/05/21 1226 1 mcg/kg/min at 10/05/21 1226    aspirin chewable tablet 81 mg  81 mg Oral Daily Sallie Arley, APRN - CNP   81 mg at 10/05/21 0939    lubrifresh P.M. (artificial tears) ophthalmic ointment   Both Eyes PRN Anahi Raghu, APRN - CNP   Given at 10/03/21 2047    chlorhexidine (PERIDEX) 0.12 % solution 15 mL  15 mL Mouth/Throat BID Sallie Arley, APRN - CNP   15 mL at 10/05/21 0939    docusate sodium (COLACE) 150 MG/15ML liquid 100 mg  100 mg Oral BID Anahi Raghu, APRN - CNP   100 mg at 10/04/21 1012    sennosides (SENOKOT) 8.8 MG/5ML syrup 5 mL  5 mL Oral BID Sallie Arley, APRN - CNP   5 mL at 10/04/21 1012    insulin lispro (HUMALOG) injection vial 0-3 Units  0-3 Units SubCUTAneous Q4H Sallie Arley, APRN - CNP   3 Units at 10/05/21 1234    atorvastatin (LIPITOR) tablet 40 mg  40 mg Oral Daily Tg Bobo PA-C   40 mg at 10/04/21 2045    ondansetron (ZOFRAN) injection 4 mg  4 mg IntraVENous Q8H PRN Tg Bobo PA-C   4 mg at 09/27/21 2026    acetaminophen (TYLENOL) tablet 650 mg  650 mg Oral Q4H PRN [facility], PA-C        acetaminophen (TYLENOL) suppository 650 mg  650 mg Rectal Q4H PRN [facility], PA-C        oxyCODONE (ROXICODONE) immediate release tablet 5 mg  5 mg Oral Q4H PRN [facility], PA-C   5 mg at 10/02/21 2055    Or    oxyCODONE HCl (OXY-IR) immediate release tablet 10 mg  10 mg Oral Q4H PRN [facility], CHANDANA        magnesium hydroxide (MILK OF MAGNESIA) 400 MG/5ML suspension 30 mL  30 mL Oral Daily PRN [facility], PA-JAMES        potassium chloride 20 mEq/50 mL IVPB (Central Line)  20 mEq IntraVENous PRN Sim EastCHANDANA   Stopped at 10/04/21 1818    magnesium sulfate 2000 mg in 50 mL IVPB premix  2,000 mg IntraVENous PRN [place], CHANDANA        ipratropium-albuterol (DUONEB) nebulizer solution 1 ampule  1 ampule Inhalation Q4H [facility]   1 ampule at 10/05/21 1222    albumin human 5 % IV solution 25 g  25 g IntraVENous PRN [place], CHANDANA   Stopped at 09/28/21 0306    0.9 % sodium chloride bolus  250 mL IntraVENous Continuous PRN Sim EastCHANDANA        norepinephrine (LEVOPHED) 16 mg in dextrose 5% 250 mL infusion  2 mcg/min IntraVENous Continuous PRN [place], CHANDANA   Stopped at 09/28/21 0902    insulin regular (HUMULIN R;NOVOLIN R) 100 Units in sodium chloride 0.9 % 100 mL infusion  1 Units/hr IntraVENous Continuous [place], CHANDANA   Stopped at 09/29/21 0808    glucose (GLUTOSE) 40 % oral gel 15 g  15 g Oral PRN [place], PA-JAMES        dextrose 50 % IV solution  12.5 g IntraVENous PRN [place], PA-C   12.5 g at 09/30/21 1728    glucagon (rDNA) injection 1 mg  1 mg IntraMUSCular PRN [facility], PA-C        dextrose 5 % solution  100 mL/hr IntraVENous PRN [place], CHANDANA        nitroPRUSSide (NIPRIDE) 50 mg in dextrose 5 % 250 mL infusion  0.1-3 mcg/kg/min IntraVENous Continuous PRN [place], PA-C        calcium gluconate 1,000 mg in dextrose 5 % 100 mL IVPB  1,000 mg IntraVENous PRN Edgar Chakraborty PA-C   Stopped at 10/04/21 3524    [Held by provider] clopidogrel (PLAVIX) tablet 75 mg  75 mg Oral Daily Edgar Chakraborty PA-C        [Held by provider] tamsulosin (FLOMAX) capsule 0.4 mg  0.4 mg Oral Daily Edgar Chakraborty PA-C            Allergies: Allergies   Allergen Reactions    Dust Mite Extract         Physical Examination  Vitals   Vitals:    10/05/21 1221 10/05/21 1300 10/05/21 1400 10/05/21 1408   BP:       Pulse: 81 83 85 83   Resp: 14 14 18    Temp:   98.6 °F (37 °C)    TempSrc:   Bladder    SpO2: 99% 98% 100% 100%   Weight:       Height:            General: Patient appears in no acute distress. Intubated   HEENT: Normocephalic, atraumatic  Chest: Clear to auscultation bilaterally  Heart: Regular rate and rhythm  Extremities: No edema or cyanosis noted    Neurologic Examination    Mental Status  Alert. Speech and language unable to test. Attention and concentration appeared normal.     Cranial Nerves  II. Visual fields full to confrontation bilaterally. III, IV, VI: Pupils equally round and reactive to light, 3 to 2 mm bilaterally. EOMs: full, no nystagmus. V. Facial sensation intact to light touch bilaterally  VII: Facial movements symmetric  VIII: not tested  IX,X: not tested  XI: Sternocleidomastoid and trapezius 5/5 bilaterally   XII: not tested    Motor     Right Left   Right Left   Deltoid 1+ 2  Hip Flexion 1 2   Biceps 1+ 3  Knee Extension 1 2   Triceps 1+ 3  Knee Flexion 1 2   Handgrip 1+ 3  Ankle Dorsiflexion 1 2       Ankle Plantarflexion 1 2     Tone: Normal in all four limbs    Bulk: Normal in all four limbs with no evidence of atrophy    Sensation  · Light Touch: Intact distally in all four limbs    Reflexes     Right Left   Biceps 1 2   Brachioradialis 1 2   Triceps 1 1   Patellar 2 2   Achilles 1 1   ankle clonus none none     Toes down going bilaterally.     Coordination  Not tested    Gait  Not tested      Data  LABS:   Recent Results (from the past 24 hour(s))   CBC    Collection Time: 10/04/21  3:55 PM   Result Value Ref Range    WBC 15.2 (H) 4.5 - 11.5 E9/L    RBC 3.20 (L) 3.80 - 5.80 E12/L    Hemoglobin 9.3 (L) 12.5 - 16.5 g/dL    Hematocrit 28.1 (L) 37.0 - 54.0 %    MCV 87.8 80.0 - 99.9 fL    MCH 29.1 26.0 - 35.0 pg    MCHC 33.1 32.0 - 34.5 %    RDW 15.3 (H) 11.5 - 15.0 fL    Platelets 61 (L) 796 - 450 E9/L    MPV 12.7 (H) 7.0 - 12.0 fL   Basic metabolic panel    Collection Time: 10/04/21  3:55 PM   Result Value Ref Range    Sodium 146 132 - 146 mmol/L    Potassium 3.4 (L) 3.5 - 5.0 mmol/L    Chloride 104 98 - 107 mmol/L    CO2 30 (H) 22 - 29 mmol/L    Anion Gap 12 7 - 16 mmol/L    Glucose 161 (H) 74 - 99 mg/dL     (HH) 6 - 23 mg/dL    CREATININE 3.6 (H) 0.7 - 1.2 mg/dL    GFR Non-African American 17 >=60 mL/min/1.73    GFR African American 20     Calcium 8.8 8.6 - 10.2 mg/dL   Platelet Confirmation    Collection Time: 10/04/21  3:55 PM   Result Value Ref Range    Platelet Confirmation CONFIRMED    POCT Glucose    Collection Time: 10/04/21  4:09 PM   Result Value Ref Range    Meter Glucose 154 (H) 74 - 99 mg/dL   POCT Glucose    Collection Time: 10/04/21  7:58 PM   Result Value Ref Range    Meter Glucose 126 (H) 74 - 99 mg/dL   Lactic acid, plasma    Collection Time: 10/04/21  8:00 PM   Result Value Ref Range    Lactic Acid 1.0 0.5 - 2.2 mmol/L   CBC    Collection Time: 10/04/21  9:40 PM   Result Value Ref Range    WBC 16.3 (H) 4.5 - 11.5 E9/L    RBC 3.20 (L) 3.80 - 5.80 E12/L    Hemoglobin 9.5 (L) 12.5 - 16.5 g/dL    Hematocrit 28.0 (L) 37.0 - 54.0 %    MCV 87.5 80.0 - 99.9 fL    MCH 29.7 26.0 - 35.0 pg    MCHC 33.9 32.0 - 34.5 %    RDW 15.5 (H) 11.5 - 15.0 fL    Platelets 62 (L) 365 - 450 E9/L    MPV 11.7 7.0 - 12.0 fL   Platelet Confirmation    Collection Time: 10/04/21  9:40 PM   Result Value Ref Range    Platelet Confirmation CONFIRMED    Magnesium    Collection Time: 10/04/21  9:40 PM Result Value Ref Range    Magnesium 2.2 1.6 - 2.6 mg/dL   Phosphorus    Collection Time: 10/04/21  9:40 PM   Result Value Ref Range    Phosphorus 4.4 2.5 - 4.5 mg/dL   POCT Glucose    Collection Time: 10/05/21 12:58 AM   Result Value Ref Range    Meter Glucose 178 (H) 74 - 99 mg/dL   Potassium    Collection Time: 10/05/21  2:20 AM   Result Value Ref Range    Potassium 3.1 (L) 3.5 - 5.0 mmol/L   Blood Gas, Arterial    Collection Time: 10/05/21  4:07 AM   Result Value Ref Range    Date Analyzed [id number]     Time Analyzed 0407     Source: Blood Arterial     pH, Blood Gas 7.546 (H) 7.350 - 7.450    PCO2 30.4 (L) 35.0 - 45.0 mmHg    PO2 101.2 (H) 75.0 - 100.0 mmHg    HCO3 25.8 22.0 - 26.0 mmol/L    B.E. 3.6 (H) -3.0 - 3.0 mmol/L    O2 Sat 96.9 92.0 - 98.5 %    PO2/FIO2 2.53 mmHg/%    AaDO2 139.0 mmHg    RI(T) 1.37     O2Hb 96.6 94.0 - 97.0 %    COHb 0.1 0.0 - 1.5 %    MetHb 0.2 0.0 - 1.5 %    HHb 3.1 0.0 - 5.0 %    tHb (est) 9.8 (L) 11.5 - 16.5 g/dL    Mode AC     FIO2 40.0 %    Rr Mechanical 14.0 b/min    Vt Mechanical 500.0 mL    Peep/Cpap 6.0 cmH2O    Date Of Collection      Time Collected      Pt Temp 37.0 C     ID [id number]     Lab [id number]     Critical(s) Notified .  No Critical Values    POCT Glucose    Collection Time: 10/05/21  4:08 AM   Result Value Ref Range    Meter Glucose 137 (H) 74 - 99 mg/dL   CBC    Collection Time: 10/05/21  4:10 AM   Result Value Ref Range    WBC 13.7 (H) 4.5 - 11.5 E9/L    RBC 3.19 (L) 3.80 - 5.80 E12/L    Hemoglobin 9.3 (L) 12.5 - 16.5 g/dL    Hematocrit 28.1 (L) 37.0 - 54.0 %    MCV 88.1 80.0 - 99.9 fL    MCH 29.2 26.0 - 35.0 pg    MCHC 33.1 32.0 - 34.5 %    RDW 15.7 (H) 11.5 - 15.0 fL    Platelets 72 (L) 374 - 450 E9/L    MPV 12.1 (H) 7.0 - 12.0 fL   Comprehensive Metabolic Panel    Collection Time: 10/05/21  4:10 AM   Result Value Ref Range    Sodium 151 (H) 132 - 146 mmol/L    Potassium 3.2 (L) 3.5 - 5.0 mmol/L    Chloride 106 98 - 107 mmol/L    CO2 28 22 - 29 mmol/L    Anion Gap 17 (H) 7 - 16 mmol/L    Glucose 132 (H) 74 - 99 mg/dL     (HH) 6 - 23 mg/dL    CREATININE 3.6 (H) 0.7 - 1.2 mg/dL    GFR Non-African American 17 >=60 mL/min/1.73    GFR African American 20     Calcium 7.9 (L) 8.6 - 10.2 mg/dL    Total Protein 5.4 (L) 6.4 - 8.3 g/dL    Albumin 3.1 (L) 3.5 - 5.2 g/dL    Total Bilirubin 1.6 (H) 0.0 - 1.2 mg/dL    Alkaline Phosphatase 72 40 - 129 U/L    ALT 18 0 - 40 U/L    AST 23 0 - 39 U/L   Phosphorus    Collection Time: 10/05/21  4:10 AM   Result Value Ref Range    Phosphorus 4.0 2.5 - 4.5 mg/dL   Magnesium    Collection Time: 10/05/21  4:10 AM   Result Value Ref Range    Magnesium 2.3 1.6 - 2.6 mg/dL   Lactic acid, plasma    Collection Time: 10/05/21  4:10 AM   Result Value Ref Range    Lactic Acid 1.6 0.5 - 2.2 mmol/L   Platelet Confirmation    Collection Time: 10/05/21  4:10 AM   Result Value Ref Range    Platelet Confirmation CONFIRMED    POCT Glucose    Collection Time: 10/05/21  8:14 AM   Result Value Ref Range    Meter Glucose 176 (H) 74 - 99 mg/dL   Potassium    Collection Time: 10/05/21  9:20 AM   Result Value Ref Range    Potassium 3.2 (L) 3.5 - 5.0 mmol/L   POCT Glucose    Collection Time: 10/05/21 12:28 PM   Result Value Ref Range    Meter Glucose 183 (H) 74 - 99 mg/dL   Basic metabolic panel    Collection Time: 10/05/21  1:45 PM   Result Value Ref Range    Sodium 146 132 - 146 mmol/L    Potassium 3.3 (L) 3.5 - 5.0 mmol/L    Chloride 102 98 - 107 mmol/L    CO2 32 (H) 22 - 29 mmol/L    Anion Gap 12 7 - 16 mmol/L    Glucose 195 (H) 74 - 99 mg/dL     (HH) 6 - 23 mg/dL    CREATININE 3.6 (H) 0.7 - 1.2 mg/dL    GFR Non-African American 17 >=60 mL/min/1.73    GFR African American 20     Calcium 8.5 (L) 8.6 - 10.2 mg/dL       RADIOLOGY:  Echo Complete    Result Date: 9/23/2021  Transthoracic Echocardiography Report (TTE)  Demographics   Patient Name    Kayleen Demarco Gender            Male                  A   Medical Record  [id number]     Room Number  Number Account #       [de-identified]    Procedure Date    09/23/2021   Corporate ID                 Ordering                               Physician   Accession       [id number]   Referring  Number                       Physician   Date of Birth   1945   Sonographer       Timothy Greene RDCS   Age             78 year(s)   Interpreting      [address]                               Physician         Physician Cardiology                                                 Florence Dos Santos MD                                Any Other  Procedure Type of Study   TTE procedure:Echo Complete W/Doppler & Color Flow. Procedure Date Date: 09/23/2021 Start: 08:21 AM Study Location: Portable Technical Quality: Adequate visualization Indications:Preop cardiac evaluation. Patient Status: Routine Height: 68 inches Weight: 160 pounds BSA: 1.86 m^2 BMI: 24.33 kg/m^2 HR: 49 bpm  Findings   Left Ventricle  Left ventricular internal dimensions were normal in diastole and systole. Abnormal (paradoxical) motion consistent with left bundle branch block. Normal left ventricular ejection fraction. Ejection fraction is visually estimated at 55%. Indeterminate diastolic function. Right Ventricle  Normal right ventricular size and function. Left Atrium  Normal sized left atrium. Interatrial septum appears intact. Right Atrium  Normal right atrium size. Mitral Valve  Mild thickening of the mitral valve leaflets with reduced leaflet  excursion. Mild centrally directed mitral regurgitation. Tricuspid Valve  The tricuspid valve appears structurally normal.  Mild tricuspid regurgitation. Aortic Valve  Structurally normal aortic valve. Pulmonic Valve  The pulmonic valve was not well visualized. Pericardial Effusion  No evidence of pericardial effusion. Aorta  Aortic root dimension within normal limits. Conclusions   Summary  Left ventricular internal dimensions were normal in diastole and systole.   Abnormal (paradoxical) motion consistent with left bundle branch block. Normal left ventricular ejection fraction. Mild thickening of the mitral valve leaflets with reduced leaflet  excursion. Mild centrally directed mitral regurgitation. Mild tricuspid regurgitation.    Signature   ----------------------------------------------------------------  Electronically signed by Tonya NIEVESInterpreting  physician) on 09/23/2021 03:26 PM  ----------------------------------------------------------------  M-Mode/2D Measurements & Calculations   LV Diastolic    LV Systolic Dimension: 3.1   AV Cusp Separation: 2.1 cmLA  Dimension: 4.3  cm                           Dimension: 3.2 cmAO Root  cm              LV Volume Diastolic: 69.4 ml Dimension: 3.1 cm  LV FS:27.9 %    LV Volume Systolic: 67.8 ml  LV PW           LV EDV/LV EDV Index: 01.0  Diastolic: 0.8  SV/89 QF/B^9EX ESV/LV ESV  cm              Index: 37.8 ml/20ml/ m^2     RV Diastolic Dimension: 2.9  Septum          EF Calculated: 54.7 %        cm  Diastolic: 1.1  LV Mass Index: 71 l/min*m^2  cm              LV Length: 7.4 cm  CO: 3.32 l/min                               LA volume/Index: 50.8 ml  CI: 1.78        LVOT: 2 cm                   /27.31ml/m^2  l/m*m^2                                      RA Area: 17.7 cm^2  LV Mass: 132.74  g  Doppler Measurements & Calculations   MV Peak E-Wave: 0.68    AV Peak Velocity:     LVOT Peak Velocity: 0.9 m/s  m/s                     1.22 m/s              LVOT Mean Velocity: 0.61 m/s  MV Peak A-Wave: 0.83    AV Peak Gradient:     LVOT Peak Gradient: 3.2  m/s                     5.97 mmHg             mmHgLVOT Mean Gradient: 1.7  MV E/A Ratio: 0.81      AV Mean Velocity:     mmHg  MV Peak Gradient: 3.2   0.87 m/s              Estimated RVSP: 26.9 mmHg  mmHg                    AV Mean Gradient: 3.3 Estimated RAP:3 mmHg  MV Mean Gradient: 1.1   mmHg  mmHg                    AV VTI: 28.6 cm  MV Mean Velocity: 0.47  AV Area               TR Velocity:2.45 m/s  m/s

## 2021-10-05 NOTE — PROGRESS NOTES
[address] [facility] Pulmonary, [facility]                                                                       Pulmonary &health 61 [facility]                                                                   Pulmonary Consult Note              Reason for Consultation:severe hypoxia ,possible mucus plug   CC : vent/resp failure   HPI:   Continue to improve and open eyes   UOP 3 L with bumex drip  Of IABP  S/p removal large mucus plug  No fever or chills  On 40 % PEEP 7   No events except some drop in hb   ABG look ok   CXR with pulmonary edema /effusion     PHYSICAL EXAMINATION:     VITAL SIGNS:  BP (!) 139/45   Pulse 96   Temp 99.3 °F (37.4 °C) (Bladder)   Resp 20   Ht 5' 8\" (1.727 m)   Wt 158 lb 11.7 oz (72 kg)   SpO2 97%   BMI 24.14 kg/m²   Wt Readings from Last 3 Encounters:   09/27/21 158 lb 11.7 oz (72 kg)   09/20/21 157 lb (71.2 kg)   09/21/21 160 lb 6.4 oz (72.8 kg)     Temp Readings from Last 3 Encounters:   10/04/21 99.3 °F (37.4 °C) (Bladder)   09/27/21 98.4 °F (36.9 °C)   09/23/21 97.1 °F (36.2 °C) (Temporal)     TMAX:  BP Readings from Last 3 Encounters:   10/02/21 (!) 139/45   09/27/21 (!) 144/63   09/23/21 (!) 183/89     Pulse Readings from Last 3 Encounters:   10/04/21 96   09/23/21 66   09/21/21 65           INTAKE/OUTPUTS:  I/O last 3 completed shifts: In: 2160.6 [I.V.:875.6; Blood:270; NG/GT:365; IV Piggyback:650]  Out: 0274 [Urine:4700;  Chest Tube:180]    Intake/Output Summary (Last 24 hours) at 10/4/2021 2133  Last data filed at 10/4/2021 2100  Gross per 24 hour   Intake 1737.63 ml   Output 5715 ml   Net -3977.37 ml       General Appearance: intubated and sedated   Eyes: pupils equal, round, and reactive to light, extraocular eye movements intact, conjunctivae normal and sclera anicteric   Neck: neck supple and non tender without mass, no thyromegaly, no thyroid nodules and no cervical adenopathy   Pulmonary/Chest:decrease breath sound bilateral   Cardiovascular: normal rate, regular rhythm, normal S1 and S2, no murmurs, rubs, clicks or gallops, distal pulses intact, no carotid bruits, no murmurs, no gallops, no carotid bruits and no JVD   Abdomen: obese, soft, non-tender, non-distended, normal bowel sounds, no masses or organomegaly   Extremities: no edema   Musculoskeletal: normal range of motion, no joint swelling, deformity or tenderness   Neurologic: reflexes normal and symmetric, no cranial nerve deficit noted    LABS/IMAGING:    CBC:  Lab Results   Component Value Date    WBC 15.2 (H) 10/04/2021    HGB 9.3 (L) 10/04/2021    HCT 28.1 (L) 10/04/2021    MCV 87.8 10/04/2021    PLT 61 (L) 10/04/2021    LYMPHOPCT 5.1 (L) 09/28/2021    RBC 3.20 (L) 10/04/2021    MCH 29.1 10/04/2021    MCHC 33.1 10/04/2021    RDW 15.3 (H) 10/04/2021    NEUTOPHILPCT 89.5 (H) 09/28/2021    MONOPCT 4.4 09/28/2021    BASOPCT 0.1 09/28/2021    NEUTROABS 15.00 (H) 09/28/2021    LYMPHSABS 0.86 (L) 09/28/2021    MONOSABS 0.73 09/28/2021    EOSABS 0.00 (L) 09/28/2021    BASOSABS 0.01 09/28/2021       Recent Labs     10/04/21  1555 10/04/21  0405 10/03/21  1600   WBC 15.2* 14.3* 16.7*   HGB 9.3* 8.9* 9.4*   HCT 28.1* 26.9* 28.4*   MCV 87.8 86.5 86.1   PLT 61* 37* 40*       BMP:   Recent Labs     10/03/21  1600 10/03/21  1600 10/03/21  2150 10/04/21  0405 10/04/21  1555      < > 143 143 146   K 4.0   < > 3.8 3.7 3.4*      < > 102 103 104   CO2 26   < > 27 26 30*   PHOS 3.9  --  3.7 3.7  --    *   < > 119* 122* 123*   CREATININE 3.4*   < > 3.5* 3.6* 3.6*    < > = values in this interval not displayed.        MG:   Lab Results   Component Value Date    MG 2.2 10/04/2021     Ca/Phos:   Lab Results   Component Value Date    CALCIUM 8.8 10/04/2021    PHOS 3.7 10/04/2021     Amylase: No results found for: AMYLASE  Lipase:   Lab Results Component Value Date    LIPASE 7 (L) 09/28/2021     LIVER PROFILE:   Recent Labs     10/03/21  1600 10/03/21  2150 10/04/21  0405   AST 21 21 21   ALT 19 18 17   BILITOT 2.0* 2.2* 1.9*   ALKPHOS 80 79 75       PT/INR:   Recent Labs     10/02/21  0412 10/04/21  0405   PROTIME 18.9* 15.9*   INR 1.7 1.5     APTT:   Recent Labs     10/04/21  0405   APTT 24.7       Cardiac Enzymes:  Lab Results   Component Value Date    CKMB 94.0 (H) 09/28/2021       Hgb A1C:   Lab Results   Component Value Date    LABA1C 5.1 09/10/2021     No results found for: EAG  CRISTIANA: No results found for: CRISTIANA  ESR: No results found for: SEDRATE  CRP: No results found for: CRP  D Dimer: No results found for: DDIMER  Folate and B12: No results found for: [id number], No results found for: FOLATE              PROBLEM LIST:  Patient Active Problem List   Diagnosis    CAD in native artery    Pulmonary nodules    Unstable angina (HCC)    Other hyperlipidemia    Essential hypertension    Gastroesophageal reflux disease with esophagitis without hemorrhage    S/P CABG (coronary artery bypass graft)    Acute respiratory failure with hypoxia (HCC)    Lactic acidemia    [facility]GUY[address]N [facility]) [facility] [address]    Hypokalemia    Hypocalcemia    Cardiogenic shock (HCC)    Thrombocytopenia ([address]    Leukocytosis    Encounter regarding vascular access for dialysis for ESRD ([facility] [address]               ASSESSMENT:  1.) Acute Respiratory failure   2.)cardiogenic shock vs other types of shock,septic ,etc   3. )Large mucus plug obstruction left main bronchus and RUL   4. )CAD s/P CABG   5.)NGUYỄN       PLAN:  *- US moderate effusion right ,I believe we can wait and let Bumex do the job ,but if continue after extubation will drain   *-S/p Bronchoscopy ,removal mucus plug   *- CXR seems with bilateral effusion ,will give Bumex time as he still + 9 L   Aggressive pulmonary toilet   BD  Weaning dobutamine   Hypertonic saline   Chest vest when possible   Wean steroids \diuresis as per renal ,seem fluid overload,renal following diuresis as needed    *- so far negative sputum culture  Liberate from MV  Rest by ICU team       Chico Carcamo MD  Pulmonary/Critical care Medicine   [address] [facility]

## 2021-10-05 NOTE — PROGRESS NOTES
Patient more awake now. Minimal to no movement in right arm and leg. Left side 3/5 strength. Likely CVA. Stat echo to rule out LV thrombus. Stat CT head without contrast.  Neurology consult. No code brain due to unknown timing of the event.   Desmond Gaitan MD

## 2021-10-05 NOTE — PROGRESS NOTES
thrills on palpation  Tele: SR 80  Abdomen: Soft, nondistended, + BS, +BM, OG with TFs infusing    Extremities: BLE +DP/PT signals, 1+ peripheral edema   Neurologic/Psych: Awake, following commands more consistently on left side with 3/5 strength; right with 2/5 hand squeeze and movement of toes  Skin: Warm and dry  Incisions: MSI well approximated, sternum stable, LLE SVG sites well approximated, left femoral IABP site C/D/I- soft to palpation       ETT 9/28  Left femoral IABP 9/28  Right brachial Arterial line 9/27  Ybarra 9/27  Right femoral Temporary Dialysis Catheter 9/30  PICC LUE 09/29/2021      Assessment/Plan: POD #8    1. CAD S/p CABGx3 (LIMA-LAD, SVG-OM, SVG-RCA)/Extensive RCA endarterectomy/MALU AtriClip  - ASA (Hold Plavix), Lipitor   - Pleural drains to bulb suction (Left with 710cc/24 hours, right with 230cc/24 hours); continue to bulb suction   - PO Amio for afib prophylaxis   - Remove Right IJ today      2.  Acute Hypoxic Respiratory Failure  - Extubated DOS, tolerating 2L NC post extubation, acute hypoxia requiring NRB, NIV and reintubation 9/28  - NMBA and iFlolan started 9/28   -Intermittently with episodes of acute hypoxia, last episode 9/30 evening-- CXR with complete left lung white out; pulmonology consulted for STAT bronch, s/p cryotherapy bronch for large obstructive mucous plug left and right main stem; vent settings improved s/p bronch  - Flolan weaned off 10/3  -On empiric Zosyn, sputum culture negative  -weaning systemic steroids  - Bedside US 10/4 moderate right sided pleural effusion, continue bumex gtt for diuresis  - Continue ETT, lung protective ventilation, PS trials as tolerated   - ABGs as ordered and PRN     3. Acute systolic heart failure   - post op with Cardiogenic shock; CPB required levophed and vasopressin and ultimately IV B12 intraop, postop ICU stable on Norepi 5mcg; acute decompensation with profound hypotension required escalation of vasopressors, given Vit B12 and TFs, will again advance slowly today to goal    - FWF increased to 60cc q hours per nephrology      11. Possible GI bleed  - Maroon colored OG output over weekend with +hemoccult  - Evaluated by general surgery, no intervention planned; PPI BID, monitor H/H     12. Right Sided Weakness  - Sedation off, Left side 3/5 strength, right side with 2/5 strength   - Neurology consulted, Stat CT head ordered   - Echo to r/o LV thrombus      13. Hypernatremia  - Na 151  - D5W @ 125cc/hr, FWF increased to 60cc q hour  - Monitor closely     This patient has a high probability of sudden deterioration, which requires preparedness to urgently intervene. I participated in the decision making process and managed the direct care of the patient that required frequent assessment and treatment. I personally spent 34 minutes of critical care time treating the patient. VTE Prophylaxis: Pharmacologic/Mechanical:  Yes, SCDs, thrombocytopenia  Line infection prevention: Can CVC or arterial line be removed: no  Continued need for urinary catheter:  Yes - clinical indication: Patient post major surgery requiring fluid balance and input and output measurement.     Dispo: CVICU    Electronically signed by JUAN MIGUEL Everett CNP on 10/5/2021 at 8:32 AM

## 2021-10-05 NOTE — PROGRESS NOTES
Critical labs this am discussed with Dr. Gillian Goldstein over telephone.  Telephone with read back orders to discontinue initial order from Dr. Monaco and place an order for D5W at 125cc/hr for elevated sodium, and replace K as standing order protocol, followed with a BMP drawn at 12pm.

## 2021-10-05 NOTE — PROGRESS NOTES
[address] of Pulmonary[facility]                                                                       Pulmonary &health 61 [facility]                                                                   Pulmonary Consult Note              Reason for Consultation:severe hypoxia ,possible mucus plug   CC : vent/resp failure   HPI:   More awake and follow commands   Seems developed left side weakness,and minimal movement in the right side  But he more strong and was able to move right arm and also left one and also lower ext ,Neurology was consulted   S/p removal large mucus plug  On 40 % PEEP 7   ABG look ok   CXR with pulmonary edema /effusion     PHYSICAL EXAMINATION:     VITAL SIGNS:  BP (!) 150/74   Pulse 79   Temp 98.6 °F (37 °C)   Resp 14   Ht 5' 8\" (1.727 m)   Wt 158 lb 11.7 oz (72 kg)   SpO2 99%   BMI 24.14 kg/m²   Wt Readings from Last 3 Encounters:   09/27/21 158 lb 11.7 oz (72 kg)   09/20/21 157 lb (71.2 kg)   09/21/21 160 lb 6.4 oz (72.8 kg)     Temp Readings from Last 3 Encounters:   10/05/21 98.6 °F (37 °C)   09/27/21 98.4 °F (36.9 °C)   09/23/21 97.1 °F (36.2 °C) (Temporal)     TMAX:  BP Readings from Last 3 Encounters:   10/05/21 (!) 150/74   09/27/21 (!) 144/63   09/23/21 (!) 183/89     Pulse Readings from Last 3 Encounters:   10/05/21 79   09/23/21 66   09/21/21 65           INTAKE/OUTPUTS:  I/O last 3 completed shifts: In: 1269.7 [I.V.:80.7; Blood:270; NG/GT:669; IV Piggyback:250]  Out: 2848 [Urine:4340;  Chest Tube:940]    Intake/Output Summary (Last 24 hours) at 10/5/2021 1211  Last data filed at 10/5/2021 1100  Gross per 24 hour   Intake 1049.69 ml   Output 4935 ml   Net -3885.31 ml       General Appearance: intubated and sedated   Eyes: pupils equal, round, and reactive to light, extraocular eye movements intact, conjunctivae normal and sclera anicteric   Neck: neck supple and non tender without mass, no thyromegaly, no thyroid nodules and no cervical adenopathy   Pulmonary/Chest:decrease breath sound bilateral   Cardiovascular: normal rate, regular rhythm, normal S1 and S2, no murmurs, rubs, clicks or gallops, distal pulses intact, no carotid bruits, no murmurs, no gallops, no carotid bruits and no JVD   Abdomen: obese, soft, non-tender, non-distended, normal bowel sounds, no masses or organomegaly   Extremities: no edema   Musculoskeletal: normal range of motion, no joint swelling, deformity or tenderness   Neurologic: reflexes normal and symmetric, no cranial nerve deficit noted    LABS/IMAGING:    CBC:  Lab Results   Component Value Date    WBC 13.7 (H) 10/05/2021    HGB 9.3 (L) 10/05/2021    HCT 28.1 (L) 10/05/2021    MCV 88.1 10/05/2021    PLT 72 (L) 10/05/2021    LYMPHOPCT 5.1 (L) 09/28/2021    RBC 3.19 (L) 10/05/2021    MCH 29.2 10/05/2021    MCHC 33.1 10/05/2021    RDW 15.7 (H) 10/05/2021    NEUTOPHILPCT 89.5 (H) 09/28/2021    MONOPCT 4.4 09/28/2021    BASOPCT 0.1 09/28/2021    NEUTROABS 15.00 (H) 09/28/2021    LYMPHSABS 0.86 (L) 09/28/2021    MONOSABS 0.73 09/28/2021    EOSABS 0.00 (L) 09/28/2021    BASOSABS 0.01 09/28/2021       Recent Labs     10/05/21  0410 10/04/21  2140 10/04/21  1555   WBC 13.7* 16.3* 15.2*   HGB 9.3* 9.5* 9.3*   HCT 28.1* 28.0* 28.1*   MCV 88.1 87.5 87.8   PLT 72* 62* 61*       BMP:   Recent Labs     10/04/21  0405 10/04/21  0405 10/04/21  0940 10/04/21  0940 10/04/21  1555 10/04/21  1555 10/04/21  2140 10/05/21  0220 10/05/21  0410 10/05/21  0920      < > 145  --  146  --   --   --  151*  --    K 3.7   < > 3.3*   < > 3.4*   < >  --  3.1* 3.2* 3.2*      < > 103  --  104  --   --   --  106  --    CO2 26   < > 30*  --  30*  --   --   --  28  --    PHOS 3.7  --   --   --   --   --  4.4  --  4.0  --    *   < > 129*  --  123*  --   --   --  128*  --    CREATININE 3.6* < > 3.8*  --  3.6*  --   --   --  3.6*  --     < > = values in this interval not displayed. MG:   Lab Results   Component Value Date    MG 2.3 10/05/2021     Ca/Phos:   Lab Results   Component Value Date    CALCIUM 7.9 (L) 10/05/2021    PHOS 4.0 10/05/2021     Amylase: No results found for: AMYLASE  Lipase:   Lab Results   Component Value Date    LIPASE 7 (L) 09/28/2021     LIVER PROFILE:   Recent Labs     10/03/21  2150 10/04/21  0405 10/05/21  0410   AST 21 21 23   ALT 18 17 18   BILITOT 2.2* 1.9* 1.6*   ALKPHOS 79 75 72       PT/INR:   Recent Labs     10/04/21  0405   PROTIME 15.9*   INR 1.5     APTT:   Recent Labs     10/04/21  0405   APTT 24.7       Cardiac Enzymes:  Lab Results   Component Value Date    CKMB 94.0 (H) 09/28/2021       Hgb A1C:   Lab Results   Component Value Date    LABA1C 5.1 09/10/2021     No results found for: EAG  CRISTIANA: No results found for: CRISTIANA  ESR: No results found for: SEDRATE  CRP: No results found for: CRP  D Dimer: No results found for: DDIMER  Folate and B12: No results found for: [id number], No results found for: FOLATE              PROBLEM LIST:  Patient Active Problem List   Diagnosis    CAD in native artery    Pulmonary nodules    Unstable angina (HCC)    Other hyperlipidemia    Essential hypertension    Gastroesophageal reflux disease with esophagitis without hemorrhage    S/P CABG (coronary artery bypass graft)    Acute respiratory failure with hypoxia (HCC)    Lactic acidemia    NGUYỄN (acute kidney injury) ([address]    Hypokalemia    Hypocalcemia    Cardiogenic shock (HCC)    Thrombocytopenia ([address]    Leukocytosis    Encounter regarding vascular access for dialysis for ESRD ([facility] [address]               ASSESSMENT:  1.) Acute Respiratory failure   2.)cardiogenic shock vs other types of shock,septic ,etc   3. )Large mucus plug obstruction left main bronchus and RUL   4. )CAD s/P CABG   5.)NGUYỄN       PLAN:  *-Neurology consulted for possible stroke and also 2 d echo for possible thrombus ,also neuropathy in DDx ,defer to neurology   *- US moderate effusion right ,I believe we can wait and let Bumex do the job ,but if continue after extubation will drain   *-S/p Bronchoscopy ,removal mucus plug   *-CXR seems with bilateral effusion ,will give Bumex time as he still + 9 L   *- Aggressive pulmonary toilet   *-BD  Hypertonic saline   Chest vest when possible   Wean steroids \diuresis as per renal ,seem fluid overload,renal following diuresis as needed    *- so far negative sputum culture  Liberate from MV,on PSV ,should be extubate in am   Rest by ICU team       Jacques Agosto MD  Pulmonary/Critical care Medicine   [facility] and [facility]

## 2021-10-06 ENCOUNTER — APPOINTMENT (OUTPATIENT)
Dept: GENERAL RADIOLOGY | Age: 76
DRG: 235 | End: 2021-10-06
Attending: THORACIC SURGERY (CARDIOTHORACIC VASCULAR SURGERY)
Payer: MEDICARE

## 2021-10-06 LAB
AADO2: 115 MMHG
AADO2: 132.4 MMHG
ALBUMIN SERPL-MCNC: 3.1 G/DL (ref 3.5–5.2)
ALP BLD-CCNC: 61 U/L (ref 40–129)
ALT SERPL-CCNC: 19 U/L (ref 0–40)
ANION GAP SERPL CALCULATED.3IONS-SCNC: 11 MMOL/L (ref 7–16)
ANION GAP SERPL CALCULATED.3IONS-SCNC: 11 MMOL/L (ref 7–16)
ANION GAP SERPL CALCULATED.3IONS-SCNC: 19 MMOL/L (ref 7–16)
AST SERPL-CCNC: 23 U/L (ref 0–39)
B.E.: 4.3 MMOL/L (ref -3–3)
B.E.: 5.2 MMOL/L (ref -3–3)
BILIRUB SERPL-MCNC: 1.3 MG/DL (ref 0–1.2)
BUN BLDV-MCNC: 109 MG/DL (ref 6–23)
BUN BLDV-MCNC: 109 MG/DL (ref 6–23)
BUN BLDV-MCNC: 118 MG/DL (ref 6–23)
CALCIUM SERPL-MCNC: 8.1 MG/DL (ref 8.6–10.2)
CALCIUM SERPL-MCNC: 8.2 MG/DL (ref 8.6–10.2)
CALCIUM SERPL-MCNC: 8.4 MG/DL (ref 8.6–10.2)
CHLORIDE BLD-SCNC: 97 MMOL/L (ref 98–107)
CHLORIDE BLD-SCNC: 98 MMOL/L (ref 98–107)
CHLORIDE BLD-SCNC: 99 MMOL/L (ref 98–107)
CO2: 26 MMOL/L (ref 22–29)
CO2: 31 MMOL/L (ref 22–29)
CO2: 32 MMOL/L (ref 22–29)
COHB: 0.1 % (ref 0–1.5)
COHB: 0.3 % (ref 0–1.5)
CREAT SERPL-MCNC: 3.1 MG/DL (ref 0.7–1.2)
CREAT SERPL-MCNC: 3.2 MG/DL (ref 0.7–1.2)
CREAT SERPL-MCNC: 3.3 MG/DL (ref 0.7–1.2)
CRITICAL: ABNORMAL
CRITICAL: ABNORMAL
DATE ANALYZED: ABNORMAL
DATE ANALYZED: ABNORMAL
DATE OF COLLECTION: ABNORMAL
DATE OF COLLECTION: ABNORMAL
FIO2: 40 %
FIO2: 40 %
GFR AFRICAN AMERICAN: 22
GFR AFRICAN AMERICAN: 23
GFR AFRICAN AMERICAN: 24
GFR NON-AFRICAN AMERICAN: 18 ML/MIN/1.73
GFR NON-AFRICAN AMERICAN: 19 ML/MIN/1.73
GFR NON-AFRICAN AMERICAN: 20 ML/MIN/1.73
GLUCOSE BLD-MCNC: 138 MG/DL (ref 74–99)
GLUCOSE BLD-MCNC: 170 MG/DL (ref 74–99)
GLUCOSE BLD-MCNC: 181 MG/DL (ref 74–99)
HCO3: 26.7 MMOL/L (ref 22–26)
HCO3: 27.5 MMOL/L (ref 22–26)
HCT VFR BLD CALC: 28.9 % (ref 37–54)
HEMOGLOBIN: 9.7 G/DL (ref 12.5–16.5)
HHB: 2.1 % (ref 0–5)
HHB: 2.6 % (ref 0–5)
LAB: ABNORMAL
LAB: ABNORMAL
LACTIC ACID: 1.4 MMOL/L (ref 0.5–2.2)
Lab: ABNORMAL
Lab: ABNORMAL
MAGNESIUM: 2 MG/DL (ref 1.6–2.6)
MCH RBC QN AUTO: 29.1 PG (ref 26–35)
MCHC RBC AUTO-ENTMCNC: 33.6 % (ref 32–34.5)
MCV RBC AUTO: 86.8 FL (ref 80–99.9)
METER GLUCOSE: 129 MG/DL (ref 74–99)
METER GLUCOSE: 136 MG/DL (ref 74–99)
METER GLUCOSE: 137 MG/DL (ref 74–99)
METER GLUCOSE: 165 MG/DL (ref 74–99)
METER GLUCOSE: 168 MG/DL (ref 74–99)
METER GLUCOSE: 172 MG/DL (ref 74–99)
METHB: 0.3 % (ref 0–1.5)
METHB: 0.3 % (ref 0–1.5)
MODE: ABNORMAL
MODE: AC
O2 SATURATION: 97.4 % (ref 92–98.5)
O2 SATURATION: 97.9 % (ref 92–98.5)
O2HB: 97 % (ref 94–97)
O2HB: 97.3 % (ref 94–97)
OPERATOR ID: 1874
OPERATOR ID: 7278
PATIENT TEMP: 37 C
PATIENT TEMP: 37 C
PCO2: 31.8 MMHG (ref 35–45)
PCO2: 31.9 MMHG (ref 35–45)
PDW BLD-RTO: 15.7 FL (ref 11.5–15)
PEEP/CPAP: 6 CMH2O
PEEP/CPAP: 6 CMH2O
PFO2: 2.65 MMHG/%
PFO2: 3.09 MMHG/%
PH BLOOD GAS: 7.54 (ref 7.35–7.45)
PH BLOOD GAS: 7.55 (ref 7.35–7.45)
PHOSPHORUS: 4.4 MG/DL (ref 2.5–4.5)
PLATELET # BLD: 100 E9/L (ref 130–450)
PMV BLD AUTO: 11.7 FL (ref 7–12)
PO2: 106.1 MMHG (ref 75–100)
PO2: 123.6 MMHG (ref 75–100)
POTASSIUM SERPL-SCNC: 3 MMOL/L (ref 3.5–5)
POTASSIUM SERPL-SCNC: 3.4 MMOL/L (ref 3.5–5)
POTASSIUM SERPL-SCNC: 3.41 MMOL/L (ref 3.5–5)
POTASSIUM SERPL-SCNC: 3.5 MMOL/L (ref 3.5–5)
POTASSIUM SERPL-SCNC: 4.3 MMOL/L (ref 3.5–5)
PS: 8 CMH20
RBC # BLD: 3.33 E12/L (ref 3.8–5.8)
RI(T): 0.93
RI(T): 1.25
RR MECHANICAL: 14 B/MIN
SODIUM BLD-SCNC: 140 MMOL/L (ref 132–146)
SODIUM BLD-SCNC: 140 MMOL/L (ref 132–146)
SODIUM BLD-SCNC: 144 MMOL/L (ref 132–146)
SOURCE, BLOOD GAS: ABNORMAL
SOURCE, BLOOD GAS: ABNORMAL
THB: 10.4 G/DL (ref 11.5–16.5)
THB: 10.5 G/DL (ref 11.5–16.5)
TIME ANALYZED: 1145
TIME ANALYZED: 455
TOTAL PROTEIN: 5.3 G/DL (ref 6.4–8.3)
VT MECHANICAL: 500 ML
WBC # BLD: 18.9 E9/L (ref 4.5–11.5)

## 2021-10-06 PROCEDURE — 6370000000 HC RX 637 (ALT 250 FOR IP): Performed by: NURSE PRACTITIONER

## 2021-10-06 PROCEDURE — 6360000002 HC RX W HCPCS: Performed by: NURSE PRACTITIONER

## 2021-10-06 PROCEDURE — 71045 X-RAY EXAM CHEST 1 VIEW: CPT

## 2021-10-06 PROCEDURE — 80048 BASIC METABOLIC PNL TOTAL CA: CPT

## 2021-10-06 PROCEDURE — 99233 SBSQ HOSP IP/OBS HIGH 50: CPT | Performed by: INTERNAL MEDICINE

## 2021-10-06 PROCEDURE — 36415 COLL VENOUS BLD VENIPUNCTURE: CPT

## 2021-10-06 PROCEDURE — 2580000003 HC RX 258: Performed by: SURGERY

## 2021-10-06 PROCEDURE — 2580000003 HC RX 258: Performed by: NURSE PRACTITIONER

## 2021-10-06 PROCEDURE — 6360000002 HC RX W HCPCS

## 2021-10-06 PROCEDURE — 6360000002 HC RX W HCPCS: Performed by: STUDENT IN AN ORGANIZED HEALTH CARE EDUCATION/TRAINING PROGRAM

## 2021-10-06 PROCEDURE — 83735 ASSAY OF MAGNESIUM: CPT

## 2021-10-06 PROCEDURE — 97166 OT EVAL MOD COMPLEX 45 MIN: CPT

## 2021-10-06 PROCEDURE — 2580000003 HC RX 258: Performed by: INTERNAL MEDICINE

## 2021-10-06 PROCEDURE — 97530 THERAPEUTIC ACTIVITIES: CPT

## 2021-10-06 PROCEDURE — 82805 BLOOD GASES W/O2 SATURATION: CPT

## 2021-10-06 PROCEDURE — 2580000003 HC RX 258

## 2021-10-06 PROCEDURE — 97162 PT EVAL MOD COMPLEX 30 MIN: CPT

## 2021-10-06 PROCEDURE — 85027 COMPLETE CBC AUTOMATED: CPT

## 2021-10-06 PROCEDURE — 84100 ASSAY OF PHOSPHORUS: CPT

## 2021-10-06 PROCEDURE — 82962 GLUCOSE BLOOD TEST: CPT

## 2021-10-06 PROCEDURE — 80053 COMPREHEN METABOLIC PANEL: CPT

## 2021-10-06 PROCEDURE — 6360000002 HC RX W HCPCS: Performed by: PHYSICIAN ASSISTANT

## 2021-10-06 PROCEDURE — 83605 ASSAY OF LACTIC ACID: CPT

## 2021-10-06 PROCEDURE — 94003 VENT MGMT INPAT SUBQ DAY: CPT

## 2021-10-06 PROCEDURE — 2580000003 HC RX 258: Performed by: PHYSICIAN ASSISTANT

## 2021-10-06 PROCEDURE — 6370000000 HC RX 637 (ALT 250 FOR IP): Performed by: PHYSICIAN ASSISTANT

## 2021-10-06 PROCEDURE — 84132 ASSAY OF SERUM POTASSIUM: CPT

## 2021-10-06 PROCEDURE — 6370000000 HC RX 637 (ALT 250 FOR IP): Performed by: THORACIC SURGERY (CARDIOTHORACIC VASCULAR SURGERY)

## 2021-10-06 PROCEDURE — 99233 SBSQ HOSP IP/OBS HIGH 50: CPT | Performed by: PHYSICIAN ASSISTANT

## 2021-10-06 PROCEDURE — 6360000002 HC RX W HCPCS: Performed by: SURGERY

## 2021-10-06 PROCEDURE — 2500000003 HC RX 250 WO HCPCS: Performed by: NURSE PRACTITIONER

## 2021-10-06 PROCEDURE — 37799 UNLISTED PX VASCULAR SURGERY: CPT

## 2021-10-06 PROCEDURE — 94640 AIRWAY INHALATION TREATMENT: CPT

## 2021-10-06 PROCEDURE — 99233 SBSQ HOSP IP/OBS HIGH 50: CPT | Performed by: NURSE PRACTITIONER

## 2021-10-06 PROCEDURE — 2000000000 HC ICU R&B

## 2021-10-06 PROCEDURE — C9113 INJ PANTOPRAZOLE SODIUM, VIA: HCPCS | Performed by: SURGERY

## 2021-10-06 RX ORDER — CARVEDILOL 3.12 MG/1
3.12 TABLET ORAL 2 TIMES DAILY WITH MEALS
Status: DISCONTINUED | OUTPATIENT
Start: 2021-10-06 | End: 2021-10-13 | Stop reason: HOSPADM

## 2021-10-06 RX ADMIN — SODIUM CHLORIDE, PRESERVATIVE FREE 10 ML: 5 INJECTION INTRAVENOUS at 20:28

## 2021-10-06 RX ADMIN — INSULIN LISPRO 3 UNITS: 100 INJECTION, SOLUTION INTRAVENOUS; SUBCUTANEOUS at 16:37

## 2021-10-06 RX ADMIN — DEXTROSE MONOHYDRATE: 50 INJECTION, SOLUTION INTRAVENOUS at 22:46

## 2021-10-06 RX ADMIN — POTASSIUM CHLORIDE 20 MEQ: 400 INJECTION, SOLUTION INTRAVENOUS at 13:00

## 2021-10-06 RX ADMIN — AMIODARONE HYDROCHLORIDE 400 MG: 200 TABLET ORAL at 20:28

## 2021-10-06 RX ADMIN — ONDANSETRON 4 MG: 2 INJECTION INTRAMUSCULAR; INTRAVENOUS at 15:21

## 2021-10-06 RX ADMIN — POTASSIUM CHLORIDE 20 MEQ: 400 INJECTION, SOLUTION INTRAVENOUS at 06:13

## 2021-10-06 RX ADMIN — ASPIRIN 81 MG CHEWABLE TABLET 81 MG: 81 TABLET CHEWABLE at 09:38

## 2021-10-06 RX ADMIN — AMIODARONE HYDROCHLORIDE 400 MG: 200 TABLET ORAL at 09:38

## 2021-10-06 RX ADMIN — SENNOSIDES 5 ML: 8.8 SYRUP ORAL at 09:39

## 2021-10-06 RX ADMIN — DEXTROSE MONOHYDRATE: 50 INJECTION, SOLUTION INTRAVENOUS at 09:40

## 2021-10-06 RX ADMIN — PANTOPRAZOLE SODIUM 40 MG: 40 INJECTION, POWDER, FOR SOLUTION INTRAVENOUS at 20:28

## 2021-10-06 RX ADMIN — PANTOPRAZOLE SODIUM 40 MG: 40 INJECTION, POWDER, FOR SOLUTION INTRAVENOUS at 09:38

## 2021-10-06 RX ADMIN — POTASSIUM CHLORIDE 20 MEQ: 400 INJECTION, SOLUTION INTRAVENOUS at 02:04

## 2021-10-06 RX ADMIN — SODIUM CHLORIDE, PRESERVATIVE FREE 300 UNITS: 5 INJECTION INTRAVENOUS at 20:28

## 2021-10-06 RX ADMIN — CHLORHEXIDINE GLUCONATE 15 ML: 1.2 RINSE ORAL at 09:38

## 2021-10-06 RX ADMIN — POTASSIUM CHLORIDE 20 MEQ: 400 INJECTION, SOLUTION INTRAVENOUS at 22:46

## 2021-10-06 RX ADMIN — IPRATROPIUM BROMIDE AND ALBUTEROL SULFATE 1 AMPULE: .5; 2.5 SOLUTION RESPIRATORY (INHALATION) at 20:56

## 2021-10-06 RX ADMIN — CALCIUM GLUCONATE 1000 MG: 98 INJECTION, SOLUTION INTRAVENOUS at 18:10

## 2021-10-06 RX ADMIN — PIPERACILLIN AND TAZOBACTAM 3375 MG: 3; .375 INJECTION, POWDER, LYOPHILIZED, FOR SOLUTION INTRAVENOUS at 11:48

## 2021-10-06 RX ADMIN — HYDROCORTISONE SODIUM SUCCINATE 50 MG: 100 INJECTION, POWDER, FOR SOLUTION INTRAMUSCULAR; INTRAVENOUS at 06:19

## 2021-10-06 RX ADMIN — IPRATROPIUM BROMIDE AND ALBUTEROL SULFATE 1 AMPULE: .5; 2.5 SOLUTION RESPIRATORY (INHALATION) at 07:28

## 2021-10-06 RX ADMIN — POTASSIUM CHLORIDE 20 MEQ: 400 INJECTION, SOLUTION INTRAVENOUS at 12:03

## 2021-10-06 RX ADMIN — FENTANYL CITRATE 12.5 MCG: 0.05 INJECTION, SOLUTION INTRAMUSCULAR; INTRAVENOUS at 13:35

## 2021-10-06 RX ADMIN — DOCUSATE SODIUM 100 MG: 50 LIQUID ORAL at 09:37

## 2021-10-06 RX ADMIN — INSULIN LISPRO 3 UNITS: 100 INJECTION, SOLUTION INTRAVENOUS; SUBCUTANEOUS at 00:03

## 2021-10-06 RX ADMIN — BUMETANIDE 0.25 MG/HR: 0.25 INJECTION INTRAMUSCULAR; INTRAVENOUS at 06:17

## 2021-10-06 RX ADMIN — CHLORHEXIDINE GLUCONATE 15 ML: 1.2 RINSE ORAL at 20:28

## 2021-10-06 RX ADMIN — POTASSIUM CHLORIDE 20 MEQ: 400 INJECTION, SOLUTION INTRAVENOUS at 02:47

## 2021-10-06 RX ADMIN — CARVEDILOL 3.12 MG: 3.12 TABLET, FILM COATED ORAL at 16:41

## 2021-10-06 RX ADMIN — POTASSIUM CHLORIDE 20 MEQ: 400 INJECTION, SOLUTION INTRAVENOUS at 07:43

## 2021-10-06 RX ADMIN — Medication 10 ML: at 20:29

## 2021-10-06 RX ADMIN — PIPERACILLIN AND TAZOBACTAM 3375 MG: 3; .375 INJECTION, POWDER, LYOPHILIZED, FOR SOLUTION INTRAVENOUS at 23:59

## 2021-10-06 RX ADMIN — INSULIN LISPRO 3 UNITS: 100 INJECTION, SOLUTION INTRAVENOUS; SUBCUTANEOUS at 04:58

## 2021-10-06 RX ADMIN — IPRATROPIUM BROMIDE AND ALBUTEROL SULFATE 1 AMPULE: .5; 2.5 SOLUTION RESPIRATORY (INHALATION) at 12:17

## 2021-10-06 RX ADMIN — Medication 10 ML: at 09:38

## 2021-10-06 RX ADMIN — ATORVASTATIN CALCIUM 40 MG: 40 TABLET, FILM COATED ORAL at 20:28

## 2021-10-06 RX ADMIN — PIPERACILLIN AND TAZOBACTAM 3375 MG: 3; .375 INJECTION, POWDER, LYOPHILIZED, FOR SOLUTION INTRAVENOUS at 00:07

## 2021-10-06 RX ADMIN — IPRATROPIUM BROMIDE AND ALBUTEROL SULFATE 1 AMPULE: .5; 2.5 SOLUTION RESPIRATORY (INHALATION) at 16:42

## 2021-10-06 ASSESSMENT — PAIN SCALES - GENERAL
PAINLEVEL_OUTOF10: 0
PAINLEVEL_OUTOF10: 5

## 2021-10-06 ASSESSMENT — PULMONARY FUNCTION TESTS
PIF_VALUE: 21
PIF_VALUE: 20
PIF_VALUE: 24
PIF_VALUE: 15
PIF_VALUE: 15
PIF_VALUE: 23
PIF_VALUE: 19
PIF_VALUE: 26
PIF_VALUE: 24
PIF_VALUE: 23
PIF_VALUE: 27
PIF_VALUE: 22
PIF_VALUE: 22
PIF_VALUE: 20
PIF_VALUE: 22
PIF_VALUE: 21
PIF_VALUE: 23
PIF_VALUE: 22
PIF_VALUE: 19
PIF_VALUE: 19
PIF_VALUE: 20
PIF_VALUE: 21
PIF_VALUE: 15

## 2021-10-06 ASSESSMENT — PAIN DESCRIPTION - LOCATION: LOCATION: THROAT

## 2021-10-06 ASSESSMENT — PAIN DESCRIPTION - PAIN TYPE: TYPE: ACUTE PAIN

## 2021-10-06 ASSESSMENT — PAIN DESCRIPTION - DESCRIPTORS: DESCRIPTORS: SORE

## 2021-10-06 ASSESSMENT — PAIN DESCRIPTION - ONSET: ONSET: SUDDEN

## 2021-10-06 NOTE — PROGRESS NOTES
Department of Internal Medicine  Nephrology Progress Note      Events reviewed. SUBJECTIVE: WE are following Mr. [address] for NGUYỄN. Remains intubated.      PHYSICAL EXAM:      Vitals:    VITALS:  BP (!) 130/51   Pulse 85   Temp 98.6 °F (37 °C) (Bladder)   Resp 16   Ht 5' 8\" (1.727 m)   Wt 158 lb 11.7 oz (72 kg)   SpO2 98%   BMI 24.14 kg/m²   24HR INTAKE/OUTPUT:      Intake/Output Summary (Last 24 hours) at 10/6/2021 0811  Last data filed at 10/6/2021 0600  Gross per 24 hour   Intake 6517 ml   Output 6080 ml   Net 437 ml     Access: right femoral temporary dialysis catheter  Constitutional: Patient is intubated, sedated, Fio2 at 40 %  HEENT: Pupils are equal reactive, endotracheal tube in place  Respiratory: Decreased breath sounds at the bases  Cardiovascular/Edema: Heart sounds are regular  Gastrointestinal: Abdomen soft  Neurologic: Patient sedated  Other: + edema      Scheduled Meds:   bisacodyl  10 mg Rectal Daily    hydrocortisone sodium succinate PF  50 mg IntraVENous Q12H    piperacillin-tazobactam  3,375 mg IntraVENous Q12H    amiodarone  400 mg Oral BID    pantoprazole  40 mg IntraVENous BID    And    sodium chloride (PF)  10 mL IntraVENous BID    lidocaine PF  5 mL IntraDERmal Once    sodium chloride flush  5-40 mL IntraVENous 2 times per day    heparin flush  3 mL IntraVENous 2 times per day    rocuronium  0.6 mg/kg IntraVENous Once    aspirin  81 mg Oral Daily    chlorhexidine  15 mL Mouth/Throat BID    docusate sodium  100 mg Oral BID    sennosides  5 mL Oral BID    insulin lispro  0-3 Units SubCUTAneous Q4H    atorvastatin  40 mg Oral Daily    ipratropium-albuterol  1 ampule Inhalation Q4H WA    [Held by provider] tamsulosin  0.4 mg Oral Daily     Continuous Infusions:   dextrose 75 mL/hr at 10/06/21 0629    sodium chloride      bumetanide 0.1 mg/mL infusion 0.25 mg/hr (10/06/21 0617)    sodium chloride      sodium chloride      sodium chloride      sodium chloride      cisatracurium (NIMBEX) infusion Stopped (10/01/21 0740)    midazolam Stopped (10/04/21 1340)    sodium chloride      norepinephrine Stopped (09/28/21 0902)    insulin Stopped (09/29/21 0808)    dextrose      nitroprusside (NIPRIDE) 50 mg in D5W infusion       PRN Meds:.sodium chloride, potassium chloride, calcium gluconate **OR** [DISCONTINUED] calcium gluconate **OR** [DISCONTINUED] calcium gluconate **OR** [DISCONTINUED] calcium gluconate, sodium chloride, sodium chloride, sodium chloride, sodium chloride flush, sodium chloride, heparin flush, fentanNYL, artificial tears, ondansetron, acetaminophen, acetaminophen, oxyCODONE **OR** oxyCODONE, magnesium hydroxide, potassium chloride, magnesium sulfate, albumin human, sodium chloride, norepinephrine, glucose, dextrose, glucagon (rDNA), dextrose, nitroprusside (NIPRIDE) 50 mg in D5W infusion, calcium gluconate IVPB    DATA:    CBC:   Lab Results   Component Value Date    WBC 13.7 10/05/2021    RBC 3.19 10/05/2021    HGB 9.3 10/05/2021    HCT 28.1 10/05/2021    MCV 88.1 10/05/2021    MCH 29.2 10/05/2021    MCHC 33.1 10/05/2021    RDW 15.7 10/05/2021    PLT 72 10/05/2021    MPV 12.1 10/05/2021     CMP:    Lab Results   Component Value Date     10/06/2021    K 3.5 10/06/2021    CL 98 10/06/2021    CO2 31 10/06/2021     10/06/2021    CREATININE 3.1 10/06/2021    GFRAA 24 10/06/2021    LABGLOM 20 10/06/2021    GLUCOSE 170 10/06/2021    PROT 5.3 10/06/2021    LABALBU 3.1 10/06/2021    CALCIUM 8.2 10/06/2021    BILITOT 1.3 10/06/2021    ALKPHOS 61 10/06/2021    AST 23 10/06/2021    ALT 19 10/06/2021     Magnesium:    Lab Results   Component Value Date    MG 2.0 10/06/2021     Phosphorus:    Lab Results   Component Value Date    PHOS 4.4 10/06/2021        Radiology Review:      CXR 10/3/21   1. Median sternotomy changes.    2. Bilateral pleuroparenchymal opacities that could be related to pleural   effusion and edema.  The appearance of the chest is about the same or   slightly worse.         Chest x-ray October 6, 2021   1. Stable appearance of the postoperative chest.   2. Bilateral chest tubes remain in position.  There is no right or left   pneumothorax. 3. Small right pleural effusion             BRIEF SUMMARY OF INITIAL CONSULT:    Briefly Mr. Haywood Gowers is a 44-year-old man with history of HTN, CAD with recent status cardiac catheterization done on September 9 which showed severe multivessel disease, hyperlipidemia, hiatal hernia, who was admitted for elective CABG on September 27, 2021. On admission his creatinine level was 0.8 mg/dL and post surgery his creatinine has progressively increased up to 2.3 mg/dL, reason for this consultation. Postoperatively she developed persistent hypotension, lactic acidosis and respiratory failure for which he was reintubated. Since then she has required multiple pressors and he was placed on IABP. He had received 1 dose of ketorolac perioperatively. His urine output has significantly decrease and is being about 500 cc/day in the last 48 hours and his fluid balance presently is over 9 L. Problems resolved:    · Cardiogenic shock, hemodynamically more stable, pressor support decrease, still on IABP. Tentative plan for removal of IABP. Pro-BP 60,044    IMPRESSION/RECOMMENDATIONS:      1. NGUYỄN stage III, CABG associated NGUYỄN, ischemic ATN, non-oliguric. FEUrea 18.8%. Renal function started to improve with decreasing creatinine levels and excellent urine output and response to Bumex drip. 2. Hypernatremia with hypervolemia, 2/2 sodium containing IV fluid resuscitation and underlying heart failure. Resolved with  natriuresis and free water (add D5W)  3. Hypokalemia, 2/2 diuretics, potassium levels improve  4. Respiratory alkalosis (pH: 7.554, PCO2: 81.9) with metabolic alkalosis (bicarbonate administration)    ---------------------------------------------------------  5. Respiratory failure status post intubation  6.  CAD status post CABG x3 September 27, 2021  7. Probably pneumonia, on piperacillin-tazobactam  8. Transaminitis with hyperbilirubinemia, possibly shock liver versus congestive liver  9. Normocytic anemia, status post surgery, getting transfused  10. Thrombocytopenia  11. Nutrition, TF at 20 cc/hour, free water at 50 cc/hour, off for extubation    Plan:    · Continue bumex drip at 0.25 mg/hr for now  · Decrease D5W to 75 cc/hour   · Continue to monitor sodium levels  · Replace potassium per protocol  · Continue to monitor renal function for recovery  · Repeat proBNP in a.m.

## 2021-10-06 NOTE — PROGRESS NOTES
Pharmacy Consultation Note  (Renal Dosing and Monitoring)    Initial consult date: 10/1/21  Consulting physician/provider: Dr Ashleigh Strickland  Reason for consult: Renal dosing of medications in CRRT    Ht Readings from Last 1 Encounters:   10/04/21 5' 8\" (1.727 m)     Wt Readings from Last 1 Encounters:   09/27/21 158 lb 11.7 oz (72 kg)       Age/Gender  Allergy Information   78 y.o. male  Dust mite extract               Estimated Creatinine Clearance: 20 mL/min (A) (based on SCr of 3.1 mg/dL (H)). Intake/Output Summary (Last 24 hours) at 10/6/2021 0810  Last data filed at 10/6/2021 0600  Gross per 24 hour   Intake 6517 ml   Output 6080 ml   Net 437 ml     Urine output over the last 24 hours: 3.6 mL/kg/hr (6145 mL total) - on bumetanide gtt    Assessment:  · 79 yo M admitted 9/27 s/p CABG x3/RCA endarterectomy/MALU exclusion with CT Surgery  · Consulted by Dr. French Patience to renally dose/monitor medications in CVVHD   · Temporary HD line placed 9/30  · CVVHD not initiated 10/1; currently bumetanide infusion on hold  · SCr 3.1 today; CrCl 20 mL/hr    Plan:  · Continue Piperacillin/tazobactam 3.375 gm IV to q12h - if SCr continues to improve, will adjust back to q8h  · No additional renal adjustments needed at this time    Will continue to follow.       Kayla PenaD, BCPS, BCCCP  10/6/2021  8:13 AM

## 2021-10-06 NOTE — PLAN OF CARE
Problem: Falls - Risk of:  Goal: Will remain free from falls  Description: Will remain free from falls  10/6/2021 0118 by Santana Mcneill RN  Outcome: Met This Shift  10/5/2021 1144 by Dwayne Leon RN  Outcome: Met This Shift  10/5/2021 1143 by Dwayne Leon RN  Outcome: Met This Shift  Goal: Absence of physical injury  Description: Absence of physical injury  10/5/2021 1144 by Dwayne Leon RN  Outcome: Met This Shift  10/5/2021 1143 by Dwayne Leon RN  Outcome: Met This Shift     Problem:  Activity Intolerance:  Goal: Able to perform prescribed physical activity  Description: Able to perform prescribed physical activity  Outcome: Not Met This Shift     Problem: Anxiety:  Goal: Level of anxiety will decrease  Description: Level of anxiety will decrease  10/6/2021 0118 by Santana Mcneill RN  Outcome: Met This Shift  10/5/2021 1144 by Dwayne Leon RN  Outcome: Met This Shift  10/5/2021 1143 by Dwayne Leon RN  Outcome: Met This Shift     Problem: Fluid Volume - Imbalance:  Goal: Ability to achieve a balanced intake and output will improve  Description: Ability to achieve a balanced intake and output will improve  10/6/2021 0118 by Santana Mcneill RN  Outcome: Met This Shift  10/5/2021 1144 by Dwayne Leon RN  Outcome: Met This Shift  10/5/2021 1143 by Dwayne Leon RN  Outcome: Met This Shift  Goal: Chest tube drainage is within specified parameters  Description: Chest tube drainage is within specified parameters  10/5/2021 1144 by Dwayne Leon RN  Outcome: Met This Shift  10/5/2021 1143 by Dwayne Leon RN  Outcome: Met This Shift

## 2021-10-06 NOTE — FLOWSHEET NOTE
After the patient worked with physical therapy and sat at the side of the bed the ETT was displaced, respiratory and Critical care NP came to bedside to assess and reposition. The patient is now back on [facility] mode and pulling appropriate tidal volumes. Will continue to monitor.

## 2021-10-06 NOTE — PROGRESS NOTES
[facility] [id number] [place] [address]       Date:10/6/2021                                                               Patient Name: Sofiya Cummings  MRN: [id number]  : 1945  Room: [address] [place] [address]    Evaluating OT: Honey Brooks, OTR/L 0112    Referring Provider: Joni Rodriguez MD   Specific Provider Orders/Date: OT eval and treat (10/5/21)       Diagnosis: CAD   Surgery/Procedures:  CABG x 3   *Following CABG, patient developed hypotension, acidosis, NGUYỄN and respiratory failure and was intubated` Bronchoscopy done on  to remove large mucus plug; pt required pressors and was placed on aortic balloon pump which was removed 10/4/21. Off of sedation, pt was found to have right sided weakness    Pertinent Medical History: CAD, ESRD, HLD, HTN, hiatal hernia     *Precautions:  Fall Risk, vent via ETT, sternal precautions, R hemiparesis    Assessment of current deficits   [x] Functional mobility  [x]ADLs  [x] Strength               []Cognition   [x] Functional transfers   [x] IADLs         [x] Safety Awareness   [x]Endurance   [x] Fine Coordination        [x] ROM     [] Vision/perception   [x]Sensation    [x]Gross Motor Coordination [x] Balance   [] Delirium                  [x]Motor Control     [] Communication    OT PLAN OF CARE   OT POC based on physician orders, patient diagnosis and results of clinical assessment.        Frequency/Duration: 1-3 days/wk for 1-2 weeks PRN    Specific OT Treatment to include:   ADL retraining/adapted techniques and AE recommendations to increase functional independence within precautions                    Energy conservation techniques to improve tolerance for selfcare routine   Functional transfer/mobility training/DME recommendations for increased independence, safety and fall prevention         Patient/family education to increase safety and functional independence within precautions             Environmental modifications for safe mobility and completion of ADLs                           Cognitive retraining ex's to improve problem solving skills & safe participation in ADLs/IADLs  Sensory re-education techniques to improve extremity awareness, maintain skin integrity and improve hand function                             Splinting/positioning needs to maintain joint/skin integrity and prevent contractures  Therapeutic activity to improve functional performance during ADLs/IADLs                                         Therapeutic exercise to improve tolerance and functional strength for ADLs   Balance retraining exercises/tasks for facilitation of postural control with dynamic challenges during ADLs . Positioning to improve functional independence  Neuromuscular re-education: facilitation of righting/equilibrium reactions, normalization muscle tone/facilitation active functional movement                      Delirium prevention/treatment    Modified Gordon Scale   Score     Description  0             No symptoms  1             No significant disability despite symptoms  2             Slight disability; able to look after own affairs  3             Moderate disability; able to ambulate without assist/ requires assist with ADLs  4             Moderate/Severe disability;requires assist to ambulate/assist with ADLs  5             Severe disability;bedridden/incontinent   6               Score:   5    Recommended Adaptive Equipment: TBA:      Home Living: Pt lives with wife  in a mobile home with 4 step(s) to enter and 1 rail(s)  Bathroom setup: walk in shower with rail; higher commode with rail. Equipment owned: no DME    Prior Level of Function: IND with ADLs;  IND with IADLs. No devicwe for ambulation.    Driving: yes  Occupation: retired    Pain Level: pt c/o 0/10  pain  this session    Cognition: A&O: 4/4  (given choices)  Follows 1-2 step commands as physically for balance and safety   Functional Mobility NT                       Mod A   functional/bathroom mobility using AD as needed & demonstrating G safety     Balance Sitting:     Static:  Dep/posterior LOB with L sided preference     Dynamic:Dep  Standing: NT  Min A dynamic sitting balance; Mod A dynamic standing balance  during ADL tasks & transfers   Endurance/Activity Tolerance   F tolerance with light activity. Tolerated >5 min EOB. G   tolerance with moderate activity/self care routine   Visual/  Perceptual Impaired: R sided attention; midline orientation                       Vitals:   HR at rest: 89 bpm HR at end of session: 100 bpm   Spo2 at rest:99% Spo2 at end of session 100%   BP at rest:124/52 mmHg BP at end of session 178/77 mmHg (nurse/NP present)       Treatment: OT treatment provided this date includes:  Bed mobility: Instruction on precautions prior to bed mobility to facilitate safe transfers and ADLS. Pt required 2 person assist for safe mobility due to complexity of medical condition, medical lines and deconditioning. Balance retraining: Performed sitting balance ex's with instruction to facilitate righting reactions with postural changes during ADLS. Pt demo posterior & R sided LOB with Max A to correct. ROM/exercise: B UE ROM/coordination ex's within precautions to tolerance while seated EOB. Delirium Prevention: Environmental and sensory modifications assessed and implemented to decrease ICU acquired delirium and to improve overall orientation, mentation and pt interaction with family/staff. Line management and environmental modifications made prior to and end of session to ensure patient safety and to increase efficiency of session. Skilled monitoring of HR, O2 saturation, blood pressure and patient's response to activity performed throughout session. Comments: OK from RN to see patient. Upon arrival, patient supine in bed, agreeable to session.   Pt demo fair tolerance with fair- understanding of education/techniques. At end of session, patient returned to bed and positioned for comfort/edema control. Call light within reach, all lines and tubes intact. Pt instructed on use of call light for assistance and fall prevention. .    Patient presents with decreased ROM/strength,activity tolerance, dynamic balance, functional mobility limiting completion of ADLs and safety. Pt can benefit from continued skilled OT to increase safety, functional independence and quality of life. Rehab Potential: Good for established goals    Patient / Family Goal: to return to PLOF    Patient and/or family were instructed/educated on diagnosis, prognosis/goals and plan of care. Pt demonstrated fair understanding. Evaluation Complexity: Moderate     · History: Expanded chart review of consults, imaging, and psychosocial history related to current functional performance. · Exam: 5+ performance deficits identified limiting functional independence and safe return home   · Assistance/Modification: Min/mod assistance or modifications required to perform tasks. May have comorbidities that affect occupational performance. [] Malnutrition indicators have been identified and nursing has been notified to ensure a dietitian consult is ordered. Time In:1305             Time Out: 1330         Total Treatment time: 10   Min Units   OT Eval Low 85533     OT Eval Medium 16420 X    OT Eval High 84594     OT Re-Eval [id number]     Therapeutic Ex 88204     Therapeutic Activities 23184 10 1   ADL/Self Care 60852     Orthotic Management 50356     Neuro Re-Ed 51063     Non-Billable Time        Evaluation time includes thorough review of current medical information, gathering information on past medical history/social history and prior level of function, completion of standardized testing/informal observation of tasks, assessment of data and development of POC/Goals.      Kendall Lopezs, OTR/L [id number]

## 2021-10-06 NOTE — PROGRESS NOTES
Dolores Morrison is a 78 y.o.  male     Neurology is following for suspected stroke     PMH significant for HTN, HLD, ESRD, CAD s/p CABG on 9/27/21` Following CABG, patient developed hypotension, acidosis, NGUYỄN and respiratory failure and was intubated` Bronchoscopy done on 9/30 to remove large mucus plug` Required pressors and was placed on aortic balloon pump which was removed 10/4/21` Off of sedation was found to have right sided weakness` CT head negative for acute findings` Echo showed no LV mural thrombus` Carotid US with atherosclerotic disease -- 50-69% bilaterally` Unable to have CTAs due to kidney function` Unable to have MRI/MRA at this time due to epicardial wires`     He remains intubated and plan is to wean vent as able` Wife at bedside` Still weak on the R, but improved some since yesterday` Nods appropriately to quesitons` No new neuro symptoms`     ROS limited 2/2 intubation`   + R side weakness       Objective:       BP (!) 130/51   Pulse 82   Temp 98.6 °F (37 °C) (Bladder)   Resp 16   Ht 5' 8\" (1.727 m)   Wt 158 lb 11.7 oz (72 kg)   SpO2 98%   BMI 24.14 kg/m²        General appearance: alert, appears stated age and cooperative  Head: Normocephalic, without obvious abnormality, atraumatic  Eyes: conjunctivae/corneas clear  Neck: no adenopathy, no carotid bruit, no JVD, supple, symmetrical, trachea midline and thyroid not enlarged, symmetric, no tenderness/mass/nodules  Lungs: clear to auscultation bilaterally  Heart: regular rate and rhythm, S1, S2 normal, no murmur, click, rub or gallop  Extremities: extremities normal, atraumatic, no cyanosis or edema  Skin: Skin color, texture, turgor normal. No rashes or lesions     Mental Status: Intubated, Alert, Follows commands well      Cranial Nerves:  I: smell    II: visual acuity     II: visual fields Bilateral threat    II: pupils EMANUEL   III,VII: ptosis None   III,IV,VI: extraocular muscles  Full ROM   V: mastication    V: facial light touch sensation  Normal   V,VII: corneal reflex     VII: facial muscle function - upper  Normal   VII: facial muscle function - lower Appears symmetric around ETT   VIII: hearing Normal   IX: soft palate elevation     IX,X: gag reflex Present   XI: trapezius strength  5/5 L; 3/5 R   XI: sternocleidomastoid strength 5/5   XI: neck extension strength     XII: tongue strength       Motor:  R hemiparesis arm more than leg   Normal tone and bulk   No abnormal movements     Sensory:  LT intact distally in all limbs     Coordination:   FFM intact on L; unable on R r/t weakness     Gait:  Deferred for patient safety     DTR:   +1 throughout     No Babinskis    No pathological reflexes    Laboratory/Radiology:     CBC with Differential:    Lab Results   Component Value Date    WBC 18.9 10/06/2021    RBC 3.33 10/06/2021    HGB 9.7 10/06/2021    HCT 28.9 10/06/2021     10/06/2021    MCV 86.8 10/06/2021    MCH 29.1 10/06/2021    MCHC 33.6 10/06/2021    RDW 15.7 10/06/2021    LYMPHOPCT 5.1 09/28/2021    MONOPCT 4.4 09/28/2021    BASOPCT 0.1 09/28/2021    MONOSABS 0.73 09/28/2021    LYMPHSABS 0.86 09/28/2021    EOSABS 0.00 09/28/2021    BASOSABS 0.01 09/28/2021     CMP:    Lab Results   Component Value Date     10/06/2021    K 3.5 10/06/2021    CL 98 10/06/2021    CO2 31 10/06/2021     10/06/2021    CREATININE 3.1 10/06/2021    GFRAA 24 10/06/2021    LABGLOM 20 10/06/2021    GLUCOSE 170 10/06/2021    PROT 5.3 10/06/2021    LABALBU 3.1 10/06/2021    CALCIUM 8.2 10/06/2021    BILITOT 1.3 10/06/2021    ALKPHOS 61 10/06/2021    AST 23 10/06/2021    ALT 19 10/06/2021     HgBA1c:    Lab Results   Component Value Date    LABA1C 5.1 09/10/2021     FLP:    Lab Results   Component Value Date    TRIG 93 10/05/2021    HDL 41 10/05/2021    LDLCALC 48 10/05/2021    LABVLDL 19 10/05/2021     CT head  No acute intracranial abnormality.     Sinus disease, which can be seen with sinusitis.  There is fluid within  bilateral mastoid air cells.     Scalp soft tissue swelling    CUS 9/10/21  Atherosclerotic disease. No hemodynamically significant stenosis is  identified  Estimated stenosis by NASCET criteria in the proximal right carotid  artery is between 0% and 49%. Estimated stenosis by NASCET criteria in the proximal left carotid  artery is between 0% and 49%.     CUS 10/5/21  The right internal carotid artery demonstrates 50-69 percent stenosis.     The left internal carotid artery demonstrates 50-69 percent stenosis.     Bilateral vertebral arteries are patent with flow in the normal direction.     Atherosclerotic plaque is present at both carotid bifurcations. Echo    Left ventricle size is normal.   Ejection fraction is visually estimated at 40+/-5%. LV apex is dyskinetic and mid to distal anterior and anteroseptal walls   are severely hypokinetic. Normal left ventricle wall thickness. Stage I diastolic dysfunction. No LV mural thrombus. Normal right ventricular size and function. No hemodynamically significant aortic stenosis is present. Unable to estimate PA systolic pressure. No evidence for hemodynamically significant pericardial effusion. Technically difficult examination. Definity echo contrast was used to   delineate endocardial borders.     I personally reviewed all labs and images today   Assessment:     Suspected left hemispheric stroke after CABG    With persistent R hemiparesis on exam   CT head negative for acute findings, CUS  reporting 50-69% stenosis b/l   Echo with no LV thrombus     Plan:     Ideally would like to have MRI brain MRA head/neck, however may not be able to be done at this time due to epicardial wires--- if unable to get MRI brain, repeat CTH in a couple days or sooner if any new neuro deficits    Unable to check CTAs due to kidney fxn     Continue ASA and statin     PCDs     PT/OT/speech     Stroke education     Wean to extubate per ICU team     Will follow       Robby Deleon CHANDANA  10:46 AM  10/6/2021

## 2021-10-06 NOTE — PROGRESS NOTES
CVICU Progress Note    Name: Emilia Victoria  MRN: [id number]    CC: Postoperative Critical Care Management      Indication for Surgery/Procedure: CAD  LVEF:  NML    RVF:  NML     Important/Relevant PMH/PSH:   LAD 2007 cath with impaired LVEF, HTN, HLD, LBBB, pulmonary nodules      Procedure/Surgeries: 9/27/2021 Sternotomy/CABG x 3 (LIMA-LAD, SVG-OM, SVG-RCA)/Extensive RCA endarterectomy/MALU exclusion with 35 mm AtriClip/EVH LLE/Rigid internal fixation of the sternum with [place] plates x 6/88 modifier     Pacing wires: Atrial and Ventricular        Intake/Output Summary (Last 24 hours) at 10/6/2021 0838  Last data filed at 10/6/2021 0600  Gross per 24 hour   Intake 6517 ml   Output 6080 ml   Net 437 ml       Recent Labs     10/04/21  2140 10/05/21  0410 10/06/21  0750   WBC 16.3* 13.7* 18.9*   HGB 9.5* 9.3* 9.7*   HCT 28.0* 28.1* 28.9*   PLT 62* 72* 100*      Lab Results   Component Value Date     10/06/2021    K 3.5 10/06/2021    CL 98 10/06/2021    CO2 31 10/06/2021     10/06/2021    CREATININE 3.1 10/06/2021    GLUCOSE 170 10/06/2021    CALCIUM 8.2 10/06/2021         Physical Exam:    BP (!) 130/51   Pulse 85   Temp 98.6 °F (37 °C) (Bladder)   Resp 16   Ht 5' 8\" (1.727 m)   Wt 158 lb 11.7 oz (72 kg)   SpO2 98%   BMI 24.14 kg/m²       CXR Findings: 10/6/2021     Impression   1. Stable appearance of the postoperative chest.   2. Bilateral chest tubes remain in position.  There is no right or left   pneumothorax. 3. Small right pleural effusion             - CXR personally viewed and interpreted by ICU Nurse Practitioner, agree with above findings     General: Awake, alert. Dobutamine weaned off overnight, hemodynamics stable. Placed on PSV this morning. Eyes: PERRL, anicteric   Pulmonary: Diminished bibasilar.  No wheezes, no accessory muscle use noted   Ventilator: Mode: PSV 12, 40% FiO2, 6 PEEP   Cardiovascular:  RRR, no heaves or thrills on palpation  Tele: SR   Abdomen: Soft, nontender, + BS, TF via OG   Extremities: Palpable pulses all extremities, trace peripheral  edema   Neurologic/Psych: Awake, SANTOS to command; left 3/5 strength, right 2/5 strength, improving   Skin: Warm and dry  Incisions: MSI C/D/I, sternum stable     Lines:  ETT 9/28  Right brachial Arterial line 9/27  Ybarra 9/27  PICC LUE 09/29/2021       Assessment/Plan: POD #9    1. CAD S/p CABGx3 (LIMA-LAD, SVG-OM, SVG-RCA)/Extensive RCA endarterectomy/MALU AtriClip  - ASA (Hold Plavix), Lipitor   - Pleural drains to bulb suction (Left with 120cc/24 hours, right with 40cc/24 hours); will likely remove today   - PO Amio for afib prophylaxis   - PT/OT     2. Acute Hypoxic Respiratory Failure  - Extubated DOS, tolerating 2L NC post extubation, acute hypoxia requiring NRB, NIV and reintubation 9/28  - NMBA and iFlolan started 9/28   -Intermittently with episodes of acute hypoxia, last episode 9/30 evening-- CXR with complete left lung white out; pulmonology consulted for STAT bronch, s/p cryotherapy bronch for large obstructive mucous plug left and right main stem; vent settings improved s/p bronch  - Flolan weaned off 10/3  -On empiric Zosyn, sputum culture negative  -weaning systemic steroids  - Bedside US 10/4 moderate right sided pleural effusion, improving on CXR   - bumex gtt for diuresis  - PS trial today, physical exam improving, possible extubation if tolerating    - ABGs as ordered and PRN     3. Acute systolic heart failure   - post op with Cardiogenic shock; CPB required levophed and vasopressin and ultimately IV B12 intraop, postop ICU stable on Norepi 5mcg; acute decompensation with profound hypotension required escalation of vasopressors, given Vit B12 and Methylene Blue, started on epinephrine infusion and given stress steroids   - POD1: STAT Echo with akinesis of apical inferior and septal walls, EF 45-50%;  Severe RV dysfunction, Moderate MR; IABP placed at bedside, 1:1 with improvement in hemodynamics, levophed weaned [email or web address]@Itaro @ 3mcg, vaso weaned to 0.02 units, inhaled flolan   - POD2: IABP placed 1:2, dobutamine increased to 7.5mcg to facilitate weaning vasopressin, epinephrine as able   - Tbili down to 0.9, AST/ALT mildly elevated (reactive), lactic 5.3 trending down  -POD3 off pressors, on 7.5 Dobutamine and 3 epi, IABP 1:2, lactic normal. Repeat echo with EF 45%, RV near normal function    - POD4 Dobutamine @ 7.5, Epinephrine weaned off, weaning stress steroids; IABP remains 1:2, hypotension with IABP on standby; will continue through weekend  -POD5 Started flolan and slow dobutamine weaning, IABP 1:2. Lasix challenge and Bumex gtt started. - POD6 Flolan weaned off, continue slow Dobutamine wean. - POD7 IABP removed, dobutamine at 2.5mcg.   - POD8 Dobutamine at 1.5mcg. Echo with EF 40-45%, LV apex is dyskinetic and mid to distal anterior and anteroseptal walls are severely hypokinetic. RV normal, no LV thrombus   - POD 9 Dobutamine weaned off overnight hemodynamics stable  - Start GDMT as able      4. NGUYỄN   - 2/2 above, baseline Scr 0.8  - Scr up to 3.1, , down trending   - nephrology following  - Continue Bumex gtt, 6.1L UOP past 24       5. Acute Post Operative Pain   - PRN fentanyl for pain management     6. Stress Hyperglycemia  - No h/o DM  - SSI q 4 hours     7. Thrombocytopenia  - Platelets 392L, improving; likely consumptive, no s/s of bleeding.   -HIT negative 9/30     8. Acute blood loss anemia  - H/H stable   - monitor and transfuse if needed to maintain Hgb >8      9. Leukocytosis  - YGZ 44.6F, afebrile; weaning systemic steroids  - On empiric Zosyn     10. At risk for malnutrition   - TF via OG, hold for possible extubation  - FWF decreased to 60 q 4 hours     11. Possible GI bleed  - Maroon colored OG output over weekend with +hemoccult  - Evaluated by general surgery, no intervention planned; PPI BID, monitor H/H      12.  Right Sided Weakness  - Sedation off, Left side 3/5 strength, right side with 2/5 strength   - Neurology following, CT head unremarkable, would like MRI- not sure if able with epicardial wires in place   - Echo with no LV thrombus    - Carotid US with 50-69% stenosis bilaterally; increased from 9/10 0-49% bilaterally   - ASA/statin, PT/OT     13. Hypernatremia  - Na 140, normal   - D5W stopped, FWF decreased       VTE Prophylaxis: Pharmacologic/Mechanical:  Yes, SCDs   Line infection prevention: Can CVC or arterial line be removed: possible removal of arterial line later today   Continued need for urinary catheter: Yes - clinical indication: Patient post major surgery requiring fluid balance and input and output measurement.     Dispo: CVICU    Electronically signed by JUAN MIGUEL Mejia CNP on 10/6/2021 at 8:38 AM

## 2021-10-07 ENCOUNTER — APPOINTMENT (OUTPATIENT)
Dept: GENERAL RADIOLOGY | Age: 76
DRG: 235 | End: 2021-10-07
Attending: THORACIC SURGERY (CARDIOTHORACIC VASCULAR SURGERY)
Payer: MEDICARE

## 2021-10-07 LAB
AADO2: 125.5 MMHG
AADO2: 129 MMHG
ALBUMIN SERPL-MCNC: 3.2 G/DL (ref 3.5–5.2)
ALP BLD-CCNC: 57 U/L (ref 40–129)
ALT SERPL-CCNC: 20 U/L (ref 0–40)
ANION GAP SERPL CALCULATED.3IONS-SCNC: 13 MMOL/L (ref 7–16)
ANION GAP SERPL CALCULATED.3IONS-SCNC: 3 MMOL/L (ref 7–16)
AST SERPL-CCNC: 24 U/L (ref 0–39)
B.E.: 4.3 MMOL/L (ref -3–3)
B.E.: 4.9 MMOL/L (ref -3–3)
B.E.: 5.4 MMOL/L (ref -3–3)
BILIRUB SERPL-MCNC: 1.3 MG/DL (ref 0–1.2)
BUN BLDV-MCNC: 110 MG/DL (ref 6–23)
BUN BLDV-MCNC: 98 MG/DL (ref 6–23)
CALCIUM SERPL-MCNC: 7.7 MG/DL (ref 8.6–10.2)
CALCIUM SERPL-MCNC: 8.4 MG/DL (ref 8.6–10.2)
CHLORIDE BLD-SCNC: 101 MMOL/L (ref 98–107)
CHLORIDE BLD-SCNC: 98 MMOL/L (ref 98–107)
CO2: 29 MMOL/L (ref 22–29)
CO2: 30 MMOL/L (ref 22–29)
COHB: 0.1 % (ref 0–1.5)
COHB: 0.2 % (ref 0–1.5)
COHB: 0.3 % (ref 0–1.5)
CREAT SERPL-MCNC: 3 MG/DL (ref 0.7–1.2)
CREAT SERPL-MCNC: 3.1 MG/DL (ref 0.7–1.2)
CRITICAL: ABNORMAL
DATE ANALYZED: ABNORMAL
DATE OF COLLECTION: ABNORMAL
FIO2: 40 %
FIO2: 40 %
GFR AFRICAN AMERICAN: 24
GFR AFRICAN AMERICAN: 25
GFR NON-AFRICAN AMERICAN: 20 ML/MIN/1.73
GFR NON-AFRICAN AMERICAN: 21 ML/MIN/1.73
GLUCOSE BLD-MCNC: 126 MG/DL (ref 74–99)
GLUCOSE BLD-MCNC: 160 MG/DL (ref 74–99)
HCO3: 26.7 MMOL/L (ref 22–26)
HCO3: 27.6 MMOL/L (ref 22–26)
HCO3: 28.3 MMOL/L (ref 22–26)
HCT VFR BLD CALC: 27.1 % (ref 37–54)
HEMOGLOBIN: 9.1 G/DL (ref 12.5–16.5)
HHB: 1.9 % (ref 0–5)
HHB: 2.5 % (ref 0–5)
HHB: 3 % (ref 0–5)
LAB: ABNORMAL
LACTIC ACID: 1.3 MMOL/L (ref 0.5–2.2)
Lab: ABNORMAL
MAGNESIUM: 1.8 MG/DL (ref 1.6–2.6)
MCH RBC QN AUTO: 28.9 PG (ref 26–35)
MCHC RBC AUTO-ENTMCNC: 33.6 % (ref 32–34.5)
MCV RBC AUTO: 86 FL (ref 80–99.9)
METER GLUCOSE: 118 MG/DL (ref 74–99)
METER GLUCOSE: 124 MG/DL (ref 74–99)
METER GLUCOSE: 128 MG/DL (ref 74–99)
METER GLUCOSE: 129 MG/DL (ref 74–99)
METER GLUCOSE: 138 MG/DL (ref 74–99)
METHB: 0.3 % (ref 0–1.5)
METHB: 0.4 % (ref 0–1.5)
METHB: 0.6 % (ref 0–1.5)
MODE: ABNORMAL
MODE: ABNORMAL
MODE: AC
O2 SATURATION: 97 % (ref 92–98.5)
O2 SATURATION: 97.5 % (ref 92–98.5)
O2 SATURATION: 98.1 % (ref 92–98.5)
O2HB: 96.5 % (ref 94–97)
O2HB: 97 % (ref 94–97)
O2HB: 97.2 % (ref 94–97)
OPERATOR ID: 1874
OPERATOR ID: ABNORMAL
PATIENT TEMP: 37 C
PCO2: 31.7 MMHG (ref 35–45)
PCO2: 33.8 MMHG (ref 35–45)
PCO2: 34.9 MMHG (ref 35–45)
PDW BLD-RTO: 15 FL (ref 11.5–15)
PEEP/CPAP: 6 CMH2O
PEEP/CPAP: 7 CMH2O
PFO2: 2.65 MMHG/%
PFO2: 2.83 MMHG/%
PH BLOOD GAS: 7.53 (ref 7.35–7.45)
PH BLOOD GAS: 7.53 (ref 7.35–7.45)
PH BLOOD GAS: 7.54 (ref 7.35–7.45)
PHOSPHORUS: 4.1 MG/DL (ref 2.5–4.5)
PLATELET # BLD: 130 E9/L (ref 130–450)
PMV BLD AUTO: 12 FL (ref 7–12)
PO2: 106.1 MMHG (ref 75–100)
PO2: 113.2 MMHG (ref 75–100)
PO2: 161 MMHG (ref 75–100)
POTASSIUM REFLEX MAGNESIUM: 3.3 MMOL/L (ref 3.5–5)
POTASSIUM SERPL-SCNC: 3.01 MMOL/L (ref 3.5–5)
POTASSIUM SERPL-SCNC: 3.2 MMOL/L (ref 3.5–5)
POTASSIUM SERPL-SCNC: 3.3 MMOL/L (ref 3.5–5)
POTASSIUM SERPL-SCNC: 3.52 MMOL/L (ref 3.5–5)
PRO-BNP: 9461 PG/ML (ref 0–450)
PS: 8 CMH20
RBC # BLD: 3.15 E12/L (ref 3.8–5.8)
RI(T): 1.11
RI(T): 1.22
RR MECHANICAL: 14 B/MIN
SODIUM BLD-SCNC: 134 MMOL/L (ref 132–146)
SODIUM BLD-SCNC: 140 MMOL/L (ref 132–146)
SOURCE, BLOOD GAS: ABNORMAL
THB: 10 G/DL (ref 11.5–16.5)
THB: 10.1 G/DL (ref 11.5–16.5)
THB: 10.4 G/DL (ref 11.5–16.5)
TIME ANALYZED: 1340
TIME ANALYZED: 406
TIME ANALYZED: 408
TIME ANALYZED: 928
TOTAL PROTEIN: 4.9 G/DL (ref 6.4–8.3)
VT MECHANICAL: 500 ML
WBC # BLD: 23.7 E9/L (ref 4.5–11.5)

## 2021-10-07 PROCEDURE — 97530 THERAPEUTIC ACTIVITIES: CPT

## 2021-10-07 PROCEDURE — 84132 ASSAY OF SERUM POTASSIUM: CPT

## 2021-10-07 PROCEDURE — 6360000002 HC RX W HCPCS: Performed by: SURGERY

## 2021-10-07 PROCEDURE — 94640 AIRWAY INHALATION TREATMENT: CPT

## 2021-10-07 PROCEDURE — 6360000002 HC RX W HCPCS: Performed by: NURSE PRACTITIONER

## 2021-10-07 PROCEDURE — C9113 INJ PANTOPRAZOLE SODIUM, VIA: HCPCS | Performed by: SURGERY

## 2021-10-07 PROCEDURE — 99232 SBSQ HOSP IP/OBS MODERATE 35: CPT | Performed by: PHYSICIAN ASSISTANT

## 2021-10-07 PROCEDURE — 2580000003 HC RX 258: Performed by: INTERNAL MEDICINE

## 2021-10-07 PROCEDURE — 83735 ASSAY OF MAGNESIUM: CPT

## 2021-10-07 PROCEDURE — 6360000002 HC RX W HCPCS

## 2021-10-07 PROCEDURE — 2580000003 HC RX 258: Performed by: SURGERY

## 2021-10-07 PROCEDURE — 2500000003 HC RX 250 WO HCPCS: Performed by: INTERNAL MEDICINE

## 2021-10-07 PROCEDURE — 80048 BASIC METABOLIC PNL TOTAL CA: CPT

## 2021-10-07 PROCEDURE — 6370000000 HC RX 637 (ALT 250 FOR IP): Performed by: PHYSICIAN ASSISTANT

## 2021-10-07 PROCEDURE — 83880 ASSAY OF NATRIURETIC PEPTIDE: CPT

## 2021-10-07 PROCEDURE — 83605 ASSAY OF LACTIC ACID: CPT

## 2021-10-07 PROCEDURE — 2000000000 HC ICU R&B

## 2021-10-07 PROCEDURE — 99233 SBSQ HOSP IP/OBS HIGH 50: CPT | Performed by: NURSE PRACTITIONER

## 2021-10-07 PROCEDURE — 99233 SBSQ HOSP IP/OBS HIGH 50: CPT | Performed by: INTERNAL MEDICINE

## 2021-10-07 PROCEDURE — 80053 COMPREHEN METABOLIC PANEL: CPT

## 2021-10-07 PROCEDURE — 6360000002 HC RX W HCPCS: Performed by: INTERNAL MEDICINE

## 2021-10-07 PROCEDURE — 85027 COMPLETE CBC AUTOMATED: CPT

## 2021-10-07 PROCEDURE — 71045 X-RAY EXAM CHEST 1 VIEW: CPT

## 2021-10-07 PROCEDURE — 84100 ASSAY OF PHOSPHORUS: CPT

## 2021-10-07 PROCEDURE — 37799 UNLISTED PX VASCULAR SURGERY: CPT

## 2021-10-07 PROCEDURE — 94003 VENT MGMT INPAT SUBQ DAY: CPT

## 2021-10-07 PROCEDURE — 6370000000 HC RX 637 (ALT 250 FOR IP): Performed by: THORACIC SURGERY (CARDIOTHORACIC VASCULAR SURGERY)

## 2021-10-07 PROCEDURE — 2580000003 HC RX 258: Performed by: PHYSICIAN ASSISTANT

## 2021-10-07 PROCEDURE — 6370000000 HC RX 637 (ALT 250 FOR IP): Performed by: NURSE PRACTITIONER

## 2021-10-07 PROCEDURE — 6360000002 HC RX W HCPCS: Performed by: PHYSICIAN ASSISTANT

## 2021-10-07 PROCEDURE — 82805 BLOOD GASES W/O2 SATURATION: CPT

## 2021-10-07 PROCEDURE — 2580000003 HC RX 258: Performed by: NURSE PRACTITIONER

## 2021-10-07 PROCEDURE — 36415 COLL VENOUS BLD VENIPUNCTURE: CPT

## 2021-10-07 PROCEDURE — 82962 GLUCOSE BLOOD TEST: CPT

## 2021-10-07 PROCEDURE — 2700000000 HC OXYGEN THERAPY PER DAY

## 2021-10-07 PROCEDURE — 2580000003 HC RX 258

## 2021-10-07 RX ORDER — DEXTROSE, SODIUM CHLORIDE, AND POTASSIUM CHLORIDE 5; .45; .15 G/100ML; G/100ML; G/100ML
INJECTION INTRAVENOUS CONTINUOUS
Status: DISCONTINUED | OUTPATIENT
Start: 2021-10-07 | End: 2021-10-11

## 2021-10-07 RX ADMIN — PANTOPRAZOLE SODIUM 40 MG: 40 INJECTION, POWDER, FOR SOLUTION INTRAVENOUS at 20:43

## 2021-10-07 RX ADMIN — BISACODYL 10 MG: 10 SUPPOSITORY RECTAL at 09:03

## 2021-10-07 RX ADMIN — POTASSIUM CHLORIDE 20 MEQ: 400 INJECTION, SOLUTION INTRAVENOUS at 09:53

## 2021-10-07 RX ADMIN — IPRATROPIUM BROMIDE AND ALBUTEROL SULFATE 1 AMPULE: .5; 2.5 SOLUTION RESPIRATORY (INHALATION) at 13:45

## 2021-10-07 RX ADMIN — ASPIRIN 81 MG CHEWABLE TABLET 81 MG: 81 TABLET CHEWABLE at 08:39

## 2021-10-07 RX ADMIN — IPRATROPIUM BROMIDE AND ALBUTEROL SULFATE 1 AMPULE: .5; 2.5 SOLUTION RESPIRATORY (INHALATION) at 07:44

## 2021-10-07 RX ADMIN — POTASSIUM CHLORIDE, DEXTROSE MONOHYDRATE AND SODIUM CHLORIDE: 150; 5; 450 INJECTION, SOLUTION INTRAVENOUS at 23:34

## 2021-10-07 RX ADMIN — CHLORHEXIDINE GLUCONATE 15 ML: 1.2 RINSE ORAL at 08:39

## 2021-10-07 RX ADMIN — POTASSIUM CHLORIDE 20 MEQ: 29.8 INJECTION, SOLUTION INTRAVENOUS at 16:05

## 2021-10-07 RX ADMIN — PANTOPRAZOLE SODIUM 40 MG: 40 INJECTION, POWDER, FOR SOLUTION INTRAVENOUS at 08:39

## 2021-10-07 RX ADMIN — SODIUM CHLORIDE, PRESERVATIVE FREE 10 ML: 5 INJECTION INTRAVENOUS at 20:45

## 2021-10-07 RX ADMIN — IPRATROPIUM BROMIDE AND ALBUTEROL SULFATE 1 AMPULE: .5; 2.5 SOLUTION RESPIRATORY (INHALATION) at 20:14

## 2021-10-07 RX ADMIN — SENNOSIDES 5 ML: 8.8 SYRUP ORAL at 09:03

## 2021-10-07 RX ADMIN — CALCIUM GLUCONATE 1000 MG: 98 INJECTION, SOLUTION INTRAVENOUS at 08:53

## 2021-10-07 RX ADMIN — POTASSIUM CHLORIDE 20 MEQ: 400 INJECTION, SOLUTION INTRAVENOUS at 23:19

## 2021-10-07 RX ADMIN — SODIUM CHLORIDE, PRESERVATIVE FREE 300 UNITS: 5 INJECTION INTRAVENOUS at 20:44

## 2021-10-07 RX ADMIN — Medication 10 ML: at 20:44

## 2021-10-07 RX ADMIN — POTASSIUM CHLORIDE 20 MEQ: 400 INJECTION, SOLUTION INTRAVENOUS at 08:25

## 2021-10-07 RX ADMIN — POTASSIUM CHLORIDE 20 MEQ: 400 INJECTION, SOLUTION INTRAVENOUS at 14:52

## 2021-10-07 RX ADMIN — HYDROCORTISONE SODIUM SUCCINATE 50 MG: 100 INJECTION, POWDER, FOR SOLUTION INTRAMUSCULAR; INTRAVENOUS at 08:39

## 2021-10-07 RX ADMIN — DOCUSATE SODIUM 100 MG: 50 LIQUID ORAL at 09:03

## 2021-10-07 RX ADMIN — IPRATROPIUM BROMIDE AND ALBUTEROL SULFATE 1 AMPULE: .5; 2.5 SOLUTION RESPIRATORY (INHALATION) at 17:48

## 2021-10-07 RX ADMIN — CARVEDILOL 3.12 MG: 3.12 TABLET, FILM COATED ORAL at 08:39

## 2021-10-07 RX ADMIN — CHLORHEXIDINE GLUCONATE 15 ML: 1.2 RINSE ORAL at 20:43

## 2021-10-07 RX ADMIN — POTASSIUM CHLORIDE 20 MEQ: 29.8 INJECTION, SOLUTION INTRAVENOUS at 22:15

## 2021-10-07 RX ADMIN — DEXTROSE MONOHYDRATE: 50 INJECTION, SOLUTION INTRAVENOUS at 16:05

## 2021-10-07 RX ADMIN — MAGNESIUM SULFATE HEPTAHYDRATE 2000 MG: 40 INJECTION, SOLUTION INTRAVENOUS at 09:56

## 2021-10-07 RX ADMIN — SODIUM CHLORIDE, PRESERVATIVE FREE 10 ML: 5 INJECTION INTRAVENOUS at 08:39

## 2021-10-07 RX ADMIN — PIPERACILLIN AND TAZOBACTAM 3375 MG: 3; .375 INJECTION, POWDER, LYOPHILIZED, FOR SOLUTION INTRAVENOUS at 12:13

## 2021-10-07 RX ADMIN — AMIODARONE HYDROCHLORIDE 400 MG: 200 TABLET ORAL at 08:39

## 2021-10-07 ASSESSMENT — PULMONARY FUNCTION TESTS
PIF_VALUE: 19
PIF_VALUE: 15
PIF_VALUE: 19
PIF_VALUE: 20
PIF_VALUE: 22
PIF_VALUE: 21
PIF_VALUE: 16
PIF_VALUE: 14
PIF_VALUE: 23
PIF_VALUE: 19
PIF_VALUE: 20
PIF_VALUE: 5
PIF_VALUE: 15
PIF_VALUE: 20

## 2021-10-07 ASSESSMENT — PAIN SCALES - GENERAL
PAINLEVEL_OUTOF10: 0

## 2021-10-07 NOTE — PROGRESS NOTES
Department of Internal Medicine  Nephrology Progress Note      Events reviewed. SUBJECTIVE: WE are following Mr. Meh[address] for NGUYỄN. Remains intubated.      PHYSICAL EXAM:      Vitals:    VITALS:  BP (!) 138/52   Pulse 84   Temp 99.9 °F (37.7 °C) (Bladder)   Resp 24   Ht 5' 8\" (1.727 m)   Wt 158 lb 11.7 oz (72 kg)   SpO2 100%   BMI 24.14 kg/m²   24HR INTAKE/OUTPUT:      Intake/Output Summary (Last 24 hours) at 10/7/2021 1020  Last data filed at 10/7/2021 1000  Gross per 24 hour   Intake 3226.17 ml   Output 4165 ml   Net -938.83 ml     Access: right femoral temporary dialysis catheter  Constitutional: Patient is intubated, sedated, Fio2 at 40 %  HEENT: Pupils are equal reactive, endotracheal tube in place  Respiratory: Decreased breath sounds at the bases  Cardiovascular/Edema: Heart sounds are regular  Gastrointestinal: Abdomen soft  Neurologic: Patient sedated  Other: + edema      Scheduled Meds:   [START ON 10/8/2021] hydrocortisone sodium succinate PF  50 mg IntraVENous Daily    insulin lispro  0-18 Units SubCUTAneous 4 times per day    carvedilol  3.125 mg Oral BID WC    piperacillin-tazobactam  3,375 mg IntraVENous Q12H    amiodarone  400 mg Oral BID    pantoprazole  40 mg IntraVENous BID    And    sodium chloride (PF)  10 mL IntraVENous BID    lidocaine PF  5 mL IntraDERmal Once    sodium chloride flush  5-40 mL IntraVENous 2 times per day    heparin flush  3 mL IntraVENous 2 times per day    aspirin  81 mg Oral Daily    chlorhexidine  15 mL Mouth/Throat BID    docusate sodium  100 mg Oral BID    sennosides  5 mL Oral BID    atorvastatin  40 mg Oral Daily    ipratropium-albuterol  1 ampule Inhalation Q4H WA     Continuous Infusions:   dextrose 75 mL/hr at 10/06/21 2246    sodium chloride      dextrose       PRN Meds:.potassium chloride, calcium gluconate **OR** [DISCONTINUED] calcium gluconate **OR** [DISCONTINUED] calcium gluconate **OR** [DISCONTINUED] calcium gluconate, sodium chloride flush, sodium chloride, heparin flush, fentanNYL, artificial tears, ondansetron, acetaminophen, acetaminophen, oxyCODONE **OR** oxyCODONE, magnesium hydroxide, potassium chloride, magnesium sulfate, albumin human, glucose, dextrose, glucagon (rDNA), dextrose, calcium gluconate IVPB    DATA:    CBC:   Lab Results   Component Value Date    WBC 18.9 10/06/2021    RBC 3.33 10/06/2021    HGB 9.7 10/06/2021    HCT 28.9 10/06/2021    MCV 86.8 10/06/2021    MCH 29.1 10/06/2021    MCHC 33.6 10/06/2021    RDW 15.7 10/06/2021     10/06/2021    MPV 11.7 10/06/2021     CMP:    Lab Results   Component Value Date     10/07/2021    K 3.01 10/07/2021    CL 98 10/07/2021    CO2 29 10/07/2021     10/07/2021    CREATININE 3.1 10/07/2021    GFRAA 24 10/07/2021    LABGLOM 20 10/07/2021    GLUCOSE 160 10/07/2021    PROT 4.9 10/07/2021    LABALBU 3.2 10/07/2021    CALCIUM 7.7 10/07/2021    BILITOT 1.3 10/07/2021    ALKPHOS 57 10/07/2021    AST 24 10/07/2021    ALT 20 10/07/2021     Magnesium:    Lab Results   Component Value Date    MG 1.8 10/07/2021     Phosphorus:    Lab Results   Component Value Date    PHOS 4.1 10/07/2021        Radiology Review:      CXR 10/3/21   1. Median sternotomy changes. 2. Bilateral pleuroparenchymal opacities that could be related to pleural   effusion and edema.  The appearance of the chest is about the same or   slightly worse.         Chest x-ray October 6, 2021   1. Stable appearance of the postoperative chest.   2. Bilateral chest tubes remain in position.  There is no right or left   pneumothorax. 3. Small right pleural effusion             BRIEF SUMMARY OF INITIAL CONSULT:    Briefly Mr. Wes Davenport is a 53-year-old man with history of HTN, CAD with recent status cardiac catheterization done on September 9 which showed severe multivessel disease, hyperlipidemia, hiatal hernia, who was admitted for elective CABG on September 27, 2021.  On admission his creatinine level was 0.8 mg/dL and post surgery his creatinine has progressively increased up to 2.3 mg/dL, reason for this consultation. Postoperatively she developed persistent hypotension, lactic acidosis and respiratory failure for which he was reintubated. Since then she has required multiple pressors and he was placed on IABP. He had received 1 dose of ketorolac perioperatively. His urine output has significantly decrease and is being about 500 cc/day in the last 48 hours and his fluid balance presently is over 9 L. Problems resolved:    · Cardiogenic shock, hemodynamically more stable, pressor support decrease, still on IABP. Tentative plan for removal of IABP. Pro-BP 24,219    IMPRESSION/RECOMMENDATIONS:      1. NGUYỄN stage III, CABG associated NGUYỄN, ischemic ATN, non-oliguric. FEUrea 18.8%. Renal function started to improve with decreasing creatinine levels and excellent urine output and response to Bumex drip. 2. Hypernatremia with hypervolemia, 2/2 sodium containing IV fluid resuscitation and underlying heart failure. Resolved with  natriuresis and free water (add D5W)  3. Hypokalemia, 2/2 diuretics, potassium levels improve  4. Respiratory alkalosis (pH: 7.554, PCO2: 09.1) with metabolic alkalosis (bicarbonate administration)    ---------------------------------------------------------  5. Respiratory failure status post intubation  6. CAD status post CABG x3 September 27, 2021  7. Probably pneumonia, on piperacillin-tazobactam  8. Transaminitis with hyperbilirubinemia, possibly shock liver versus congestive liver  9. Normocytic anemia, status post surgery, getting transfused  10. Thrombocytopenia  11.  Nutrition, TF at 20 cc/hour, free water at 50 cc/hour, off for extubation    Plan:    · stop bumex drip  · Decrease D5W to 50 cc/hour   · Continue to monitor sodium levels  · Replace potassium per protocol  · Give magnesium sulfate 1 gm iv today  · Continue to monitor renal function for recovery  · Agree with plans for extubation today  · Repeat proBNP in a.m.       Discussed with Brandee Herr MD

## 2021-10-07 NOTE — CARE COORDINATION
Extubated this am. O2 currently 6L . R pleural drain remains in place. Still with R sided weakness. Planning Rpt head ct in am. Will follow up with wife for rui choices if resp status remains stable.

## 2021-10-07 NOTE — PROGRESS NOTES
[address] [facility] Pulmonary, [facility]                                                                       Pulmonary &health [facility]                                                                   Pulmonary Consult Note              Reason for Consultation:severe hypoxia ,possible mucus plug   CC : vent/resp failure   HPI:   Doing much better   On AC   Waking up   Strength much better    PHYSICAL EXAMINATION:     VITAL SIGNS:  BP (!) 130/51   Pulse 81   Temp 98.6 °F (37 °C) (Temporal)   Resp 14   Ht 5' 8\" (1.727 m)   Wt 158 lb 11.7 oz (72 kg)   SpO2 100%   BMI 24.14 kg/m²   Wt Readings from Last 3 Encounters:   09/27/21 158 lb 11.7 oz (72 kg)   09/20/21 157 lb (71.2 kg)   09/21/21 160 lb 6.4 oz (72.8 kg)     Temp Readings from Last 3 Encounters:   10/06/21 98.6 °F (37 °C) (Temporal)   09/27/21 98.4 °F (36.9 °C)   09/23/21 97.1 °F (36.2 °C) (Temporal)     TMAX:  BP Readings from Last 3 Encounters:   10/06/21 (!) 130/51   09/27/21 (!) 144/63   09/23/21 (!) 183/89     Pulse Readings from Last 3 Encounters:   10/06/21 81   09/23/21 66   09/21/21 65           INTAKE/OUTPUTS:  I/O last 3 completed shifts:   In: 5708.2 [I.V.:3602.2; NG/GT:1519; IV [id number]:943]  Out: 8884 [IORRS:8162; Chest Tube:210]    Intake/Output Summary (Last 24 hours) at 10/6/2021 2034  Last data filed at 10/6/2021 2000  Gross per 24 hour   Intake 5771.17 ml   Output 5845 ml   Net -73.83 ml       General Appearance: intubated but awake ,  Eyes: pupils equal, round, and reactive to light, extraocular eye movements intact, conjunctivae normal and sclera anicteric   Neck: neck supple and non tender without mass, no thyromegaly, no thyroid nodules and no cervical adenopathy   Pulmonary/Chest:decrease breath sound bilateral   Cardiovascular: normal rate, regular rhythm, normal S1 and S2, no murmurs, rubs, clicks or gallops, distal pulses intact, no carotid bruits, no murmurs, no gallops, no carotid bruits and no JVD   Abdomen: obese, soft, non-tender, non-distended, normal bowel sounds, no masses or organomegaly   Extremities: no edema   Musculoskeletal:strength better right side and left side and seems very alert     Neurologic: reflexes normal and symmetric, no cranial nerve deficit noted    LABS/IMAGING:    CBC:  Lab Results   Component Value Date    WBC 18.9 (H) 10/06/2021    HGB 9.7 (L) 10/06/2021    HCT 28.9 (L) 10/06/2021    MCV 86.8 10/06/2021     (L) 10/06/2021    LYMPHOPCT 5.1 (L) 09/28/2021    RBC 3.33 (L) 10/06/2021    MCH 29.1 10/06/2021    MCHC 33.6 10/06/2021    RDW 15.7 (H) 10/06/2021    NEUTOPHILPCT 89.5 (H) 09/28/2021    MONOPCT 4.4 09/28/2021    BASOPCT 0.1 09/28/2021    NEUTROABS 15.00 (H) 09/28/2021    LYMPHSABS 0.86 (L) 09/28/2021    MONOSABS 0.73 09/28/2021    EOSABS 0.00 (L) 09/28/2021    BASOSABS 0.01 09/28/2021       Recent Labs     10/06/21  0750 10/05/21  0410 10/04/21  2140   WBC 18.9* 13.7* 16.3*   HGB 9.7* 9.3* 9.5*   HCT 28.9* 28.1* 28.0*   MCV 86.8 88.1 87.5   * 72* 62*       BMP:   Recent Labs     10/04/21  2140 10/05/21  0220 10/05/21  0410 10/05/21  0920 10/05/21  1840 10/05/21  2220 10/06/21  0451 10/06/21  1145 10/06/21  1400   NA  --   --  151*   < > 147*  --  140  --  144   K  --    < > 3.2*   < > 3.4*   < > 3.5 3.41* 4.3   CL  --   --  106   < > 103  --  98  --  99   CO2  --   --  28   < > 34*  --  31*  --  26   PHOS 4.4  --  4.0  --   --   --  4.4  --   --    BUN  --   --  128*   < > 122*  --  118*  --  109*   CREATININE  --   --  3.6*   < > 3.5*  --  3.1*  --  3.3*    < > = values in this interval not displayed.        MG:   Lab Results   Component Value Date    MG 2.0 10/06/2021     Ca/Phos:   Lab Results   Component Value Date    CALCIUM 8.1 (L) 10/06/2021    PHOS 4.4 10/06/2021     Amylase: No results found for: AMYLASE  Lipase:   Lab Results   Component Value Date    LIPASE 7 (L) 09/28/2021     LIVER PROFILE:   Recent Labs     10/04/21  0405 10/05/21  0410 10/06/21  0451   AST 21 23 23   ALT 17 18 19   BILITOT 1.9* 1.6* 1.3*   ALKPHOS 75 72 61       PT/INR:   Recent Labs     10/04/21  0405   PROTIME 15.9*   INR 1.5     APTT:   Recent Labs     10/04/21  0405   APTT 24.7       Cardiac Enzymes:  Lab Results   Component Value Date    CKMB 94.0 (H) 09/28/2021       Hgb A1C:   Lab Results   Component Value Date    LABA1C 5.1 09/10/2021     No results found for: EAG  CRISTIANA: No results found for: CRISTIANA  ESR: No results found for: SEDRATE  CRP: No results found for: CRP  D Dimer: No results found for: DDIMER  Folate and B12: No results found for: PVQTRWKB14, No results found for: FOLATE              PROBLEM LIST:  Patient Active Problem List   Diagnosis    CAD in native artery    Pulmonary nodules    Unstable angina (HCC)    Other hyperlipidemia    Essential hypertension    Gastroesophageal reflux disease with esophagitis without hemorrhage    S/P CABG (coronary artery bypass graft)    Acute respiratory failure with hypoxia (HCC)    Lactic acidemia    [facility]N [facility]    Hypokalemia    Hypocalcemia    Cardiogenic shock (HCC)    Thrombocytopenia ([address]    Leukocytosis    Encounter regarding vascular access for dialysis for ESRD ([facility] [address]    Acute right-sided weakness               ASSESSMENT:  1.) Acute Respiratory failure   2.)cardiogenic shock vs other types of shock,septic ,etc   3. )Large mucus plug obstruction left main bronchus and RUL   4. )CAD s/P CABG   5.)NGUYỄN       PLAN:  Strength improving   shoucld be able to extubate   Effusion small with US will monitor  *-S/p Bronchoscopy ,removal mucus plug   *-CXR seems with bilateral effusion ,will give Bumex time as he still + 9 L   *- Aggressive pulmonary toilet   *-BD  Hypertonic saline   Chest vest when possible   Wean steroids \diuresis as per renal ,seem fluid overload,renal following diuresis as needed    *- so far negative sputum culture  Liberate from MV soon   PT &OT as was on paralytic    Marley Gamble MD  Pulmonary/Critical care [address]

## 2021-10-07 NOTE — PROGRESS NOTES
Total  -/35     Therapeutic Exercises:  BLE AROM x 5 reps    Patient education  Pt educated on safety    Patient response to education:   Pt verbalized understanding Pt demonstrated skill Pt requires further education in this area   intubated yes yes     ASSESSMENT:    Conditions Requiring Skilled Therapeutic Intervention:    [x]Decreased strength     []Decreased ROM  [x]Decreased functional mobility  [x]Decreased balance   [x]Decreased endurance   [x]Decreased posture  [x]Decreased sensation  []Decreased coordination   []Decreased vision  []Decreased safety awareness   []Increased pain       Comments:  NP reported pt was stable for session. Pt was in bed upon arrival, agreeable to treatment session. Pt was educated on sternal precautions and PLB prior to activity. Pt was slightly confused and required reorientation to situation. L gaze preference still noted. Pt was able to initiate bed mobility but required increased assistance to reach EOB. Posterior lean to R noted in sitting but pt was able to momentarily correct. BLE exercises completed as noted above. Pt becomes very diaphoretic with activity but vitals remained WNL. Fatigue limited activity. Pt was left in bed with all needs met and call light in reach. All lines remained intact. Treatment:  Patient practiced and was instructed in the following treatment:     Bed mobility training - pt given verbal and tactile cues to facilitate proper sequencing and safety during supine>sit as well as provided with physical assistance.  Sitting EOB for >10 minutes for upright tolerance, postural awareness and BLE ROM   Skilled positioning - Pt placed in the chair position with pillows utilized to facilitate upright posture, joint and skin integrity, and interaction with environment.       Non-pharmacological treatment and prevention of ICU delirium - Pt oriented to date, time, time of day, place, and situation as well as provided with visual and auditory stimuli in order to improve cognition and combat effects of ICU delirium. PLAN:    Patient is making some progress towards established goals. Will continue with current POC.       Time in  1255  Time out  1325    Total Treatment Time  30 minutes     CPT codes:  [] Gait training 69487 - minutes  [] Manual therapy 62915 - minutes  [x] Therapeutic activities 48628 30 minutes  [] Therapeutic exercises 95088 - minutes  [] Neuromuscular reeducation 05155 - minutes    Delvin Zamarripa PT, DPT  [id number]

## 2021-10-07 NOTE — PROGRESS NOTES
OCCUPATIONAL THERAPY TREATMENT NOTE    [address] [address] [address]       YBOF:                                                               Patient Name: Dara Rodriguez  MRN: [id number]  : 1945  Room: [address] [place] [address]    Evaluating OT: Brad Rader, OTR/L [id number]     Referring Provider: Jia Kerr MD   Specific Provider Orders/Date: OT eval and treat (10/5/21)        Diagnosis: CAD   Surgery/Procedures:  CABG x 3   *Following CABG, patient developed hypotension, acidosis, NGUYỄN and respiratory failure and was intubated` Bronchoscopy done on  to remove large mucus plug; pt required pressors and was placed on aortic balloon pump which was removed 10/4/21. Off of sedation, pt was found to have right sided weakness     Pertinent Medical History: CAD, ESRD, HLD, HTN, hiatal hernia      *Precautions:  Fall Risk,O2, , sternal precautions, R hemiparesis     Assessment of current deficits   [x]? Functional mobility          [x]? ADLs           [x]? Strength                  [x]? Cognition   [x]? Functional transfers        [x]? IADLs         [x]? Safety Awareness   [x]? Endurance   [x]? Fine Coordination           [x]? ROM           [x]? Vision/perception    [x]? Sensation     [x]? Gross Motor Coordination [x]? Balance    [x]? Delirium                  [x]? Motor Control     []?  Communication     OT PLAN OF CARE   OT POC based on physician orders, patient diagnosis and results of clinical assessment.        Frequency/Duration: 1-3 days/wk for 1-2 weeks PRN    Specific OT Treatment to include:   ADL retraining/adapted techniques and AE recommendations to increase functional independence within precautions                    Energy conservation techniques to improve tolerance for selfcare routine   Functional transfer/mobility training/DME recommendations for increased independence, safety and fall prevention         Patient/family education to increase safety and functional independence within precautions             Environmental modifications for safe mobility and completion of ADLs                           Cognitive retraining ex's to improve problem solving skills & safe participation in ADLs/IADLs  Sensory re-education techniques to improve extremity awareness, maintain skin integrity and improve hand function                             Splinting/positioning needs to maintain joint/skin integrity and prevent contractures  Therapeutic activity to improve functional performance during ADLs/IADLs                                         Therapeutic exercise to improve tolerance and functional strength for ADLs   Balance retraining exercises/tasks for facilitation of postural control with dynamic challenges during ADLs . Positioning to improve functional independence  Neuromuscular re-education: facilitation of righting/equilibrium reactions, normalization muscle tone/facilitation active functional movement                      Delirium prevention/treatment     Modified Gordon Scale   Score     Description  0             No symptoms  1             No significant disability despite symptoms  2             Slight disability; able to look after own affairs  3             Moderate disability; able to ambulate without assist/ requires assist with ADLs  4             Moderate/Severe disability;requires assist to ambulate/assist with ADLs  5             Severe disability;bedridden/incontinent   6               Score:   5     Recommended Adaptive Equipment: TBA: ADL AE, bathroom DME, AD as indicated pending progress/restrictions     Home Living: Pt lives with wife  in a mobile home with 4 step(s) to enter and 1 rail(s)  Bathroom setup: walk in shower with rail; higher commode with rail. Equipment owned: no DME     Prior Level of Function: IND with ADLs;  IND with IADLs. No device for ambulation.    Driving: yes  Occupation:     UB dressing/bathing Dep Dep   Max cues to locate R UE                       Mod A  Min cues for R body awareness and initiation of amber dressing techniques         LB dressing/bathing Dep  Dep                       Mod A   using AE as needed for safe reach/ energy conservation        Toileting Dep  Dep                             Bed Mobility  Supine to sit: Dep+2     Sit to supine: Dep+2 Supine to sit: Max A+2     Sit to supine: Dep+2                           Mod A  in prep of ADL tasks & transfers   Functional Transfers Sit to stand: NT     Stand to sit: NT Sit to stand:NT                        Mod A  sit<>stand/functional bathroom transfers using AD/DME as needed for balance and safety   Functional Mobility NT NT                       Mod A   functional/bathroom mobility using AD as needed & demonstrating G safety      Balance Sitting:     Static:  Dep/posterior LOB with L sided preference     Dynamic:Dep  Standing: NT  Sitting: Max A/R sided LOB; max cues to initiate trunk    Standing: NT Min A dynamic sitting balance; Mod A dynamic standing balance  during ADL tasks & transfers   Endurance/Activity Tolerance    F tolerance with light activity. Tolerated >5 min EOB. Fair tolerance with light to moderate activity.  G   tolerance with moderate activity/self care routine   Visual/  Perceptual Impaired: R sided attention; midline orientation                             Vitals:   HR at rest: 81 bpm HR at end of session: 79 bpm   Spo2 at rest:100% Spo2 at end of session: 99%   BP at rest: 127/49 mmHg BP at end of session: 123/52 mmHg     Treatment: OT treatment provided this date:  Bed mobility: Instruction on precautions prior to bed mobility to facilitate safe transfers and ADLS. Pt required 2 person assist for safe mobility due to complexity of medical condition, medical lines and deconditioning.  HOB elevated to assist.   Balance retraining: Performed sitting balance ex's with instruction to facilitate Ther Ex  [phone]     Manual Therapy Amanda Aceves [phone]   ADL/Home Mgt [id number]     Neuro Re-ed [id number]     Orthotic manage/training  02110     Non-Billable Time         Honey Brooks, OTR/L [id number]

## 2021-10-07 NOTE — PLAN OF CARE
Problem: Falls - Risk of:  Goal: Will remain free from falls  Description: Will remain free from falls  10/7/2021 0023 by Elisa Smith RN  Outcome: Met This Shift     Problem: Falls - Risk of:  Goal: Absence of physical injury  Description: Absence of physical injury  Outcome: Met This Shift     Problem: Anxiety:  Goal: Level of anxiety will decrease  Description: Level of anxiety will decrease  Outcome: Met This Shift     Problem: Cardiac Output - Decreased:  Goal: Cardiac output within specified parameters  Description: Cardiac output within specified parameters  Outcome: Met This Shift     Problem: Cardiac Output - Decreased:  Goal: Hemodynamic stability will improve  Description: Hemodynamic stability will improve  10/7/2021 0023 by Elisa Smith RN  Outcome: Met This Shift     Problem: Fluid Volume - Imbalance:  Goal: Ability to achieve a balanced intake and output will improve  Description: Ability to achieve a balanced intake and output will improve  Outcome: Met This Shift     Problem: Fluid Volume - Imbalance:  Goal: Chest tube drainage is within specified parameters  Description: Chest tube drainage is within specified parameters  10/7/2021 0023 by Elisa Smith RN  Outcome: Met This Shift     Problem: Gas Exchange - Impaired:  Goal: Levels of oxygenation will improve  Description: Levels of oxygenation will improve  Outcome: Met This Shift     Problem: Gas Exchange - Impaired:  Goal: Ability to maintain adequate ventilation will improve  Description: Ability to maintain adequate ventilation will improve  10/7/2021 0023 by Elisa Smith RN  Outcome: Met This Shift     Problem: Pain:  Goal: Pain level will decrease  Description: Pain level will decrease  Outcome: Met This Shift  Goal: Control of acute pain  Description: Control of acute pain  Outcome: Met This Shift  Goal: Control of chronic pain  Description: Control of chronic pain  Outcome: Met This Shift     Problem: Tissue Perfusion - Cardiopulmonary, Altered:  Goal: Absence of angina  Description: Absence of angina  Outcome: Met This Shift  Goal: Hemodynamic stability will improve  Description: Hemodynamic stability will improve  Outcome: Met This Shift       Problem: Skin Integrity:  Goal: Will show no infection signs and symptoms  Description: Will show no infection signs and symptoms  Outcome: Met This Shift  Goal: Absence of new skin breakdown  Description: Absence of new skin breakdown  Outcome: Met This Shift     Problem: Musculor/Skeletal Functional Status  Goal: Highest potential functional level  Outcome: Met This Shift  Goal: Absence of falls  Outcome: Met This Shift

## 2021-10-07 NOTE — PROGRESS NOTES
CVICU Progress Note    Name: Harika Maria  MRN: [id number]    CC: Postoperative Critical Care Management      Indication for Surgery/Procedure: CAD  LVEF:  NML    RVF:  NML     Important/Relevant PMH/PSH:   LAD 2007 cath with impaired LVEF, HTN, HLD, LBBB, pulmonary nodules      Procedure/Surgeries: 9/27/2021 Sternotomy/CABG x 3 (LIMA-LAD, SVG-OM, SVG-RCA)/Extensive RCA endarterectomy/MALU exclusion with 35 mm AtriClip/EVH LLE/Rigid internal fixation of the sternum with [place] plates x 1/47 modifier     Pacing wires: Atrial and Ventricular        Intake/Output Summary (Last 24 hours) at 10/7/2021 1007  Last data filed at 10/7/2021 0956  Gross per 24 hour   Intake 3226.17 ml   Output 4080 ml   Net -853.83 ml       Recent Labs     10/04/21  2140 10/05/21  0410 10/06/21  0750   WBC 16.3* 13.7* 18.9*   HGB 9.5* 9.3* 9.7*   HCT 28.0* 28.1* 28.9*   PLT 62* 72* 100*      Lab Results   Component Value Date     10/07/2021    K 3.01 10/07/2021    CL 98 10/07/2021    CO2 29 10/07/2021     10/07/2021    CREATININE 3.1 10/07/2021    GLUCOSE 160 10/07/2021    CALCIUM 7.7 10/07/2021         Physical Exam:    BP (!) 138/52   Pulse 83   Temp 99.9 °F (37.7 °C) (Bladder)   Resp 11   Ht 5' 8\" (1.727 m)   Wt 158 lb 11.7 oz (72 kg)   SpO2 99%   BMI 24.14 kg/m²       CXR Findings: 10/7/2021     Impression   1. There is no appreciable left pneumothorax.  The left chest tube previously   identified is not appreciated and may be removed. 2. Stable position of the right chest tube.  There is right lung base   atelectasis.           - CXR personally viewed and interpreted by ICU Nurse Practitioner, agree with above findings     General: Awake, alert. On PSV, for extubation today. Eyes: PERRL, anicteric   Pulmonary: Diminished bibasilar.  No wheezes, no accessory muscle use noted   Ventilator: Mode: PSV 8, 40% FiO2, 6 PEEP   Cardiovascular:  RRR, no heaves or thrills on palpation  Tele: SR   Abdomen: Soft, nontender, + BS, OG to LIWS  Extremities: Palpable pulses all extremities, trace peripheral  edema   Neurologic/Psych: Awake, SANTOS to command; left 3/5 strength, right 2/5 strength  Skin: Warm and dry  Incisions: MSI C/D/I, sternum stable     Lines:  ETT 9/28  Right brachial Arterial line 9/27  Ybarra 9/27  PICC LUE 09/29/2021       Assessment/Plan: POD #10    1. CAD S/p CABGx3 (LIMA-LAD, SVG-OM, SVG-RCA)/Extensive RCA endarterectomy/MALU AtriClip  - ASA (Hold Plavix), Lipitor   - Right pleural drain to bulb suction- 145cc/24 hours   - PO Amio for afib prophylaxis   - PT/OT     2. Acute Hypoxic Respiratory Failure  - Extubated DOS, tolerating 2L NC post extubation, acute hypoxia requiring NRB, NIV and reintubation 9/28  - NMBA and iFlolan started 9/28   -s/p cryotherapy bronch for large obstructive mucous plug left and right main stem  - Flolan weaned off 10/3  - Bedside US 10/4 small-moderate right sided pleural effusion, improving on CXR, keep right pleural drain in place for now  -On empiric Zosyn (day 10), sputum culture negative  -weaning systemic steroids, diuresis per nephrology  - Placed back on PS this morning, extubation this AM  - ABGs PRN, supportive care      3. Acute systolic heart failure  - Post op course complicated by cardiogenic shock in setting of biventricular failure. Echo POD1 with akinesis of apical inferior and septal walls, EF 45-50%%, severe RV  Dysfunction. On epinephrine, levophed, vasopressin, dobutamine, IABP insertion (removed 10/4), iFlolan. Hemodynamics improved, repeat Echo 10/8 with EF 45%, RV normal.   - started on low dose Coreg 10/6. Hold ACEi in setting of NGUYỄN.   - GDMT as able      4. NGUYỄN   - 2/2 above, baseline Scr 0.8  - Scr 3.1, , down trending   - nephrology following  - Diuresis per nephrology, 4.1L UOP past 24    - Replace electrolytes PRN      5. Acute Post Operative Pain   - PRN fentanyl for pain management     6. Stress Hyperglycemia  - No h/o DM  - SSI q 6 hours      7. Thrombocytopenia  - HIT negative 9/30  - CBC ordered, Platelets have been up trending, monitor    8. Acute blood loss anemia  - CBC ordered   - monitor and transfuse if needed to maintain Hgb >8      9. Leukocytosis  - CBC ordered, afebrile; weaning systemic steroids  - On empiric Zosyn (day 10)      10. At risk for malnutrition   - TF via OG, off for extubation  - Speech therapy for swallow eval after extubation, likely tomorrow      11. Possible GI bleed  - Maroon colored OG output over weekend with +hemoccult  - Evaluated by general surgery, no intervention planned; PPI BID, monitor H/H      12. Right Sided Weakness  - Sedation off, Left side 3/5 strength, right side with 2/5 strength   - Neurology following, CT head unremarkable, would like MRI- not sure if able with epicardial wires in place   - Echo with no LV thrombus    - Carotid US with 50-69% stenosis bilaterally; increased from 9/10 0-49% bilaterally   - ASA/statin, PT/OT/speech      13. Hypernatremia  - resolved, Na 140      VTE Prophylaxis: Pharmacologic/Mechanical:  Yes, SCDs   Line infection prevention: Can CVC or arterial line be removed: possible removal of arterial line later today   Continued need for urinary catheter: Yes - clinical indication: Patient post major surgery requiring fluid balance and input and output measurement.     Dispo: CVICU    Electronically signed by JUAN MIGUEL Luu CNP on 10/7/2021 at 10:07 AM

## 2021-10-07 NOTE — PROGRESS NOTES
Harika Maria is a 78 y.o.  male     Neurology is following for suspected stroke     PMH significant for HTN, HLD, ESRD, CAD s/p CABG on 9/27/21` Following CABG, patient developed hypotension, acidosis, NGUYỄN and respiratory failure and was intubated` Bronchoscopy done on 9/30 to remove large mucus plug` Required pressors and was placed on aortic balloon pump which was removed 10/4/21` Off of sedation was found to have right sided weakness` CT head negative for acute findings` Echo showed no LV mural thrombus` Carotid US with atherosclerotic disease -- 50-69% bilaterally` Unable to have CTAs due to kidney function` Unable to have MRI/MRA at this time due to epicardial wires`     He continues to have R sided weakness. No new neuro deficits. He was just extubated. Voice is soft. C/o scratchy throat. Wife at bedside. All questions answered at this time.     + R side weakness  ROS otherwise negative        Objective:       BP (!) 138/52   Pulse 84   Temp 99.9 °F (37.7 °C) (Bladder)   Resp 24   Ht 5' 8\" (1.727 m)   Wt 158 lb 11.7 oz (72 kg)   SpO2 100%   BMI 24.14 kg/m²        General appearance: alert, appears stated age and cooperative  Head: Normocephalic, without obvious abnormality, atraumatic  Eyes: conjunctivae/corneas clear  Neck: no adenopathy, no carotid bruit, no JVD, supple, symmetrical, trachea midline and thyroid not enlarged, symmetric, no tenderness/mass/nodules  Lungs: clear to auscultation bilaterally  Heart: regular rate and rhythm, S1, S2 normal, no murmur, click, rub or gallop  Extremities: extremities normal, atraumatic, no cyanosis or edema  Skin: Skin color, texture, turgor normal. No rashes or lesions     Mental Status: Alert. Oriented to self, place, wife. Follows commands well. Speech soft (just extubated). No apparent aphasia.        Cranial Nerves:  I: smell    II: visual acuity     II: visual fields Bilateral threat    II: pupils EMANUEL   III,VII: ptosis None   III,IV,VI: extraocular muscles  Full ROM   V: mastication    V: facial light touch sensation  Normal   V,VII: corneal reflex     VII: facial muscle function - upper  Normal   VII: facial muscle function - lower Appears symmetric around ETT   VIII: hearing Normal   IX: soft palate elevation     IX,X: gag reflex Present   XI: trapezius strength  5/5 L; 3/5 R   XI: sternocleidomastoid strength 5/5   XI: neck extension strength     XII: tongue strength       Motor:  R hemiparesis arm more than leg   Normal tone and bulk   No abnormal movements     Sensory:  LT intact distally in all limbs     Coordination:   FFM intact on L; unable on R r/t weakness     Gait:  Deferred for patient safety     DTR:   +1 throughout     No Babinskis    No pathological reflexes    Laboratory/Radiology:     CBC with Differential:    Lab Results   Component Value Date    WBC 18.9 10/06/2021    RBC 3.33 10/06/2021    HGB 9.7 10/06/2021    HCT 28.9 10/06/2021     10/06/2021    MCV 86.8 10/06/2021    MCH 29.1 10/06/2021    MCHC 33.6 10/06/2021    RDW 15.7 10/06/2021    LYMPHOPCT 5.1 09/28/2021    MONOPCT 4.4 09/28/2021    BASOPCT 0.1 09/28/2021    MONOSABS 0.73 09/28/2021    LYMPHSABS 0.86 09/28/2021    EOSABS 0.00 09/28/2021    BASOSABS 0.01 09/28/2021     CMP:    Lab Results   Component Value Date     10/07/2021    K 3.01 10/07/2021    CL 98 10/07/2021    CO2 29 10/07/2021     10/07/2021    CREATININE 3.1 10/07/2021    GFRAA 24 10/07/2021    LABGLOM 20 10/07/2021    GLUCOSE 160 10/07/2021    PROT 4.9 10/07/2021    LABALBU 3.2 10/07/2021    CALCIUM 7.7 10/07/2021    BILITOT 1.3 10/07/2021    ALKPHOS 57 10/07/2021    AST 24 10/07/2021    ALT 20 10/07/2021     HgBA1c:    Lab Results   Component Value Date    LABA1C 5.1 09/10/2021     FLP:    Lab Results   Component Value Date    TRIG 93 10/05/2021    HDL 41 10/05/2021    LDLCALC 48 10/05/2021    LABVLDL 19 10/05/2021     CT head  No acute intracranial abnormality.     Sinus disease, which can be seen with sinusitis. Jolayne Cancel is fluid within  bilateral mastoid air cells.     Scalp soft tissue swelling    CUS 9/10/21  Atherosclerotic disease. No hemodynamically significant stenosis is  identified  Estimated stenosis by NASCET criteria in the proximal right carotid  artery is between 0% and 49%. Estimated stenosis by NASCET criteria in the proximal left carotid  artery is between 0% and 49%.     CUS 10/5/21  The right internal carotid artery demonstrates 50-69 percent stenosis.     The left internal carotid artery demonstrates 50-69 percent stenosis.     Bilateral vertebral arteries are patent with flow in the normal direction.     Atherosclerotic plaque is present at both carotid bifurcations. Echo    Left ventricle size is normal.   Ejection fraction is visually estimated at 40+/-5%. LV apex is dyskinetic and mid to distal anterior and anteroseptal walls   are severely hypokinetic. Normal left ventricle wall thickness. Stage I diastolic dysfunction. No LV mural thrombus. Normal right ventricular size and function. No hemodynamically significant aortic stenosis is present. Unable to estimate PA systolic pressure. No evidence for hemodynamically significant pericardial effusion. Technically difficult examination. Definity echo contrast was used to   delineate endocardial borders.     I personally reviewed all labs and images today   Assessment:     Suspected left hemispheric stroke after CABG    With persistent R hemiparesis on exam   CT head negative for acute findings, CUS  reporting 50-69% stenosis b/l   Echo with no LV thrombus     Plan:     Repeat CT head tomorrow   Unable to get MRI brain due to epicardial wires     Continue ASA and statin     PCDs     PT/OT/speech     Stroke education     Will follow       Leisa Weeks PA-C  11:07 AM  10/7/2021

## 2021-10-08 ENCOUNTER — APPOINTMENT (OUTPATIENT)
Dept: GENERAL RADIOLOGY | Age: 76
DRG: 235 | End: 2021-10-08
Attending: THORACIC SURGERY (CARDIOTHORACIC VASCULAR SURGERY)
Payer: MEDICARE

## 2021-10-08 ENCOUNTER — APPOINTMENT (OUTPATIENT)
Dept: CT IMAGING | Age: 76
DRG: 235 | End: 2021-10-08
Attending: THORACIC SURGERY (CARDIOTHORACIC VASCULAR SURGERY)
Payer: MEDICARE

## 2021-10-08 LAB
ALBUMIN SERPL-MCNC: 3.1 G/DL (ref 3.5–5.2)
ALP BLD-CCNC: 51 U/L (ref 40–129)
ALT SERPL-CCNC: 16 U/L (ref 0–40)
ANION GAP SERPL CALCULATED.3IONS-SCNC: 12 MMOL/L (ref 7–16)
AST SERPL-CCNC: 19 U/L (ref 0–39)
BILIRUB SERPL-MCNC: 1.2 MG/DL (ref 0–1.2)
BUN BLDV-MCNC: 98 MG/DL (ref 6–23)
CALCIUM SERPL-MCNC: 7.9 MG/DL (ref 8.6–10.2)
CHLORIDE BLD-SCNC: 100 MMOL/L (ref 98–107)
CO2: 27 MMOL/L (ref 22–29)
CREAT SERPL-MCNC: 2.9 MG/DL (ref 0.7–1.2)
GFR AFRICAN AMERICAN: 26
GFR NON-AFRICAN AMERICAN: 21 ML/MIN/1.73
GLUCOSE BLD-MCNC: 130 MG/DL (ref 74–99)
HCT VFR BLD CALC: 26.4 % (ref 37–54)
HEMOGLOBIN: 8.6 G/DL (ref 12.5–16.5)
MAGNESIUM: 2.5 MG/DL (ref 1.6–2.6)
MAGNESIUM: 2.5 MG/DL (ref 1.6–2.6)
MCH RBC QN AUTO: 28.6 PG (ref 26–35)
MCHC RBC AUTO-ENTMCNC: 32.6 % (ref 32–34.5)
MCV RBC AUTO: 87.7 FL (ref 80–99.9)
METER GLUCOSE: 103 MG/DL (ref 74–99)
METER GLUCOSE: 105 MG/DL (ref 74–99)
METER GLUCOSE: 116 MG/DL (ref 74–99)
METER GLUCOSE: 118 MG/DL (ref 74–99)
METER GLUCOSE: 145 MG/DL (ref 74–99)
METER GLUCOSE: 150 MG/DL (ref 74–99)
PDW BLD-RTO: 15 FL (ref 11.5–15)
PHOSPHORUS: 4 MG/DL (ref 2.5–4.5)
PLATELET # BLD: 145 E9/L (ref 130–450)
PMV BLD AUTO: 11.5 FL (ref 7–12)
POTASSIUM SERPL-SCNC: 3.7 MMOL/L (ref 3.5–5)
POTASSIUM SERPL-SCNC: 4.2 MMOL/L (ref 3.5–5)
RBC # BLD: 3.01 E12/L (ref 3.8–5.8)
SODIUM BLD-SCNC: 139 MMOL/L (ref 132–146)
TOTAL PROTEIN: 4.9 G/DL (ref 6.4–8.3)
WBC # BLD: 19.6 E9/L (ref 4.5–11.5)

## 2021-10-08 PROCEDURE — 82962 GLUCOSE BLOOD TEST: CPT

## 2021-10-08 PROCEDURE — 6370000000 HC RX 637 (ALT 250 FOR IP): Performed by: INTERNAL MEDICINE

## 2021-10-08 PROCEDURE — 97530 THERAPEUTIC ACTIVITIES: CPT

## 2021-10-08 PROCEDURE — 85027 COMPLETE CBC AUTOMATED: CPT

## 2021-10-08 PROCEDURE — 2580000003 HC RX 258: Performed by: INTERNAL MEDICINE

## 2021-10-08 PROCEDURE — 71045 X-RAY EXAM CHEST 1 VIEW: CPT

## 2021-10-08 PROCEDURE — 74230 X-RAY XM SWLNG FUNCJ C+: CPT

## 2021-10-08 PROCEDURE — 2580000003 HC RX 258: Performed by: SURGERY

## 2021-10-08 PROCEDURE — 84132 ASSAY OF SERUM POTASSIUM: CPT

## 2021-10-08 PROCEDURE — 6360000002 HC RX W HCPCS: Performed by: INTERNAL MEDICINE

## 2021-10-08 PROCEDURE — 92526 ORAL FUNCTION THERAPY: CPT

## 2021-10-08 PROCEDURE — 92611 MOTION FLUOROSCOPY/SWALLOW: CPT

## 2021-10-08 PROCEDURE — 6360000002 HC RX W HCPCS: Performed by: SURGERY

## 2021-10-08 PROCEDURE — 80053 COMPREHEN METABOLIC PANEL: CPT

## 2021-10-08 PROCEDURE — 6370000000 HC RX 637 (ALT 250 FOR IP): Performed by: THORACIC SURGERY (CARDIOTHORACIC VASCULAR SURGERY)

## 2021-10-08 PROCEDURE — 6370000000 HC RX 637 (ALT 250 FOR IP): Performed by: PHYSICIAN ASSISTANT

## 2021-10-08 PROCEDURE — 2580000003 HC RX 258: Performed by: NURSE PRACTITIONER

## 2021-10-08 PROCEDURE — 70450 CT HEAD/BRAIN W/O DYE: CPT

## 2021-10-08 PROCEDURE — C9113 INJ PANTOPRAZOLE SODIUM, VIA: HCPCS | Performed by: SURGERY

## 2021-10-08 PROCEDURE — 2700000000 HC OXYGEN THERAPY PER DAY

## 2021-10-08 PROCEDURE — 6360000002 HC RX W HCPCS

## 2021-10-08 PROCEDURE — 2000000000 HC ICU R&B

## 2021-10-08 PROCEDURE — 37799 UNLISTED PX VASCULAR SURGERY: CPT

## 2021-10-08 PROCEDURE — 94640 AIRWAY INHALATION TREATMENT: CPT

## 2021-10-08 PROCEDURE — 83735 ASSAY OF MAGNESIUM: CPT

## 2021-10-08 PROCEDURE — 84100 ASSAY OF PHOSPHORUS: CPT

## 2021-10-08 PROCEDURE — 92610 EVALUATE SWALLOWING FUNCTION: CPT

## 2021-10-08 PROCEDURE — 99232 SBSQ HOSP IP/OBS MODERATE 35: CPT | Performed by: PHYSICIAN ASSISTANT

## 2021-10-08 PROCEDURE — 36415 COLL VENOUS BLD VENIPUNCTURE: CPT

## 2021-10-08 PROCEDURE — 2580000003 HC RX 258

## 2021-10-08 PROCEDURE — 6370000000 HC RX 637 (ALT 250 FOR IP): Performed by: NURSE PRACTITIONER

## 2021-10-08 PROCEDURE — 99233 SBSQ HOSP IP/OBS HIGH 50: CPT | Performed by: NURSE PRACTITIONER

## 2021-10-08 PROCEDURE — 6360000002 HC RX W HCPCS: Performed by: NURSE PRACTITIONER

## 2021-10-08 RX ORDER — BUMETANIDE 1 MG/1
1 TABLET ORAL DAILY
Status: DISCONTINUED | OUTPATIENT
Start: 2021-10-08 | End: 2021-10-13 | Stop reason: HOSPADM

## 2021-10-08 RX ORDER — CLOPIDOGREL BISULFATE 75 MG/1
75 TABLET ORAL DAILY
Status: DISCONTINUED | OUTPATIENT
Start: 2021-10-08 | End: 2021-10-13 | Stop reason: HOSPADM

## 2021-10-08 RX ADMIN — PIPERACILLIN AND TAZOBACTAM 3375 MG: 3; .375 INJECTION, POWDER, LYOPHILIZED, FOR SOLUTION INTRAVENOUS at 19:58

## 2021-10-08 RX ADMIN — AMIODARONE HYDROCHLORIDE 400 MG: 200 TABLET ORAL at 19:57

## 2021-10-08 RX ADMIN — PANTOPRAZOLE SODIUM 40 MG: 40 INJECTION, POWDER, FOR SOLUTION INTRAVENOUS at 19:58

## 2021-10-08 RX ADMIN — CLOPIDOGREL 75 MG: 75 TABLET, FILM COATED ORAL at 14:23

## 2021-10-08 RX ADMIN — IPRATROPIUM BROMIDE AND ALBUTEROL SULFATE 1 AMPULE: .5; 2.5 SOLUTION RESPIRATORY (INHALATION) at 11:59

## 2021-10-08 RX ADMIN — PIPERACILLIN AND TAZOBACTAM 3375 MG: 3; .375 INJECTION, POWDER, LYOPHILIZED, FOR SOLUTION INTRAVENOUS at 08:50

## 2021-10-08 RX ADMIN — INSULIN LISPRO 3 UNITS: 100 INJECTION, SOLUTION INTRAVENOUS; SUBCUTANEOUS at 16:44

## 2021-10-08 RX ADMIN — HYDROCORTISONE SODIUM SUCCINATE 50 MG: 100 INJECTION, POWDER, FOR SOLUTION INTRAMUSCULAR; INTRAVENOUS at 08:55

## 2021-10-08 RX ADMIN — PIPERACILLIN AND TAZOBACTAM 3375 MG: 3; .375 INJECTION, POWDER, LYOPHILIZED, FOR SOLUTION INTRAVENOUS at 00:45

## 2021-10-08 RX ADMIN — PANTOPRAZOLE SODIUM 40 MG: 40 INJECTION, POWDER, FOR SOLUTION INTRAVENOUS at 08:55

## 2021-10-08 RX ADMIN — ASPIRIN 81 MG CHEWABLE TABLET 81 MG: 81 TABLET CHEWABLE at 10:58

## 2021-10-08 RX ADMIN — IPRATROPIUM BROMIDE AND ALBUTEROL SULFATE 1 AMPULE: .5; 2.5 SOLUTION RESPIRATORY (INHALATION) at 08:44

## 2021-10-08 RX ADMIN — SODIUM CHLORIDE, PRESERVATIVE FREE 300 UNITS: 5 INJECTION INTRAVENOUS at 19:58

## 2021-10-08 RX ADMIN — BUMETANIDE 1 MG: 1 TABLET ORAL at 16:44

## 2021-10-08 RX ADMIN — IPRATROPIUM BROMIDE AND ALBUTEROL SULFATE 1 AMPULE: .5; 2.5 SOLUTION RESPIRATORY (INHALATION) at 20:02

## 2021-10-08 RX ADMIN — SODIUM CHLORIDE, PRESERVATIVE FREE 300 UNITS: 5 INJECTION INTRAVENOUS at 14:17

## 2021-10-08 RX ADMIN — SODIUM CHLORIDE, PRESERVATIVE FREE 10 ML: 5 INJECTION INTRAVENOUS at 20:18

## 2021-10-08 RX ADMIN — CARVEDILOL 3.12 MG: 3.12 TABLET, FILM COATED ORAL at 10:58

## 2021-10-08 RX ADMIN — IPRATROPIUM BROMIDE AND ALBUTEROL SULFATE 1 AMPULE: .5; 2.5 SOLUTION RESPIRATORY (INHALATION) at 16:20

## 2021-10-08 RX ADMIN — CARVEDILOL 3.12 MG: 3.12 TABLET, FILM COATED ORAL at 16:45

## 2021-10-08 RX ADMIN — ATORVASTATIN CALCIUM 40 MG: 40 TABLET, FILM COATED ORAL at 19:57

## 2021-10-08 RX ADMIN — AMIODARONE HYDROCHLORIDE 400 MG: 200 TABLET ORAL at 10:58

## 2021-10-08 RX ADMIN — Medication 10 ML: at 19:58

## 2021-10-08 ASSESSMENT — PAIN SCALES - GENERAL
PAINLEVEL_OUTOF10: 0

## 2021-10-08 ASSESSMENT — PAIN DESCRIPTION - PAIN TYPE: TYPE: SURGICAL PAIN

## 2021-10-08 NOTE — PROGRESS NOTES
Physical Therapy  Physical Therapy Treatment Note    Name: Saulo Banks  : 1945  MRN: [id number]      Date of Service: 10/8/2021    Evaluating PT:  Herman Zhou, PT, DPT [id number]    Room #:  1627/8921-G  Diagnosis:  CAD in native artery [I25.10]  PMHx/PSHx:  HLD, HTN  Procedure/Surgery:   CABG x 3, RCA endarterectomy,  bronch  Precautions:  Falls, Sternal, Vent via ETT, L gaze preference   Equipment Needs:  TBD    SUBJECTIVE:    Pt lives with wife in a mobile home with 4 stairs to enter and 1 rail. Pt ambulated without device and was independent PTA. OBJECTIVE:   Initial Evaluation  Date: 10/6/21 Treatment  10/8/21 Short Term/ Long Term   Goals   AM-PAC 6 Clicks     Was pt agreeable to Eval/treatment? Yes Yes    Does pt have pain?  No c/o pain No c/o pain    Bed Mobility  Rolling: NT  Supine to sit: Dep x 2 with HOB elevated  Sit to supine: Dep x 2  Scooting: Dep Rolling: NT  Supine to sit: MaxA x 2 with HOB elevated  Sit to supine: NT  Scooting: Dep Ayaka   Transfers Sit to stand: NT  Stand to sit: NT  Stand pivot: NT Sit to stand: MaxA x 2; Dep  Stand to sit: MaxA x 2; Dep  Stand pivot: Dep no device Ayaka   Ambulation   NT NT >50 feet with Ayaka no device   Stair negotiation: ascended and descended NT  >2 steps with 1 rail ModA   ROM BUE:  Defer to OT note  BLE:  WFL     Strength BUE:  Defer to OT note  LLE:  4/5 grossly  RLE:  3+/5 grossly  Increase by 1/3 MMT grade   Balance Sitting EOB:  Dep  Dynamic Standing:  NT Sitting EOB:  MaxA  Dynamic Standing:  Dep no device Sitting EOB:  SBA  Dynamic Standing:  Ayaka no device     Pt is A & O x 3  CAM-ICU: NT  RASS: 0  Sensation:  No reported paresthesias  Edema:  None    Vitals:  Heart Rate at rest 86 bpm Heart Rate post session 92 bpm   SpO2 at rest 94% SpO2 post session 94%   Blood Pressure at rest - mmHg Blood Pressure post session 111/57 mmHg     Functional Status Score-Intensive Care Unit (FSS-ICU)   Rolling -/7   Supine to sit transfer 1/7   Unsupported sitting  2/7   Sit to stand transfers 1/7   Ambulation -/7   Total  4/35     Therapeutic Exercises:  BLE AROM x 5 reps    Patient education  Pt educated on safety    Patient response to education:   Pt verbalized understanding Pt demonstrated skill Pt requires further education in this area   intubated yes yes     ASSESSMENT:    Conditions Requiring Skilled Therapeutic Intervention:    [x]Decreased strength     []Decreased ROM  [x]Decreased functional mobility  [x]Decreased balance   [x]Decreased endurance   [x]Decreased posture  [x]Decreased sensation  []Decreased coordination   []Decreased vision  []Decreased safety awareness   []Increased pain       Comments:  Discussed pt's case with NP who reported pt was stable for session. Pt was in bed upon arrival, agreeable to treatment session. Pt was educated on sternal precautions and PLB prior to activity. Pt's wife was present for session. Pt demonstrated improved cognition and tolerance to activity this session. Pt still requires frequent cues for improved posture and head positioning. Pt stood with flexed posture to the L and unable to achieve a fully erect posture. Pt was dependently transferred to chair. All needs met and call light in reach. All lines remained intact. Discussed assessment with RN. Treatment:  Patient practiced and was instructed in the following treatment:     Bed mobility training - pt given verbal and tactile cues to facilitate proper sequencing and safety during supine>sit as well as provided with physical assistance.  Sitting EOB for >10 minutes for upright tolerance, postural awareness and BLE ROM   Transfer training - pt was given verbal and tactile cues to facilitate proper hand placement, technique and safety during sit to stand and stand to sit as well as provided with physical assistance to complete task. PLAN:    Patient is making some progress towards established goals.   Will continue with current POC.       Time in  1000  Time out  1038    Total Treatment Time  38 minutes     CPT codes:  [] Gait training 53247 - minutes  [] Manual therapy 00721 - minutes  [x] Therapeutic activities 58211 38 minutes  [] Therapeutic exercises 59122 - minutes  [] Neuromuscular reeducation 87360 - minutes    Genesis Jackson, PT, DPT  [id number]

## 2021-10-08 NOTE — CARE COORDINATION
O2 weaned to 2L. Alert, oriented. Still with some R sided weakness. Spoke with wife. Her preference would be for pt to go home with therapy at home, but she is aware that at this time her will require inpt rehab prior to going home. She would like TAV. Referral made to [facility]. Will follow up with therapy to see if pt shows improvement to qualify for acute rehab.     13:30  Discussed with therapists. Pt showed improvement with therapy today. He would benefit from acute Rehab. Spoke with wife and discussed options. She prefers [facility]. Will request PM&R to follow.

## 2021-10-08 NOTE — PROGRESS NOTES
Comprehensive Nutrition Assessment    Type and Reason for Visit:  Reassess    Nutrition Recommendations/Plan: Continue current diet, Start [id number] BID, Boost pudding daily    Nutrition Assessment:  Pt improving from a nutritional standpoint now s/p extubated w/ PO diet advanced s/p MBS. Pt s/p CABGx3 w/ cardiogenic shock/acute resp failure, NGUYỄN. Pt s/p bronch/cryotherapy. Noted possible post-op CVA. Will add ONS and continue to monitor. Malnutrition Assessment:  Malnutrition Status: At risk for malnutrition (Comment)    Context:  Acute Illness     Findings of the 6 clinical characteristics of malnutrition:  Energy Intake:  Mild decrease in energy intake (Comment)  Weight Loss:  No significant weight loss     Body Fat Loss:  No significant body fat loss     Muscle Mass Loss:  No significant muscle mass loss    Fluid Accumulation:  No significant fluid accumulation     Strength:  Not Performed    Estimated Daily Nutrient Needs:  Energy (kcal):  MSJ 1431 x 1.2 SF = 9744-6910; Weight Used for Energy Requirements:  Admission     Protein (g):  ; Weight Used for Protein Requirements:  Admission (1.2-1.4)        Fluid (ml/day):  per critical care/renal management; Method Used for Fluid Requirements:  Other (Comment)      Nutrition Related Findings:  pt extubated, active BS, soft abd, CT x1, trace/+1 edema, +I/Os      Wounds:  Multiple, Surgical Incision       Current Nutrition Therapies:    ADULT DIET; Dysphagia - Pureed; Mildly Thick (Nectar)    Anthropometric Measures:  · Height: 5' 8\" (172.7 cm)  · Current Body Weight: 158 lb (71.7 kg) (9/27 bed scale)   · Admission Body Weight: 158 lb (71.7 kg) (9/27 first measured)    · Usual Body Weight: 158 lb (71.7 kg) (7/2021 actual per EMR)     · Ideal Body Weight: 154 lbs; % Ideal Body Weight 102.6 %   · BMI: 24  · BMI Categories: Normal Weight (BMI 18.5-24. 9)       Nutrition Diagnosis:   · Inadequate oral intake related to cardiac dysfunction as evidenced by intake 0-25%, poor intake prior to admission    Nutrition Interventions:   Food and/or Nutrient Delivery:  Continue Current Diet, Start Oral Nutrition Supplement  Nutrition Education/Counseling:  Education not indicated   Coordination of Nutrition Care:  Continue to monitor while inpatient    Goals:  pt to consume >50% meals/ONS       Nutrition Monitoring and Evaluation:   Food/Nutrient Intake Outcomes:  Food and Nutrient Intake, Supplement Intake  Physical Signs/Symptoms Outcomes:  Biochemical Data, Chewing or Swallowing, GI Status, Fluid Status or Edema, Hemodynamic Status, Nutrition Focused Physical Findings, Skin, Weight     Discharge Planning:     Too soon to determine     Electronically signed by Violeta Hebert, MS, RD, LD on 10/8/21 at 11:04 AM EDT    Contact: [phone]

## 2021-10-08 NOTE — PROGRESS NOTES
peripheral  edema   Neurologic/Psych: A& Ox3, SANTOS to command; left 3/5 strength, right 2/5 strength  Skin: Warm  Incisions: MSI C/D/I, sternum stable     Lines:  Right brachial Arterial line 9/27-- remove today  Ybarra 9/27  PICC LUE 09/29/2021       Assessment/Plan: POD #11    1. CAD S/p CABGx3 (LIMA-LAD, SVG-OM, SVG-RCA)/Extensive RCA endarterectomy/MALU AtriClip  - ASA, Lipitor, Coreg   - Right pleural drain to bulb suction- 305cc/24 hours - monitor for now   - PO Amio for afib prophylaxis   - PT/OT     2. Acute Hypoxic Respiratory Failure  - Extubated DOS, tolerating 2L NC post extubation, acute hypoxia requiring NRB, NIV and reintubation 9/28  - NMBA and iFlolan started 9/28   -s/p cryotherapy bronch for large obstructive mucous plug left and right main stem  - Flolan weaned off 10/3  - Bedside US 10/4 small-moderate right sided pleural effusion, improving on CXR, keep right pleural drain   - Extubated 10/7, currently on room air   -On empiric Zosyn (day 11), sputum culture negative  -diuresis per nephrology     3. Acute systolic heart failure  - Post op course complicated by cardiogenic shock in setting of biventricular failure. Echo POD1 with akinesis of apical inferior and septal walls, EF 45-50%%, severe RV  Dysfunction. On epinephrine, levophed, vasopressin, dobutamine, IABP insertion (removed 10/4), iFlolan. Hemodynamics improved, repeat Echo 10/8 with EF 45%, RV normal.   - started on low dose Coreg 10/6. Hold ACEi in setting of NGUYỄN. Diuresis per nephrology   - GDMT as able      4. NGUYỄN   - 2/2 above, baseline Scr 0.8  - Scr 2.9, BUN 98, down trending   - nephrology following  - Diuresis per nephrology, 2.1L UOP past 24    - Replace electrolytes PRN      5. Acute Post Operative Pain   - PRN fentanyl for pain management     6. Stress Hyperglycemia  - No h/o DM  - SSI q 6 hours      7. Thrombocytopenia  - HIT negative 9/30  - Recovering, plts 145K, monitor    8.  Acute blood loss anemia  - H/H stable - monitor and transfuse if needed to maintain Hgb >8      9. Leukocytosis  - WBC 19.6, afebrile; steroids stopped   - On empiric Zosyn (day 11)      10. At risk for malnutrition   - Speech therapy for swallow eval today. Will need Corpak placed if fails      11. Possible GI bleed  - Maroon colored OG output over weekend with +hemoccult  - Evaluated by general surgery, no intervention planned; PPI BID, monitor H/H      12. Right Sided Weakness  - Sedation off, Left side 3/5 strength, right side with 2/5 strength   - Neurology following, CT head unremarkable, would like MRI- unable with epicardial wires in place   - Echo with no LV thrombus    - Carotid US with 50-69% stenosis bilaterally; increased from 9/10 0-49% bilaterally   - Repeat CT head 10/8 with area of low-density in left cerebellum   - ASA/statin, PT/OT/speech        VTE Prophylaxis: Pharmacologic/Mechanical:  Yes, SCDs   Line infection prevention: Can CVC or arterial line be removed: possible removal of arterial line later today   Continued need for urinary catheter: Yes - clinical indication: Patient post major surgery requiring fluid balance and input and output measurement.     Dispo: CVICU    Electronically signed by JUAN MIGUEL Scott - CNP on 10/8/2021 at 9:10 AM

## 2021-10-08 NOTE — PLAN OF CARE
Problem: Falls - Risk of:  Goal: Will remain free from falls  Description: Will remain free from falls  Outcome: Met This Shift  Goal: Absence of physical injury  Description: Absence of physical injury  Outcome: Met This Shift     Problem: Cardiac Output - Decreased:  Goal: Cardiac output within specified parameters  Description: Cardiac output within specified parameters  Outcome: Met This Shift  Goal: Hemodynamic stability will improve  Description: Hemodynamic stability will improve  Outcome: Met This Shift     Problem: Fluid Volume - Imbalance:  Goal: Chest tube drainage is within specified parameters  Description: Chest tube drainage is within specified parameters  Outcome: Met This Shift     Problem: Gas Exchange - Impaired:  Goal: Levels of oxygenation will improve  Description: Levels of oxygenation will improve  Outcome: Met This Shift  Goal: Ability to maintain adequate ventilation will improve  Description: Ability to maintain adequate ventilation will improve  Outcome: Met This Shift     Problem: Pain:  Goal: Pain level will decrease  Description: Pain level will decrease  Outcome: Met This Shift  Goal: Control of acute pain  Description: Control of acute pain  Outcome: Met This Shift  Goal: Control of chronic pain  Description: Control of chronic pain  Outcome: Met This Shift     Problem: Tissue Perfusion - Cardiopulmonary, Altered:  Goal: Absence of angina  Description: Absence of angina  Outcome: Met This Shift  Goal: Hemodynamic stability will improve  Description: Hemodynamic stability will improve  Outcome: Met This Shift  Goal: Will show no evidence of cardiac arrhythmias  Description: Will show no evidence of cardiac arrhythmias  Outcome: Met This Shift     Problem: Pain:  Goal: Pain level will decrease  Description: Pain level will decrease  Outcome: Met This Shift  Goal: Control of acute pain  Description: Control of acute pain  Outcome: Met This Shift  Goal: Control of chronic pain  Description: Control of chronic pain  Outcome: Met This Shift     Problem: Skin Integrity:  Goal: Will show no infection signs and symptoms  Description: Will show no infection signs and symptoms  Outcome: Met This Shift  Goal: Absence of new skin breakdown  Description: Absence of new skin breakdown  Outcome: Met This Shift     Problem: Musculor/Skeletal Functional Status  Goal: Absence of falls  Outcome: Met This Shift     Problem: Discharge Planning:  Goal: Discharged to appropriate level of care  Description: Discharged to appropriate level of care  Outcome: Ongoing     Problem:  Activity Intolerance:  Goal: Able to perform prescribed physical activity  Description: Able to perform prescribed physical activity  Outcome: Ongoing  Goal: Ability to tolerate increased activity will improve  Description: Ability to tolerate increased activity will improve  Outcome: Ongoing     Problem: Anxiety:  Goal: Level of anxiety will decrease  Description: Level of anxiety will decrease  Outcome: Ongoing     Problem: Fluid Volume - Imbalance:  Goal: Ability to achieve a balanced intake and output will improve  Description: Ability to achieve a balanced intake and output will improve  Outcome: Ongoing     Problem: Tobacco Use:  Goal: Will participate in inpatient tobacco-use cessation counseling  Description: Will participate in inpatient tobacco-use cessation counseling  Outcome: Ongoing     Problem: Musculor/Skeletal Functional Status  Goal: Highest potential functional level  Outcome: Ongoing

## 2021-10-08 NOTE — PROGRESS NOTES
Department of Internal Medicine  Nephrology Progress Note      Events reviewed. Patient was extubated    SUBJECTIVE: We are following Mr. Mehul [address] for NGUYỄN. Reports no complaints.     PHYSICAL EXAM:      Vitals:    VITALS:  BP (!) 138/52   Pulse 88   Temp 97.9 °F (36.6 °C) (Temporal)   Resp 20   Ht 5' 8\" (1.727 m)   Wt 158 lb 11.7 oz (72 kg)   SpO2 95%   BMI 24.14 kg/m²   24HR INTAKE/OUTPUT:      Intake/Output Summary (Last 24 hours) at 10/8/2021 1016  Last data filed at 10/8/2021 0900  Gross per 24 hour   Intake 2195.01 ml   Output 2335 ml   Net -139.99 ml     Constitutional: Patient is awake, alert in no distress  HEENT: Pupils are equal reactive  Respiratory: Decreased breath sounds at the bases  Cardiovascular/Edema: Heart sounds are regular  Gastrointestinal: Abdomen soft  Neurologic: Patient sedated  Other: + edema      Scheduled Meds:   piperacillin-tazobactam  3,375 mg IntraVENous Q8H    hydrocortisone sodium succinate PF  50 mg IntraVENous Daily    insulin lispro  0-18 Units SubCUTAneous 4 times per day    carvedilol  3.125 mg Oral BID WC    amiodarone  400 mg Oral BID    pantoprazole  40 mg IntraVENous BID    And    sodium chloride (PF)  10 mL IntraVENous BID    lidocaine PF  5 mL IntraDERmal Once    sodium chloride flush  5-40 mL IntraVENous 2 times per day    heparin flush  3 mL IntraVENous 2 times per day    aspirin  81 mg Oral Daily    chlorhexidine  15 mL Mouth/Throat BID    docusate sodium  100 mg Oral BID    sennosides  5 mL Oral BID    atorvastatin  40 mg Oral Daily    ipratropium-albuterol  1 ampule Inhalation Q4H WA     Continuous Infusions:   dextrose 5% and 0.45% NaCl with KCl 20 mEq 50 mL/hr at 10/07/21 2334    dextrose Stopped (10/07/21 2334)    sodium chloride      dextrose       PRN Meds:.potassium chloride, calcium gluconate **OR** [DISCONTINUED] calcium gluconate **OR** [DISCONTINUED] calcium gluconate **OR** [DISCONTINUED] calcium gluconate, sodium chloride flush, sodium chloride, heparin flush, fentanNYL, artificial tears, ondansetron, acetaminophen, acetaminophen, oxyCODONE **OR** oxyCODONE, magnesium hydroxide, potassium chloride, magnesium sulfate, albumin human, glucose, dextrose, glucagon (rDNA), dextrose, calcium gluconate IVPB    DATA:    CBC:   Lab Results   Component Value Date    WBC 19.6 10/08/2021    RBC 3.01 10/08/2021    HGB 8.6 10/08/2021    HCT 26.4 10/08/2021    MCV 87.7 10/08/2021    MCH 28.6 10/08/2021    MCHC 32.6 10/08/2021    RDW 15.0 10/08/2021     10/08/2021    MPV 11.5 10/08/2021     CMP:    Lab Results   Component Value Date     10/08/2021    K 3.7 10/08/2021    K 3.3 10/07/2021     10/08/2021    CO2 27 10/08/2021    BUN 98 10/08/2021    CREATININE 2.9 10/08/2021    GFRAA 26 10/08/2021    LABGLOM 21 10/08/2021    GLUCOSE 130 10/08/2021    PROT 4.9 10/08/2021    LABALBU 3.1 10/08/2021    CALCIUM 7.9 10/08/2021    BILITOT 1.2 10/08/2021    ALKPHOS 51 10/08/2021    AST 19 10/08/2021    ALT 16 10/08/2021     Magnesium:    Lab Results   Component Value Date    MG 2.5 10/08/2021     Phosphorus:    Lab Results   Component Value Date    PHOS 4.0 10/08/2021        Radiology Review:      CXR 10/3/21   1. Median sternotomy changes. 2. Bilateral pleuroparenchymal opacities that could be related to pleural   effusion and edema.  The appearance of the chest is about the same or   slightly worse.         Chest x-ray October 6, 2021   1. Stable appearance of the postoperative chest.   2. Bilateral chest tubes remain in position.  There is no right or left   pneumothorax.    3. Small right pleural effusion         Chest x-ray October 8, 2021   Minimal bibasilar atelectasis.       Trace right pleural effusion       There is no pneumothorax             BRIEF SUMMARY OF INITIAL CONSULT:    Briefly Mr. Joselyn Ayala is a 79-year-old man with history of HTN, CAD with recent status cardiac catheterization done on September 9 which showed severe multivessel disease, hyperlipidemia, hiatal hernia, who was admitted for elective CABG on September 27, 2021. On admission his creatinine level was 0.8 mg/dL and post surgery his creatinine has progressively increased up to 2.3 mg/dL, reason for this consultation. Postoperatively she developed persistent hypotension, lactic acidosis and respiratory failure for which he was reintubated. Since then she has required multiple pressors and he was placed on IABP. He had received 1 dose of ketorolac perioperatively. His urine output has significantly decrease and is being about 500 cc/day in the last 48 hours and his fluid balance presently is over 9 L. Problems resolved:    · Cardiogenic shock, hemodynamically more stable, pressor support decrease, still on IABP. Tentative plan for removal of IABP. Pro-BP 16,936  · Hypernatremia with hypervolemia, 2/2 sodium containing IV fluid resuscitation and underlying heart failure. Resolved with  natriuresis and free water (add D5W)  · Hypokalemia, 2/2 diuretics, potassium levels improve  · Respiratory alkalosis (pH: 7.554, PCO2: 74.7) with metabolic alkalosis (bicarbonate administration)  · Respiratory failure status post intubation  · Thrombocytopenia  · Transaminitis with hyperbilirubinemia, possibly shock liver versus congestive liver    IMPRESSION/RECOMMENDATIONS:      1. NGUYỄN stage III, CABG associated NGUYỄN, ischemic ATN, non-oliguric. FEUrea 18.8%. Renal function continues to improve with decreasing creatinine levels and adequate urine output. 2. HFmEF 40% with stage IDD, proBNP 16,826 > 9461, s/p bumex drip     ---------------------------------------------------------    3. CAD status post CABG x3 September 27, 2021, on ASA, clopidogrel, carvedilol, atorvastatin  4. Amiodarone therapy, for AF prophylaxis  5. Probably pneumonia, on piperacillin-tazobactam  6.  Normocytic anemia, status post surgery, getting transfused      Plan:    · Continue D5 0.45% sodium chloride with 20 mEq of KCl until adequate oral intake  · Bumex 1 mg p.o. daily  · Continue to monitor renal function for recovery

## 2021-10-08 NOTE — PROGRESS NOTES
SPEECH/LANGUAGE PATHOLOGY  VIDEOFLUOROSCOPIC STUDY OF SWALLOWING (MBS)   and PLAN OF CARE    PATIENT NAME:  Olaf Haynes  (male)     MRN:  [id number]    :  1945  (78 y.o.)  STATUS:  Inpatient: Room 3814/3814-A    TODAY'S DATE:  10/8/2021  REFERRING PROVIDER:   Dr. Segovia Estimable: [place] modified barium swallow with video  Date of order:  10-8-21   REASON FOR REFERRAL: failed bedside   EVALUATING THERAPIST: ANGELIQUE Jacobs      RESULTS:      DYSPHAGIA DIAGNOSIS:  moderate pharyngeal phase dysphagia     Laryngeal Penetration and Aspiration:  Penetration WITHOUT aspiration was observed in today's study with  thin liquid, mildly thick liquid (nectar)     DIET RECOMMENDATIONS:  Pureed consistency solids (dysphagia 1) with  nectar consistency (mildly thick) liquids    FEEDING RECOMMENDATIONS:    Assistance level:  Supervision is needed during all oral intake     Compensatory strategies recommended: Double swallow and Chin tuck     Discussed recommendations with nursing and/or faxed report to referring provider: Yes    SPEECH THERAPY  PLAN OF CARE   The dysphagia POC is established based on physician order and dysphagia diagnosis    Skilled SLP intervention for dysphagia management on acute care 3-5 x per week until goals met, pt plateaus in function and/or discharged from hospital      Conditions Requiring Skilled Therapeutic Intervention for dysphagia:    Reduced pharyngeal clearing of the bolus  Reduced laryngeal closure resulting in penetration    SPECIFIC DYSPHAGIA INTERVENTIONS TO INCLUDE:     Therapeutic exercises  Trials of upgraded diet/liquid     Specific instructions for next treatment:  initiate instruction of therapeutic exercises   Treatment Goals:    Short Term Goals:  Pt will complete BOTR strength/ ROM exercises to reduce pharyngeal residuals and improve epiglottic inversion minimal verbal prompts  Pt will complete laryngeal strength/ ROM therapeutic exercises to improve airway protection for the least restrictive PO diet minimal verbal prompts    Long Term Goals:   Pt will maintain adequate nutrition/hydration via PO intake of the least restrictive oral diet with implementation of safe swallow/ compensatory strategies and decrease signs/symptoms of aspiration to less than 1 x/day. Patient/family Goal:    Did not state. Will further assess during treatment. Plan of care discussed with Patient   The Patient understand(s) the diagnosis, prognosis and plan of care     Rehabilitation Potential/Prognosis: good                      ADMITTING DIAGNOSIS: CAD in native artery [I25.10]     VISIT DIAGNOSIS:         PATIENT REPORT/COMPLAINT: did not state. . failed bedside evaluation    PRIOR LEVEL OF SWALLOW FUNCTION:    Past History of Dysphagia?:  none reported    Home diet: Regular consistency solids with  thin liquids    PROCEDURE:  Consistencies Administered During the Evaluation   Liquids: thin liquid and nectar thick liquid   Solids:  pureed foods      Method of Intake:   cup, spoon  Fed by clinician      Position:   Seated, upright, Lateral plane    INSTRUMENTAL ASSESSMENT:    ORAL PREP/ ORAL PHASE:    The oral stage of swallowing was within functional limits     PHARYNGEAL PHASE:     ONSET TIME       Onset time of the pharyngeal swallow was adequate       PHARYNGEAL RESIDUALS        Vallecula/Pharyngeal Wall           Reduced tongue base retraction resulting in residuals in vallecula and/or posterior pharyngeal wall for nectar consistency liquid and pureed foods which mostly cleared with chin tuck and cued double swallow       Pyriform Sinuses      No significant residuals were noted in the pyriform sinuses     LARYNGEAL PENETRATION   Laryngeal penetration occurred in the absence of aspiration DURING the swallow for thin liquid and nectar consistency liquid due to  delayed laryngeal closure which was eliminated with use fof the chin tuck for the nectar thick liquids but remained in the laryngeal vestibule for the thin liquid even with a chin tuck. . Laryngeal penetration was mild-moderate and occurred consistently   spontaneous throat clearing was noted    ASPIRATION  Aspiration  was not present during this evaluation however there is high risk for aspiration  of thin liquid and also for nectar consistency liquid  If a compensatory strategies are followed    PENETRATION-ASPIRATION SCALE (PAS):  THIN 3 = Material enters the airway, remains above the vocal folds, and is not ejected from the airway  MILDLY THICK 3 = Material enters the airway, remains above the vocal folds, and is not ejected from the airway  MODERATELY THICK item not administered  PUREE 1 = Material does not enter the airway  HARD SOLID item not administered       COMPENSATORY STRATEGIES    Compensatory strategies that were beneficial included Double swallow, Chin tuck and Small bites/sips      STRUCTURAL/FUNCTIONAL ANOMALIES   No structural/functional anomalies were noted    CERVICAL ESOPHAGEAL STAGE :     The cervical esophagus appeared adequate          ___________    Cognition:   Within functional limits for this exam    Oral Peripheral Examination   Generalized oral weakness    Current Respiratory Status   room air     Parameters of Speech Production  Respiration:  Adequate for speech production  Quality:   Within functional limits  Intensity: Quiet    Pain: No pain reported. EDUCATION:   The Speech Language Pathologist (SLP) completed education regarding results of evaluation and that intervention is warranted at this time. Learner: Patient  Education: Reviewed results and recommendations of this evaluation and Reviewed diet and strategies  Evaluation of Education:  Prema understanding    This plan may be re-evaluated and revised as warranted.         Evaluation Time includes thorough review of current medical information, gathering information on past medical history/social history and prior level of function, completion of standardized testing/informal observation of tasks, assessment of data and education on plan of care and goals. [x]The admitting diagnosis and active problem list, have been reviewed prior to initiation of this evaluation.     CPT Code: 41240  dysphagia study    ACTIVE PROBLEM LIST:   Patient Active Problem List   Diagnosis    CAD in native artery    Pulmonary nodules    Unstable angina (HCC)    Other hyperlipidemia    Essential hypertension    Gastroesophageal reflux disease with esophagitis without hemorrhage    S/P CABG (coronary artery bypass graft)    Acute respiratory failure with hypoxia (HCC)    Lactic acidemia    NGUY[place]N (acute kidney injury) ([address]    Hypokalemia    Hypocalcemia    Cardiogenic shock (HCC)    Thrombocytopenia ([address]    Leukocytosis    Encounter regarding vascular access for dialysis for ESRD ([address]    Acute right-sided weakness

## 2021-10-08 NOTE — PROGRESS NOTES
Pharmacy Consultation Note  (Renal Dosing and Monitoring)    Initial consult date: 10/1/21  Consulting physician/provider: Dr Phyliss Litten  Reason for consult: Renal dosing of medications in CRRT    Ht Readings from Last 1 Encounters:   10/04/21 5' 8\" (1.727 m)     Wt Readings from Last 1 Encounters:   09/27/21 158 lb 11.7 oz (72 kg)       Age/Gender  Allergy Information   78 y.o. male  Dust mite extract               Estimated Creatinine Clearance: 22 mL/min (A) (based on SCr of 2.9 mg/dL (H)). Intake/Output Summary (Last 24 hours) at 10/8/2021 2431  Last data filed at 10/8/2021 0700  Gross per 24 hour   Intake 2445.01 ml   Output 2440 ml   Net 5.01 ml     Urine output over the last 24 hours: 1.3 mL/kg/hr (2180 mL total)     Assessment:  · 79 yo M admitted 9/27 s/p CABG x3/RCA endarterectomy/MALU exclusion with CT Surgery  · Consulted by Dr. Tri Tapia to renally dose/monitor medications in CVVHD   · Temporary HD line placed 9/30  · CVVHD not initiated 10/1; bumetanide infusion discontinued  · SCr 2.9 today; CrCl 22 mL/hr    Plan:  · Adjust Piperacillin/tazobactam 3.375 gm IV to q8h   · No additional renal adjustments needed at this time    Will continue to follow.       Alida Allison, PharmD, BCPS 10/8/2021 8:10 AM

## 2021-10-08 NOTE — PROGRESS NOTES
SPEECH/LANGUAGE PATHOLOGY  CLINICAL ASSESSMENT OF SWALLOWING FUNCTION   and PLAN OF CARE    PATIENT NAME:  Dara Rodriguez  (male)     MRN:  [id number]    :  1945  (78 y.o.)  STATUS:  Inpatient: Room 3814/3814-A    TODAY'S DATE:  10/8/2021  REFERRING PROVIDER:   Dr. Alisa Fuentes: ANGELIQUE swallowing-dysphagia evaluation and treatment Date of order:  10-7-21  REASON FOR REFERRAL: dysphagia   EVALUATING THERAPIST: ANGELIQUE Hernandez                 RESULTS:    DYSPHAGIA DIAGNOSIS:   Inconsistent signs of aspiration noted      DIET RECOMMENDATIONS:  NPO until MBSS can be completed        FEEDING RECOMMENDATIONS:     Assistance level:  Not applicable      Compensatory strategies recommended: Not applicable      Discussed recommendations with nursing and/or faxed report to referring provider: Yes    SPEECH THERAPY  PLAN OF CARE   The dysphagia POC is established based on physician order, dysphagia diagnosis and results of clinical assessment     Will establish POC once MBSS is completed. Conditions Requiring Skilled Therapeutic Intervention for dysphagia:    Not applicable    Specific dysphagia interventions to include:     Not applicable    Specific instructions for next treatment:  MBSS to be completed  Patient Treatment Goals:    Short Term Goals:  Not applicable no therapy warranted     Long Term Goals: A Video Swallow Study (MBSS) is recommended and requires a physician order      Patient/family Goal:    Did not state. Will further assess during treatment.     Plan of care discussed with Patient   The Patient understand(s) the diagnosis, prognosis and plan of care     Rehabilitation Potential/Prognosis: good                    ADMITTING DIAGNOSIS: CAD in native artery [I25.10]    VISIT DIAGNOSIS:      PATIENT REPORT/COMPLAINT: coughing    RN cleared patient for participation in assessment     yes     PRIOR LEVEL OF SWALLOW FUNCTION:    PAST HISTORY OF DYSPHAGIA?: none reported    Home diet: Regular consistency solids with  thin liquids    PROCEDURE:  Consistencies Administered During the Evaluation   Liquids: thin liquid   Solids:  pureed foods      Method of Intake:   cup, straw, spoon  Fed by clinician      Position:   Seated, upright    CLINICAL ASSESSMENT:  Oral Stage: The oral stage of swallowing was within functional limits      Pharyngeal Stage:    Throat clearing present after presentation of thin liquid and pureed foods  Wet respirations were noted after presentation of thin liquid and pureed foods  Multiple swallows were noted after presentation of thin liquid and pureed foods    Cognition:   Within functional limits for this exam    Oral Peripheral Examination   Adequate lingual/labial strength     Current Respiratory Status    room air     Parameters of Speech Production  Respiration:  Adequate for speech production  Quality:   Within functional limits  Intensity: Quiet    Volitional Swallow: present     Volitional Cough:   present     Pain: No pain reported. EDUCATION:   The Speech Language Pathologist (SLP) completed education regarding results of evaluation and that intervention is not warranted at this time. Learner: Patient and Family  Education: Reviewed results and recommendations of this evaluation  Evaluation of Education:  Olivia Lucio understanding    This plan may be re-evaluated and revised as warranted. Evaluation Time includes thorough review of current medical information, gathering information on past medical history/social history and prior level of function, completion of standardized testing/informal observation of tasks, assessment of data and education on plan of care and goals. [x]The admitting diagnosis and active problem list, have been reviewed prior to initiation of this evaluation.         ACTIVE PROBLEM LIST:   Patient Active Problem List   Diagnosis    CAD in native artery    Pulmonary nodules    Unstable angina (HCC)    Other hyperlipidemia  Essential hypertension    Gastroesophageal reflux disease with esophagitis without hemorrhage    S/P CABG (coronary artery bypass graft)    Acute respiratory failure with hypoxia (HCC)    Lactic acidemia    [facility]N [facility]    Hypokalemia    Hypocalcemia    Cardiogenic shock (HCC)    Thrombocytopenia ([facility]    Leukocytosis    Encounter regarding vascular access for dialysis for ESRD ([facility]    Acute right-sided weakness         CPT code:  58029  bedside swallow eval

## 2021-10-08 NOTE — PROGRESS NOTES
[address] of Pulmonary, [facility]                                                                       Pulmonary &health [facility]                                                                   Pulmonary Consult Note              Reason for Consultation:severe hypoxia ,possible mucus plug   CC : vent/resp failure   HPI:   Extubated   Still with some upper ext weakness ,but seems slowly improving   On 3 L      PHYSICAL EXAMINATION:     VITAL SIGNS:  BP (!) 138/52   Pulse 82   Temp 98.6 °F (37 °C) (Temporal)   Resp 11   Ht 5' 8\" (1.727 m)   Wt 158 lb 11.7 oz (72 kg)   SpO2 100%   BMI 24.14 kg/m²   Wt Readings from Last 3 Encounters:   09/27/21 158 lb 11.7 oz (72 kg)   09/20/21 157 lb (71.2 kg)   09/21/21 160 lb 6.4 oz (72.8 kg)     Temp Readings from Last 3 Encounters:   10/07/21 98.6 °F (37 °C) (Temporal)   09/27/21 98.4 °F (36.9 °C)   09/23/21 97.1 °F (36.2 °C) (Temporal)     TMAX:  BP Readings from Last 3 Encounters:   10/06/21 (!) 138/52   09/27/21 (!) 144/63   09/23/21 (!) 183/89     Pulse Readings from Last 3 Encounters:   10/07/21 82   09/23/21 66   09/21/21 65           INTAKE/OUTPUTS:  I/O last 3 completed shifts: In: 3042 [I.V.:1750; NG/GT:542; IV Piggyback:750]  Out: 9695 [Urine:3340;  Chest Tube:210]    Intake/Output Summary (Last 24 hours) at 10/7/2021 2107  Last data filed at 10/7/2021 2000  Gross per 24 hour   Intake 2722 ml   Output 2885 ml   Net -163 ml       General Appearance:extuabted Eyes: pupils equal, round, and reactive to light, extraocular eye movements intact, conjunctivae normal and sclera anicteric   Neck: neck supple and non tender without mass, no thyromegaly, no thyroid nodules and no cervical adenopathy   Pulmonary/Chest:decrease breath sound bilateral   Cardiovascular: normal rate, regular rhythm, normal S1 and S2, no murmurs, rubs, clicks or gallops, distal pulses intact, no carotid bruits, no murmurs, no gallops, no carotid bruits and no JVD   Abdomen: obese, soft, non-tender, non-distended, normal bowel sounds, no masses or organomegaly   Extremities: no edema   Musculoskeletal:strength better right side and left side and seems very alert     Neurologic: reflexes normal and symmetric, no cranial nerve deficit noted    LABS/IMAGING:    CBC:  Lab Results   Component Value Date    WBC 23.7 (H) 10/07/2021    HGB 9.1 (L) 10/07/2021    HCT 27.1 (L) 10/07/2021    MCV 86.0 10/07/2021     10/07/2021    LYMPHOPCT 5.1 (L) 09/28/2021    RBC 3.15 (L) 10/07/2021    MCH 28.9 10/07/2021    MCHC 33.6 10/07/2021    RDW 15.0 10/07/2021    NEUTOPHILPCT 89.5 (H) 09/28/2021    MONOPCT 4.4 09/28/2021    BASOPCT 0.1 09/28/2021    NEUTROABS 15.00 (H) 09/28/2021    LYMPHSABS 0.86 (L) 09/28/2021    MONOSABS 0.73 09/28/2021    EOSABS 0.00 (L) 09/28/2021    BASOSABS 0.01 09/28/2021       Recent Labs     10/07/21  1300 10/06/21  0750 10/05/21  0410   WBC 23.7* 18.9* 13.7*   HGB 9.1* 9.7* 9.3*   HCT 27.1* 28.9* 28.1*   MCV 86.0 86.8 88.1    100* 72*       BMP:   Recent Labs     10/05/21  0410 10/05/21  0920 10/06/21  0451 10/06/21  1145 10/06/21  1400 10/06/21  1400 10/06/21  2040 10/07/21  0100 10/07/21  0358 10/07/21  0408 10/07/21  1340   *   < > 140  --  144  --  140  --  140  --   --    K 3.2*   < > 3.5   < > 4.3   < > 3.0*   < > 3.2* 3.01* 3.52      < > 98  --  99  --  97*  --  98  --   --    CO2 28   < > 31*  --  26  --  32*  --  29  --   --    PHOS 4.0  --  4.4  --   --   --   --   --  4.1  --   --    *   < > 118*  --  109*  --  109*  --  110*  --   --    CREATININE 3.6*   < > 3.1*  --  3.3*  --  3.2*  --  3.1*  --   --     < > = values in this interval not displayed.        MG:   Lab Results   Component Value Date    MG 1.8 10/07/2021     Ca/Phos:   Lab Results   Component Value Date CALCIUM 7.7 (L) 10/07/2021    PHOS 4.1 10/07/2021     Amylase: No results found for: AMYLASE  Lipase:   Lab Results   Component Value Date    LIPASE 7 (L) 09/28/2021     LIVER PROFILE:   Recent Labs     10/05/21  0410 10/06/21  0451 10/07/21  0358   AST 23 23 24   ALT 18 19 20   BILITOT 1.6* 1.3* 1.3*   ALKPHOS 72 61 57       PT/INR:   No results for input(s): PROTIME, INR in the last 72 hours. APTT:   No results for input(s): APTT in the last 72 hours. Cardiac Enzymes:  Lab Results   Component Value Date    CKMB 94.0 (H) 09/28/2021       Hgb A1C:   Lab Results   Component Value Date    LABA1C 5.1 09/10/2021     No results found for: EAG  CRISTIANA: No results found for: CRISTIANA  ESR: No results found for: SEDRATE  CRP: No results found for: CRP  D Dimer: No results found for: DDIMER  Folate and B12: No results found for: [id number], No results found for: FOLATE              PROBLEM LIST:  Patient Active Problem List   Diagnosis    CAD in native artery    Pulmonary nodules    Unstable angina (HCC)    Other hyperlipidemia    Essential hypertension    Gastroesophageal reflux disease with esophagitis without hemorrhage    S/P CABG (coronary artery bypass graft)    Acute respiratory failure with hypoxia (HCC)    Lactic acidemia    [facility]    Hypokalemia    Hypocalcemia    Cardiogenic shock (HCC)    Thrombocytopenia ([address]    Leukocytosis    Encounter regarding vascular access for dialysis for ESRD ([facility]    Acute right-sided weakness               ASSESSMENT:  1.) Acute Respiratory failure   2.)cardiogenic shock vs other types of shock,septic ,etc   3. )Large mucus plug obstruction left main bronchus and RUL   4. )CAD s/P CABG   5.)NGUYỄN       PLAN:  Extubated   Aggressive pulmonary toilet  Once transfer to [facility] ,will check another US and if still any fluid will drain ,but CXR looks great ,and doubt any need to so .   Discussed with wife   Diuresis as per renal      :RADHA HUBER, MD  Pulmonary/Critical care [place]

## 2021-10-08 NOTE — PROGRESS NOTES
Mally Riddle is a 78 y.o.  male     Neurology is following for suspected stroke     PMH significant for HTN, HLD, ESRD, CAD s/p CABG on 9/27/21` Following CABG, patient developed hypotension, acidosis, NGUYỄN and respiratory failure and was intubated` Bronchoscopy done on 9/30 to remove large mucus plug` Required pressors and was placed on aortic balloon pump which was removed 10/4/21` Off of sedation was found to have right sided weakness` CT head negative for acute findings` Echo showed no LV mural thrombus` Carotid US with atherosclerotic disease -- 50-69% bilaterally` Unable to have CTAs due to kidney function` Unable to have MRI/MRA at this time due to epicardial wires`     Repeat CT head with equivocal area of low density in the L cerebellum. Video swallow with moderate pharyngeal phase dysphagia     He continues to have R sided weakness. No new neuro deficits  Wife at bedside. All questions answered at this time.     + R side weakness  ROS otherwise negative        Objective:       BP (!) 111/57   Pulse 85   Temp 97.9 °F (36.6 °C) (Temporal)   Resp 23   Ht 5' 8\" (1.727 m)   Wt 158 lb 11.7 oz (72 kg)   SpO2 95%   BMI 24.14 kg/m²        General appearance: alert, appears stated age and cooperative  Head: Normocephalic, without obvious abnormality, atraumatic  Eyes: conjunctivae/corneas clear  Neck: no adenopathy, no carotid bruit, no JVD, supple, symmetrical, trachea midline and thyroid not enlarged, symmetric, no tenderness/mass/nodules  Lungs: clear to auscultation bilaterally  Heart: regular rate and rhythm, S1, S2 normal, no murmur, click, rub or gallop  Extremities: extremities normal, atraumatic, no cyanosis or edema  Skin: Skin color, texture, turgor normal. No rashes or lesions     Mental Status: Alert. Oriented to self, place, wife. Follows commands well. Mild dysarthria.  No aphasia     Cranial Nerves:  I: smell    II: visual acuity     II: visual fields Bilateral threat    II: pupils EMANUEL III,VII: ptosis None   III,IV,VI: extraocular muscles  Full ROM   V: mastication    V: facial light touch sensation  Normal   V,VII: corneal reflex     VII: facial muscle function - upper  Normal   VII: facial muscle function - lower Appears symmetric around ETT   VIII: hearing Normal   IX: soft palate elevation     IX,X: gag reflex Present   XI: trapezius strength  5/5 L; 3/5 R   XI: sternocleidomastoid strength 5/5   XI: neck extension strength     XII: tongue strength       Motor:  R hemiparesis arm more than leg   Normal tone and bulk   No abnormal movements     Sensory:  LT intact distally in all limbs     Coordination:   FFM intact on L; unable on R r/t weakness     Gait:  Deferred for patient safety     DTR:   +1 throughout     No Babinskis    No pathological reflexes    Laboratory/Radiology:     CBC with Differential:    Lab Results   Component Value Date    WBC 19.6 10/08/2021    RBC 3.01 10/08/2021    HGB 8.6 10/08/2021    HCT 26.4 10/08/2021     10/08/2021    MCV 87.7 10/08/2021    MCH 28.6 10/08/2021    MCHC 32.6 10/08/2021    RDW 15.0 10/08/2021    LYMPHOPCT 5.1 09/28/2021    MONOPCT 4.4 09/28/2021    BASOPCT 0.1 09/28/2021    MONOSABS 0.73 09/28/2021    LYMPHSABS 0.86 09/28/2021    EOSABS 0.00 09/28/2021    BASOSABS 0.01 09/28/2021     CMP:    Lab Results   Component Value Date     10/08/2021    K 3.7 10/08/2021    K 3.3 10/07/2021     10/08/2021    CO2 27 10/08/2021    BUN 98 10/08/2021    CREATININE 2.9 10/08/2021    GFRAA 26 10/08/2021    LABGLOM 21 10/08/2021    GLUCOSE 130 10/08/2021    PROT 4.9 10/08/2021    LABALBU 3.1 10/08/2021    CALCIUM 7.9 10/08/2021    BILITOT 1.2 10/08/2021    ALKPHOS 51 10/08/2021    AST 19 10/08/2021    ALT 16 10/08/2021     HgBA1c:    Lab Results   Component Value Date    LABA1C 5.1 09/10/2021     FLP:    Lab Results   Component Value Date    TRIG 93 10/05/2021    HDL 41 10/05/2021    LDLCALC 48 10/05/2021    LABVLDL 19 10/05/2021     CT head  No acute intracranial abnormality.     Sinus disease, which can be seen with sinusitis.  There is fluid within  bilateral mastoid air cells.     Scalp soft tissue swelling    R/p CT head  1. Equivocal area of low-density in the left cerebellum.  If clinically  indicated, MRI of the brain could be done to evaluate for acute infarct. 2. Mild chronic white matter ischemic changes.  No evidence of intracranial  hemorrhage. 3. Slight improvement of sinus disease and bilateral mastoid effusions. CUS 9/10/21  Atherosclerotic disease. No hemodynamically significant stenosis is  identified  Estimated stenosis by NASCET criteria in the proximal right carotid  artery is between 0% and 49%. Estimated stenosis by NASCET criteria in the proximal left carotid  artery is between 0% and 49%.     CUS 10/5/21  The right internal carotid artery demonstrates 50-69 percent stenosis.     The left internal carotid artery demonstrates 50-69 percent stenosis.     Bilateral vertebral arteries are patent with flow in the normal direction.     Atherosclerotic plaque is present at both carotid bifurcations. Echo    Left ventricle size is normal.   Ejection fraction is visually estimated at 40+/-5%. LV apex is dyskinetic and mid to distal anterior and anteroseptal walls   are severely hypokinetic. Normal left ventricle wall thickness. Stage I diastolic dysfunction. No LV mural thrombus. Normal right ventricular size and function. No hemodynamically significant aortic stenosis is present. Unable to estimate PA systolic pressure. No evidence for hemodynamically significant pericardial effusion. Technically difficult examination. Definity echo contrast was used to   delineate endocardial borders.     I personally reviewed all labs and images today   Assessment:     Suspected left hemispheric stroke after CABG    With persistent R hemiparesis on exam   Repeat CT head with area of hypodensity in the L  cerebellum, CUS reporting 50-69% stenosis b/l--  previous CUS in 9/2021 with 0-49% b/l.  Unable to  get intracranial vessel imaging at this time as  unable to get MRI/MRA due to epicardial wires  and unable to do CTAs due to kidney function    Echo with no LV thrombus     Plan:     Continue ASA and statin     Recommend starting Plavix if okay with others     Recommend MRI brain/MRA head and neck as OP when able     Recommend vascular surgery follow up for carotid stenosis monitoring as OP     PCDs     PT/OT/speech     Stroke education     Neurology will be available as needed, please call with questions or new issues        Vince Johansen PA-C  10:49 AM  10/8/2021

## 2021-10-08 NOTE — PROGRESS NOTES
OCCUPATIONAL THERAPY TREATMENT NOTE    Lisa Valarie [facility] [address]  [address] [place] [address]       PDGW:                                                               Patient Name: Elizabeth Hebert  MRN: [id number]  : 1945  Room: [address] [place] [address]    Evaluating OT: Tony Bates, OTR/L [id number]     Referring Provider: Modesto Berry MD   Specific Provider Orders/Date: OT eval and treat (10/5/21)        Diagnosis: CAD   Surgery/Procedures:  CABG x 3   *Following CABG, patient developed hypotension, acidosis, NGUYỄN and respiratory failure and was intubated` Bronchoscopy done on  to remove large mucus plug; pt required pressors and was placed on aortic balloon pump which was removed 10/4/21. Off of sedation, pt was found to have right sided weakness     Pertinent Medical History: CAD, ESRD, HLD, HTN, hiatal hernia      *Precautions:  Fall Risk,O2, , sternal precautions, R hemiparesis     Assessment of current deficits   [x]? Functional mobility          [x]? ADLs           [x]? Strength                  [x]? Cognition   [x]? Functional transfers        [x]? IADLs         [x]? Safety Awareness   [x]? Endurance   [x]? Fine Coordination           [x]? ROM           [x]? Vision/perception    [x]? Sensation     [x]? Gross Motor Coordination [x]? Balance    [x]? Delirium                  [x]? Motor Control     []?  Communication     OT PLAN OF CARE   OT POC based on physician orders, patient diagnosis and results of clinical assessment.        Frequency/Duration: 1-3 days/wk for 1-2 weeks PRN    Specific OT Treatment to include:   ADL retraining/adapted techniques and AE recommendations to increase functional independence within precautions                    Energy conservation techniques to improve tolerance for selfcare routine   Functional transfer/mobility training/DME recommendations for increased independence, safety and fall prevention         Patient/family education to increase safety and functional independence within precautions             Environmental modifications for safe mobility and completion of ADLs                           Cognitive retraining ex's to improve problem solving skills & safe participation in ADLs/IADLs  Sensory re-education techniques to improve extremity awareness, maintain skin integrity and improve hand function                             Splinting/positioning needs to maintain joint/skin integrity and prevent contractures  Therapeutic activity to improve functional performance during ADLs/IADLs                                         Therapeutic exercise to improve tolerance and functional strength for ADLs   Balance retraining exercises/tasks for facilitation of postural control with dynamic challenges during ADLs . Positioning to improve functional independence  Neuromuscular re-education: facilitation of righting/equilibrium reactions, normalization muscle tone/facilitation active functional movement                      Delirium prevention/treatment     Modified Gordon Scale   Score     Description  0             No symptoms  1             No significant disability despite symptoms  2             Slight disability; able to look after own affairs  3             Moderate disability; able to ambulate without assist/ requires assist with ADLs  4             Moderate/Severe disability;requires assist to ambulate/assist with ADLs  5             Severe disability;bedridden/incontinent   6               Score:   4     Recommended Adaptive Equipment: TBA: ADL AE, bathroom DME, AD as indicated pending progress/restrictions     Home Living: Pt lives with wife  in a mobile home with 4 step(s) to enter and 1 rail(s)  Bathroom setup: walk in shower with rail; higher commode with rail. Equipment owned: no DME     Prior Level of Function: IND with ADLs;  IND with IADLs. No device for ambulation.    Driving: yes  Occupation: retired     Pain Level: pt c/o 0/10  pain  this session     Cognition: A&O: 4/4   Follows 1-2 step commands appropriately with min cues to focus. Pt requires min cues to regard R side of body during activity and to maintain midline orientation. Memory: fair; able to recall sternal precautions; 3/3 word recall             Comprehension fair             Problem solving: fair-             Judgement/safety: fair                Communication skills: pt more verbal today; normal conversation and asking appropriate questions. Pt interacting with wife. Vision: visual tracking intact; R inattention/body awareness                   Glasses:yes                                                      Hearing: WFL               RASS: 0  CAM-ICU: (NT) Delirium     UE Assessment:  Hand Dominance: Right [x]? Left []?       ROM Strength STM goal: PRN   RUE  Shoulder: gross shrug; gross horizontal add/abd; elbow grossly 30 deg; forearm supination to neutral; gross wrist ext; gross grasp with impaired DELTA [facility] Shoulder: 2-/5  Elbow: 2-/5  Forearm: 2-/5  Wrist 2-/5  Grasp 3-/5  Thumb 2-/5   Pt to demo G tolerance with R UE ROM/facilitation techniques to maintain jt mobility and increase body awareness for participation in ADLs.           LUE Limited shoulder ROM; WFL distal   Grossly 3-/5 proximal  3+/5 distal  WFL for ADLs; grossly 4/5         Sensation: No c/o numbness/tingling in extremities. Impaired finger ID in L hand.   Tone: WFL  Edema: min edema B hands     Functional Assessment:  AM-PAC Daily Activity Raw Score: 8/24    Initial Eval Status  Date: 10/6 Treatment Status  Date:10/8 STGs = LTGs  Time frame: 7-14 days   Feeding NGT NPO       Re-assess when indicated   Grooming Dep  Max A  set up  [facility] assist to use R UE during task; poor functional grasp on wash cloth    Max A using L UE due to shoulder weakness                       Min A   while seated supported; using B UE's as fair functional assists.       UB dressing/bathing Dep Max A  Max cues to locate R UE                       Mod A  Min cues for R body awareness and initiation of amber dressing techniques         LB dressing/bathing Dep  Dep                       Mod A   using AE as needed for safe reach/ energy conservation        Toileting Dep  Dep                             Bed Mobility  Supine to sit: Dep+2     Sit to supine: Dep+2 Supine to sit: Max A+2     Sit to supine: NTT                           Mod A  in prep of ADL tasks & transfers   Functional Transfers Sit to stand: NT     Stand to sit: NT Sit to stand: Max A+2 from EOB    SPT: Dep                        Mod A  sit<>stand/functional bathroom transfers using AD/DME as needed for balance and safety   Functional Mobility NT NT                       Mod A   functional/bathroom mobility using AD as needed & demonstrating G safety      Balance Sitting:     Static:  Dep/posterior LOB with L sided preference     Dynamic:Dep  Standing: NT  Sitting: Max A/R sided/posterior LOB; max cues to initiate trunk    Standing: NT Min A dynamic sitting balance; Mod A dynamic standing balance  during ADL tasks & transfers   Endurance/Activity Tolerance    F tolerance with light activity. Tolerated >5 min EOB. Fair tolerance with light to moderate activity.  G   tolerance with moderate activity/self care routine   Visual/  Perceptual Impaired: R sided attention; midline orientation                             Vitals:   HR at rest: 86 bpm HR at end of session: 93 bpm   Spo2 at rest:94% Spo2 at end of session: 97%   BP at rest: NT (equipment malfunction) mmHg BP at end of session: NTmmHg     Treatment: OT treatment provided this date:  Bed mobility: Instruction on precautions prior to bed mobility to facilitate safe transfers and ADLS. Pt required 2 person assist for safe mobility due to complexity of medical condition, medical lines and deconditioning.  HOB elevated to assist. Pt more readily moves R side of body to assist.  Balance retraining: Performed sitting balance ex's with instruction to facilitate righting reactions with postural changes during ADLS. Pt demo LOB to R/posterior. Pt requires cues for maintaining head control/midline orientation. Pt with L sided preference; improving once positioned up in chair. ADL retraining: Instruction on adapted techniques/ amber techniques and [place] assist to increase body awareness and participation in ADLs  ROM/exercise: B UE A/AROM ex's within precautions/restrictions to increase L UE functional use and to facilitate R UE ROM. R UE elevated on pillow. Pt/wife instructed on ex's to perform bedside. Delirium Prevention: Environmental and sensory modifications assessed and implemented to decrease ICU acquired delirium and to improve overall orientation, mentation and pt interaction with family/staff. Line management and environmental modifications made prior to and end of session to ensure patient safety and to increase efficiency of session. Skilled monitoring of HR, O2 saturation, blood pressure and patient's response to activity performed throughout session. Comments: OK from RN to see patient. Upon arrival, patient supine in bed, awake & agreeable to session. Wife present. Pt demo fair tolerance with fair understanding of education/techniques. At end of session, patient left seated in chair to increase activity tolerance, head control and interaction with wife. Pillow/towel roll placed under R UE for comfort & edema control. .  Call light within reach, all lines and tubes intact. Pt instructed on use of call light for assistance and fall prevention. Overall, pt presents with decreased activity tolerance, dynamic balance, functional mobility and perceptual deficits limiting completion of ADLs and safe return home. Pt can benefit from continued skilled OT to increase safety, functional independence & quality of life. · Pt has made good progress towards set goals. · Continue with current plan of care       Time In:1010            Time Out: 9420      Total Treatment Time: 30      Treatment Charges: Mins Units   Ther Ex  21284     Manual Therapy [id number] [facility]     Thera Activities [id number]   ADL/Home Mgt [id number]     Neuro Re-ed [id number]     Orthotic manage/training  [id number]     Non-Billable Time         Tony Bates, OTR/L [id number]

## 2021-10-09 ENCOUNTER — APPOINTMENT (OUTPATIENT)
Dept: GENERAL RADIOLOGY | Age: 76
DRG: 235 | End: 2021-10-09
Attending: THORACIC SURGERY (CARDIOTHORACIC VASCULAR SURGERY)
Payer: MEDICARE

## 2021-10-09 LAB
ANION GAP SERPL CALCULATED.3IONS-SCNC: 9 MMOL/L (ref 7–16)
BUN BLDV-MCNC: 91 MG/DL (ref 6–23)
C-REACTIVE PROTEIN: 1.6 MG/DL (ref 0–0.4)
CALCIUM SERPL-MCNC: 8.1 MG/DL (ref 8.6–10.2)
CHLORIDE BLD-SCNC: 103 MMOL/L (ref 98–107)
CO2: 30 MMOL/L (ref 22–29)
CREAT SERPL-MCNC: 2.7 MG/DL (ref 0.7–1.2)
GFR AFRICAN AMERICAN: 28
GFR NON-AFRICAN AMERICAN: 23 ML/MIN/1.73
GLUCOSE BLD-MCNC: 106 MG/DL (ref 74–99)
HCT VFR BLD CALC: 27.1 % (ref 37–54)
HEMOGLOBIN: 8.8 G/DL (ref 12.5–16.5)
MAGNESIUM: 2.5 MG/DL (ref 1.6–2.6)
MCH RBC QN AUTO: 28.9 PG (ref 26–35)
MCHC RBC AUTO-ENTMCNC: 32.5 % (ref 32–34.5)
MCV RBC AUTO: 89.1 FL (ref 80–99.9)
METER GLUCOSE: 110 MG/DL (ref 74–99)
METER GLUCOSE: 117 MG/DL (ref 74–99)
METER GLUCOSE: 150 MG/DL (ref 74–99)
PDW BLD-RTO: 15 FL (ref 11.5–15)
PHOSPHORUS: 3.2 MG/DL (ref 2.5–4.5)
PLATELET # BLD: 187 E9/L (ref 130–450)
PMV BLD AUTO: 11.5 FL (ref 7–12)
POTASSIUM SERPL-SCNC: 3.1 MMOL/L (ref 3.5–5)
POTASSIUM SERPL-SCNC: 3.3 MMOL/L (ref 3.5–5)
POTASSIUM SERPL-SCNC: 3.5 MMOL/L (ref 3.5–5)
POTASSIUM SERPL-SCNC: 3.9 MMOL/L (ref 3.5–5)
PROCALCITONIN: 0.46 NG/ML (ref 0–0.08)
RBC # BLD: 3.04 E12/L (ref 3.8–5.8)
SODIUM BLD-SCNC: 142 MMOL/L (ref 132–146)
WBC # BLD: 21 E9/L (ref 4.5–11.5)

## 2021-10-09 PROCEDURE — 6360000002 HC RX W HCPCS: Performed by: PHYSICIAN ASSISTANT

## 2021-10-09 PROCEDURE — 71045 X-RAY EXAM CHEST 1 VIEW: CPT

## 2021-10-09 PROCEDURE — 2580000003 HC RX 258: Performed by: INTERNAL MEDICINE

## 2021-10-09 PROCEDURE — 2580000003 HC RX 258: Performed by: NURSE PRACTITIONER

## 2021-10-09 PROCEDURE — 83735 ASSAY OF MAGNESIUM: CPT

## 2021-10-09 PROCEDURE — 36592 COLLECT BLOOD FROM PICC: CPT

## 2021-10-09 PROCEDURE — 6370000000 HC RX 637 (ALT 250 FOR IP): Performed by: INTERNAL MEDICINE

## 2021-10-09 PROCEDURE — 84132 ASSAY OF SERUM POTASSIUM: CPT

## 2021-10-09 PROCEDURE — 36415 COLL VENOUS BLD VENIPUNCTURE: CPT

## 2021-10-09 PROCEDURE — 99233 SBSQ HOSP IP/OBS HIGH 50: CPT | Performed by: INTERNAL MEDICINE

## 2021-10-09 PROCEDURE — 94640 AIRWAY INHALATION TREATMENT: CPT

## 2021-10-09 PROCEDURE — 80048 BASIC METABOLIC PNL TOTAL CA: CPT

## 2021-10-09 PROCEDURE — 6370000000 HC RX 637 (ALT 250 FOR IP): Performed by: THORACIC SURGERY (CARDIOTHORACIC VASCULAR SURGERY)

## 2021-10-09 PROCEDURE — 6360000002 HC RX W HCPCS: Performed by: NURSE PRACTITIONER

## 2021-10-09 PROCEDURE — 82962 GLUCOSE BLOOD TEST: CPT

## 2021-10-09 PROCEDURE — 6370000000 HC RX 637 (ALT 250 FOR IP): Performed by: PHYSICIAN ASSISTANT

## 2021-10-09 PROCEDURE — 2000000000 HC ICU R&B

## 2021-10-09 PROCEDURE — 84100 ASSAY OF PHOSPHORUS: CPT

## 2021-10-09 PROCEDURE — C9113 INJ PANTOPRAZOLE SODIUM, VIA: HCPCS | Performed by: SURGERY

## 2021-10-09 PROCEDURE — 85027 COMPLETE CBC AUTOMATED: CPT

## 2021-10-09 PROCEDURE — 86140 C-REACTIVE PROTEIN: CPT

## 2021-10-09 PROCEDURE — 6360000002 HC RX W HCPCS: Performed by: SURGERY

## 2021-10-09 PROCEDURE — 84145 PROCALCITONIN (PCT): CPT

## 2021-10-09 PROCEDURE — 6370000000 HC RX 637 (ALT 250 FOR IP): Performed by: NURSE PRACTITIONER

## 2021-10-09 PROCEDURE — 6360000002 HC RX W HCPCS: Performed by: INTERNAL MEDICINE

## 2021-10-09 PROCEDURE — 2580000003 HC RX 258: Performed by: SURGERY

## 2021-10-09 RX ADMIN — SODIUM CHLORIDE, PRESERVATIVE FREE 10 ML: 5 INJECTION INTRAVENOUS at 08:56

## 2021-10-09 RX ADMIN — AMIODARONE HYDROCHLORIDE 400 MG: 200 TABLET ORAL at 08:55

## 2021-10-09 RX ADMIN — POTASSIUM CHLORIDE 20 MEQ: 29.8 INJECTION, SOLUTION INTRAVENOUS at 07:25

## 2021-10-09 RX ADMIN — PANTOPRAZOLE SODIUM 40 MG: 40 INJECTION, POWDER, FOR SOLUTION INTRAVENOUS at 20:13

## 2021-10-09 RX ADMIN — AMIODARONE HYDROCHLORIDE 400 MG: 200 TABLET ORAL at 20:13

## 2021-10-09 RX ADMIN — PANTOPRAZOLE SODIUM 40 MG: 40 INJECTION, POWDER, FOR SOLUTION INTRAVENOUS at 08:54

## 2021-10-09 RX ADMIN — Medication 10 ML: at 20:13

## 2021-10-09 RX ADMIN — POTASSIUM CHLORIDE 20 MEQ: 400 INJECTION, SOLUTION INTRAVENOUS at 14:10

## 2021-10-09 RX ADMIN — PIPERACILLIN AND TAZOBACTAM 3375 MG: 3; .375 INJECTION, POWDER, LYOPHILIZED, FOR SOLUTION INTRAVENOUS at 14:11

## 2021-10-09 RX ADMIN — POTASSIUM CHLORIDE 20 MEQ: 400 INJECTION, SOLUTION INTRAVENOUS at 15:07

## 2021-10-09 RX ADMIN — CARVEDILOL 3.12 MG: 3.12 TABLET, FILM COATED ORAL at 08:51

## 2021-10-09 RX ADMIN — ASPIRIN 81 MG CHEWABLE TABLET 81 MG: 81 TABLET CHEWABLE at 08:55

## 2021-10-09 RX ADMIN — IPRATROPIUM BROMIDE AND ALBUTEROL SULFATE 1 AMPULE: .5; 2.5 SOLUTION RESPIRATORY (INHALATION) at 07:59

## 2021-10-09 RX ADMIN — CLOPIDOGREL 75 MG: 75 TABLET, FILM COATED ORAL at 08:55

## 2021-10-09 RX ADMIN — IPRATROPIUM BROMIDE AND ALBUTEROL SULFATE 1 AMPULE: .5; 2.5 SOLUTION RESPIRATORY (INHALATION) at 18:38

## 2021-10-09 RX ADMIN — Medication 10 ML: at 08:51

## 2021-10-09 RX ADMIN — SODIUM CHLORIDE, PRESERVATIVE FREE 300 UNITS: 5 INJECTION INTRAVENOUS at 08:54

## 2021-10-09 RX ADMIN — CARVEDILOL 3.12 MG: 3.12 TABLET, FILM COATED ORAL at 17:19

## 2021-10-09 RX ADMIN — ATORVASTATIN CALCIUM 40 MG: 40 TABLET, FILM COATED ORAL at 20:13

## 2021-10-09 RX ADMIN — BUMETANIDE 1 MG: 1 TABLET ORAL at 08:51

## 2021-10-09 RX ADMIN — PIPERACILLIN AND TAZOBACTAM 3375 MG: 3; .375 INJECTION, POWDER, LYOPHILIZED, FOR SOLUTION INTRAVENOUS at 04:33

## 2021-10-09 RX ADMIN — POTASSIUM CHLORIDE 20 MEQ: 29.8 INJECTION, SOLUTION INTRAVENOUS at 06:23

## 2021-10-09 RX ADMIN — PIPERACILLIN AND TAZOBACTAM 3375 MG: 3; .375 INJECTION, POWDER, LYOPHILIZED, FOR SOLUTION INTRAVENOUS at 22:45

## 2021-10-09 RX ADMIN — SODIUM CHLORIDE, PRESERVATIVE FREE 10 ML: 5 INJECTION INTRAVENOUS at 20:15

## 2021-10-09 RX ADMIN — SODIUM CHLORIDE, PRESERVATIVE FREE 300 UNITS: 5 INJECTION INTRAVENOUS at 20:15

## 2021-10-09 RX ADMIN — INSULIN LISPRO 3 UNITS: 100 INJECTION, SOLUTION INTRAVENOUS; SUBCUTANEOUS at 18:03

## 2021-10-09 RX ADMIN — IPRATROPIUM BROMIDE AND ALBUTEROL SULFATE 1 AMPULE: .5; 2.5 SOLUTION RESPIRATORY (INHALATION) at 11:07

## 2021-10-09 RX ADMIN — POTASSIUM CHLORIDE 20 MEQ: 400 INJECTION, SOLUTION INTRAVENOUS at 20:13

## 2021-10-09 ASSESSMENT — PAIN SCALES - GENERAL
PAINLEVEL_OUTOF10: 0

## 2021-10-09 NOTE — PLAN OF CARE
Problem: Falls - Risk of:  Goal: Will remain free from falls  Description: Will remain free from falls  10/9/2021 1007 by Nichole Frias RN  Outcome: Met This Shift  10/8/2021 2322 by Donovan Driscoll RN  Outcome: Met This Shift  Goal: Absence of physical injury  Description: Absence of physical injury  10/9/2021 1007 by Nichole Frias RN  Outcome: Met This Shift  10/8/2021 2322 by Donovan Driscoll RN  Outcome: Met This Shift     Problem:  Activity Intolerance:  Goal: Able to perform prescribed physical activity  Description: Able to perform prescribed physical activity  10/9/2021 1007 by Nichole Frias RN  Outcome: Met This Shift  10/8/2021 2322 by Donovan Driscoll RN  Outcome: Met This Shift  Goal: Ability to tolerate increased activity will improve  Description: Ability to tolerate increased activity will improve  10/9/2021 1007 by Nichole Frias RN  Outcome: Met This Shift  10/8/2021 2322 by Donovan Driscoll RN  Outcome: Met This Shift     Problem: Cardiac Output - Decreased:  Goal: Cardiac output within specified parameters  Description: Cardiac output within specified parameters  10/9/2021 1007 by Nichole Frias RN  Outcome: Met This Shift  10/8/2021 2322 by Donovan Driscoll RN  Outcome: Met This Shift  Goal: Hemodynamic stability will improve  Description: Hemodynamic stability will improve  10/9/2021 1007 by Nichole Frias RN  Outcome: Met This Shift  10/8/2021 2322 by Donovan Driscoll RN  Outcome: Met This Shift     Problem: Fluid Volume - Imbalance:  Goal: Chest tube drainage is within specified parameters  Description: Chest tube drainage is within specified parameters  Outcome: Met This Shift     Problem: Gas Exchange - Impaired:  Goal: Levels of oxygenation will improve  Description: Levels of oxygenation will improve  Outcome: Met This Shift     Problem: Pain:  Goal: Pain level will decrease  Description: Pain level will decrease  10/9/2021 1007 by Nichole Frias RN  Outcome: Met This Shift  10/8/2021 2322 by Isaiah Silveira RN  Outcome: Met This Shift  Goal: Control of acute pain  Description: Control of acute pain  10/9/2021 1007 by Naif Kimball RN  Outcome: Met This Shift  10/8/2021 2322 by Isaiah Silveira RN  Outcome: Met This Shift  Goal: Control of chronic pain  Description: Control of chronic pain  10/8/2021 2322 by Isaiah Silveira RN  Outcome: Met This Shift     Problem: Tissue Perfusion - Cardiopulmonary, Altered:  Goal: Absence of angina  Description: Absence of angina  Outcome: Met This Shift  Goal: Will show no evidence of cardiac arrhythmias  Description: Will show no evidence of cardiac arrhythmias  Outcome: Met This Shift     Problem: Pain:  Goal: Pain level will decrease  Description: Pain level will decrease  10/9/2021 1007 by Naif Kimball RN  Outcome: Met This Shift  10/8/2021 2322 by Isaiah Silveira RN  Outcome: Met This Shift

## 2021-10-09 NOTE — FLOWSHEET NOTE
Patient attempting to get out of bed, moving both legs to edge of bed and using left arm to pull himself over, instructed patient that he is not suppose to lift, push, or pull anything greater than 5lbs with his arms. I told him that he could injure his chest incision that could possibly require surgery to repair. Patient is upset and frustrated and wants to leave the hospital, stated \"I am getting out of here. \" I strongly urged him to stay in bed at this time for his safety. Patient knows he is in the hospital, knows that he had a heart attack, and can tell me the appropriate year and month, but seems agitated and paranoid, stating \"you guys are poisoning me with the medication you are putting in my veins so that I can't move my arm. \" I assured patient that he was just receiving and antibiotic and potassium IV. Will continue to monitor and reassure patient of his care.

## 2021-10-09 NOTE — PROGRESS NOTES
Department of Internal Medicine  Nephrology Progress Note      Events reviewed. SUBJECTIVE: We are following Mr. Mehul [address] for NGUYỄN. Reports no complaints.      PHYSICAL EXAM:      Vitals:    VITALS:  BP (!) 115/40   Pulse 81   Temp 97.4 °F (36.3 °C) (Temporal)   Resp 16   Ht 5' 8\" (1.727 m)   Wt 158 lb 11.7 oz (72 kg)   SpO2 97%   BMI 24.14 kg/m²   24HR INTAKE/OUTPUT:      Intake/Output Summary (Last 24 hours) at 10/9/2021 1046  Last data filed at 10/9/2021 0900  Gross per 24 hour   Intake 478.05 ml   Output 1125 ml   Net -646.95 ml     Constitutional: Patient is awake, alert in no distress  HEENT: Pupils are equal reactive  Respiratory: Decreased breath sounds at the bases  Cardiovascular/Edema: Heart sounds are regular  Gastrointestinal: Abdomen soft  Neurologic: Patient alert   Other: +trace edema      Scheduled Meds:   piperacillin-tazobactam  3,375 mg IntraVENous Q8H    clopidogrel  75 mg Oral Daily    bumetanide  1 mg Oral Daily    insulin lispro  0-18 Units SubCUTAneous 4 times per day    carvedilol  3.125 mg Oral BID WC    amiodarone  400 mg Oral BID    pantoprazole  40 mg IntraVENous BID    And    sodium chloride (PF)  10 mL IntraVENous BID    lidocaine PF  5 mL IntraDERmal Once    sodium chloride flush  5-40 mL IntraVENous 2 times per day    heparin flush  3 mL IntraVENous 2 times per day    aspirin  81 mg Oral Daily    chlorhexidine  15 mL Mouth/Throat BID    docusate sodium  100 mg Oral BID    sennosides  5 mL Oral BID    atorvastatin  40 mg Oral Daily    ipratropium-albuterol  1 ampule Inhalation Q4H WA     Continuous Infusions:   dextrose 5% and 0.45% NaCl with KCl 20 mEq 50 mL/hr at 10/07/21 2334    sodium chloride      dextrose       PRN Meds:.potassium chloride, calcium gluconate **OR** [DISCONTINUED] calcium gluconate **OR** [DISCONTINUED] calcium gluconate **OR** [DISCONTINUED] calcium gluconate, sodium chloride flush, sodium chloride, heparin flush, fentanNYL, artificial tears, ondansetron, acetaminophen, acetaminophen, oxyCODONE **OR** oxyCODONE, magnesium hydroxide, potassium chloride, magnesium sulfate, albumin human, glucose, dextrose, glucagon (rDNA), dextrose, calcium gluconate IVPB    DATA:    CBC:   Lab Results   Component Value Date    WBC 21.0 10/09/2021    RBC 3.04 10/09/2021    HGB 8.8 10/09/2021    HCT 27.1 10/09/2021    MCV 89.1 10/09/2021    MCH 28.9 10/09/2021    MCHC 32.5 10/09/2021    RDW 15.0 10/09/2021     10/09/2021    MPV 11.5 10/09/2021     CMP:    Lab Results   Component Value Date     10/09/2021    K 3.1 10/09/2021    K 3.3 10/07/2021     10/09/2021    CO2 30 10/09/2021    BUN 91 10/09/2021    CREATININE 2.7 10/09/2021    GFRAA 28 10/09/2021    LABGLOM 23 10/09/2021    GLUCOSE 106 10/09/2021    PROT 4.9 10/08/2021    LABALBU 3.1 10/08/2021    CALCIUM 8.1 10/09/2021    BILITOT 1.2 10/08/2021    ALKPHOS 51 10/08/2021    AST 19 10/08/2021    ALT 16 10/08/2021     Magnesium:    Lab Results   Component Value Date    MG 2.5 10/09/2021     Phosphorus:    Lab Results   Component Value Date    PHOS 3.2 10/09/2021        Radiology Review:      CXR 10/3/21   1. Median sternotomy changes. 2. Bilateral pleuroparenchymal opacities that could be related to pleural   effusion and edema.  The appearance of the chest is about the same or   slightly worse.         Chest x-ray October 6, 2021   1. Stable appearance of the postoperative chest.   2. Bilateral chest tubes remain in position.  There is no right or left   pneumothorax.    3. Small right pleural effusion         Chest x-ray October 8, 2021   Minimal bibasilar atelectasis.       Trace right pleural effusion       There is no pneumothorax             BRIEF SUMMARY OF INITIAL CONSULT:    Briefly Mr. Raeann Jackson is a 62-year-old man with history of HTN, CAD with recent status cardiac catheterization done on September 9 which showed severe multivessel disease, hyperlipidemia, hiatal hernia, who was admitted for elective CABG on September 27, 2021. On admission his creatinine level was 0.8 mg/dL and post surgery his creatinine has progressively increased up to 2.3 mg/dL, reason for this consultation. Postoperatively she developed persistent hypotension, lactic acidosis and respiratory failure for which he was reintubated. Since then she has required multiple pressors and he was placed on IABP. He had received 1 dose of ketorolac perioperatively. His urine output has significantly decrease and is being about 500 cc/day in the last 48 hours and his fluid balance presently is over 9 L. Problems resolved:    · Cardiogenic shock, hemodynamically more stable, pressor support decrease, still on IABP. Tentative plan for removal of IABP. Pro-BP 16,936  · Hypernatremia with hypervolemia, 2/2 sodium containing IV fluid resuscitation and underlying heart failure. Resolved with  natriuresis and free water (add D5W)  · Hypokalemia, 2/2 diuretics, potassium levels improve  · Respiratory alkalosis (pH: 7.554, PCO2: 74.1) with metabolic alkalosis (bicarbonate administration)  · Respiratory failure status post intubation  · Thrombocytopenia  · Transaminitis with hyperbilirubinemia, possibly shock liver versus congestive liver    IMPRESSION/RECOMMENDATIONS:      1. NGUYỄN stage III, CABG associated NGUYỄN, ischemic ATN, non-oliguric. FEUrea 18.8%. Renal function continues to improve with decreasing creatinine levels and adequate urine output. 2. HFmEF 40% with stage IDD, proBNP 16,826 > 9461, s/p bumex drip     ---------------------------------------------------------    3. CAD status post CABG x3 September 27, 2021, on ASA, clopidogrel, carvedilol, atorvastatin  4. Amiodarone therapy, for AF prophylaxis  5. Probably pneumonia, on piperacillin-tazobactam  6.  Normocytic anemia, status post surgery, getting transfused      Plan:    · Continue D5 0.45% sodium chloride with 20 mEq of KClat 50 cc/hr  until adequate oral intake  · Bumex 1 mg p.o. daily  · Replace K level , check repeat bmp at 4 pm  · Continue to monitor renal function for recovery     discussed with RN and family in room and questions answered  Gavin Bejarano MD

## 2021-10-09 NOTE — PLAN OF CARE
Problem: Falls - Risk of:  Goal: Will remain free from falls  Description: Will remain free from falls  Outcome: Met This Shift  Goal: Absence of physical injury  Description: Absence of physical injury  Outcome: Met This Shift     Problem:  Activity Intolerance:  Goal: Able to perform prescribed physical activity  Description: Able to perform prescribed physical activity  Outcome: Met This Shift  Goal: Ability to tolerate increased activity will improve  Description: Ability to tolerate increased activity will improve  Outcome: Met This Shift     Problem: Cardiac Output - Decreased:  Goal: Cardiac output within specified parameters  Description: Cardiac output within specified parameters  Outcome: Met This Shift  Goal: Hemodynamic stability will improve  Description: Hemodynamic stability will improve  Outcome: Met This Shift     Problem: Pain:  Goal: Pain level will decrease  Description: Pain level will decrease  Outcome: Met This Shift  Goal: Control of acute pain  Description: Control of acute pain  Outcome: Met This Shift  Goal: Control of chronic pain  Description: Control of chronic pain  Outcome: Met This Shift     Problem: Pain:  Goal: Pain level will decrease  Description: Pain level will decrease  Outcome: Met This Shift

## 2021-10-09 NOTE — PROGRESS NOTES
Pharmacy Consultation Note  (Renal Dosing and Monitoring)    Initial consult date: 10/1/21  Consulting physician/provider: Dr Luke Huntley  Reason for consult: Renal dosing of medications in CRRT    Ht Readings from Last 1 Encounters:   10/08/21 5' 8\" (1.727 m)     Wt Readings from Last 1 Encounters:   09/27/21 158 lb 11.7 oz (72 kg)       Age/Gender  Allergy Information   78 y.o. male  Dust mite extract               Estimated Creatinine Clearance: 23 mL/min (A) (based on SCr of 2.7 mg/dL (H)). Intake/Output Summary (Last 24 hours) at 10/9/2021 0859  Last data filed at 10/9/2021 0700  Gross per 24 hour   Intake 478.05 ml   Output 1210 ml   Net -731.95 ml     Urine output over the last 24 hours: 1.3 mL/kg/hr (2180 mL total)     Assessment:  · 79 yo M admitted 9/27 s/p CABG x3/RCA endarterectomy/MALU exclusion with CT Surgery  · Consulted by Dr. Clarita Myrick to renally dose/monitor medications in CVVHD   · Temporary HD line placed 9/30  · CVVHD not initiated 10/1; bumetanide infusion discontinued  · SCr 2.7 today; CrCl 23 mL/hr    Plan:  · No additional renal adjustments needed at this time    Will continue to follow.       Diego Ferreira PharmD, BCPS 10/9/2021 9:00 AM

## 2021-10-10 ENCOUNTER — APPOINTMENT (OUTPATIENT)
Dept: GENERAL RADIOLOGY | Age: 76
DRG: 235 | End: 2021-10-10
Attending: THORACIC SURGERY (CARDIOTHORACIC VASCULAR SURGERY)
Payer: MEDICARE

## 2021-10-10 LAB
ANION GAP SERPL CALCULATED.3IONS-SCNC: 11 MMOL/L (ref 7–16)
ANION GAP SERPL CALCULATED.3IONS-SCNC: 6 MMOL/L (ref 7–16)
BUN BLDV-MCNC: 69 MG/DL (ref 6–23)
BUN BLDV-MCNC: 81 MG/DL (ref 6–23)
CALCIUM SERPL-MCNC: 8.1 MG/DL (ref 8.6–10.2)
CALCIUM SERPL-MCNC: 8.3 MG/DL (ref 8.6–10.2)
CHLORIDE BLD-SCNC: 106 MMOL/L (ref 98–107)
CHLORIDE BLD-SCNC: 106 MMOL/L (ref 98–107)
CO2: 25 MMOL/L (ref 22–29)
CO2: 30 MMOL/L (ref 22–29)
CREAT SERPL-MCNC: 2.3 MG/DL (ref 0.7–1.2)
CREAT SERPL-MCNC: 2.4 MG/DL (ref 0.7–1.2)
GFR AFRICAN AMERICAN: 32
GFR AFRICAN AMERICAN: 34
GFR NON-AFRICAN AMERICAN: 27 ML/MIN/1.73
GFR NON-AFRICAN AMERICAN: 28 ML/MIN/1.73
GLUCOSE BLD-MCNC: 117 MG/DL (ref 74–99)
GLUCOSE BLD-MCNC: 122 MG/DL (ref 74–99)
HCT VFR BLD CALC: 26.4 % (ref 37–54)
HEMOGLOBIN: 8.4 G/DL (ref 12.5–16.5)
MAGNESIUM: 2.3 MG/DL (ref 1.6–2.6)
MCH RBC QN AUTO: 29.5 PG (ref 26–35)
MCHC RBC AUTO-ENTMCNC: 31.8 % (ref 32–34.5)
MCV RBC AUTO: 92.6 FL (ref 80–99.9)
METER GLUCOSE: 105 MG/DL (ref 74–99)
METER GLUCOSE: 118 MG/DL (ref 74–99)
METER GLUCOSE: 121 MG/DL (ref 74–99)
METER GLUCOSE: 88 MG/DL (ref 74–99)
PDW BLD-RTO: 14.8 FL (ref 11.5–15)
PHOSPHORUS: 2.8 MG/DL (ref 2.5–4.5)
PLATELET # BLD: 194 E9/L (ref 130–450)
PMV BLD AUTO: 11.3 FL (ref 7–12)
POTASSIUM SERPL-SCNC: 3.4 MMOL/L (ref 3.5–5)
POTASSIUM SERPL-SCNC: 3.5 MMOL/L (ref 3.5–5)
POTASSIUM SERPL-SCNC: 3.5 MMOL/L (ref 3.5–5)
POTASSIUM SERPL-SCNC: 3.6 MMOL/L (ref 3.5–5)
RBC # BLD: 2.85 E12/L (ref 3.8–5.8)
SODIUM BLD-SCNC: 142 MMOL/L (ref 132–146)
SODIUM BLD-SCNC: 142 MMOL/L (ref 132–146)
WBC # BLD: 19.2 E9/L (ref 4.5–11.5)

## 2021-10-10 PROCEDURE — 36415 COLL VENOUS BLD VENIPUNCTURE: CPT

## 2021-10-10 PROCEDURE — 94640 AIRWAY INHALATION TREATMENT: CPT

## 2021-10-10 PROCEDURE — 2580000003 HC RX 258: Performed by: INTERNAL MEDICINE

## 2021-10-10 PROCEDURE — 6370000000 HC RX 637 (ALT 250 FOR IP): Performed by: THORACIC SURGERY (CARDIOTHORACIC VASCULAR SURGERY)

## 2021-10-10 PROCEDURE — 6360000002 HC RX W HCPCS: Performed by: THORACIC SURGERY (CARDIOTHORACIC VASCULAR SURGERY)

## 2021-10-10 PROCEDURE — 82962 GLUCOSE BLOOD TEST: CPT

## 2021-10-10 PROCEDURE — 6360000002 HC RX W HCPCS: Performed by: NURSE PRACTITIONER

## 2021-10-10 PROCEDURE — 6360000002 HC RX W HCPCS: Performed by: PHYSICIAN ASSISTANT

## 2021-10-10 PROCEDURE — 6360000002 HC RX W HCPCS: Performed by: SURGERY

## 2021-10-10 PROCEDURE — 2580000003 HC RX 258: Performed by: SURGERY

## 2021-10-10 PROCEDURE — 83735 ASSAY OF MAGNESIUM: CPT

## 2021-10-10 PROCEDURE — 2580000003 HC RX 258: Performed by: NURSE PRACTITIONER

## 2021-10-10 PROCEDURE — 6370000000 HC RX 637 (ALT 250 FOR IP): Performed by: NURSE PRACTITIONER

## 2021-10-10 PROCEDURE — C9113 INJ PANTOPRAZOLE SODIUM, VIA: HCPCS | Performed by: SURGERY

## 2021-10-10 PROCEDURE — 84100 ASSAY OF PHOSPHORUS: CPT

## 2021-10-10 PROCEDURE — 84132 ASSAY OF SERUM POTASSIUM: CPT

## 2021-10-10 PROCEDURE — 6370000000 HC RX 637 (ALT 250 FOR IP): Performed by: PHYSICIAN ASSISTANT

## 2021-10-10 PROCEDURE — 71045 X-RAY EXAM CHEST 1 VIEW: CPT

## 2021-10-10 PROCEDURE — 2580000003 HC RX 258: Performed by: THORACIC SURGERY (CARDIOTHORACIC VASCULAR SURGERY)

## 2021-10-10 PROCEDURE — 6360000002 HC RX W HCPCS: Performed by: INTERNAL MEDICINE

## 2021-10-10 PROCEDURE — 80048 BASIC METABOLIC PNL TOTAL CA: CPT

## 2021-10-10 PROCEDURE — 6370000000 HC RX 637 (ALT 250 FOR IP): Performed by: INTERNAL MEDICINE

## 2021-10-10 PROCEDURE — 85027 COMPLETE CBC AUTOMATED: CPT

## 2021-10-10 PROCEDURE — 99232 SBSQ HOSP IP/OBS MODERATE 35: CPT | Performed by: INTERNAL MEDICINE

## 2021-10-10 PROCEDURE — 2140000000 HC CCU INTERMEDIATE R&B

## 2021-10-10 RX ORDER — NYSTATIN 100000 U/G
OINTMENT TOPICAL 2 TIMES DAILY
Status: DISCONTINUED | OUTPATIENT
Start: 2021-10-10 | End: 2021-10-13 | Stop reason: HOSPADM

## 2021-10-10 RX ORDER — DEXTROSE MONOHYDRATE 50 MG/ML
100 INJECTION, SOLUTION INTRAVENOUS PRN
Status: DISCONTINUED | OUTPATIENT
Start: 2021-10-10 | End: 2021-10-13 | Stop reason: HOSPADM

## 2021-10-10 RX ORDER — BISACODYL 10 MG
10 SUPPOSITORY, RECTAL RECTAL DAILY
Status: DISCONTINUED | OUTPATIENT
Start: 2021-10-10 | End: 2021-10-11

## 2021-10-10 RX ORDER — AMIODARONE HYDROCHLORIDE 200 MG/1
400 TABLET ORAL
Status: ACTIVE | OUTPATIENT
Start: 2021-10-10 | End: 2021-10-10

## 2021-10-10 RX ORDER — SENNA AND DOCUSATE SODIUM 50; 8.6 MG/1; MG/1
1 TABLET, FILM COATED ORAL 2 TIMES DAILY
Status: DISCONTINUED | OUTPATIENT
Start: 2021-10-10 | End: 2021-10-11

## 2021-10-10 RX ORDER — ACETAMINOPHEN 325 MG/1
650 TABLET ORAL EVERY 4 HOURS PRN
Status: DISCONTINUED | OUTPATIENT
Start: 2021-10-10 | End: 2021-10-13 | Stop reason: HOSPADM

## 2021-10-10 RX ORDER — DEXTROSE MONOHYDRATE 25 G/50ML
12.5 INJECTION, SOLUTION INTRAVENOUS PRN
Status: DISCONTINUED | OUTPATIENT
Start: 2021-10-10 | End: 2021-10-13 | Stop reason: HOSPADM

## 2021-10-10 RX ORDER — FOLIC ACID 1 MG/1
1 TABLET ORAL DAILY
Status: DISCONTINUED | OUTPATIENT
Start: 2021-10-10 | End: 2021-10-13 | Stop reason: HOSPADM

## 2021-10-10 RX ORDER — FERROUS SULFATE 325(65) MG
325 TABLET ORAL 2 TIMES DAILY WITH MEALS
Status: DISCONTINUED | OUTPATIENT
Start: 2021-10-10 | End: 2021-10-13 | Stop reason: HOSPADM

## 2021-10-10 RX ORDER — MORPHINE SULFATE 2 MG/ML
2 INJECTION, SOLUTION INTRAMUSCULAR; INTRAVENOUS EVERY 4 HOURS PRN
Status: DISCONTINUED | OUTPATIENT
Start: 2021-10-10 | End: 2021-10-13 | Stop reason: HOSPADM

## 2021-10-10 RX ORDER — NICOTINE POLACRILEX 4 MG
15 LOZENGE BUCCAL PRN
Status: DISCONTINUED | OUTPATIENT
Start: 2021-10-10 | End: 2021-10-13 | Stop reason: HOSPADM

## 2021-10-10 RX ORDER — DIPHENHYDRAMINE HCL 25 MG
25 TABLET ORAL EVERY 8 HOURS PRN
Status: DISCONTINUED | OUTPATIENT
Start: 2021-10-10 | End: 2021-10-13 | Stop reason: HOSPADM

## 2021-10-10 RX ORDER — ONDANSETRON 2 MG/ML
4 INJECTION INTRAMUSCULAR; INTRAVENOUS EVERY 8 HOURS PRN
Status: DISCONTINUED | OUTPATIENT
Start: 2021-10-10 | End: 2021-10-13 | Stop reason: HOSPADM

## 2021-10-10 RX ORDER — OXYCODONE HYDROCHLORIDE AND ACETAMINOPHEN 5; 325 MG/1; MG/1
1 TABLET ORAL EVERY 4 HOURS PRN
Status: DISCONTINUED | OUTPATIENT
Start: 2021-10-10 | End: 2021-10-13 | Stop reason: HOSPADM

## 2021-10-10 RX ORDER — MAGNESIUM SULFATE IN WATER 40 MG/ML
2000 INJECTION, SOLUTION INTRAVENOUS
Status: ACTIVE | OUTPATIENT
Start: 2021-10-10 | End: 2021-10-10

## 2021-10-10 RX ORDER — ASPIRIN 81 MG/1
81 TABLET ORAL DAILY
Status: DISCONTINUED | OUTPATIENT
Start: 2021-10-11 | End: 2021-10-13 | Stop reason: HOSPADM

## 2021-10-10 RX ORDER — ASCORBIC ACID 500 MG
500 TABLET ORAL 2 TIMES DAILY
Status: DISCONTINUED | OUTPATIENT
Start: 2021-10-10 | End: 2021-10-13 | Stop reason: HOSPADM

## 2021-10-10 RX ORDER — OXYCODONE HYDROCHLORIDE AND ACETAMINOPHEN 5; 325 MG/1; MG/1
2 TABLET ORAL EVERY 4 HOURS PRN
Status: DISCONTINUED | OUTPATIENT
Start: 2021-10-10 | End: 2021-10-13 | Stop reason: HOSPADM

## 2021-10-10 RX ADMIN — NYSTATIN OINTMENT: 100000 OINTMENT TOPICAL at 21:18

## 2021-10-10 RX ADMIN — PANTOPRAZOLE SODIUM 40 MG: 40 INJECTION, POWDER, FOR SOLUTION INTRAVENOUS at 08:19

## 2021-10-10 RX ADMIN — NYSTATIN OINTMENT: 100000 OINTMENT TOPICAL at 16:39

## 2021-10-10 RX ADMIN — Medication 300 UNITS: at 16:39

## 2021-10-10 RX ADMIN — CARVEDILOL 3.12 MG: 3.12 TABLET, FILM COATED ORAL at 16:39

## 2021-10-10 RX ADMIN — POTASSIUM CHLORIDE 20 MEQ: 400 INJECTION, SOLUTION INTRAVENOUS at 17:29

## 2021-10-10 RX ADMIN — CLOPIDOGREL 75 MG: 75 TABLET, FILM COATED ORAL at 08:17

## 2021-10-10 RX ADMIN — POTASSIUM CHLORIDE 20 MEQ: 29.8 INJECTION, SOLUTION INTRAVENOUS at 08:24

## 2021-10-10 RX ADMIN — POTASSIUM CHLORIDE 20 MEQ: 400 INJECTION, SOLUTION INTRAVENOUS at 06:27

## 2021-10-10 RX ADMIN — PIPERACILLIN AND TAZOBACTAM 3375 MG: 3; .375 INJECTION, POWDER, LYOPHILIZED, FOR SOLUTION INTRAVENOUS at 16:38

## 2021-10-10 RX ADMIN — CHLORHEXIDINE GLUCONATE 15 ML: 1.2 RINSE ORAL at 21:17

## 2021-10-10 RX ADMIN — OXYCODONE HYDROCHLORIDE AND ACETAMINOPHEN 500 MG: 500 TABLET ORAL at 21:17

## 2021-10-10 RX ADMIN — AMIODARONE HYDROCHLORIDE 400 MG: 200 TABLET ORAL at 21:17

## 2021-10-10 RX ADMIN — Medication 10 ML: at 08:21

## 2021-10-10 RX ADMIN — FOLIC ACID 1 MG: 1 TABLET ORAL at 16:39

## 2021-10-10 RX ADMIN — FERROUS SULFATE TAB 325 MG (65 MG ELEMENTAL FE) 325 MG: 325 (65 FE) TAB at 16:39

## 2021-10-10 RX ADMIN — CARVEDILOL 3.12 MG: 3.12 TABLET, FILM COATED ORAL at 08:16

## 2021-10-10 RX ADMIN — PIPERACILLIN AND TAZOBACTAM 3375 MG: 3; .375 INJECTION, POWDER, LYOPHILIZED, FOR SOLUTION INTRAVENOUS at 06:27

## 2021-10-10 RX ADMIN — BUMETANIDE 1 MG: 1 TABLET ORAL at 08:19

## 2021-10-10 RX ADMIN — AMIODARONE HYDROCHLORIDE 400 MG: 200 TABLET ORAL at 08:17

## 2021-10-10 RX ADMIN — IPRATROPIUM BROMIDE AND ALBUTEROL SULFATE 1 AMPULE: .5; 2.5 SOLUTION RESPIRATORY (INHALATION) at 07:37

## 2021-10-10 RX ADMIN — IPRATROPIUM BROMIDE AND ALBUTEROL SULFATE 1 AMPULE: .5; 2.5 SOLUTION RESPIRATORY (INHALATION) at 19:45

## 2021-10-10 RX ADMIN — IPRATROPIUM BROMIDE AND ALBUTEROL SULFATE 1 AMPULE: .5; 2.5 SOLUTION RESPIRATORY (INHALATION) at 16:08

## 2021-10-10 RX ADMIN — SODIUM CHLORIDE, PRESERVATIVE FREE 300 UNITS: 5 INJECTION INTRAVENOUS at 08:30

## 2021-10-10 RX ADMIN — ATORVASTATIN CALCIUM 40 MG: 40 TABLET, FILM COATED ORAL at 21:17

## 2021-10-10 RX ADMIN — ASPIRIN 81 MG CHEWABLE TABLET 81 MG: 81 TABLET CHEWABLE at 08:16

## 2021-10-10 RX ADMIN — SODIUM CHLORIDE, PRESERVATIVE FREE 10 ML: 5 INJECTION INTRAVENOUS at 08:21

## 2021-10-10 RX ADMIN — SODIUM CHLORIDE, PRESERVATIVE FREE 300 UNITS: 5 INJECTION INTRAVENOUS at 21:18

## 2021-10-10 ASSESSMENT — PAIN SCALES - GENERAL
PAINLEVEL_OUTOF10: 0

## 2021-10-10 NOTE — PROGRESS NOTES
Pharmacy Consultation Note  (Renal Dosing and Monitoring)    Initial consult date: 10/1/21  Consulting physician/provider: Dr Lorraine Gaitan  Reason for consult: Renal dosing of medications in CRRT    Ht Readings from Last 1 Encounters:   10/08/21 5' 8\" (1.727 m)     Wt Readings from Last 1 Encounters:   09/27/21 158 lb 11.7 oz (72 kg)       Age/Gender  Allergy Information   78 y.o. male  Dust mite extract               Estimated Creatinine Clearance: 26 mL/min (A) (based on SCr of 2.4 mg/dL (H)). Intake/Output Summary (Last 24 hours) at 10/10/2021 0958  Last data filed at 10/10/2021 0700  Gross per 24 hour   Intake 270 ml   Output 1145 ml   Net -875 ml     Urine output over the last 24 hours: 1.3 mL/kg/hr (2180 mL total)     Assessment:  · 79 yo M admitted 9/27 s/p CABG x3/RCA endarterectomy/MALU exclusion with CT Surgery  · Consulted by Dr. River Garay to renally dose/monitor medications in CVVHD   · Temporary HD line placed 9/30  · CVVHD not initiated 10/1; bumetanide infusion discontinued  · SCr 2.4 today; CrCl 26 mL/hr    Plan:  · No additional renal adjustments needed at this time    Will continue to follow.       Mike Card PharmD, BCPS 10/10/2021 9:58 AM

## 2021-10-10 NOTE — PLAN OF CARE
Problem: Falls - Risk of:  Goal: Will remain free from falls  Description: Will remain free from falls  10/9/2021 2031 by Ck Huddleston RN  Outcome: Met This Shift     Problem: Falls - Risk of:  Goal: Absence of physical injury  Description: Absence of physical injury  10/9/2021 2031 by Ck Huddleston RN  Outcome: Met This Shift     Problem: Anxiety:  Goal: Level of anxiety will decrease  Description: Level of anxiety will decrease  Outcome: Met This Shift

## 2021-10-10 NOTE — PROGRESS NOTES
Upon entering patient's room, witnessed patient's wife giving him soda with a straw and no thicket in it. Explained to patient and his wife again, that until his swallow evaluation is repeated he is ordered nectar thick liquids ONLY. I had the same conversation with both of them yesterday. Patient's wife stated \"he wasn't coughing or choking on it\", I also told her that he could silently aspirate, and to please not give him anymore liquid without thicket in it.

## 2021-10-10 NOTE — PROGRESS NOTES
Department of Internal Medicine  Nephrology Progress Note      Events reviewed. SUBJECTIVE: We are following Mr. Mehul [address] for NGUYỄN. Reports no complaints.      PHYSICAL EXAM:      Vitals:    VITALS:  BP (!) 142/66   Pulse 83   Temp 97.8 °F (36.6 °C) (Temporal)   Resp 18   Ht 5' 8\" (1.727 m)   Wt 158 lb 11.7 oz (72 kg)   SpO2 97%   BMI 24.14 kg/m²   24HR INTAKE/OUTPUT:      Intake/Output Summary (Last 24 hours) at 10/10/2021 1131  Last data filed at 10/10/2021 0700  Gross per 24 hour   Intake 270 ml   Output 895 ml   Net -625 ml     Constitutional: Patient is awake, alert in no distress  HEENT: Pupils are equal reactive  Respiratory: Decreased breath sounds at the bases  Cardiovascular/Edema: Heart sounds are regular  Gastrointestinal: Abdomen soft  Neurologic: Patient alert   Other: +trace edema      Scheduled Meds:   piperacillin-tazobactam  3,375 mg IntraVENous Q8H    clopidogrel  75 mg Oral Daily    bumetanide  1 mg Oral Daily    insulin lispro  0-18 Units SubCUTAneous 4 times per day    carvedilol  3.125 mg Oral BID WC    amiodarone  400 mg Oral BID    pantoprazole  40 mg IntraVENous BID    And    sodium chloride (PF)  10 mL IntraVENous BID    lidocaine PF  5 mL IntraDERmal Once    sodium chloride flush  5-40 mL IntraVENous 2 times per day    heparin flush  3 mL IntraVENous 2 times per day    aspirin  81 mg Oral Daily    chlorhexidine  15 mL Mouth/Throat BID    docusate sodium  100 mg Oral BID    sennosides  5 mL Oral BID    atorvastatin  40 mg Oral Daily    ipratropium-albuterol  1 ampule Inhalation Q4H WA     Continuous Infusions:   dextrose 5% and 0.45% NaCl with KCl 20 mEq 50 mL/hr at 10/07/21 2334    sodium chloride      dextrose       PRN Meds:.potassium chloride, calcium gluconate **OR** [DISCONTINUED] calcium gluconate **OR** [DISCONTINUED] calcium gluconate **OR** [DISCONTINUED] calcium gluconate, sodium chloride flush, sodium chloride, heparin flush, fentanNYL, artificial tears, ondansetron, acetaminophen, acetaminophen, oxyCODONE **OR** oxyCODONE, magnesium hydroxide, potassium chloride, magnesium sulfate, albumin human, glucose, dextrose, glucagon (rDNA), dextrose, calcium gluconate IVPB    DATA:    CBC:   Lab Results   Component Value Date    WBC 19.2 10/10/2021    RBC 2.85 10/10/2021    HGB 8.4 10/10/2021    HCT 26.4 10/10/2021    MCV 92.6 10/10/2021    MCH 29.5 10/10/2021    MCHC 31.8 10/10/2021    RDW 14.8 10/10/2021     10/10/2021    MPV 11.3 10/10/2021     CMP:    Lab Results   Component Value Date     10/10/2021    K 3.4 10/10/2021    K 3.3 10/07/2021     10/10/2021    CO2 30 10/10/2021    BUN 81 10/10/2021    CREATININE 2.4 10/10/2021    GFRAA 32 10/10/2021    LABGLOM 27 10/10/2021    GLUCOSE 122 10/10/2021    PROT 4.9 10/08/2021    LABALBU 3.1 10/08/2021    CALCIUM 8.1 10/10/2021    BILITOT 1.2 10/08/2021    ALKPHOS 51 10/08/2021    AST 19 10/08/2021    ALT 16 10/08/2021     Magnesium:    Lab Results   Component Value Date    MG 2.3 10/10/2021     Phosphorus:    Lab Results   Component Value Date    PHOS 2.8 10/10/2021        Radiology Review:      CXR 10/3/21   1. Median sternotomy changes. 2. Bilateral pleuroparenchymal opacities that could be related to pleural   effusion and edema.  The appearance of the chest is about the same or   slightly worse.         Chest x-ray October 6, 2021   1. Stable appearance of the postoperative chest.   2. Bilateral chest tubes remain in position.  There is no right or left   pneumothorax.    3. Small right pleural effusion         Chest x-ray October 8, 2021   Minimal bibasilar atelectasis.       Trace right pleural effusion       There is no pneumothorax             BRIEF SUMMARY OF INITIAL CONSULT:    Briefly Mr. Wes Davenport is a 82-year-old man with history of HTN, CAD with recent status cardiac catheterization done on September 9 which showed severe multivessel disease, hyperlipidemia, hiatal hernia, who was admitted for elective CABG on September 27, 2021. On admission his creatinine level was 0.8 mg/dL and post surgery his creatinine has progressively increased up to 2.3 mg/dL, reason for this consultation. Postoperatively she developed persistent hypotension, lactic acidosis and respiratory failure for which he was reintubated. Since then she has required multiple pressors and he was placed on IABP. He had received 1 dose of ketorolac perioperatively. His urine output has significantly decrease and is being about 500 cc/day in the last 48 hours and his fluid balance presently is over 9 L. Problems resolved:    · Cardiogenic shock, hemodynamically more stable, pressor support decrease, still on IABP. Tentative plan for removal of IABP. Pro-BP 16,936  · Hypernatremia with hypervolemia, 2/2 sodium containing IV fluid resuscitation and underlying heart failure. Resolved with  natriuresis and free water (add D5W)  · Hypokalemia, 2/2 diuretics, potassium levels improve  · Respiratory alkalosis (pH: 7.554, PCO2: 54.8) with metabolic alkalosis (bicarbonate administration)  · Respiratory failure status post intubation  · Thrombocytopenia  · Transaminitis with hyperbilirubinemia, possibly shock liver versus congestive liver    IMPRESSION/RECOMMENDATIONS:      1. NGUYỄN stage III, CABG associated NGUYỄN, ischemic ATN, non-oliguric. FEUrea 18.8%. Renal function continues to improve with decreasing creatinine levels and adequate urine output. 2. HFmEF 40% with stage IDD, proBNP 16,826 > 9461, s/p bumex drip     ---------------------------------------------------------    3. CAD status post CABG x3 September 27, 2021, on ASA, clopidogrel, carvedilol, atorvastatin  4. Amiodarone therapy, for AF prophylaxis  5. Probably pneumonia, on piperacillin-tazobactam  6.  Normocytic anemia, status post surgery, getting transfused      Plan:    · Continue D5 0.45% sodium chloride with 20 mEq of KClat 50 cc/hr  until adequate oral intake  · Bumex 1 mg p.o. daily  · Check probnp tomorrow  · Replace K level , check repeat bmp at 4 pm  · Continue to monitor renal function for recovery  · Agree with transfer to telemetry     discussed with RN and family in room and questions answered  Tano Staley MD

## 2021-10-10 NOTE — PROGRESS NOTES
[address] [facility] Pulmonary, [facility]                                                                       Pulmonary &health [facility]                                                                   Pulmonary Consult Note              Reason for Consultation:severe hypoxia ,possible mucus plug   CC : vent/resp failure   HPI:   Doing great   On RA   Still somehow weak ,but improving     PHYSICAL EXAMINATION:     VITAL SIGNS:  /60   Pulse 69   Temp 98.6 °F (37 °C) (Axillary)   Resp 16   Ht 5' 8\" (1.727 m)   Wt 158 lb 11.7 oz (72 kg)   SpO2 100%   BMI 24.14 kg/m²   Wt Readings from Last 3 Encounters:   09/27/21 158 lb 11.7 oz (72 kg)   09/20/21 157 lb (71.2 kg)   09/21/21 160 lb 6.4 oz (72.8 kg)     Temp Readings from Last 3 Encounters:   10/09/21 98.6 °F (37 °C) (Axillary)   09/27/21 98.4 °F (36.9 °C)   09/23/21 97.1 °F (36.2 °C) (Temporal)     TMAX:  BP Readings from Last 3 Encounters:   10/09/21 124/60   09/27/21 (!) 144/63   09/23/21 (!) 183/89     Pulse Readings from Last 3 Encounters:   10/09/21 69   09/23/21 66   09/21/21 65           INTAKE/OUTPUTS:  I/O last 3 completed shifts: In: 478.1 [IV Piggyback:478.1]  Out: 1150 [Urine:1020;  Chest Tube:130]    Intake/Output Summary (Last 24 hours) at 10/9/2021 2059  Last data filed at 10/9/2021 2000  Gross per 24 hour   Intake 748.05 ml   Output 1220 ml   Net -471.95 ml       General Appearance:extuabted  Eyes: pupils equal, round, and reactive to light, extraocular eye movements intact, conjunctivae normal and sclera anicteric   Neck: neck supple and non tender without mass, no thyromegaly, no thyroid nodules and no cervical adenopathy   Pulmonary/Chest:decrease breath sound bilateral   Cardiovascular: normal rate, regular rhythm, normal S1 and S2, no murmurs, rubs, clicks or gallops, distal pulses intact, no carotid bruits, no murmurs, no gallops, no carotid bruits and no JVD   Abdomen: obese, soft, non-tender, non-distended, normal bowel sounds, no masses or organomegaly   Extremities: no edema   Musculoskeletal:strength better right side and left side and seems very alert     Neurologic: reflexes normal and symmetric, no cranial nerve deficit noted    LABS/IMAGING:    CBC:  Lab Results   Component Value Date    WBC 21.0 (H) 10/09/2021    HGB 8.8 (L) 10/09/2021    HCT 27.1 (L) 10/09/2021    MCV 89.1 10/09/2021     10/09/2021    LYMPHOPCT 5.1 (L) 09/28/2021    RBC 3.04 (L) 10/09/2021    MCH 28.9 10/09/2021    MCHC 32.5 10/09/2021    RDW 15.0 10/09/2021    NEUTOPHILPCT 89.5 (H) 09/28/2021    MONOPCT 4.4 09/28/2021    BASOPCT 0.1 09/28/2021    NEUTROABS 15.00 (H) 09/28/2021    LYMPHSABS 0.86 (L) 09/28/2021    MONOSABS 0.73 09/28/2021    EOSABS 0.00 (L) 09/28/2021    BASOSABS 0.01 09/28/2021       Recent Labs     10/09/21  0430 10/08/21  0400 10/07/21  1300   WBC 21.0* 19.6* 23.7*   HGB 8.8* 8.6* 9.1*   HCT 27.1* 26.4* 27.1*   MCV 89.1 87.7 86.0    145 130       BMP:   Recent Labs     10/07/21  0358 10/07/21  0409 10/07/21  1340 10/07/21  2050 10/08/21  0400 10/08/21  1400 10/09/21  0430 10/09/21  1236 10/09/21  1800      < >  --  134 139  --  142  --   --    K 3.2*   < >   < > 3.3* 3.7   < > 3.1* 3.3* 3.5   CL 98   < >  --  101 100  --  103  --   --    CO2 29   < >  --  30* 27  --  30*  --   --    PHOS 4.1  --   --   --  4.0  --  3.2  --   --    *   < >  --  98* 98*  --  91*  --   --    CREATININE 3.1*   < >  --  3.0* 2.9*  --  2.7*  --   --     < > = values in this interval not displayed.        MG:   Lab Results   Component Value Date    MG 2.5 10/09/2021     Ca/Phos:   Lab Results   Component Value Date    CALCIUM 8.1 (L) 10/09/2021    PHOS 3.2 10/09/2021     Amylase: No results found for: AMYLASE  Lipase:   Lab Results   Component Value Date    LIPASE 7 (L) 09/28/2021     LIVER PROFILE:   Recent Labs     10/07/21  0358 10/08/21  0400   AST 24 19   ALT 20 16   BILITOT 1.3* 1.2   ALKPHOS 57 51       PT/INR:   No results for input(s): PROTIME, INR in the last 72 hours. APTT:   No results for input(s): APTT in the last 72 hours. Cardiac Enzymes:  Lab Results   Component Value Date    CKMB 94.0 (H) 09/28/2021       Hgb A1C:   Lab Results   Component Value Date    LABA1C 5.1 09/10/2021     No results found for: EAG  CRISTIANA: No results found for: CRISTIANA  ESR: No results found for: SEDRATE  CRP: No results found for: CRP  D Dimer: No results found for: DDIMER  Folate and B12: No results found for: [id number], No results found for: FOLATE              PROBLEM LIST:  Patient Active Problem List   Diagnosis    CAD in native artery    Pulmonary nodules    Unstable angina (HCC)    Other hyperlipidemia    Essential hypertension    Gastroesophageal reflux disease with esophagitis without hemorrhage    S/P CABG (coronary artery bypass graft)    Acute respiratory failure with hypoxia (HCC)    Lactic acidemia    NGUYỄN (acute kidney injury) ([facility] [facility]    Hypokalemia    Hypocalcemia    Cardiogenic shock (HCC)    Thrombocytopenia ([address]    Leukocytosis    Encounter regarding vascular access for dialysis for ESRD ([facility] [address]    Acute right-sided weakness               ASSESSMENT:  1.) Acute Respiratory failure   2.)cardiogenic shock vs other types of shock,septic ,etc   3. )Large mucus plug obstruction left main bronchus and RUL   4. )CAD s/P CABG   5.)NGUYỄN       PLAN:  Wbc going up  CXR stable   On Zosyn   Check procal and CRP  Extubated on RA  Aggressive pulmonary toilet  Once transfer to [facility] ,will check another US and if still any fluid will drain ,but CXR looks great ,and doubt any need to so .   Discussed with wife   Diuresis as per renal      :Jennifer Lombardo MD  Pulmonary/Critical care Medicine   [address] [facility]

## 2021-10-11 ENCOUNTER — APPOINTMENT (OUTPATIENT)
Dept: GENERAL RADIOLOGY | Age: 76
DRG: 235 | End: 2021-10-11
Attending: THORACIC SURGERY (CARDIOTHORACIC VASCULAR SURGERY)
Payer: MEDICARE

## 2021-10-11 DIAGNOSIS — I65.23 BILATERAL CAROTID ARTERY STENOSIS: Primary | ICD-10-CM

## 2021-10-11 LAB
ANION GAP SERPL CALCULATED.3IONS-SCNC: 6 MMOL/L (ref 7–16)
BUN BLDV-MCNC: 67 MG/DL (ref 6–23)
CALCIUM SERPL-MCNC: 8.4 MG/DL (ref 8.6–10.2)
CHLORIDE BLD-SCNC: 106 MMOL/L (ref 98–107)
CO2: 27 MMOL/L (ref 22–29)
CREAT SERPL-MCNC: 2.2 MG/DL (ref 0.7–1.2)
GFR AFRICAN AMERICAN: 36
GFR NON-AFRICAN AMERICAN: 29 ML/MIN/1.73
GLUCOSE BLD-MCNC: 108 MG/DL (ref 74–99)
HCT VFR BLD CALC: 27.3 % (ref 37–54)
HEMOGLOBIN: 8.6 G/DL (ref 12.5–16.5)
IRON SATURATION: 36 % (ref 20–55)
IRON: 56 MCG/DL (ref 59–158)
MCH RBC QN AUTO: 29.5 PG (ref 26–35)
MCHC RBC AUTO-ENTMCNC: 31.5 % (ref 32–34.5)
MCV RBC AUTO: 93.5 FL (ref 80–99.9)
METER GLUCOSE: 119 MG/DL (ref 74–99)
METER GLUCOSE: 125 MG/DL (ref 74–99)
METER GLUCOSE: 125 MG/DL (ref 74–99)
METER GLUCOSE: 84 MG/DL (ref 74–99)
PDW BLD-RTO: 14.9 FL (ref 11.5–15)
PLATELET # BLD: 217 E9/L (ref 130–450)
PMV BLD AUTO: 11.5 FL (ref 7–12)
POTASSIUM SERPL-SCNC: 3.7 MMOL/L (ref 3.5–5)
RBC # BLD: 2.92 E12/L (ref 3.8–5.8)
SODIUM BLD-SCNC: 139 MMOL/L (ref 132–146)
TOTAL IRON BINDING CAPACITY: 154 MCG/DL (ref 250–450)
WBC # BLD: 15.8 E9/L (ref 4.5–11.5)

## 2021-10-11 PROCEDURE — 92611 MOTION FLUOROSCOPY/SWALLOW: CPT | Performed by: SPEECH-LANGUAGE PATHOLOGIST

## 2021-10-11 PROCEDURE — 97530 THERAPEUTIC ACTIVITIES: CPT

## 2021-10-11 PROCEDURE — 92526 ORAL FUNCTION THERAPY: CPT | Performed by: SPEECH-LANGUAGE PATHOLOGIST

## 2021-10-11 PROCEDURE — 6370000000 HC RX 637 (ALT 250 FOR IP): Performed by: THORACIC SURGERY (CARDIOTHORACIC VASCULAR SURGERY)

## 2021-10-11 PROCEDURE — 83540 ASSAY OF IRON: CPT

## 2021-10-11 PROCEDURE — 82962 GLUCOSE BLOOD TEST: CPT

## 2021-10-11 PROCEDURE — 36415 COLL VENOUS BLD VENIPUNCTURE: CPT

## 2021-10-11 PROCEDURE — 6360000002 HC RX W HCPCS: Performed by: THORACIC SURGERY (CARDIOTHORACIC VASCULAR SURGERY)

## 2021-10-11 PROCEDURE — 97535 SELF CARE MNGMENT TRAINING: CPT

## 2021-10-11 PROCEDURE — 93798 PHYS/QHP OP CAR RHAB W/ECG: CPT

## 2021-10-11 PROCEDURE — 80048 BASIC METABOLIC PNL TOTAL CA: CPT

## 2021-10-11 PROCEDURE — 2580000003 HC RX 258: Performed by: THORACIC SURGERY (CARDIOTHORACIC VASCULAR SURGERY)

## 2021-10-11 PROCEDURE — 83550 IRON BINDING TEST: CPT

## 2021-10-11 PROCEDURE — 6370000000 HC RX 637 (ALT 250 FOR IP): Performed by: NURSE PRACTITIONER

## 2021-10-11 PROCEDURE — 94640 AIRWAY INHALATION TREATMENT: CPT

## 2021-10-11 PROCEDURE — 97110 THERAPEUTIC EXERCISES: CPT

## 2021-10-11 PROCEDURE — 6360000002 HC RX W HCPCS: Performed by: PHYSICIAN ASSISTANT

## 2021-10-11 PROCEDURE — 74230 X-RAY XM SWLNG FUNCJ C+: CPT

## 2021-10-11 PROCEDURE — 85027 COMPLETE CBC AUTOMATED: CPT

## 2021-10-11 PROCEDURE — 2140000000 HC CCU INTERMEDIATE R&B

## 2021-10-11 PROCEDURE — 71045 X-RAY EXAM CHEST 1 VIEW: CPT

## 2021-10-11 RX ORDER — SENNA AND DOCUSATE SODIUM 50; 8.6 MG/1; MG/1
1 TABLET, FILM COATED ORAL 2 TIMES DAILY PRN
Status: DISCONTINUED | OUTPATIENT
Start: 2021-10-11 | End: 2021-10-13 | Stop reason: HOSPADM

## 2021-10-11 RX ORDER — BUMETANIDE 1 MG/1
1 TABLET ORAL ONCE
Status: COMPLETED | OUTPATIENT
Start: 2021-10-11 | End: 2021-10-11

## 2021-10-11 RX ADMIN — PIPERACILLIN AND TAZOBACTAM 3375 MG: 3; .375 INJECTION, POWDER, LYOPHILIZED, FOR SOLUTION INTRAVENOUS at 00:26

## 2021-10-11 RX ADMIN — OXYCODONE HYDROCHLORIDE AND ACETAMINOPHEN 500 MG: 500 TABLET ORAL at 08:05

## 2021-10-11 RX ADMIN — AMIODARONE HYDROCHLORIDE 400 MG: 200 TABLET ORAL at 20:24

## 2021-10-11 RX ADMIN — AMIODARONE HYDROCHLORIDE 400 MG: 200 TABLET ORAL at 08:06

## 2021-10-11 RX ADMIN — CHLORHEXIDINE GLUCONATE 15 ML: 1.2 RINSE ORAL at 08:08

## 2021-10-11 RX ADMIN — FOLIC ACID 1 MG: 1 TABLET ORAL at 08:06

## 2021-10-11 RX ADMIN — IPRATROPIUM BROMIDE AND ALBUTEROL SULFATE 1 AMPULE: .5; 2.5 SOLUTION RESPIRATORY (INHALATION) at 17:06

## 2021-10-11 RX ADMIN — ATORVASTATIN CALCIUM 40 MG: 40 TABLET, FILM COATED ORAL at 20:24

## 2021-10-11 RX ADMIN — IPRATROPIUM BROMIDE AND ALBUTEROL SULFATE 1 AMPULE: .5; 2.5 SOLUTION RESPIRATORY (INHALATION) at 12:51

## 2021-10-11 RX ADMIN — SODIUM CHLORIDE, PRESERVATIVE FREE 300 UNITS: 5 INJECTION INTRAVENOUS at 20:24

## 2021-10-11 RX ADMIN — BUMETANIDE 1 MG: 1 TABLET ORAL at 16:54

## 2021-10-11 RX ADMIN — OXYCODONE HYDROCHLORIDE AND ACETAMINOPHEN 500 MG: 500 TABLET ORAL at 20:24

## 2021-10-11 RX ADMIN — CLOPIDOGREL 75 MG: 75 TABLET, FILM COATED ORAL at 08:05

## 2021-10-11 RX ADMIN — BUMETANIDE 1 MG: 1 TABLET ORAL at 08:06

## 2021-10-11 RX ADMIN — FERROUS SULFATE TAB 325 MG (65 MG ELEMENTAL FE) 325 MG: 325 (65 FE) TAB at 16:54

## 2021-10-11 RX ADMIN — CHLORHEXIDINE GLUCONATE 15 ML: 1.2 RINSE ORAL at 20:24

## 2021-10-11 RX ADMIN — SODIUM CHLORIDE, PRESERVATIVE FREE 300 UNITS: 5 INJECTION INTRAVENOUS at 08:07

## 2021-10-11 RX ADMIN — POTASSIUM CHLORIDE 20 MEQ: 400 INJECTION, SOLUTION INTRAVENOUS at 00:56

## 2021-10-11 RX ADMIN — PIPERACILLIN AND TAZOBACTAM 3375 MG: 3; .375 INJECTION, POWDER, LYOPHILIZED, FOR SOLUTION INTRAVENOUS at 08:07

## 2021-10-11 RX ADMIN — IPRATROPIUM BROMIDE AND ALBUTEROL SULFATE 1 AMPULE: .5; 2.5 SOLUTION RESPIRATORY (INHALATION) at 09:10

## 2021-10-11 RX ADMIN — CARVEDILOL 3.12 MG: 3.12 TABLET, FILM COATED ORAL at 16:53

## 2021-10-11 RX ADMIN — POTASSIUM CHLORIDE 20 MEQ: 400 INJECTION, SOLUTION INTRAVENOUS at 08:05

## 2021-10-11 RX ADMIN — NYSTATIN OINTMENT: 100000 OINTMENT TOPICAL at 08:15

## 2021-10-11 RX ADMIN — FERROUS SULFATE TAB 325 MG (65 MG ELEMENTAL FE) 325 MG: 325 (65 FE) TAB at 08:06

## 2021-10-11 RX ADMIN — ENOXAPARIN SODIUM 30 MG: 30 INJECTION SUBCUTANEOUS at 11:53

## 2021-10-11 RX ADMIN — POTASSIUM CHLORIDE 20 MEQ: 400 INJECTION, SOLUTION INTRAVENOUS at 02:00

## 2021-10-11 RX ADMIN — IPRATROPIUM BROMIDE AND ALBUTEROL SULFATE 1 AMPULE: .5; 2.5 SOLUTION RESPIRATORY (INHALATION) at 21:33

## 2021-10-11 RX ADMIN — NYSTATIN OINTMENT: 100000 OINTMENT TOPICAL at 20:24

## 2021-10-11 RX ADMIN — CARVEDILOL 3.12 MG: 3.12 TABLET, FILM COATED ORAL at 08:06

## 2021-10-11 RX ADMIN — ASPIRIN 81 MG: 81 TABLET, COATED ORAL at 08:06

## 2021-10-11 ASSESSMENT — PAIN SCALES - GENERAL
PAINLEVEL_OUTOF10: 0

## 2021-10-11 NOTE — PROGRESS NOTES
Acute Rehab Pre-Admission Screen      Referral date: 10/8/21  Onset/Hospital Admit Date: 9/27/2021  5:25 AM    Current Location: [address] [place] [address]    Name: Ivania Prater: 1945  Age: 78 y.o. Admitting Diagnosis: CAD   Address: [address] [place] [address], [address] [place] [address] Phone: [phone] (home)  Social Security #: [id number]    Sex: male  Race:   Marital Status:    Ethnic/Cultural/Lutheran Considerations: None known. Advanced Directives: [x] Full Code  [] [address] [] Medications only       [] Living Will  [] DPOA      []Organ donor      [] No mechanical breathing or ventilation     [] no tube feeding, nutrition or hydration      [x] Patient does not have advanced directives or living will     COVERAGE INFORMATION   Primary Insurer: Medicare Medicare #: [id number]  Verified coverage: [] Patient  [] Family/caregiver    [x] financial department [] insurance carrier    COVID SCREEN DATE:  10/12/21 - Negative      MEDICAL UPDATE:   History of present admission:  527/21 - 78year-old male presents to acute hospital for elective CABG x3.  9/28/21 - Patient developed NGUYỄN, requiring continuous BiPAP, requiring pressors, developed lactic acidosis. Patient had intra-aortic balloon pump inserted (left femoral). 9/30/21 - Nephrology consult d/t elevated creatinine (2.3) and decreased urine output. Patient developed pneumonia, on IV antibiotics. Patient had a bronchoscopy with cryotherapy d/t multiple mucus plugs. Patient intubated, on ventilator. 10/1/21 - Patient received 1 unit PRBC's d/t low hgb (7.6). Possible GI bleed, hemoccult +, no surgical intervention per general surgery. 10/2/21 - Started on Bumex IV drip. Started on tube feeds. 10/4/21 - IABP removed. Neurology consult for suspected left hemispheric stroke, right-sided weakness - unable to confirm with MRI d/t pacer wires. 10/7/21 - Extubated.   10/8/21 - Video swallow completed, recommend pureed diet and nectar thick liquids. Patient has moderate pharyngeal phase dysphagia. 10/10/21 - Transferred out of ICU to telemetry unit. 10/11/21 - Repeat video swallow, recommend pureed diet and nectar thick liquids. Patient has minimal-moderate oral phase dysphagia and moderate pharyngeal phase dysphagia. All chest tubes have been removed. Wires have been cut.  10/12/21 - Echo completed. PHYSICIAN / REFERRAL INFORMATION  Referring Physician: JUAN MIGUEL Moreland - CNP  Attending Physician: Kat Milan MD  Primary Care Physician: Darius Bishop MD  Consultants/Opinions (see full consult notes on chart): Nephrology    SOCIAL INFORMATION  Primary  Contact: Shazia Nguyen  Relationship: Spouse  Primary Phone: [phone]    Previous [employer]: None known. Caregiver available: [x] Yes [] No Hours per day available: TBD. Patient previously employed:  [] Yes [] Part Time [] Full Time [] No [x] Retired  Occupation/Profession: Retired. Prior living arrangements: [x] Home  [] Assisted living  [] SNF [] Other  Lived with:  [] Alone  [x] Spouse  [] Family  [] Other  Lived with: [address] phone: [phone]  Home:  [address] [facility]  4 entry steps  Rails: 1   Bedroom: [x] 1st floor  [] 2nd floor    Bathroom:  [x] 1st floor  [] 2nd floor    Prior Functional Level: Independent for: ADLS, IADLS, ambulation, driving. Assistance for: None. Dependent for: None. Dominant hand: [x] Right  [] Left    Previous Home Equipment:  [] Cane [] Grab bars [] Orthotic / prosthetic   [] Shower chair [] Tub bench  [] 3-in-1 Commode [] Long handle sponge   [] Oxygen [] Sock aide  [] Wheelchair  [] motorized wc/scooter  [] Wheelchair cushion   [] Crutches [] Long handle shoehorn  [] Reachers [] Toilet seat elevator [] Rollator  [] Waynetta Bears (wheeled)   [] Waynetta Bears (standard) [] Mechanical lift   [x] None of the above     Has patient had 2 or more falls in the past year or any fall with injury in the past year?   [] yes   [] no   [x]unknown    Has patient had major surgery during past 100 days prior to admission?    [] yes   [x] no Type/ Date:    Surgical History:  Past Surgical History:   Procedure Laterality Date    BRONCHOSCOPY N/A 9/30/2021    BRONCHOSCOPY DIAGNOSTIC OR CELL [address] ONLY performed by Bill Montes MD at [id number] [place] [address]  9/30/2021    BRONCHOSCOPY CRYOTHERAPY/LASER performed by Bill Montes MD at [address] [facility] N/A 9/27/2021    CABG CORONARY ARTERY BYPASS  X 3 EVH, EMMA performed by John Jenkins MD at [facility]       Past Medical:  Past Medical History:   Diagnosis Date    CAD (coronary artery disease)     Encounter regarding vascular access for dialysis for ESRD ([facility] [address] 10/1/2021    Hiatal hernia     Hyperlipidemia     Hypertension        Current Co-morbidities:  [] Alzheimer's   [] Dysphasia    [] Parkinsonism  [] Amputation   [] GERD   [] Peripheral artery disease   [] Anemia      [] Encephalopathy  [] Peripheral vascular disease  [] Anxiety   [] Gangrene   [] Pneumonia  [] Aphasia   [] Gout   [] Polyneuropathy  [] Asthma   [] Heart Failure (diastolic) [] Post-polio syndrome  [] Atrial fibrillation  [] Heart Failure (left-sided) [] Pseudomonas enteritis   [] Blind   [] Heart Failure (right-sided) [] Pulmonary embolism  [] Cellulitis     [] Heart Failure (systolic) [] Renal dialysis  [] Clostridium difficile  [x] Hemiparesis (right)  [] Renal failure  [] Congestive heart failure [x] Hypertension  [] Rheumatoid arthritis  [] COPD   [] Hypotension  [] Seizure disorder   [x] Coronary Artery Disease [] Hypothyroidism  [] Septicemia   [] Deaf   [] Hyperlipidemia  [] Sleep apnea  [] Depression   [] Morbid obesity  [] Spinal cord injury  [] Diabetes   [] MRSA   [x] Stroke  [] Diabetic nephropathy [] Myocardial infarction [] Tracheostomy  [] Diabetic neuropathy [] Osteoarthritis  [] Traumatic brain injury   [] Diabetic retinopathy [] Osteoporosis  [] Urinary tract infection  [] DVT   [] Pancytopenia  [] Vocal cord paralysis  [] Spinal stenosis  [x] kidney disease  [] VRE  [x] Post op - CABG x3 on 9/27/21 [x] NGUYỄN stage 3   []        Medical/Functional Conditions requiring inpatient rehabilitation: Patient has functional deficits in ADLS, mobility, speech, swallow and cognition, impaired balance, and needs ongoing medical management. Patient requires frequent lab work monitoring due to renal failure. Requires multidisciplinary treatment including PM&R physician daily care, 24 hour rehabilitation nursing, physical therapy, occupational therapy, rehabilitation psychology, recreation therapy and rehabilitation social work, nutrition services due to new deficits. Risk for Medical/Clinical Complications: Falls, injury, pain, skin breakdown, abnormal vitals, abnormal labs, DVT, PE, pneumonia, decreased mobility, neuro changes. CLINICAL DATA:     Height : 5'8\"     Weight:  158lbs   BMI: 24       Date: 10/11/21 Date: 10/12/21 Date: 10/13/21   temperature 97.5 97.8 97.8   pulse 79 77 80   respirations 18 18 18   Blood pressure 117/57 127/61 118/56   Pulse oximeter 96% on room air 94% on room air 96% on room air      ALLERGIES: Dust mite extract    DIET : Adult Oral Nutrition Supplement; Other Oral Supplement; [id number]  Adult Oral Nutrition Supplement; Fortified Pudding Oral Supplement  ADULT DIET; Dysphagia - Pureed; 4 carb choices (60 gm/meal);  Low Fat/Low Chol/High Fiber/2 gm Na; Mildly Thick ([place])    Current Lab and Diagnostic Tests:   Recent Results (from the past 24 hour(s))   Basic Metabolic Panel    Collection Time: 10/10/21  4:32 PM   Result Value Ref Range    Sodium 142 132 - 146 mmol/L    Potassium 3.5 3.5 - 5.0 mmol/L    Chloride 106 98 - 107 mmol/L    CO2 25 22 - 29 mmol/L    Anion Gap 11 7 - 16 mmol/L    Glucose 117 (H) 74 - 99 mg/dL    BUN 69 (H) 6 - 23 mg/dL    CREATININE 2.3 (H) 0.7 - 1.2 mg/dL    GFR Non-African American 28 >=60 mL/min/1.73    GFR African American 34 Calcium 8.3 (L) 8.6 - 10.2 mg/dL   POCT Glucose    Collection Time: 10/10/21  4:37 PM   Result Value Ref Range    Meter Glucose 105 (H) 74 - 99 mg/dL   POCT Glucose    Collection Time: 10/10/21  8:07 PM   Result Value Ref Range    Meter Glucose 118 (H) 74 - 99 mg/dL   Potassium    Collection Time: 10/10/21  9:22 PM   Result Value Ref Range    Potassium 3.5 3.5 - 5.0 mmol/L   CBC    Collection Time: 10/11/21  5:30 AM   Result Value Ref Range    WBC 15.8 (H) 4.5 - 11.5 E9/L    RBC 2.92 (L) 3.80 - 5.80 E12/L    Hemoglobin 8.6 (L) 12.5 - 16.5 g/dL    Hematocrit 27.3 (L) 37.0 - 54.0 %    MCV 93.5 80.0 - 99.9 fL    MCH 29.5 26.0 - 35.0 pg    MCHC 31.5 (L) 32.0 - 34.5 %    RDW 14.9 11.5 - 15.0 fL    Platelets 765 094 - 398 E9/L    MPV 11.5 7.0 - 12.0 fL   Basic Metabolic Panel    Collection Time: 10/11/21  5:30 AM   Result Value Ref Range    Sodium 139 132 - 146 mmol/L    Potassium 3.7 3.5 - 5.0 mmol/L    Chloride 106 98 - 107 mmol/L    CO2 27 22 - 29 mmol/L    Anion Gap 6 (L) 7 - 16 mmol/L    Glucose 108 (H) 74 - 99 mg/dL    BUN 67 (H) 6 - 23 mg/dL    CREATININE 2.2 (H) 0.7 - 1.2 mg/dL    GFR Non-African American 29 >=60 mL/min/1.73    GFR African American 36     Calcium 8.4 (L) 8.6 - 10.2 mg/dL   Iron and TIBC    Collection Time: 10/11/21  5:30 AM   Result Value Ref Range    Iron 56 (L) 59 - 158 mcg/dL    TIBC 154 (L) 250 - 450 mcg/dL    Iron Saturation 36 20 - 55 %   POCT Glucose    Collection Time: 10/11/21  6:07 AM   Result Value Ref Range    Meter Glucose 84 74 - 99 mg/dL   POCT Glucose    Collection Time: 10/11/21 11:26 AM   Result Value Ref Range    Meter Glucose 125 (H) 74 - 99 mg/dL     Echo Complete    Result Date: 9/23/2021  Transthoracic Echocardiography Report (TTE)  Demographics   Patient Name    Braxton Daugherty Gender            Male                  A   Medical Record  [id number]     Room Number  Number   Account #       [de-identified]    Procedure Date    09/23/2021   Corporate ID                 Ordering Physician   Accession       [id number]   Referring  Number                       Physician   Date of Birth   1945   Sonographer       Pearl Liner RDMARVIN   Age             78 year(s)   Interpreting      [address]                               Physician         Physician Cardiology                                                 Essie Westbrook MD                                Any Other  Procedure Type of Study   TTE procedure:Echo Complete W/Doppler & Color Flow. Procedure Date Date: 09/23/2021 Start: 08:21 AM Study Location: Portable Technical Quality: Adequate visualization Indications:Preop cardiac evaluation. Patient Status: Routine Height: 68 inches Weight: 160 pounds BSA: 1.86 m^2 BMI: 24.33 kg/m^2 HR: 49 bpm  Findings   Left Ventricle  Left ventricular internal dimensions were normal in diastole and systole. Abnormal (paradoxical) motion consistent with left bundle branch block. Normal left ventricular ejection fraction. Ejection fraction is visually estimated at 55%. Indeterminate diastolic function. Right Ventricle  Normal right ventricular size and function. Left Atrium  Normal sized left atrium. Interatrial septum appears intact. Right Atrium  Normal right atrium size. Mitral Valve  Mild thickening of the mitral valve leaflets with reduced leaflet  excursion. Mild centrally directed mitral regurgitation. Tricuspid Valve  The tricuspid valve appears structurally normal.  Mild tricuspid regurgitation. Aortic Valve  Structurally normal aortic valve. Pulmonic Valve  The pulmonic valve was not well visualized. Pericardial Effusion  No evidence of pericardial effusion. Aorta  Aortic root dimension within normal limits. Conclusions   Summary  Left ventricular internal dimensions were normal in diastole and systole. Abnormal (paradoxical) motion consistent with left bundle branch block. Normal left ventricular ejection fraction.   Mild thickening of the mitral valve leaflets with reduced leaflet  excursion. Mild centrally directed mitral regurgitation. Mild tricuspid regurgitation.    Signature   ----------------------------------------------------------------  Electronically signed by Pia Perkins MD(Interpreting  physician) on 09/23/2021 03:26 PM  ----------------------------------------------------------------  M-Mode/2D Measurements & Calculations   LV Diastolic    LV Systolic Dimension: 3.1   AV Cusp Separation: 2.1 cmLA  Dimension: 4.3  cm                           Dimension: 3.2 cmAO Root  cm              LV Volume Diastolic: 49.7 ml Dimension: 3.1 cm  LV FS:27.9 %    LV Volume Systolic: 19.6 ml  LV PW           LV EDV/LV EDV Index: 49.2  Diastolic: 0.8  LJ/75 KA/Z^7VC ESV/LV ESV  cm              Index: 37.8 ml/20ml/ m^2     RV Diastolic Dimension: 2.9  Septum          EF Calculated: 54.7 %        cm  Diastolic: 1.1  LV Mass Index: 71 l/min*m^2  cm              LV Length: 7.4 cm  CO: 3.32 l/min                               LA volume/Index: 50.8 ml  CI: 1.78        LVOT: 2 cm                   /27.31ml/m^2  l/m*m^2                                      RA Area: 17.7 cm^2  LV Mass: 132.74  g  Doppler Measurements & Calculations   MV Peak E-Wave: 0.68    AV Peak Velocity:     LVOT Peak Velocity: 0.9 m/s  m/s                     1.22 m/s              LVOT Mean Velocity: 0.61 m/s  MV Peak A-Wave: 0.83    AV Peak Gradient:     LVOT Peak Gradient: 3.2  m/s                     5.97 mmHg             mmHgLVOT Mean Gradient: 1.7  MV E/A Ratio: 0.81      AV Mean Velocity:     mmHg  MV Peak Gradient: 3.2   0.87 m/s              Estimated RVSP: 26.9 mmHg  mmHg                    AV Mean Gradient: 3.3 Estimated RAP:3 mmHg  MV Mean Gradient: 1.1   mmHg  mmHg                    AV VTI: 28.6 cm  MV Mean Velocity: 0.47  AV Area               TR Velocity:2.45 m/s  m/s                     (Continuity):2.37     TR Gradient:23.91 mmHg  MV Deceleration Time:   cm^2 PV Peak Velocity: 0.82 m/s  243.7 msec                                    PV Peak Gradient: 2.68 mmHg  MV P1/2t: 57.4 msec     LVOT VTI: 21.6 cm     PV Mean Velocity: 0.53 m/s  MVA by PHT:3.83 cm^2                          PV Mean Gradient: 1.3 mmHg  MV Area (continuity): 2 Estimated PASP: 26.91  cm^2                    mmHg  MV E' Septal Velocity:  0.04 m/s  MV E' Lateral Velocity:  8 m/s   Alias Velocity: 0.38  m/sPISA Radius: 0.7 cm   PISA area: 3.08 cm^2MR  flow rate: 117.96 ml/s  [email or web address] [id number]%3d    Additional labs or diagnostic studies needed before admission to rehabilitation unit:  None.     Medications:   enoxaparin  30 mg SubCUTAneous Daily    bumetanide  1 mg Oral Once    aspirin  81 mg Oral Daily    ferrous sulfate  325 mg Oral BID WC    folic acid  1 mg Oral Daily    insulin lispro  0-6 Units SubCUTAneous TID WC    insulin lispro  0-3 Units SubCUTAneous Nightly    vitamin C  500 mg Oral BID    nystatin   Topical BID    clopidogrel  75 mg Oral Daily    bumetanide  1 mg Oral Daily    carvedilol  3.125 mg Oral BID WC    amiodarone  400 mg Oral BID    heparin flush  3 mL IntraVENous 2 times per day    chlorhexidine  15 mL Mouth/Throat BID    atorvastatin  40 mg Oral Daily    ipratropium-albuterol  1 ampule Inhalation Q4H WA      dextrose      sodium chloride       sennosides-docusate sodium, bisacodyl, perflutren lipid microspheres, acetaminophen, oxyCODONE-acetaminophen **OR** oxyCODONE-acetaminophen, diphenhydrAMINE, glucose, dextrose, glucagon (rDNA), dextrose, morphine, ondansetron, sodium chloride, trimethobenzamide, sodium chloride, heparin flush, artificial tears, potassium chloride    SPECIAL PRECAUTIONS: [] No current precautions  [x] Cardiac  [] Renal [x] Sternal [] Respiratory      [] Neurological           [] Hip  [] Spinal [] Seizure  [] Aspiration  [] Isolation precautions:    [] Contact   [] Respiratory   [] Protective     [] Droplet    [x] Weight Bearing precautions:         [x] Non Weight Bearing : BUE , no lifting greater than 5lbs      [] Toe Touch Weight Bearing :        [] Partial Weight Bearing :         [] Weight Bearing as Tolerated :         [x] Fall Risk:   [] Recent history of falls [x] Falls risk level (Allen Scale): high      [] Bed Alarm    [x] Do not leave alone in the bathroom    [] Chair Alarm    [] Cognitive impairment      [] One to One supervision  [] Sitter / Dina John sitter   [] Safety enclosure bed  [x] Decreased balance     SPECIAL REHABILITATION NEEDS:   [] IV Therapy: [x] PRN Adapter  [] Midline  [] PICC      [] Central Line    [] TPN       [] Oxygen: [] Trach [] Bi-PAP [] CPAP  [] Nasal cannula  [] Liters:    [x] Wound Care:   [] Pressure ulcers (stage and location)   [] Wound vac   [x] Wound or incision care - anterior sternum incision, left medial thigh incision. [x] Pain Management (level of pain, meds): Percocet every 4 hrs PRN.      [] Incontinence Bladder [] Ybarra  Insertion date:   [] Incontinence Bowel    [x] Last bowel movement : 10/11/21    Substance use history: [] Yes  [x] No   [] Tobacco  [] Alcohol  [] Other     [] Ethnic  [] Cultural  [] Spiritual  [] Language [] Needs  [] Other than English  [] Hearing Impaired  [] Visually Impaired  [] Speaking Impaired  [] Blind  [] Special equipment:  [] Devices/Splints  [] Type  [] Brace   [] Type  [] Bariatric bed  [] Extra wide commode  [] Extra wide wheelchair [] Extra wide walker  [] Guy walker  [] Guy wheelchair  [] Transfer lift    [] Other equipment    FUNCTIONAL STATUS PT / Radha Felix / Lawfadia Canter:  Mortimer Samuel / RUBEN Discipline Initial: 10/6/21 Follow Up: 10/11/21 Current: 10/12/21   Eating OT NPO Standby assist Minimal assist   Grooming OT Dependent Minimal assist Minimal assist   Bathing OT Dependent Maximal assist Maximal assist   Dressing Upper Extremity OT Dependent Maximal assist Maximal assist   Dressing Lower Extremity OT Dependent Maximal assist Maximal assist   Toileting OT Dependent Dependent NT   Toilet Transfers OT NT NT Moderate assist   Tub/Shower Transfers OT NT NT NT   Homemaking OT NT NT NT   Bed Mobility PT Dependent NT Maximal assist   Bed/Wheelchair Transfers PT NT Maximal assist Maximal assist   Locomotion Walk / Wheelchair  Device:  Distance: PT NT NT Maximal assist  2 steps without AD   Endurance PT - - -   Expression SP - - -   Social Interaction SP - - -   Problem Solving SP - Fair - Fair -   Memory SP - Fair Fair   Comprehension SP - Fair Fair   Swallowing SP NPO Dysphagia 4 Diet Dysphagia 4 Diet   Bowel Management NSG - None charted Continent   Bladder Management NSG - Incontinent Continent     Comments on Functional Status: Able to tolerate 3 hours of therapy per day split into four 45 minute sessions. Patient is on cardiac/sternal precautions. Patient has right-sided weakness from stroke. [x] Able to participate a minimum of 3 hours per day of therapy intervention.     Required treatments/services: [x] Rehabilitation nursing [] Dietitian / nurtition                 [x] Case management  [] Respiratory Therapy      [x] Social work   [] Other    Required Therapy:  Therapy Hours per Day Days per Week Therapeutic Interventions Required   [x] Physical Therapy 1 5-7 Gait, transfers, Safety, strength, education, endurance   [x] Occupational Therapy 1 5-7 ADLs, IADLs, Safety, strength, education, endurance   [x] Speech Pathology 1 5-7 Speech, swallow, safety   [] Prosthetics / Orthotics       []         Anticipated Discharge Plan:   Anticipated DME Needs:  [x] Home     [] Commode   [] Alone    [] Wheelchair   [] Supervised    [] Walker   [x] Assist    [] Oxygen        [] Hospital Bed  [] Assisted Living    [] Ramp        [x] To Be Determined    Anticipated Home Health Services:  Anticipated Outpatient Services:  [] PT       [] PT  [] OT      [] OT  [] Speech     [] Speech  [] Nursing     [] Dialysis  [] Aide      [x] To Be Determined  [x] To Be Determined    Anticipated support group:  [] Amputation  [] Multiple Sclerosis  [] Stroke  [] Brain Injury  [] Spinal cord injury  [] Other    Barriers to discharge: Impaired self care, impaired mobility, sternal precautions. Discharge Support: [] Patient lives alone and does not have a caregiver available     [x] Patient has a caregiver available     [x] Discharge plan has been verified with patient's caregiver      [x] Caregiver is in agreement with the discharge plan     Expected functional status for safe discharge: Minimal assist.    Patient/support person goals: Go home. Expected length of stay: 2-3 weeks. Discussed expected length of stay and agreeable to IRF plan: [x] Yes   [] No    Impairment Group Category: 1.2    Etiological Diagnosis: CAD / CVA. Primary Rehabilitation Diagnosis: CVA    Electronically signed by Qamar Akins RN on 10/11/2021 at 2:51 PM    Prescreen completed __________________________________ (signature of prescreener)    Date:   10/13/21 Time:  1030    JUSTIFICATION FOR ADMISSION TO ACUTE REHABILITATION:  Patient has suffered decline in functional abilities for gait, transfers, speech, swallowing, cognition,  ADL's and IADL's as well as endurance. Patient has functional deficits requiring intensive therapy across multiple disciplines in order to return home safely. Patient will need physician oversight for respiratory issues, abnormal vital signs, nutritional and hydration status, safety issues, medications and therapy modalities. PT, OT and speech will work on deficits as noted in evaluations. Case management and social work will provide services for DME and management of a safe discharge home.       RECOMMEND LEVEL OF CARE  Recommend inpatient rehabilitation: [x] Yes   [] No  If no indicate reason:  [] Functional level too high  [] Unmotivated  [] No insurance carrier approval [] Unlikely to return to community  [] No medical necessity  [] Patient or family chose other facility  [] Too medically complex  [] Inadequate discharge plan  [] Rehabilitation bed unavailable [] Functional level too low  [] patient or family refused ARU    If patient not accepted for IRF admission, recommended level of care:  [] [address]  [] [address]  [] [place]   [] Home Care  [] Other      [] LTAC       Physician Assigned:  [x] Dr. Madeline Mckenna         [] Dr. Joao Goetz              [] Dr. Francisco Fisher [] Dr. Jean Pierre Bellamy  [] Dr. Lion Evangelina:    ____________________________________________________________________  ____________________________________________________________________  ____________________________________________________________________  ____________________________________________________________________  ____________________________________________________________________      Physician Signature:_____________________________________    Print Signature:_________________________________________    Date:   10/13/21 Time:    21     PRE-SCREEN ASSESSMENT UPDATE (if not admitted within 48 hours of initial pre-screen)    Medical Update/Changes: Prescreen updated to reflect current data since initiated. Functional Update/Changes: Therapy notes updated on prescreen graph to reflect current status.     Reviewer Signature:_____________________________________    Date:  10/13/21 Time: 1030    PHYSICIAN ADMISSION DETERMINATION AND REVIEW UPDATE:     ____________________________________________________________________  ____________________________________________________________________  ____________________________________________________________________  ____________________________________________________________________  ____________________________________________________________________    Physician Signature:_____________________________________    Print Signature:_________________________________________    Date: 10/13/21    Time:  1030

## 2021-10-11 NOTE — PROGRESS NOTES
POD#14  Awake, alert. No complaints. Denies CP, palpitations, SOB at rest, dizziness/lightheadedness. Emotional as he has been through quite a bit the last 2 weeks. Vitals:    10/11/21 0000 10/11/21 0345 10/11/21 0700 10/11/21 0910   BP: 138/64 (!) 142/65 137/66    Pulse: 76 80 78    Resp: 18 18 18    Temp: 97.3 °F (36.3 °C) 97.2 °F (36.2 °C) 98.9 °F (37.2 °C)    TempSrc: Temporal Temporal Temporal    SpO2: 98% 94% 93% 94%   Weight:       Height:         O2: RA      Intake/Output Summary (Last 24 hours) at 10/11/2021 0938  Last data filed at 10/11/2021 0920  Gross per 24 hour   Intake 360 ml   Output 1375 ml   Net -1015 ml         Recent Labs     10/09/21  0430 10/10/21  0407 10/11/21  0530   WBC 21.0* 19.2* 15.8*   HGB 8.8* 8.4* 8.6*   HCT 27.1* 26.4* 27.3*    194 217      Recent Labs     10/10/21  0407 10/10/21  1632 10/11/21  0530   BUN 81* 69* 67*   CREATININE 2.4* 2.3* 2.2*         CXR mild bilat LL effusion/congestion (expected)  Telemetry: NSR      PE  Cardiac: RRR  Lungs: decreased bases bilat  Chest incision C/D/I, approximated, no erythema. Sternum stable. Prior chest tube site incisions C/D/I, no erythema with intact sutures. Chest tubes x 1 and AV Epicardial pacing wires present and secure. Abd: Soft, nontender, +BS  Ext: LE Incisions C/D/I, approximated, no erythema, bilat groins soft s/p dialysis catheter and IABP sites; mild skin irritation along right groin and chest from adhesives  RUE with persistent weakness, good  strength- pt is able to life arm somewhat now           A/P: POD# 14     1.  CAD  --Stable s/p CABGx3 (LIMA-LAD, SVG-OM, SVG-RCA)/Extensive RCA endarterectomy/MALU AtriClip  --Echo done 10/5 while in [facility] shows EF 40-45% LV apex is dyskinetic and mid to distal anterior and anteroseptal walls   are severely hypokinetic; normal RV function  --DAPT/statin/BB  --reinforce sternal precautions  --epicardial pacing wires cut w/o difficulty  --chest tubes without significant output plavix and ASA  - add lovenox for DVT prophy    10. Leukocytosis  - WBC down to 15k, afebrile; steroids stopped   - On empiric Zosyn (day 14) - will d/w attending to stop    11. At risk for malnutrition   - Speech therapy for swallow eval recommended dysphagia diet; pureed  - repeat swallow today       12. Possible GI bleed  - Maroon colored OG output over weekend with +hemoccult  - Evaluated by general surgery, no intervention planned; PPI BID, monitor H/H   - start lovenox for DVT prophy     13. Right Sided Weakness  - Neurology following, CT head unremarkable, would like MRI- unable with epicardial wires in place (wires now removed)  - Echo with no LV thrombus    - Carotid US with 50-69% stenosis bilaterally; increased from 9/10 0-49% bilaterally- will arrange OP f/u with Dr. Katlyn Salazar (seen while inpatient for dialysis access)   - Repeat CT head 10/8 with area of low-density in left cerebellum   - ASA/statin, PT/OT/speech   -ARU following             DVT prophylaxis:  --continue bilateral knee high BETY hose  --continue PCDs  --continue progressive ambulation  --Add lovenox for dvt prophylaxis and continue knee high BETY hose/pcds/progressive ambulation      Dispo: home vs. Rehab pending progression in activity level- possibly qualifies for ARU    Pt seen with dr Alpa Goss; will repeat limited echo to eval EF;  Stop zosyn        This patient's case and care plan was discussed with the attending surgeon

## 2021-10-11 NOTE — PROGRESS NOTES
Nutrition Education      Provided educational handouts and sample meal plans on heart healthy/cardiac diet. Information in discharge instructions d/t patients current confusion. · Written educational materials provided. · Contact name and number provided. · Refer to Patient Education activity for more details.     Electronically signed by Norman Quinn RD, LD on 10/11/21 at 11:47 AM EDT    Contact: [phone]

## 2021-10-11 NOTE — PATIENT CARE CONFERENCE
P Quality Flow/Interdisciplinary Rounds Progress Note        Quality Flow Rounds held on October 11, 2021    Disciplines Attending:  Bedside Nurse, ,  and Nursing Unit [address] [facility] was admitted on 9/27/2021  5:25 AM    Anticipated Discharge Date:  Expected Discharge Date: 10/11/21    Disposition:    Ephraim Score:  Ephraim Scale Score: 16    Readmission Risk              Risk of Unplanned Readmission:  27           Discussed patient goal for the day, patient clinical progression, and barriers to discharge.   The following Goal(s) of the Day/Commitment(s) have been identified:  Diagnostics - Report Results      Radha Greco RN  October 11, 2021

## 2021-10-11 NOTE — PROGRESS NOTES
Department of Internal Medicine  Nephrology Progress Note      Events reviewed. SUBJECTIVE: We are following Mr. Meh[address] for NGUYỄN. Reports no complaints.      PHYSICAL EXAM:      Vitals:    VITALS:  /66   Pulse 78   Temp 98.9 °F (37.2 °C) (Temporal)   Resp 18   Ht 5' 8\" (1.727 m)   Wt 158 lb 11.7 oz (72 kg)   SpO2 94%   BMI 24.14 kg/m²   24HR INTAKE/OUTPUT:      Intake/Output Summary (Last 24 hours) at 10/11/2021 0959  Last data filed at 10/11/2021 0920  Gross per 24 hour   Intake 360 ml   Output 1375 ml   Net -1015 ml     Constitutional: Patient is awake, alert in no distress  HEENT: Pupils are equal reactive  Respiratory: Decreased breath sounds at the bases  Cardiovascular/Edema: Heart sounds are regular  Gastrointestinal: Abdomen soft  Neurologic: Patient alert   Other: +trace edema      Scheduled Meds:   enoxaparin  30 mg SubCUTAneous Daily    aspirin  81 mg Oral Daily    ferrous sulfate  325 mg Oral BID WC    folic acid  1 mg Oral Daily    insulin lispro  0-6 Units SubCUTAneous TID WC    insulin lispro  0-3 Units SubCUTAneous Nightly    vitamin C  500 mg Oral BID    nystatin   Topical BID    piperacillin-tazobactam  3,375 mg IntraVENous Q8H    clopidogrel  75 mg Oral Daily    bumetanide  1 mg Oral Daily    carvedilol  3.125 mg Oral BID WC    amiodarone  400 mg Oral BID    heparin flush  3 mL IntraVENous 2 times per day    chlorhexidine  15 mL Mouth/Throat BID    atorvastatin  40 mg Oral Daily    ipratropium-albuterol  1 ampule Inhalation Q4H WA     Continuous Infusions:   dextrose      dextrose 5% and 0.45% NaCl with KCl 20 mEq 50 mL/hr at 10/07/21 2334    sodium chloride       PRN Meds:.sennosides-docusate sodium, bisacodyl, acetaminophen, oxyCODONE-acetaminophen **OR** oxyCODONE-acetaminophen, diphenhydrAMINE, glucose, dextrose, glucagon (rDNA), dextrose, morphine, ondansetron, sodium chloride, trimethobenzamide, sodium chloride, heparin flush, artificial tears, potassium chloride    DATA:    CBC:   Lab Results   Component Value Date    WBC 15.8 10/11/2021    RBC 2.92 10/11/2021    HGB 8.6 10/11/2021    HCT 27.3 10/11/2021    MCV 93.5 10/11/2021    MCH 29.5 10/11/2021    MCHC 31.5 10/11/2021    RDW 14.9 10/11/2021     10/11/2021    MPV 11.5 10/11/2021     CMP:    Lab Results   Component Value Date     10/11/2021    K 3.7 10/11/2021    K 3.3 10/07/2021     10/11/2021    CO2 27 10/11/2021    BUN 67 10/11/2021    CREATININE 2.2 10/11/2021    GFRAA 36 10/11/2021    LABGLOM 29 10/11/2021    GLUCOSE 108 10/11/2021    PROT 4.9 10/08/2021    LABALBU 3.1 10/08/2021    CALCIUM 8.4 10/11/2021    BILITOT 1.2 10/08/2021    ALKPHOS 51 10/08/2021    AST 19 10/08/2021    ALT 16 10/08/2021     Magnesium:    Lab Results   Component Value Date    MG 2.3 10/10/2021     Phosphorus:    Lab Results   Component Value Date    PHOS 2.8 10/10/2021        Radiology Review:      CXR 10/3/21   1. Median sternotomy changes. 2. Bilateral pleuroparenchymal opacities that could be related to pleural   effusion and edema.  The appearance of the chest is about the same or   slightly worse.         Chest x-ray October 6, 2021   1. Stable appearance of the postoperative chest.   2. Bilateral chest tubes remain in position.  There is no right or left   pneumothorax. 3. Small right pleural effusion         Chest x-ray October 8, 2021   Minimal bibasilar atelectasis.       Trace right pleural effusion       There is no pneumothorax             BRIEF SUMMARY OF INITIAL CONSULT:    [facility] 79[facility] with history of HTN, CAD with recent status cardiac catheterization done on September 9 which showed severe multivessel disease, hyperlipidemia, hiatal hernia, who was admitted for elective CABG on September 27, 2021. On admission his creatinine level was 0.8 mg/dL and post surgery his creatinine has progressively increased up to 2.3 mg/dL, reason for this consultation.  Postoperatively she developed persistent hypotension, lactic acidosis and respiratory failure for which he was reintubated. Since then she has required multiple pressors and he was placed on IABP. He had received 1 dose of ketorolac perioperatively. His urine output has significantly decrease and is being about 500 cc/day in the last 48 hours and his fluid balance presently is over 9 L. Problems resolved:    · Cardiogenic shock, hemodynamically more stable, pressor support decrease, still on IABP. Tentative plan for removal of IABP. Pro-BP 16,936  · Hypernatremia with hypervolemia, 2/2 sodium containing IV fluid resuscitation and underlying heart failure. Resolved with  natriuresis and free water (add D5W)  · Hypokalemia, 2/2 diuretics, potassium levels improve  · Respiratory alkalosis (pH: 7.554, PCO2: 78.1) with metabolic alkalosis (bicarbonate administration)  · Respiratory failure status post intubation  · Thrombocytopenia  · Transaminitis with hyperbilirubinemia, possibly shock liver versus congestive liver    IMPRESSION/RECOMMENDATIONS:      1. NGUYỄN stage III, CABG associated NGUYỄN, ischemic ATN, non-oliguric. FEUrea 18.8%. Renal function continues to improve with decreasing creatinine levels and adequate urine output. 2. HFmEF 40% with stage IDD, proBNP 16,826 > 9461, on Bumex 1 mg p.o. daily    ---------------------------------------------------------    3. CAD status post CABG x3 September 27, 2021, on ASA, clopidogrel, carvedilol, atorvastatin  4. Amiodarone therapy, for AF prophylaxis  5. Probably pneumonia, on piperacillin-tazobactam  6.  Normocytic anemia, status post surgery, getting transfused      Plan:    · Discontinue IV fluids  · Continue Bumex 1 mg p.o. daily, at 1 mg p.o. this afternoon  · Continue to monitor renal function for recovery

## 2021-10-11 NOTE — PROGRESS NOTES
Pureed diet was recommended due to residuals in the vallecula. Continue to recommend pureed foods or a repeat video swallow to determine if residuals remain.

## 2021-10-11 NOTE — DISCHARGE INSTR - COC
Continuity of Care Form    Patient Name: Daina Lr   :  1945  MRN:  [id number]    Admit date:  2021  Discharge date:  10/13    Code Status Order: Prior   Advance Directives:   [place] Directive Type of Healthcare Directive Copy in [address] Agent's Name Healthcare Agent's Phone Number    [phone]  Yes, patient has an advance directive for healthcare treatment  Durable power of  for health care; Health care treatment directive; Living will  --  Healthcare power of   Fabiana Peters wife   --            Admitting Physician:  Leonard Art MD  PCP: Hair Acharya MD    Discharging Nurse: KASHIF [address] Unit/Room#: 8967/4852-P  Discharging Unit Phone Number: [phone]    Emergency Contact:   Extended Emergency Contact Information  Primary Emergency Contact: Eric [facility] Phone: [phone]  Relation: Spouse    Past Surgical History:  Past Surgical History:   Procedure Laterality Date    BRONCHOSCOPY N/A 2021    BRONCHOSCOPY DIAGNOSTIC OR [facility] ONLY performed by Jesika Pimentel MD at [address] [place] [address]  2021    BRONCHOSCOPY CRYOTHERAPY/LASER performed by Jesika Pimentel MD at [place] [address]. N/A 2021    CABG CORONARY ARTERY BYPASS  X 3 EVH, EMMA performed by Leonard Art MD at [address] [place] [address]       Immunization History:   Immunization History   Administered Date(s) Administered    COVID-19, J&J, PF, 0.5 mL 2021       Active Problems:  Patient Active Problem List   Diagnosis Code    CAD in native artery I25.10    Pulmonary nodules R91.8    Unstable angina ([facility] I20.0    Other hyperlipidemia E78.49    Essential hypertension I10    Gastroesophageal reflux disease with esophagitis without hemorrhage K21.00    S/P CABG (coronary artery bypass graft) Z95.1    Acute respiratory failure with hypoxia ([facility] J96.01    Lactic acidemia E87.2    NGUYỄN (acute kidney injury) ([facility] N17.9    Hypokalemia E87.6    Hypocalcemia E83.51    Cardiogenic shock (HCC) R57.0    Thrombocytopenia (HCC) D69.6    Leukocytosis D72.829    Encounter regarding vascular access for dialysis for ESRD ([facility] N18.6, Z99.2    Acute right-sided weakness R53.1       Isolation/Infection:   Isolation            No Isolation          Patient Infection Status       None to display            Nurse Assessment:  Last Vital Signs: BP (!) 117/57   Pulse 79   Temp 97.5 °F (36.4 °C) (Temporal)   Resp 18   Ht 5' 8\" (1.727 m)   Wt 158 lb 11.7 oz (72 kg)   SpO2 96%   BMI 24.14 kg/m²     Last documented pain score (0-10 scale): Pain Level: 0  Last Weight:   Wt Readings from Last 1 Encounters:   09/27/21 158 lb 11.7 oz (72 kg)     Mental Status:  oriented and alert    IV Access:  - PICC - site  L Basilic, insertion date: 9/29/21    Nursing Mobility/ADLs:  Walking   Assisted  Transfer  Assisted  Bathing  Assisted  Dressing  Assisted  Toileting  Assisted  Feeding  Assisted  Med Admin  Assisted  Med Delivery   crushed and prefers mixed with pudding    Wound Care Documentation and Therapy:        Elimination:  Continence:   · Bowel: Yes  · Bladder: Yes  Urinary Catheter: None   Colostomy/Ileostomy/Ileal Conduit: No       Date of Last BM: 10/12    Intake/Output Summary (Last 24 hours) at 10/11/2021 1418  Last data filed at 10/11/2021 1401  Gross per 24 hour   Intake 540 ml   Output 1245 ml   Net -705 ml     I/O last 3 completed shifts: In: 180 [P.O.:180]  Out: 1375 [Urine:1225; Chest Tube:150]    Safety Concerns:      At Risk for Falls and Aspiration Risk    Impairments/Disabilities:      R sided weakness    Nutrition Therapy:  Current Nutrition Therapy:   - Oral Diet:  Cardiac    Routes of Feeding: Oral  Liquids: Nectar Thick Liquids  Daily Fluid Restriction: no  Last Modified Barium Swallow with Video (Video Swallowing Test): done on 10/06    Treatments at the Time of Hospital Discharge:   Respiratory Treatments: ***  Oxygen Therapy:  is not on home oxygen therapy. Ventilator:    - No ventilator support    Rehab Therapies: Physical Therapy and Occupational Therapy  Weight Bearing Status/Restrictions: No weight bearing restirctions  Other Medical Equipment (for information only, NOT a DME order):  ***  Other Treatments: PLEASE FOLLOW CARDIAC SURGERY REHAB PROTOCOL     Patient's personal belongings (please select all that are sent with patient):  Lisa    RN SIGNATURE:  Electronically signed by Jeremy Jimenez RN on 10/13/21 at 11:22 AM EDT           ***HEART FAILURE - CONGESTIVE HEART FAILURE***  DISCHARGE INSTRUCTIONS:  GUIDELINES TO FOLLOW AT [facility]/SNF/ Assisted Living    Future Appointments   Date Time Provider Yoselin Falk   10/26/2021  9:45 AM Daniel Lazar MD CARDIO SURG [facility]          MEDICATIONS:  · Please notify the doctor if patient is not able to take their medications or if medications are being held for any reasons (such as low blood pressure ect.)  · Do not give the patient ibuprofen (Advil or Motrin), naproxen (Aleve) without talking to the doctor first. This could make their heart failure worse.          WEIGHT MONITORING:  ·  Weigh patient every day in the morning after they void (If patient is able to stand, please get a standing weight.)  ·  Notify the doctor of a weight gain of 3 pounds or more in 1 day   OR  a total of 5 pounds or more in 1 week             DIET   Cardiac heart healthy diet:  Low sodium diet: no  more than 2,000mg (2 grams) of salt / sodium per day (which equals to a little less than  a teaspoon of salt)/ Cardiac Diet: Low saturated / low trans fat, no added salt, caffeine restricted    · If patient is there for rehab and will be returning home in the near future; reinforce with the patient and the family to follow a low sodium diet (2,000 mg)- avoid using salt at the table, avoid / limit use of canned soups, processed / packaged foods, salted snacks, olives and pickles. Do not use a salt substitute without checking with the doctor. (Mrs. Paulina Thompson is safe to use). NOTIFY THE DOCTOR THE FIRST DAY OF ONSET OF ANY OF THESE   SYMPTOMS:  ·  Weight gain of 3 pounds or more in 1 day         OR 5 pounds or more in one week  · More shortness of breath  · More swelling in stomach, legs, ankles or feet  · Feeling more tired, No energy  · Dry hacky cough  · Dizziness  · More chest pain / discomfort  · Hard time breathing laying down      Discharge Instructions for Open Heart Surgery    What you will need at home:               * Accurate Scale               *  Digital Thermometer               *  Antibacterial Soap                *  Clean Wash Cloths             *  Someone to be with you for one week               DO NOT LIFT, PUSH, OR PULL ANYTHING OVER 5 POUNDS FOR 8 WEEK from the day of surgery. This could prevent your sternum from healing properly. ? When you are given permission from your surgeon to begin lifting again,                     do so gradually. You will need time to build your muscle strength. ? A gallon of milk is more than 5 lbs. you should buy ½ gallons.  NEVER SMOKE AGAIN! Absolutely no tobacco products! Do not allow others to smoke around you. Second hand smoke can be just as bad. The [employer] has a free program available, call 7-870.537.5390.  Activity: See your activity diary in your binder for your walking plan. Plan to walk indoors on days the temperature is below 40? or over 80? or during smog alerts. At first limit your stair climbing to once or twice a day. You may use the handrail for balance only. Do not pull yourself up with it. It is not unusual to feel tired for the first few weeks, but walking builds up your strength.  Driving: Do not drive a car or any vehicle for 4 weeks from the day of surgery.   You can not drive a truck, tractor or  for 8 weeks from the day of surgery. After 4 weeks, you can drive vehicles with power steering only. Do not drive while on pain medication.  Incentive Spirometer:  Continue to use your lung exerciser for the next 4 weeks. Support your chest and cough each time you complete the 10 exercises.  Weight:  Weigh yourself every morning. Call the surgeon if you gain 3 pounds or more overnight or 5 pounds in a week. This may be a sign of fluid retention.  Medication:    1. Pain pills are ordered to keep you comfortable and able to increase activity         level. Your need for pain pills should decrease over the next 2 weeks. For mild pain you take Tylenol, but do not exceed 4000mg of Tylenol a day. Vicodin contains Tylenol,  so watch how much Tylenol (Acetaminophen) you take  2. Stool Softners: You may have been prescribed Colace (docusate), to help prevent straining with bowel movements. If your bowels have not moved by the second day home, take a laxative of your choice. If no bowel movement by the third day, call your surgeon. If you find you have the opposite problem and your stools are too soft, stop taking the stool softener. 3. Avoid herbal, dietary products and vitamins unless OKd by your surgeon. 4. If on Coumadin monitor Vitamin K intake and keep it consistent. Call during business hours Monday through Friday for medication refills. [phone]       Incision Care:  [address] YOUR INCISIONS DAILY WITH A CLEAN WASHCLOTH AND ANTIBACTERIAL SOAP. Do not wash your incisions after you have cleansed other parts of your body. You may shower but keep your back to the spray. Avoid hot water, comfortably warm is OK. ? If you went home with a dressing on any incision: Remove the dressing daily     and wash the incision with antibacterial soap and water and pat dry. Only     cover the incision with a dressing if it is draining.  Otherwise leave it uncovered (unless your doctor told you otherwise)  ? No tub baths until your incisions are healed. ? DO NOT APPLY ANYTHING OTHER THAN ANTIBACTERIAL SOAP AND     WATER TO YOUR INCISIONS. Do not apply lotions, creams, ointments,     hydrogen peroxide or powder. ? Keep your incisions CLEAN. Think CLEAN. No one should touch your     incisions without washing their hands first.  Do not let pets touch your incisions     (have incisions covered with clothing when around pets). Keep your heart     pillow clean and off the floor. ? Expect some swelling in the leg with the incision. Keep your legs elevated     above the level of your heart when lying or sitting. ? Wear loose clothing. For support women should wear a bra.  BETY Hose (white stockings): Should be worn during the day and off at night. Someone other than the patient will need to put on and take off the hose. It is too much pulling and tugging for the patient. Expect them to be difficult to put on and they should feel snug. These are usually worn for 2-4 weeks. Wash them by hand to keep their elasticity.  Diet:  Eat a low fat, low salt diet. Eat a high fiber diet to avoid constipation and straining during bowel movements (whole grains, raw veggies, fruits)     Diabetics:  Check blood sugars twice a day. Contact your primary care physician if your blood sugar is consistently above 130. Good tissue healing occurs when blood sugars are less than 130.  Housework: Do not cut the grass, vacuum, sweep or do any heavy housework.  Riding: You may ride in the car for local trips, up to 45 minutes. If it you have to travel further stop every 45 min. Wear your lap and shoulder belts in the car. Place your heart pillow between your chest and the shoulder belt.  Sexual Activity: When you can climb 2 flights of stairs without chest pain, shortness of breath or fatigue, it is generally OK to resume sexual activity.        Depression: It is not unusual to have feelings of anxiety, fear or depression after surgery. If you need help with these feelings, call your primary care physician. There are medications to help and healing usually occurs sooner is youre not depressed.  Heart Valve Replacement:  If you had your heart valve replaced you should receive a card for your wallet. Show ALL your doctors and dentist the card before treatment. You will need to take antibiotics before and after surgery, teeth cleaning etc. for the rest of your life. ? If you get a sore throat or fever over 101? that lasts more than a day or     two call your doctor. ? If you are exposed to someone with strept throat, then get a sore throat     yourself, call your doctor IMMEDIATELY. Any doubts about taking antibiotics before or after a procedure call your surgeons office. When to call the doctor:  [phone] If you have sudden, severe shortness of breath or shortness of breath while resting.  Pain, tenderness, warmth or redness in your calf.  Weight gain of 3 pounds in one day or 5 pounds in one week.  If your heart rate is below 50 or over 110 beats per minute, or symptoms of fluttering in your chest or irregular pulse.  Temperature (taken daily) greater than 101? Aldair Chime  If your bowels have not moved by the third day home.  Persistent diarrhea, nausea or vomiting.  If you are unable to eat, or unable to drink 5 glasses of fluid a day for more than one day.  For redness, warmth, tenderness, drainage or swelling of any incision. For ANY chest incision drainage.  If you have pain not relieved by pain medication.  If you experience the same pain or other symptoms that brought you to the hospital or doctor before surgery.  Diabetics:  Call Primary Care Physician for blood sugars consistently above 130.    Depression:  Call Primary Care Physician if feelings of depression persist.      If you think something is seriously wrong go to the nearest emergency room! Call 911 for any EMERGENCY and go to the nearest hospital!    Future Appointments   Date Time Provider Yoselin Hernandezi   10/21/2021  8:30 AM JUAN MIGUEL Dozier CNP [facility] CARDIO [facility]   10/26/2021  9:45 AM Deborah Juárez MD CARDIO [facility]           CASE MANAGEMENT/SOCIAL WORK SECTION    Inpatient Status Date: 09/27/21    Readmission Risk Assessment Score:  Readmission Risk              Risk of Unplanned Readmission:  27           Discharging to Facility/ Agency   · Name: [facility]   · Address:  · Phone:  · Fax:    Dialysis Facility (if applicable)   · Name:  · Address:  · Dialysis Schedule:  · Phone:  · Fax:    / signature: Electronically signed by Yasmeen Glez RN on 10/12/2021 at 10:08 AM     PHYSICIAN SECTION    Prognosis: Fair    Condition at Discharge: Stable    Rehab Potential (if transferring to Rehab): Fair    Recommended Labs or Other Treatments After Discharge: Follow up with Dr. Caitlyn Renteria in 3 weeks     Physician Certification: I certify the above information and transfer of Mia Goss  is necessary for the continuing treatment of the diagnosis listed and that he requires Acute Rehab for less 30 days.      Update Admission H&P: Changes in H&P as follows - see last progress note    PHYSICIAN SIGNATURE:  Electronically signed by JUAN MIGUEL Montero CNP on 10/13/21 at 9:53 AM EDT

## 2021-10-11 NOTE — PROGRESS NOTES
OCCUPATIONAL THERAPY TREATMENT NOTE    [address] [address] [address]       QJWE:                                                               Patient Name: Sally Carpio  MRN: [id number]  : 1945  Room: 0294/2238-X    Evaluating OT: Noah Sanchez OTR/L 9183     Referring Provider: Monique Jackson MD   Specific Provider Orders/Date: OT eval and treat (10/5/21)     Diagnosis: CAD   Surgery/Procedures:  CABG x 3   *Following CABG, patient developed hypotension, acidosis, NGUYỄN and respiratory failure and was intubated` Bronchoscopy done on  to remove large mucus plug; pt required pressors and was placed on aortic balloon pump which was removed 10/4/21. Off of sedation, pt was found to have right sided weakness     Pertinent Medical History: CAD, ESRD, HLD, HTN, hiatal hernia      *Precautions:  Fall Risk,, , sternal precautions, R hemiparesis, anxious      Assessment of current deficits   [x]? Functional mobility          [x]? ADLs           [x]? Strength                  [x]? Cognition   [x]? Functional transfers        [x]? IADLs         [x]? Safety Awareness   [x]? Endurance   [x]? Fine Coordination           [x]? ROM           [x]? Vision/perception    [x]? Sensation     [x]? Gross Motor Coordination [x]? Balance    [x]? Delirium                  [x]? Motor Control     []?  Communication     OT PLAN OF CARE   OT POC based on physician orders, patient diagnosis and results of clinical assessment.        Frequency/Duration: 1-3 days/wk for 1-2 weeks PRN    Specific OT Treatment to include:   ADL retraining/adapted techniques and AE recommendations to increase functional independence within precautions                    Energy conservation techniques to improve tolerance for selfcare routine   Functional transfer/mobility training/DME recommendations for increased independence, safety and fall prevention         Patient/family retired     Pain Level: pt c/o 0/10  pain  this session     Cognition: A&O: 4/4   Follows 1-2 step commands appropriately with min cues to focus. Pt requires min cues to regard R side of body during activity and to maintain midline orientation. Memory: fair; able to recall sternal precautions; 3/3 word recall             Comprehension fair             Problem solving: fair-             Judgement/safety: fair                Communication skills: WFL             Vision: visual tracking intact, improved awareness of R side of body, min inattention                  Glasses:yes                                                      Hearing: WFL     UE Assessment:  Hand Dominance: Right [x]? Left []?       ROM Strength STM goal: PRN   RUE  Shoulder: gross shrug; gross horizontal add/abd; elbow grossly 30 deg; forearm supination to neutral; gross wrist ext; gross grasp with impaired [address] Shoulder: 2-/5  Elbow: 2-/5  Forearm: 2-/5  Wrist 2-/5  Grasp 3-/5  Thumb 2-/5   Pt to demo G tolerance with R UE ROM/facilitation techniques to maintain jt mobility and increase body awareness for participation in ADLs.           LUE Limited shoulder ROM; WFL distal   Grossly 3-/5 proximal  3+/5 distal  WFL for ADLs; grossly 4/5         Sensation: No c/o numbness/tingling in extremities. Impaired finger ID in L hand.   Tone: WFL  Edema: min throughout with mod edema R hand     Functional Assessment:  AM-PAC Daily Activity Raw Score: 13/24    Initial Eval Status  Date: 10/6 Treatment Status  Date:10/11/21 STGs = LTGs  Time frame: 7-14 days   Feeding NGT SBA  Using L UE, educated pt to use R UE as a helper with meal, seated in chair for meal      Re-assess when indicated   Grooming Dep  Min A  Using L UE due to edema & weakness in R UE, again instructed pt to use R UE as a helper with tasks, mod edema in hand, minimal grasp                       Min A   while seated supported; using B UE's as fair functional assists.       UB dressing/bathing Dep Max A  Instructed on modified techniques due to R UE weakness                       Mod A  Min cues for R body awareness and initiation of amber dressing techniques         LB dressing/bathing Dep Max A  Instructed on modified techniques due to R LE weakness, unable to complete task with socks using cross over technique due to pain in groin, will educate on AE                       Mod A   using AE as needed for safe reach/ energy conservation        Toileting Dep  Dep  Incontinence per spouse                             Bed Mobility  Supine to sit: Dep+2     Sit to supine: Dep+2 NT  Seated in chair upon arrival     Supine to sit: Max A+2     Sit to supine: NTT                           Mod A  in prep of ADL tasks & transfers   Functional Transfers Sit to stand: NT     Stand to sit: NT Max A x 2  Sit < > stand   Completed x 3  Cues for sternal precautions  Max cues to shift weight forward, pt demonstrated heavy posterior lean                        Mod A  sit<>stand/functional bathroom transfers using AD/DME as needed for balance and safety   Functional Mobility NT NT  Attempted unable to shift weight or advance feet, only stepped R foot forward & back, did not move L foot                      Mod A   functional/bathroom mobility using AD as needed & demonstrating G safety      Balance Sitting:     Static:  Dep/posterior LOB with L sided preference     Dynamic:Dep  Standing: NT  Sitting: SBA-Min A  Standing: Max A x 2   Min A dynamic sitting balance; Mod A dynamic standing balance  during ADL tasks & transfers   Endurance/Activity Tolerance    F tolerance with light activity. Tolerated >5 min EOB.  Fair seated  Fair- standing   30 second standing tolerance    pt becomes very anxious with standing, fearful  O2 97% on RA  95% after standing   Required mod rest period between tasks G   tolerance with moderate activity/self care routine   Visual/  Perceptual Impaired: R sided attention; midline orientation       Improvement noted, mild inattention on R, awareness of R UE & positioning                       Treatment: OT treatment provided this date:  Balance retraining: Performed sitting & standing balance ex's with instruction to facilitate righting reactions with postural changes during ADLS. Pt demo heavy posterior lean. Completed multiple transfers to improve standing tolerance & balance. ADL retraining: Instruction on adapted techniques/ amber techniques with Fair understanding. ROM/exercise: B UE A/AROM ex's within precautions/restrictions to increase L UE functional use and to facilitate R UE ROM. R UE elevated on pillow. Pt/wife instructed on ex's to perform bedside. Line management and environmental modifications made prior to and end of session to ensure patient safety and to increase efficiency of session. Skilled monitoring of HR, O2 saturation, blood pressure and patient's response to activity performed throughout session. Comments: OK from RN to see patient. Upon arrival, pt seated in recliner & agreeable to session. Wife present. At end of session, pt remained seated in recliner, Pillow/towel roll placed under R UE for comfort & edema control. .  Call light within reach, all lines and tubes intact. Pt instructed on use of call light for assistance and fall prevention. Overall, pt presents with decreased activity tolerance, dynamic balance, functional mobility and perceptual deficits limiting completion of ADLs and safe return home. Pt can benefit from continued skilled OT to increase safety, functional independence & quality of life. · Pt has made Fair progress towards set goals.    · Continue with current plan of care    Time In: 8:20           Time Out: 9:00     Total Treatment Time: 40      Treatment Charges: Mins Units   Ther Ex  31850 10 1   Manual Therapy Amanda Aceves 8141 09480 15 1   ADL/Home Mgt [id number] 15 1   Neuro Re-ed [id number]     Orthotic manage/training  [id number]     Non-Billable Time         Melecio Cole.  [address], [address] [facility]

## 2021-10-11 NOTE — PROGRESS NOTES
SPEECH/LANGUAGE PATHOLOGY  VIDEOFLUOROSCOPIC STUDY OF SWALLOWING (MBS)   and PLAN OF CARE    PATIENT NAME:  Bolivar Guallpa  (male)     MRN:  [id number]    :  1945  (57 y.o.)  STATUS:  Inpatient: Room 6516/6516-A    TODAY'S DATE:  10/11/2021  REFERRING PROVIDER:   SAULO Powers   SPECIFIC PROVIDER ORDER: SLP video swallow  Date of order:  10/11/21   REASON FOR REFERRAL: re-assess oropharyngeal swallow function    EVALUATING THERAPIST: ANGELIQUE Gonzalez      RESULTS:      DYSPHAGIA DIAGNOSIS:  minimal-mild oral phase dysphagia  and moderate pharyngeal phase dysphagia     Laryngeal Penetration and Aspiration:  Penetration WITHOUT aspiration was observed in 's study with  thin liquid- consistently, mildly thick liquid (nectar)- inconsistent only     DIET RECOMMENDATIONS:  Pureed consistency solids (IDDSI level 4) with  nectar consistency (mildly thick - IDDSI level 2) liquids with strategies as trained     MEDICATION ADMINISTRATION and Administer medication crushed, as able, with pudding/applesauce    FEEDING RECOMMENDATIONS:    Assistance level:  Supervision is needed during all oral intake     Compensatory strategies recommended: Double swallow, Effortful swallow, Small bites/sips, Alternate solids and liquids, Liquids by teaspoon only and Chin Tuck AND Head TURN to right     Discussed recommendations with nursing and/or faxed report to referring provider: Yes    SPEECH THERAPY  PLAN OF CARE   The dysphagia POC is established based on physician order and dysphagia diagnosis    Skilled SLP intervention for dysphagia management on acute care 3-5 x per week until goals met, pt plateaus in function and/or discharged from hospital      Conditions Requiring Skilled Therapeutic Intervention for dysphagia:    Reduced pharyngeal clearing of the bolus  Reduced laryngeal closure resulting in penetration    SPECIFIC DYSPHAGIA INTERVENTIONS TO INCLUDE:     Compensatory strategy training   Therapeutic exercises  Therapeutic intervention to facilitate implementation of energy conservation techniques during intake to decrease potential for aspiration associated with compromised swallow/breathing sequence. Specific instructions for next treatment:  ongoing PO analysis to upgrade diet and evaluate tolerance of current PO recommendation, initiate instruction of therapeutic exercises  and initiate instruction of compensatory strategies  Treatment Goals:    Short Term Goals:  Pt will implement identified compensatory swallowing strategies on 90% of opportunities or greater to improve airway protection and swallow function. Pt will participate in ongoing mealtime assessment to provide diet modification and compensatory strategy implementation to minimize risk of aspiration associated with PO intake  Pt will complete BOTR strength/ ROM exercises to reduce pharyngeal residuals and improve epiglottic inversion minimal verbal prompts  Pt will complete laryngeal strength/ ROM therapeutic exercises to improve airway protection for the least restrictive PO diet minimal verbal prompts  Pt will complete Shaker and/or Chin Tuck Against Resistance (CTAR) to increase UES relaxation diameter and increase anterior laryngeal excursion to reduce pharyngeal residuals and reduce risk of pen/asp with minimal verbal prompts.    Pt will complete Effortful Swallow therapeutically to target increased oral and base of tongue pressure, increased pharyngeal constrictor contractions, and increased UES relaxation duration to reduce pharyngeal residue with minimal verbal prompts   Pt will complete Mavis Maneuver therapeutically to target increased pharyngeal constrictor contractions to reduce pharyngeal residue with minimal verbal prompts   Pt will practice chin down posture to target earlier laryngeal closure with minimal verbal prompts     Long Term Goals:   Pt will improve oropharyngeal swallow function to ensure airway protection during PO intake to maintain adequate nutrition/hydration and decrease signs/symptoms of aspiration to less than 1 x/day. OTHER RECOMMENDATIONS:  Repeat Video Swallow Study (MBSS) is recommended in 5-7 days and requires a new physician order     Patient/family Goal:    Did not state. Will further assess during treatment.     Plan of care discussed with Patient   The Patient understand(s) the diagnosis, prognosis and plan of care     Rehabilitation Potential/Prognosis: good                      ADMITTING DIAGNOSIS: CAD in native artery [I25.10]     VISIT DIAGNOSIS:         PATIENT REPORT/COMPLAINT: n/a    PRIOR LEVEL OF SWALLOW FUNCTION:    Past History of Dysphagia?:  yes    Diet during hospital admission: Pureed consistency solids (IDDSI level 4) with nectar consistency (mildly thick - IDDSI level 2) liquids    PROCEDURE:  Consistencies Administered During the Evaluation   Liquids: thin liquid and nectar thick liquid and honey thick   Solids:  pureed foods      Method of Intake:   spoon  Fed by clinician      Position:   Seated, upright    INSTRUMENTAL ASSESSMENT:    ORAL PREP/ ORAL PHASE:    Oral residuals were noted :  throughout the oral cavity     PHARYNGEAL PHASE:     ONSET TIME       Onset time of the pharyngeal swallow was adequate       PHARYNGEAL RESIDUALS        Vallecula/Pharyngeal Wall           Reduced tongue base retraction and impaired epiglottic inversion resulting in residuals in vallecula and/or posterior pharyngeal wall for all consistencies administered which partially cleared/ minimally cleared with cued double swallow and liquid chaser       Pyriform Sinuses      No significant residuals were noted in the pyriform sinuses     LARYNGEAL PENETRATION   Laryngeal penetration occurred in the absence of aspiration DURING the swallow for thin liquid and Nectar thick liquids (without use of chin tuck) due to  delayed laryngeal closure and inadequate laryngeal closure which cleared from the laryngeal vestibule spontaneously (transient) and cleared from the laryngeal vestibule with a cued, re-directive throat clear . Laryngeal penetration was mild  an absent cough/throat clear was noted    ASPIRATION  Aspiration  was not present during this evaluation however there is high risk for aspiration  of all consistencies WITHOUT use of strategies administered due to laryngeal penetration and pharyngeal residuals    PENETRATION-ASPIRATION SCALE (PAS):  THIN 3 = Material enters the airway, remains above the vocal folds, and is not ejected from the airway  MILDLY THICK 3 = Material enters the airway, remains above the vocal folds, and is not ejected from the airway  MODERATELY THICK 1 = Material does not enter the airway  PUREE 1 = Material does not enter the airway  HARD SOLID item not administered       COMPENSATORY STRATEGIES    Compensatory strategies that were beneficial included Double swallow, Effortful swallow, Small bites/sips, Alternate solids and liquids, Liquids by teaspoon only and Chin Tuck AND Head TURN to right        STRUCTURAL/FUNCTIONAL ANOMALIES   No structural/functional anomalies were noted    CERVICAL ESOPHAGEAL STAGE :     The cervical esophagus appeared adequate          ___________    Cognition:   Within functional limits for this exam    Oral Peripheral Examination   Right labiobuccal weakness    Current Respiratory Status   room air     Parameters of Speech Production  Respiration:  Adequate for speech production  Quality:   Within functional limits  Intensity: Within functional limits    Pain: No pain reported. EDUCATION:   The Speech Language Pathologist (SLP) completed education regarding results of evaluation and that intervention is warranted at this time.   Learner: Patient  Education: Reviewed results and recommendations of this evaluation, Reviewed diet and strategies, Reviewed signs, symptoms and risks of aspiration, Reviewed recommendations for follow-up and Education Related to Potential Risks and Complications Due to Impairment/Illness/Injury  Evaluation of Education:  Verbalizes understanding    This plan may be re-evaluated and revised as warranted. Evaluation Time includes thorough review of current medical information, gathering information on past medical history/social history and prior level of function, completion of standardized testing/informal observation of tasks, assessment of data and education on plan of care and goals. [x]The admitting diagnosis and active problem list, have been reviewed prior to initiation of this evaluation. CPT Code: 85305  dysphagia study/ 95369 dysphagia therapy    Speech Pathologist (SLP) completed education with the patient/family regarding procedure of Modified Barium Swallow Study prior to exam and then type of swallowing impairment following completion of MBSS. Reviewed current solid/liquid consistency diet recommendations (Dysphagia 1, Pureed solids with  nectar consistency (mildly thick - IDDSI level 2) liquids) and discussed compensatory strategies (Double swallow, Effortful swallow, Small bites/sips, Alternate solids and liquids, Liquids by teaspoon only and Chin Tuck AND Head TURN to right) to ensure safe PO intake. Images from MBSS reviewed with patient/ family and education provided. Reviewed aspiration precautions. Encouraged patient and/or family to engage SLP in unstructured Q&A session relative to identified deficit areas; indicated understanding of all information provided via satisfactory verbal response.     ACTIVE PROBLEM LIST:   Patient Active Problem List   Diagnosis    CAD in native artery    Pulmonary nodules    Unstable angina (HCC)    Other hyperlipidemia    Essential hypertension    Gastroesophageal reflux disease with esophagitis without hemorrhage    S/P CABG (coronary artery bypass graft)    Acute respiratory failure with hypoxia (HCC)    Lactic acidemia    NGUY[facility]N (acute kidney injury) ([facility] [address]    Hypokalemia    Hypocalcemia    Cardiogenic shock (HCC)    Thrombocytopenia (HCC)    Leukocytosis    Encounter regarding vascular access for dialysis for ESRD ([facility] [address]    Acute right-sided weakness       Herminia Garrido, CCC-SLP  Speech-Language Pathologist  [id number]  10/11/2021

## 2021-10-11 NOTE — PROGRESS NOTES
[address] [facility]                                                                       Pulmonary &health [facility]                                                                   Pulmonary Consult Note              Reason for Consultation:severe hypoxia ,possible mucus plug   CC : vent/resp failure   HPI:   Doing great   Mild weakness right side ,improving   On RA   Still somehow weak ,but improving     PHYSICAL EXAMINATION:     VITAL SIGNS:  /64   Pulse 76   Temp 97.3 °F (36.3 °C) (Temporal)   Resp 18   Ht 5' 8\" (1.727 m)   Wt 158 lb 11.7 oz (72 kg)   SpO2 98%   BMI 24.14 kg/m²   Wt Readings from Last 3 Encounters:   09/27/21 158 lb 11.7 oz (72 kg)   09/20/21 157 lb (71.2 kg)   09/21/21 160 lb 6.4 oz (72.8 kg)     Temp Readings from Last 3 Encounters:   10/11/21 97.3 °F (36.3 °C) (Temporal)   09/27/21 98.4 °F (36.9 °C)   09/23/21 97.1 °F (36.2 °C) (Temporal)     TMAX:  BP Readings from Last 3 Encounters:   10/11/21 138/64   09/27/21 (!) 144/63   09/23/21 (!) 183/89     Pulse Readings from Last 3 Encounters:   10/11/21 76   09/23/21 66   09/21/21 65           INTAKE/OUTPUTS:  I/O last 3 completed shifts: In: 180 [P.O.:180]  Out: 4965 [Urine:1150;  Chest Tube:205]    Intake/Output Summary (Last 24 hours) at 10/11/2021 0007  Last data filed at 10/10/2021 2216  Gross per 24 hour   Intake 180 ml   Output 1190 ml   Net -1010 ml       General Appearance:extuabted  Eyes: pupils equal, round, and reactive to light, extraocular eye movements intact, conjunctivae normal and sclera anicteric   Neck: neck supple and non tender without mass, no thyromegaly, no thyroid nodules and no cervical adenopathy   Pulmonary/Chest:decrease breath sound bilateral   Cardiovascular: normal rate, regular rhythm, normal S1 and S2, no murmurs, rubs, clicks or gallops, distal pulses intact, no carotid bruits, no murmurs, no gallops, no carotid bruits and no JVD   Abdomen: obese, soft, non-tender, non-distended, normal bowel sounds, no masses or organomegaly   Extremities: no edema   Musculoskeletal:strength better right side and left side and seems very alert     Neurologic: reflexes normal and symmetric, no cranial nerve deficit noted    LABS/IMAGING:    CBC:  Lab Results   Component Value Date    WBC 19.2 (H) 10/10/2021    HGB 8.4 (L) 10/10/2021    HCT 26.4 (L) 10/10/2021    MCV 92.6 10/10/2021     10/10/2021    LYMPHOPCT 5.1 (L) 09/28/2021    RBC 2.85 (L) 10/10/2021    MCH 29.5 10/10/2021    MCHC 31.8 (L) 10/10/2021    RDW 14.8 10/10/2021    NEUTOPHILPCT 89.5 (H) 09/28/2021    MONOPCT 4.4 09/28/2021    BASOPCT 0.1 09/28/2021    NEUTROABS 15.00 (H) 09/28/2021    LYMPHSABS 0.86 (L) 09/28/2021    MONOSABS 0.73 09/28/2021    EOSABS 0.00 (L) 09/28/2021    BASOSABS 0.01 09/28/2021       Recent Labs     10/10/21  0407 10/09/21  0430 10/08/21  0400   WBC 19.2* 21.0* 19.6*   HGB 8.4* 8.8* 8.6*   HCT 26.4* 27.1* 26.4*   MCV 92.6 89.1 87.7    187 145       BMP:   Recent Labs     10/08/21  0400 10/08/21  1400 10/09/21  0430 10/09/21  1236 10/10/21  0407 10/10/21  0407 10/10/21  1225 10/10/21  1632 10/10/21  2122     --  142  --  142  --   --  142  --    K 3.7   < > 3.1*   < > 3.4*   < > 3.6 3.5 3.5     --  103  --  106  --   --  106  --    CO2 27  --  30*  --  30*  --   --  25  --    PHOS 4.0  --  3.2  --  2.8  --   --   --   --    BUN 98*  --  91*  --  81*  --   --  69*  --    CREATININE 2.9*  --  2.7*  --  2.4*  --   --  2.3*  --     < > = values in this interval not displayed.        MG:   Lab Results   Component Value Date    MG 2.3 10/10/2021     Ca/Phos:   Lab Results   Component Value Date    CALCIUM 8.3 (L) 10/10/2021    PHOS 2.8 10/10/2021     Amylase: No results found for: AMYLASE  Lipase:   Lab Results   Component Value Date    LIPASE 7 (L) 09/28/2021     LIVER PROFILE:   Recent Labs     10/08/21  0400   AST 19   ALT 16   BILITOT 1.2   ALKPHOS 51       PT/INR:   No results for input(s): PROTIME, INR in the last 72 hours. APTT:   No results for input(s): APTT in the last 72 hours. Cardiac Enzymes:  Lab Results   Component Value Date    CKMB 94.0 (H) 09/28/2021       Hgb A1C:   Lab Results   Component Value Date    LABA1C 5.1 09/10/2021     No results found for: EAG  CRISTIANA: No results found for: CRISTIANA  ESR: No results found for: SEDRATE  CRP:   Lab Results   Component Value Date    CRP 1.6 (H) 10/09/2021     D Dimer: No results found for: DDIMER  Folate and B12: No results found for: [id number], No results found for: FOLATE              PROBLEM LIST:  Patient Active Problem List   Diagnosis    CAD in native artery    Pulmonary nodules    Unstable angina (HCC)    Other hyperlipidemia    Essential hypertension    Gastroesophageal reflux disease with esophagitis without hemorrhage    S/P CABG (coronary artery bypass graft)    Acute respiratory failure with hypoxia (HCC)    Lactic acidemia    NGUY[facility]N (acute [facility] injury) ([facility] [address]    Hypokalemia    Hypocalcemia    Cardiogenic shock (HCC)    Thrombocytopenia (HCC)    Leukocytosis    Encounter regarding vascular access for dialysis for ESRD ([facility] [address]    Acute right-sided weakness               ASSESSMENT:  1.) Acute Respiratory failure   2.)cardiogenic shock vs other types of shock,septic ,etc   3. )Large mucus plug obstruction left main bronchus and RUL   4. )CAD s/P CABG   5.)NGUYỄN       PLAN:  Wbc 19 ,but no symptoms of infection   CXR stable   On Zosyn   Check procal and CRP ,mild elevation on zosyn   Aggressive pulmonary toilet  Discharge plan       :Jerry Moreno MD  Pulmonary/Critical care Medicine   [address] [facility] and [address] [place] [address]

## 2021-10-11 NOTE — CONSULTS
Patient currently admitted with diagnosis of Diastolic heart failure. Patient was awake and alert during the consultation. He was engaged and asked appropriate questions throughout the education session. Spouse Rox Orlando also present at bedside. Future Appointments   Date Time Provider Yoselin Falk   10/26/2021  9:45 AM Rosario Choe MD CARDIO SURG [facility]            We reviewed the introduction to Heart Failure, the HF zones, signs and symptoms to report on day 1 of onset, medications, medication compliance, the importance of obtaining daily weights, following a low sodium diet, reading food labels for the sodium content, keeping physician appointments, and smoking cessation. We discussed writing / tracking daily weights on a calendar / log because a 5 pound gain in 1 week can sneak up if you are not tracking it. You can also take your charted weights to your doctor appointments to be reviewed. Patient and wife Rox Orlando said they have a working scale at home and can weigh daily and document. They are very mindful of reading labels and say they cautiously avoid sodium. I advised patient they can reduce the risk for Heart Failure exacerbations by modifying / controlling the risk factors. We discussed self-managed care which includes the following:  to take medications as prescribed, report any intolerable side effects of medications to the cardiologist / doctor, do not just stop taking the medication; follow a cardiac heart healthy / low sodium diet; weigh yourself daily, exercise regularly- per doctor recommendation and not to smoke or use an excess amount of alcohol. We discussed calling the cardiologist / doctor with a weight gain of 3 pounds in one day or a total of 5 pounds or more in one week. Also, if you should have a significant weight loss of 3# or more in one day to call the doctor, they may need to decrease or hold the diuretic dose.  On days you feels nauseated and not eating / drinking, having emesis or diarrhea,  informed to call the cardiologist  / doctor, they may need to decrease or hold diuretic to avoid dehydration. I stressed the importance of informing their cardiologist the first day of onset of any of the signs and symptoms in the \"Yellow Zone\" of the HF Zones. Patient verbalizes understanding. Greater than 30 minutes was spent educating patient. BNP:   Lab Results   Component Value Date    PROBNP 9,461 (H) 10/07/2021       History of:    has a past medical history of CAD (coronary artery disease), Encounter regarding vascular access for dialysis for ESRD ([facility] [address] Hiatal hernia, Hyperlipidemia, and Hypertension. has a past surgical history that includes Tonsillectomy (1952); Coronary artery bypass graft (N/A, 9/27/2021); bronchoscopy (N/A, 9/30/2021); and bronchoscopy (9/30/2021). Medications:    enoxaparin  30 mg SubCUTAneous Daily    aspirin  81 mg Oral Daily    ferrous sulfate  325 mg Oral BID WC    folic acid  1 mg Oral Daily    insulin lispro  0-6 Units SubCUTAneous TID WC    insulin lispro  0-3 Units SubCUTAneous Nightly    vitamin C  500 mg Oral BID    nystatin   Topical BID    clopidogrel  75 mg Oral Daily    bumetanide  1 mg Oral Daily    carvedilol  3.125 mg Oral BID WC    amiodarone  400 mg Oral BID    heparin flush  3 mL IntraVENous 2 times per day    chlorhexidine  15 mL Mouth/Throat BID    atorvastatin  40 mg Oral Daily    ipratropium-albuterol  1 ampule Inhalation Q4H WA      dextrose      sodium chloride         Code Status:Prior    The patient is ordered:  Diet: Adult Oral Nutrition Supplement; Other Oral Supplement; [id number]  Adult Oral Nutrition Supplement; Fortified Pudding Oral Supplement  ADULT DIET; Dysphagia - Pureed; 4 carb choices (60 gm/meal);  Low Fat/Low Chol/High Fiber/2 gm Na; Mildly Thick (Nectar)   Sodium controlled diet Yes  Fluid restriction daily ordered (fluid restriction recommended if patient is hyponatremic and/or diuretic is initiated or increased) No  FR:   Daily Weights: No data found. I/O:     Intake/Output Summary (Last 24 hours) at 10/11/2021 1711  Last data filed at 10/11/2021 1448  Gross per 24 hour   Intake 540 ml   Output 1395 ml   Net -855 ml        The Heart Failure Booklet given to the patient with additional patient education addressing:  · What is Heart Failure? · Things You Can Do to Live Well with HF  · How to Take Your Medications  · How to Eat Less Salt  · Exercising Well with Heart Failure  · Signs and symptoms of HF to report  · Weight Yourself Each Day  · Home Self Management- activity, weight tracking, taking medications as prescribed, meals /diet planning (sodium and fluid restriction), how to read food labels, keeping physician follow ups, smoking cessation, follow the Heart Failure Zones  · The Heart Failure zones  · Sodium content pamphlet  · What foods I should avoid tip sheet    Instructed  to call 911 if you have any of the following symptoms:    ·    Struggling to breathe unrelieved with rest,  ·    Having chest pain, confusion or can't think clearly  ·    Have confusion or cant think clearly    Readmission Risk Score: 27        Discharge Plan:  I placed the Heart Failure Home Instructions in patient's discharge instructions. Per Heart Failure GWTG, the patient should have a follow-up appointment made within 7 days of discharge. Lucia Ochoa RN   Heart Failure Navigator        CONGESTIVE HEART FAILURE (CHF) GUIDELINES  (Must be completed for Primary Diagnosis CHF or History of CHF)    Type of CHF:    [x] Acute   [] Chronic     [] Acute on Chronic     Ventricular Function Assessment (check):       [] HFpEF  [x] HFpEF, borderline   [] HFpEF, improved  [] HFrEF    Echocardiogram: 10/5/2021  Left ventricle size is normal.   Ejection fraction is visually estimated at 40+/-5%. LV apex is dyskinetic and mid to distal anterior and anteroseptal walls   are severely hypokinetic. Normal left ventricle wall thickness. Stage I diastolic dysfunction. No LV mural thrombus. Normal right ventricular size and function. No hemodynamically significant aortic stenosis is present. Unable to estimate PA systolic pressure. No evidence for hemodynamically significant pericardial effusion. Technically difficult examination. Definity echo contrast was used to      delineate endocardial borders. Repeat Echo has been ordered and pending                                                                 Angiotensin-Converting-Enzyme (ACE) inhibitor ordered:  [] Yes  [x] No (specify contraindication):  [x] Renal Insufficiency  [] Cough  [] Hypotension  [] Allergy/angioedema  [] No left ventricular systolic dysfunction (LVSD)  [] Hyperkalemia  [] Moderate to severe aortic stenosis  [] Other (Specify): Angiotensin II receptor blockers (ARB) ordered:  [] Yes  [x] No (specify contraindication):  [x] Renal Insufficiency  [] Hypotension  [] Allergy/angioedema  [] No LVSD  [] Hyperkalemia  [] Moderate to severe aortic stenosis  [] Other (Specify):      ARNI - Angiotensin Receptor Neprilysin Inhibitor ordered:  [] Yes  [x] No      ACC/AHA Guidelines Beta Blocker (Carvedilol, Metoprolol Succinate, or Bisoprolol) ordered:    [x] Yes-Carvedilol  [] No (specify contraindication):  [] Bradycardia  [] Hypotension  [] LVD  [] 2nd or 3rd degree heart block  [] Bronchospastic airway disease  [] Decompensated CHF  [] Other (Specify):      Patient's chart updated to reflect:      . - HF care plan, HF education points and HF discharge instructions.  -Orders: 2 gram sodium diet, daily weights, I/O.  -PCP and/or Cardiologist (Dr. Kathy Abbott) appointment to be scheduled within 7 days of hospital discharge.    Antoine Snyder RN   Heart Failure Navigator    Electronically signed by Antoine Snyder RN on 10/11/2021 at 5:16 PM

## 2021-10-11 NOTE — CARE COORDINATION
Pt transferred from [facility] [address] on 10/10 at 1445. PM+ R consulted on 10/08. RT chest tube d/c'd. Wires cut. Dillan swallow and limited echo ordered. Therapy worked with pt this am. Met with pt and pt's wife  in room. Pt's wife is hopeful pt will be accepted at [facility]. Await their input. Sw/cm will follow     [phone] with Marli at [facility]. Ext [phone]. Dr Jelly Obrien to see pt.

## 2021-10-11 NOTE — PROGRESS NOTES
Physical Therapy  Physical Therapy Treatment Note    Name: Robert Gonzalez  : 1945  MRN: [id number]      Date of Service: 10/11/2021    Evaluating PT:  Lana Martin, PT, DPT [id number]    Room #:  3058/0576-D  Diagnosis:  CAD in native artery [I25.10]  PMHx/PSHx:  HLD, HTN  Procedure/Surgery:   CABG x 3, RCA endarterectomy,  bronch  Precautions:  Falls, Sternal, , L gaze preference   Equipment Needs:  TBD    SUBJECTIVE:    Pt lives with wife in a mobile home with 4 stairs to enter and 1 rail. Pt ambulated without device and was independent PTA. OBJECTIVE:   Initial Evaluation  Date: 10/6/21 Treatment  10/11/21 Short Term/ Long Term   Goals   AM-PAC 6 Clicks 4/19 3/71    Was pt agreeable to Eval/treatment? Yes Yes    Does pt have pain? No c/o pain No c/o pain    Bed Mobility  Rolling: NT  Supine to sit: Dep x 2 with HOB elevated  Sit to supine: Dep x 2  Scooting: Dep N/T pt sitting in chair Ayaka   Transfers Sit to stand: NT  Stand to sit: NT  Stand pivot: NT Sit to stand: MaxA x 2  Stand to sit: MaxA x 2   Ayaka   Ambulation   NT Attempted x2 reps >50 feet with Ayaka no device   Stair negotiation: ascended and descended NT  >2 steps with 1 rail ModA   ROM BUE:  Defer to OT note  BLE:  WFL     Strength BUE:  Defer to OT note  LLE:  4/5 grossly  RLE:  3+/5 grossly  Increase by 1/3 MMT grade   Balance Sitting EOB:  Dep  Dynamic Standing:  NT Sitting EOB:  N/T    Dynamic Standing: MaxA +2 no device Sitting EOB:  SBA  Dynamic Standing:  Ayaka no device     Pt is A & O x 3    Sensation:  No reported paresthesias  Edema:  None    Vitals:  Heart Rate at rest 86 bpm Heart Rate post session 94 bpm   SpO2 at rest 97% SpO2 post session 95%   Blood Pressure at rest - mmHg Blood Pressure post session 111/57 mmHg         Therapeutic Exercises:  LAQs 10x, hip flexion AAROM R LE x5 reps, AROM L LE x5 reps, ankle pumps 20x.      Patient education  Pt educated on safety    Patient response to education:   Pt verbalized understanding Pt demonstrated skill Pt requires further education in this area   intubated yes yes     ASSESSMENT:    Conditions Requiring Skilled Therapeutic Intervention:    [x]Decreased strength     []Decreased ROM  [x]Decreased functional mobility  [x]Decreased balance   [x]Decreased endurance   [x]Decreased posture  [x]Decreased sensation  []Decreased coordination   []Decreased vision  []Decreased safety awareness   []Increased pain       Comments:  Pt cleared by RN prior to tx session. Pt siting up in chair and agreed to tx. Pt requires increased time as pt is anxious throughout. Pt's O2 checked throughout. Pt required assistance to scoot forward in chair. Pt leans posterior in chair with cues to correct. Pt required cues to follow sternal precautions. Pt stood x2 reps. Posterior lean requiring MaxA +2 to attempt to correct. Attempted amb but pt unable to clear B LEs off floor. Pt perfoemed ex sitting in chair. Noted weakness R LE. Pt remained up in chair and repositioned. Pt given call light. Treatment:  Patient practiced and was instructed in the following treatment:     Standing balance training to improve posture and working on weight shifting.  Transfer training - pt was given verbal and tactile cues to facilitate proper hand placement, technique and safety during sit to stand and stand to sit as well as provided with physical assistance to complete task. PLAN:    Patient is making some progress towards established goals. Will continue with current POC.       Time in 8:20  Time out 9:00    Total Treatment Time  40 minutes     CPT codes:  [] Gait training 45775 - minutes  [] Manual therapy 95910 - minutes  [x] Therapeutic activities 98761 40 minutes  [] Therapeutic exercises 56180 - minutes  [] Neuromuscular reeducation 99465 - minutes      Kirby Naylor [id number]

## 2021-10-11 NOTE — PROGRESS NOTES
Pharmacy Consultation Note  (Renal Dosing and Monitoring)    Initial consult date: 10/1/21  Consulting physician/provider: Dr Michael Dixon  Reason for consult: Renal dosing of medications in CRRT    Ht Readings from Last 1 Encounters:   10/08/21 5' 8\" (1.727 m)     Wt Readings from Last 1 Encounters:   09/27/21 158 lb 11.7 oz (72 kg)       Age/Gender  Allergy Information   78 y.o. male  Dust mite extract               Estimated Creatinine Clearance: 29 mL/min (A) (based on SCr of 2.2 mg/dL (H)). Intake/Output Summary (Last 24 hours) at 10/11/2021 0813  Last data filed at 10/11/2021 0654  Gross per 24 hour   Intake 180 ml   Output 1375 ml   Net -1195 ml     Urine output over the last 24 hours: 0.7 mL/kg/hr (1225 mL total)     Assessment:  · 79 yo M admitted 9/27 s/p CABG x3/RCA endarterectomy/MALU exclusion with CT Surgery  · Consulted by Dr. Carroll Medina to renally dose/monitor medications in CVVHD   · Temporary HD line placed 9/30  · CVVHD not initiated 10/1; on bumetanide 1 mg PO daily  · SCr 2.2 today; CrCl 29 mL/hr    Plan:  · No additional renal adjustments needed at this time    Will continue to follow.       Alexa Edwards, PharmD, BCPS, BCCCP  10/11/2021  8:14 AM

## 2021-10-12 ENCOUNTER — APPOINTMENT (OUTPATIENT)
Dept: GENERAL RADIOLOGY | Age: 76
DRG: 235 | End: 2021-10-12
Attending: THORACIC SURGERY (CARDIOTHORACIC VASCULAR SURGERY)
Payer: MEDICARE

## 2021-10-12 LAB
ALBUMIN SERPL-MCNC: 3.1 G/DL (ref 3.5–5.2)
ALP BLD-CCNC: 47 U/L (ref 40–129)
ALT SERPL-CCNC: 10 U/L (ref 0–40)
ANION GAP SERPL CALCULATED.3IONS-SCNC: 10 MMOL/L (ref 7–16)
AST SERPL-CCNC: 15 U/L (ref 0–39)
BILIRUB SERPL-MCNC: 0.8 MG/DL (ref 0–1.2)
BUN BLDV-MCNC: 59 MG/DL (ref 6–23)
CALCIUM SERPL-MCNC: 8.4 MG/DL (ref 8.6–10.2)
CHLORIDE BLD-SCNC: 108 MMOL/L (ref 98–107)
CO2: 25 MMOL/L (ref 22–29)
CREAT SERPL-MCNC: 2.1 MG/DL (ref 0.7–1.2)
GFR AFRICAN AMERICAN: 37
GFR NON-AFRICAN AMERICAN: 31 ML/MIN/1.73
GLUCOSE BLD-MCNC: 104 MG/DL (ref 74–99)
HCT VFR BLD CALC: 24.6 % (ref 37–54)
HCT VFR BLD CALC: 25.6 % (ref 37–54)
HEMOGLOBIN: 7.9 G/DL (ref 12.5–16.5)
HEMOGLOBIN: 8.2 G/DL (ref 12.5–16.5)
MCH RBC QN AUTO: 29.2 PG (ref 26–35)
MCHC RBC AUTO-ENTMCNC: 32.1 % (ref 32–34.5)
MCV RBC AUTO: 90.8 FL (ref 80–99.9)
METER GLUCOSE: 92 MG/DL (ref 74–99)
METER GLUCOSE: 94 MG/DL (ref 74–99)
PDW BLD-RTO: 15.3 FL (ref 11.5–15)
PLATELET # BLD: 210 E9/L (ref 130–450)
PMV BLD AUTO: 11.5 FL (ref 7–12)
POTASSIUM SERPL-SCNC: 3.3 MMOL/L (ref 3.5–5)
PRO-BNP: 7228 PG/ML (ref 0–450)
RBC # BLD: 2.71 E12/L (ref 3.8–5.8)
SARS-COV-2, NAAT: NOT DETECTED
SODIUM BLD-SCNC: 143 MMOL/L (ref 132–146)
TOTAL PROTEIN: 5.4 G/DL (ref 6.4–8.3)
WBC # BLD: 12.1 E9/L (ref 4.5–11.5)

## 2021-10-12 PROCEDURE — 2140000000 HC CCU INTERMEDIATE R&B

## 2021-10-12 PROCEDURE — 94640 AIRWAY INHALATION TREATMENT: CPT

## 2021-10-12 PROCEDURE — 94645 CONT INHLJ TX EACH ADDL HOUR: CPT

## 2021-10-12 PROCEDURE — 85014 HEMATOCRIT: CPT

## 2021-10-12 PROCEDURE — 80053 COMPREHEN METABOLIC PANEL: CPT

## 2021-10-12 PROCEDURE — 93798 PHYS/QHP OP CAR RHAB W/ECG: CPT

## 2021-10-12 PROCEDURE — 97530 THERAPEUTIC ACTIVITIES: CPT

## 2021-10-12 PROCEDURE — 93308 TTE F-UP OR LMTD: CPT

## 2021-10-12 PROCEDURE — 6360000002 HC RX W HCPCS: Performed by: PHYSICIAN ASSISTANT

## 2021-10-12 PROCEDURE — 36415 COLL VENOUS BLD VENIPUNCTURE: CPT

## 2021-10-12 PROCEDURE — 6370000000 HC RX 637 (ALT 250 FOR IP): Performed by: THORACIC SURGERY (CARDIOTHORACIC VASCULAR SURGERY)

## 2021-10-12 PROCEDURE — 82962 GLUCOSE BLOOD TEST: CPT

## 2021-10-12 PROCEDURE — 6360000002 HC RX W HCPCS: Performed by: THORACIC SURGERY (CARDIOTHORACIC VASCULAR SURGERY)

## 2021-10-12 PROCEDURE — 97535 SELF CARE MNGMENT TRAINING: CPT

## 2021-10-12 PROCEDURE — 85018 HEMOGLOBIN: CPT

## 2021-10-12 PROCEDURE — 71045 X-RAY EXAM CHEST 1 VIEW: CPT

## 2021-10-12 PROCEDURE — 99232 SBSQ HOSP IP/OBS MODERATE 35: CPT | Performed by: INTERNAL MEDICINE

## 2021-10-12 PROCEDURE — 87635 SARS-COV-2 COVID-19 AMP PRB: CPT

## 2021-10-12 PROCEDURE — 83880 ASSAY OF NATRIURETIC PEPTIDE: CPT

## 2021-10-12 PROCEDURE — 85027 COMPLETE CBC AUTOMATED: CPT

## 2021-10-12 RX ADMIN — NYSTATIN OINTMENT: 100000 OINTMENT TOPICAL at 21:29

## 2021-10-12 RX ADMIN — OXYCODONE HYDROCHLORIDE AND ACETAMINOPHEN 500 MG: 500 TABLET ORAL at 21:29

## 2021-10-12 RX ADMIN — FERROUS SULFATE TAB 325 MG (65 MG ELEMENTAL FE) 325 MG: 325 (65 FE) TAB at 17:06

## 2021-10-12 RX ADMIN — CHLORHEXIDINE GLUCONATE 15 ML: 1.2 RINSE ORAL at 08:08

## 2021-10-12 RX ADMIN — FOLIC ACID 1 MG: 1 TABLET ORAL at 08:07

## 2021-10-12 RX ADMIN — SODIUM CHLORIDE, PRESERVATIVE FREE 300 UNITS: 5 INJECTION INTRAVENOUS at 21:30

## 2021-10-12 RX ADMIN — OXYCODONE HYDROCHLORIDE AND ACETAMINOPHEN 500 MG: 500 TABLET ORAL at 08:08

## 2021-10-12 RX ADMIN — SODIUM CHLORIDE, PRESERVATIVE FREE 300 UNITS: 5 INJECTION INTRAVENOUS at 08:07

## 2021-10-12 RX ADMIN — FERROUS SULFATE TAB 325 MG (65 MG ELEMENTAL FE) 325 MG: 325 (65 FE) TAB at 08:07

## 2021-10-12 RX ADMIN — IPRATROPIUM BROMIDE AND ALBUTEROL SULFATE 1 AMPULE: .5; 2.5 SOLUTION RESPIRATORY (INHALATION) at 16:23

## 2021-10-12 RX ADMIN — ASPIRIN 81 MG: 81 TABLET, COATED ORAL at 08:07

## 2021-10-12 RX ADMIN — IPRATROPIUM BROMIDE AND ALBUTEROL SULFATE 1 AMPULE: .5; 2.5 SOLUTION RESPIRATORY (INHALATION) at 12:36

## 2021-10-12 RX ADMIN — BUMETANIDE 1 MG: 1 TABLET ORAL at 08:07

## 2021-10-12 RX ADMIN — CLOPIDOGREL 75 MG: 75 TABLET, FILM COATED ORAL at 08:07

## 2021-10-12 RX ADMIN — CARVEDILOL 3.12 MG: 3.12 TABLET, FILM COATED ORAL at 08:07

## 2021-10-12 RX ADMIN — AMIODARONE HYDROCHLORIDE 400 MG: 200 TABLET ORAL at 08:07

## 2021-10-12 RX ADMIN — CARVEDILOL 3.12 MG: 3.12 TABLET, FILM COATED ORAL at 17:06

## 2021-10-12 RX ADMIN — IPRATROPIUM BROMIDE AND ALBUTEROL SULFATE 1 AMPULE: .5; 2.5 SOLUTION RESPIRATORY (INHALATION) at 20:39

## 2021-10-12 RX ADMIN — NYSTATIN OINTMENT: 100000 OINTMENT TOPICAL at 08:06

## 2021-10-12 RX ADMIN — AMIODARONE HYDROCHLORIDE 400 MG: 200 TABLET ORAL at 21:29

## 2021-10-12 RX ADMIN — CHLORHEXIDINE GLUCONATE 15 ML: 1.2 RINSE ORAL at 21:29

## 2021-10-12 RX ADMIN — POTASSIUM CHLORIDE 20 MEQ: 400 INJECTION, SOLUTION INTRAVENOUS at 11:49

## 2021-10-12 RX ADMIN — ENOXAPARIN SODIUM 30 MG: 30 INJECTION SUBCUTANEOUS at 08:07

## 2021-10-12 RX ADMIN — ATORVASTATIN CALCIUM 40 MG: 40 TABLET, FILM COATED ORAL at 21:29

## 2021-10-12 ASSESSMENT — PAIN SCALES - GENERAL
PAINLEVEL_OUTOF10: 0

## 2021-10-12 NOTE — PROGRESS NOTES
POD#15  Awake, alert. No complaints. Denies CP, palpitations, SOB at rest, dizziness/lightheadedness. Vitals:    10/11/21 2315 10/12/21 0345 10/12/21 0615 10/12/21 0728   BP: (!) 119/58 126/60  127/61   Pulse: 75 79  77   Resp: 18 18  18   Temp: 96.6 °F (35.9 °C) 96.6 °F (35.9 °C)  97.8 °F (36.6 °C)   TempSrc: Temporal Temporal  Temporal   SpO2: 97% 96%  94%   Weight:   156 lb 9.6 oz (71 kg)    Height:         O2: RA      Intake/Output Summary (Last 24 hours) at 10/12/2021 1038  Last data filed at 10/12/2021 0625  Gross per 24 hour   Intake 420 ml   Output 1425 ml   Net -1005 ml         +BM       Recent Labs     10/10/21  0407 10/11/21  0530 10/12/21  0500   WBC 19.2* 15.8* 12.1*   HGB 8.4* 8.6* 7.9*   HCT 26.4* 27.3* 24.6*    217 210      Recent Labs     10/10/21  1632 10/11/21  0530 10/12/21  0500   BUN 69* 67* 59*   CREATININE 2.3* 2.2* 2.1*           PE  Cardiac: RRR  Lungs: decreased bases  Chest incision  C/D/I, approximated, no erythema. Sternum stable. Prior chest tube site incisions C/D/I, no erythema with intact sutures. Abd: Soft, nontender, +BS  Ext: Incisions C/D/I, approximated, no erythema; RUE weakness unchanged           A/P: POD# 15     1. CAD  --Stable s/p CABGx3 (LIMA-LAD, SVG-OM, SVG-RCA)/Extensive RCA endarterectomy/MALU AtriClip  --Echo done 10/5 while in CVIC shows EF 40-45% LV apex is dyskinetic and mid to distal anterior and anteroseptal walls   are severely hypokinetic; normal RV function  --DAPT/statin/BB  --reinforce sternal precautions  --all tubes and wires out  --JAYCEE removed;   - FOR REPEAT LIMITED ECHO PER DR PATEL to assess for need for lifevest     2. Expected acute blood loss anemia secondary to open heart surgery  --stable  - repeat h/h this afternoon for hgb 7.9 this am        3. NSR  --continue BB with hold parameters  -continue oral amiodarone for afib prophylaxis--with plans to taper on dc; currently on 400 po BID        4.  Expected acute respiratory insufficiency in the setting following surgery  - Extubated DOS, tolerating 2L NC post extubation, acute hypoxia requiring NRB, NIV and reintubation 9/28  - NMBA and iFlolan started 9/28   -s/p cryotherapy bronch for large obstructive mucous plug left and right main stem  - Flolan weaned off 10/3  - Extubated 10/7, currently on room air   -zosyn completed 14 days  -diuresis per nephrology  --wean oxygen to keep SpO2 greater than or equal to 92%  --continue duonebs with ezpap  --encourage C&DB, SMI     5. Acute systolic heart failure  - Post op course complicated by cardiogenic shock in setting of biventricular failure. In karl was On epinephrine, levophed, vasopressin, dobutamine, IABP insertion (removed 10/4), iFlolan. Hemodynamics improved, repeat Echo 10/8 with EF 45%, RV normal.   - started on low dose Coreg 10/6. Hold ACEi in setting of NGUYỄN. Diuresis per nephrology   - GDMT as able   - Scr 2.1 - cont to hold ACE; on po bumex daily per renal  - on MIVF per renal        6. Constipation--expected delayed return of bowel function----> multiple BMs  --secondary to anesthesia, narcotics, decreased oral intake, and decreased physical activity   --now with multiple BMs- will back down on bowel regimen         7. Expected deconditioning in the setting following surgery  --Increase activity as tolerated  --PT/OT     8. NGUYỄN   -  baseline Scr 0.8  - Scr down trending, now at 2.1  - nephrology following  - Diuresis per nephrology  - Replace electrolytes PRN      9. Thrombocytopenia  - HIT negative 9/30  - Resolved;  on plavix and ASA  - added lovenox for DVT prophy     10. Leukocytosis  - WBC down to 12k from 15k, afebrile; steroids stopped   - empiric zosyn completed 14 days     11. At risk for malnutrition   - Speech therapy for swallow eval recommended dysphagia diet; pureed  - repeat swallow done yesterday; dietary recommends Pureed consistency solids with  nectar consistency         12.  Possible GI bleed  - Maroon colored OG output over weekend with +hemoccult  - Evaluated by general surgery, no intervention planned; PPI BID, monitor H/H   - start lovenox for DVT prophy     13.  Right Sided Weakness  - Neurology following, CT head unremarkable, would like MRI- unable with epicardial wires in place (wires now removed)  - Echo with no LV thrombus    - Carotid US with 50-69% stenosis bilaterally; increased from 9/10 0-49% bilaterally- will arrange OP f/u with Dr. Deborah Molina (seen while inpatient for dialysis access)   - Repeat CT head 10/8 with area of low-density in left cerebellum   - ASA/statin, PT/OT/speech   -ARU following              DVT prophylaxis:  --continue bilateral knee high BETY hose  --continue PCDs  --continue progressive ambulation  --on lovenox for dvt prophylaxis and continue knee high BETY hose/pcds/progressive ambulation        Dispo: approved to Acute rehab, DC likely tomorrow after echo is done        This patient's case and care plan was discussed with the attending surgeon

## 2021-10-12 NOTE — CARE COORDINATION
Pt accepted to [facility]. No pre cert needed and will need covid test per Marli from aru. Pt needs echo done. I called echo dept and spoke with Maxime Hopper to have 2d echo done today.

## 2021-10-12 NOTE — PROGRESS NOTES
Physical Therapy  Physical Therapy Treatment Note    Name: Sally Carpio  : 1945  MRN: [id number]      Date of Service: 10/12/2021    Evaluating PT:  Naeem Powell, PT, DPT [id number]    Room #:  9232/3151-M  Diagnosis:  CAD in native artery [I25.10]  PMHx/PSHx:  HLD, HTN  Procedure/Surgery:   CABG x 3, RCA endarterectomy,  bronch  Precautions:  Falls, Sternal, , L gaze preference   Equipment Needs:  TBD    SUBJECTIVE:    Pt lives with wife in a mobile home with 4 stairs to enter and 1 rail. Pt ambulated without device and was independent PTA. OBJECTIVE:   Initial Evaluation  Date: 10/6/21 Treatment  10/12/21 Short Term/ Long Term   Goals   AM-PAC 6 Clicks     Was pt agreeable to Eval/treatment? Yes Yes    Does pt have pain? No c/o pain No c/o pain    Bed Mobility  Rolling: NT  Supine to sit: Dep x 2 with HOB elevated  Sit to supine: Dep x 2  Scooting: Dep Sit to supine MaxA   Rolling MaxA Ayaka   Transfers Sit to stand: NT  Stand to sit: NT  Stand pivot: NT Sit to stand:ModA +2  Stand to sit: ModA  + 2  Stand pivot Mo/MaxA +2   Ayaka   Ambulation   NT 2 steps Max/ModA +2 without A.D. >50 feet with Ayaka no device   Stair negotiation: ascended and descended NT  >2 steps with 1 rail ModA   ROM BUE:  Defer to OT note  BLE:  WFL     Strength BUE:  Defer to OT note  LLE:  4/5 grossly  RLE:  3+/5 grossly  Increase by 1/3 MMT grade   Balance Sitting EOB:  Dep  Dynamic Standing:  NT Sitting EOB:  N/T    Dynamic Standing: MaxA +2 no device Sitting EOB:  SBA  Dynamic Standing:  Ayaka no device     Pt is A & O x 3    Sensation:  No reported paresthesias  Edema:  None            Therapeutic Exercises:   hip flexion AAROM R LE x5 reps, AROM L LE x5 reps, ankle pumps 20x, sit <> stand x4 reps.     Patient education  Pt educated on safety    Patient response to education:   Pt verbalized understanding Pt demonstrated skill Pt requires further education in this area   intubated yes yes ASSESSMENT:    Conditions Requiring Skilled Therapeutic Intervention:    [x]Decreased strength     []Decreased ROM  [x]Decreased functional mobility  [x]Decreased balance   [x]Decreased endurance   [x]Decreased posture  [x]Decreased sensation  []Decreased coordination   []Decreased vision  []Decreased safety awareness   []Increased pain       Comments:  Pt cleared by RN prior to tx session. Pt siting up in chair and agreed to tx. Pt requires increased time as pt is anxious throughout. Pt's O2 checked throughout. Decreased assistance to scoot forward in chair . Decreased assistance for transfers. Pt leans posterior still. Pt able to take 2 steps with Mod/MaxA +2. Decreased posture when attempting to take a step even with assistance and cues to improve posture. Pt's wife present throughout. Pt assisted back to bed for a bedside test. Pt remained in supine with staff for test.     Treatment:  Patient practiced and was instructed in the following treatment:     Standing balance training to improve posture and working on weight shifting.  Transfer training - pt was given verbal and tactile cues to facilitate proper hand placement, technique and safety during sit to stand and stand to sit as well as provided with physical assistance to complete task.  Ex: to improve strength and balance. PLAN:    Patient is making some progress towards established goals. Will continue with current POC.       Time in 11:03  Time out 11:28    Total Treatment Time  25 minutes     CPT codes:  [] Gait training 05490 - minutes  [] Manual therapy 05771 - minutes  [x] Therapeutic activities 17531 25 minutes  [] Therapeutic exercises 36474 - minutes  [] Neuromuscular reeducation 88509 - minutes      Bay Quintanilla [id number]

## 2021-10-12 NOTE — PROGRESS NOTES
OCCUPATIONAL THERAPY TREATMENT NOTE    [address] [address] [address]       JMTY:                                                               Patient Name: Chai Gusman  MRN: [id number]  : 1945  Room: [address] [place] [address]    Evaluating OT: Connor Wu, OTR/L 0836     Referring Provider: Nirav Grande MD   Specific Provider Orders/Date: OT eval and treat (10/5/21)     Diagnosis: CAD   Surgery/Procedures:  CABG x 3   *Following CABG, patient developed hypotension, acidosis, NGUYỄN and respiratory failure and was intubated` Bronchoscopy done on  to remove large mucus plug; pt required pressors and was placed on aortic balloon pump which was removed 10/4/21. Off of sedation, pt was found to have right sided weakness     Pertinent Medical History: CAD, ESRD, HLD, HTN, hiatal hernia      *Precautions:  Fall Risk,, , sternal precautions, R hemiparesis, anxious      Assessment of current deficits   [x]? Functional mobility          [x]? ADLs           [x]? Strength                  [x]? Cognition   [x]? Functional transfers        [x]? IADLs         [x]? Safety Awareness   [x]? Endurance   [x]? Fine Coordination           [x]? ROM           [x]? Vision/perception    [x]? Sensation     [x]? Gross Motor Coordination [x]? Balance    [x]? Delirium                  [x]? Motor Control     []?  Communication     OT PLAN OF CARE   OT POC based on physician orders, patient diagnosis and results of clinical assessment.        Frequency/Duration: 1-3 days/wk for 1-2 weeks PRN    Specific OT Treatment to include:   ADL retraining/adapted techniques and AE recommendations to increase functional independence within precautions                    Energy conservation techniques to improve tolerance for selfcare routine   Functional transfer/mobility training/DME recommendations for increased independence, safety and fall prevention         Patient/family education to increase safety and functional independence within precautions             Environmental modifications for safe mobility and completion of ADLs                           Cognitive retraining ex's to improve problem solving skills & safe participation in ADLs/IADLs  Sensory re-education techniques to improve extremity awareness, maintain skin integrity and improve hand function                             Splinting/positioning needs to maintain joint/skin integrity and prevent contractures  Therapeutic activity to improve functional performance during ADLs/IADLs                                         Therapeutic exercise to improve tolerance and functional strength for ADLs   Balance retraining exercises/tasks for facilitation of postural control with dynamic challenges during ADLs . Positioning to improve functional independence  Neuromuscular re-education: facilitation of righting/equilibrium reactions, normalization muscle tone/facilitation active functional movement                      Delirium prevention/treatment     Modified Gordon Scale   Score     Description  0             No symptoms  1             No significant disability despite symptoms  2             Slight disability; able to look after own affairs  3             Moderate disability; able to ambulate without assist/ requires assist with ADLs  4             Moderate/Severe disability;requires assist to ambulate/assist with ADLs  5             Severe disability;bedridden/incontinent   6               Score:   4     Recommended Adaptive Equipment: TBA: ADL AE, bathroom DME, AD as indicated pending progress/restrictions     Home Living: Pt lives with wife  in a mobile home with 4 step(s) to enter and 1 rail(s)  Bathroom setup: walk in shower with rail; higher commode with rail. Equipment owned: no DME     Prior Level of Function: IND with ADLs;  IND with IADLs. No device for ambulation.    Driving: yes  Occupation: retired     Pain Level: Pt did not complain of pain this session     Cognition: A&O: 4/4   Follows 1-2 step commands appropriately with min cues to focus. Pt requires min cues to regard R side of body during activity and to maintain midline orientation. Memory: fair; able to recall sternal precautions; 3/3 word recall             Comprehension fair             Problem solving: fair-             Judgement/safety: fair                Communication skills: WFL             Vision: visual tracking intact, improved awareness of R side of body, min inattention                  Glasses:yes                                                      Hearing: WFL     UE Assessment:  Hand Dominance: Right [x]? Left []?       ROM Strength STM goal: PRN   RUE  Shoulder: gross shrug; gross horizontal add/abd; elbow grossly 30 deg; forearm supination to neutral; gross wrist ext; gross grasp with impaired [facility] Shoulder: 2-/5  Elbow: 2-/5  Forearm: 2-/5  Wrist 2-/5  Grasp 3-/5  Thumb 2-/5   Pt to demo G tolerance with R UE ROM/facilitation techniques to maintain jt mobility and increase body awareness for participation in ADLs.           LUE Limited shoulder ROM; WFL distal   Grossly 3-/5 proximal  3+/5 distal  WFL for ADLs; grossly 4/5         Sensation: No c/o numbness/tingling in extremities. Impaired finger ID in L hand. Tone: WFL  Edema: min throughout with mod edema R hand     Functional Assessment:  AM-PAC Daily Activity Raw Score: 13/24    Initial Eval Status  Date: 10/6 Treatment Status  Date:10/12/21 STGs = LTGs  Time frame: 7-14 days   Feeding NGT Asher  Built up  issued to pt this date, with pt able to grasp, and retrieve food, slow pace, assistance to bring to mouth.  Pt able to use of LUE to hold utensil, retrieve food and bring to mouth      Re-assess when indicated   Grooming Dep Asher  Pt able to wash face with use of LUE, requiring assistance to christiana deodorant, with pt having difficulty with ROM grasp of RUE                       Min A   while seated supported; using B UE's as fair functional assists.       UB dressing/bathing Dep maxA-dressing  [facility] gown seated upright in chair                           Mod A  Min cues for R body awareness and initiation of amber dressing techniques         LB dressing/bathing Dep maxA  [facility] socks, with assistance to christiana tennis shoes seated upright in chair                       Mod A   using AE as needed for safe reach/ energy conservation        Toileting Dep DNT  Dependent per spouse                             Bed Mobility  Supine to sit: Dep+2     Sit to supine: Dep+2 maxA  EOB<>Supine                           Mod A  in prep of ADL tasks & transfers   Functional Transfers Sit to stand: NT     Stand to sit: NT modAx2  Sit to Stand  Stand to Sit  Cueing for hand placement with transfer    Stand Pivot Transfer   Chair<>EOB with HHA                           Mod A  sit<>stand/functional bathroom transfers using AD/DME as needed for balance and safety   Functional Mobility NT DNT                      Mod A   functional/bathroom mobility using AD as needed & demonstrating G safety      Balance Sitting:     Static:  Dep/posterior LOB with L sided preference     Dynamic:Dep  Standing: NT Sitting Supported:  SBA  EOB:Asher    Standing:  maxA   Min A dynamic sitting balance; Mod A dynamic standing balance  during ADL tasks & transfers   Endurance/Activity Tolerance    F tolerance with light activity. Tolerated >5 min EOB. Fair- G   tolerance with moderate activity/self care routine   Visual/  Perceptual Impaired: R sided attention; midline orientation       Improvement noted, mild inattention on R, awareness of R UE & positioning                            Treatment/Education/Comments: Upon arrival, pt seated upright in chair, agreeable to therapy, wife present, nursing okaying pt to be seen this session.  Pt completed of bed mobility, transfers and ADL tasks this session. Pt and wife educated on importance of therapy and OOB activity, goals to be reached, safety and hand placement with transfers, amber dressing technique to assist with UB dressing, use of AE/built up foam to assist with  during feeding task, and retrograde massage to decrease of edema and increase of blood flow to R hand. At end of session, pt returned to bed, all tubes and lines intact, call light within reach, wife still present, and hospital worker present performing of ultrasound. · Pt has made fair progress towards set goals.    · Continue with current plan of care focusing on increasing of independency with transfers and ADL tasks    Time In: 11:03am           Time Out: 11:28am   Total Treatment Time: 25mins      Treatment Charges: Mins Units   Ther Ex  [id number]     Manual Therapy 39699     Thera Activities 14799 14 1   ADL/Home Mgt 23431 11 1   Neuro Re-ed 77615     Orthotic manage/training  [id number]     Non-Billable Time         Jeanette Gaspar WEINER/L [id number]

## 2021-10-12 NOTE — FLOWSHEET NOTE
Inpatient Wound Care (Initial consult) [id number]    Admit Date: 9/27/2021  5:25 AM    Reason for consult:  Head    Significant history: Per H&P     History of Present Illness: For the last several weeks the patient has had increasing dyspnea with exertion which resolves with rest.  No chest pain. No other associated symptoms. Apparently he had a positive stress test in [place] in April 2021 but did not have an intervention after that. He was brought to the hospital for planned cardiac catheterization yesterday which showed multivessel disease. He currently is chest pain-free. Denies dyspnea. Feeling quite well and anxious to get home.      Findings:     10/12/21 1516   Skin Integrity   Skin Integrity Bruising   Location BUE   Skin Integrity Site 2   Skin Integrity Location 2 Excoriation   Location 2   (groin, medial thighs)   Wound 10/12/21 Head Left;Posterior   Date First Assessed/Time First Assessed: 10/12/21 1517   Present on Hospital Admission: No  Location: Head  Wound Location Orientation: Left;Posterior   Wound Image    Wound Etiology Deep tissue/Injury   Dressing/Treatment Open to air   Wound Length (cm) 4 cm   Wound Width (cm) 4 cm   Wound Depth (cm)   (unable to determine)   Wound Surface Area (cm^2) 16 cm^2   Wound Assessment Purple/maroon  (black)   Drainage Amount None   Odor None   Mackenzie-wound Assessment Intact   Wound 10/12/21 Head Right;Posterior   Date First Assessed/Time First Assessed: 10/12/21 1518   Present on Hospital Admission: No  Location: Head  Wound Location Orientation: Right;Posterior   Wound Image    Wound Etiology Deep tissue/Injury   Dressing/Treatment Open to air   Wound Length (cm) 2 cm   Wound Width (cm) 1.6 cm   Wound Depth (cm)   (unable to determine)   Wound Surface Area (cm^2) 3.2 cm^2   Wound Assessment Purple/maroon   Drainage Amount None   Odor None   Mackenzie-wound Assessment Intact   Incision 09/27/21 Sternum Anterior   Date First Assessed: 09/27/21   Present on [facility] Admission: No  Primary Wound Type: Surgical Type  Location: Sternum  Wound Location Orientation: Anterior   Dressing/Treatment Open to air   Incision 09/27/21 Thigh Left;Medial   Date First Assessed: 09/27/21   Present on Hospital Admission: No  Primary Wound Type: Surgical Type  Location: Thigh  Wound Location Orientation: Left;Medial   Dressing/Treatment Open to air       **Informed Consent**    The patient has given verbal consent to have photos taken of wounds and inserted into their chart as part of their permanent medical record for purposes of documentation, treatment management and/or medical review. All Images taken on 10/12/21 of patient name: Daina Lr were transmitted and stored on secured [employer] located within [employer] Tab by a registered [employer] Mobile Application Device. Impression:  Posterior head: DTI    Plan: Leave open to air  TAPS  Heel protectors  Nystatin cream  Patient will need continued preventative care.       Suma Keller RN 10/12/2021 3:30 PM

## 2021-10-12 NOTE — PROGRESS NOTES
Department of Internal Medicine  Nephrology Progress Note      Events reviewed. SUBJECTIVE: We are following Mr. Mehul [address] for NGUYỄN. Reports no complaints.      PHYSICAL EXAM:      Vitals:    VITALS:  /61   Pulse 77   Temp 97.8 °F (36.6 °C) (Temporal)   Resp 18   Ht 5' 8\" (1.727 m)   Wt 156 lb 9.6 oz (71 kg)   SpO2 94%   BMI 23.81 kg/m²   24HR INTAKE/OUTPUT:      Intake/Output Summary (Last 24 hours) at 10/12/2021 1118  Last data filed at 10/12/2021 0212  Gross per 24 hour   Intake 420 ml   Output 1425 ml   Net -1005 ml     Constitutional: Patient is awake, alert in no distress  HEENT: Pupils are equal reactive  Respiratory: Decreased breath sounds at the bases  Cardiovascular/Edema: Heart sounds are regular  Gastrointestinal: Abdomen soft  Neurologic: Patient alert   Other: +trace edema      Scheduled Meds:   enoxaparin  30 mg SubCUTAneous Daily    aspirin  81 mg Oral Daily    ferrous sulfate  325 mg Oral BID WC    folic acid  1 mg Oral Daily    insulin lispro  0-6 Units SubCUTAneous TID WC    insulin lispro  0-3 Units SubCUTAneous Nightly    vitamin C  500 mg Oral BID    nystatin   Topical BID    clopidogrel  75 mg Oral Daily    bumetanide  1 mg Oral Daily    carvedilol  3.125 mg Oral BID WC    amiodarone  400 mg Oral BID    heparin flush  3 mL IntraVENous 2 times per day    chlorhexidine  15 mL Mouth/Throat BID    atorvastatin  40 mg Oral Daily    ipratropium-albuterol  1 ampule Inhalation Q4H WA     Continuous Infusions:   dextrose      sodium chloride       PRN Meds:.sennosides-docusate sodium, bisacodyl, perflutren lipid microspheres, acetaminophen, oxyCODONE-acetaminophen **OR** oxyCODONE-acetaminophen, diphenhydrAMINE, glucose, dextrose, glucagon (rDNA), dextrose, morphine, ondansetron, sodium chloride, trimethobenzamide, sodium chloride, heparin flush, artificial tears, potassium chloride    DATA:    CBC:   Lab Results   Component Value Date    WBC 12.1 10/12/2021    RBC 2.71 10/12/2021    HGB 7.9 10/12/2021    HCT 24.6 10/12/2021    MCV 90.8 10/12/2021    MCH 29.2 10/12/2021    MCHC 32.1 10/12/2021    RDW 15.3 10/12/2021     10/12/2021    MPV 11.5 10/12/2021     CMP:    Lab Results   Component Value Date     10/12/2021    K 3.3 10/12/2021    K 3.3 10/07/2021     10/12/2021    CO2 25 10/12/2021    BUN 59 10/12/2021    CREATININE 2.1 10/12/2021    GFRAA 37 10/12/2021    LABGLOM 31 10/12/2021    GLUCOSE 104 10/12/2021    PROT 5.4 10/12/2021    LABALBU 3.1 10/12/2021    CALCIUM 8.4 10/12/2021    BILITOT 0.8 10/12/2021    ALKPHOS 47 10/12/2021    AST 15 10/12/2021    ALT 10 10/12/2021     Magnesium:    Lab Results   Component Value Date    MG 2.3 10/10/2021     Phosphorus:    Lab Results   Component Value Date    PHOS 2.8 10/10/2021        Radiology Review:      CXR 10/3/21   1. Median sternotomy changes. 2. Bilateral pleuroparenchymal opacities that could be related to pleural   effusion and edema.  The appearance of the chest is about the same or   slightly worse.         Chest x-ray October 6, 2021   1. Stable appearance of the postoperative chest.   2. Bilateral chest tubes remain in position.  There is no right or left   pneumothorax. 3. Small right pleural effusion         Chest x-ray October 8, 2021   Minimal bibasilar atelectasis.       Trace right pleural effusion       There is no pneumothorax             BRIEF SUMMARY OF INITIAL CONSULT:    Briefly Mr. Ye Barroso is a 70-year-old man with history of HTN, CAD with recent status cardiac catheterization done on September 9 which showed severe multivessel disease, hyperlipidemia, hiatal hernia, who was admitted for elective CABG on September 27, 2021. On admission his creatinine level was 0.8 mg/dL and post surgery his creatinine has progressively increased up to 2.3 mg/dL, reason for this consultation.  Postoperatively she developed persistent hypotension, lactic acidosis and respiratory failure for which he was reintubated. Since then she has required multiple pressors and he was placed on IABP. He had received 1 dose of ketorolac perioperatively. His urine output has significantly decrease and is being about 500 cc/day in the last 48 hours and his fluid balance presently is over 9 L. Problems resolved:    · Cardiogenic shock, hemodynamically more stable, pressor support decrease, still on IABP. Tentative plan for removal of IABP. Pro-BP 16,936  · Hypernatremia with hypervolemia, 2/2 sodium containing IV fluid resuscitation and underlying heart failure. Resolved with  natriuresis and free water (add D5W)  · Hypokalemia, 2/2 diuretics, potassium levels improve  · Respiratory alkalosis (pH: 7.554, PCO2: 30.0) with metabolic alkalosis (bicarbonate administration)  · Respiratory failure status post intubation  · Thrombocytopenia  · Transaminitis with hyperbilirubinemia, possibly shock liver versus congestive liver  · Probably pneumonia, on piperacillin-tazobactam    IMPRESSION/RECOMMENDATIONS:      1. NGUYỄN stage III, CABG associated NGUYỄN, ischemic ATN, non-oliguric. FEUrea 18.8%. Renal function continues to improve with decreasing creatinine levels and adequate urine output. 2. HFmEF 40% with stage IDD, proBNP 16,826 > 9461> 7228, on Bumex 1 mg p.o. daily  3. Hypokalemia, 2/2 diuretics, to replace    ---------------------------------------------------------    4. CAD status post CABG x3 September 27, 2021, on ASA, clopidogrel, carvedilol, atorvastatin  5. Amiodarone therapy, for AF prophylaxis  6.  Normocytic anemia, status post surgery, status post transfusion, iron saturation 36%, on p.o. iron      Plan:    · Continue Bumex 1 mg p.o. daily  · Replace potassium  · Continue to monitor renal function for recovery   · Discharge planning

## 2021-10-13 ENCOUNTER — HOSPITAL ENCOUNTER (INPATIENT)
Age: 76
LOS: 14 days | Discharge: HOME | DRG: 056 | End: 2021-10-27
Attending: PHYSICAL MEDICINE & REHABILITATION | Admitting: PHYSICAL MEDICINE & REHABILITATION
Payer: MEDICARE

## 2021-10-13 ENCOUNTER — APPOINTMENT (OUTPATIENT)
Dept: GENERAL RADIOLOGY | Age: 76
DRG: 235 | End: 2021-10-13
Attending: THORACIC SURGERY (CARDIOTHORACIC VASCULAR SURGERY)
Payer: MEDICARE

## 2021-10-13 VITALS
BODY MASS INDEX: 24.19 KG/M2 | DIASTOLIC BLOOD PRESSURE: 56 MMHG | HEART RATE: 80 BPM | WEIGHT: 159.6 LBS | RESPIRATION RATE: 18 BRPM | TEMPERATURE: 97.8 F | SYSTOLIC BLOOD PRESSURE: 118 MMHG | HEIGHT: 68 IN | OXYGEN SATURATION: 97 %

## 2021-10-13 PROBLEM — I63.9 ACUTE CVA (CEREBROVASCULAR ACCIDENT) (HCC): Status: ACTIVE | Noted: 2021-10-13

## 2021-10-13 LAB
ANION GAP SERPL CALCULATED.3IONS-SCNC: 15 MMOL/L (ref 7–16)
BUN BLDV-MCNC: 51 MG/DL (ref 6–23)
CALCIUM SERPL-MCNC: 8.5 MG/DL (ref 8.6–10.2)
CHLORIDE BLD-SCNC: 108 MMOL/L (ref 98–107)
CO2: 21 MMOL/L (ref 22–29)
CREAT SERPL-MCNC: 1.9 MG/DL (ref 0.7–1.2)
GFR AFRICAN AMERICAN: 42
GFR NON-AFRICAN AMERICAN: 35 ML/MIN/1.73
GLUCOSE BLD-MCNC: 95 MG/DL (ref 74–99)
HCT VFR BLD CALC: 24.9 % (ref 37–54)
HEMOGLOBIN: 7.9 G/DL (ref 12.5–16.5)
MCH RBC QN AUTO: 28.8 PG (ref 26–35)
MCHC RBC AUTO-ENTMCNC: 31.7 % (ref 32–34.5)
MCV RBC AUTO: 90.9 FL (ref 80–99.9)
PDW BLD-RTO: 15 FL (ref 11.5–15)
PLATELET # BLD: 215 E9/L (ref 130–450)
PMV BLD AUTO: 11.4 FL (ref 7–12)
POTASSIUM SERPL-SCNC: 3.4 MMOL/L (ref 3.5–5)
RBC # BLD: 2.74 E12/L (ref 3.8–5.8)
SODIUM BLD-SCNC: 144 MMOL/L (ref 132–146)
WBC # BLD: 10.3 E9/L (ref 4.5–11.5)

## 2021-10-13 PROCEDURE — 6370000000 HC RX 637 (ALT 250 FOR IP)

## 2021-10-13 PROCEDURE — 94640 AIRWAY INHALATION TREATMENT: CPT

## 2021-10-13 PROCEDURE — 6370000000 HC RX 637 (ALT 250 FOR IP): Performed by: THORACIC SURGERY (CARDIOTHORACIC VASCULAR SURGERY)

## 2021-10-13 PROCEDURE — 80048 BASIC METABOLIC PNL TOTAL CA: CPT

## 2021-10-13 PROCEDURE — 6360000002 HC RX W HCPCS: Performed by: THORACIC SURGERY (CARDIOTHORACIC VASCULAR SURGERY)

## 2021-10-13 PROCEDURE — 71045 X-RAY EXAM CHEST 1 VIEW: CPT

## 2021-10-13 PROCEDURE — 1280000000 HC REHAB R&B

## 2021-10-13 PROCEDURE — 85027 COMPLETE CBC AUTOMATED: CPT

## 2021-10-13 PROCEDURE — 36415 COLL VENOUS BLD VENIPUNCTURE: CPT

## 2021-10-13 PROCEDURE — 6360000002 HC RX W HCPCS: Performed by: PHYSICIAN ASSISTANT

## 2021-10-13 PROCEDURE — 93798 PHYS/QHP OP CAR RHAB W/ECG: CPT

## 2021-10-13 RX ORDER — BUMETANIDE 1 MG/1
1 TABLET ORAL DAILY
Status: CANCELLED | OUTPATIENT
Start: 2021-10-14

## 2021-10-13 RX ORDER — OXYCODONE HYDROCHLORIDE AND ACETAMINOPHEN 5; 325 MG/1; MG/1
2 TABLET ORAL EVERY 4 HOURS PRN
Status: CANCELLED | OUTPATIENT
Start: 2021-10-13

## 2021-10-13 RX ORDER — DIPHENHYDRAMINE HCL 25 MG
25 TABLET ORAL NIGHTLY PRN
Status: DISCONTINUED | OUTPATIENT
Start: 2021-10-13 | End: 2021-10-27 | Stop reason: HOSPADM

## 2021-10-13 RX ORDER — AMIODARONE HYDROCHLORIDE 200 MG/1
200 TABLET ORAL 2 TIMES DAILY
Status: CANCELLED | OUTPATIENT
Start: 2021-10-13

## 2021-10-13 RX ORDER — MINERAL OIL AND WHITE PETROLATUM 150; 830 MG/G; MG/G
OINTMENT OPHTHALMIC PRN
Status: DISCONTINUED | OUTPATIENT
Start: 2021-10-13 | End: 2021-10-27 | Stop reason: HOSPADM

## 2021-10-13 RX ORDER — IPRATROPIUM BROMIDE AND ALBUTEROL SULFATE 2.5; .5 MG/3ML; MG/3ML
1 SOLUTION RESPIRATORY (INHALATION)
Status: CANCELLED | OUTPATIENT
Start: 2021-10-13

## 2021-10-13 RX ORDER — NYSTATIN 100000 U/G
OINTMENT TOPICAL 2 TIMES DAILY
Status: CANCELLED | OUTPATIENT
Start: 2021-10-13

## 2021-10-13 RX ORDER — AMIODARONE HYDROCHLORIDE 200 MG/1
200 TABLET ORAL DAILY
Status: DISCONTINUED | OUTPATIENT
Start: 2021-10-21 | End: 2021-10-27 | Stop reason: HOSPADM

## 2021-10-13 RX ORDER — ACETAMINOPHEN 325 MG/1
650 TABLET ORAL EVERY 4 HOURS PRN
Status: CANCELLED | OUTPATIENT
Start: 2021-10-13

## 2021-10-13 RX ORDER — MINERAL OIL AND WHITE PETROLATUM 150; 830 MG/G; MG/G
OINTMENT OPHTHALMIC PRN
Status: CANCELLED | OUTPATIENT
Start: 2021-10-13

## 2021-10-13 RX ORDER — ONDANSETRON 4 MG/1
4 TABLET, ORALLY DISINTEGRATING ORAL EVERY 8 HOURS PRN
Status: DISCONTINUED | OUTPATIENT
Start: 2021-10-13 | End: 2021-10-27 | Stop reason: HOSPADM

## 2021-10-13 RX ORDER — ASPIRIN 81 MG/1
81 TABLET ORAL DAILY
Status: DISCONTINUED | OUTPATIENT
Start: 2021-10-14 | End: 2021-10-27 | Stop reason: HOSPADM

## 2021-10-13 RX ORDER — OXYCODONE HYDROCHLORIDE AND ACETAMINOPHEN 5; 325 MG/1; MG/1
2 TABLET ORAL EVERY 4 HOURS PRN
Status: DISCONTINUED | OUTPATIENT
Start: 2021-10-13 | End: 2021-10-27

## 2021-10-13 RX ORDER — ACETAMINOPHEN 325 MG/1
650 TABLET ORAL EVERY 4 HOURS PRN
Status: DISCONTINUED | OUTPATIENT
Start: 2021-10-13 | End: 2021-10-27 | Stop reason: HOSPADM

## 2021-10-13 RX ORDER — CARVEDILOL 3.12 MG/1
3.12 TABLET ORAL 2 TIMES DAILY WITH MEALS
Status: DISCONTINUED | OUTPATIENT
Start: 2021-10-13 | End: 2021-10-27 | Stop reason: HOSPADM

## 2021-10-13 RX ORDER — ATORVASTATIN CALCIUM 40 MG/1
40 TABLET, FILM COATED ORAL DAILY
Status: DISCONTINUED | OUTPATIENT
Start: 2021-10-13 | End: 2021-10-27 | Stop reason: HOSPADM

## 2021-10-13 RX ORDER — FERROUS SULFATE 325(65) MG
325 TABLET ORAL 2 TIMES DAILY WITH MEALS
Status: DISCONTINUED | OUTPATIENT
Start: 2021-10-13 | End: 2021-10-25

## 2021-10-13 RX ORDER — ONDANSETRON 4 MG/1
4 TABLET, ORALLY DISINTEGRATING ORAL EVERY 8 HOURS PRN
Status: CANCELLED | OUTPATIENT
Start: 2021-10-13

## 2021-10-13 RX ORDER — PANTOPRAZOLE SODIUM 40 MG/1
40 TABLET, DELAYED RELEASE ORAL
Status: CANCELLED | OUTPATIENT
Start: 2021-10-14 | End: 2021-10-28

## 2021-10-13 RX ORDER — NYSTATIN 100000 U/G
OINTMENT TOPICAL 2 TIMES DAILY
Status: DISCONTINUED | OUTPATIENT
Start: 2021-10-13 | End: 2021-10-27 | Stop reason: HOSPADM

## 2021-10-13 RX ORDER — FOLIC ACID 1 MG/1
1 TABLET ORAL DAILY
Status: CANCELLED | OUTPATIENT
Start: 2021-10-14 | End: 2021-11-13

## 2021-10-13 RX ORDER — ASCORBIC ACID 500 MG
500 TABLET ORAL 2 TIMES DAILY
Status: DISCONTINUED | OUTPATIENT
Start: 2021-10-13 | End: 2021-10-27 | Stop reason: HOSPADM

## 2021-10-13 RX ORDER — FERROUS SULFATE 325(65) MG
325 TABLET ORAL 2 TIMES DAILY WITH MEALS
Status: CANCELLED | OUTPATIENT
Start: 2021-10-13 | End: 2021-11-12

## 2021-10-13 RX ORDER — CLOPIDOGREL BISULFATE 75 MG/1
75 TABLET ORAL DAILY
Status: DISCONTINUED | OUTPATIENT
Start: 2021-10-14 | End: 2021-10-27 | Stop reason: HOSPADM

## 2021-10-13 RX ORDER — CLOPIDOGREL BISULFATE 75 MG/1
75 TABLET ORAL DAILY
Status: CANCELLED | OUTPATIENT
Start: 2021-10-14

## 2021-10-13 RX ORDER — FOLIC ACID 1 MG/1
1 TABLET ORAL DAILY
Status: DISCONTINUED | OUTPATIENT
Start: 2021-10-14 | End: 2021-10-27 | Stop reason: HOSPADM

## 2021-10-13 RX ORDER — SENNA AND DOCUSATE SODIUM 50; 8.6 MG/1; MG/1
1 TABLET, FILM COATED ORAL 2 TIMES DAILY PRN
Status: DISCONTINUED | OUTPATIENT
Start: 2021-10-13 | End: 2021-10-27 | Stop reason: HOSPADM

## 2021-10-13 RX ORDER — OXYCODONE HYDROCHLORIDE AND ACETAMINOPHEN 5; 325 MG/1; MG/1
1 TABLET ORAL EVERY 4 HOURS PRN
Status: DISCONTINUED | OUTPATIENT
Start: 2021-10-13 | End: 2021-10-27

## 2021-10-13 RX ORDER — SENNA AND DOCUSATE SODIUM 50; 8.6 MG/1; MG/1
1 TABLET, FILM COATED ORAL 2 TIMES DAILY PRN
Status: CANCELLED | OUTPATIENT
Start: 2021-10-13

## 2021-10-13 RX ORDER — POLYETHYLENE GLYCOL 3350 17 G/17G
17 POWDER, FOR SOLUTION ORAL DAILY PRN
Status: DISCONTINUED | OUTPATIENT
Start: 2021-10-13 | End: 2021-10-27 | Stop reason: HOSPADM

## 2021-10-13 RX ORDER — CARVEDILOL 3.12 MG/1
3.12 TABLET ORAL 2 TIMES DAILY WITH MEALS
Status: CANCELLED | OUTPATIENT
Start: 2021-10-13

## 2021-10-13 RX ORDER — IPRATROPIUM BROMIDE AND ALBUTEROL SULFATE 2.5; .5 MG/3ML; MG/3ML
1 SOLUTION RESPIRATORY (INHALATION)
Status: DISCONTINUED | OUTPATIENT
Start: 2021-10-13 | End: 2021-10-27 | Stop reason: HOSPADM

## 2021-10-13 RX ORDER — AMIODARONE HYDROCHLORIDE 200 MG/1
200 TABLET ORAL 2 TIMES DAILY
Status: COMPLETED | OUTPATIENT
Start: 2021-10-13 | End: 2021-10-20

## 2021-10-13 RX ORDER — AMIODARONE HYDROCHLORIDE 200 MG/1
200 TABLET ORAL 2 TIMES DAILY
Status: DISCONTINUED | OUTPATIENT
Start: 2021-10-13 | End: 2021-10-13 | Stop reason: SDUPTHER

## 2021-10-13 RX ORDER — OXYCODONE HYDROCHLORIDE AND ACETAMINOPHEN 5; 325 MG/1; MG/1
1 TABLET ORAL EVERY 4 HOURS PRN
Status: CANCELLED | OUTPATIENT
Start: 2021-10-13

## 2021-10-13 RX ORDER — DIPHENHYDRAMINE HCL 25 MG
25 TABLET ORAL NIGHTLY PRN
Status: CANCELLED | OUTPATIENT
Start: 2021-10-13

## 2021-10-13 RX ORDER — ATORVASTATIN CALCIUM 40 MG/1
40 TABLET, FILM COATED ORAL DAILY
Status: CANCELLED | OUTPATIENT
Start: 2021-10-13

## 2021-10-13 RX ORDER — BUMETANIDE 1 MG/1
1 TABLET ORAL DAILY
Status: DISCONTINUED | OUTPATIENT
Start: 2021-10-14 | End: 2021-10-27 | Stop reason: HOSPADM

## 2021-10-13 RX ORDER — ASPIRIN 81 MG/1
81 TABLET ORAL DAILY
Status: CANCELLED | OUTPATIENT
Start: 2021-10-14

## 2021-10-13 RX ORDER — ASCORBIC ACID 500 MG
500 TABLET ORAL 2 TIMES DAILY
Status: CANCELLED | OUTPATIENT
Start: 2021-10-13 | End: 2021-11-12

## 2021-10-13 RX ORDER — PANTOPRAZOLE SODIUM 40 MG/1
40 TABLET, DELAYED RELEASE ORAL
Status: COMPLETED | OUTPATIENT
Start: 2021-10-14 | End: 2021-10-27

## 2021-10-13 RX ADMIN — NYSTATIN: 100000 OINTMENT TOPICAL at 21:08

## 2021-10-13 RX ADMIN — OXYCODONE HYDROCHLORIDE AND ACETAMINOPHEN 500 MG: 500 TABLET ORAL at 08:47

## 2021-10-13 RX ADMIN — ASPIRIN 81 MG: 81 TABLET, COATED ORAL at 08:54

## 2021-10-13 RX ADMIN — ENOXAPARIN SODIUM 30 MG: 30 INJECTION SUBCUTANEOUS at 08:47

## 2021-10-13 RX ADMIN — AMIODARONE HYDROCHLORIDE 200 MG: 200 TABLET ORAL at 21:08

## 2021-10-13 RX ADMIN — POTASSIUM CHLORIDE 20 MEQ: 400 INJECTION, SOLUTION INTRAVENOUS at 11:15

## 2021-10-13 RX ADMIN — ONDANSETRON 4 MG: 2 INJECTION INTRAMUSCULAR; INTRAVENOUS at 12:25

## 2021-10-13 RX ADMIN — SODIUM CHLORIDE, PRESERVATIVE FREE 300 UNITS: 5 INJECTION INTRAVENOUS at 08:48

## 2021-10-13 RX ADMIN — NYSTATIN OINTMENT: 100000 OINTMENT TOPICAL at 08:52

## 2021-10-13 RX ADMIN — FERROUS SULFATE TAB 325 MG (65 MG ELEMENTAL FE) 325 MG: 325 (65 FE) TAB at 18:50

## 2021-10-13 RX ADMIN — ATORVASTATIN CALCIUM 40 MG: 40 TABLET, FILM COATED ORAL at 21:08

## 2021-10-13 RX ADMIN — CARVEDILOL 3.12 MG: 3.12 TABLET, FILM COATED ORAL at 08:47

## 2021-10-13 RX ADMIN — FOLIC ACID 1 MG: 1 TABLET ORAL at 08:47

## 2021-10-13 RX ADMIN — IPRATROPIUM BROMIDE AND ALBUTEROL SULFATE 1 AMPULE: .5; 2.5 SOLUTION RESPIRATORY (INHALATION) at 12:54

## 2021-10-13 RX ADMIN — CLOPIDOGREL 75 MG: 75 TABLET, FILM COATED ORAL at 08:47

## 2021-10-13 RX ADMIN — FERROUS SULFATE TAB 325 MG (65 MG ELEMENTAL FE) 325 MG: 325 (65 FE) TAB at 08:47

## 2021-10-13 RX ADMIN — IPRATROPIUM BROMIDE AND ALBUTEROL SULFATE 1 AMPULE: .5; 2.5 SOLUTION RESPIRATORY (INHALATION) at 21:41

## 2021-10-13 RX ADMIN — AMIODARONE HYDROCHLORIDE 400 MG: 200 TABLET ORAL at 08:47

## 2021-10-13 RX ADMIN — CARVEDILOL 3.12 MG: 3.12 TABLET, FILM COATED ORAL at 18:50

## 2021-10-13 RX ADMIN — POTASSIUM CHLORIDE 20 MEQ: 400 INJECTION, SOLUTION INTRAVENOUS at 10:03

## 2021-10-13 RX ADMIN — CHLORHEXIDINE GLUCONATE 15 ML: 1.2 RINSE ORAL at 08:58

## 2021-10-13 RX ADMIN — BUMETANIDE 1 MG: 1 TABLET ORAL at 08:47

## 2021-10-13 RX ADMIN — OXYCODONE HYDROCHLORIDE AND ACETAMINOPHEN 500 MG: 500 TABLET ORAL at 21:08

## 2021-10-13 ASSESSMENT — PAIN - FUNCTIONAL ASSESSMENT: PAIN_FUNCTIONAL_ASSESSMENT: ACTIVITIES ARE NOT PREVENTED

## 2021-10-13 ASSESSMENT — PAIN DESCRIPTION - FREQUENCY: FREQUENCY: INTERMITTENT

## 2021-10-13 ASSESSMENT — PAIN DESCRIPTION - DESCRIPTORS: DESCRIPTORS: SORE

## 2021-10-13 ASSESSMENT — PAIN SCALES - GENERAL
PAINLEVEL_OUTOF10: 0

## 2021-10-13 ASSESSMENT — PAIN DESCRIPTION - LOCATION: LOCATION: CHEST

## 2021-10-13 ASSESSMENT — PAIN DESCRIPTION - ORIENTATION: ORIENTATION: MID

## 2021-10-13 ASSESSMENT — PAIN DESCRIPTION - PROGRESSION: CLINICAL_PROGRESSION: GRADUALLY IMPROVING

## 2021-10-13 ASSESSMENT — PAIN DESCRIPTION - ONSET: ONSET: ON-GOING

## 2021-10-13 ASSESSMENT — PAIN DESCRIPTION - PAIN TYPE: TYPE: SURGICAL PAIN

## 2021-10-13 NOTE — CONSULTS
Met with patient and discussed that their physician has ordered a referral to our outpatient Phase II Cardiac Rehabilitation program. Reviewed the benefits of cardiac rehabilitation based on their diagnosis and personal risk factors. Patient demonstrates moderate interest in Cardiac Rehabilitation at this time. Cardiac Rehabilitation brochure provided to patient/family. The Cardiac Rehabilitation Program has been provided the patient's referral information and pertinent patient details and history. The patient may call [facility] at [phone] for additional information or questions. Contact information for [facility] and other choices close to the patient's residence have been provided in the discharge instructions so that the patient may call and schedule an appointment when cleared by their physician.  Thank you for the referral.

## 2021-10-13 NOTE — PROGRESS NOTES
[address] [facility] Pulmonary, [facility]                                                                       Pulmonary &health [facility]                                                                   Pulmonary Consult Note              Reason for Consultation:severe hypoxia ,possible mucus plug   CC : vent/resp failure   HPI:   Up to the chair   Mild weakness right side ,improving   On RA   Still somehow weak ,but improving     PHYSICAL EXAMINATION:     VITAL SIGNS:  BP (!) 117/57   Pulse 72   Temp 97.2 °F (36.2 °C) (Temporal)   Resp 18   Ht 5' 8\" (1.727 m)   Wt 156 lb 9.6 oz (71 kg)   SpO2 99%   BMI 23.81 kg/m²   Wt Readings from Last 3 Encounters:   10/12/21 156 lb 9.6 oz (71 kg)   09/20/21 157 lb (71.2 kg)   09/21/21 160 lb 6.4 oz (72.8 kg)     Temp Readings from Last 3 Encounters:   10/12/21 97.2 °F (36.2 °C) (Temporal)   09/27/21 98.4 °F (36.9 °C)   09/23/21 97.1 °F (36.2 °C) (Temporal)     TMAX:  BP Readings from Last 3 Encounters:   10/12/21 (!) 117/57   09/27/21 (!) 144/63   09/23/21 (!) 183/89     Pulse Readings from Last 3 Encounters:   10/12/21 72   09/23/21 66   09/21/21 65           INTAKE/OUTPUTS:  I/O last 3 completed shifts:   In: 490 [P.O.:490]  Out: 1776 [Urine:1775; Stool:1]    Intake/Output Summary (Last 24 hours) at 10/12/2021 2222  Last data filed at 10/12/2021 1903  Gross per 24 hour   Intake 490 ml   Output 1426 ml   Net -936 ml       General Appearance:extuabted  Eyes: pupils equal, round, and reactive to light, extraocular eye movements intact, conjunctivae normal and sclera anicteric   Neck: neck supple and non tender without mass, no thyromegaly, no thyroid nodules and no cervical adenopathy   Pulmonary/Chest:decrease breath sound bilateral   Cardiovascular: normal rate, regular rhythm, normal S1 and S2, no murmurs, rubs, clicks or gallops, distal pulses intact, no carotid bruits, no murmurs, no gallops, no carotid bruits and no JVD   Abdomen: obese, soft, non-tender, non-distended, normal bowel sounds, no masses or organomegaly   Extremities: no edema   Musculoskeletal:strength better right side and left side and seems very alert     Neurologic: reflexes normal and symmetric, no cranial nerve deficit noted    LABS/IMAGING:    CBC:  Lab Results   Component Value Date    WBC 12.1 (H) 10/12/2021    HGB 8.2 (L) 10/12/2021    HCT 25.6 (L) 10/12/2021    MCV 90.8 10/12/2021     10/12/2021    LYMPHOPCT 5.1 (L) 09/28/2021    RBC 2.71 (L) 10/12/2021    MCH 29.2 10/12/2021    MCHC 32.1 10/12/2021    RDW 15.3 (H) 10/12/2021    NEUTOPHILPCT 89.5 (H) 09/28/2021    MONOPCT 4.4 09/28/2021    BASOPCT 0.1 09/28/2021    NEUTROABS 15.00 (H) 09/28/2021    LYMPHSABS 0.86 (L) 09/28/2021    MONOSABS 0.73 09/28/2021    EOSABS 0.00 (L) 09/28/2021    BASOSABS 0.01 09/28/2021       Recent Labs     10/12/21  1555 10/12/21  0500 10/11/21  0530 10/10/21  0407 10/10/21  0407   WBC  --  12.1* 15.8*  --  19.2*   HGB 8.2* 7.9* 8.6*   < > 8.4*   HCT 25.6* 24.6* 27.3*   < > 26.4*   MCV  --  90.8 93.5  --  92.6   PLT  --  210 217  --  194    < > = values in this interval not displayed. BMP:   Recent Labs     10/10/21  0407 10/10/21  1225 10/10/21  1632 10/10/21  1632 10/10/21  2122 10/11/21  0530 10/12/21  0500     --  142  --   --  139 143   K 3.4*   < > 3.5   < > 3.5 3.7 3.3*     --  106  --   --  106 108*   CO2 30*  --  25  --   --  27 25   PHOS 2.8  --   --   --   --   --   --    BUN 81*  --  69*  --   --  67* 59*   CREATININE 2.4*  --  2.3*  --   --  2.2* 2.1*    < > = values in this interval not displayed.        MG:   Lab Results   Component Value Date    MG 2.3 10/10/2021     Ca/Phos:   Lab Results   Component Value Date    CALCIUM 8.4 (L) 10/12/2021    PHOS 2.8 10/10/2021     Amylase: No results found for: AMYLASE  Lipase:   Lab Results   Component Value Date    LIPASE 7 (L) 09/28/2021     LIVER PROFILE:   Recent Labs     10/12/21  0500   AST 15   ALT 10   BILITOT 0.8   ALKPHOS 47       PT/INR:   No results for input(s): PROTIME, INR in the last 72 hours. APTT:   No results for input(s): APTT in the last 72 hours. Cardiac Enzymes:  Lab Results   Component Value Date    CKMB 94.0 (H) 09/28/2021       Hgb A1C:   Lab Results   Component Value Date    LABA1C 5.1 09/10/2021     No results found for: EAG  CRISTIANA: No results found for: CRISTIANA  ESR: No results found for: SEDRATE  CRP:   Lab Results   Component Value Date    CRP 1.6 (H) 10/09/2021     D Dimer: No results found for: DDIMER  Folate and B12: No results found for: [id number], No results found for: FOLATE              PROBLEM LIST:  Patient Active Problem List   Diagnosis    CAD in native artery    Pulmonary nodules    Unstable angina (HCC)    Other hyperlipidemia    Essential hypertension    Gastroesophageal reflux disease with esophagitis without hemorrhage    S/P CABG (coronary artery bypass graft)    Acute respiratory failure with hypoxia (HCC)    Lactic acidemia    [facility]N [facility]) [facility] [address] [facility]    Hypokalemia    Hypocalcemia    Cardiogenic shock (HCC)    Thrombocytopenia (HCC)    Leukocytosis    Encounter regarding vascular access for dialysis for ESRD ([facility] [address]    Acute right-sided weakness               ASSESSMENT:  1.) Acute Respiratory failure   2.)cardiogenic shock vs other types of shock,septic ,etc   3. )Large mucus plug obstruction left main bronchus and RUL   4. )CAD s/P CABG   5.)NGUYỄN       PLAN:  no symptoms of infection   CXR  stable   On Zosyn ,should be done with 10 days course  Aggressive pulmonary toilet  Discharge plan       :Marley Gamble MD  Pulmonary/Critical care Medicine   [id number] [facility]

## 2021-10-13 NOTE — LETTER
PORTABLE PATIENT PROFILE  Jonnie Sharp  [id number]    MEDICAL DIAGNOSIS/CONDITION:   Patient Active Problem List   Diagnosis    CAD in native artery    Pulmonary nodules    Unstable angina (HCC)    Other hyperlipidemia    Essential hypertension    Gastroesophageal reflux disease with esophagitis without hemorrhage    S/P CABG (coronary artery bypass graft)    Acute respiratory failure with hypoxia (HCC)    Lactic acidemia    NGUY[facility]N (acute kidney injury) ([facility] [address] [facility]    Hypokalemia    Hypocalcemia    Cardiogenic shock (HCC)    Thrombocytopenia (HCC)    Leukocytosis    Encounter regarding vascular access for dialysis for ESRD ([facility]    Right hemiparesis ([facility]    Acute CVA (cerebrovascular accident) ([facility]    Acute blood loss anemia    Dysarthria       INSURANCE INFORMATION:  Payor: MEDICARE /  /  /     ADVANCED DIRECTIVES:      @[email or web address]@     EMERGENCY CONTACT:       RISK FACTORS:   Social History     Tobacco Use    Smoking status: Never Smoker    Smokeless tobacco: Never Used   Substance Use Topics    Alcohol use: Yes     Comment: occ. ALLERGIES:  Allergies   Allergen Reactions    Dust Mite Extract        IMMUNIZATIONS:  Immunization History   Administered Date(s) Administered    COVID-19, J&J, PF, 0.5 mL 05/03/2021       SWALLOWING:   Difficulty Chewing or Swallowing Food: No    VISION AND HEARING:   Sensory Problems  Visual impairment: Glasses    PHYSICIANS INVOLVED WITH CARE:    Ne Nelson MD  No ref.  provider found  MD Ne Garcia Ala, MD

## 2021-10-13 NOTE — PROGRESS NOTES
Pharmacy Consultation Note  (Renal Dosing and Monitoring)    Initial consult date: 10/1/21  Consulting physician/provider: Dr Gorge Wilkinson  Reason for consult: Renal dosing of medications in CRRT - CRRT never initiated    Ht Readings from Last 1 Encounters:   10/08/21 5' 8\" (1.727 m)     Wt Readings from Last 1 Encounters:   10/12/21 156 lb 9.6 oz (71 kg)       Age/Gender  Allergy Information   78 y.o. male  Dust mite extract               Estimated Creatinine Clearance: 30 mL/min (A) (based on SCr of 2.1 mg/dL (H)). Intake/Output Summary (Last 24 hours) at 10/13/2021 0701  Last data filed at 10/13/2021 3309  Gross per 24 hour   Intake 490 ml   Output 1151 ml   Net -661 ml     Urine output over the last 24 hours: 0.7 mL/kg/hr (1150 mL total)     Assessment:  · 77 yo M admitted 9/27 s/p CABG x3/RCA endarterectomy/MALU exclusion with CT Surgery  · Consulted by Dr. Odin Pryor to renally dose/monitor medications in CVVHD - CVVHD never initiated  · Temporary HD line placed 9/30  · CVVHD not initiated 10/1; remains on bumetanide 1 mg PO daily  · SCr 2.1 yesterday; CrCl 30 mL/hr    Plan:  · No additional renal adjustments needed at this time    Will continue to follow.       Franchesca Jane, PharmD, BCPS, BCCCP  10/13/2021  7:01 AM

## 2021-10-13 NOTE — CARE COORDINATION
Discharge order on chart. 2d echo results on chart. Pt to go to [facility] today. Covid done 10/12/21. Met with pt and pt's wife in room. Pt's wife has new room number for seyh aru.

## 2021-10-13 NOTE — PROGRESS NOTES
POD#16 Awake, alert. No complaints. Denies CP, palpitations, SOB at rest, dizziness/lightheadedness. Vitals:    10/13/21 0400 10/13/21 0650 10/13/21 0712 10/13/21 0820   BP: (!) 118/59  (!) 118/56    Pulse: 80  80    Resp: 14  18    Temp: 96.8 °F (36 °C)  97.8 °F (36.6 °C)    TempSrc: Temporal  Temporal    SpO2: 94%  96% 98%   Weight:  159 lb 9.6 oz (72.4 kg)     Height:         O2: RA      Intake/Output Summary (Last 24 hours) at 10/13/2021 0945  Last data filed at 10/13/2021 3319  Gross per 24 hour   Intake 490 ml   Output 1101 ml   Net -611 ml         +BM on 10/11    UO: 150mL/8hr       Recent Labs     10/11/21  0530 10/11/21  0530 10/12/21  0500 10/12/21  1555 10/13/21  0450   WBC 15.8*  --  12.1*  --  10.3   HGB 8.6*   < > 7.9* 8.2* 7.9*   HCT 27.3*   < > 24.6* 25.6* 24.9*     --  210  --  215    < > = values in this interval not displayed. Recent Labs     10/11/21  0530 10/12/21  0500 10/13/21  0450   BUN 67* 59* 51*   CREATININE 2.2* 2.1* 1.9*       Telemetry: SR    PE  Cardiac: RRR  Lungs: decreased bases  Chest incision  C/D/I, approximated, no erythema. Sternum stable. Prior chest tube site incisions C/D/I, no erythema with intact sutures. Abd: Soft, nontender, +BS  Ext: Incisions C/D/I, approximated, no erythema; RUE weakness unchanged               A/P: POD# 16     1. CAD  --Stable s/p CABGx3 (LIMA-LAD, SVG-OM, SVG-RCA)/Extensive RCA endarterectomy/MALU AtriClip  --Echo done 10/5 while in CVIC shows EF 40-45% LV apex is dyskinetic and mid to distal anterior and anteroseptal walls   are severely hypokinetic; normal RV function  --Limited echo 10/12 shows EF 55%; normal LV - no lifevest needed  --DAPT/statin/BB  --reinforce sternal precautions  --all tubes and wires out  --JAYCEE removed       2. Expected acute blood loss anemia secondary to open heart surgery  --stable  --Hgb 7.9 again today - pt asymptomatic - will monitor Hgb while at acute rehab         3.  NSR  --continue BB with hold parameters  --continue oral amiodarone for afib prophylaxis--with plans to taper on dc; currently on 400 po BID        4. Expected acute respiratory insufficiency in the setting following surgery  - Extubated DOS, tolerating 2L NC post extubation, acute hypoxia requiring NRB, NIV and reintubation 9/28  - NMBA and iFlolan started 9/28   -s/p cryotherapy bronch for large obstructive mucous plug left and right main stem  - Flolan weaned off 10/3  - Extubated 10/7, currently on room air   - zosyn completed 14 days - discontinued   - diuresis per nephrology  --wean oxygen to keep SpO2 greater than or equal to 92%  --continue duonebs with ezpap  --encourage C&DB, SMI        5. Acute systolic heart failure  - Post op course complicated by cardiogenic shock in setting of biventricular failure. In karl was On epinephrine, levophed, vasopressin, dobutamine, IABP insertion (removed 10/4), iFlolan. Hemodynamics improved, repeat Echo 10/8 with EF 45%, RV normal.   - started on low dose Coreg 10/6. Hold ACEi in setting of NGUYỄN. Diuresis per nephrology   - GDMT as able   - Scr 1.9  - cont to hold ACE; on po bumex daily per renal  - on MIVF per renal        6.  Constipation--expected delayed return of bowel function----> multiple BMs  --secondary to anesthesia, narcotics, decreased oral intake, and decreased physical activity   --now with multiple BMs- will back down on bowel regimen         7. Expected deconditioning in the setting following surgery  --Increase activity as tolerated  --PT/OT       8. NGUYỄN   -  baseline Scr 0.8  - Scr down trending, now at 1.9  - nephrology following  - Diuresis per nephrology  - Replace electrolytes PRN        9. Thrombocytopenia  - HIT negative 9/30  - Resolved;  on plavix and ASA  - added lovenox for DVT prophy       10. Leukocytosis  - WBC down to 10.3k from 12k, afebrile; steroids stopped   - empiric zosyn completed 14 days       11. At risk for malnutrition   - Speech therapy for swallow eval recommended dysphagia diet; pureed  - repeat swallow done yesterday; dietary recommends Pureed consistency solids with  nectar consistency         12. Possible GI bleed  - Maroon colored OG output over weekend with +hemoccult  - Evaluated by general surgery, no intervention planned; PPI BID, monitor H/H   - start lovenox for DVT prophy       13.  Right Sided Weakness  - Neurology following, CT head unremarkable, would like MRI- unable with epicardial wires in place (wires now removed)  - Echo with no LV thrombus    - Carotid US with 50-69% stenosis bilaterally; increased from 9/10 0-49% bilaterally- will arrange OP f/u with Dr. José Miguel Israel (seen while inpatient for dialysis access)   - Repeat CT head 10/8 with area of low-density in left cerebellum   - ASA/statin, PT/OT/speech   -ARU following              DVT prophylaxis:  --continue bilateral knee high BETY hose  --continue PCDs  --continue progressive ambulation  --on lovenox for dvt prophylaxis and continue knee high BETY hose/pcds/progressive ambulation        Dispo: approved to Acute rehab, DC today        This patient's case and care plan was discussed with the attending surgeon

## 2021-10-13 NOTE — PROGRESS NOTES
Met with patient and wife in patient's room. New room number and visiting hours given to patient and wife. Patient already has clothes and shoes here with him. Notified unit charge nurse Mckenna Ghosh and  Raissa Perez of patient's new room number. Cardiothoracic surgery to place discharge/readmit orders.

## 2021-10-13 NOTE — PLAN OF CARE
Problem: Falls - Risk of:  Goal: Will remain free from falls  Description: Will remain free from falls  Outcome: Met This Shift     Problem:  Activity Intolerance:  Goal: Able to perform prescribed physical activity  Description: Able to perform prescribed physical activity  Outcome: Met This Shift     Problem: Anxiety:  Goal: Level of anxiety will decrease  Description: Level of anxiety will decrease  Outcome: Met This Shift     Problem: Cardiac Output - Decreased:  Goal: Cardiac output within specified parameters  Description: Cardiac output within specified parameters  Outcome: Met This Shift     Problem: Fluid Volume - Imbalance:  Goal: Ability to achieve a balanced intake and output will improve  Description: Ability to achieve a balanced intake and output will improve  Outcome: Met This Shift     Problem: Gas Exchange - Impaired:  Goal: Levels of oxygenation will improve  Description: Levels of oxygenation will improve  Outcome: Met This Shift

## 2021-10-14 LAB
ANION GAP SERPL CALCULATED.3IONS-SCNC: 13 MMOL/L (ref 7–16)
BASOPHILS ABSOLUTE: 0.05 E9/L (ref 0–0.2)
BASOPHILS RELATIVE PERCENT: 0.5 % (ref 0–2)
BUN BLDV-MCNC: 43 MG/DL (ref 6–23)
CALCIUM SERPL-MCNC: 8.5 MG/DL (ref 8.6–10.2)
CHLORIDE BLD-SCNC: 105 MMOL/L (ref 98–107)
CO2: 23 MMOL/L (ref 22–29)
CREAT SERPL-MCNC: 2.1 MG/DL (ref 0.7–1.2)
EOSINOPHILS ABSOLUTE: 0.15 E9/L (ref 0.05–0.5)
EOSINOPHILS RELATIVE PERCENT: 1.5 % (ref 0–6)
GFR AFRICAN AMERICAN: 37
GFR NON-AFRICAN AMERICAN: 31 ML/MIN/1.73
GLUCOSE BLD-MCNC: 111 MG/DL (ref 74–99)
HCT VFR BLD CALC: 28.1 % (ref 37–54)
HEMOGLOBIN: 8.8 G/DL (ref 12.5–16.5)
IMMATURE GRANULOCYTES #: 0.05 E9/L
IMMATURE GRANULOCYTES %: 0.5 % (ref 0–5)
LYMPHOCYTES ABSOLUTE: 0.69 E9/L (ref 1.5–4)
LYMPHOCYTES RELATIVE PERCENT: 6.9 % (ref 20–42)
MCH RBC QN AUTO: 28.8 PG (ref 26–35)
MCHC RBC AUTO-ENTMCNC: 31.3 % (ref 32–34.5)
MCV RBC AUTO: 91.8 FL (ref 80–99.9)
MONOCYTES ABSOLUTE: 0.61 E9/L (ref 0.1–0.95)
MONOCYTES RELATIVE PERCENT: 6.1 % (ref 2–12)
NEUTROPHILS ABSOLUTE: 8.4 E9/L (ref 1.8–7.3)
NEUTROPHILS RELATIVE PERCENT: 84.5 % (ref 43–80)
PDW BLD-RTO: 15.1 FL (ref 11.5–15)
PLATELET # BLD: 231 E9/L (ref 130–450)
PMV BLD AUTO: 11.1 FL (ref 7–12)
POTASSIUM REFLEX MAGNESIUM: 4.2 MMOL/L (ref 3.5–5)
RBC # BLD: 3.06 E12/L (ref 3.8–5.8)
SODIUM BLD-SCNC: 141 MMOL/L (ref 132–146)
WBC # BLD: 10 E9/L (ref 4.5–11.5)

## 2021-10-14 PROCEDURE — 99223 1ST HOSP IP/OBS HIGH 75: CPT | Performed by: PHYSICAL MEDICINE & REHABILITATION

## 2021-10-14 PROCEDURE — 80048 BASIC METABOLIC PNL TOTAL CA: CPT

## 2021-10-14 PROCEDURE — 6370000000 HC RX 637 (ALT 250 FOR IP): Performed by: NURSE PRACTITIONER

## 2021-10-14 PROCEDURE — 92523 SPEECH SOUND LANG COMPREHEN: CPT

## 2021-10-14 PROCEDURE — 97110 THERAPEUTIC EXERCISES: CPT

## 2021-10-14 PROCEDURE — 97130 THER IVNTJ EA ADDL 15 MIN: CPT

## 2021-10-14 PROCEDURE — 51798 US URINE CAPACITY MEASURE: CPT

## 2021-10-14 PROCEDURE — 85025 COMPLETE CBC W/AUTO DIFF WBC: CPT

## 2021-10-14 PROCEDURE — 6370000000 HC RX 637 (ALT 250 FOR IP)

## 2021-10-14 PROCEDURE — 92610 EVALUATE SWALLOWING FUNCTION: CPT

## 2021-10-14 PROCEDURE — 36415 COLL VENOUS BLD VENIPUNCTURE: CPT

## 2021-10-14 PROCEDURE — 97129 THER IVNTJ 1ST 15 MIN: CPT

## 2021-10-14 PROCEDURE — 97530 THERAPEUTIC ACTIVITIES: CPT

## 2021-10-14 PROCEDURE — 97162 PT EVAL MOD COMPLEX 30 MIN: CPT

## 2021-10-14 PROCEDURE — 97166 OT EVAL MOD COMPLEX 45 MIN: CPT

## 2021-10-14 PROCEDURE — 2580000003 HC RX 258: Performed by: PHYSICAL MEDICINE & REHABILITATION

## 2021-10-14 PROCEDURE — 97535 SELF CARE MNGMENT TRAINING: CPT

## 2021-10-14 PROCEDURE — 6360000002 HC RX W HCPCS

## 2021-10-14 PROCEDURE — 94640 AIRWAY INHALATION TREATMENT: CPT

## 2021-10-14 PROCEDURE — 1280000000 HC REHAB R&B

## 2021-10-14 PROCEDURE — 97112 NEUROMUSCULAR REEDUCATION: CPT

## 2021-10-14 RX ORDER — SODIUM CHLORIDE 0.9 % (FLUSH) 0.9 %
5-40 SYRINGE (ML) INJECTION 2 TIMES DAILY
Status: DISCONTINUED | OUTPATIENT
Start: 2021-10-14 | End: 2021-10-22

## 2021-10-14 RX ORDER — BUMETANIDE 1 MG/1
1 TABLET ORAL ONCE
Status: COMPLETED | OUTPATIENT
Start: 2021-10-14 | End: 2021-10-14

## 2021-10-14 RX ADMIN — NYSTATIN: 100000 OINTMENT TOPICAL at 08:51

## 2021-10-14 RX ADMIN — ENOXAPARIN SODIUM 30 MG: 100 INJECTION SUBCUTANEOUS at 08:07

## 2021-10-14 RX ADMIN — FERROUS SULFATE TAB 325 MG (65 MG ELEMENTAL FE) 325 MG: 325 (65 FE) TAB at 08:07

## 2021-10-14 RX ADMIN — ASPIRIN 81 MG: 81 TABLET ORAL at 08:07

## 2021-10-14 RX ADMIN — BUMETANIDE 1 MG: 1 TABLET ORAL at 17:12

## 2021-10-14 RX ADMIN — NYSTATIN: 100000 OINTMENT TOPICAL at 22:30

## 2021-10-14 RX ADMIN — AMIODARONE HYDROCHLORIDE 200 MG: 200 TABLET ORAL at 20:48

## 2021-10-14 RX ADMIN — PANTOPRAZOLE SODIUM 40 MG: 40 TABLET, DELAYED RELEASE ORAL at 05:34

## 2021-10-14 RX ADMIN — CARVEDILOL 3.12 MG: 3.12 TABLET, FILM COATED ORAL at 08:07

## 2021-10-14 RX ADMIN — FERROUS SULFATE TAB 325 MG (65 MG ELEMENTAL FE) 325 MG: 325 (65 FE) TAB at 17:12

## 2021-10-14 RX ADMIN — FOLIC ACID 1 MG: 1 TABLET ORAL at 08:07

## 2021-10-14 RX ADMIN — AMIODARONE HYDROCHLORIDE 200 MG: 200 TABLET ORAL at 08:08

## 2021-10-14 RX ADMIN — OXYCODONE HYDROCHLORIDE AND ACETAMINOPHEN 500 MG: 500 TABLET ORAL at 20:48

## 2021-10-14 RX ADMIN — CLOPIDOGREL 75 MG: 75 TABLET, FILM COATED ORAL at 08:07

## 2021-10-14 RX ADMIN — IPRATROPIUM BROMIDE AND ALBUTEROL SULFATE 1 AMPULE: .5; 2.5 SOLUTION RESPIRATORY (INHALATION) at 16:02

## 2021-10-14 RX ADMIN — BUMETANIDE 1 MG: 1 TABLET ORAL at 08:07

## 2021-10-14 RX ADMIN — IPRATROPIUM BROMIDE AND ALBUTEROL SULFATE 1 AMPULE: .5; 2.5 SOLUTION RESPIRATORY (INHALATION) at 12:08

## 2021-10-14 RX ADMIN — SODIUM CHLORIDE, PRESERVATIVE FREE 10 ML: 5 INJECTION INTRAVENOUS at 20:49

## 2021-10-14 RX ADMIN — ATORVASTATIN CALCIUM 40 MG: 40 TABLET, FILM COATED ORAL at 20:48

## 2021-10-14 RX ADMIN — CARVEDILOL 3.12 MG: 3.12 TABLET, FILM COATED ORAL at 17:11

## 2021-10-14 RX ADMIN — OXYCODONE HYDROCHLORIDE AND ACETAMINOPHEN 500 MG: 500 TABLET ORAL at 08:07

## 2021-10-14 ASSESSMENT — PAIN SCALES - GENERAL
PAINLEVEL_OUTOF10: 0
PAINLEVEL_OUTOF10: 0

## 2021-10-14 NOTE — FLOWSHEET NOTE
10/14/21 1323   Urine Assessment   Incontinence Yes  (large amount in depend)   Urine Color Yellow/straw   Urine Appearance GIANCARLO   Urine Odor No odor   Bladder Scan Volume (mL) 31 mL   $ Bladder scan $ Yes

## 2021-10-14 NOTE — PROGRESS NOTES
Late Entry: Patient given a signed copy of The Important Message From Medicare.  Adithya You  10/14/2021  10:39 AM

## 2021-10-14 NOTE — PLAN OF CARE
Problem: Falls - Risk of:  Goal: Will remain free from falls  Description: Will remain free from falls  10/14/2021 1348 by Annetta Diaz  Outcome: Met This Shift  10/13/2021 2356 by Ramin Gomez RN  Outcome: Met This Shift  Goal: Absence of physical injury  Description: Absence of physical injury  10/14/2021 1348 by Annetta Diaz  Outcome: Met This Shift  10/13/2021 2356 by Ramin Gomez RN  Outcome: Met This Shift     Problem: Skin Integrity:  Goal: Will show no infection signs and symptoms  Description: Will show no infection signs and symptoms  10/14/2021 1348 by Annetta Diaz  Outcome: Met This Shift  10/13/2021 2356 by Ramin Gomez RN  Outcome: Met This Shift  Goal: Absence of new skin breakdown  Description: Absence of new skin breakdown  10/14/2021 1348 by Annetta Diaz  Outcome: Met This Shift  10/13/2021 2356 by Ramin Gomez RN  Outcome: Met This Shift     Problem: Infection:  Goal: Will remain free from infection  Description: Will remain free from infection  Outcome: Met This Shift     Problem: Safety:  Goal: Free from accidental physical injury  Description: Free from accidental physical injury  Outcome: Met This Shift  Goal: Free from intentional harm  Description: Free from intentional harm  Outcome: Met This Shift     Problem: Daily Care:  Goal: Daily care needs are met  Description: Daily care needs are met  Outcome: Met This Shift     Problem: Pain:  Goal: Patient's pain/discomfort is manageable  Description: Patient's pain/discomfort is manageable  Outcome: Met This Shift     Problem: Skin Integrity:  Goal: Skin integrity will stabilize  Description: Skin integrity will stabilize  Outcome: Met This Shift     Problem: Discharge Planning:  Goal: Patients continuum of care needs are met  Description: Patients continuum of care needs are met  Outcome: Met This Shift     Problem: ABCDS Injury Assessment  Goal: Absence of physical injury  Outcome: Met This Shift     Problem: HEMODYNAMIC STATUS  Goal: Patient has stable vital signs and fluid balance  Outcome: Met This Shift     Problem: ACTIVITY INTOLERANCE/IMPAIRED MOBILITY  Goal: Mobility/activity is maintained at optimum level for patient  Outcome: Met This Shift     Problem: COMMUNICATION IMPAIRMENT  Goal: Ability to express needs and understand communication  Outcome: Met This Shift     Problem: Cardiac:  Goal: Ability to maintain vital signs within normal range will improve  Description: Ability to maintain vital signs within normal range will improve  Outcome: Met This Shift  Goal: Cardiovascular alteration will improve  Description: Cardiovascular alteration will improve  Outcome: Met This Shift     Problem: Health Behavior:  Goal: Will modify at least one risk factor affecting health status  Description: Will modify at least one risk factor affecting health status  Outcome: Met This Shift  Goal: Identification of resources available to assist in meeting health care needs will improve  Description: Identification of resources available to assist in meeting health care needs will improve  Outcome: Met This Shift     Problem: Physical Regulation:  Goal: Complications related to the disease process, condition or treatment will be avoided or minimized  Description: Complications related to the disease process, condition or treatment will be avoided or minimized  Outcome: Met This Shift

## 2021-10-14 NOTE — PROGRESS NOTES
LANGUAGE    Comprehension of Yes/No Questions: Within functional limits    Process  Simple Verbal Commands:   Within functional limits  Process Intermediate Verbal Commands:   Within functional limits  Process Complex Verbal Commands:     Incomplete    Comprehension of Conversation:      Within functional limits      EXPRESSIVE LANGUAGE     Serials: Functional    Imitation:  Words   Functional   Sentences Functional    Naming:  (Modality used:  Verbal)  Confrontation Naming  Functional  Functional Description  Functional  Response Naming: Functional    Conversation:      Conversation was within functional limits    COGNITION       Attention/Orientation  Attention: Sustained attention     General  Orientation:   Person:  oriented      Place:  oriented      Situation:  accurately described    Temporal Orientation:  Temporal Orientation: Year: Correct      Temporal Orientation: Month: Accurate within 5 days      Temporal Orientation: Day: Correct    Memory   Long Term Recall:    Address:  recalled without cuing  Birthdate:  recalled without cuing  Age: recalled without cuing  Family: recalled without cuing    Immediate Recall: Repetition of Three Words (First Attempt): 3    Delayed Recall:   Able to recall \"sock: Yes, no cue required     Able to recall \"blue\": Yes, after cueing (\"a color\")     Able to recall \"bed\": Yes, after cueing (\"a piece of furniture\")    Organization/Problem Solving/Reasoning   Sequencing:    Verbal: Functional     Problem solving: Verbal: Functional        CLINICAL OBSERVATIONS NOTED DURING THE EVALUATION  Latent responses    FIMS SCORES      Swallowing    Current Status  4--Minimal Assistance    Short Term Goal 6--Modified Independent   Long Term Goal 6--Modified Independent     Receptive    Current Status  6--Modified Independent    Short Term Goal 6--Modified Independent    Long Term Goal 6--Modified Independent     Expressive    Current Status  5--Supervision    Short Term Goal 6--Modified Independent    Long Term Goal 6--Modified Independent     Social Interaction    Current Status  5--Supervision    Short Term Goal 6--Modified Independent    Long Term Goal 6--Modified Independent         Problem Solving    Current Status  6--Modified Independent    Short Term Goal 6--Modified Independent    Long Term Goal 6--Modified Independent      Memory    Current Status  5--Supervision    Short Term Goal 6--Modified Independent    Long Term Goal 6--Modified Independent       EDUCATION    Speech Pathologist (SLP) completed education with the patient and/or family regarding identified cognitive linguistic deficits and subsequent need for speech pathology intervention. Discussed deficit areas to be targeted by formal intervention and established short/long term goals. Reviewed compensatory strategies to improve functional outcome (as appropriate). Encouraged patient and/or family to engage SLP in structured Q&A session relative to identified deficit areas. Patient and/or family indicated understanding of all information provided via satisfactory verbal response. ? Prognosis for improvements is good  This plan will be re-evaluated and revised in 1 week  if warranted. Patient stated goals: Agreed with above  Treatment goals discussed with Patient   The Patient understand(s) the diagnosis, prognosis and plan of care       The admitting diagnosis and active problem list, as listed below have been reviewed prior to initiation of this evaluation.         ACTIVE PROBLEM LIST:   Patient Active Problem List   Diagnosis    CAD in native artery    Pulmonary nodules    Unstable angina (HCC)    Other hyperlipidemia    Essential hypertension    Gastroesophageal reflux disease with esophagitis without hemorrhage    S/P CABG (coronary artery bypass graft)    Acute respiratory failure with hypoxia (HCC)    Lactic acidemia    [facility]N [facility]) [facility] [address]    Hypokalemia    Hypocalcemia    Cardiogenic shock (HCC)    Thrombocytopenia (HCC)    Leukocytosis    Encounter regarding vascular access for dialysis for ESRD ([facility] [address]    Acute right-sided weakness    Acute CVA (cerebrovascular accident) ([facility] [address]

## 2021-10-14 NOTE — PROGRESS NOTES
Notified Dr. Sera Parra of bladder scan results. Dr. Sera Parra said \"thank you\". No new orders given at this time.

## 2021-10-14 NOTE — CARE COORDINATION
Social Work Assessment Summary    PCP: Dr. Stahl Reasons with [facility] in [place]    Patient Resides: Pt lives with wife, Richard Lama. No pets in household. Home Architecture : Pt is currently residing in a newly remodeled mobile home. Main entrance has 5 steps with 1 rail. Bathroom is a Walk-in Shower. Will you return to your own home? Yes. Richard Lama stated she is able to provide 24hr supervision if needed. Primary Caregiver: Pt is  to Richard Lama (71) and has 2 children: Suman Castellanos (54) & Karlos North (47). Both children do not live close to either residency. Richard Lama has been retired for 5 years and is overall healthy & drives. Suman Castellanos works with a cabinet painting company and Karlos North works with [employer] in Customer Service. Both are healthy and drive. There are extended family members locally but Richard Lama would be the primary caregiver. Level of Function PTA : Independent. Highest form of education: HS graduate    Employment: Retired from [employer] over 6 years ago. DME Pta  : None    Community Agency Involvement PTA : None    Do they have a medical profile: No    Family Teaching: TBD    Strength: Tenacity, intelligent, perfectionalism    Preference: Per wife: \" to have full use of all of his body parts\"      NAME RELATION HOME # WORK # CELL #   Aaron Wife [phone]                     Height: 5'8\"  Weight: 152      Pt has another home in [place]. Wife stated if patient would need to stay in [place] after D/C, they will extend their stay and leave by the end of the year depending on pt's health.       Chato Esparza, MECCA Intern  [place], [place]  10/14/2021

## 2021-10-14 NOTE — H&P
PM&R Admission History & Physical Examination    Patient: Saulo Banks  Age/sex: 78 y.o. male  Medical Record #: [id number]    Admission to Acute Rehab Unit: Date: 10/13/2021   Diagnosis: Acute CVA (cerebrovascular accident) [facility]) [I63.9]  Admitting Physician: Dallin Whitfield MD  Primary care provider: Sage Hoyt MD      Chief complaint:   Impairments and acitivities limitations in ADLs, mobility, and cognition secondary to Acute CVA    HPI:   Saulo Banks is a 78 y.o. male with past medical history significant for HTN, HLD, CAD who presented [facility] on [facility] and underwent elective CABG x3. He had a cardiac cath on 2021 that showed severe multivessel disease. His course was complicated by post operative cardiogenic shock with hypotension requiring pressors. He was placed on an aortic balloon pump that was removed on 10/4/2021. Course also complicated by lactic acidosis, NGUYỄN, Afib with RVR, and respiratory failure requiring intubation. Bronchoscopy was performed on  to removed mucus plugs. Once patient was weaned off of sedation he was noted to have right sided weakness. Neurology was consulted. Initial head CT was negative for acute process. Patient could not have MRI due to epicardial wires. Repeat head CT revealed equivocal area of low density in the left cerebellum, likely representing acute CVA. Once medically stabilized patient was evaluated for rehab.  He was felt to be a good candidate for further rehabilitation and is now admitted for the Acute inpatient rehab program.        Past Medical History:   Diagnosis Date    CAD (coronary artery disease)     Encounter regarding vascular access for dialysis for ESRD ([facility] [address] 10/1/2021    Hiatal hernia     Hyperlipidemia     Hypertension        Past Surgical History:   Procedure Laterality Date    BRONCHOSCOPY N/A 2021    BRONCHOSCOPY DIAGNOSTIC OR CELL [address] ONLY performed by Eliezer Weber MD at [address] BRONCHOSCOPY  9/30/2021    BRONCHOSCOPY CRYOTHERAPY/LASER performed by Ольга Pearson MD at [address] N/A 9/27/2021    CABG CORONARY ARTERY BYPASS  X 3 EVH, EMMA performed by Jessie Weldon MD at [facility]       Family history: no pertinent history reported       Allergies   Allergen Reactions    Dust Mite Extract        Scheduled Meds:  bumetanide, 1 mg, Once  ipratropium-albuterol, 1 ampule, Q4H WA  aspirin, 81 mg, Daily  vitamin C, 500 mg, BID  atorvastatin, 40 mg, Daily  bumetanide, 1 mg, Daily  carvedilol, 3.125 mg, BID WC  clopidogrel, 75 mg, Daily  enoxaparin, 30 mg, Daily  ferrous sulfate, 944 mg, BID WC  folic acid, 1 mg, Daily  nystatin, , BID  pantoprazole, 40 mg, QAM AC  amiodarone, 200 mg, BID   Followed by  Emmanuel Cha ON 10/21/2021] amiodarone, 200 mg, Daily        PRN Meds  acetaminophen, 650 mg, Q4H PRN  bisacodyl, 5 mg, Daily PRN  oxyCODONE-acetaminophen, 1 tablet, Q4H PRN   Or  oxyCODONE-acetaminophen, 2 tablet, Q4H PRN  sennosides-docusate sodium, 1 tablet, BID PRN  diphenhydrAMINE, 25 mg, Nightly PRN  artificial tears, , PRN  ondansetron, 4 mg, Q8H PRN  sodium chloride, 1 spray, PRN  polyethylene glycol, 17 g, Daily PRN        Social History:  Home situation: Lives with wife in a mobile home with 4 steps to enter and 1 rail    Functional History:  Premorbid ADL's: Independent  Premorbid Mobility: Independent      Current Level of function:      Physical Therapy:   Bed mobility: Max A  Transfers:  Mod A x2 - Max A  Ambulation: 2 steps Max A without AD      Occupational Therapy:  Feeding: Min A  Grooming: Min A  UB dressing: Max A  LB dressing: Max A    Speech therapy:  Mild dysarthria  Mild cognitive impairment       Review Of Systems:   Constitutional: No Fever, chills  Skin: No rash, ulcers, or other lesions  HEENT: No HA, blurry vision  Respiratory: No Cough, SOB  Cardiovascular: No CP  Gastrointestinal: No nausea, vomiting, diarrhea, abdominal discomfort  Genitourinary: No Dysuria  Musculoskeletal: per HPI  Neurologic: per HPI  Psychiatric: No depression, No anxiety      Physical Examination:  Vitals:   Vitals:    10/13/21 1850 10/13/21 2000 10/14/21 0807 10/14/21 1208   BP: 120/80 (!) 106/54 133/62    Pulse: 76 84 88    Resp:  20 17    Temp:  98.9 °F (37.2 °C) 98.9 °F (37.2 °C)    TempSrc:  Oral Temporal    SpO2:  96% 100% 92%   Weight:   152 lb (68.9 kg)    Height:   5' 8\" (1.727 m)        GEN: NAD, conversational   HEENT: NC/AT, PERRLA, EOMI   RESP: CTAB, no R/R/W   CV: +S1 S2, RRR  ABD: soft, NT, ND, normal BS   EXT: Normal ROM, No clubbing/cyanosis, 2+ pulses  SKIN: No erythema, sternal incision c/d/i   PSYCH: appropriate mood and affect    Neuro Exam:  AAOx4  Speech dysarthric. No aphasia appreciated  Follows commands well  Recall 2/3 at 3 min      Cranial Nerves:  I: olfactory not tested  II: Full to confrontation  III, IV, VI: extra occular muscles intact  Pupils (II, III): ERRL  V: Facial Sensation/Jaw clench:  intact  VII: Facial Motor:  intact  VIII.  Hearing:  intact  XI/X: Palate:  intact  XI: Shoulder shrug/SCM:  intact  XII: Tongue:  intact    Coordination:  F - N: impaired on right relative to weakness, intact on left     Sensory:    LT:  Intact throughout            Motor:      Motor Findings  Strength: Right  Strength: Left    Deltoid  2 4+   Biceps  2 4+   Triceps  2 4+   Wrist Extensors  1 4+   FDP 2 4+   Finger abductors 2 4+   Iliospoas  2 3   Quadriceps  3 4+   Tibialis anterior  3 4+   EHL 3 4+   Gastroc soleus  3 4+       DTRs:  Reflex Right Left   Biceps 2+ 2+   Triceps 2+ 2+   Brachioradialis 2+ 2+   Patella 2+ 2+   Achilles  0 0         Laboratory:  Lab Results   Component Value Date    WBC 10.0 10/14/2021    HGB 8.8 (L) 10/14/2021    HCT 28.1 (L) 10/14/2021    MCV 91.8 10/14/2021     10/14/2021     Lab Results   Component Value Date     10/14/2021    K 4.2 10/14/2021     10/14/2021    CO2 23 10/14/2021    BUN 43 10/14/2021    CREATININE 2.1 10/14/2021    GLUCOSE 111 10/14/2021    CALCIUM 8.5 10/14/2021      Lab Results   Component Value Date    ALT 10 10/12/2021    AST 15 10/12/2021    ALKPHOS 47 10/12/2021    BILITOT 0.8 10/12/2021     Lab Results   Component Value Date    COLORU Yellow 09/23/2021    NITRU Negative 09/23/2021    GLUCOSEU Negative 09/23/2021    KETUA Negative 09/23/2021    UROBILINOGEN 0.2 09/23/2021    BILIRUBINUR Negative 09/23/2021     Lab Results   Component Value Date    LABA1C 5.1 09/10/2021         Pertinent Imaging:   Head CT     Impression   1. Equivocal area of low-density in the left cerebellum.  If clinically   indicated, MRI of the brain could be done to evaluate for acute infarct. 2. Mild chronic white matter ischemic changes.  No evidence of intracranial   hemorrhage. 3. Slight improvement of sinus disease and bilateral mastoid effusions. DIAGNOSIS: Acute CVA    IMPRESSION/INDIVIDUAL PLAN OF CARE:  Emilia Victoria is a 78 y. o. with impairments and acitivities limitations in ADLs, mobility, and cognition secondary to Acute CVA    Patient has impairments in:  Demonstrating impaired cognition, impaired strength, impaired balance, impaired safety awareness, decreased endurance. Patient has activities limitations in self care, mobility, and cognition, and is [facility] for acute comprehensive rehabilitation. I. Rehabilitation Necessity/Diagnosis:   Medical impact on function as a result of impaired functional mobility, ambulation, bed mobility, transfers, self-care/ADL's, strength, endurance, range of motion/flexibility, coordination and impaired gait/balance. Treatment plan will include a comprehensive rehabilitation program with intense therapies for 3 hours/day, 5 days/week.     1. Physical therapy to address bed mobidility, car and mat transfer, progressive ambulation with use of least restrictive assistive device, optimization of gait biomechanics, truncal strengthening, lower extremity strengthening, coordination, range of motion/flexibility, wheelchair and cushion evaluation, durable medical equipment evaluation, patient and family instruction and endurance training. 2. Occupational therapy to address feeding, grooming, upper and lower body dressing and bathing, toileting, tub/toilet/bed transfers, durable medical equipment evaluation, upper extremity strengthening, range of motion/flexibility, dexterity, coordination and patient and family instruction. 3. Rehabilitation nursing 24 hours per day to monitor bowel and bladder function, work on bowel routine, voiding trials/jaimes catheter management as per bladder management protocol, assess falls risk upon admission and periodically thereafter, implement and revise falls prevention strategies, maintain skin integrity through initial and daily pressure sore risk assessment (Ephraim scale), implement and revise pressure sore prevention strategies, educate patient and family members regarding medication administration, ADL's, transfers, and mobility and continue therapy carryover with ADL's, transfers, and mobility  4. Social work and case management consults for discharge planning/disposition issues, as well as coordination and communication of patient progress between family and providers. 5. Rehab psychology - as needed for adjustment, coping  6. Recreational therapy for community reintegration activities. 7. Speech therapy for speech and cognition     This patient is sufficiently stable at this time of admission to [facility] to be able to participate in an intensive rehabilitation program described above and requires physician supervision by a rehabilitation physician as outlined below in Medical Management section. II. Medical Management:  -Acute CVA: Right hemiparesis, dysarthria, cognitive impairment. Continue Aspirin, Plavix, Lipitor.  Begin Acute Rehab evaluations. -CAD, s/p CABG x3: Continue current medications, sternal precautions.   -Post operative pain: Percocet PRN. Wean narcotics as tolerated  -Acute blood loss anemia: H&H low but stable, monitor. Continue iron supplement   -Afib during acute care hospitalization, now in NSR: On amiodarone for Afib prophylaxis per CT surgery, with plans to wean after discharge  -Acute systolic heart failure: On coreg. Diuretics per Nephrology   -NGUYỄN: Nephrology following. Cr downtrending. -DVT prophylaxis: lovenox       # Disposition/social - likely discharge home, will discuss in team rounds. # Code status: Full Code     III. Estimated length of stay: 3-4 weeks    IV. Goals - Mod I    V. Anticipated discharge setting: Home    VI. Prognosis:  Rehab Prognosis: good  Medical Prognosis: good    CMS Required Post-Admission Physician Evaluation Elements  History and Physical, including medical history, functional history and active comorbidities as in above text. Post Admission Physician Evaluation comparison with pre-admission screen:  I concur with the preadmission screen except as documented above     Medication Reconciliation CMS Requirements:  Drug Regimen Review:  Upon admission, a complete drug regimen review to identify potential clinically significant medication issues requiring immediate attention by a physician was completed. Electronically signed by Michael Gupta MD on 10/14/2021 at 3:14 PM       Please note that the above documentation was prepared using voice recognition software. Every attempt was made to ensure accuracy but there may be spelling, grammatical, and contextual errors.

## 2021-10-14 NOTE — PROGRESS NOTES
Physical Therapy  Evaluating Therapist: Bebe Barrera., PT, DPT [id number]      ROOM: [address] [place] [address]  DIAGNOSIS: L cerebellar CVA   PRECAUTIONS: Falls, Sternal Precautions, Pureed diet w/ nectar thick liquids,   HPI: s/p CABG on 9/27/21, Following CABG, patient developed hypotension, acidosis, NGUYỄN and respiratory failure and was intubated. Bronchoscopy done on 9/30 to remove large mucus plug. Required pressors and was placed on aortic balloon pump which was removed 10/4/21. Off of sedation was found to have right sided weakness. CT head negative for acute findings. Echo showed no LV mural thrombus. Carotid US with atherosclerotic disease -- 50-69% bilaterally. Unable to have CTAs due to kidney function. Unable to have MRI/MRA at this time due to epicardial wires. Repeat CT head with equivocal area of low density in the L cerebellum. Social:  Pt lives with wife in a mobile home with 5 stairs to enter and 1 rail. Pt also has home in [place] which is 1 story setup, unclear amount of steps. Prior to admission: Pt ambulated without device and was independent PTA. Initial Evaluation  Date: 10/14/21 AM     PM    Short Term Goals Long Term Goals    Was pt agreeable to Eval/treatment? yes IE yes     Does pt have pain?  Denies pain  Denies pain     Bed Mobility  Rolling: SBA  Supine to sit: modA  Sit to supine: modA  Scooting: SBA  NT sup Mod i   Transfers Sit to stand: mod-maxA  Stand to sit: mod-maxA  Stand pivot: modA    5xSTS: NT  Sit to stand: modA  Stand to sit: modA  Stand pivot: NT    5xSTS: NT Asher  5xSTS: < 15 sec sup  5xSTS: < 15 sec   Ambulation    10', no AD, maxA with w/c follow from 2nd helper    10mWT: NT  6mWT: NT  NT 50', no AD, Asher    10mWT: TBD  6mWT: ', no AD, sup    10mWT: TBD  6mWT: TBD   Walking 10 feet on uneven surface NT due to safety  NT 10', no AD,  Asher 50', no AD, sup   Wheel Chair Mobility NT  NT n/a n/a   Car Transfers NT due to safety  NT Asher sup   Stair negotiation: ascended and descended NT due to safety  NT 4 steps, HR for balance only, Asher 12 steps, HR for balance only, sup   Curb Step:   ascended and descended 2\" curb, maxA, no AD  NT 4\" curb, no AD, Asher 7.5\" curb, no AD, sup   Picking up object off the floor NT due to safety  NT Will  cone with Asher assist Will  cone with sup assist   BLE ROM AROM WFL       BLE Strength LLE 5/5 except hip flexors which were 3/5  R hip flex 3/5   R knee ext/flex 5/5   R ankle DF/PF 5/5       Balance  Sitting- mod I  Standing-mod-maxA    BBS: NT  FGA: NT  Sitting- mod I  Standing-mod-maxA    BBS: NT  FGA: NT   BBS: > 45/56  FGA: >22/30   BBS: -  FGA: -   Date Family Teach Completed N/A       Is additional Family Teaching Needed? Y or N Y       Hindering Progress sternal precautions, dyscoordination, weakness, decreased endurance, anxiety       PT recommended ELOS 3-4 weeks       Team's Discharge Plan        Therapist at Team Meeting          Therapeutic Exercise:     AM:   IE    PM:   1. LAQ- 3x10, 1.5# ankle weights  2. Seated hip flexion- 3x10, AROM  3. Seated hip abduction- 2x15 vs orange theraband resistance  4. STS and static stand- x3 reps, standing for 60-90 seconds, very retroupuslive, modA to steady     Patient education    Pt educated on TE, tx, balance, current status and POC, d/c planning    Patient response to education:   Pt verbalized understanding Pt demonstrated skill Pt requires further education in this area   yes partial All areas     Additional Comments:   PM: Pt reporting significant fatigue, requesting light activity. TE completed per list, pt with good effort but required rest breaks between bouts, increased effort shown during tasks. Pt continues to be very retropulsive during static standing, able to verbalize direction of lean but minimal initiation to compensate.  Had pt place BUE on PT's shoulders whle PT sitting in fron, pt use BUE for balance support only, slight improvement in anterior weight shift over center of gravity but still requiring heavy steadying assist. Will continue to practice. Chair/bed alarm:     PM  Time in: 1430   Time out: 1515    Pt is making good progress toward established Physical Therapy goals. Continue with physical therapy current plan of care.     Phylicia Ivory., PT, DPT  [id number]

## 2021-10-14 NOTE — PROGRESS NOTES
Occupational Therapy   Occupational Therapy Initial Assessment  Date: 10/14/2021   Patient Name: Lelo Munoz  MRN: [id number]     : 1945  Room: 5501B    Referring Practitioner: Marge Morales MD  Diagnosis: CVA- s/p CABG x 3- 21  Additional Pertinent Hx: HLD, HTN, CAD, ESRD, hx hiatal hernia  Restrictions: Fall Risk, puree- dyphagia IV/nectar & sternal    Date of Service: 10/14/2021    Discharge Recommendations:  24 hour supervision or assist, Home with nursing aide, Home with Home health OT     Subjective   Chart Reviewed: Yes  Family / Caregiver Present: No  Subjective: Pt presents supine in bed & was agreeable to OT intervention. Pain Comments: Pt did not c/o pain during OT session    Social/Functional History  Lives With: Spouse  Type of Home: Mobile home  Home Layout: One level  Home Access: Stairs to enter with rails  Entrance Stairs - Number of Steps: 4 ANYA 1HR  Bathroom Shower/Tub: Walk-in shower  Bathroom Toilet: Handicap height  ADL Assistance: Independent  Homemaking Assistance: Independent  Homemaking Responsibilities: Yes  Meal Prep Responsibility: Secondary  Laundry Responsibility: Secondary  Cleaning Responsibility: Secondary  Ambulation Assistance: Independent  Transfer Assistance: Independent  Active : Yes  Mode of Transportation: Car  Occupation: Retired  IADL Comments: PLOF pt independent in all areas. Objective   Vision: Impaired  Vision Exceptions: Wears glasses at all times  Hearing: Within functional limits     Orientation  Overall Orientation Status: Within Functional Limits     Observation/Palpation  Posture: Fair     Balance  Sitting Balance: Minimal assistance  Standing Balance: Maximum assistance  Toilet Transfers  Toilet - Technique: Stand pivot  Equipment Used: Raised toilet seat with rails  Toilet Transfer: Maximum assistance  Toilet Transfers Comments: vc's for hand placement  ADL  Feeding: Minimal assistance;Setup; Thickened liquids;Pureed diet  Grooming:  Moderate assistance; Increased time to complete;Verbal cueing;Setup  UE Bathing: Minimal assistance; Increased time to complete;Verbal cueing;Setup  LE Bathing: Maximum assistance;Setup;Verbal cueing; Increased time to complete  UE Dressing: Maximum assistance;Setup;Verbal cueing; Increased time to complete- vc's for amber dressing technique  LE Dressing: Dependent/Total;Setup;Verbal cueing; Increased time to complete- Max A to don depends & pants & dependent to don socks & shoes  Toileting: Dependent/Total  Additional Comments: vc's to attent to right, vc's for hand placement & vc's to ensure pt safety during mobility     Coordination  Movements Are Fluid And Coordinated: No  Coordination and Movement description: Fine motor impairments;Gross motor impairments;Right UE     Bed mobility  Rolling to Right: Moderate assistance  Scooting: Minimal assistance  Comment: vc's for hand placement & safety  Transfers  Stand Pivot Transfers: Moderate assistance;Maximum assistance  Sit to stand: Moderate assistance  Stand to sit: Moderate assistance  Transfer Comments: vc's for hand placement & safety     Vision - Basic Assessment  Prior Vision: Wears glasses all the time  Visual History: No significant visual history  Patient Visual Report: No visual complaint reported. Cognition  Overall Cognitive Status: Exceptions  Problem Solving: Assistance required to generate solutions;Assistance required to correct errors made  Insights: Decreased awareness of deficits  Initiation: Requires cues for some  Sequencing: Requires cues for some     Sensation  Overall Sensation Status: Impaired  Light Touch: Partial deficits in the RUE      LUE AROM : WFL  Left Hand AROM: WFL  RUE PROM: WFL  RUE AROM: Pt demo 1/4 shoulder, >1/4 elbow & 1/4 wrist AROM  Right Hand PROM: WFL  Right Hand AROM: Pt has a gross grasp & release.  Pt able to oppose his thumb to his index & middle finger with increased time    LUE Strength  L Hand General: 4+/5  RUE Strength  R Hand General: 2-/5  RUE Strength Comment: shoulder, elbow 2/5 & wrist 2-/5     Hand Dominance: Right    Left Hand Strength -    Handle Setting 2: 60# & norm for pt age & gender is 65#  Right Hand Strength -   Handle Setting 2: 20# & norm for pt age & gender is 75#    Fine Motor Skills  Left 9-Hole Peg Test:  34.8 seconds & norm for pt age & gender is 26.0 seconds  Right 9-Hole Peg Test:  Pt handed pegs 1 @ a time as u/a to pick them up on his own in 1 minute & 33.6 [id number] & norm for pt age & gender is 25.8 seconds     Plan   Times per week: OT twice a day for 4 weeks to address above problem areas  Times per day: Twice a day  Current Treatment Recommendations: Strengthening, Gait Training, Patient/Caregiver Education & Training, Home Management Training, ROM, Equipment Evaluation, Education, & procurement, Balance Training, Neuromuscular Re-education, Functional Mobility Training, Endurance Training, Cognitive Reorientation, Safety Education & Training, Self-Care / ADL    Assessment   Performance deficits / Impairments: Decreased functional mobility ; Decreased endurance;Decreased coordination;Decreased ADL status; Decreased posture;Decreased ROM; Decreased balance;Decreased strength;Decreased safe awareness;Decreased high-level IADLs;Decreased cognition;Decreased fine motor control  Prognosis: Good  Decision Making: Medium Complexity  OT Education: OT Role;Plan of Care;ADL Adaptive Strategies;Precautions;Transfer Training;Energy Conservation  Patient Education: Pt educated with regards to OT process. Pt participated in OT goal planning. Pt in agreement [facility] & goals after explained to the patient.  Pt pleasant & cooperative throughout the OT session  REQUIRES OT FOLLOW UP: Yes  Activity Tolerance: Patient Tolerated treatment well  Safety Devices in place: Yes  Type of devices: Call light within reach        Goals  Long term goals  Time Frame for Long term goals : 4 weeks to address above problema reas  Long term goal 1: Pt demo independent to eat all meals  Long term goal 2: Pt demo Mod I grooming seated or standing & demo G- safety  Long term goal 3: Pt demo SBA-CGA bathing @ shower level both seated & standing  Long term goal 4: Pt demo s/u UE dress & SBA-CGA LE dress including underwear, pants, socks & shoes & demo G- endurance  Long term goal 5: Pt demo SBA-CGA commode trf with appropriate DME to ensure pt safety. Pt demo SBA toileting & demo G- safety & insight  Long term goals 6: Pt demo SBA-CGA walk in shower trf using appropriate DME to ensure pt safety  Long term goal 7: Pt demo Min A light homemaking activity & demo G- safety & insight  Long term goal 8: Pt demo improved FMC/ strength to improve pt ability to complete ADLs as evidenced by gains inthe followin-hole peg test- R hand pt handed pegs as he was u/a to pick them up & completed in 1 minute & 33.6 seconds & norm for pt age & gender is 25.8 seconds, L hand 34.8 seconds & norm for pt age & gender is 26.0 seconds;  strength- R hand 20# & norm for pt age & gender is 75# & L hand 57# & normf or pt age & gender is 65#  Long term goal 9: Pt demo G- endurance for a 20-30 minute functional activity  Patient Goals   Patient goals : \"I'd like to go to the bathroom. \"     Therapy Time   Individual Concurrent Group Co-treatment   Time In 550-OT eval  600-OT rx         Time Out 600-OT eval  640-OT rx         Minutes 50 Min OTtotal  10 Min OT eval  [address], OTR/L [address]

## 2021-10-14 NOTE — PROGRESS NOTES
Physical Therapy  Evaluating Therapist: Dianna Senior., PT, DPT [id number]      ROOM: [address] [place] [address]  DIAGNOSIS: L cerebellar CVA   PRECAUTIONS: Falls, Sternal Precautions, Pureed diet w/ nectar thick liquids,   HPI: s/p CABG on 9/27/21, Following CABG, patient developed hypotension, acidosis, NGUYỄN and respiratory failure and was intubated. Bronchoscopy done on 9/30 to remove large mucus plug. Required pressors and was placed on aortic balloon pump which was removed 10/4/21. Off of sedation was found to have right sided weakness. CT head negative for acute findings. Echo showed no LV mural thrombus. Carotid US with atherosclerotic disease -- 50-69% bilaterally. Unable to have CTAs due to kidney function. Unable to have MRI/MRA at this time due to epicardial wires. Repeat CT head with equivocal area of low density in the L cerebellum. Social:  Pt lives with wife in a mobile home with 5 stairs to enter and 1 rail. Pt also has home in [place] which is 1 story setup, unclear amount of steps. Prior to admission: Pt ambulated without device and was independent PTA. Initial Evaluation  Date: 10/14/21 AM     PM    Short Term Goals Long Term Goals    Was pt agreeable to Eval/treatment? yes       Does pt have pain?  Denies pain       Bed Mobility  Rolling: SBA  Supine to sit: modA  Sit to supine: modA  Scooting: SBA   sup Mod i   Transfers Sit to stand: mod-maxA  Stand to sit: mod-maxA  Stand pivot: modA    5xSTS: NT   Asher  5xSTS: < 15 sec sup  5xSTS: < 15 sec   Ambulation    10', no AD, maxA with w/c follow from 2nd helper    10mWT: NT  6mWT: NT   50', no AD, Asher    10mWT: TBD  6mWT: ', no AD, sup    10mWT: TBD  6mWT: TBD   Walking 10 feet on uneven surface NT due to safety   10', no AD,  Asher 50', no AD, sup   Wheel Chair Mobility NT   n/a n/a   Car Transfers NT due to safety   Asher sup   Stair negotiation: ascended and descended NT due to safety   4 steps, HR for balance only, Asher 12 steps, HR for balance only, sup   Curb Step:   ascended and descended 2\" curb, maxA, no AD   4\" curb, no AD, Asher 7.5\" curb, no AD, sup   Picking up object off the floor NT due to safety   Will  cone with Asher assist Will  cone with sup assist   BLE ROM AROM WFL       BLE Strength LLE 5/5 except hip flexors which were 3/5  R hip flex 3/5   R knee ext/flex 5/5   R ankle DF/PF 5/5       Balance  Sitting- mod I  Standing-mod-maxA    BBS: NT  FGA: NT     BBS: > 45/56  FGA: >22/30   BBS: -  FGA: -   Date Family Teach Completed N/A       Is additional Family Teaching Needed? Y or N Y       Hindering Progress sternal precautions, dyscoordination, weakness, decreased endurance, anxiety       PT recommended ELOS 3-4 weeks       Team's Discharge Plan        Therapist at Team Meeting          Pt is alert and Oriented x 3, oriented to person, place, and date  Sensation: light touch intact  Coordination: mild decreased speed and dysmetria in finger to nose test in LUE, unable to test RUE due to weakness  unable to formally assess heel to shin due to hip flexor weakness  Edema: RUE edema in fingers, forearm, and arm  Endurance: poor-fair     ASSESSMENT  Pt displays functional ability as noted in the objective portion of this evaluation. Comments:  Pt presents with deficits in balance, strength, endurance, coordination, safety awareness, and functional mobility when compared to baseline. Pt require assist to recall sternal precautions. Pt requires heavy assist to complete tx and limited amb distance with chair follow, unsafe to trial stair negotiation at this time. Pt demo's significant retropulsion during static standing. Pt would benefit form continued therapy services to address deficits listed, working towards goals mentioned above. Anticipate pt to require 3-4 weeks to reach goals.  Anticipate pt to require supervision level assist at d/c. Educated pt to use call light for assistance with mobility with staff to prevent risk of falls. Patient education  Pt educated on tx, gait, balance, current status and POC, dc/ planning    Patient response to education:   Pt verbalized understanding Pt demonstrated skill Pt requires further education in this area   yes partial All areas     Rehab potential is Good for reaching above PT goals. Pts/ family goals   1. Be able to get out of bed and go to the restroom unassisted    Patient and or family understand(s) diagnosis, prognosis, and plan of care. yes    PLAN  PT care will be provided in accordance with the objectives noted above. Whenever appropriate, clear delegation orders will be provided for nursing staff. Exercises and functional mobility practice will be used as well as appropriate assistive devices or modalities to obtain goals. Patient and family education will also be administered as needed. Frequency of treatments will be 2x/day M-F and 1x/day Sat or Sun x 21-28 days.     Time in: 0915  Time out: Daya Han 272. PT, DPT  [id number]

## 2021-10-14 NOTE — PROGRESS NOTES
Speech Language Pathology  ACUTE REHABILITATION--DAILY PROGRESS NOTE  ADMITTING DIAGNOSIS: Acute CVA (cerebrovascular accident) ([facility] [address] [I63.9]     VISIT DIAGNOSIS:          SPEECH THERAPY  PLAN OF CARE   The speech therapy  POC is established based on physician order, speech pathology diagnosis and results of clinical assessment      SPEECH PATHOLOGY DIAGNOSIS:    Mild dysarthria, mild cognitive deficits     Speech Pathology intervention is recommended 3-6 times per week for LOS or when goals are met with emphasis on the following:       Conditions Requiring Skilled Therapeutic Intervention for speech, language and/or cognition     Dysarthria   Decreased short term memory     Specific Speech Therapy Interventions to Include: Therapeutic exercises  Therapeutic tasks for Cognition     Specific instructions for next treatment:                 To initiate memory tasks  To initiate speech production tasks     SHORT/LONG TERM GOALS  Pt will improve immediate, short term, recent memory during structured and unstructured tasks with 80% accuracy   RESULTS:     DYSPHAGIA DIAGNOSIS:   Clinical indicators of adequate swallow function on patient's current diet of puree and NTL  Per most recent MBSS completed on 10/11/21 results indicated minimal-mild oral phase dysphagia and moderate pharyngeal phase dysphagia                            DIET RECOMMENDATIONS:  Pureed consistency solids (IDDSI level 4) with  nectar consistency (mildly thick - IDDSI level 2) liquids     MEDICATION ADMINISTRATION and Administer medication crushed, as able, with pudding/applesauce                 FEEDING RECOMMENDATIONS:                           Assistance level:  Supervision is needed during all oral intake                             Compensatory strategies recommended: Double swallow, Effortful swallow, Small bites/sips, Alternate solids and liquids, Liquids by teaspoon only and Head TURN to right                             Discussed recommendations with nursing and/or faxed report to referring provider: No secondary to no diet/liquid change recommended        SPEECH THERAPY  PLAN OF CARE   The dysphagia POC is established based on physician order, dysphagia diagnosis and results of clinical assessment      Skilled SLP intervention for dysphagia management on acute care 3-5 x per week until goals met, pt plateaus in function and/or discharged from hospital     Conditions Requiring Skilled Therapeutic Intervention for dysphagia:              Per most recent MBSS completed on 10/11/21. .. Reduced pharyngeal clearing of the bolus  Reduced laryngeal closure resulting in penetration     Specific dysphagia interventions to include:      Compensatory strategy training   Therapeutic exercises  Therapeutic intervention to facilitate implementation of energy conservation techniques during intake to decrease potential for aspiration associated with compromised swallow/breathing sequence.     Specific instructions for next treatment:  ongoing PO analysis to upgrade diet and evaluate tolerance of current PO recommendation  Patient Treatment Goals:     Short Term Goals:  Pt will implement identified compensatory swallowing strategies on 90% of opportunities or greater to improve airway protection and swallow function.   Pt will participate in ongoing mealtime assessment to provide diet modification and compensatory strategy implementation to minimize risk of aspiration associated with PO intake  Pt will complete BOTR strength/ ROM exercises to reduce pharyngeal residuals and improve epiglottic inversion minimal verbal prompts  Pt will complete laryngeal strength/ ROM therapeutic exercises to improve airway protection for the least restrictive PO diet minimal verbal prompts  Pt will complete Shaker and/or Chin Tuck Against Resistance (CTAR) to increase UES relaxation diameter and increase anterior laryngeal excursion to reduce pharyngeal residuals and reduce risk of pen/asp with minimal verbal prompts. Pt will complete Effortful Swallow therapeutically to target increased oral and base of tongue pressure, increased pharyngeal constrictor contractions, and increased UES relaxation duration to reduce pharyngeal residue with minimal verbal prompts   Pt will complete Mavis Maneuver therapeutically to target increased pharyngeal constrictor contractions to reduce pharyngeal residue with minimal verbal prompts   Pt will practice chin down posture to target earlier laryngeal closure with minimal verbal prompts      Long Term Goals:              Pt will improve oropharyngeal swallow function to ensure airway protection during PO intake to maintain adequate nutrition/hydration and decrease signs/symptoms of aspiration to less than 1 x/day.    Repeat Video Swallow Study (MBSS) is recommended in 5-7 days and requires a new physician order            FIMS    Swallowing                          Current Status             4--Minimal Assistance               Short Term Goal         6--Modified Independent           Long Term Goal          6--Modified Independent              Receptive                          Current Status             6--Modified Independent                       Short Term Goal         6--Modified Independent                       Long Term Goal          6--Modified Independent              Expressive                          Current Status             5--Supervision               Short Term Goal         6--Modified Independent                       Long Term Goal          6--Modified Independent              Social Interaction                          Current Status             5--Supervision               Short Term Goal         6--Modified Independent                       Long Term Goal          6--Modified Independent                                                             Problem Solving                          Current Status             6--Modified Independent                       Short Term Goal         6--Modified Independent                       Long Term Goal          6--Modified Independent                          Memory                          Current Status             5--Supervision               Short Term Goal         6--Modified Independent                       Long Term Goal          6--Modified Independent                          SWALLOWING:      Diet:  Dysphagia 1, Pureed solids with nectar consistency (mildly thick - IDDSI level 2) liquids          LANGUAGE:     Patient answered questions appropriately and engaged in conversation with SLP and OT. Patient was emotional while talking about family members. COGNITION:       Fair+ outcome while answering problem solving, reasoning, and verbal sequencing questions with min v/c. Safety:  fair +    SPEECH:     Speech intelligibility was good this session. Minimal dysarthria noted. SP recommended after discharge: To be determined  Supervision recommended at discharge: To be determined  Will continue SP intervention as per previously established POC. EDUCATION: Speech Pathologist (SLP) completed education with the patient and/or family regarding identified cognitive linguistic deficits and subsequent need for speech pathology intervention. Discussed deficit areas to be targeted by formal intervention and established short/long term goals. Reviewed compensatory strategies to improve functional outcome (as appropriate). Encouraged patient and/or family to engage SLP in structured Q&A session relative to identified deficit areas. Patient and/or family indicated understanding of all information provided via satisfactory verbal response.     Minute Tracking:    Individual therapy:     0 minutes  Concurrent therapy:    0 minutes  Group therapy:     0 minutes  Co-treatment therapy:  30 minutes    Total minutes for 10/14/2021: 30 minutes

## 2021-10-14 NOTE — PROGRESS NOTES
fatigue. Additional Notes:       Patient has made good  progress during treatment sessions toward set goals. Therapy emphasis to obtain goals: Strengthening, Gait Training, Patient/Caregiver Education & Training, Home Management Training, ROM, Equipment Evaluation, Education, & procurement, Balance Training, Neuromuscular Re-education, Cognitive Reorientation, Safety Education & Training, Self-Care/ADL    Long term goal 1: Pt demo independent to eat all meals  Long term goal 2: Pt demo Mod I grooming seated or standing & demo G- safety  Long term goal 3: Pt demo SBA-CGA bathing @ shower level both seated & standing  Long term goal 4: Pt demo s/u UE dress & SBA-CGA LE dress including underwear, pants, socks & shoes & demo G- endurance  Long term goal 5: Pt demo SBA-CGA commode trf with appropriate DME to ensure pt safety. Pt demo SBA toileting & demo G- safety & insight  Long term goals 6: Pt demo SBA-CGA walk in shower trf using appropriate DME to ensure pt safety  Long term goal 7: Pt demo Min A light homemaking activity & demo G- safety & insight  Long term goal 8: Pt demo improved FMC/ strength to improve pt ability to complete ADLs as evidenced by gains inthe followin-hole peg test- R hand pt handed pegs as he was u/a to pick them up & completed in 1 minute & 33.6 seconds & norm for pt age & gender is 25.8 seconds, L hand 34.8 seconds & norm for pt age & gender is 26.0 seconds;  strength- R hand 20# & norm for pt age & gender is 75# & L hand 57# & normf or pt age & gender is 65#  Long term goal 9: Pt demo G- endurance for a 20-30 minute functional activity    [x] Continue with current OT Plan of care.   [] Prepare for Discharge     DISCHARGE RECOMMENDATIONS  Recommended DME:    Post Discharge Care:   []Home Independently  []Home with 24hr Care / Supervision []Home with Partial Supervision []Home with Home Health OT []Home with Out Pt OT []Other: ___   Comments:         Time in Time out Tx Time

## 2021-10-14 NOTE — PROGRESS NOTES
Department of Internal Medicine  Nephrology Progress Note      Events reviewed. SUBJECTIVE: We are following Mr. Mehul [address] for NGUYỄN. Reports urinary incontinence.     PHYSICAL EXAM:      Vitals:    VITALS:  /62   Pulse 88   Temp 98.9 °F (37.2 °C) (Temporal)   Resp 17   Ht 5' 8\" (1.727 m)   Wt 152 lb (68.9 kg)   SpO2 100%   BMI 23.11 kg/m²   24HR INTAKE/OUTPUT:      Intake/Output Summary (Last 24 hours) at 10/14/2021 1106  Last data filed at 10/14/2021 0533  Gross per 24 hour   Intake    Output 250 ml   Net -250 ml     Constitutional: Patient is awake, alert in no distress  HEENT: Pupils are equal reactive  Respiratory: Decreased breath sounds at the bases  Cardiovascular/Edema: Heart sounds are regular  Gastrointestinal: Abdomen soft  Neurologic: Patient alert   Other: +trace edema      Scheduled Meds:   ipratropium-albuterol  1 ampule Inhalation Q4H WA    aspirin  81 mg Oral Daily    vitamin C  500 mg Oral BID    atorvastatin  40 mg Oral Daily    bumetanide  1 mg Oral Daily    carvedilol  3.125 mg Oral BID WC    clopidogrel  75 mg Oral Daily    enoxaparin  30 mg SubCUTAneous Daily    ferrous sulfate  325 mg Oral BID WC    folic acid  1 mg Oral Daily    nystatin   Topical BID    pantoprazole  40 mg Oral QAM AC    amiodarone  200 mg Oral BID    Followed by   Reed Costa ON 10/21/2021] amiodarone  200 mg Oral Daily     Continuous Infusions:    PRN Meds:.acetaminophen, bisacodyl, oxyCODONE-acetaminophen **OR** oxyCODONE-acetaminophen, sennosides-docusate sodium, diphenhydrAMINE, artificial tears, ondansetron, sodium chloride, polyethylene glycol    DATA:    CBC:   Lab Results   Component Value Date    WBC 10.0 10/14/2021    RBC 3.06 10/14/2021    HGB 8.8 10/14/2021    HCT 28.1 10/14/2021    MCV 91.8 10/14/2021    MCH 28.8 10/14/2021    MCHC 31.3 10/14/2021    RDW 15.1 10/14/2021     10/14/2021    MPV 11.1 10/14/2021     CMP:    Lab Results   Component Value Date     10/14/2021    K 4.2 10/14/2021     10/14/2021    CO2 23 10/14/2021    BUN 43 10/14/2021    CREATININE 2.1 10/14/2021    GFRAA 37 10/14/2021    LABGLOM 31 10/14/2021    GLUCOSE 111 10/14/2021    PROT 5.4 10/12/2021    LABALBU 3.1 10/12/2021    CALCIUM 8.5 10/14/2021    BILITOT 0.8 10/12/2021    ALKPHOS 47 10/12/2021    AST 15 10/12/2021    ALT 10 10/12/2021     Magnesium:    Lab Results   Component Value Date    MG 2.3 10/10/2021     Phosphorus:    Lab Results   Component Value Date    PHOS 2.8 10/10/2021        Radiology Review:      CXR 10/3/21   1. Median sternotomy changes. 2. Bilateral pleuroparenchymal opacities that could be related to pleural   effusion and edema.  The appearance of the chest is about the same or   slightly worse.         Chest x-ray October 6, 2021   1. Stable appearance of the postoperative chest.   2. Bilateral chest tubes remain in position.  There is no right or left   pneumothorax. 3. Small right pleural effusion         Chest x-ray October 8, 2021   Minimal bibasilar atelectasis.       Trace right pleural effusion       There is no pneumothorax             BRIEF SUMMARY OF INITIAL CONSULT:    Briefly Mr. Dona Vera is a 61-year-old man with history of HTN, CAD with recent status cardiac catheterization done on September 9 which showed severe multivessel disease, hyperlipidemia, hiatal hernia, who was admitted for elective CABG on September 27, 2021. On admission his creatinine level was 0.8 mg/dL and post surgery his creatinine has progressively increased up to 2.3 mg/dL, reason for this consultation. Postoperatively she developed persistent hypotension, lactic acidosis and respiratory failure for which he was reintubated. Since then she has required multiple pressors and he was placed on IABP. He had received 1 dose of ketorolac perioperatively. His urine output has significantly decrease and is being about 500 cc/day in the last 48 hours and his fluid balance presently is over 9 L.     Problems resolved:    · Cardiogenic shock, hemodynamically more stable, pressor support decrease, still on IABP. Tentative plan for removal of IABP. Pro-BP 16,936  · Hypernatremia with hypervolemia, 2/2 sodium containing IV fluid resuscitation and underlying heart failure. Resolved with  natriuresis and free water (add D5W)  · Hypokalemia, 2/2 diuretics, potassium levels improve  · Respiratory alkalosis (pH: 7.554, PCO2: 90.2) with metabolic alkalosis (bicarbonate administration)  · Respiratory failure status post intubation  · Thrombocytopenia  · Transaminitis with hyperbilirubinemia, possibly shock liver versus congestive liver  · Probably pneumonia, on piperacillin-tazobactam    IMPRESSION/RECOMMENDATIONS:      1. NGUYỄN stage III, CABG associated NGUYỄN, ischemic ATN, non-oliguric. FEUrea 18.8%. Creatinine level has increased last 24 hours, to obtain bladder scan to rule out urinary retention. 2. HFmEF 40% with stage IDD, proBNP 16,826 > 9461> 7228, on Bumex 1 mg p.o. daily, still edematous. Additional dose of Bumex today. 3. Hypokalemia, 2/2 diuretics, potassium levels improved.    ---------------------------------------------------------    4. CAD status post CABG x3 September 27, 2021, on ASA, clopidogrel, carvedilol, atorvastatin  5. Amiodarone therapy, for AF prophylaxis  6. Normocytic anemia, status post surgery, status post transfusion, iron saturation 36%, on p.o. iron      Plan:    · Continue Bumex 1 mg p.o. daily, give additional dose this afternoon  · Obtain bladder scan  · Replace potassium  · Continue to monitor renal function for recovery   · Okay to discharge to acute rehabilitation unit.

## 2021-10-15 LAB
ANION GAP SERPL CALCULATED.3IONS-SCNC: 15 MMOL/L (ref 7–16)
BUN BLDV-MCNC: 39 MG/DL (ref 6–23)
CALCIUM SERPL-MCNC: 8.5 MG/DL (ref 8.6–10.2)
CHLORIDE BLD-SCNC: 102 MMOL/L (ref 98–107)
CO2: 22 MMOL/L (ref 22–29)
CREAT SERPL-MCNC: 2.3 MG/DL (ref 0.7–1.2)
GFR AFRICAN AMERICAN: 34
GFR NON-AFRICAN AMERICAN: 28 ML/MIN/1.73
GLUCOSE BLD-MCNC: 119 MG/DL (ref 74–99)
HCT VFR BLD CALC: 24.1 % (ref 37–54)
HEMOGLOBIN: 7.9 G/DL (ref 12.5–16.5)
MCH RBC QN AUTO: 30.2 PG (ref 26–35)
MCHC RBC AUTO-ENTMCNC: 32.8 % (ref 32–34.5)
MCV RBC AUTO: 92 FL (ref 80–99.9)
PDW BLD-RTO: 15.3 FL (ref 11.5–15)
PLATELET # BLD: 177 E9/L (ref 130–450)
PMV BLD AUTO: 10.9 FL (ref 7–12)
POTASSIUM SERPL-SCNC: 4.3 MMOL/L (ref 3.5–5)
RBC # BLD: 2.62 E12/L (ref 3.8–5.8)
SODIUM BLD-SCNC: 139 MMOL/L (ref 132–146)
WBC # BLD: 7.2 E9/L (ref 4.5–11.5)

## 2021-10-15 PROCEDURE — 97530 THERAPEUTIC ACTIVITIES: CPT

## 2021-10-15 PROCEDURE — 85027 COMPLETE CBC AUTOMATED: CPT

## 2021-10-15 PROCEDURE — 2580000003 HC RX 258: Performed by: PHYSICAL MEDICINE & REHABILITATION

## 2021-10-15 PROCEDURE — 36415 COLL VENOUS BLD VENIPUNCTURE: CPT

## 2021-10-15 PROCEDURE — 97110 THERAPEUTIC EXERCISES: CPT

## 2021-10-15 PROCEDURE — 6360000002 HC RX W HCPCS

## 2021-10-15 PROCEDURE — 6370000000 HC RX 637 (ALT 250 FOR IP)

## 2021-10-15 PROCEDURE — 97130 THER IVNTJ EA ADDL 15 MIN: CPT

## 2021-10-15 PROCEDURE — 92526 ORAL FUNCTION THERAPY: CPT | Performed by: SPEECH-LANGUAGE PATHOLOGIST

## 2021-10-15 PROCEDURE — 99232 SBSQ HOSP IP/OBS MODERATE 35: CPT | Performed by: PHYSICAL MEDICINE & REHABILITATION

## 2021-10-15 PROCEDURE — 97129 THER IVNTJ 1ST 15 MIN: CPT

## 2021-10-15 PROCEDURE — 94640 AIRWAY INHALATION TREATMENT: CPT

## 2021-10-15 PROCEDURE — 80048 BASIC METABOLIC PNL TOTAL CA: CPT

## 2021-10-15 PROCEDURE — 92526 ORAL FUNCTION THERAPY: CPT

## 2021-10-15 PROCEDURE — 2580000003 HC RX 258: Performed by: INTERNAL MEDICINE

## 2021-10-15 PROCEDURE — 97535 SELF CARE MNGMENT TRAINING: CPT

## 2021-10-15 PROCEDURE — 1280000000 HC REHAB R&B

## 2021-10-15 RX ORDER — DEXTROSE AND SODIUM CHLORIDE 5; .9 G/100ML; G/100ML
INJECTION, SOLUTION INTRAVENOUS CONTINUOUS
Status: DISCONTINUED | OUTPATIENT
Start: 2021-10-15 | End: 2021-10-16

## 2021-10-15 RX ADMIN — BISACODYL 5 MG: 5 TABLET, COATED ORAL at 09:42

## 2021-10-15 RX ADMIN — OXYCODONE HYDROCHLORIDE AND ACETAMINOPHEN 500 MG: 500 TABLET ORAL at 21:18

## 2021-10-15 RX ADMIN — CARVEDILOL 3.12 MG: 3.12 TABLET, FILM COATED ORAL at 09:33

## 2021-10-15 RX ADMIN — PANTOPRAZOLE SODIUM 40 MG: 40 TABLET, DELAYED RELEASE ORAL at 05:31

## 2021-10-15 RX ADMIN — IPRATROPIUM BROMIDE AND ALBUTEROL SULFATE 1 AMPULE: .5; 2.5 SOLUTION RESPIRATORY (INHALATION) at 16:24

## 2021-10-15 RX ADMIN — FERROUS SULFATE TAB 325 MG (65 MG ELEMENTAL FE) 325 MG: 325 (65 FE) TAB at 17:59

## 2021-10-15 RX ADMIN — AMIODARONE HYDROCHLORIDE 200 MG: 200 TABLET ORAL at 09:34

## 2021-10-15 RX ADMIN — FERROUS SULFATE TAB 325 MG (65 MG ELEMENTAL FE) 325 MG: 325 (65 FE) TAB at 09:33

## 2021-10-15 RX ADMIN — FOLIC ACID 1 MG: 1 TABLET ORAL at 09:33

## 2021-10-15 RX ADMIN — ENOXAPARIN SODIUM 30 MG: 100 INJECTION SUBCUTANEOUS at 09:35

## 2021-10-15 RX ADMIN — OXYCODONE HYDROCHLORIDE AND ACETAMINOPHEN 500 MG: 500 TABLET ORAL at 09:34

## 2021-10-15 RX ADMIN — BUMETANIDE 1 MG: 1 TABLET ORAL at 09:33

## 2021-10-15 RX ADMIN — NYSTATIN: 100000 OINTMENT TOPICAL at 09:35

## 2021-10-15 RX ADMIN — SODIUM CHLORIDE, PRESERVATIVE FREE 10 ML: 5 INJECTION INTRAVENOUS at 09:42

## 2021-10-15 RX ADMIN — AMIODARONE HYDROCHLORIDE 200 MG: 200 TABLET ORAL at 21:18

## 2021-10-15 RX ADMIN — DEXTROSE AND SODIUM CHLORIDE: 5; 900 INJECTION, SOLUTION INTRAVENOUS at 14:15

## 2021-10-15 RX ADMIN — NYSTATIN: 100000 OINTMENT TOPICAL at 21:18

## 2021-10-15 RX ADMIN — ATORVASTATIN CALCIUM 40 MG: 40 TABLET, FILM COATED ORAL at 21:18

## 2021-10-15 RX ADMIN — ASPIRIN 81 MG: 81 TABLET ORAL at 09:33

## 2021-10-15 RX ADMIN — CLOPIDOGREL 75 MG: 75 TABLET, FILM COATED ORAL at 09:33

## 2021-10-15 RX ADMIN — CARVEDILOL 3.12 MG: 3.12 TABLET, FILM COATED ORAL at 17:59

## 2021-10-15 NOTE — FLOWSHEET NOTE
Inpatient Wound Care(initial evaluation)  [id number]    Admit Date: 10/13/2021  4:05 PM    Reason for consult:  head    Significant history:   9/27.  Operation: Sternotomy/CABG x 3 (LIMA-LAD, SVG-OM, SVG-RCA)/Extensive RCA endarterectomy/MALU exclusion with 35 mm AtriClip/EVH LLE/Rigid internal fixation of the sternum with Shon plates x 8/01 modifier     Admitted for rehabilitation    Findings:     10/15/21 0715   Skin Integrity   Skin Integrity Bruising   Location BUE   Skin Integrity Site 2   Skin Integrity Location 2   (erosion)   Location 2 gluteal cleft   Wound 10/12/21 Head Right;Posterior   Date First Assessed/Time First Assessed: 10/12/21 1518   Present on Hospital Admission: No  Location: Head  Wound Location Orientation: Right;Posterior   Wound Image   (see left)   Wound Etiology Deep tissue/Injury   Dressing/Treatment Open to air   Wound Length (cm) 2 cm   Wound Width (cm) 1.6 cm   Wound Depth (cm) 0.1 cm   Wound Surface Area (cm^2) 3.2 cm^2   Change in Wound Size % (l*w) 0   Wound Volume (cm^3) 0.32 cm^3   Wound Assessment Purple/maroon  (brown)   Drainage Amount None   Mackenzie-wound Assessment Intact   Wound 10/12/21 Head Left;Posterior   Date First Assessed/Time First Assessed: 10/12/21 1517   Present on Hospital Admission: No  Location: Head  Wound Location Orientation: Left;Posterior   Wound Image    Wound Etiology Pressure Unstageable   Dressing/Treatment Open to air   Wound Length (cm) 4 cm   Wound Width (cm) 4 cm   Wound Depth (cm)   (unable to determine)   Wound Surface Area (cm^2) 16 cm^2   Change in Wound Size % (l*w) 0   Wound Assessment   (yellow, black)   Drainage Amount Scant   Drainage Description Yellow;Serosanguinous   Odor None   Mackenzie-wound Assessment Intact   thick dry skin to feet  **Informed Consent**    The patient has given verbal consent to have photos taken of wounds and inserted into their chart as part of their permanent medical record for purposes of documentation, treatment management and/or medical review. All Images taken on 10/15/21 of patient name: Leah Toney were transmitted and stored on secured [employer] located within [facility] by a registered [employer] Mobile Application Device.      Impression:    Left posterior head- unstageable  Right head - DTI    Plan:  No local wound care to head- will monitor  Will need continued preventative care  Will reassess head next week    Butch Salinas RN 10/15/2021 7:47 AM

## 2021-10-15 NOTE — PROGRESS NOTES
[facility]  Comprehensive Progress Note    Subjective:      Nery Paul is a 78 y.o. male admitted to inpatient rehabilitation for impairments and acitivities limitations in ADLs, mobility, and cognition secondary to Acute CVA. No acute events overnight. No cp, sob, n/v. He reports sternal post operative pain is controlled. Tolerating therapy evaluations. The patient's medical records have been reviewed. Scheduled Meds:sodium chloride flush, 5-40 mL, BID  ipratropium-albuterol, 1 ampule, Q4H WA  aspirin, 81 mg, Daily  vitamin C, 500 mg, BID  atorvastatin, 40 mg, Daily  [Held by provider] bumetanide, 1 mg, Daily  carvedilol, 3.125 mg, BID WC  clopidogrel, 75 mg, Daily  enoxaparin, 30 mg, Daily  ferrous sulfate, 765 mg, BID WC  folic acid, 1 mg, Daily  nystatin, , BID  pantoprazole, 40 mg, QAM AC  amiodarone, 200 mg, BID   Followed by  Alfredito Mcfarlane ON 10/21/2021] amiodarone, 200 mg, Daily      Continuous Infusions:   dextrose 5 % and 0.9 % NaCl       PRN Meds:acetaminophen, 650 mg, Q4H PRN  bisacodyl, 5 mg, Daily PRN  oxyCODONE-acetaminophen, 1 tablet, Q4H PRN   Or  oxyCODONE-acetaminophen, 2 tablet, Q4H PRN  sennosides-docusate sodium, 1 tablet, BID PRN  diphenhydrAMINE, 25 mg, Nightly PRN  artificial tears, , PRN  ondansetron, 4 mg, Q8H PRN  sodium chloride, 1 spray, PRN  polyethylene glycol, 17 g, Daily PRN         Objective:      Vitals:    10/14/21 1208 10/14/21 1711 10/14/21 2045 10/15/21 0930   BP:  (!) 110/57 (!) 114/57 (!) 110/53   Pulse:  80 77 78   Resp:   16 16   Temp:   98.1 °F (36.7 °C) 97.2 °F (36.2 °C)   TempSrc:   Tympanic Temporal   SpO2: 92%      Weight:       Height:         General appearance: alert, appears stated age and cooperative, NAD  Eyes: conjunctivae/corneas clear. PERRL, EOM's intact.    Lungs: clear to auscultation bilaterally  Heart: regular rate and rhythm, S1, S2  Abdomen: soft, non-tender, normal bowel sounds  Musculoskeletal: Range of motion normal and no gross abnormalities. Neurologic: Alert, oriented. Dysarthric. Motor Findings  Strength: Right  Strength: Left    Deltoid  2 4+   Biceps  2 4+   Triceps  2 4+   Wrist Extensors  1 4+   FDP 2 4+   Finger abductors 2 4+   Iliospoas  2 3   Quadriceps  3 4+   Tibialis anterior  3 4+   EHL 3 4+   Gastroc soleus  3 4+         Laboratory:    Lab Results   Component Value Date    WBC 7.2 10/15/2021    HGB 7.9 (L) 10/15/2021    HCT 24.1 (L) 10/15/2021    MCV 92.0 10/15/2021     10/15/2021     Lab Results   Component Value Date     10/15/2021    K 4.3 10/15/2021    K 4.2 10/14/2021     10/15/2021    CO2 22 10/15/2021    BUN 39 10/15/2021    CREATININE 2.3 10/15/2021    GLUCOSE 119 10/15/2021    CALCIUM 8.5 10/15/2021      Lab Results   Component Value Date    ALT 10 10/12/2021    AST 15 10/12/2021    ALKPHOS 47 10/12/2021    BILITOT 0.8 10/12/2021       Lab Results   Component Value Date    COLORU Yellow 09/23/2021    NITRU Negative 09/23/2021    GLUCOSEU Negative 09/23/2021    KETUA Negative 09/23/2021    UROBILINOGEN 0.2 09/23/2021    BILIRUBINUR Negative 09/23/2021     Lab Results   Component Value Date    LABA1C 5.1 09/10/2021         Functional Status:   Physical Therapy:   Bed mobility: Max A  Transfers: Mod A x2 - Max A  Ambulation: 2 steps Max A without AD        Occupational Therapy:  Feeding: Min A  Grooming: Min A  UB dressing: Max A  LB dressing: Max A        Assessment/Plan:       78 y.o. male admitted to inpatient rehabilitation for impairments and acitivities limitations in ADLs, mobility, and cognition secondary to Acute CVA. -Acute CVA: Right hemiparesis, dysarthria, cognitive impairment. Continue Aspirin, Plavix, Lipitor. Continue Acute Rehab evaluations. -CAD, s/p CABG x3: Continue current medications, sternal precautions.   -Post operative pain: Percocet PRN. Wean narcotics as tolerated  -Acute blood loss anemia: H&H low but stable, monitor.  Continue iron supplement   -Afib during acute care hospitalization, now in NSR: On amiodarone for Afib prophylaxis per CT surgery, with plans to wean after discharge  -Acute systolic heart failure: On coreg. Diuretics per Nephrology   -NGUYỄN: Nephrology following. Cr downtrending. -DVT prophylaxis: lovenox      Hgb 7.9, continue to monitor  Cr increased to 2.3. IV fluids started per Nephrology.  Continue to monitor    Continue Acute rehab evaluations    Electronically signed by Kimi Gardner MD on 10/15/2021 at 1:15 PM

## 2021-10-15 NOTE — PROGRESS NOTES
CC: post op follow up     Brief HPI:  No formal consult necessary as patient is well known to our service. S/p CABG x 3, MALU exclusion on 9/27. Patient was Dc'd to ARU on 10/13. Today patient states that he is \"feeling pretty well\"  He denies any CP, SOB, dizziness or any new complaints. Past Medical History:   Diagnosis Date    CAD (coronary artery disease)     Encounter regarding vascular access for dialysis for ESRD ([facility] [address] 10/1/2021    Hiatal hernia     Hyperlipidemia     Hypertension      Past Surgical History:   Procedure Laterality Date    BRONCHOSCOPY N/A 9/30/2021    BRONCHOSCOPY DIAGNOSTIC OR CELL [address] ONLY performed by Aretha Ko MD at [address] [facility]  9/30/2021    BRONCHOSCOPY CRYOTHERAPY/LASER performed by Aretha Ko MD at [address] [facility] N/A 9/27/2021    CABG CORONARY ARTERY BYPASS  X 3 EVH, EMMA performed by Tisha Milan MD at [facility] Marital status:      Spouse name: Not on file    Number of children: Not on file    Years of education: Not on file    Highest education level: Not on file   Occupational History    Not on file   Tobacco Use    Smoking status: Never Smoker    Smokeless tobacco: Never Used   Vaping Use    Vaping Use: Never used   Substance and Sexual Activity    Alcohol use: Yes     Comment: occ.  Drug use: Never    Sexual activity: Yes     Partners: Female   Other Topics Concern    Not on file   Social History Narrative    Not on file     Social Determinants of Health     Financial Resource Strain:     Difficulty of Paying Living Expenses:    Food Insecurity:     Worried About Running Out of Food in the Last Year:     [address] in the Last Year:    Transportation Needs:     Lack of Transportation (Medical):      Lack of Transportation (Non-Medical):    Physical Activity:     Days of Exercise per Week:     Minutes of Exercise per Session:    Stress:     Feeling of Stress :    Social Connections:     Frequency of Communication with Friends and Family:     Frequency of Social Gatherings with Friends and Family:     Attends Mosque Services:     Active Member of Clubs or Organizations:     Attends Club or Organization Meetings:     Marital Status:    Intimate Partner Violence:     Fear of Current or Ex-Partner:     Emotionally Abused:     Physically Abused:     Sexually Abused:      No family history on file. Vitals:    10/14/21 1208 10/14/21 1711 10/14/21 2045 10/15/21 0930   BP:  (!) 110/57 (!) 114/57 (!) 110/53   Pulse:  80 77 78   Resp:   16 16   Temp:   98.1 °F (36.7 °C) 97.2 °F (36.2 °C)   TempSrc:   Tympanic Temporal   SpO2: 92%      Weight:       Height:               Intake/Output Summary (Last 24 hours) at 10/15/2021 0957  Last data filed at 10/15/2021 6331  Gross per 24 hour   Intake 1040 ml   Output 650 ml   Net 390 ml         Recent Labs     10/13/21  0450 10/14/21  0700 10/15/21  0530   WBC 10.3 10.0 7.2   HGB 7.9* 8.8* 7.9*   HCT 24.9* 28.1* 24.1*    231 177      Recent Labs     10/13/21  0450 10/14/21  0700   BUN 51* 43*   CREATININE 1.9* 2.1*         ROS:   Negative for CP, palpitations, SOB at rest, dizziness/lightheadedness. Physical Exam   Constitutional: Oriented to person, place, and time. Appears well-developed. No distress. Cardiovascular: Normal rate, regular rhythm and normal heart sounds   Pulmonary/Chest: Effort normal. No respiratory distress. Abdominal: Soft. Bowel sounds are normal.   Musculoskeletal: Normal range of motion. Neurological: alert and oriented to person, place, and time. Skin: Skin is warm and dry. Psychiatric: normal mood and affect.        ASSESSMENT:  Patient Active Problem List   Diagnosis    CAD in native artery    Pulmonary nodules    Unstable angina (HCC)    Other hyperlipidemia    Essential hypertension    Gastroesophageal reflux disease with esophagitis without hemorrhage    S/P CABG (coronary artery bypass graft)    Acute respiratory failure with hypoxia (HCC)    Lactic acidemia    NGUYỄN [facility]acute kidney injury) ([facility] [address]    Hypokalemia    Hypocalcemia    Cardiogenic shock (HCC)    Thrombocytopenia (HCC)    Leukocytosis    Encounter regarding vascular access for dialysis for ESRD ([facility] [address]    Acute right-sided weakness    Acute CVA (cerebrovascular accident) ([facility] [address]       S/p CABG x 3, MALU exclusion, with post op Cardiogenic shock, s/p IABP (removed 10/4)       PLAN:  - hgb today 7.9, cont to monitor, no plans of transfusion at present,   - Okay to remove CT sutured on 10/18  - sternal and vein harvest incisions c/d/i  - Cont sternal precautions    - Cont current medications

## 2021-10-15 NOTE — PROGRESS NOTES
Speech Language Pathology  ACUTE REHABILITATION--DAILY PROGRESS NOTE  ADMITTING DIAGNOSIS: Acute CVA (cerebrovascular accident) ([facility] [address] [I63.9]     VISIT DIAGNOSIS:          SPEECH THERAPY  PLAN OF CARE   The speech therapy  POC is established based on physician order, speech pathology diagnosis and results of clinical assessment      SPEECH PATHOLOGY DIAGNOSIS:    Mild dysarthria, mild cognitive deficits     Speech Pathology intervention is recommended 3-6 times per week for LOS or when goals are met with emphasis on the following:       Conditions Requiring Skilled Therapeutic Intervention for speech, language and/or cognition     Dysarthria   Decreased short term memory     Specific Speech Therapy Interventions to Include: Therapeutic exercises  Therapeutic tasks for Cognition     Specific instructions for next treatment:                 To initiate memory tasks  To initiate speech production tasks     SHORT/LONG TERM GOALS  Pt will improve immediate, short term, recent memory during structured and unstructured tasks with 80% accuracy   RESULTS:     DYSPHAGIA DIAGNOSIS:   Clinical indicators of adequate swallow function on patient's current diet of puree and NTL  Per most recent MBSS completed on 10/11/21 results indicated minimal-mild oral phase dysphagia and moderate pharyngeal phase dysphagia                            DIET RECOMMENDATIONS:  Pureed consistency solids (IDDSI level 4) with  nectar consistency (mildly thick - IDDSI level 2) liquids     MEDICATION ADMINISTRATION and Administer medication crushed, as able, with pudding/applesauce                 FEEDING RECOMMENDATIONS:                           Assistance level:  Supervision is needed during all oral intake                             Compensatory strategies recommended: Double swallow, Effortful swallow, Small bites/sips, Alternate solids and liquids, Liquids by teaspoon only and Head TURN to right                             Discussed recommendations with nursing and/or faxed report to referring provider: No secondary to no diet/liquid change recommended        SPEECH THERAPY  PLAN OF CARE   The dysphagia POC is established based on physician order, dysphagia diagnosis and results of clinical assessment      Skilled SLP intervention for dysphagia management on acute care 3-5 x per week until goals met, pt plateaus in function and/or discharged from hospital     Conditions Requiring Skilled Therapeutic Intervention for dysphagia:              Per most recent MBSS completed on 10/11/21. .. Reduced pharyngeal clearing of the bolus  Reduced laryngeal closure resulting in penetration     Specific dysphagia interventions to include:      Compensatory strategy training   Therapeutic exercises  Therapeutic intervention to facilitate implementation of energy conservation techniques during intake to decrease potential for aspiration associated with compromised swallow/breathing sequence.     Specific instructions for next treatment:  ongoing PO analysis to upgrade diet and evaluate tolerance of current PO recommendation  Patient Treatment Goals:     Short Term Goals:  Pt will implement identified compensatory swallowing strategies on 90% of opportunities or greater to improve airway protection and swallow function.   Pt will participate in ongoing mealtime assessment to provide diet modification and compensatory strategy implementation to minimize risk of aspiration associated with PO intake  Pt will complete BOTR strength/ ROM exercises to reduce pharyngeal residuals and improve epiglottic inversion minimal verbal prompts  Pt will complete laryngeal strength/ ROM therapeutic exercises to improve airway protection for the least restrictive PO diet minimal verbal prompts  Pt will complete Shaker and/or Chin Tuck Against Resistance (CTAR) to increase UES relaxation diameter and increase anterior laryngeal excursion to reduce pharyngeal residuals and reduce risk of pen/asp with minimal verbal prompts. Pt will complete Effortful Swallow therapeutically to target increased oral and base of tongue pressure, increased pharyngeal constrictor contractions, and increased UES relaxation duration to reduce pharyngeal residue with minimal verbal prompts   Pt will complete Mavsi Maneuver therapeutically to target increased pharyngeal constrictor contractions to reduce pharyngeal residue with minimal verbal prompts   Pt will practice chin down posture to target earlier laryngeal closure with minimal verbal prompts      Long Term Goals:              Pt will improve oropharyngeal swallow function to ensure airway protection during PO intake to maintain adequate nutrition/hydration and decrease signs/symptoms of aspiration to less than 1 x/day.    Repeat Video Swallow Study (MBSS) is recommended in 5-7 days and requires a new physician order            FIMS    Swallowing                          Current Status             4--Minimal Assistance               Short Term Goal         6--Modified Independent           Long Term Goal          6--Modified Independent              Receptive                          Current Status             6--Modified Independent                       Short Term Goal         6--Modified Independent                       Long Term Goal          6--Modified Independent              Expressive                          Current Status             5--Supervision               Short Term Goal         6--Modified Independent                       Long Term Goal          6--Modified Independent              Social Interaction                          Current Status             5--Supervision               Short Term Goal         6--Modified Independent                       Long Term Goal          6--Modified Independent                                                             Problem Solving                          Current Status             6--Modified Independent                       Short Term Goal         6--Modified Independent                       Long Term Goal          6--Modified Independent                          Memory                          Current Status             5--Supervision               Short Term Goal         6--Modified Independent                       Long Term Goal          6--Modified Independent                          SWALLOWING:      Diet:  Dysphagia 1, Pureed solids with nectar consistency (mildly thick - IDDSI level 2) liquids     Reviewed recommendations of the patients current diet. All questions answered. Also explained the current NTL recommendations and aspiration risk. Patient expressed understanding and is currently following recommendations. In terms of compensatory strategies, it was encouraged that he double swallow, effortful swallow, small bites/sips, alternate solids and liquids, liquids only by teaspoon AND head TURN to right. Introduced tongue base and laryngeal strengthening exercises this date. Patient was able to complete effortful swallow and Mavis strengthening exercises 10x each given verbal/visual cues. LANGUAGE:     Good expression and understanding of wants/needs/health care circumstances. Good topic maintenance. COGNITION:      Fair outcome with verbal reasoning tasks related to medication maintenance of identifying determining factors when scheduling medications and making important decisions min prompts/cues. Good outcome answering problem solviing questions related to holidays and months of the year.  Good outcome when remembering card number and suit during SLP card trick. Patient was able to remember card information throughout the 5 minute duration of the task.             Safety:  fair +    SPEECH:     Speech intelligibility was good this session. Minimal dysarthria noted. SP recommended after discharge: To be determined  Supervision recommended at discharge:  To be determined  Will continue SP intervention as per previously established POC. EDUCATION: Speech Pathologist (SLP) completed education with the patient and/or family regarding identified cognitive linguistic deficits and subsequent need for speech pathology intervention. Discussed deficit areas to be targeted by formal intervention and established short/long term goals. Reviewed compensatory strategies to improve functional outcome (as appropriate). Encouraged patient and/or family to engage SLP in structured Q&A session relative to identified deficit areas. Patient and/or family indicated understanding of all information provided via satisfactory verbal response.     Minute Tracking:    Individual therapy:     0 minutes  Concurrent therapy:    45 minutes  Group therapy:     0 minutes  Co-treatment therapy:    0 minutes    Total minutes for 10/15/2021: 45 minutes

## 2021-10-15 NOTE — PROGRESS NOTES
Department of Internal Medicine  Nephrology Progress Note      Events reviewed. SUBJECTIVE: We are following Mr. Mehul [address] for NGUYỄN. Reports urinary incontinence.     PHYSICAL EXAM:      Vitals:    VITALS:  BP (!) 110/53   Pulse 78   Temp 97.2 °F (36.2 °C) (Temporal)   Resp 16   Ht 5' 8\" (1.727 m)   Wt 152 lb (68.9 kg)   SpO2 92%   BMI 23.11 kg/m²   24HR INTAKE/OUTPUT:      Intake/Output Summary (Last 24 hours) at 10/15/2021 1301  Last data filed at 10/15/2021 5321  Gross per 24 hour   Intake 800 ml   Output 650 ml   Net 150 ml     Constitutional: Patient is awake, alert in no distress  HEENT: Pupils are equal reactive  Respiratory: Decreased breath sounds at the bases  Cardiovascular/Edema: Heart sounds are regular  Gastrointestinal: Abdomen soft  Neurologic: Patient alert   Other: +trace edema      Scheduled Meds:   sodium chloride flush  5-40 mL IntraVENous BID    ipratropium-albuterol  1 ampule Inhalation Q4H WA    aspirin  81 mg Oral Daily    vitamin C  500 mg Oral BID    atorvastatin  40 mg Oral Daily    bumetanide  1 mg Oral Daily    carvedilol  3.125 mg Oral BID WC    clopidogrel  75 mg Oral Daily    enoxaparin  30 mg SubCUTAneous Daily    ferrous sulfate  325 mg Oral BID WC    folic acid  1 mg Oral Daily    nystatin   Topical BID    pantoprazole  40 mg Oral QAM AC    amiodarone  200 mg Oral BID    Followed by   Fausto Aldana ON 10/21/2021] amiodarone  200 mg Oral Daily     Continuous Infusions:    PRN Meds:.acetaminophen, bisacodyl, oxyCODONE-acetaminophen **OR** oxyCODONE-acetaminophen, sennosides-docusate sodium, diphenhydrAMINE, artificial tears, ondansetron, sodium chloride, polyethylene glycol    DATA:    CBC:   Lab Results   Component Value Date    WBC 7.2 10/15/2021    RBC 2.62 10/15/2021    HGB 7.9 10/15/2021    HCT 24.1 10/15/2021    MCV 92.0 10/15/2021    MCH 30.2 10/15/2021    MCHC 32.8 10/15/2021    RDW 15.3 10/15/2021     10/15/2021    MPV 10.9 10/15/2021     CMP:    Lab Results   Component Value Date     10/15/2021    K 4.3 10/15/2021    K 4.2 10/14/2021     10/15/2021    CO2 22 10/15/2021    BUN 39 10/15/2021    CREATININE 2.3 10/15/2021    GFRAA 34 10/15/2021    LABGLOM 28 10/15/2021    GLUCOSE 119 10/15/2021    PROT 5.4 10/12/2021    LABALBU 3.1 10/12/2021    CALCIUM 8.5 10/15/2021    BILITOT 0.8 10/12/2021    ALKPHOS 47 10/12/2021    AST 15 10/12/2021    ALT 10 10/12/2021     Magnesium:    Lab Results   Component Value Date    MG 2.3 10/10/2021     Phosphorus:    Lab Results   Component Value Date    PHOS 2.8 10/10/2021        Radiology Review:      CXR 10/3/21   1. Median sternotomy changes. 2. Bilateral pleuroparenchymal opacities that could be related to pleural   effusion and edema.  The appearance of the chest is about the same or   slightly worse.         Chest x-ray October 6, 2021   1. Stable appearance of the postoperative chest.   2. Bilateral chest tubes remain in position.  There is no right or left   pneumothorax. 3. Small right pleural effusion         Chest x-ray October 8, 2021   Minimal bibasilar atelectasis.       Trace right pleural effusion       There is no pneumothorax             BRIEF SUMMARY OF INITIAL CONSULT:    Briefly Mr. Theresa Layton is a 27-year-old man with history of HTN, CAD with recent status cardiac catheterization done on September 9 which showed severe multivessel disease, hyperlipidemia, hiatal hernia, who was admitted for elective CABG on September 27, 2021. On admission his creatinine level was 0.8 mg/dL and post surgery his creatinine has progressively increased up to 2.3 mg/dL, reason for this consultation. Postoperatively she developed persistent hypotension, lactic acidosis and respiratory failure for which he was reintubated. Since then she has required multiple pressors and he was placed on IABP. He had received 1 dose of ketorolac perioperatively.  His urine output has significantly decrease and is being about 500 cc/day in

## 2021-10-15 NOTE — PROGRESS NOTES
Speech Language Pathology  ACUTE REHABILITATION--DAILY PROGRESS NOTE  ADMITTING DIAGNOSIS: Acute CVA (cerebrovascular accident) ([facility] [address] [I63.9]     VISIT DIAGNOSIS:          SPEECH THERAPY  PLAN OF CARE   The speech therapy  POC is established based on physician order, speech pathology diagnosis and results of clinical assessment      SPEECH PATHOLOGY DIAGNOSIS:    Mild dysarthria, mild cognitive deficits     Speech Pathology intervention is recommended 3-6 times per week for LOS or when goals are met with emphasis on the following:       Conditions Requiring Skilled Therapeutic Intervention for speech, language and/or cognition     Dysarthria   Decreased short term memory     Specific Speech Therapy Interventions to Include: Therapeutic exercises  Therapeutic tasks for Cognition     Specific instructions for next treatment:                 To initiate memory tasks  To initiate speech production tasks     SHORT/LONG TERM GOALS  Pt will improve immediate, short term, recent memory during structured and unstructured tasks with 80% accuracy   RESULTS:     DYSPHAGIA DIAGNOSIS:   Clinical indicators of adequate swallow function on patient's current diet of puree and NTL  Per most recent MBSS completed on 10/11/21 results indicated minimal-mild oral phase dysphagia and moderate pharyngeal phase dysphagia                            DIET RECOMMENDATIONS:  Pureed consistency solids (IDDSI level 4) with  nectar consistency (mildly thick - IDDSI level 2) liquids     MEDICATION ADMINISTRATION and Administer medication crushed, as able, with pudding/applesauce                 FEEDING RECOMMENDATIONS:                           Assistance level:  Supervision is needed during all oral intake                             Compensatory strategies recommended: Double swallow, Effortful swallow, Small bites/sips, Alternate solids and liquids, Liquids by teaspoon only and Head TURN to right                             Discussed recommendations with nursing and/or faxed report to referring provider: No secondary to no diet/liquid change recommended        SPEECH THERAPY  PLAN OF CARE   The dysphagia POC is established based on physician order, dysphagia diagnosis and results of clinical assessment      Skilled SLP intervention for dysphagia management on acute care 3-5 x per week until goals met, pt plateaus in function and/or discharged from hospital     Conditions Requiring Skilled Therapeutic Intervention for dysphagia:              Per most recent MBSS completed on 10/11/21. .. Reduced pharyngeal clearing of the bolus  Reduced laryngeal closure resulting in penetration     Specific dysphagia interventions to include:      Compensatory strategy training   Therapeutic exercises  Therapeutic intervention to facilitate implementation of energy conservation techniques during intake to decrease potential for aspiration associated with compromised swallow/breathing sequence.     Specific instructions for next treatment:  ongoing PO analysis to upgrade diet and evaluate tolerance of current PO recommendation  Patient Treatment Goals:     Short Term Goals:  Pt will implement identified compensatory swallowing strategies on 90% of opportunities or greater to improve airway protection and swallow function.   Pt will participate in ongoing mealtime assessment to provide diet modification and compensatory strategy implementation to minimize risk of aspiration associated with PO intake  Pt will complete BOTR strength/ ROM exercises to reduce pharyngeal residuals and improve epiglottic inversion minimal verbal prompts  Pt will complete laryngeal strength/ ROM therapeutic exercises to improve airway protection for the least restrictive PO diet minimal verbal prompts  Pt will complete Shaker and/or Chin Tuck Against Resistance (CTAR) to increase UES relaxation diameter and increase anterior laryngeal excursion to reduce pharyngeal residuals and reduce risk of pen/asp with minimal verbal prompts. Pt will complete Effortful Swallow therapeutically to target increased oral and base of tongue pressure, increased pharyngeal constrictor contractions, and increased UES relaxation duration to reduce pharyngeal residue with minimal verbal prompts   Pt will complete Mavis Maneuver therapeutically to target increased pharyngeal constrictor contractions to reduce pharyngeal residue with minimal verbal prompts   Pt will practice chin down posture to target earlier laryngeal closure with minimal verbal prompts      Long Term Goals:              Pt will improve oropharyngeal swallow function to ensure airway protection during PO intake to maintain adequate nutrition/hydration and decrease signs/symptoms of aspiration to less than 1 x/day.    Repeat Video Swallow Study (MBSS) is recommended in 5-7 days and requires a new physician order            FIMS    Swallowing                          Current Status             4--Minimal Assistance               Short Term Goal         6--Modified Independent           Long Term Goal          6--Modified Independent              Receptive                          Current Status             6--Modified Independent                       Short Term Goal         6--Modified Independent                       Long Term Goal          6--Modified Independent              Expressive                          Current Status             5--Supervision               Short Term Goal         6--Modified Independent                       Long Term Goal          6--Modified Independent              Social Interaction                          Current Status             5--Supervision               Short Term Goal         6--Modified Independent                       Long Term Goal          6--Modified Independent                                                             Problem Solving                          Current Status             6--Modified Independent                       Short Term Goal         6--Modified Independent                       Long Term Goal          6--Modified Independent                          Memory                          Current Status             5--Supervision               Short Term Goal         6--Modified Independent                       Long Term Goal          6--Modified Independent                          SWALLOWING:      Diet:  Dysphagia 1, Pureed solids with nectar consistency (mildly thick - IDDSI level 2) liquids    Appropriate dysphagia exercises left bedside with Pt. SLP provided education and training to Pt's wife to allow for orange dot during meals. Pt is indep in implementing safe swallow strategies as outlined and Pt's wife demonstrated good understanding r/t importance of implementation overall. All questions answered. LANGUAGE:     Good expression and understanding of wants/needs/health care circumstances. Good topic maintenance. COGNITION:      indep recall of safe swallow strategies. Safety:  fair +    SPEECH:     Speech intelligibility was good this session. Minimal dysarthria noted. SP recommended after discharge: To be determined  Supervision recommended at discharge: To be determined  Will continue SP intervention as per previously established POC. EDUCATION: Speech Pathologist (SLP) completed education with the patient and/or family regarding identified cognitive linguistic deficits and subsequent need for speech pathology intervention. Discussed deficit areas to be targeted by formal intervention and established short/long term goals. Reviewed compensatory strategies to improve functional outcome (as appropriate). Encouraged patient and/or family to engage SLP in structured Q&A session relative to identified deficit areas. Patient and/or family indicated understanding of all information provided via satisfactory verbal response.     Minute Tracking:    Individual therapy:     15 minutes  Concurrent therapy:    0 minutes  Group therapy:     0 minutes  Co-treatment therapy:    0 minutes    Total minutes for 10/15/2021: 155minutes

## 2021-10-15 NOTE — PROGRESS NOTES
OCCUPATIONAL THERAPY DAILY NOTE    Date:10/15/2021  Patient Name: Glenn Lara  MRN: [id number]  : 1945  Room: [address] [place] [address]     Diagnosis: CVA- s/p CABG x 3- 21  Additional Pertinent Hx: HLD, HTN, CAD, ESRD, hx hiatal hernia    Precautions: Falls, Puree-Dysphagia IV/nectar & Sternal precautions    Functional Assessment:   Date Status AE  Comments   Feeding 10/14/21 Min A     Grooming 10/14/21 Mod A           Oral Care 10/14/21 Mod A     Bathing 10/14/21 Max A     UB Dressing 10/14/21 Max A     LB Dressing 10/15/21 Mod A LB AE To don/doff pants with LB AE. Pt required assist to thread R LE into pants and to stand and manage clothing over hips. Min cues for sequencing/safety required. Footwear 10/15/21 Mod A LB AE To don/doff socks and shoes. Min cues for proper use of LB AE required. Assist required to don/doff R shoe and to tie B shoes. Toileting 10/14/21 Dependent     Homemaking  TBA       Functional Transfers / Balance:   Date Status DME  Comments   Sit Balance 10/15/21 CGA  Seated in w/c during functional ADL tasks. PM: demo'd during arm ex's seated up at table top. Stand Balance 10/15/21 Min/Mod A Kitchen sink PM: Pt stood at kitchen sink needing cues for balance and safety along with sternal precautions. Pt stood x3 reps approx. 1 to 1.5 mins. [] Tub  [] Shower   Transfer  TBD     Commode   Transfer 10/14/21 Max A     Functional   Mobility  TBD     Other:  Supine>Sit    Bed>W/c   10/14/21    10/14/21   Mod/Min A    Mod A         Functional Exercises / Activity:  PM:  Pt engaged in standing balance at kitchen sink x3 reps for approx. 1min to 1.5 mins needing cues for sternal precautions and safety due to fatigue issues. BUE AROM ex's using peyton box without weight to increase shoulder, scapula, elbow, wrist and hand ROM to increase skills for ADL;s, transfers and mobility tasks. Pt tolerated 3 reps of 10 ea for back & forth, forward/backward circles.   Green digiflex ex's for Left hand and Red for Right hand to increase /pinch strength/ROM for functional skills. Bilateral coordination and fine motor ex's with large nuts/bolts then medium size bolts/nuts to increase fine motor & dexterity skills. Sensory / Neuromuscular Re-Education:      Cognitive Skills:   Status Comments   Problem   Solving fair     Memory fair +    Sequencing fair     Safety fair       Visual Perception:    Education:  -Pt educated on LB AE for increasing independence with ADL tasks. Pt verbalized/demonstrated fair understanding, recommend continued education/reinforcement. [] Family teach completed on:    Pain Level: 0/10. Pt c/o fatigue both am/pm sessions. Additional Notes:       Patient has made good  progress during treatment sessions toward set goals. Therapy emphasis to obtain goals: Strengthening, Gait Training, Patient/Caregiver Education & Training, Home Management Training, ROM, Equipment Evaluation, Education, & procurement, Balance Training, Neuromuscular Re-education, Cognitive Reorientation, Safety Education & Training, Self-Care/ADL    Long term goal 1: Pt demo independent to eat all meals  Long term goal 2: Pt demo Mod I grooming seated or standing & demo G- safety  Long term goal 3: Pt demo SBA-CGA bathing @ shower level both seated & standing  Long term goal 4: Pt demo s/u UE dress & SBA-CGA LE dress including underwear, pants, socks & shoes & demo G- endurance  Long term goal 5: Pt demo SBA-CGA commode trf with appropriate DME to ensure pt safety.  Pt demo SBA toileting & demo G- safety & insight  Long term goals 6: Pt demo SBA-CGA walk in shower trf using appropriate DME to ensure pt safety  Long term goal 7: Pt demo Min A light homemaking activity & demo G- safety & insight  Long term goal 8: Pt demo improved FMC/ strength to improve pt ability to complete ADLs as evidenced by gains inthe followin-hole peg test- R hand pt handed pegs as he was u/a to pick them up & completed in 1 minute & 33.6 seconds & norm for pt age & gender is 25.8 seconds, L hand 34.8 seconds & norm for pt age & gender is 26.0 seconds;  strength- R hand 20# & norm for pt age & gender is 75# & L hand 57# & normf or pt age & gender is 65#  Long term goal 9: Pt demo G- endurance for a 20-30 minute functional activity    [x] Continue with current OT Plan of care. [] Prepare for Discharge     DISCHARGE RECOMMENDATIONS  Recommended DME:    Post Discharge Care:   []Home Independently  []Home with 24hr Care / Supervision []Home with Partial Supervision []Home with Home Health OT []Home with Out Pt OT []Other: ___   Comments:         Time in Time out Tx Time Breakdown  Variance:   First Session  0830 0915 [x] Individual Tx- 45 Mins  [] Concurrent Tx -  [] Co-Tx -   [] Group Tx -   [] Time Missed -     Second Session 13:50pm 14:35pm [x] Individual Tx-45mins  [] Concurrent Tx -  [] Co-Tx -   [] Group Tx -   [] Time Missed -     Third Session    [] Individual Tx-   [] Concurrent Tx -  [] Co-Tx -   [] Group Tx -   [] Time Missed -         Total Tx Time  90 Mins     [facility]/L [id number] [facility]/L [id number]  I have read the above note and agree with the documentation.   Alex Madera OTR/L [id number]

## 2021-10-15 NOTE — PROGRESS NOTES
Physical Therapy  Evaluating Therapist: Coretta Plunkett PT, DPT [id number]      ROOM: [address] [place] [address]  DIAGNOSIS: L cerebellar CVA   PRECAUTIONS: Falls, Sternal Precautions, Pureed diet w/ nectar thick liquids,   HPI: s/p CABG on 9/27/21, Following CABG, patient developed hypotension, acidosis, NGUYỄN and respiratory failure and was intubated. Bronchoscopy done on 9/30 to remove large mucus plug. Required pressors and was placed on aortic balloon pump which was removed 10/4/21. Off of sedation was found to have right sided weakness. CT head negative for acute findings. Echo showed no LV mural thrombus. Carotid US with atherosclerotic disease -- 50-69% bilaterally. Unable to have CTAs due to kidney function. Unable to have MRI/MRA at this time due to epicardial wires. Repeat CT head with equivocal area of low density in the L cerebellum. Social:  Pt lives with wife in a mobile home with 5 stairs to enter and 1 rail. Pt also has home in [place] which is 1 story setup, unclear amount of steps. Prior to admission: Pt ambulated without device and was independent PTA. Initial Evaluation  Date: 10/14/21 AM     PM    Short Term Goals Long Term Goals    Was pt agreeable to Eval/treatment? yes yes yes     Does pt have pain?  Denies pain denies denies     Bed Mobility  Rolling: SBA  Supine to sit: modA  Sit to supine: modA  Scooting: SBA NT NT sup Mod i   Transfers Sit to stand: mod-maxA  Stand to sit: mod-maxA  Stand pivot: modA    5xSTS: NT Sit to stand: modA  Stand to sit: modA  Stand pivot: NT Sit to stand: modA  Stand to sit: modA  Stand pivot: NT Asher  5xSTS: < 15 sec sup  5xSTS: < 15 sec   Ambulation    10', no AD, maxA with w/c follow from 2nd helper    10mWT: NT  6mWT: NT 45', 49'x2, no AD, modA with chair follow from 2nd helper 40', 48', 15'   no AD, modA with chair follow from 2nd helper 50', no AD, Asher    10mWT: TBD  6mWT: ', no AD, sup    10mWT: TBD  6mWT: TBD   Walking 10 feet on uneven surface NT due to safety   10', no AD,  Asher 48', no AD, sup   Wheel Chair Mobility NT   n/a n/a   Car Transfers NT due to safety   Asher sup   Stair negotiation: ascended and descended NT due to safety   4 steps, HR for balance only, Asher 12 steps, HR for balance only, sup   Curb Step:   ascended and descended 2\" curb, maxA, no AD   4\" curb, no AD, Asher 7.5\" curb, no AD, sup   Picking up object off the floor NT due to safety   Will  cone with Asher assist Will  cone with sup assist   BLE ROM AROM WFL       BLE Strength LLE 5/5 except hip flexors which were 3/5  R hip flex 3/5   R knee ext/flex 5/5   R ankle DF/PF 5/5       Balance  Sitting- mod I  Standing-mod-maxA    BBS: NT  FGA: NT Sitting- mod I  Standing-mod-maxA    BBS: NT  FGA: NT    BBS: > 45/56  FGA: >22/30   BBS: -  FGA: -   Date Family Teach Completed N/A       Is additional Family Teaching Needed? Y or N Y       Hindering Progress sternal precautions, dyscoordination, weakness, decreased endurance, anxiety       PT recommended ELOS 3-4 weeks       Team's Discharge Plan        Therapist at Team Meeting          Vitals:  HRmax: 146 BPM, adjusted for beta blocker medication Time spent at moderate intensity (60-75% HRmax)    AM: -    PM: -       Am: HR ranging form 77-84 BPM at rest, post ambulation HR ranging from 101-110 BPM. SPo2 > 95% on room air. PM: Hr reaching max of 105 BPM during amb, spo2 stable AND > 90% on room air. Therapeutic Exercise:     AM:   1. Seated marches- 3x10, AROM  2. LAQ-2.5# on each leg, 3x10    PM:   1. Static standing- 3x45-60 seconds, mirror in front for visual cueing, initially retropuslive but once set pt able to stand at SBA level for bouts up to 30 sec, verbal cueing for slight knee flexion and hip extension provided. 2.  PROM BLE- x5 min, focus on hamstrings and plabntarflexors    Patient education    Pt educated on tx, sternal precautions, gait, TE, balance, current status and POC, d/c planning    Patient response to education:   Pt verbalized understanding Pt demonstrated skill Pt requires further education in this area   yes partial All areas     Additional Comments:   AM: Pt with improved participation during sit to stand, completed at consistent modA this session. Pt with improvement in static standing, less retropulsion compared to yesterday but continues to require min-modA to steady. Pt progressed his amb distance with chair follow this session, tolerated 3 bouts. As PT let off with hands on support, pt able to attempt to make self corrections for posterior LOB but ultimately continues to require modA to steady. Overall great performance this session compared to yesterday. Will continue to progress. Discussed with MD monitoring HR during gait training and having pt participate in high intensity gait training program, MD advising to keep pt at or below moderate level intensity for now due to pt having recent cardiac procedure. Will monitor closely. PM: Wife present in room, provided wit update of pt's progress thus far, wife appreciative. Pt progressing sit to stand, approaching Asher, but at times continues to be very unsteady upon standing, with retropulsive lean. Pt with improved static standing balance when using mirror for visual cueing. Pt able to hold static balance for bouts up to 30 seconds at SBA, but requires steadying assist to attain position, continue to have retropulsive LOB, verbal cueing provided to promote slight knee flexion NANCY and engage hip extension for upright posture. Chair/bed alarm: Hand off to OT at end of session. AM  Time in: 1000  Time out: 1045    PM  Time in: 1300  Time out: 1345    Pt is making good progress toward established Physical Therapy goals. Continue with physical therapy current plan of care.     Xuan Plummer., PT, DPT  [id number]

## 2021-10-16 PROBLEM — D62 ACUTE BLOOD LOSS ANEMIA: Status: ACTIVE | Noted: 2021-10-16

## 2021-10-16 PROBLEM — R47.1 DYSARTHRIA: Status: ACTIVE | Noted: 2021-10-16

## 2021-10-16 LAB
ANION GAP SERPL CALCULATED.3IONS-SCNC: 10 MMOL/L (ref 7–16)
ANION GAP SERPL CALCULATED.3IONS-SCNC: 10 MMOL/L (ref 7–16)
BUN BLDV-MCNC: 32 MG/DL (ref 6–23)
BUN BLDV-MCNC: 35 MG/DL (ref 6–23)
CALCIUM SERPL-MCNC: 8 MG/DL (ref 8.6–10.2)
CALCIUM SERPL-MCNC: 8.1 MG/DL (ref 8.6–10.2)
CHLORIDE BLD-SCNC: 105 MMOL/L (ref 98–107)
CHLORIDE BLD-SCNC: 106 MMOL/L (ref 98–107)
CO2: 24 MMOL/L (ref 22–29)
CO2: 24 MMOL/L (ref 22–29)
CREAT SERPL-MCNC: 1.9 MG/DL (ref 0.7–1.2)
CREAT SERPL-MCNC: 2 MG/DL (ref 0.7–1.2)
GFR AFRICAN AMERICAN: 40
GFR AFRICAN AMERICAN: 42
GFR NON-AFRICAN AMERICAN: 33 ML/MIN/1.73
GFR NON-AFRICAN AMERICAN: 35 ML/MIN/1.73
GLUCOSE BLD-MCNC: 104 MG/DL (ref 74–99)
GLUCOSE BLD-MCNC: 108 MG/DL (ref 74–99)
POTASSIUM SERPL-SCNC: 2.8 MMOL/L (ref 3.5–5)
POTASSIUM SERPL-SCNC: 3.4 MMOL/L (ref 3.5–5)
PRO-BNP: 5112 PG/ML (ref 0–450)
SODIUM BLD-SCNC: 139 MMOL/L (ref 132–146)
SODIUM BLD-SCNC: 140 MMOL/L (ref 132–146)

## 2021-10-16 PROCEDURE — 36415 COLL VENOUS BLD VENIPUNCTURE: CPT

## 2021-10-16 PROCEDURE — 92526 ORAL FUNCTION THERAPY: CPT

## 2021-10-16 PROCEDURE — 99232 SBSQ HOSP IP/OBS MODERATE 35: CPT | Performed by: PHYSICAL MEDICINE & REHABILITATION

## 2021-10-16 PROCEDURE — 97530 THERAPEUTIC ACTIVITIES: CPT

## 2021-10-16 PROCEDURE — 6360000002 HC RX W HCPCS: Performed by: INTERNAL MEDICINE

## 2021-10-16 PROCEDURE — 2580000003 HC RX 258: Performed by: INTERNAL MEDICINE

## 2021-10-16 PROCEDURE — 83880 ASSAY OF NATRIURETIC PEPTIDE: CPT

## 2021-10-16 PROCEDURE — 94640 AIRWAY INHALATION TREATMENT: CPT

## 2021-10-16 PROCEDURE — 6360000002 HC RX W HCPCS

## 2021-10-16 PROCEDURE — 6370000000 HC RX 637 (ALT 250 FOR IP)

## 2021-10-16 PROCEDURE — 2580000003 HC RX 258: Performed by: PHYSICAL MEDICINE & REHABILITATION

## 2021-10-16 PROCEDURE — 1280000000 HC REHAB R&B

## 2021-10-16 PROCEDURE — 6370000000 HC RX 637 (ALT 250 FOR IP): Performed by: INTERNAL MEDICINE

## 2021-10-16 PROCEDURE — 80048 BASIC METABOLIC PNL TOTAL CA: CPT

## 2021-10-16 RX ORDER — POTASSIUM CHLORIDE 20 MEQ/1
20 TABLET, EXTENDED RELEASE ORAL ONCE
Status: COMPLETED | OUTPATIENT
Start: 2021-10-16 | End: 2021-10-16

## 2021-10-16 RX ORDER — POTASSIUM CHLORIDE 7.45 MG/ML
10 INJECTION INTRAVENOUS
Status: COMPLETED | OUTPATIENT
Start: 2021-10-16 | End: 2021-10-16

## 2021-10-16 RX ADMIN — FERROUS SULFATE TAB 325 MG (65 MG ELEMENTAL FE) 325 MG: 325 (65 FE) TAB at 16:50

## 2021-10-16 RX ADMIN — CLOPIDOGREL 75 MG: 75 TABLET, FILM COATED ORAL at 08:05

## 2021-10-16 RX ADMIN — POTASSIUM CHLORIDE 10 MEQ: 10 INJECTION, SOLUTION INTRAVENOUS at 10:07

## 2021-10-16 RX ADMIN — DEXTROSE AND SODIUM CHLORIDE: 5; 900 INJECTION, SOLUTION INTRAVENOUS at 10:16

## 2021-10-16 RX ADMIN — OXYCODONE HYDROCHLORIDE AND ACETAMINOPHEN 500 MG: 500 TABLET ORAL at 20:31

## 2021-10-16 RX ADMIN — IPRATROPIUM BROMIDE AND ALBUTEROL SULFATE 1 AMPULE: .5; 2.5 SOLUTION RESPIRATORY (INHALATION) at 20:52

## 2021-10-16 RX ADMIN — NYSTATIN: 100000 OINTMENT TOPICAL at 20:31

## 2021-10-16 RX ADMIN — FOLIC ACID 1 MG: 1 TABLET ORAL at 08:06

## 2021-10-16 RX ADMIN — SODIUM CHLORIDE, PRESERVATIVE FREE 10 ML: 5 INJECTION INTRAVENOUS at 20:32

## 2021-10-16 RX ADMIN — PANTOPRAZOLE SODIUM 40 MG: 40 TABLET, DELAYED RELEASE ORAL at 05:55

## 2021-10-16 RX ADMIN — OXYCODONE HYDROCHLORIDE AND ACETAMINOPHEN 500 MG: 500 TABLET ORAL at 08:06

## 2021-10-16 RX ADMIN — POTASSIUM CHLORIDE 10 MEQ: 10 INJECTION, SOLUTION INTRAVENOUS at 12:51

## 2021-10-16 RX ADMIN — FERROUS SULFATE TAB 325 MG (65 MG ELEMENTAL FE) 325 MG: 325 (65 FE) TAB at 08:07

## 2021-10-16 RX ADMIN — ENOXAPARIN SODIUM 30 MG: 100 INJECTION SUBCUTANEOUS at 08:06

## 2021-10-16 RX ADMIN — NYSTATIN: 100000 OINTMENT TOPICAL at 08:06

## 2021-10-16 RX ADMIN — AMIODARONE HYDROCHLORIDE 200 MG: 200 TABLET ORAL at 08:06

## 2021-10-16 RX ADMIN — IPRATROPIUM BROMIDE AND ALBUTEROL SULFATE 1 AMPULE: .5; 2.5 SOLUTION RESPIRATORY (INHALATION) at 15:59

## 2021-10-16 RX ADMIN — IPRATROPIUM BROMIDE AND ALBUTEROL SULFATE 1 AMPULE: .5; 2.5 SOLUTION RESPIRATORY (INHALATION) at 09:52

## 2021-10-16 RX ADMIN — CARVEDILOL 3.12 MG: 3.12 TABLET, FILM COATED ORAL at 08:06

## 2021-10-16 RX ADMIN — AMIODARONE HYDROCHLORIDE 200 MG: 200 TABLET ORAL at 20:31

## 2021-10-16 RX ADMIN — POTASSIUM CHLORIDE 10 MEQ: 10 INJECTION, SOLUTION INTRAVENOUS at 11:38

## 2021-10-16 RX ADMIN — ASPIRIN 81 MG: 81 TABLET ORAL at 08:06

## 2021-10-16 RX ADMIN — IPRATROPIUM BROMIDE AND ALBUTEROL SULFATE 1 AMPULE: .5; 2.5 SOLUTION RESPIRATORY (INHALATION) at 12:58

## 2021-10-16 RX ADMIN — ATORVASTATIN CALCIUM 40 MG: 40 TABLET, FILM COATED ORAL at 20:31

## 2021-10-16 RX ADMIN — CARVEDILOL 3.12 MG: 3.12 TABLET, FILM COATED ORAL at 16:50

## 2021-10-16 RX ADMIN — POTASSIUM CHLORIDE 20 MEQ: 1500 TABLET, EXTENDED RELEASE ORAL at 17:18

## 2021-10-16 ASSESSMENT — PAIN SCALES - GENERAL
PAINLEVEL_OUTOF10: 0
PAINLEVEL_OUTOF10: 0

## 2021-10-16 NOTE — PROGRESS NOTES
Speech Language Pathology  ACUTE REHABILITATION--DAILY PROGRESS NOTE  ADMITTING DIAGNOSIS: Acute CVA (cerebrovascular accident) ([facility] [address] [I63.9]     VISIT DIAGNOSIS:          SPEECH THERAPY  PLAN OF CARE   The speech therapy  POC is established based on physician order, speech pathology diagnosis and results of clinical assessment      SPEECH PATHOLOGY DIAGNOSIS:    Mild dysarthria, mild cognitive deficits     Speech Pathology intervention is recommended 3-6 times per week for LOS or when goals are met with emphasis on the following:       Conditions Requiring Skilled Therapeutic Intervention for speech, language and/or cognition     Dysarthria   Decreased short term memory     Specific Speech Therapy Interventions to Include: Therapeutic exercises  Therapeutic tasks for Cognition     Specific instructions for next treatment:                 To initiate memory tasks  To initiate speech production tasks     SHORT/LONG TERM GOALS  Pt will improve immediate, short term, recent memory during structured and unstructured tasks with 80% accuracy   RESULTS:     DYSPHAGIA DIAGNOSIS:   Clinical indicators of adequate swallow function on patient's current diet of puree and NTL  Per most recent MBSS completed on 10/11/21 results indicated minimal-mild oral phase dysphagia and moderate pharyngeal phase dysphagia                            DIET RECOMMENDATIONS:  Pureed consistency solids (IDDSI level 4) with  nectar consistency (mildly thick - IDDSI level 2) liquids     MEDICATION ADMINISTRATION and Administer medication crushed, as able, with pudding/applesauce                 FEEDING RECOMMENDATIONS:                           Assistance level:  Supervision is needed during all oral intake                             Compensatory strategies recommended: Double swallow, Effortful swallow, Small bites/sips, Alternate solids and liquids, Liquids by teaspoon only and Head TURN to right                             Discussed recommendations with nursing and/or faxed report to referring provider: No secondary to no diet/liquid change recommended        SPEECH THERAPY  PLAN OF CARE   The dysphagia POC is established based on physician order, dysphagia diagnosis and results of clinical assessment      Skilled SLP intervention for dysphagia management on acute care 3-5 x per week until goals met, pt plateaus in function and/or discharged from hospital     Conditions Requiring Skilled Therapeutic Intervention for dysphagia:              Per most recent MBSS completed on 10/11/21. .. Reduced pharyngeal clearing of the bolus  Reduced laryngeal closure resulting in penetration     Specific dysphagia interventions to include:      Compensatory strategy training   Therapeutic exercises  Therapeutic intervention to facilitate implementation of energy conservation techniques during intake to decrease potential for aspiration associated with compromised swallow/breathing sequence.     Specific instructions for next treatment:  ongoing PO analysis to upgrade diet and evaluate tolerance of current PO recommendation  Patient Treatment Goals:     Short Term Goals:  Pt will implement identified compensatory swallowing strategies on 90% of opportunities or greater to improve airway protection and swallow function.   Pt will participate in ongoing mealtime assessment to provide diet modification and compensatory strategy implementation to minimize risk of aspiration associated with PO intake  Pt will complete BOTR strength/ ROM exercises to reduce pharyngeal residuals and improve epiglottic inversion minimal verbal prompts  Pt will complete laryngeal strength/ ROM therapeutic exercises to improve airway protection for the least restrictive PO diet minimal verbal prompts  Pt will complete Shaker and/or Chin Tuck Against Resistance (CTAR) to increase UES relaxation diameter and increase anterior laryngeal excursion to reduce pharyngeal residuals and reduce risk of pen/asp with minimal verbal prompts. Pt will complete Effortful Swallow therapeutically to target increased oral and base of tongue pressure, increased pharyngeal constrictor contractions, and increased UES relaxation duration to reduce pharyngeal residue with minimal verbal prompts   Pt will complete Mavis Maneuver therapeutically to target increased pharyngeal constrictor contractions to reduce pharyngeal residue with minimal verbal prompts   Pt will practice chin down posture to target earlier laryngeal closure with minimal verbal prompts      Long Term Goals:              Pt will improve oropharyngeal swallow function to ensure airway protection during PO intake to maintain adequate nutrition/hydration and decrease signs/symptoms of aspiration to less than 1 x/day.    Repeat Video Swallow Study (MBSS) is recommended in 5-7 days and requires a new physician order            FIMS    Swallowing                          Current Status             4--Minimal Assistance               Short Term Goal         6--Modified Independent           Long Term Goal          6--Modified Independent              Receptive                          Current Status             6--Modified Independent                       Short Term Goal         6--Modified Independent                       Long Term Goal          6--Modified Independent              Expressive                          Current Status             5--Supervision               Short Term Goal         6--Modified Independent                       Long Term Goal          6--Modified Independent              Social Interaction                          Current Status             5--Supervision               Short Term Goal         6--Modified Independent                       Long Term Goal          6--Modified Independent                                                             Problem Solving                          Current Status             6--Modified Independent                       Short Term Goal         6--Modified Independent                       Long Term Goal          6--Modified Independent                          Memory                          Current Status             5--Supervision               Short Term Goal         6--Modified Independent                       Long Term Goal          6--Modified Independent                          SWALLOWING:      Diet:  Dysphagia 1, Pureed solids with nectar consistency (mildly thick - IDDSI level 2) liquids    SLP left patient dysphagia exercises on 10/15/21 and he said he attempted to try some of them. This date, SLP provided a full detailed explanation about the purpose of each exercises and a step by step breakdown of how to complete each exercise. Patient was able to complete 5 repetitions of most exercises including: tongue pull back, effortful swallow, yawn, tongue glide back, CTAR, Mavis, and pitch glide. In order to execute the Mavis maneuver, he had to sip thickened water between trials. Overall fair-good strength during completion. SLP encouraged completion of the exercises 3x/day. He expressed understanding. All questions answered. LANGUAGE:     Good expression and understanding of wants/needs/health care circumstances. Good topic maintenance. COGNITION:      indep recall of safe swallow strategies. Safety:  fair +    SPEECH:     Speech intelligibility was good this session. Minimal dysarthria noted. SP recommended after discharge: To be determined  Supervision recommended at discharge: To be determined  Will continue SP intervention as per previously established POC. EDUCATION: Speech Pathologist (SLP) completed education with the patient and/or family regarding identified cognitive linguistic deficits and subsequent need for speech pathology intervention. Discussed deficit areas to be targeted by formal intervention and established short/long term goals. Reviewed compensatory strategies to improve functional outcome (as appropriate). Encouraged patient and/or family to engage SLP in structured Q&A session relative to identified deficit areas. Patient and/or family indicated understanding of all information provided via satisfactory verbal response.     Minute Tracking:    Individual therapy:     15 minutes  Concurrent therapy:    15 minutes  Group therapy:     0 minutes  Co-treatment therapy:    0 minutes    Total minutes for 10/16/2021:  30 minutes

## 2021-10-16 NOTE — PROGRESS NOTES
Physical Therapy  Evaluating Therapist: Danilo Jane., PT, DPT [id number]      ROOM: [address] [place] [address]  DIAGNOSIS: L cerebellar CVA   PRECAUTIONS: Falls, Sternal Precautions, Pureed diet w/ nectar thick liquids,   HPI: s/p CABG on 9/27/21, Following CABG, patient developed hypotension, acidosis, NGUYỄN and respiratory failure and was intubated. Bronchoscopy done on 9/30 to remove large mucus plug. Required pressors and was placed on aortic balloon pump which was removed 10/4/21. Off of sedation was found to have right sided weakness. CT head negative for acute findings. Echo showed no LV mural thrombus. Carotid US with atherosclerotic disease -- 50-69% bilaterally. Unable to have CTAs due to kidney function. Unable to have MRI/MRA at this time due to epicardial wires. Repeat CT head with equivocal area of low density in the L cerebellum. Social:  Pt lives with wife in a mobile home with 5 stairs to enter and 1 rail. Pt also has home in [place] which is 1 story setup, unclear amount of steps. Prior to admission: Pt ambulated without device and was independent PTA. Initial Evaluation  Date: 10/14/21 AM     PM    Short Term Goals Long Term Goals    Was pt agreeable to Eval/treatment? yes yes      Does pt have pain?  Denies pain denies      Bed Mobility  Rolling: SBA  Supine to sit: modA  Sit to supine: modA  Scooting: SBA NT  sup Mod i   Transfers Sit to stand: mod-maxA  Stand to sit: mod-maxA  Stand pivot: modA    5xSTS: NT Sit to stand: modA  Stand to sit: Alek  Stand pivot: ModA  Asher  5xSTS: < 15 sec sup  5xSTS: < 15 sec   Ambulation    10', no AD, maxA with w/c follow from 2nd helper    10mWT: NT  6mWT: NT 25 feet x 6 reps and 45 feet x 1 rep without AD with Asher/ModA (followed by turn to sit in chair)  50', no AD, Asher    10mWT: TBD  6mWT: ', no AD, sup    10mWT: TBD  6mWT: TBD   Walking 10 feet on uneven surface NT due to safety   10', no AD,  Asher 50', no AD, sup   Wheel Chair Mobility NT   n/a n/a Car Transfers NT due to safety   Asher sup   Stair negotiation: ascended and descended NT due to safety   4 steps, HR for balance only, Asher 12 steps, HR for balance only, sup   Curb Step:   ascended and descended 2\" curb, maxA, no AD   4\" curb, no AD, Asher 7.5\" curb, no AD, sup   Picking up object off the floor NT due to safety   Will  cone with Asher assist Will  cone with sup assist   BLE ROM AROM WFL       BLE Strength LLE 5/5 except hip flexors which were 3/5  R hip flex 3/5   R knee ext/flex 5/5   R ankle DF/PF 5/5       Balance  Sitting- mod I  Standing-mod-maxA    BBS: NT  FGA: NT Sitting- mod I  Standing Asher/ModA    BBS: NT  FGA: NT    BBS: > 45/56  FGA: >22/30   BBS: -  FGA: -   Date Family Teach Completed N/A       Is additional Family Teaching Needed? Y or N Y       Hindering Progress sternal precautions, dyscoordination, weakness, decreased endurance, anxiety       PT recommended ELOS 3-4 weeks       Team's Discharge Plan        Therapist at Team Meeting          Am:  HR ranging from  BPM with activity, checked during and after all standing activities. SPo2 > 95% on room air. Therapeutic Exercise:     AM: Figure 8 weaving between 2 standing quad canes x 2 reps without AD with Asher/ModA    Patient education    Pt educated on tx, sternal precautions, gait, TE, balance, current status and POC, d/c planning    Patient response to education:   Pt verbalized understanding Pt demonstrated skill Pt requires further education in this area   yes partial All areas     Additional Comments: Pt remains pleasant and motivated during session. Pt requesting clearance to use restroom on his own, educated on pt's current level of assistance required for basic standing tasks and implications on function. Pt verbalized understanding to continue to utilize call light for nursing assistance with mobility. Walking tasks resumed, pt cues to slightly increase PRASHANT in order to improve dynamic stability.  Pt also exhibiting posterior LOB when turning to sit in chair/WC, cued for slight anterior lean during this task to counteract posterior COM displacement. Pt able to implement semi-successfully upon subsequent repetitions. Vitals remained stable during session and HR remained within moderate intensity HR range. Pt returned to room with all needs met following session. AM  Time in: 0820  Time out: 0850    Pt is making good progress toward established Physical Therapy goals. Continue with physical therapy current plan of care.     Kate Guerrier, PT, DPT  Board Certified Neurologic Clinical Specialist  [id number]

## 2021-10-16 NOTE — PROGRESS NOTES
[facility]  Comprehensive Progress Note    Subjective:      Sally Carpio is a 78 y.o. male admitted to inpatient rehabilitation for impairments and acitivities limitations in ADLs, mobility, and cognition secondary to Acute CVA. No acute events overnight. No cp, sob, n/v. Pain is controlled. Tolerating therapy. Labs noted. The patient's medical records have been reviewed. Scheduled Meds:potassium chloride, 10 mEq, Q1H  sodium chloride flush, 5-40 mL, BID  ipratropium-albuterol, 1 ampule, Q4H WA  aspirin, 81 mg, Daily  vitamin C, 500 mg, BID  atorvastatin, 40 mg, Daily  [Held by provider] bumetanide, 1 mg, Daily  carvedilol, 3.125 mg, BID WC  clopidogrel, 75 mg, Daily  enoxaparin, 30 mg, Daily  ferrous sulfate, 089 mg, BID WC  folic acid, 1 mg, Daily  nystatin, , BID  pantoprazole, 40 mg, QAM AC  amiodarone, 200 mg, BID   Followed by  Ruby Mask ON 10/21/2021] amiodarone, 200 mg, Daily      Continuous Infusions:  PRN Meds:acetaminophen, 650 mg, Q4H PRN  bisacodyl, 5 mg, Daily PRN  oxyCODONE-acetaminophen, 1 tablet, Q4H PRN   Or  oxyCODONE-acetaminophen, 2 tablet, Q4H PRN  sennosides-docusate sodium, 1 tablet, BID PRN  diphenhydrAMINE, 25 mg, Nightly PRN  artificial tears, , PRN  ondansetron, 4 mg, Q8H PRN  sodium chloride, 1 spray, PRN  polyethylene glycol, 17 g, Daily PRN         Objective:      Vitals:    10/15/21 0930 10/15/21 1759 10/15/21 2100 10/16/21 0806   BP: (!) 110/53 (!) 110/55 (!) 106/52 (!) 115/54   Pulse: 78 78 72 81   Resp: 16  18 16   Temp: 97.2 °F (36.2 °C)  98.3 °F (36.8 °C) 97.8 °F (36.6 °C)   TempSrc: Temporal  Temporal Temporal   SpO2:   100% 95%   Weight:       Height:         General appearance: alert, appears stated age and cooperative, NAD  Eyes: conjunctivae/corneas clear. PERRL, EOM's intact.    Lungs: clear to auscultation bilaterally  Heart: regular rate and rhythm, S1, S2  Abdomen: soft, non-tender, normal bowel sounds  Musculoskeletal: Range of motion normal and no gross abnormalities. Neurologic: Alert, oriented. Dysarthric.      Motor Findings  Strength: Right  Strength: Left    Deltoid  2 4+   Biceps  2 4+   Triceps  2 4+   Wrist Extensors  2 4+   FDP 2 4+   Finger abductors 2 4+   Iliospoas  2 3   Quadriceps  4 5   Tibialis anterior  4 5   EHL 4 5   Gastroc soleus  4 5          Laboratory:    Lab Results   Component Value Date    WBC 7.2 10/15/2021    HGB 7.9 (L) 10/15/2021    HCT 24.1 (L) 10/15/2021    MCV 92.0 10/15/2021     10/15/2021     Lab Results   Component Value Date     10/16/2021    K 2.8 10/16/2021    K 4.2 10/14/2021     10/16/2021    CO2 24 10/16/2021    BUN 35 10/16/2021    CREATININE 2.0 10/16/2021    GLUCOSE 104 10/16/2021    CALCIUM 8.1 10/16/2021      Lab Results   Component Value Date    ALT 10 10/12/2021    AST 15 10/12/2021    ALKPHOS 47 10/12/2021    BILITOT 0.8 10/12/2021       Lab Results   Component Value Date    COLORU Yellow 09/23/2021    NITRU Negative 09/23/2021    GLUCOSEU Negative 09/23/2021    KETUA Negative 09/23/2021    UROBILINOGEN 0.2 09/23/2021    BILIRUBINUR Negative 09/23/2021     Lab Results   Component Value Date    LABA1C 5.1 09/10/2021         Functional Status:   Physical Therapy:   Bed mobility: Mod A  Transfers: Mod A   Ambulation: 45 ft without AD Mod A        Occupational Therapy:  Feeding: Min A  Grooming: Mod A  UB dressing: Max A  LB dressing: Mod A       Assessment/Plan:       78 y.o. male admitted to inpatient rehabilitation for impairments and acitivities limitations in ADLs, mobility, and cognition secondary to Acute CVA. -Acute CVA: Right hemiparesis, dysarthria, cognitive impairment. Continue Aspirin, Plavix, Lipitor. Continue Acute Rehab evaluations. -CAD, s/p CABG x3: Continue current medications, sternal precautions.   -Post operative pain: Percocet PRN. Wean narcotics as tolerated  -Acute blood loss anemia: H&H low but stable, monitor.  Continue iron supplement   -Afib during acute care hospitalization, now in NSR: On amiodarone for Afib prophylaxis per CT surgery, with plans to wean after discharge  -Acute systolic heart failure: On coreg. Diuretics per Nephrology   -NGUYỄN: Nephrology following.  Cr downtrending.   -Hypokalemia: Potassium being replaced via IV  -DVT prophylaxis: lovenox          Potassium is being replaced via IV   Diuretics on hold per Nephrology  Tolerating therapy, making progress      Electronically signed by Maribell Harper MD on 10/16/2021 at 11:58 AM

## 2021-10-16 NOTE — PROGRESS NOTES
Notified Dr. Kandice Salcedo of potassium of 2.8 and Pro-BNP of 5,112 via [employer]. Message read at 4875.

## 2021-10-16 NOTE — PLAN OF CARE
Problem: Falls - Risk of:  Goal: Will remain free from falls  Description: Will remain free from falls  Outcome: Met This Shift  Goal: Absence of physical injury  Description: Absence of physical injury  Outcome: Met This Shift     Problem: Skin Integrity:  Goal: Will show no infection signs and symptoms  Description: Will show no infection signs and symptoms  Outcome: Met This Shift  Goal: Absence of new skin breakdown  Description: Absence of new skin breakdown  Outcome: Met This Shift     Problem: Infection:  Goal: Will remain free from infection  Description: Will remain free from infection  Outcome: Met This Shift     Problem: Safety:  Goal: Free from accidental physical injury  Description: Free from accidental physical injury  Outcome: Met This Shift  Goal: Free from intentional harm  Description: Free from intentional harm  Outcome: Met This Shift     Problem: Daily Care:  Goal: Daily care needs are met  Description: Daily care needs are met  Outcome: Met This Shift     Problem: Pain:  Goal: Patient's pain/discomfort is manageable  Description: Patient's pain/discomfort is manageable  Outcome: Met This Shift     Problem: Skin Integrity:  Goal: Skin integrity will stabilize  Description: Skin integrity will stabilize  Outcome: Met This Shift     Problem: Discharge Planning:  Goal: Patients continuum of care needs are met  Description: Patients continuum of care needs are met  Outcome: Met This Shift     Problem: ABCDS Injury Assessment  Goal: Absence of physical injury  Outcome: Met This Shift     Problem: HEMODYNAMIC STATUS  Goal: Patient has stable vital signs and fluid balance  Outcome: Met This Shift     Problem: ACTIVITY INTOLERANCE/IMPAIRED MOBILITY  Goal: Mobility/activity is maintained at optimum level for patient  Outcome: Met This Shift     Problem: COMMUNICATION IMPAIRMENT  Goal: Ability to express needs and understand communication  Outcome: Met This Shift     Problem: Cardiac:  Goal: Ability to maintain vital signs within normal range will improve  Description: Ability to maintain vital signs within normal range will improve  Outcome: Met This Shift  Goal: Cardiovascular alteration will improve  Description: Cardiovascular alteration will improve  Outcome: Met This Shift     Problem: Health Behavior:  Goal: Will modify at least one risk factor affecting health status  Description: Will modify at least one risk factor affecting health status  Outcome: Met This Shift  Goal: Identification of resources available to assist in meeting health care needs will improve  Description: Identification of resources available to assist in meeting health care needs will improve  Outcome: Met This Shift     Problem: Physical Regulation:  Goal: Complications related to the disease process, condition or treatment will be avoided or minimized  Description: Complications related to the disease process, condition or treatment will be avoided or minimized  Outcome: Met This Shift

## 2021-10-16 NOTE — PROGRESS NOTES
Department of Internal Medicine  Nephrology Progress Note      Events reviewed. SUBJECTIVE: We are following Mr. Meh[address] for NGUYỄN. Reports urinary incontinence.     PHYSICAL EXAM:      Vitals:    VITALS:  BP (!) 115/54   Pulse 81   Temp 97.8 °F (36.6 °C) (Temporal)   Resp 16   Ht 5' 8\" (1.727 m)   Wt 152 lb (68.9 kg)   SpO2 95%   BMI 23.11 kg/m²   24HR INTAKE/OUTPUT:      Intake/Output Summary (Last 24 hours) at 10/16/2021 0950  Last data filed at 10/16/2021 0602  Gross per 24 hour   Intake 898 ml   Output 425 ml   Net 473 ml     Constitutional: Patient is awake, alert in no distress  HEENT: Pupils are equal reactive  Respiratory: Decreased breath sounds at the bases  Cardiovascular/Edema: Heart sounds are regular  Gastrointestinal: Abdomen soft  Neurologic: Patient alert   Other: +trace edema      Scheduled Meds:   sodium chloride flush  5-40 mL IntraVENous BID    ipratropium-albuterol  1 ampule Inhalation Q4H WA    aspirin  81 mg Oral Daily    vitamin C  500 mg Oral BID    atorvastatin  40 mg Oral Daily    [Held by provider] bumetanide  1 mg Oral Daily    carvedilol  3.125 mg Oral BID WC    clopidogrel  75 mg Oral Daily    enoxaparin  30 mg SubCUTAneous Daily    ferrous sulfate  325 mg Oral BID WC    folic acid  1 mg Oral Daily    nystatin   Topical BID    pantoprazole  40 mg Oral QAM AC    amiodarone  200 mg Oral BID    Followed by   [facility] ON 10/21/2021] amiodarone  200 mg Oral Daily     Continuous Infusions:   dextrose 5 % and 0.9 % NaCl 50 mL/hr at 10/15/21 1415     PRN Meds:.acetaminophen, bisacodyl, oxyCODONE-acetaminophen **OR** oxyCODONE-acetaminophen, sennosides-docusate sodium, diphenhydrAMINE, artificial tears, ondansetron, sodium chloride, polyethylene glycol    DATA:    CBC:   Lab Results   Component Value Date    WBC 7.2 10/15/2021    RBC 2.62 10/15/2021    HGB 7.9 10/15/2021    HCT 24.1 10/15/2021    MCV 92.0 10/15/2021    MCH 30.2 10/15/2021    MCHC 32.8 10/15/2021    RDW 15.3 10/15/2021     10/15/2021    MPV 10.9 10/15/2021     CMP:    Lab Results   Component Value Date     10/16/2021    K 2.8 10/16/2021    K 4.2 10/14/2021     10/16/2021    CO2 24 10/16/2021    BUN 35 10/16/2021    CREATININE 2.0 10/16/2021    GFRAA 40 10/16/2021    LABGLOM 33 10/16/2021    GLUCOSE 104 10/16/2021    PROT 5.4 10/12/2021    LABALBU 3.1 10/12/2021    CALCIUM 8.1 10/16/2021    BILITOT 0.8 10/12/2021    ALKPHOS 47 10/12/2021    AST 15 10/12/2021    ALT 10 10/12/2021     Magnesium:    Lab Results   Component Value Date    MG 2.3 10/10/2021     Phosphorus:    Lab Results   Component Value Date    PHOS 2.8 10/10/2021        Radiology Review:      CXR 10/3/21   1. Median sternotomy changes. 2. Bilateral pleuroparenchymal opacities that could be related to pleural   effusion and edema.  The appearance of the chest is about the same or   slightly worse.         Chest x-ray October 6, 2021   1. Stable appearance of the postoperative chest.   2. Bilateral chest tubes remain in position.  There is no right or left   pneumothorax. 3. Small right pleural effusion         Chest x-ray October 8, 2021   Minimal bibasilar atelectasis.       Trace right pleural effusion       There is no pneumothorax             BRIEF SUMMARY OF INITIAL CONSULT:    Briefly Mr. Ayan Lancaster is a 74-year-old man with history of HTN, CAD with recent status cardiac catheterization done on September 9 which showed severe multivessel disease, hyperlipidemia, hiatal hernia, who was admitted for elective CABG on September 27, 2021. On admission his creatinine level was 0.8 mg/dL and post surgery his creatinine has progressively increased up to 2.3 mg/dL, reason for this consultation. Postoperatively she developed persistent hypotension, lactic acidosis and respiratory failure for which he was reintubated. Since then she has required multiple pressors and he was placed on IABP. He had received 1 dose of ketorolac perioperatively.

## 2021-10-17 LAB
ANION GAP SERPL CALCULATED.3IONS-SCNC: 11 MMOL/L (ref 7–16)
BUN BLDV-MCNC: 28 MG/DL (ref 6–23)
CALCIUM SERPL-MCNC: 8.4 MG/DL (ref 8.6–10.2)
CHLORIDE BLD-SCNC: 106 MMOL/L (ref 98–107)
CO2: 23 MMOL/L (ref 22–29)
CREAT SERPL-MCNC: 1.9 MG/DL (ref 0.7–1.2)
GFR AFRICAN AMERICAN: 42
GFR NON-AFRICAN AMERICAN: 35 ML/MIN/1.73
GLUCOSE BLD-MCNC: 92 MG/DL (ref 74–99)
HCT VFR BLD CALC: 26.5 % (ref 37–54)
HEMOGLOBIN: 8.6 G/DL (ref 12.5–16.5)
MAGNESIUM: 1.9 MG/DL (ref 1.6–2.6)
MCH RBC QN AUTO: 29.6 PG (ref 26–35)
MCHC RBC AUTO-ENTMCNC: 32.5 % (ref 32–34.5)
MCV RBC AUTO: 91.1 FL (ref 80–99.9)
PDW BLD-RTO: 15.3 FL (ref 11.5–15)
PLATELET # BLD: 161 E9/L (ref 130–450)
PMV BLD AUTO: 10.8 FL (ref 7–12)
POTASSIUM SERPL-SCNC: 3.8 MMOL/L (ref 3.5–5)
RBC # BLD: 2.91 E12/L (ref 3.8–5.8)
SODIUM BLD-SCNC: 140 MMOL/L (ref 132–146)
WBC # BLD: 6.9 E9/L (ref 4.5–11.5)

## 2021-10-17 PROCEDURE — 2580000003 HC RX 258: Performed by: PHYSICAL MEDICINE & REHABILITATION

## 2021-10-17 PROCEDURE — 6370000000 HC RX 637 (ALT 250 FOR IP)

## 2021-10-17 PROCEDURE — 36415 COLL VENOUS BLD VENIPUNCTURE: CPT

## 2021-10-17 PROCEDURE — 97110 THERAPEUTIC EXERCISES: CPT

## 2021-10-17 PROCEDURE — 94640 AIRWAY INHALATION TREATMENT: CPT

## 2021-10-17 PROCEDURE — 36592 COLLECT BLOOD FROM PICC: CPT

## 2021-10-17 PROCEDURE — 97530 THERAPEUTIC ACTIVITIES: CPT

## 2021-10-17 PROCEDURE — 80048 BASIC METABOLIC PNL TOTAL CA: CPT

## 2021-10-17 PROCEDURE — 85027 COMPLETE CBC AUTOMATED: CPT

## 2021-10-17 PROCEDURE — 83735 ASSAY OF MAGNESIUM: CPT

## 2021-10-17 PROCEDURE — 6360000002 HC RX W HCPCS

## 2021-10-17 PROCEDURE — 1280000000 HC REHAB R&B

## 2021-10-17 RX ADMIN — IPRATROPIUM BROMIDE AND ALBUTEROL SULFATE 1 AMPULE: .5; 2.5 SOLUTION RESPIRATORY (INHALATION) at 12:37

## 2021-10-17 RX ADMIN — CARVEDILOL 3.12 MG: 3.12 TABLET, FILM COATED ORAL at 18:07

## 2021-10-17 RX ADMIN — ENOXAPARIN SODIUM 30 MG: 100 INJECTION SUBCUTANEOUS at 08:15

## 2021-10-17 RX ADMIN — PANTOPRAZOLE SODIUM 40 MG: 40 TABLET, DELAYED RELEASE ORAL at 06:59

## 2021-10-17 RX ADMIN — FOLIC ACID 1 MG: 1 TABLET ORAL at 08:16

## 2021-10-17 RX ADMIN — OXYCODONE HYDROCHLORIDE AND ACETAMINOPHEN 500 MG: 500 TABLET ORAL at 08:16

## 2021-10-17 RX ADMIN — SODIUM CHLORIDE, PRESERVATIVE FREE 10 ML: 5 INJECTION INTRAVENOUS at 06:59

## 2021-10-17 RX ADMIN — FERROUS SULFATE TAB 325 MG (65 MG ELEMENTAL FE) 325 MG: 325 (65 FE) TAB at 17:40

## 2021-10-17 RX ADMIN — AMIODARONE HYDROCHLORIDE 200 MG: 200 TABLET ORAL at 08:16

## 2021-10-17 RX ADMIN — ATORVASTATIN CALCIUM 40 MG: 40 TABLET, FILM COATED ORAL at 20:07

## 2021-10-17 RX ADMIN — SODIUM CHLORIDE, PRESERVATIVE FREE 10 ML: 5 INJECTION INTRAVENOUS at 20:08

## 2021-10-17 RX ADMIN — NYSTATIN: 100000 OINTMENT TOPICAL at 20:08

## 2021-10-17 RX ADMIN — IPRATROPIUM BROMIDE AND ALBUTEROL SULFATE 1 AMPULE: .5; 2.5 SOLUTION RESPIRATORY (INHALATION) at 09:21

## 2021-10-17 RX ADMIN — CLOPIDOGREL 75 MG: 75 TABLET, FILM COATED ORAL at 08:15

## 2021-10-17 RX ADMIN — FERROUS SULFATE TAB 325 MG (65 MG ELEMENTAL FE) 325 MG: 325 (65 FE) TAB at 08:16

## 2021-10-17 RX ADMIN — ASPIRIN 81 MG: 81 TABLET ORAL at 08:15

## 2021-10-17 RX ADMIN — AMIODARONE HYDROCHLORIDE 200 MG: 200 TABLET ORAL at 20:07

## 2021-10-17 RX ADMIN — NYSTATIN: 100000 OINTMENT TOPICAL at 08:43

## 2021-10-17 RX ADMIN — CARVEDILOL 3.12 MG: 3.12 TABLET, FILM COATED ORAL at 08:15

## 2021-10-17 RX ADMIN — IPRATROPIUM BROMIDE AND ALBUTEROL SULFATE 1 AMPULE: .5; 2.5 SOLUTION RESPIRATORY (INHALATION) at 16:10

## 2021-10-17 RX ADMIN — OXYCODONE HYDROCHLORIDE AND ACETAMINOPHEN 500 MG: 500 TABLET ORAL at 20:07

## 2021-10-17 ASSESSMENT — PAIN SCALES - GENERAL
PAINLEVEL_OUTOF10: 0

## 2021-10-17 NOTE — PROGRESS NOTES
Department of Internal Medicine  Nephrology Progress Note      Events reviewed. SUBJECTIVE: We are following Mr. [facility] for NGUYỄN. Reports no complaints.     PHYSICAL EXAM:      Vitals:    VITALS:  BP (!) 126/58   Pulse 75   Temp 97.5 °F (36.4 °C) (Temporal)   Resp 17   Ht 5' 8\" (1.727 m)   Wt 155 lb 4.8 oz (70.4 kg)   SpO2 93%   BMI 23.61 kg/m²   24HR INTAKE/OUTPUT:      Intake/Output Summary (Last 24 hours) at 10/17/2021 1010  Last data filed at 10/17/2021 7523  Gross per 24 hour   Intake 980 ml   Output 275 ml   Net 705 ml     Constitutional: Patient is awake, alert in no distress  HEENT: Pupils are equal reactive  Respiratory: Decreased breath sounds at the bases  Cardiovascular/Edema: Heart sounds are regular  Gastrointestinal: Abdomen soft  Neurologic: Patient alert   Other: +trace edema      Scheduled Meds:   sodium chloride flush  5-40 mL IntraVENous BID    ipratropium-albuterol  1 ampule Inhalation Q4H WA    aspirin  81 mg Oral Daily    vitamin C  500 mg Oral BID    atorvastatin  40 mg Oral Daily    [Held by provider] bumetanide  1 mg Oral Daily    carvedilol  3.125 mg Oral BID WC    clopidogrel  75 mg Oral Daily    enoxaparin  30 mg SubCUTAneous Daily    ferrous sulfate  325 mg Oral BID WC    folic acid  1 mg Oral Daily    nystatin   Topical BID    pantoprazole  40 mg Oral QAM AC    amiodarone  200 mg Oral BID    Followed by   Alka Pradhan ON 10/21/2021] amiodarone  200 mg Oral Daily     Continuous Infusions:    PRN Meds:.acetaminophen, bisacodyl, oxyCODONE-acetaminophen **OR** oxyCODONE-acetaminophen, sennosides-docusate sodium, diphenhydrAMINE, artificial tears, ondansetron, sodium chloride, polyethylene glycol    DATA:    CBC:   Lab Results   Component Value Date    WBC 6.9 10/17/2021    RBC 2.91 10/17/2021    HGB 8.6 10/17/2021    HCT 26.5 10/17/2021    MCV 91.1 10/17/2021    MCH 29.6 10/17/2021    MCHC 32.5 10/17/2021    RDW 15.3 10/17/2021     10/17/2021    MPV 10.8 10/17/2021     CMP:    Lab Results   Component Value Date     10/17/2021    K 3.8 10/17/2021    K 4.2 10/14/2021     10/17/2021    CO2 23 10/17/2021    BUN 28 10/17/2021    CREATININE 1.9 10/17/2021    GFRAA 42 10/17/2021    LABGLOM 35 10/17/2021    GLUCOSE 92 10/17/2021    PROT 5.4 10/12/2021    LABALBU 3.1 10/12/2021    CALCIUM 8.4 10/17/2021    BILITOT 0.8 10/12/2021    ALKPHOS 47 10/12/2021    AST 15 10/12/2021    ALT 10 10/12/2021     Magnesium:    Lab Results   Component Value Date    MG 1.9 10/17/2021     Phosphorus:    Lab Results   Component Value Date    PHOS 2.8 10/10/2021        Radiology Review:      CXR 10/3/21   1. Median sternotomy changes. 2. Bilateral pleuroparenchymal opacities that could be related to pleural   effusion and edema.  The appearance of the chest is about the same or   slightly worse.         Chest x-ray October 6, 2021   1. Stable appearance of the postoperative chest.   2. Bilateral chest tubes remain in position.  There is no right or left   pneumothorax. 3. Small right pleural effusion         Chest x-ray October 8, 2021   Minimal bibasilar atelectasis.       Trace right pleural effusion       There is no pneumothorax             BRIEF SUMMARY OF INITIAL CONSULT:    Briefly Mr. Piper Jean-Baptiste is a 61-year-old man with history of HTN, CAD with recent status cardiac catheterization done on September 9 which showed severe multivessel disease, hyperlipidemia, hiatal hernia, who was admitted for elective CABG on September 27, 2021. On admission his creatinine level was 0.8 mg/dL and post surgery his creatinine has progressively increased up to 2.3 mg/dL, reason for this consultation. Postoperatively she developed persistent hypotension, lactic acidosis and respiratory failure for which he was reintubated. Since then she has required multiple pressors and he was placed on IABP. He had received 1 dose of ketorolac perioperatively.  His urine output has significantly decrease and is being about 500 cc/day in the last 48 hours and his fluid balance presently is over 9 L. Problems resolved:    · Cardiogenic shock, hemodynamically more stable, pressor support decrease, still on IABP. Tentative plan for removal of IABP. Pro-BP 16,936  · Hypernatremia with hypervolemia, 2/2 sodium containing IV fluid resuscitation and underlying heart failure. Resolved with  natriuresis and free water (add D5W)  · Hypokalemia, 2/2 diuretics, potassium levels improve  · Respiratory alkalosis (pH: 7.554, PCO2: 61.6) with metabolic alkalosis (bicarbonate administration)  · Respiratory failure status post intubation  · Thrombocytopenia  · Transaminitis with hyperbilirubinemia, possibly shock liver versus congestive liver  · Probably pneumonia, on piperacillin-tazobactam  · Hypokalemia, 2/2 diuretics, potassium levels improved. IMPRESSION/RECOMMENDATIONS:      1. NGUỄYN stage III, CABG associated NGUYỄN, ischemic ATN, non-oliguric. FEUrea 18.8%. Renal function stable, creatinine 1.9.    2. Hypokalemia, 2/2 diuretics, potassium levels improved  3. 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 CAD status post CABG x3 September 27, 2021, on ASA, clopidogrel, carvedilol, atorvastatin  5. Amiodarone therapy, for AF prophylaxis  6.  Normocytic anemia, status post surgery, status post transfusion, iron saturation 36%, on p.o. iron      Plan:    · Continue to hold diuretics  · Continue to monitor renal function for recovery

## 2021-10-17 NOTE — PLAN OF CARE
Problem: Falls - Risk of:  Goal: Will remain free from falls  Description: Will remain free from falls  10/17/2021 1226 by Lavelle Rich  Outcome: Met This Shift  10/17/2021 0217 by Sunita Lobo RN  Outcome: Met This Shift  Goal: Absence of physical injury  Description: Absence of physical injury  Outcome: Met This Shift     Problem: Skin Integrity:  Goal: Will show no infection signs and symptoms  Description: Will show no infection signs and symptoms  10/17/2021 1226 by Lavelle Rodriguez  Outcome: Met This Shift  10/17/2021 0217 by Sunita Lobo RN  Outcome: Met This Shift  Goal: Absence of new skin breakdown  Description: Absence of new skin breakdown  10/17/2021 1226 by Lavelle Rodriguez  Outcome: Met This Shift  10/17/2021 0217 by Sunita Lobo RN  Outcome: Met This Shift     Problem: Infection:  Goal: Will remain free from infection  Description: Will remain free from infection  10/17/2021 1226 by Theta Rich  Outcome: Met This Shift  10/17/2021 0217 by Sunita Lobo RN  Outcome: Met This Shift     Problem: Safety:  Goal: Free from accidental physical injury  Description: Free from accidental physical injury  10/17/2021 1226 by Theta Rich  Outcome: Met This Shift  10/17/2021 0217 by Sunita Lobo RN  Outcome: Met This Shift  Goal: Free from intentional harm  Description: Free from intentional harm  Outcome: Met This Shift     Problem: Daily Care:  Goal: Daily care needs are met  Description: Daily care needs are met  10/17/2021 1226 by Theta Rich  Outcome: Met This Shift  10/17/2021 0217 by Sunita Lobo RN  Outcome: Met This Shift     Problem: Pain:  Goal: Patient's pain/discomfort is manageable  Description: Patient's pain/discomfort is manageable  10/17/2021 1226 by Lavelle Rich  Outcome: Met This Shift  10/17/2021 0217 by Sunita Lobo RN  Outcome: Met This Shift     Problem: Skin Integrity:  Goal: Skin integrity will stabilize  Description: Skin integrity will stabilize  10/17/2021 1226 by Elver Rodriguez  Outcome: Met This Shift  10/17/2021 0217 by Iain Messer RN  Outcome: Met This Shift     Problem: Discharge Planning:  Goal: Patients continuum of care needs are met  Description: Patients continuum of care needs are met  Outcome: Met This Shift     Problem: ABCDS Injury Assessment  Goal: Absence of physical injury  10/17/2021 1226 by Elver Rodriguez  Outcome: Met This Shift  10/17/2021 0217 by Iain Messer RN  Outcome: Met This Shift     Problem: HEMODYNAMIC STATUS  Goal: Patient has stable vital signs and fluid balance  Outcome: Met This Shift     Problem: ACTIVITY INTOLERANCE/IMPAIRED MOBILITY  Goal: Mobility/activity is maintained at optimum level for patient  10/17/2021 1226 by Elver Rodriguez  Outcome: Met This Shift  10/17/2021 0217 by Iain Messer RN  Outcome: Ongoing     Problem: COMMUNICATION IMPAIRMENT  Goal: Ability to express needs and understand communication  10/17/2021 1226 by Elver Rodriguez  Outcome: Met This Shift  10/17/2021 0217 by Iain Messer RN  Outcome: Met This Shift     Problem: Cardiac:  Goal: Ability to maintain vital signs within normal range will improve  Description: Ability to maintain vital signs within normal range will improve  10/17/2021 1226 by Elver Rodriguez  Outcome: Met This Shift  10/17/2021 0217 by Iain Messer RN  Outcome: Met This Shift  Goal: Cardiovascular alteration will improve  Description: Cardiovascular alteration will improve  Outcome: Met This Shift     Problem: Health Behavior:  Goal: Will modify at least one risk factor affecting health status  Description: Will modify at least one risk factor affecting health status  Outcome: Met This Shift  Goal: Identification of resources available to assist in meeting health care needs will improve  Description: Identification of resources available to assist in meeting health care needs will improve  Outcome: Met This Shift     Problem: Physical Regulation:  Goal: Complications related to the disease process, condition or treatment will be avoided or minimized  Description: Complications related to the disease process, condition or treatment will be avoided or minimized  10/17/2021 1226 by Neto Bell  Outcome: Met This Shift  10/17/2021 0217 by Rochelle Shanks RN  Outcome: Met This Shift     Problem: Mobility - Impaired:  Goal: Mobility will improve  Description: Mobility will improve  10/17/2021 1226 by Neto Bell  Outcome: Met This Shift  10/17/2021 0217 by Rochelle Shanks RN  Outcome: Met This Shift

## 2021-10-17 NOTE — PROGRESS NOTES
OCCUPATIONAL THERAPY DAILY NOTE    Date:10/17/2021  Patient Name: Sally Carpio  MRN: [id number]  : 1945  Room: [address] [place] [address]     Diagnosis: CVA- s/p CABG x 3- 21  Additional Pertinent Hx: HLD, HTN, CAD, ESRD, hx hiatal hernia    Precautions: Falls, Puree-Dysphagia IV/nectar & Sternal precautions    Functional Assessment:   Date Status AE  Comments   Feeding 10/14/21 Min A     Grooming 10/14/21 Mod A           Oral Care 10/14/21 Mod A     Bathing 10/14/21 Max A     UB Dressing 10/14/21 Max A     LB Dressing 10/15/21 Mod A LB AE     Footwear 10/15/21 Mod A LB AE    Toileting 10/14/21 Dependent     Homemaking  TBA       Functional Transfers / Balance:   Date Status DME  Comments   Sit Balance 10/15/21 CGA  Seated in w/c during functional ADL tasks. PM: demo'd during arm ex's seated up at table top. Stand Balance 10/15/21 Min/Mod A Kitchen sink PM: Pt stood at kitchen sink needing cues for balance and safety along with sternal precautions. Pt stood x3 reps approx. 1 to 1.5 mins. [] Tub  [x] Shower   Transfer 10/17/21 MOD A  Shower bench  Pt completed requiring assist from lower surface, v/c's for technique and control during descent    Commode   Transfer 10/14/21 Max A     Functional   Mobility 10/17/21 MIN A  No AD Short distances in bathroom    Other:  Supine>Sit    Bed>W/c   10/14/21    10/14/21   Mod/Min A    Mod A         Functional Exercises / Activity:  Pt seated engaged in B UE ex's with min resistant can do bar 2 x 10 reps in 3 planes focusing on UE strength/endurane to increased independence with ADL tasks. Pt engaged in static/dynamic standing at high/low table focusing on standing tolerance to increase independence with ADL taks;      Sensory / Neuromuscular Re-Education:      Cognitive Skills:   Status Comments   Problem   Solving fair     Memory fair +    Sequencing fair     Safety fair       Visual Perception:    Education:  -Pt educated on LB AE for increasing independence with ADL tasks. Pt verbalized/demonstrated fair understanding, recommend continued education/reinforcement. [] Family teach completed on:    Pain Level: 0/10. Pt c/o fatigue both am/pm sessions. Additional Notes:       Patient has made good  progress during treatment sessions toward set goals. Therapy emphasis to obtain goals: Strengthening, Gait Training, Patient/Caregiver Education & Training, Home Management Training, ROM, Equipment Evaluation, Education, & procurement, Balance Training, Neuromuscular Re-education, Cognitive Reorientation, Safety Education & Training, Self-Care/ADL    Long term goal 1: Pt demo independent to eat all meals  Long term goal 2: Pt demo Mod I grooming seated or standing & demo G- safety  Long term goal 3: Pt demo SBA-CGA bathing @ shower level both seated & standing  Long term goal 4: Pt demo s/u UE dress & SBA-CGA LE dress including underwear, pants, socks & shoes & demo G- endurance  Long term goal 5: Pt demo SBA-CGA commode trf with appropriate DME to ensure pt safety. Pt demo SBA toileting & demo G- safety & insight  Long term goals 6: Pt demo SBA-CGA walk in shower trf using appropriate DME to ensure pt safety  Long term goal 7: Pt demo Min A light homemaking activity & demo G- safety & insight  Long term goal 8: Pt demo improved FMC/ strength to improve pt ability to complete ADLs as evidenced by gains inthe followin-hole peg test- R hand pt handed pegs as he was u/a to pick them up & completed in 1 minute & 33.6 seconds & norm for pt age & gender is 25.8 seconds, L hand 34.8 seconds & norm for pt age & gender is 26.0 seconds;  strength- R hand 20# & norm for pt age & gender is 75# & L hand 57# & normf or pt age & gender is 65#  Long term goal 9: Pt demo G- endurance for a 20-30 minute functional activity    [x] Continue with current OT Plan of care.   [] Prepare for Discharge     DISCHARGE RECOMMENDATIONS  Recommended DME:    Post Discharge Care:   []Home Independently  []Home with 24hr Care / Supervision []Home with Partial Supervision []Home with Home Health OT []Home with Out Pt OT []Other: ___   Comments:         Time in Time out Tx Time Breakdown  Variance:   First Session   [id number] [x] Individual Tx-30 min  [] Concurrent Tx -  [] Co-Tx -   [] Group Tx -   [] Time Missed -     Second Session   [] Individual Tx-45mins  [] Concurrent Tx -  [] Co-Tx -   [] Group Tx -   [] Time Missed -     Third Session    [] Individual Tx-   [] Concurrent Tx -  [] Co-Tx -   [] Group Tx -   [] Time Missed -         Total Tx Time  30 Mins     [employer] WEINER/L [address] None

## 2021-10-18 LAB
ANION GAP SERPL CALCULATED.3IONS-SCNC: 11 MMOL/L (ref 7–16)
BUN BLDV-MCNC: 25 MG/DL (ref 6–23)
CALCIUM SERPL-MCNC: 8.1 MG/DL (ref 8.6–10.2)
CHLORIDE BLD-SCNC: 106 MMOL/L (ref 98–107)
CO2: 22 MMOL/L (ref 22–29)
CREAT SERPL-MCNC: 1.8 MG/DL (ref 0.7–1.2)
GFR AFRICAN AMERICAN: 45
GFR NON-AFRICAN AMERICAN: 37 ML/MIN/1.73
GLUCOSE BLD-MCNC: 90 MG/DL (ref 74–99)
POTASSIUM SERPL-SCNC: 3.6 MMOL/L (ref 3.5–5)
SODIUM BLD-SCNC: 139 MMOL/L (ref 132–146)

## 2021-10-18 PROCEDURE — 97130 THER IVNTJ EA ADDL 15 MIN: CPT

## 2021-10-18 PROCEDURE — 6370000000 HC RX 637 (ALT 250 FOR IP): Performed by: INTERNAL MEDICINE

## 2021-10-18 PROCEDURE — 6370000000 HC RX 637 (ALT 250 FOR IP)

## 2021-10-18 PROCEDURE — 97129 THER IVNTJ 1ST 15 MIN: CPT

## 2021-10-18 PROCEDURE — 2580000003 HC RX 258: Performed by: PHYSICAL MEDICINE & REHABILITATION

## 2021-10-18 PROCEDURE — 80048 BASIC METABOLIC PNL TOTAL CA: CPT

## 2021-10-18 PROCEDURE — 6360000002 HC RX W HCPCS

## 2021-10-18 PROCEDURE — 92526 ORAL FUNCTION THERAPY: CPT

## 2021-10-18 PROCEDURE — 36415 COLL VENOUS BLD VENIPUNCTURE: CPT

## 2021-10-18 PROCEDURE — 97112 NEUROMUSCULAR REEDUCATION: CPT

## 2021-10-18 PROCEDURE — 97535 SELF CARE MNGMENT TRAINING: CPT

## 2021-10-18 PROCEDURE — 36592 COLLECT BLOOD FROM PICC: CPT

## 2021-10-18 PROCEDURE — 97530 THERAPEUTIC ACTIVITIES: CPT

## 2021-10-18 PROCEDURE — 1280000000 HC REHAB R&B

## 2021-10-18 PROCEDURE — 99232 SBSQ HOSP IP/OBS MODERATE 35: CPT | Performed by: PHYSICAL MEDICINE & REHABILITATION

## 2021-10-18 RX ORDER — POTASSIUM CHLORIDE 20 MEQ/1
20 TABLET, EXTENDED RELEASE ORAL ONCE
Status: COMPLETED | OUTPATIENT
Start: 2021-10-18 | End: 2021-10-18

## 2021-10-18 RX ADMIN — ASPIRIN 81 MG: 81 TABLET ORAL at 10:58

## 2021-10-18 RX ADMIN — NYSTATIN: 100000 OINTMENT TOPICAL at 21:17

## 2021-10-18 RX ADMIN — PANTOPRAZOLE SODIUM 40 MG: 40 TABLET, DELAYED RELEASE ORAL at 06:38

## 2021-10-18 RX ADMIN — CLOPIDOGREL 75 MG: 75 TABLET, FILM COATED ORAL at 10:57

## 2021-10-18 RX ADMIN — ATORVASTATIN CALCIUM 40 MG: 40 TABLET, FILM COATED ORAL at 21:17

## 2021-10-18 RX ADMIN — AMIODARONE HYDROCHLORIDE 200 MG: 200 TABLET ORAL at 21:17

## 2021-10-18 RX ADMIN — SODIUM CHLORIDE, PRESERVATIVE FREE 10 ML: 5 INJECTION INTRAVENOUS at 11:02

## 2021-10-18 RX ADMIN — FERROUS SULFATE TAB 325 MG (65 MG ELEMENTAL FE) 325 MG: 325 (65 FE) TAB at 17:22

## 2021-10-18 RX ADMIN — FOLIC ACID 1 MG: 1 TABLET ORAL at 10:58

## 2021-10-18 RX ADMIN — OXYCODONE HYDROCHLORIDE AND ACETAMINOPHEN 500 MG: 500 TABLET ORAL at 21:17

## 2021-10-18 RX ADMIN — OXYCODONE HYDROCHLORIDE AND ACETAMINOPHEN 500 MG: 500 TABLET ORAL at 10:59

## 2021-10-18 RX ADMIN — AMIODARONE HYDROCHLORIDE 200 MG: 200 TABLET ORAL at 10:58

## 2021-10-18 RX ADMIN — FERROUS SULFATE TAB 325 MG (65 MG ELEMENTAL FE) 325 MG: 325 (65 FE) TAB at 10:58

## 2021-10-18 RX ADMIN — CARVEDILOL 3.12 MG: 3.12 TABLET, FILM COATED ORAL at 10:57

## 2021-10-18 RX ADMIN — CARVEDILOL 3.12 MG: 3.12 TABLET, FILM COATED ORAL at 17:22

## 2021-10-18 RX ADMIN — ENOXAPARIN SODIUM 30 MG: 100 INJECTION SUBCUTANEOUS at 10:59

## 2021-10-18 RX ADMIN — POTASSIUM CHLORIDE 20 MEQ: 1500 TABLET, EXTENDED RELEASE ORAL at 17:22

## 2021-10-18 ASSESSMENT — PAIN SCALES - GENERAL
PAINLEVEL_OUTOF10: 0

## 2021-10-18 NOTE — PROGRESS NOTES
Physical Therapy  Evaluating Therapist: Kedar Robles., PT, DPT [id number]      ROOM: [address] [place] [address]  DIAGNOSIS: L cerebellar CVA   PRECAUTIONS: Falls, Sternal Precautions, Pureed diet w/ nectar thick liquids, **ORANGE DOT: Wife may assist with tx/amb pt to restroom in room**  HPI: s/p CABG on 9/27/21, Following CABG, patient developed hypotension, acidosis, NGUYỄN and respiratory failure and was intubated. Bronchoscopy done on 9/30 to remove large mucus plug. Required pressors and was placed on aortic balloon pump which was removed 10/4/21. Off of sedation was found to have right sided weakness. CT head negative for acute findings. Echo showed no LV mural thrombus. Carotid US with atherosclerotic disease -- 50-69% bilaterally. Unable to have CTAs due to kidney function. Unable to have MRI/MRA at this time due to epicardial wires. Repeat CT head with equivocal area of low density in the L cerebellum. Social:  Pt lives with wife in a mobile home with 5 stairs to enter and 1 rail. Pt also has home in [place] which is 1 story setup, unclear amount of steps. Prior to admission: Pt ambulated without device and was independent PTA. Initial Evaluation  Date: 10/14/21 AM     PM    Short Term Goals Long Term Goals    Was pt agreeable to Eval/treatment? yes yes yes     Does pt have pain?  Denies pain denies denies     Bed Mobility  Rolling: SBA  Supine to sit: modA  Sit to supine: modA  Scooting: SBA NT Rolling: SBA  Supine to sit: SBA  Sit to supine: SBA  Scooting: SBA sup Mod i   Transfers Sit to stand: mod-maxA  Stand to sit: mod-maxA  Stand pivot: modA    5xSTS: NT Sit to stand: Asher  Stand to sit: Asher  Stand pivot: NT Sit to stand: Asher  Stand to sit: Asher  Stand pivot: NT Asher  5xSTS: < 15 sec sup  5xSTS: < 15 sec   Ambulation    10', no AD, maxA with w/c follow from 2nd helper    10mWT: NT  6mWT: NT 40'x4, 80'x2, no AD, Asher 30'x2, no AD, Asher  70', no AD, Asher + chair follow to maximize distance 50', no AD, Asher    10mWT: TBD  6mWT: ', no AD, sup    10mWT: TBD  6mWT: TBD   Walking 10 feet on uneven surface NT due to safety NT NT 10', no AD,  Asher 50', no AD, sup   Wheel Chair Mobility NT NT NT n/a n/a   Car Transfers NT due to safety NT NT Asher sup   Stair negotiation: ascended and descended NT due to safety 4 steps, BUE on HR for balance only, Asher, non-reciprocal pattern NT 4 steps, HR for balance only, Asher 12 steps, HR for balance only, sup   Curb Step:   ascended and descended 2\" curb, maxA, no AD NT NT 4\" curb, no AD, Asher 7.5\" curb, no AD, sup   Picking up object off the floor NT due to safety NT NT Will  cone with Asher assist Will  cone with sup assist   BLE ROM AROM WFL       BLE Strength LLE 5/5 except hip flexors which were 3/5  R hip flex 3/5   R knee ext/flex 5/5   R ankle DF/PF 5/5       Balance  Sitting- mod I  Standing-mod-maxA    BBS: NT  FGA: NT Sitting- mod I  Standing-Asher    BBS: NT  FGA: NT Sitting- mod I  Standing-Asher    BBS: NT  FGA: NT   BBS: > 45/56  FGA: >22/30   BBS: -  FGA: -   Date Family Teach Completed N/A  10/18/21 with wife Debbe Buerger     Is additional Family Teaching Needed? Y or N Y  Y     Hindering Progress sternal precautions, dyscoordination, weakness, decreased endurance, anxiety sternal precautions, dyscoordination, weakness, decreased endurance, anxie sternal precautions, dyscoordination, weakness, decreased endurance, anxie     PT recommended ELOS 3-4 weeks       Team's Discharge Plan        Therapist at Team Meeting          Vitals:  HRmax: 146 BPM, adjusted for beta blocker medication Time spent at moderate intensity (60-75% HRmax,  BPM)    AM: 25 min 53 sec    PM: min 16 sec       Therapeutic Exercise:     AM: functional mobility    PM:   1. Mini squats- 1x10, 1x5, Asher for balance  2. Step taps to 2\" block- 1x10 each leg, Asher to steady  Patient education    Pt and wife educated on tx, sit to stand, balance, gait, safety awareness.      Patient response to education:   Pt verbalized understanding Pt demonstrated skill Pt requires further education in this area   yes partial All areas     Additional Comments:   AM: Pt showing significantly improved balance and tolerance to activity compared to initial evaluation. Pt consistently Asher to steady during ambulation and STS. Pt benefits from occasional cueing to recall sternal precautions during tx tasks. Will continue to practice. Pt requesting to have wife trianed with tx activity so she may assist to/from restroom pt when she is present, will plan to initiate family training as she is present. PM: Wife present during PM session. Reviewed and educated with wife guarding and assisting tehniques to aide in tx, bed mobility, and amb with pt. Had wife assist pt with bed mobility, STS/stand pivot tx, and amb. Wife demonstrating competence and safety with all tasks, pt issued orange dot for wife to be able to tx or amb pt in room to and from restroom, pending level of fatigue. Educated wife on providing verbal cueing to pt to adhere to sternal precautions with all tasks. Remainder of session worked on balance and strengthening of BLE. Pt requiring intermittent seated rest breaks due to fatigue. Ended session with having pt amb towards room, chair follow provided to maximize distance, pt able to progress his personal best distance to 70'. Will continue to challenge balance and endurance in future sessions. AM  Time in: 0915  Time out: 1000    PM  Time in: 1300  Time out: 8621    Pt is making good progress toward established Physical Therapy goals. Continue with physical therapy current plan of care.     Luís Cazares., PT, DPT  [id number]

## 2021-10-18 NOTE — PROGRESS NOTES
[facility]  Comprehensive Progress Note    Subjective:      Nery Paul is a 78 y.o. male admitted to inpatient rehabilitation for impairments and acitivities limitations in ADLs, mobility, and cognition secondary to Acute CVA. No acute events overnight. No cp, sob, n/v. Pain is controlled. Tolerating therapy. Patient denies complaints. VSS. The patient's medical records have been reviewed. Scheduled Meds:sodium chloride flush, 5-40 mL, BID  ipratropium-albuterol, 1 ampule, Q4H WA  aspirin, 81 mg, Daily  vitamin C, 500 mg, BID  atorvastatin, 40 mg, Daily  [Held by provider] bumetanide, 1 mg, Daily  carvedilol, 3.125 mg, BID WC  clopidogrel, 75 mg, Daily  enoxaparin, 30 mg, Daily  ferrous sulfate, 734 mg, BID WC  folic acid, 1 mg, Daily  nystatin, , BID  pantoprazole, 40 mg, QAM AC  amiodarone, 200 mg, BID   Followed by  Alfredito Mcfarlane ON 10/21/2021] amiodarone, 200 mg, Daily      Continuous Infusions:  PRN Meds:acetaminophen, 650 mg, Q4H PRN  bisacodyl, 5 mg, Daily PRN  oxyCODONE-acetaminophen, 1 tablet, Q4H PRN   Or  oxyCODONE-acetaminophen, 2 tablet, Q4H PRN  sennosides-docusate sodium, 1 tablet, BID PRN  diphenhydrAMINE, 25 mg, Nightly PRN  artificial tears, , PRN  ondansetron, 4 mg, Q8H PRN  sodium chloride, 1 spray, PRN  polyethylene glycol, 17 g, Daily PRN         Objective:      Vitals:    10/17/21 1807 10/18/21 0020 10/18/21 0644 10/18/21 1045   BP: (!) 111/56   (!) 124/59   Pulse: 73   79   Resp:    18   Temp:    98.5 °F (36.9 °C)   TempSrc:    Temporal   SpO2:  98%     Weight:   153 lb 8 oz (69.6 kg)    Height:         General appearance: alert, up in chair, NAD  Eyes: conjunctivae/corneas clear. PERRL, EOM's intact. Lungs: clear to auscultation bilaterally  Heart: regular rate and rhythm, S1, S2  Abdomen: soft, non-tender, normal bowel sounds  Musculoskeletal: Range of motion normal and no gross abnormalities. Neurologic: Alert, oriented. Dysarthric.    Motor Findings  Strength: Right  Strength: Left    Deltoid  3 4+   Biceps  3 4+   Triceps  3 4+   Wrist Extensors  3 4+   FDP 3 4+   Finger abductors 2 4+   Iliospoas  2 3   Quadriceps  4 5   Tibialis anterior  4 5   EHL 4 5   Gastroc soleus  4 5          Laboratory:    Lab Results   Component Value Date    WBC 6.9 10/17/2021    HGB 8.6 (L) 10/17/2021    HCT 26.5 (L) 10/17/2021    MCV 91.1 10/17/2021     10/17/2021     Lab Results   Component Value Date     10/18/2021    K 3.6 10/18/2021    K 4.2 10/14/2021     10/18/2021    CO2 22 10/18/2021    BUN 25 10/18/2021    CREATININE 1.8 10/18/2021    GLUCOSE 90 10/18/2021    CALCIUM 8.1 10/18/2021      Lab Results   Component Value Date    ALT 10 10/12/2021    AST 15 10/12/2021    ALKPHOS 47 10/12/2021    BILITOT 0.8 10/12/2021       Lab Results   Component Value Date    COLORU Yellow 09/23/2021    NITRU Negative 09/23/2021    GLUCOSEU Negative 09/23/2021    KETUA Negative 09/23/2021    UROBILINOGEN 0.2 09/23/2021    BILIRUBINUR Negative 09/23/2021     Lab Results   Component Value Date    LABA1C 5.1 09/10/2021         Functional Status:   Physical Therapy:   Bed mobility: SBA  Transfers: Min A   Ambulation: 30 ft Min A no AD        Occupational Therapy:  Feeding: Min A  Grooming: Mod A  UB dressing: Max A  LB dressing: Mod A       Assessment/Plan:       78 y.o. male admitted to inpatient rehabilitation for impairments and acitivities limitations in ADLs, mobility, and cognition secondary to Acute CVA. -Acute CVA: Right hemiparesis, dysarthria, cognitive impairment. Continue Aspirin, Plavix, Lipitor. Continue Acute Rehab evaluations. -CAD, s/p CABG x3: Continue current medications, sternal precautions.   -Post operative pain: Percocet PRN. Wean narcotics as tolerated  -Acute blood loss anemia: H&H low but stable, monitor.  Continue iron supplement   -Afib during acute care hospitalization, now in NSR: On amiodarone for Afib prophylaxis per CT surgery, with plans to wean after discharge  -Acute systolic heart failure: On coreg. Diuretics on hold per Nephrology   -NGUYỄN: Nephrology following. Cr downtrending.   -Hypokalemia: Potassium repleted, now WNL. Replace PRN  -DVT prophylaxis: lovenox      Labs stable. Monitor renal function and electrolytes.    Tolerating therapy      Electronically signed by Mayra Davenport MD on 10/18/2021 at 2:13 PM

## 2021-10-18 NOTE — PROGRESS NOTES
Speech Language Pathology  ACUTE REHABILITATION--DAILY PROGRESS NOTE  ADMITTING DIAGNOSIS: Acute CVA (cerebrovascular accident) ([facility] [address] [I63.9]     VISIT DIAGNOSIS:          SPEECH THERAPY  PLAN OF CARE   The speech therapy  POC is established based on physician order, speech pathology diagnosis and results of clinical assessment      SPEECH PATHOLOGY DIAGNOSIS:    Mild dysarthria, mild cognitive deficits     Speech Pathology intervention is recommended 3-6 times per week for LOS or when goals are met with emphasis on the following:       Conditions Requiring Skilled Therapeutic Intervention for speech, language and/or cognition     Dysarthria   Decreased short term memory     Specific Speech Therapy Interventions to Include: Therapeutic exercises  Therapeutic tasks for Cognition     Specific instructions for next treatment:                 To initiate memory tasks  To initiate speech production tasks     SHORT/LONG TERM GOALS  Pt will improve immediate, short term, recent memory during structured and unstructured tasks with 80% accuracy   RESULTS:     DYSPHAGIA DIAGNOSIS:   Clinical indicators of adequate swallow function on patient's current diet of puree and NTL  Per most recent MBSS completed on 10/11/21 results indicated minimal-mild oral phase dysphagia and moderate pharyngeal phase dysphagia                            DIET RECOMMENDATIONS:  Pureed consistency solids (IDDSI level 4) with  nectar consistency (mildly thick - IDDSI level 2) liquids     MEDICATION ADMINISTRATION and Administer medication crushed, as able, with pudding/applesauce                 FEEDING RECOMMENDATIONS:                           Assistance level:  Supervision is needed during all oral intake                             Compensatory strategies recommended: Double swallow, Effortful swallow, Small bites/sips, Alternate solids and liquids, Liquids by teaspoon only and Head TURN to right                             Discussed recommendations with nursing and/or faxed report to referring provider: No secondary to no diet/liquid change recommended        SPEECH THERAPY  PLAN OF CARE   The dysphagia POC is established based on physician order, dysphagia diagnosis and results of clinical assessment      Skilled SLP intervention for dysphagia management on acute care 3-5 x per week until goals met, pt plateaus in function and/or discharged from hospital     Conditions Requiring Skilled Therapeutic Intervention for dysphagia:              Per most recent MBSS completed on 10/11/21. .. Reduced pharyngeal clearing of the bolus  Reduced laryngeal closure resulting in penetration     Specific dysphagia interventions to include:      Compensatory strategy training   Therapeutic exercises  Therapeutic intervention to facilitate implementation of energy conservation techniques during intake to decrease potential for aspiration associated with compromised swallow/breathing sequence.     Specific instructions for next treatment:  ongoing PO analysis to upgrade diet and evaluate tolerance of current PO recommendation  Patient Treatment Goals:     Short Term Goals:  Pt will implement identified compensatory swallowing strategies on 90% of opportunities or greater to improve airway protection and swallow function.   Pt will participate in ongoing mealtime assessment to provide diet modification and compensatory strategy implementation to minimize risk of aspiration associated with PO intake  Pt will complete BOTR strength/ ROM exercises to reduce pharyngeal residuals and improve epiglottic inversion minimal verbal prompts  Pt will complete laryngeal strength/ ROM therapeutic exercises to improve airway protection for the least restrictive PO diet minimal verbal prompts  Pt will complete Shaker and/or Chin Tuck Against Resistance (CTAR) to increase UES relaxation diameter and increase anterior laryngeal excursion to reduce pharyngeal residuals and reduce risk of pen/asp with minimal verbal prompts. Pt will complete Effortful Swallow therapeutically to target increased oral and base of tongue pressure, increased pharyngeal constrictor contractions, and increased UES relaxation duration to reduce pharyngeal residue with minimal verbal prompts   Pt will complete Mavis Maneuver therapeutically to target increased pharyngeal constrictor contractions to reduce pharyngeal residue with minimal verbal prompts   Pt will practice chin down posture to target earlier laryngeal closure with minimal verbal prompts      Long Term Goals:              Pt will improve oropharyngeal swallow function to ensure airway protection during PO intake to maintain adequate nutrition/hydration and decrease signs/symptoms of aspiration to less than 1 x/day.    Repeat Video Swallow Study (MBSS) is recommended in 5-7 days and requires a new physician order            FIMS    Swallowing                          Current Status             4--Minimal Assistance               Short Term Goal         6--Modified Independent           Long Term Goal          6--Modified Independent              Receptive                          Current Status             6--Modified Independent                       Short Term Goal         6--Modified Independent                       Long Term Goal          6--Modified Independent              Expressive                          Current Status             5--Supervision               Short Term Goal         6--Modified Independent                       Long Term Goal          6--Modified Independent              Social Interaction                          Current Status             5--Supervision               Short Term Goal         6--Modified Independent                       Long Term Goal          6--Modified Independent                                                             Problem Solving                          Current Status             6--Modified Independent                       Short Term Goal         6--Modified Independent                       Long Term Goal          6--Modified Independent                          Memory                          Current Status             5--Supervision               Short Term Goal         6--Modified Independent                       Long Term Goal          6--Modified Independent                          SWALLOWING:      Diet:  Dysphagia 1, Pureed solids with nectar consistency (mildly thick - IDDSI level 2) liquids      Patient seen at lunch. Patient wife present. Patient tolerating current diet,  Dysphagia 1, Pureed solids with nectar consistency (mildly thick - IDDSI level 2) liquids. Patient using compensatory strategies of Double swallow, Effortful swallow, Small bites/sips, Alternate solids and liquids, Liquids by teaspoon only and Head TURN to right. Patient took 2 bites/sips without head turned to the right with good outcome. No overt signs of aspiration. SLP encouraged patient to practice dysphagia exercises 3x/day. Patient expressed understanding. All questions answered. LANGUAGE:     Good topic maintenance and fluent conversation with SLP. Good detail when discussing information from from his short term memory pertaining to activities with OT and PT. Great detail when discussing information from his long term memory pertaining to house projects.         COGNITION:      SLP presented patient with 10 \"What's Wrong? \" task cards. Patient correctly identified the problem and a posible solution in 10/10 trials. SLP presented 4-step sequencing cards. Patient correctly sequenced 4-steps pertaining to 3 separate events independently. Patient correctly sequenced 5-steps pertaining to 3 separate events independently. Good recalled of swallowing compensatory strategies and exercises. Minimal to no cueing needed during today's treatment.           Safety:  fair +    SPEECH:     Speech intelligibility was good this session. Minimal dysarthria noted. SP recommended after discharge: To be determined  Supervision recommended at discharge: To be determined  Will continue SP intervention as per previously established POC. EDUCATION: Speech Pathologist (SLP) completed education with the patient and/or family regarding identified cognitive linguistic deficits and subsequent need for speech pathology intervention. Discussed deficit areas to be targeted by formal intervention and established short/long term goals. Reviewed compensatory strategies to improve functional outcome (as appropriate). Encouraged patient and/or family to engage SLP in structured Q&A session relative to identified deficit areas. Patient and/or family indicated understanding of all information provided via satisfactory verbal response.     Minute Tracking:    Individual therapy:     45 minutes  Concurrent therapy:    0 minutes  Group therapy:     0 minutes  Co-treatment therapy:    0 minutes    Total minutes for 10/18/2021:  45 minutes

## 2021-10-18 NOTE — PROGRESS NOTES
Department of Internal Medicine  Nephrology Progress Note      Events reviewed. SUBJECTIVE: We are following Mr. Mehul [address] for NGUYỄN. Reports no complaints.     PHYSICAL EXAM:      Vitals:    VITALS:  BP (!) 124/59   Pulse 79   Temp 98.5 °F (36.9 °C) (Temporal)   Resp 18   Ht 5' 8\" (1.727 m)   Wt 153 lb 8 oz (69.6 kg)   SpO2 98%   BMI 23.34 kg/m²   24HR INTAKE/OUTPUT:      Intake/Output Summary (Last 24 hours) at 10/18/2021 1139  Last data filed at 10/18/2021 1033  Gross per 24 hour   Intake 1220 ml   Output 950 ml   Net 270 ml     Constitutional: Patient is awake, alert in no distress  HEENT: Pupils are equal reactive  Respiratory: Decreased breath sounds at the bases  Cardiovascular/Edema: Heart sounds are regular  Gastrointestinal: Abdomen soft  Neurologic: Patient alert   Other: +trace edema      Scheduled Meds:   sodium chloride flush  5-40 mL IntraVENous BID    ipratropium-albuterol  1 ampule Inhalation Q4H WA    aspirin  81 mg Oral Daily    vitamin C  500 mg Oral BID    atorvastatin  40 mg Oral Daily    [Held by provider] bumetanide  1 mg Oral Daily    carvedilol  3.125 mg Oral BID WC    clopidogrel  75 mg Oral Daily    enoxaparin  30 mg SubCUTAneous Daily    ferrous sulfate  325 mg Oral BID WC    folic acid  1 mg Oral Daily    nystatin   Topical BID    pantoprazole  40 mg Oral QAM AC    amiodarone  200 mg Oral BID    Followed by   Ke Pedersen ON 10/21/2021] amiodarone  200 mg Oral Daily     Continuous Infusions:    PRN Meds:.acetaminophen, bisacodyl, oxyCODONE-acetaminophen **OR** oxyCODONE-acetaminophen, sennosides-docusate sodium, diphenhydrAMINE, artificial tears, ondansetron, sodium chloride, polyethylene glycol    DATA:    CBC:   Lab Results   Component Value Date    WBC 6.9 10/17/2021    RBC 2.91 10/17/2021    HGB 8.6 10/17/2021    HCT 26.5 10/17/2021    MCV 91.1 10/17/2021    MCH 29.6 10/17/2021    MCHC 32.5 10/17/2021    RDW 15.3 10/17/2021     10/17/2021    MPV 10.8 10/17/2021 CMP:    Lab Results   Component Value Date     10/18/2021    K 3.6 10/18/2021    K 4.2 10/14/2021     10/18/2021    CO2 22 10/18/2021    BUN 25 10/18/2021    CREATININE 1.8 10/18/2021    GFRAA 45 10/18/2021    LABGLOM 37 10/18/2021    GLUCOSE 90 10/18/2021    PROT 5.4 10/12/2021    LABALBU 3.1 10/12/2021    CALCIUM 8.1 10/18/2021    BILITOT 0.8 10/12/2021    ALKPHOS 47 10/12/2021    AST 15 10/12/2021    ALT 10 10/12/2021     Magnesium:    Lab Results   Component Value Date    MG 1.9 10/17/2021     Phosphorus:    Lab Results   Component Value Date    PHOS 2.8 10/10/2021        Radiology Review:      CXR 10/3/21   1. Median sternotomy changes. 2. Bilateral pleuroparenchymal opacities that could be related to pleural   effusion and edema.  The appearance of the chest is about the same or   slightly worse.         Chest x-ray October 6, 2021   1. Stable appearance of the postoperative chest.   2. Bilateral chest tubes remain in position.  There is no right or left   pneumothorax. 3. Small right pleural effusion         Chest x-ray October 8, 2021   Minimal bibasilar atelectasis.       Trace right pleural effusion       There is no pneumothorax             BRIEF SUMMARY OF INITIAL CONSULT:    Briefly Mr. Raghu Sterling is a 77-year-old man with history of HTN, CAD with recent status cardiac catheterization done on September 9 which showed severe multivessel disease, hyperlipidemia, hiatal hernia, who was admitted for elective CABG on September 27, 2021. On admission his creatinine level was 0.8 mg/dL and post surgery his creatinine has progressively increased up to 2.3 mg/dL, reason for this consultation. Postoperatively she developed persistent hypotension, lactic acidosis and respiratory failure for which he was reintubated. Since then she has required multiple pressors and he was placed on IABP. He had received 1 dose of ketorolac perioperatively.  His urine output has significantly decrease and is being about 500 cc/day in the last 48 hours and his fluid balance presently is over 9 L. Problems resolved:    · Cardiogenic shock, hemodynamically more stable, pressor support decrease, still on IABP. Tentative plan for removal of IABP. Pro-BP 16,936  · Hypernatremia with hypervolemia, 2/2 sodium containing IV fluid resuscitation and underlying heart failure. Resolved with  natriuresis and free water (add D5W)  · Hypokalemia, 2/2 diuretics, potassium levels improve  · Respiratory alkalosis (pH: 7.554, PCO2: 43.6) with metabolic alkalosis (bicarbonate administration)  · Respiratory failure status post intubation  · Thrombocytopenia  · Transaminitis with hyperbilirubinemia, possibly shock liver versus congestive liver  · Probably pneumonia, on piperacillin-tazobactam  · Hypokalemia, 2/2 diuretics, potassium levels improved. IMPRESSION/RECOMMENDATIONS:      1. NGUYỄN stage III, CABG associated NGUYỄN, ischemic ATN, non-oliguric. FEUrea 18.8%. Renal function improving, creatinine down to 1.8 mg/L.    2. Hypokalemia, 2/2 diuretics, potassium levels improved  3. 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 CAD status post CABG x3 September 27, 2021, on ASA, clopidogrel, carvedilol, atorvastatin  5. Amiodarone therapy, for AF prophylaxis  6.  Normocytic anemia, status post surgery, status post transfusion, iron saturation 36%, on p.o. iron      Plan:    · Continue to hold diuretics  · Replace potassium  · Continue to monitor renal function for recovery

## 2021-10-18 NOTE — PROGRESS NOTES
OCCUPATIONAL THERAPY DAILY NOTE    Date:10/18/2021  Patient Name: Leah Toney  MRN: [id number]  : 1945  Room: [address] [place] [address]     Diagnosis: CVA- s/p CABG x 3- 21  Additional Pertinent Hx: HLD, HTN, CAD, ESRD, hx hiatal hernia  Precautions: Falls, Puree-Dysphagia IV/nectar & Sternal precautions & wife- Isis orange dot    Functional Assessment:   Date Status AE  Comments   Feeding 10/18/21 s/u     Grooming 10/18/21 Sup           Oral Care 10/18/21 Sup   Seated due to fatigue post shower   Bathing 10/18/21 Min A  Shower seated & standing   UB Dressing 10/18/21 Min A     LB Dressing 10/18/21 Min A LB AE vc's for technique   Footwear 10/18/21 Min A Shoe horn Pt require assist to don anel hose using easy s;lide & shoe horn to don shoes. Pt tool increased time   Toileting 10/18/21 Min a     [employer]  TBA       Functional Transfers / Balance:   Date Status DME  Comments   Sit Balance 10/18/21 SBA  While showering     Stand Balance 10/18/21 MIn A Kitchen sink    [] Tub  [x] Shower   Transfer 10/18/21 Min A Shower seat Pt completed requiring assist from lower surface, v/c's for technique and control during descent    Commode   Transfer 10/18/21 Min A  Min vc's for posture & body mechanics   Functional   Mobility 10/18/21 MIN A  No AD Short distances in bathroom    Other:  Supine>Sit    Bed>W/c   10/18/21    10/18/21   SBA    Min A         Functional Exercises / Activity:    Sensory / Neuromuscular Re-Education:  Pt tolerated Inmotion assessment using 14 cm Sokaogon. Pt also sat in chair with arm straps & RUE ace wrapped for postioning. Pt also tolerated pre & post testing, random & error augmentation protocol using his RUE. Pt demo the following during the assessment: smoothness 0.592, reach error 0.023, path error 0.007, initiation time 0.013 seconds, Sokaogon size 0.079, Sokaogon independence 0.691, hold deviation . 30 & displacement 0.132.  Pt Pt made the following gains from his pre to post testing the following areas: path error decreased from 0.008 to 0.007 & initiation time decreased from 0.015 seconds to 0.012 seconds. Pt tolerated a total of 516 total movements using his RUE. All InMotion exercises are performed to increase function of R UE to assist with ADL's and IADL's at home. Pt appreciates the immediate feedback that is provided using the InMotion arm. Pt is highly motivated & is beginning to engage in ADLs & IADLs in the home and attempt to incorporate R UE into functional tasks. Cognitive Skills:   Status Comments   Problem   Solving fair     Memory fair +    Sequencing fair     Safety fair       Visual Perception:    Education:  -Pt educated on sternal precautions & body mechanics to facilitate sit<>stands    [x] Family teach completed on:  10/18/21- Pt wife educated with regards to pt current levels & performed hands on training to perform commode trfs, short distance ambulation & bed mobility. Pt wife made an orange dot    Pain Level: 0/10. Pt c/o fatigue both am/pm sessions. Additional Notes:   10/18/21- Recommend a 3:1 be ordered to increased height to improve pt ability to sit<>stand    Patient has made good  progress during treatment sessions toward set goals. Therapy emphasis to obtain goals: Strengthening, Gait Training, Patient/Caregiver Education & Training, Home Management Training, ROM, Equipment Evaluation, Education, & procurement, Balance Training, Neuromuscular Re-education, Cognitive Reorientation, Safety Education & Training, Self-Care/ADL    Long term goal 1: Pt demo independent to eat all meals  Long term goal 2: Pt demo Mod I grooming seated or standing & demo G- safety  Long term goal 3: Pt demo SBA-CGA bathing @ shower level both seated & standing  Long term goal 4: Pt demo s/u UE dress & SBA-CGA LE dress including underwear, pants, socks & shoes & demo G- endurance  Long term goal 5: Pt demo SBA-CGA commode trf with appropriate DME to ensure pt safety.  Pt demo SBA toileting & demo G- safety & insight  Long term goals 6: Pt demo SBA-CGA walk in shower trf using appropriate DME to ensure pt safety  Long term goal 7: Pt demo Min A light homemaking activity & demo G- safety & insight  Long term goal 8: Pt demo improved FMC/ strength to improve pt ability to complete ADLs as evidenced by gains inthe followin-hole peg test- R hand pt handed pegs as he was u/a to pick them up & completed in 1 minute & 33.6 seconds & norm for pt age & gender is 25.8 seconds, L hand 34.8 seconds & norm for pt age & gender is 26.0 seconds;  strength- R hand 20# & norm for pt age & gender is 75# & L hand 57# & normf or pt age & gender is 65#  Long term goal 9: Pt demo G- endurance for a 20-30 minute functional activity    [x] Continue with current OT Plan of care.   [] Prepare for Discharge     DISCHARGE RECOMMENDATIONS  Recommended DME:    Post Discharge Care:   []Home Independently  []Home with 24hr Care / Supervision []Home with Partial Supervision []Home with Home Health OT []Home with Out Pt OT []Other: ___   Comments:         Time in Time out Tx Time Breakdown  Variance:   First Session   600 700 [x] Individual Tx-60 min  [] Concurrent Tx -  [] Co-Tx -   [] Group Tx -   [] Time Missed -     Second Session 777 336 [x] Individual Tx-60mins  [] Concurrent Tx -  [] Co-Tx -   [] Group Tx -   [] Time Missed -     Third Session  1355 1300 [x] Individual Tx- 15  [] Concurrent Tx -  [] Co-Tx -   [] Group Tx -   [] Time Missed -         Total Tx Time 135  Mins     Hattie Triplett OTR/L [id number]

## 2021-10-19 PROCEDURE — 6370000000 HC RX 637 (ALT 250 FOR IP)

## 2021-10-19 PROCEDURE — 6360000002 HC RX W HCPCS

## 2021-10-19 PROCEDURE — 99232 SBSQ HOSP IP/OBS MODERATE 35: CPT | Performed by: PHYSICAL MEDICINE & REHABILITATION

## 2021-10-19 PROCEDURE — 97535 SELF CARE MNGMENT TRAINING: CPT

## 2021-10-19 PROCEDURE — 94640 AIRWAY INHALATION TREATMENT: CPT

## 2021-10-19 PROCEDURE — 97530 THERAPEUTIC ACTIVITIES: CPT

## 2021-10-19 PROCEDURE — 1280000000 HC REHAB R&B

## 2021-10-19 PROCEDURE — 97110 THERAPEUTIC EXERCISES: CPT

## 2021-10-19 RX ADMIN — CARVEDILOL 3.12 MG: 3.12 TABLET, FILM COATED ORAL at 17:21

## 2021-10-19 RX ADMIN — IPRATROPIUM BROMIDE AND ALBUTEROL SULFATE 1 AMPULE: .5; 2.5 SOLUTION RESPIRATORY (INHALATION) at 19:14

## 2021-10-19 RX ADMIN — OXYCODONE HYDROCHLORIDE AND ACETAMINOPHEN 500 MG: 500 TABLET ORAL at 09:10

## 2021-10-19 RX ADMIN — ASPIRIN 81 MG: 81 TABLET ORAL at 13:00

## 2021-10-19 RX ADMIN — AMIODARONE HYDROCHLORIDE 200 MG: 200 TABLET ORAL at 09:10

## 2021-10-19 RX ADMIN — OXYCODONE HYDROCHLORIDE AND ACETAMINOPHEN 500 MG: 500 TABLET ORAL at 21:00

## 2021-10-19 RX ADMIN — CLOPIDOGREL 75 MG: 75 TABLET, FILM COATED ORAL at 09:10

## 2021-10-19 RX ADMIN — ENOXAPARIN SODIUM 30 MG: 100 INJECTION SUBCUTANEOUS at 09:10

## 2021-10-19 RX ADMIN — IPRATROPIUM BROMIDE AND ALBUTEROL SULFATE 1 AMPULE: .5; 2.5 SOLUTION RESPIRATORY (INHALATION) at 13:00

## 2021-10-19 RX ADMIN — FERROUS SULFATE TAB 325 MG (65 MG ELEMENTAL FE) 325 MG: 325 (65 FE) TAB at 17:21

## 2021-10-19 RX ADMIN — FERROUS SULFATE TAB 325 MG (65 MG ELEMENTAL FE) 325 MG: 325 (65 FE) TAB at 09:10

## 2021-10-19 RX ADMIN — DOCUSATE SODIUM 50 MG AND SENNOSIDES 8.6 MG 1 TABLET: 8.6; 5 TABLET, FILM COATED ORAL at 04:10

## 2021-10-19 RX ADMIN — PANTOPRAZOLE SODIUM 40 MG: 40 TABLET, DELAYED RELEASE ORAL at 09:10

## 2021-10-19 RX ADMIN — ATORVASTATIN CALCIUM 40 MG: 40 TABLET, FILM COATED ORAL at 21:00

## 2021-10-19 RX ADMIN — FOLIC ACID 1 MG: 1 TABLET ORAL at 09:10

## 2021-10-19 RX ADMIN — NYSTATIN: 100000 OINTMENT TOPICAL at 21:00

## 2021-10-19 RX ADMIN — CARVEDILOL 3.12 MG: 3.12 TABLET, FILM COATED ORAL at 09:10

## 2021-10-19 RX ADMIN — IPRATROPIUM BROMIDE AND ALBUTEROL SULFATE 1 AMPULE: .5; 2.5 SOLUTION RESPIRATORY (INHALATION) at 10:20

## 2021-10-19 RX ADMIN — AMIODARONE HYDROCHLORIDE 200 MG: 200 TABLET ORAL at 21:00

## 2021-10-19 ASSESSMENT — PAIN SCALES - GENERAL: PAINLEVEL_OUTOF10: 0

## 2021-10-19 NOTE — PROGRESS NOTES
[facility]  Comprehensive Progress Note    Subjective:      Dara Rodriguez is a 78 y.o. male admitted to inpatient rehabilitation for impairments and acitivities limitations in ADLs, mobility, and cognition secondary to Acute CVA. No acute events overnight. No cp, sob, n/v. Pain is controlled. Tolerating therapy. Patient reports mild increased edema at the feet and ankles bilaterally. No other complaints. The patient's medical records have been reviewed. Scheduled Meds:sodium chloride flush, 5-40 mL, BID  ipratropium-albuterol, 1 ampule, Q4H WA  aspirin, 81 mg, Daily  vitamin C, 500 mg, BID  atorvastatin, 40 mg, Daily  [Held by provider] bumetanide, 1 mg, Daily  carvedilol, 3.125 mg, BID WC  clopidogrel, 75 mg, Daily  enoxaparin, 30 mg, Daily  ferrous sulfate, 052 mg, BID WC  folic acid, 1 mg, Daily  nystatin, , BID  pantoprazole, 40 mg, QAM AC  amiodarone, 200 mg, BID   Followed by  Dennard Gosselin ON 10/21/2021] amiodarone, 200 mg, Daily      Continuous Infusions:  PRN Meds:acetaminophen, 650 mg, Q4H PRN  bisacodyl, 5 mg, Daily PRN  oxyCODONE-acetaminophen, 1 tablet, Q4H PRN   Or  oxyCODONE-acetaminophen, 2 tablet, Q4H PRN  sennosides-docusate sodium, 1 tablet, BID PRN  diphenhydrAMINE, 25 mg, Nightly PRN  artificial tears, , PRN  ondansetron, 4 mg, Q8H PRN  sodium chloride, 1 spray, PRN  polyethylene glycol, 17 g, Daily PRN         Objective:      Vitals:    10/18/21 0644 10/18/21 1045 10/19/21 0910 10/19/21 1020   BP:  (!) 124/59 135/63    Pulse:  79 83    Resp:  18 16 16   Temp:  98.5 °F (36.9 °C) 97.9 °F (36.6 °C)    TempSrc:  Temporal Temporal    SpO2:   99% 98%   Weight: 153 lb 8 oz (69.6 kg)      Height:         General appearance: alert, up in chair, NAD  Eyes: conjunctivae/corneas clear. PERRL, EOM's intact.    Lungs: clear to auscultation bilaterally  Heart: regular rate and rhythm, S1, S2  Abdomen: soft, non-tender, normal bowel sounds  Musculoskeletal: Range but stable, monitor. Continue iron supplement   -Afib during acute care hospitalization, now in NSR: On amiodarone for Afib prophylaxis per CT surgery, with plans to wean after discharge  -Acute systolic heart failure: On coreg. Diuretics on hold per Nephrology   -NGUYỄN: Nephrology following. Cr improved. -Hypokalemia: Potassium repleted, now WNL. Replace PRN  -DVT prophylaxis: lovenox      Renal function improved.  Potassium WNL  Developing some edema at the ankles, will check with Nephrology when to resume bumex    Team conference tomorrow     Electronically signed by Mayra Davenport MD on 10/19/2021 at 10:47 AM

## 2021-10-19 NOTE — PROGRESS NOTES
Physical Therapy  Treatment note  Evaluating Therapist: Alessio Conner., PT, DPT [id number]      ROOM: [address] [place] [address]  DIAGNOSIS: L cerebellar CVA   PRECAUTIONS: Falls, Sternal Precautions, Pureed diet w/ nectar thick liquids, **ORANGE DOT: Wife may assist with tx/amb pt to restroom in room**  HPI: s/p CABG on 9/27/21, Following CABG, patient developed hypotension, acidosis, NGUYỄN and respiratory failure and was intubated. Bronchoscopy done on 9/30 to remove large mucus plug. Required pressors and was placed on aortic balloon pump which was removed 10/4/21. Off of sedation was found to have right sided weakness. CT head negative for acute findings. Echo showed no LV mural thrombus. Carotid US with atherosclerotic disease -- 50-69% bilaterally. Unable to have CTAs due to kidney function. Unable to have MRI/MRA at this time due to epicardial wires. Repeat CT head with equivocal area of low density in the L cerebellum. Social:  Pt lives with wife in a mobile home with 5 stairs to enter and 1 rail. Pt also has home in [place] which is 1 story setup, unclear amount of steps. Prior to admission: Pt ambulated without device and was independent PTA. Initial Evaluation  Date: 10/14/21 AM     PM    Short Term Goals Long Term Goals    Was pt agreeable to Eval/treatment? yes yes yes     Does pt have pain?  Denies pain denies denies     Bed Mobility  Rolling: SBA  Supine to sit: modA  Sit to supine: modA  Scooting: SBA Rolling: SBA  Supine to sit: SBA  Sit to supine: SBA  Scooting: SBA NT sup Mod i   Transfers Sit to stand: mod-maxA  Stand to sit: mod-maxA  Stand pivot: modA    5xSTS: NT Sit to stand: Asher  Stand to sit: Asher  Stand pivot: Asher Sit to stand: Asher  Stand to sit: Asher  Stand pivot: Asher Asher  5xSTS: < 15 sec sup  5xSTS: < 15 sec   Ambulation    10', no AD, maxA with w/c follow from 2nd helper    10mWT: NT  6mWT: NT 70'x2, no AD, Asher 100'x2, Asher, no AD 50', no AD, Asher    10mWT: TBD  6mWT: ', no AD, sup    10mWT: TBD  6mWT: TBD   Walking 10 feet on uneven surface NT due to safety 12', no AD, Asher NT 10', no AD,  Asher 50', no AD, sup   Wheel Chair Mobility NT NT NT n/a n/a   Car Transfers NT due to safety NT NT Asher sup   Stair negotiation: ascended and descended NT due to safety 12 steps, BUE on HR for balance only, Asher, non-reciprocal pattern NT 4 steps, HR for balance only, Asher 12 steps, HR for balance only, sup   Curb Step:   ascended and descended 2\" curb, maxA, no AD 7.5\" curb, no AD, Asher NT 4\" curb, no AD, Asher 7.5\" curb, no AD, sup   Picking up object off the floor NT due to safety 2# weight, Asher NT Will  cone with Asher assist Will  cone with sup assist   BLE ROM AROM WFL       BLE Strength LLE 5/5 except hip flexors which were 3/5  R hip flex 3/5   R knee ext/flex 5/5   R ankle DF/PF 5/5       Balance  Sitting- mod I  Standing-mod-maxA    BBS: NT  FGA: NT Sitting- mod I  Standing-Asher    BBS: NT  FGA: NT Sitting- mod I  Standing-Asher    BBS: NT  FGA: NT   BBS: > 45/56  FGA: >22/30   BBS: -  FGA: -   Date Family Teach Completed N/A  10/18/21 with wife Violeta Ramon     Is additional Family Teaching Needed? Y or N Y  Y     Hindering Progress sternal precautions, dyscoordination, weakness, decreased endurance, anxiety sternal precautions, dyscoordination, weakness, decreased endurance, anxiety sternal precautions, dyscoordination, weakness, decreased endurance, anxiety     PT recommended ELOS 3-4 weeks       Team's Discharge Plan        Therapist at Team Meeting          Vitals:  HRmax: 146 BPM, adjusted for beta blocker medication Time spent at moderate intensity (60-75% HRmax,  BPM)    AM: 18 min 13 sec    PM: 19 min 30 sec     Therapeutic Exercise:     AM: Functional mobility    PM:   1. Amb with weaving- 5 quad canes spaced 2' apart each, 3 sets of 2 rounds, min-modA to steady  2.  Wheelchair pedaling with BLE- focus on strengthening BLE, pt wheeling self 80'x2 reps with BLE, supervision  Patient education    Pt educated on sternal precautions, balance, tx gait, stair negotiation, current status and POC, d/c planning    Patient response to education:   Pt verbalized understanding Pt demonstrated skill Pt requires further education in this area   yes partial All areas     Additional Comments:   AM: Pt continuing to show progress with mobility, demo's improved steadiness during amb, requiring light Asher, but distance continues to be limited by endurance deficits. Pt able to negotiate 12 steps without rest break, Asher provided with verbal cueing to have BUE resting on rail for balance only. Will continue to progress endurance and balance in future sessions. PM: Continued to working on progressing ambulation and balance with turns. Provided pt with verbal cueing to concentrate on trunk control and maintaining good PRASHANT during weaving activity, especially as pt fatigued. Pt with 3 moderate LOB during tasks requiring steadying assist. Overall good effort with all activity. Hand off to OT at end of session. AM  Time in: 0915  Time out: 1000    PM  Time in: 1300  Time out: 0171    Pt is making good progress toward established Physical Therapy goals. Continue with physical therapy current plan of care.     Nisha Mireles., PT, DPT  [id number]

## 2021-10-19 NOTE — PROGRESS NOTES
Physical Therapy  Weekly note    Evaluating Therapist: Phylicia Ivory., PT, DPT [id number]      ROOM: [address] [place] [address]  DIAGNOSIS: L cerebellar CVA   PRECAUTIONS: Falls, Sternal Precautions, Pureed diet w/ nectar thick liquids, **ORANGE DOT: Wife may assist with tx/amb pt to restroom in room**  HPI: s/p CABG on 9/27/21, Following CABG, patient developed hypotension, acidosis, NGUYỄN and respiratory failure and was intubated. Bronchoscopy done on 9/30 to remove large mucus plug. Required pressors and was placed on aortic balloon pump which was removed 10/4/21. Off of sedation was found to have right sided weakness. CT head negative for acute findings. Echo showed no LV mural thrombus. Carotid US with atherosclerotic disease -- 50-69% bilaterally. Unable to have CTAs due to kidney function. Unable to have MRI/MRA at this time due to epicardial wires. Repeat CT head with equivocal area of low density in the L cerebellum. Social:  Pt lives with wife in a mobile home with 5 stairs to enter and 1 rail. Pt also has home in [place] which is 1 story setup, unclear amount of steps. Prior to admission: Pt ambulated without device and was independent PTA. Initial Evaluation  Date: 10/14/21 AM     Comments   Short Term Goals Long Term Goals    Was pt agreeable to Eval/treatment? yes yes      Does pt have pain?  Denies pain denies      Bed Mobility  Rolling: SBA  Supine to sit: modA  Sit to supine: modA  Scooting: SBA Rolling: SBA  Supine to sit: SBA  Sit to supine: SBA  Scooting: SBA Verbal cues to comply with sternal precautions sup Mod i   Transfers Sit to stand: mod-maxA  Stand to sit: mod-maxA  Stand pivot: modA    5xSTS: NT Sit to stand: Asher  Stand to sit: Asher  Stand pivot: Asher retropulsive initially upon standing Asher  5xSTS: < 15 sec sup  5xSTS: < 15 sec   Ambulation    10', no AD, maxA with w/c follow from 2nd helper    10mWT: NT  6mWT: ', no AD, Asher Distance progressing but continues to be limited by fatigue and SOB  R glute medius weakness causing slight trendelenburg gait when very fatigued 50', no AD, Asher    10mWT: TBD  6mWT: ', no AD, sup    10mWT: TBD  6mWT: TBD   Walking 10 feet on uneven surface NT due to safety 12', no AD, Asher  10', no AD,  Asher 50', no AD, sup   Wheel Chair Mobility NT NT  n/a n/a   Car Transfers NT due to safety NT  Asher sup   Stair negotiation: ascended and descended NT due to safety 12 steps, BUE on HR for balance only, Asher, non-reciprocal pattern Verbal cues to use UE for balance only 4 steps, HR for balance only, Asher 12 steps, HR for balance only, sup   Curb Step:   ascended and descended 2\" curb, maxA, no AD 7.5\" curb, no AD, Asher  4\" curb, no AD, Asher 7.5\" curb, no AD, sup   Picking up object off the floor NT due to safety 2# weight, Asher  Will  cone with Asher assist Will  cone with sup assist   BLE ROM AROM WFL       BLE Strength LLE 5/5 except hip flexors which were 3/5  R hip flex 3/5   R knee ext/flex 5/5   R ankle DF/PF 5/5       Balance  Sitting- mod I  Standing-mod-maxA    BBS: NT  FGA: NT Sitting- mod I  Standing-Asher    BBS: NT  FGA: NT    BBS: > 45/56  FGA: >22/30   BBS: -  FGA: -   Date Family Teach Completed N/A 10/18/21 with wife Madison Johnston      Is additional Family Teaching Needed? Y or N Y Y      Hindering Progress sternal precautions, dyscoordination, weakness, decreased endurance, anxiety sternal precautions, dyscoordination, weakness, decreased endurance, anxiety      PT recommended ELOS 3-4 weeks 3 weeks      Team's Discharge Plan  2 weeks      Therapist at [facility], [place], [place], DPT [id number]            Date:  10/19/21  Supporting factors:   Motivated, good family support, making consistent progress at good rate  Barriers to discharge:  Poor endurance, sternal precautions, stairs to enter home  Additional comments:  Pt has made significant progress in short amount of time in terms of balance and motor control but continues to be limited by endurance deficits, showing quick decompensation of balance and PRASHANT during dynamic standing tasks. Have been maintaining HR within 60-75% HR max range, pt appears to be tolerating this well with no adverse symptoms. Wife was present during session on 10/18, cleared for orange dot to amb or tx pt to restroom, only within pt's room.    DME:  Transport chair  After Care:  HHPT vs OPPT

## 2021-10-19 NOTE — PROGRESS NOTES
OCCUPATIONAL THERAPY DAILY NOTE    Date:10/19/2021  Patient Name: Tammy Morrell  MRN: [id number]  : 1945  Room: [address] [place] [address]     Diagnosis: CVA- s/p CABG x 3- 21  Additional Pertinent Hx: HLD, HTN, CAD, ESRD, hx hiatal hernia  Precautions: Falls, Puree-Dysphagia IV/nectar & Sternal precautions & wife- Isis orange dot    Functional Assessment:   Date Status AE  Comments   Feeding 10/18/21 s/u     Grooming 10/18/21 Sup           Oral Care 10/18/21 Sup      Bathing 10/18/21 Min A     UB Dressing 10/18/21 Min A     LB Dressing 10/18/21 Min A LB AE    Footwear 10/18/21 Min A Shoe horn    Toileting 10/18/21 Min a     Homemaking 10/19/21 Min A- standing  S/SBA- sitting  counter Pt stood to open & stack coffee cups then placed up in first level shelf only due to sternal precautions to increase hmkg and dyn/static standing balance. Pt folded washcloths seated at table while wearing 1# wts on BUE's to increase strength & endurance. Functional Transfers / Balance:   Date Status DME  Comments   Sit Balance 10/19/21 S/SBA  demo'd up in w/c during arm ex's to increase overall endurance & strength for functional tasks. Stand Balance 10/19/21 MIn A counter demo'd during dyn/static standing balance for hmkg to increase skills. [] Tub  [x] Shower   Transfer 10/18/21 Min A Shower seat    Commode   Transfer 10/18/21 Min A     Functional   Mobility 10/18/21 MIN A  No AD    Other:  Supine>Sit    Bed>W/c    Sit to stand   10/18/21    10/18/21    10/19/21   SBA    Min A    Min A        Pt completed x3-4 sit to stand transfers from w/c<>counter top during hmkg task needing cues for sternal precautions. Functional Exercises / Activity:  -Nut/bolt ladder completed seated at table with focus on increasing R UE FMC/GMC. Increased time and frequent rest breaks required to complete. -MRMT completed seated.  R: 2 min 6 sec             L: 1 min 29 sec    PM:  Pt engaged in hmkg tasks seated at S/SBA and standing required Min assist for safety and balance. Pt completed BUE ex's using red imer bar to improve  & forearm strength for functional tasks 3 reps of 10 ea. BUE ex's wearing 1# wts during seat level hmkg to fold washcloths and stack to increase overall strength & endurance for transfers, mobility and ADL's. Sensory / Neuromuscular Re-Education:         Cognitive Skills:   Status Comments   Problem   Solving fair     Memory fair +    Sequencing fair     Safety fair       Visual Perception:    Education:  -Pt educated on benefits of increasing use of R UE during functional tasks. Pt verbalized/demonstrated fair understanding, recommend continued education/reinforcement. Pt educated with sternal precautions during sit to stand transfers from w/c<>counter top for hmkg task. [x] Family teach completed on:  10/18/21- Pt wife educated with regards to pt current levels & performed hands on training to perform commode trfs, short distance ambulation & bed mobility. Pt wife made an orange dot    Pain Level: 0/10. Additional Notes:   10/18/21- Recommend a 3:1 be ordered to increased height to improve pt ability to sit<>stand    Patient has made good  progress during treatment sessions toward set goals. Therapy emphasis to obtain goals: Strengthening, Gait Training, Patient/Caregiver Education & Training, Home Management Training, ROM, Equipment Evaluation, Education, & procurement, Balance Training, Neuromuscular Re-education, Cognitive Reorientation, Safety Education & Training, Self-Care/ADL    Long term goal 1: Pt demo independent to eat all meals  Long term goal 2: Pt demo Mod I grooming seated or standing & demo G- safety  Long term goal 3: Pt demo SBA-CGA bathing @ shower level both seated & standing  Long term goal 4: Pt demo s/u UE dress & SBA-CGA LE dress including underwear, pants, socks & shoes & demo G- endurance  Long term goal 5: Pt demo SBA-CGA commode trf with appropriate DME to ensure pt safety.  Pt demo SBA toileting & demo G- safety & insight  Long term goals 6: Pt demo SBA-CGA walk in shower trf using appropriate DME to ensure pt safety  Long term goal 7: Pt demo Min A light homemaking activity & demo G- safety & insight  Long term goal 8: Pt demo improved FMC/ strength to improve pt ability to complete ADLs as evidenced by gains inthe followin-hole peg test- R hand pt handed pegs as he was u/a to pick them up & completed in 1 minute & 33.6 seconds & norm for pt age & gender is 25.8 seconds, L hand 34.8 seconds & norm for pt age & gender is 26.0 seconds;  strength- R hand 20# & norm for pt age & gender is 75# & L hand 57# & normf or pt age & gender is 65#  Long term goal 9: Pt demo G- endurance for a 20-30 minute functional activity    [x] Continue with current OT Plan of care.   [] Prepare for Discharge     DISCHARGE RECOMMENDATIONS  Recommended DME:    Post Discharge Care:   []Home Independently  []Home with 24hr Care / Supervision []Home with Partial Supervision []Home with Home Health OT []Home with Out Pt OT []Other: ___   Comments:         Time in Time out Tx Time Breakdown  Variance:   First Session  83015 [x] Individual Tx-45 Mins  [] Concurrent Tx -  [] Co-Tx -   [] Group Tx -   [] Time Missed -     Second Session 13:45pm 14:30pm [] Individual Tx-  [x] Concurrent Tx -45  [] Co-Tx -   [] Group Tx -   [] Time Missed -     Third Session    [] Individual Tx-   [] Concurrent Tx -  [] Co-Tx -   [] Group Tx -   [] Time Missed -         Total Tx Time 90 Mins     Davina Garciaimer WEINER/L [id number] [facility] WEINER/L [id number]    I have read & agree with the above status  Karen Kramer OTR/L [id number]

## 2021-10-20 ENCOUNTER — APPOINTMENT (OUTPATIENT)
Dept: GENERAL RADIOLOGY | Age: 76
DRG: 056 | End: 2021-10-20
Attending: PHYSICAL MEDICINE & REHABILITATION
Payer: MEDICARE

## 2021-10-20 LAB
ANION GAP SERPL CALCULATED.3IONS-SCNC: 9 MMOL/L (ref 7–16)
BUN BLDV-MCNC: 21 MG/DL (ref 6–23)
CALCIUM SERPL-MCNC: 8.3 MG/DL (ref 8.6–10.2)
CHLORIDE BLD-SCNC: 105 MMOL/L (ref 98–107)
CO2: 25 MMOL/L (ref 22–29)
CREAT SERPL-MCNC: 1.8 MG/DL (ref 0.7–1.2)
GFR AFRICAN AMERICAN: 45
GFR NON-AFRICAN AMERICAN: 37 ML/MIN/1.73
GLUCOSE BLD-MCNC: 93 MG/DL (ref 74–99)
POTASSIUM SERPL-SCNC: 4.4 MMOL/L (ref 3.5–5)
SODIUM BLD-SCNC: 139 MMOL/L (ref 132–146)

## 2021-10-20 PROCEDURE — 6360000002 HC RX W HCPCS

## 2021-10-20 PROCEDURE — 97110 THERAPEUTIC EXERCISES: CPT

## 2021-10-20 PROCEDURE — 36415 COLL VENOUS BLD VENIPUNCTURE: CPT

## 2021-10-20 PROCEDURE — 97530 THERAPEUTIC ACTIVITIES: CPT

## 2021-10-20 PROCEDURE — 92611 MOTION FLUOROSCOPY/SWALLOW: CPT

## 2021-10-20 PROCEDURE — 6370000000 HC RX 637 (ALT 250 FOR IP)

## 2021-10-20 PROCEDURE — 80048 BASIC METABOLIC PNL TOTAL CA: CPT

## 2021-10-20 PROCEDURE — 94640 AIRWAY INHALATION TREATMENT: CPT

## 2021-10-20 PROCEDURE — 74230 X-RAY XM SWLNG FUNCJ C+: CPT

## 2021-10-20 PROCEDURE — 2500000003 HC RX 250 WO HCPCS: Performed by: STUDENT IN AN ORGANIZED HEALTH CARE EDUCATION/TRAINING PROGRAM

## 2021-10-20 PROCEDURE — 1280000000 HC REHAB R&B

## 2021-10-20 PROCEDURE — 97112 NEUROMUSCULAR REEDUCATION: CPT

## 2021-10-20 PROCEDURE — 99232 SBSQ HOSP IP/OBS MODERATE 35: CPT | Performed by: PHYSICAL MEDICINE & REHABILITATION

## 2021-10-20 RX ADMIN — BARIUM SULFATE 15 ML: 400 PASTE ORAL at 14:33

## 2021-10-20 RX ADMIN — ASPIRIN 81 MG: 81 TABLET ORAL at 08:30

## 2021-10-20 RX ADMIN — OXYCODONE HYDROCHLORIDE AND ACETAMINOPHEN 500 MG: 500 TABLET ORAL at 21:05

## 2021-10-20 RX ADMIN — OXYCODONE HYDROCHLORIDE AND ACETAMINOPHEN 500 MG: 500 TABLET ORAL at 08:31

## 2021-10-20 RX ADMIN — FERROUS SULFATE TAB 325 MG (65 MG ELEMENTAL FE) 325 MG: 325 (65 FE) TAB at 17:22

## 2021-10-20 RX ADMIN — PANTOPRAZOLE SODIUM 40 MG: 40 TABLET, DELAYED RELEASE ORAL at 06:18

## 2021-10-20 RX ADMIN — FERROUS SULFATE TAB 325 MG (65 MG ELEMENTAL FE) 325 MG: 325 (65 FE) TAB at 08:31

## 2021-10-20 RX ADMIN — CARVEDILOL 3.12 MG: 3.12 TABLET, FILM COATED ORAL at 17:22

## 2021-10-20 RX ADMIN — BARIUM SULFATE 15 ML: 0.81 POWDER, FOR SUSPENSION ORAL at 14:34

## 2021-10-20 RX ADMIN — IPRATROPIUM BROMIDE AND ALBUTEROL SULFATE 1 AMPULE: .5; 2.5 SOLUTION RESPIRATORY (INHALATION) at 17:51

## 2021-10-20 RX ADMIN — ATORVASTATIN CALCIUM 40 MG: 40 TABLET, FILM COATED ORAL at 21:05

## 2021-10-20 RX ADMIN — BUMETANIDE 1 MG: 1 TABLET ORAL at 08:31

## 2021-10-20 RX ADMIN — NYSTATIN: 100000 OINTMENT TOPICAL at 21:05

## 2021-10-20 RX ADMIN — CLOPIDOGREL 75 MG: 75 TABLET, FILM COATED ORAL at 08:30

## 2021-10-20 RX ADMIN — BARIUM SULFATE 15 ML: 400 SUSPENSION ORAL at 14:33

## 2021-10-20 RX ADMIN — IPRATROPIUM BROMIDE AND ALBUTEROL SULFATE 1 AMPULE: .5; 2.5 SOLUTION RESPIRATORY (INHALATION) at 12:56

## 2021-10-20 RX ADMIN — CARVEDILOL 3.12 MG: 3.12 TABLET, FILM COATED ORAL at 08:30

## 2021-10-20 RX ADMIN — ENOXAPARIN SODIUM 30 MG: 100 INJECTION SUBCUTANEOUS at 08:30

## 2021-10-20 RX ADMIN — FOLIC ACID 1 MG: 1 TABLET ORAL at 08:31

## 2021-10-20 RX ADMIN — AMIODARONE HYDROCHLORIDE 200 MG: 200 TABLET ORAL at 08:30

## 2021-10-20 ASSESSMENT — PAIN SCALES - GENERAL
PAINLEVEL_OUTOF10: 0

## 2021-10-20 NOTE — PROGRESS NOTES
SPEECH/LANGUAGE PATHOLOGY  VIDEOFLUOROSCOPIC STUDY OF SWALLOWING (MBS)   and PLAN OF CARE    PATIENT NAME:  Deni Kiser  (male)     MRN:  [id number]    :  1945  (78 y.o.)  STATUS:  Inpatient: Room 5501/5501-B    TODAY'S DATE:  10/20/2021  REFERRING PROVIDER:   Dr. Galvan Crest: [place] modified barium swallow with video  Date of order:  10/19/21   REASON FOR REFERRAL: re-assess swallow function   EVALUATING THERAPIST: ANGELIQUE Hager      RESULTS:      DYSPHAGIA DIAGNOSIS:  mild  pharyngeal phase dysphagia     Laryngeal Penetration and Aspiration:  Neither penetration nor aspiration was observed in 's study      DIET RECOMMENDATIONS:  Regular consistency solids (IDDSI level 7) with  thin liquids (IDDSI level 0)    FEEDING RECOMMENDATIONS:    Assistance level:  Stand by assistance is needed during all oral intake     Compensatory strategies recommended: Double swallow, Effortful swallow, Small bites/sips and Alternate solids and liquids     Discussed recommendations with nursing and/or faxed report to referring provider: Yes    SPEECH THERAPY  PLAN OF CARE   The dysphagia POC is established based on physician order and dysphagia diagnosis    Skilled SLP intervention for dysphagia management on acute care 3-5 x per week until goals met, pt plateaus in function and/or discharged from hospital  Meal time assessment for 1-2 sessions to provide diet modification and compensatory strategy implementation due to need to ensure proper implementation of compensatory strategies during PO intake       Conditions Requiring Skilled Therapeutic Intervention for dysphagia:    Reduced pharyngeal clearing of the bolus    SPECIFIC DYSPHAGIA INTERVENTIONS TO INCLUDE:     Compensatory strategy training   Therapeutic exercises  Meal time assessment for 1-2 sessions to provide diet modification and compensatory strategy implementation due to need to ensure proper implementation of compensatory strategies during PO intake     Specific instructions for next treatment:  development and training of compensatory swallow strategies to improve airway protection and swallow function, initiate instruction of therapeutic exercises  and initiate instruction of compensatory strategies  Treatment Goals:    Short Term Goals:  Pt will participate in meal time assessment for 1-2 sessions to provide diet modification and compensatory strategy implementation due to need to ensure proper implementation of compensatory strategies during PO intake   Pt will complete BOTR strength/ ROM exercises to reduce pharyngeal residuals and improve epiglottic inversion minimal verbal prompts  Pt will complete Effortful Swallow therapeutically to target increased oral and base of tongue pressure, increased pharyngeal constrictor contractions, and increased UES relaxation duration to reduce pharyngeal residue with minimal verbal prompts   Pt will complete Mavis Maneuver therapeutically to target increased pharyngeal constrictor contractions to reduce pharyngeal residue with minimal verbal prompts     Long Term Goals:   Pt will maintain adequate nutrition/hydration via PO intake of the least restrictive oral diet with implementation of safe swallow/ compensatory strategies and decrease signs/symptoms of aspiration to less than 1 x/day. Patient/family Goal:    To return to least restrictive diet.     Plan of care discussed with Patient   The Patient understand(s) the diagnosis, prognosis and plan of care     Rehabilitation Potential/Prognosis: good                      ADMITTING DIAGNOSIS: Acute CVA (cerebrovascular accident) ([facility] [address] [I63.9]     VISIT DIAGNOSIS:         PATIENT REPORT/COMPLAINT: none reported    PRIOR LEVEL OF SWALLOW FUNCTION:    Past History of Dysphagia?:  yes    Diet during hospital admission: Pureed consistency solids (IDDSI level 4) with thin liquids (IDDSI level 0)    PROCEDURE:  Consistencies Administered During the Evaluation   Liquids: thin liquid and nectar thick liquid   Solids:  pureed foods and solid foods      Method of Intake:   cup, straw, spoon  Fed by clinician      Position:   Seated, upright    INSTRUMENTAL ASSESSMENT:    ORAL PREP/ ORAL PHASE:    The oral stage of swallowing was within functional limits     PHARYNGEAL PHASE:     ONSET TIME       Onset time of the pharyngeal swallow was adequate       PHARYNGEAL RESIDUALS        Vallecula/Pharyngeal Wall           Reduced tongue base retraction resulting in residuals in vallecula and/or posterior pharyngeal wall for pureed foods and solid foods which mostly cleared with cued double swallow       Pyriform Sinuses      No significant residuals were noted in the pyriform sinuses     LARYNGEAL PENETRATION   Laryngeal penetration was not present during this evaluation    ASPIRATION  Aspiration was not present during this evaluation    PENETRATION-ASPIRATION SCALE (PAS):  THIN 1 = Material does not enter the airway  MILDLY THICK 1 = Material does not enter the airway  MODERATELY THICK item not administered  PUREE 1 = Material does not enter the airway  HARD SOLID 1 = Material does not enter the airway       COMPENSATORY STRATEGIES    Compensatory strategies that were beneficial included Double swallow, Effortful swallow, Small bites/sips and Alternate solids and liquids      STRUCTURAL/FUNCTIONAL ANOMALIES   No structural/functional anomalies were noted    CERVICAL ESOPHAGEAL STAGE :     The cervical esophagus appeared adequate          ___________    Cognition:   Within functional limits for this exam    Oral Peripheral Examination   Adequate lingual/labial strength     Current Respiratory Status   room air     Parameters of Speech Production  Respiration:  Adequate for speech production  Quality:   Within functional limits  Intensity: Within functional limits    Pain: No pain reported.     EDUCATION:   The Speech Language Pathologist (SLP) completed education regarding results of evaluation and that intervention is warranted at this time. Learner: Patient  Education: Reviewed results and recommendations of this evaluation  Evaluation of Education:  Ignacio Owens understanding    This plan may be re-evaluated and revised as warranted. Evaluation Time includes thorough review of current medical information, gathering information on past medical history/social history and prior level of function, completion of standardized testing/informal observation of tasks, assessment of data and education on plan of care and goals. [x]The admitting diagnosis and active problem list, have been reviewed prior to initiation of this evaluation. CPT Code: 92693  dysphagia study    ACTIVE PROBLEM LIST:   Patient Active Problem List   Diagnosis    CAD in native artery    Pulmonary nodules    Unstable angina (HCC)    Other hyperlipidemia    Essential hypertension    Gastroesophageal reflux disease with esophagitis without hemorrhage    S/P CABG (coronary artery bypass graft)    Acute respiratory failure with hypoxia (HCC)    Lactic acidemia    NGUY[address]N (acute kidney injury) ([address]    Hypokalemia    Hypocalcemia    Cardiogenic shock (HCC)    Thrombocytopenia ([address]    Leukocytosis    Encounter regarding vascular access for dialysis for ESRD ([address]    Right hemiparesis ([address]    Acute CVA (cerebrovascular accident) ([address]    Acute blood loss anemia    Dysarthria       Natalie M D'Amico M. A. Runkelen [id number]  Speech Language Pathologist

## 2021-10-20 NOTE — PROGRESS NOTES
Speech Language Pathology  ACUTE REHABILITATION--DAILY PROGRESS NOTE  ADMITTING DIAGNOSIS: Acute CVA (cerebrovascular accident) ([facility] [address] [I63.9]     VISIT DIAGNOSIS:          SPEECH THERAPY  PLAN OF CARE   The speech therapy  POC is established based on physician order, speech pathology diagnosis and results of clinical assessment      SPEECH PATHOLOGY DIAGNOSIS:    Mild dysarthria, mild cognitive deficits     Speech Pathology intervention is recommended 3-6 times per week for LOS or when goals are met with emphasis on the following:       Conditions Requiring Skilled Therapeutic Intervention for speech, language and/or cognition     Dysarthria   Decreased short term memory     Specific Speech Therapy Interventions to Include: Therapeutic exercises  Therapeutic tasks for Cognition     Specific instructions for next treatment:                 To initiate memory tasks  To initiate speech production tasks     SHORT/LONG TERM GOALS  Pt will improve immediate, short term, recent memory during structured and unstructured tasks with 80% accuracy   RESULTS:     DYSPHAGIA DIAGNOSIS:   Clinical indicators of adequate swallow function on patient's current diet of puree and NTL  Per most recent MBSS completed on 10/11/21 results indicated minimal-mild oral phase dysphagia and moderate pharyngeal phase dysphagia                            DIET RECOMMENDATIONS:  Pureed consistency solids (IDDSI level 4) with  nectar consistency (mildly thick - IDDSI level 2) liquids     MEDICATION ADMINISTRATION and Administer medication crushed, as able, with pudding/applesauce                 FEEDING RECOMMENDATIONS:                           Assistance level:  Supervision is needed during all oral intake                             Compensatory strategies recommended: Double swallow, Effortful swallow, Small bites/sips, Alternate solids and liquids, Liquids by teaspoon only and Head TURN to right                             Discussed recommendations with nursing and/or faxed report to referring provider: No secondary to no diet/liquid change recommended        SPEECH THERAPY  PLAN OF CARE   The dysphagia POC is established based on physician order, dysphagia diagnosis and results of clinical assessment      Skilled SLP intervention for dysphagia management on acute care 3-5 x per week until goals met, pt plateaus in function and/or discharged from hospital     Conditions Requiring Skilled Therapeutic Intervention for dysphagia:              Per most recent MBSS completed on 10/11/21. .. Reduced pharyngeal clearing of the bolus  Reduced laryngeal closure resulting in penetration     Specific dysphagia interventions to include:      Compensatory strategy training   Therapeutic exercises  Therapeutic intervention to facilitate implementation of energy conservation techniques during intake to decrease potential for aspiration associated with compromised swallow/breathing sequence.     Specific instructions for next treatment:  ongoing PO analysis to upgrade diet and evaluate tolerance of current PO recommendation  Patient Treatment Goals:     Short Term Goals:  Pt will implement identified compensatory swallowing strategies on 90% of opportunities or greater to improve airway protection and swallow function.   Pt will participate in ongoing mealtime assessment to provide diet modification and compensatory strategy implementation to minimize risk of aspiration associated with PO intake  Pt will complete BOTR strength/ ROM exercises to reduce pharyngeal residuals and improve epiglottic inversion minimal verbal prompts  Pt will complete laryngeal strength/ ROM therapeutic exercises to improve airway protection for the least restrictive PO diet minimal verbal prompts  Pt will complete Shaker and/or Chin Tuck Against Resistance (CTAR) to increase UES relaxation diameter and increase anterior laryngeal excursion to reduce pharyngeal residuals and reduce risk of pen/asp with minimal verbal prompts. Pt will complete Effortful Swallow therapeutically to target increased oral and base of tongue pressure, increased pharyngeal constrictor contractions, and increased UES relaxation duration to reduce pharyngeal residue with minimal verbal prompts   Pt will complete Mavis Maneuver therapeutically to target increased pharyngeal constrictor contractions to reduce pharyngeal residue with minimal verbal prompts   Pt will practice chin down posture to target earlier laryngeal closure with minimal verbal prompts      Long Term Goals:              Pt will improve oropharyngeal swallow function to ensure airway protection during PO intake to maintain adequate nutrition/hydration and decrease signs/symptoms of aspiration to less than 1 x/day.    Repeat Video Swallow Study (MBSS) is recommended in 5-7 days and requires a new physician order            FIMS    Swallowing                          Current Status             4--Minimal Assistance               Short Term Goal         6--Modified Independent           Long Term Goal          6--Modified Independent              Receptive                          Current Status             6--Modified Independent                       Short Term Goal         6--Modified Independent                       Long Term Goal          6--Modified Independent              Expressive                          Current Status             5--Supervision               Short Term Goal         6--Modified Independent                       Long Term Goal          6--Modified Independent              Social Interaction                          Current Status             5--Supervision               Short Term Goal         6--Modified Independent                       Long Term Goal          6--Modified Independent                                                             Problem Solving                          Current Status             6--Modified Independent                       Short Term Goal         6--Modified Independent                       Long Term Goal          6--Modified Independent                          Memory                          Current Status             5--Supervision               Short Term Goal         6--Modified Independent                       Long Term Goal          6--Modified Independent                          SWALLOWING:      Diet:  Dysphagia 1, Pureed solids with nectar consistency (mildly thick - IDDSI level 2) liquids       Patient ate lunch in his room with his wife. Recommend repeat Modified Barium Swallow Study this week. LANGUAGE:     Not seen for SP intervention this date     COGNITION:      Not seen for SP intervention this date         Safety:  Fair+    SPEECH:     Not seen for SP intervention this date    SP recommended after discharge: To be determined  Supervision recommended at discharge: To be determined    Will continue SP intervention as per previously established POC. EDUCATION:     Speech Pathologist (SLP) completed education with the patient/family regarding type of swallowing impairment. Reviewed current solid/liquid consistency diet recommendations and discussed compensatory strategies to ensure safe PO intake. Reviewed aspiration precautions. Encouraged patient and/or family to engage SLP in unstructured Q&A session relative to identified deficit areas; indicated understanding of all information provided via satisfactory verbal response. Speech Pathologist (SLP) completed education with the patient and/or family regarding identified cognitive linguistic deficits and subsequent need for speech pathology intervention. Discussed deficit areas to be targeted by formal intervention and established short/long term goals. Reviewed compensatory strategies to improve functional outcome (as appropriate).  Encouraged patient and/or family to engage SLP in structured Q&A session relative to identified deficit areas. Patient and/or family indicated understanding of all information provided via satisfactory verbal response.     Minute Tracking:    Individual therapy:     0 minutes  Concurrent therapy:    0 minutes  Group therapy:     0 minutes  Co-treatment therapy:    0 minutes    Total minutes for 10/20/2021:  0 minutes

## 2021-10-20 NOTE — PROGRESS NOTES
Comprehensive Nutrition Assessment    Type and Reason for Visit:  Initial    Nutrition Recommendations/Plan: Continue current diet and ONS    Nutrition Assessment:  Pt admit to ARU s/p CABG x3, s/p cryotherapy bronch w/ noted post-op CVA. Pt noted w/ multiple pressure injuries & dysphagia s/p MBS currently on modified diet. Will continue current ONS and monitor. Malnutrition Assessment:  Malnutrition Status:  No malnutrition    Context:  Acute Illness     Findings of the 6 clinical characteristics of malnutrition:  Energy Intake:  No significant decrease in energy intake  Weight Loss:  No significant weight loss     Body Fat Loss:  No significant body fat loss     Muscle Mass Loss:  No significant muscle mass loss    Fluid Accumulation:  No significant fluid accumulation     Strength:  Not Performed    Estimated Daily Nutrient Needs:  Energy (kcal):  MSJ 1418 x 1.2 SF = 2275-1264; Weight Used for Energy Requirements:  Current     Protein (g):  ; Weight Used for Protein Requirements:  Current (1.3-1.5)        Fluid (ml/day):  7766-1102; Method Used for Fluid Requirements:  1 ml/kcal      Nutrition Related Findings:  A&Ox4, abd WDL, soft abd, trace/+1 edema, +I/Os, dysphagia      Wounds:  Multiple, Surgical Incision, Pressure Injury, Unstageable, Deep Tissue Injury       Current Nutrition Therapies:    ADULT DIET; Dysphagia - Pureed; 4 carb choices (60 gm/meal); Low Fat/Low Chol/High Fiber/2 gm Na; Mildly Thick ([place])  Adult Oral Nutrition Supplement; Fortified Pudding Oral Supplement  Adult Oral Nutrition Supplement;  Other Oral Supplement; [id number]    Anthropometric Measures:  · Height: 5' 8\" (172.7 cm)  · Current Body Weight: 155 lb (70.3 kg) (10/20 standing scale)   · Usual Body Weight: 158 lb (71.7 kg) (7/2021 actual per EMR)     · Ideal Body Weight: 154 lbs; % Ideal Body Weight 100.6 %   · BMI: 23.6  · BMI Categories: Normal Weight (BMI 22.0 to 24.9) age over 72       Nutrition Diagnosis: · Increased nutrient needs related to increase demand for energy/nutrients as evidenced by wounds    Nutrition Interventions:   Food and/or Nutrient Delivery:  Continue Current Diet, Continue Oral Nutrition Supplement  Nutrition Education/Counseling:  Education not indicated   Coordination of Nutrition Care:  Continue to monitor while inpatient    Goals:  pt to consume >75% meals/ONS       Nutrition Monitoring and Evaluation:   Food/Nutrient Intake Outcomes:  Food and Nutrient Intake, Supplement Intake  Physical Signs/Symptoms Outcomes:  Biochemical Data, Chewing or Swallowing, GI Status, Fluid Status or Edema, Nutrition Focused Physical Findings, Weight, Skin     Discharge Planning:     Too soon to determine     Electronically signed by Alphonse Quintana, MS, RD, LD on 10/20/21 at 1:19 PM EDT    Contact: [phone]

## 2021-10-20 NOTE — PATIENT CARE CONFERENCE
[facility] NOTE/PATIENT PLAN OF CARE    The physician was present and led this team conference    Date: 10/20/2021  Admission date: 10/13/2021  Patient Name: Harika Maria        MRN: [id number]    : 1945  (78 y.o.)  Gender: male   Rehab diagnosis/surgery with date:  CVA of 10/05/21  Impairment Group Code:  1.2      MEDICAL/FUNCTIONAL HISTORY/STATUS:  3 vessel CABG  21, post op course complicated by respiratory failure, acute kidney injury and a-fib. Noted to have right side weakness and neurology felt he suffered a CVA    Consultations/Labs/X-rays: nephrology following for acute kidney injury, Creatinine 2.2 on 21  Results for Payton Bruner (MRN [id number])    Ref.  Range 10/20/2021 05:44   Sodium Latest Ref Range: 132 - 146 mmol/L 139   Potassium Latest Ref Range: 3.5 - 5.0 mmol/L 4.4   Chloride Latest Ref Range: 98 - 107 mmol/L 105   CO2 Latest Ref Range: 22 - 29 mmol/L 25   BUN Latest Ref Range: 6 - 23 mg/dL 21   Creatinine Latest Ref Range: 0.7 - 1.2 mg/dL 1.8 (H)       MEDICATION UPDATE:  bumex restarted 10/19/21-1mg daily    NURSING :    Bowel:   Always Continent  [x]   Occasionally incontinent  []   Frequently incontinent  []   Always incontinent  []   Not occurred  []     Bladder:   Always Continent  [x]    Incontinent less than daily[]   Incontinent  daily []   Always incontinent  []   No urine output    []   Indwelling catheter []       Toilet Hygiene:   Current level : min assist  Short term Toilet hygiene goal: supervision  Long term toilet hygiene goal:  Modified independent    Skin integrity: sternal incision glued, left leg donor site healing,  left heel deep tissue injury and posterior scalp-wound care nurse saw 10/15/21  Pain: none    NUTRITION    Diet  pureed  Liquid consistency   nectar    SOCIAL INFORMATION:  Lives with: spouse  Prior community services:  none  Home Architecture:  mobile home, 5 entry steps, 1 rail  Prior Level of function:  independent, retired  DME:  none    FAMILY / PATIENT EDUCATION:  safety and self care are ongoing, wife allowed to assist patient in room  \"orange dot\"    PHYSICAL THERAPY    Bed mobility:   Current level: standby assist with verbal cues for sternal precautions  Short term bed mobility goal: supervision  Long term bed mobility goal: Modified independent    Chair/bed transfers:  Current level: min assist, retropulsive  Short term Chair/bed transfers goal: met  Long term Chair/bed transfers goal: supervision      Ambulation:   Current level: 100 ft no device  at min assist, limited by fatigue, shortness of breath  Short term ambulation goal: met  Long term ambulation goal: 250 ft at supervision      Car transfers:   Current level: to be assessed    Stairs:   Current level : 12 with 2 rails min assist, verbal cues  Short term stairs goal: met  Long term stairs goal: 12 at supervision    Other comments: family ed with spouse 10/18, she has an \" orange dot \"for in room only      OCCUPATIONAL THERAPY:      Tub/shower:   Current level: min assist  Short term tub/shower goal: Contact guard assist-standby assist  Long term tub/shower goal: standby assist      Feeding:  Current level: supervision  Short term feeding goal: Modified independent  Long term feeding goal: independent    Grooming:   Current level: supervision seated  Short term grooming goal: Modified independent  Long term grooming goal: Modified independent    Bathing:  Current level: shower at min assist  Short term bathing goal: Contact guard assist  Long term bathing goal: standby assist    Homemaking:   Current level: light activity,  min assist seated  Short term homemaking goal: met  Long term homemaking goal: min    Upper body dressing:  Current level: min assist  Short term upper body dressing goal: supervision  Long term upper body dressing goal: supervision    Lower body dressing: Current level: min assist with adaptive equipment              Short term lower body dressing goal: Contact guard assist          Long term lower body dressing goal: standby assist            Footwear  Current level: min assist  Short term goal: Contact guard assist  Long term goal:standby assist      Toilet transfer:   Current level: min assist  Short term toilet transfer goal: Contact guard assist  Long term toilet transfer goal: standby assist    Upper body strength issues: right upper is weak    Other comments: in motion arm to right upper         SPEECH THERAPY  Swallowing:  Current level:  min assist-repeat video this week  Short term swallowing goal: supervision  Long term swallowing Goal: Modified independent    Comprehension:   Current level: Modified independent  Short term comprehension goal: Modified independent  Long term comprehension goal: Modified independent    Expression:   Current level: supervision  Short term expression goal: Modified independent  Long term expression goal: Modified independent    Problem solving:   Current level: Modified independent-supervision  Short term problem solving goal: Modified independent  Long term problem solving goal: Modified independent    Memory:  Current level: supervision  Short term memory goal: Modified independent  Long term memory goal: Modified independent    Social interaction:  supervision    Safety awareness: fair +      Patient/family's personal goals: 'go to bathroom myself\"  Factors supporting goal achievement:  motivated, good support from spouse  Factors hindering goal achievement:  decreased endurance, sternal precautions, dysphagia    Discharge Plan   Estimated Length of Stay: 2 weeks            Destination: home  Services at Discharge: to be assessed  Equipment at Discharge: to be assessed      INTERDISCIPLINARY TEAM/PHYSICIAN RECOMMENDATION AND/OR REVISIONS OF PLAN OF CARE:  repeat video this week with hopes of advancing diet      I approve the established interdisciplinary plan of care as documented within the medical record of [employer]. Electronically signed by Nuha Ortiz RN  on 10/20/2021 at 8:30 AM  The following interdisciplinary team members were present:  CINTHIA Echols, HECTORW  RISA Urrutia, OTROBEL Cobb , [place] CCC-SLP

## 2021-10-20 NOTE — PROGRESS NOTES
[facility]  Comprehensive Progress Note    Subjective:      Lelo Munoz is a 78 y.o. male admitted to inpatient rehabilitation for impairments and acitivities limitations in ADLs, mobility, and cognition secondary to Acute CVA. No acute events overnight. No cp, sob, n/v. Pain is controlled. Tolerating therapy. No complaints. The patient's medical records have been reviewed. Scheduled Meds:sodium chloride flush, 5-40 mL, BID  ipratropium-albuterol, 1 ampule, Q4H WA  aspirin, 81 mg, Daily  vitamin C, 500 mg, BID  atorvastatin, 40 mg, Daily  bumetanide, 1 mg, Daily  carvedilol, 3.125 mg, BID WC  clopidogrel, 75 mg, Daily  enoxaparin, 30 mg, Daily  ferrous sulfate, 750 mg, BID WC  folic acid, 1 mg, Daily  nystatin, , BID  pantoprazole, 40 mg, QAM AC  [START ON 10/21/2021] amiodarone, 200 mg, Daily      Continuous Infusions:  PRN Meds:acetaminophen, 650 mg, Q4H PRN  bisacodyl, 5 mg, Daily PRN  oxyCODONE-acetaminophen, 1 tablet, Q4H PRN   Or  oxyCODONE-acetaminophen, 2 tablet, Q4H PRN  sennosides-docusate sodium, 1 tablet, BID PRN  diphenhydrAMINE, 25 mg, Nightly PRN  artificial tears, , PRN  ondansetron, 4 mg, Q8H PRN  sodium chloride, 1 spray, PRN  polyethylene glycol, 17 g, Daily PRN         Objective:      Vitals:    10/19/21 1300 10/19/21 1914 10/20/21 0700 10/20/21 0815   BP:    124/64   Pulse:    79   Resp: 16   18   Temp:    96.9 °F (36.1 °C)   TempSrc:    Tympanic   SpO2: 97% 96%     Weight:   155 lb 14.4 oz (70.7 kg)    Height:         General appearance: alert, up in chair, NAD  Eyes: conjunctivae/corneas clear. PERRL, EOM's intact. Lungs: clear to auscultation bilaterally  Heart: regular rate and rhythm, S1, S2  Abdomen: soft, non-tender, normal bowel sounds  Musculoskeletal: Range of motion normal and no gross abnormalities. 1+ dependent edema both ankles. No calf tenderness. Neurologic: Alert, oriented.  Dysarthric.      Motor Findings  Strength: Right  Strength: Left    Deltoid  3 4+   Biceps  3 4+   Triceps  3 4+   Wrist Extensors  3 4+   FDP 3 4+   Finger abductors 2 4+   Iliospoas  2 3   Quadriceps  4 5   Tibialis anterior  4 5   EHL 4 5   Gastroc soleus  4 5          Laboratory:    Lab Results   Component Value Date    WBC 6.9 10/17/2021    HGB 8.6 (L) 10/17/2021    HCT 26.5 (L) 10/17/2021    MCV 91.1 10/17/2021     10/17/2021     Lab Results   Component Value Date     10/20/2021    K 4.4 10/20/2021    K 4.2 10/14/2021     10/20/2021    CO2 25 10/20/2021    BUN 21 10/20/2021    CREATININE 1.8 10/20/2021    GLUCOSE 93 10/20/2021    CALCIUM 8.3 10/20/2021      Lab Results   Component Value Date    ALT 10 10/12/2021    AST 15 10/12/2021    ALKPHOS 47 10/12/2021    BILITOT 0.8 10/12/2021       Lab Results   Component Value Date    COLORU Yellow 09/23/2021    NITRU Negative 09/23/2021    GLUCOSEU Negative 09/23/2021    KETUA Negative 09/23/2021    UROBILINOGEN 0.2 09/23/2021    BILIRUBINUR Negative 09/23/2021     Lab Results   Component Value Date    LABA1C 5.1 09/10/2021         Functional Status:   Physical Therapy:   Bed mobility: SBA  Transfers: Min A   Ambulation: 30 ft Min A no AD        Occupational Therapy:  Feeding: Min A  Grooming: Mod A  UB dressing: Max A  LB dressing: Mod A       Assessment/Plan:       78 y.o. male admitted to inpatient rehabilitation for impairments and acitivities limitations in ADLs, mobility, and cognition secondary to Acute CVA. -Acute CVA: Right hemiparesis, dysarthria, cognitive impairment. Continue Aspirin, Plavix, Lipitor. Continue Acute Rehab evaluations. -CAD, s/p CABG x3: Continue current medications, sternal precautions.   -Post operative pain: Percocet PRN. Wean narcotics as tolerated  -Acute blood loss anemia: H&H low but stable, monitor.  Continue iron supplement   -Afib during acute care hospitalization, now in NSR: On amiodarone for Afib prophylaxis per CT surgery, with plans to wean after discharge  -Acute systolic heart failure: On coreg. On Bumex. -NGUYỄN: Nephrology following. Cr improved. -Hypokalemia: Potassium repleted, now WNL.  Replace PRN  -DVT prophylaxis: lovenox        Labs stable  Will repeat video swallow study, discussed with speech therapy  Team conference today      Electronically signed by Kimberly Jacob MD on 10/20/2021 at 12:37 PM

## 2021-10-20 NOTE — PROGRESS NOTES
OCCUPATIONAL THERAPY DAILY NOTE    Date:10/20/2021  Patient Name: Nery Paul  MRN: [id number]  : 1945  Room: [address] [place] [address]     Diagnosis: CVA- s/p CABG x 3- 21  Additional Pertinent Hx: HLD, HTN, CAD, ESRD, hx hiatal hernia  Precautions: Falls, Puree-Dysphagia IV/nectar & Sternal precautions & wife- Isis orange dot    Functional Assessment:   Date Status AE  Comments   Feeding 10/20/21 independent     Grooming 10/18/21 Sup           Oral Care 10/18/21 Sup      Bathing 10/18/21 Min A     UB Dressing 10/18/21 Min A     LB Dressing 10/18/21 Min A LB AE    Footwear 10/18/21 Min A Shoe horn    Toileting 10/18/21 Min a     Homemaking 10/19/21 Min A- standing  S/SBA- sitting  counter Pt stood to open & stack coffee cups then placed up in first level shelf only due to sternal precautions to increase hmkg and dyn/static standing balance. Pt folded washcloths seated at table while wearing 1# wts on BUE's to increase strength & endurance. Functional Transfers / Balance:   Date Status DME  Comments   Sit Balance 10/20/21 Sup  demo'd up in w/c during arm ex's to increase overall endurance & strength for functional tasks. Stand Balance 10/20/21 CGA No device    [] Tub  [x] Shower   Transfer 10/18/21 Min A Shower seat    Commode   Transfer 10/20/21 Min A No AD    Functional   Mobility 10/20/21 CGA No AD [facility]   Other:  Supine>Sit    Bed>W/c    Sit to stand   10/18/21    10/18/21    10/20/21   SBA    Min A    CGA    Pt require min vc's for body mechanics to facilitate sit<>stand     Functional Exercises / Activity:  Pt provided a home exercise program for pre-witing  & writing tasks using a built up pen using his R hand. Pt demo G- carryover using pen. Sensory / Neuromuscular Re-Education:   Pt tolerated Inmotion assessment using 14 cm [place]. Pt also sat in chair with arm straps & RUE ace wrapped for postioning.  Pt also tolerated pre & post testing, random & error augmentation protocol using his RUE. Pt made the following gains in between sets of the random protocol: robot initiation improved from 56/80 initiations to 78/80 initiations, distance from a target improved from 8 to 6 which means pt hit target with greater accuracy & robot power in [place] decreased from 64 to 49 which means pt powered more of the movement. Pt made the following gains from his pre to post testing the following areas: path error decreased from 0.010 to 0.009, smoothness improved from 0.551 to 0.569 & initiation time decreased from 0.084 seconds to 0.009 seconds. Pt tolerated a total of 624 total movements using his RUE. All InMotion exercises are performed to increase function of R UE to assist with ADL's and IADL's at home. Pt appreciates the immediate feedback that is provided using the InMotion arm. Pt is highly motivated & is beginning to engage in ADLs & IADLs in the home and attempt to incorporate R UE into functional tasks. Cognitive Skills:   Status Comments   Problem   Solving fair     Memory fair +    Sequencing fair +    Safety fair +      Visual Perception:    Education:  -Pt educated on benefits of using Inmotion robotic arm to facilitate neurological recovery for functional use of his RUE during functional task, ADLs & IADLs     [x] Family teach completed on:  10/18/21- Pt wife educated with regards to pt current levels & performed hands on training to perform commode trfs, short distance ambulation & bed mobility. Pt wife made an orange dot    Pain Level: 0/10. Additional Notes:   10/18/21- Recommend a 3:1 be ordered to increased height to improve pt ability to sit<>stand  10/20/21- Pt given HEP to work on writing skills    Patient has made good  progress during treatment sessions toward set goals.  Therapy emphasis to obtain goals: Strengthening, Gait Training, Patient/Caregiver Education & Training, Home Management Training, ROM, Equipment Evaluation, Education, & 14:10pm [x] Individual Tx- 25  [] Concurrent Tx -  [] Co-Tx -   [] Group Tx -   [x] Time Missed - 20  Pt off unit @ swallow study   Third Session    [] Individual Tx-   [] Concurrent Tx -  [] Co-Tx -   [] Group Tx -   [] Time Missed -         Total Tx Time 70 Mins     Hattie Triplett OTR/L [id number]

## 2021-10-20 NOTE — CARE COORDINATION
Per Team: Pt will have an ELOS of 2 weeks and will require 24hr discharge when discharged. Pt was updated as well as Wife Sekou Arrieta. LTG: Min A/ Mod I. Pt is not on insulin, not on iv-abx, and not on O2. Pt requires cues to maintain sternal precautions and is retro pulsive at times. Pt. Is utilizing the in motion arm with OT.  Pt. Is on a pureed/ nectar thick diet and has a repeat video for 10/20 or 10/21. DME: Will discuss closer to discharge. Aftercare: Will discuss closer to discharge. Family teaching:10/18 obs.   10/22 AM & PM.    Crystal WING intern  RANJIT Ramirez LSW

## 2021-10-20 NOTE — PROGRESS NOTES
Physical Therapy  Treatment note    NAME: Elan Ross  : 1945  MRN: [id number]    Date of Service: 10/20/2021    Evaluating Therapist: Rosa Christiansen., PT, DPT [id number]  ROOM: [address] [place] [address]  DIAGNOSIS: L cerebellar CVA   PRECAUTIONS: Falls, Sternal Precautions, Pureed diet w/ nectar thick liquids, **ORANGE DOT: Wife may assist with tx/amb pt to restroom in room**  HPI: s/p CABG on 21, Following CABG, patient developed hypotension, acidosis, NGUYỄN and respiratory failure and was intubated. Bronchoscopy done on  to remove large mucus plug. Required pressors and was placed on aortic balloon pump which was removed 10/4/21. Off of sedation was found to have right sided weakness. CT head negative for acute findings. Echo showed no LV mural thrombus. Carotid US with atherosclerotic disease -- 50-69% bilaterally. Unable to have CTAs due to kidney function. Unable to have MRI/MRA at this time due to epicardial wires. Repeat CT head with equivocal area of low density in the L cerebellum. Social:  Pt lives with wife in a mobile home with 5 stairs to enter and 1 rail. Pt also has home in [place] which is 1 story setup, unclear amount of steps. Prior to admission: Pt ambulated without device and was independent PTA. Initial Evaluation  Date: 10/14/21 AM     PM    Short Term Goals Long Term Goals    Was pt agreeable to Eval/treatment? yes yes yes     Does pt have pain?  Denies pain denies denies     Bed Mobility  Rolling: SBA  Supine to sit: modA  Sit to supine: modA  Scooting: SBA NT NT sup Mod i   Transfers Sit to stand: mod-maxA  Stand to sit: mod-maxA  Stand pivot: modA    5xSTS: NT Sit to stand: Asher  Stand to sit: Asher  Stand pivot: Asher Sit to stand: Asher  Stand to sit: Asher  Stand pivot: Asher Asher  5xSTS: < 15 sec sup  5xSTS: < 15 sec   Ambulation    10', no AD, maxA with w/c follow from 2nd helper    10mWT: NT  6mWT: NT 75'x2, 205'x2, 70'x1 no AD, Asher 230', 120', no AD, Asher 50', no AD, Asher    10mWT: TBD  6mWT: ', no AD, sup    10mWT: TBD  6mWT: TBD   Walking 10 feet on uneven surface NT due to safety NT NT 10', no AD,  Asher 50', no AD, sup   Wheel Chair Mobility NT NT NT n/a n/a   Car Transfers NT due to safety NT NT Asher sup   Stair negotiation: ascended and descended NT due to safety NT NT 4 steps, HR for balance only, Asher 12 steps, HR for balance only, sup   Curb Step:   ascended and descended 2\" curb, maxA, no AD NT NT 4\" curb, no AD, Asher 7.5\" curb, no AD, sup   Picking up object off the floor NT due to safety NT NT Will  cone with Asher assist Will  cone with sup assist   BLE ROM AROM WFL       BLE Strength LLE 5/5 except hip flexors which were 3/5  R hip flex 3/5   R knee ext/flex 5/5   R ankle DF/PF 5/5       Balance  Sitting- mod I  Standing-mod-maxA    BBS: NT  FGA: NT Sitting- mod I  Standing-Asher    BBS: NT  FGA: NT Sitting- mod I  Standing-Asher    BBS: NT  FGA: NT   BBS: > 45/56  FGA: >22/30   BBS: -  FGA: -   Date Family Teach Completed N/A  10/18/21 with wife Kole Bruno     Is additional Family Teaching Needed? Y or N Y  Y     Hindering Progress sternal precautions, dyscoordination, weakness, decreased endurance, anxiety sternal precautions, dyscoordination, weakness, decreased endurance, anxiety sternal precautions, dyscoordination, weakness, decreased endurance, anxiety     PT recommended ELOS 3-4 weeks       Team's Discharge Plan        Therapist at Team Meeting          Vitals:  HRmax: 146 BPM, adjusted for beta blocker medication Time spent at moderate intensity (60-75% HRmax,  BPM)    AM: 25 min 48 sec    PM: 4 min 43 sec     Therapeutic Exercise:     AM:   1. Resisted ambulation- 2x75\" vs purple theraband resistance, min-mod A to steady    PM:   1. LAQ, seated marches- 3x10 reps, 2# ankle weights each lower extremity  2. Hip abduction- 3x10 vs purple theraband resistance  3.  Hip adduciton ISO- 3x10 vs ball  Patient education    Pt educated on optimal BLE placement to stand from chair without UE support    Patient response to education:   Pt verbalized understanding Pt demonstrated skill Pt requires further education in this area   yes partial All areas     Additional Comments:   AM: Pt able to progress amb distance this session. Trialed resisted amb to incorporate error augmentation to have pt self correct for posterior lean during amb, pt with moderate carryover. During amb bouts, pt completing bouts at mostly SBA, but occasional lateral Lob at end of bout due to fatigue requiring steadying assist.    PM: Pt reporting significant fatigue this session, requesting light activity. Pt completed seated TE with increased time and effort but able to progress resistance. Pt progressing am distance for 1 bout, showing increased episodes of LOB this session due to fatigue. Hand off to OT at end of session. Cleared by MD for high intensity training, working up to 85% HR max, will continue to progress with HIGT in future sessions. AM  Time in: 0915  Time out: 1000    PM  Time in: 1300  Time out: 4257    Pt is making good progress toward established Physical Therapy goals. Continue with physical therapy current plan of care.     Christina Mccray, PT, DPT  [id number]

## 2021-10-20 NOTE — PROGRESS NOTES
Department of Internal Medicine  Nephrology Progress Note      Events reviewed. SUBJECTIVE: We are following Mr. Mehul [address] for NGUYỄN. Reports no complaints.     PHYSICAL EXAM:      Vitals:    VITALS:  /64   Pulse 79   Temp 96.9 °F (36.1 °C) (Tympanic)   Resp 18   Ht 5' 8\" (1.727 m)   Wt 155 lb 14.4 oz (70.7 kg)   SpO2 97%   BMI 23.70 kg/m²   24HR INTAKE/OUTPUT:      Intake/Output Summary (Last 24 hours) at 10/20/2021 1301  Last data filed at 10/20/2021 1120  Gross per 24 hour   Intake 300 ml   Output 600 ml   Net -300 ml     Constitutional: Patient is awake, alert in no distress  HEENT: Pupils are equal reactive  Respiratory: Decreased breath sounds at the bases  Cardiovascular/Edema: Heart sounds are regular  Gastrointestinal: Abdomen soft  Neurologic: Patient alert   Other: +trace edema      Scheduled Meds:   sodium chloride flush  5-40 mL IntraVENous BID    ipratropium-albuterol  1 ampule Inhalation Q4H WA    aspirin  81 mg Oral Daily    vitamin C  500 mg Oral BID    atorvastatin  40 mg Oral Daily    bumetanide  1 mg Oral Daily    carvedilol  3.125 mg Oral BID WC    clopidogrel  75 mg Oral Daily    enoxaparin  30 mg SubCUTAneous Daily    ferrous sulfate  325 mg Oral BID WC    folic acid  1 mg Oral Daily    nystatin   Topical BID    pantoprazole  40 mg Oral QAM AC    [START ON 10/21/2021] amiodarone  200 mg Oral Daily     Continuous Infusions:    PRN Meds:.acetaminophen, bisacodyl, oxyCODONE-acetaminophen **OR** oxyCODONE-acetaminophen, sennosides-docusate sodium, diphenhydrAMINE, artificial tears, ondansetron, sodium chloride, polyethylene glycol    DATA:    CBC:   Lab Results   Component Value Date    WBC 6.9 10/17/2021    RBC 2.91 10/17/2021    HGB 8.6 10/17/2021    HCT 26.5 10/17/2021    MCV 91.1 10/17/2021    MCH 29.6 10/17/2021    MCHC 32.5 10/17/2021    RDW 15.3 10/17/2021     10/17/2021    MPV 10.8 10/17/2021     CMP:    Lab Results   Component Value Date     10/20/2021    K 4.4 10/20/2021    K 4.2 10/14/2021     10/20/2021    CO2 25 10/20/2021    BUN 21 10/20/2021    CREATININE 1.8 10/20/2021    GFRAA 45 10/20/2021    LABGLOM 37 10/20/2021    GLUCOSE 93 10/20/2021    PROT 5.4 10/12/2021    LABALBU 3.1 10/12/2021    CALCIUM 8.3 10/20/2021    BILITOT 0.8 10/12/2021    ALKPHOS 47 10/12/2021    AST 15 10/12/2021    ALT 10 10/12/2021     Magnesium:    Lab Results   Component Value Date    MG 1.9 10/17/2021     Phosphorus:    Lab Results   Component Value Date    PHOS 2.8 10/10/2021        Radiology Review:      CXR 10/3/21   1. Median sternotomy changes. 2. Bilateral pleuroparenchymal opacities that could be related to pleural   effusion and edema.  The appearance of the chest is about the same or   slightly worse.         Chest x-ray October 6, 2021   1. Stable appearance of the postoperative chest.   2. Bilateral chest tubes remain in position.  There is no right or left   pneumothorax. 3. Small right pleural effusion         Chest x-ray October 8, 2021   Minimal bibasilar atelectasis.       Trace right pleural effusion       There is no pneumothorax             BRIEF SUMMARY OF INITIAL CONSULT:    Briefly Mr. Abbey Omalley is a 30-year-old man with history of HTN, CAD with recent status cardiac catheterization done on September 9 which showed severe multivessel disease, hyperlipidemia, hiatal hernia, who was admitted for elective CABG on September 27, 2021. On admission his creatinine level was 0.8 mg/dL and post surgery his creatinine has progressively increased up to 2.3 mg/dL, reason for this consultation. Postoperatively she developed persistent hypotension, lactic acidosis and respiratory failure for which he was reintubated. Since then she has required multiple pressors and he was placed on IABP. He had received 1 dose of ketorolac perioperatively.  His urine output has significantly decrease and is being about 500 cc/day in the last 48 hours and his fluid balance presently is over 9 L. Problems resolved:    · Cardiogenic shock, hemodynamically more stable, pressor support decrease, still on IABP. Tentative plan for removal of IABP. Pro-BP 16,936  · Hypernatremia with hypervolemia, 2/2 sodium containing IV fluid resuscitation and underlying heart failure. Resolved with  natriuresis and free water (add D5W)  · Hypokalemia, 2/2 diuretics, potassium levels improve  · Respiratory alkalosis (pH: 7.554, PCO2: 43.5) with metabolic alkalosis (bicarbonate administration)  · Respiratory failure status post intubation  · Thrombocytopenia  · Transaminitis with hyperbilirubinemia, possibly shock liver versus congestive liver  · Probably pneumonia, on piperacillin-tazobactam  · Hypokalemia, 2/2 diuretics, potassium levels improved. IMPRESSION/RECOMMENDATIONS:      1. NGUYỄN stage III, CABG associated NGUYỄN, ischemic ATN, non-oliguric. FEUrea 18.8%. Renal function has remained stable the past 48 hours. 2. Hypokalemia, 2/2 diuretics, potassium levels improved  3. HFmEF 40% with stage IDD, proBNP 16,826 > 9461> 7228 > 5112, proBNP levels continues to improve    ---------------------------------------------------------    4. CAD status post CABG x3 September 27, 2021, on ASA, clopidogrel, carvedilol, atorvastatin  5. Amiodarone therapy, for AF prophylaxis  6.  Normocytic anemia, status post surgery, status post transfusion, iron saturation 36%, on p.o. iron      Plan:    · Continue Bumex 1 mg p.o. daily  · Continue to monitor renal function for recovery ([facility] MWF)      Electronically signed by JUAN MIGUEL Nash CNP on 10/20/2021 at 1:02 PM

## 2021-10-21 PROCEDURE — 97129 THER IVNTJ 1ST 15 MIN: CPT

## 2021-10-21 PROCEDURE — 6360000002 HC RX W HCPCS

## 2021-10-21 PROCEDURE — 6370000000 HC RX 637 (ALT 250 FOR IP)

## 2021-10-21 PROCEDURE — 97530 THERAPEUTIC ACTIVITIES: CPT

## 2021-10-21 PROCEDURE — 92526 ORAL FUNCTION THERAPY: CPT

## 2021-10-21 PROCEDURE — 97112 NEUROMUSCULAR REEDUCATION: CPT

## 2021-10-21 PROCEDURE — 1280000000 HC REHAB R&B

## 2021-10-21 PROCEDURE — 97130 THER IVNTJ EA ADDL 15 MIN: CPT

## 2021-10-21 PROCEDURE — 94640 AIRWAY INHALATION TREATMENT: CPT

## 2021-10-21 PROCEDURE — 97110 THERAPEUTIC EXERCISES: CPT

## 2021-10-21 PROCEDURE — 99232 SBSQ HOSP IP/OBS MODERATE 35: CPT | Performed by: PHYSICAL MEDICINE & REHABILITATION

## 2021-10-21 RX ADMIN — NYSTATIN: 100000 OINTMENT TOPICAL at 09:02

## 2021-10-21 RX ADMIN — BUMETANIDE 1 MG: 1 TABLET ORAL at 08:55

## 2021-10-21 RX ADMIN — CLOPIDOGREL 75 MG: 75 TABLET, FILM COATED ORAL at 08:55

## 2021-10-21 RX ADMIN — FERROUS SULFATE TAB 325 MG (65 MG ELEMENTAL FE) 325 MG: 325 (65 FE) TAB at 08:56

## 2021-10-21 RX ADMIN — IPRATROPIUM BROMIDE AND ALBUTEROL SULFATE 1 AMPULE: .5; 2.5 SOLUTION RESPIRATORY (INHALATION) at 17:08

## 2021-10-21 RX ADMIN — AMIODARONE HYDROCHLORIDE 200 MG: 200 TABLET ORAL at 08:56

## 2021-10-21 RX ADMIN — OXYCODONE HYDROCHLORIDE AND ACETAMINOPHEN 500 MG: 500 TABLET ORAL at 20:42

## 2021-10-21 RX ADMIN — DOCUSATE SODIUM 50 MG AND SENNOSIDES 8.6 MG 1 TABLET: 8.6; 5 TABLET, FILM COATED ORAL at 16:08

## 2021-10-21 RX ADMIN — FERROUS SULFATE TAB 325 MG (65 MG ELEMENTAL FE) 325 MG: 325 (65 FE) TAB at 16:08

## 2021-10-21 RX ADMIN — ATORVASTATIN CALCIUM 40 MG: 40 TABLET, FILM COATED ORAL at 20:42

## 2021-10-21 RX ADMIN — CARVEDILOL 3.12 MG: 3.12 TABLET, FILM COATED ORAL at 08:56

## 2021-10-21 RX ADMIN — ENOXAPARIN SODIUM 30 MG: 100 INJECTION SUBCUTANEOUS at 08:56

## 2021-10-21 RX ADMIN — IPRATROPIUM BROMIDE AND ALBUTEROL SULFATE 1 AMPULE: .5; 2.5 SOLUTION RESPIRATORY (INHALATION) at 21:22

## 2021-10-21 RX ADMIN — OXYCODONE HYDROCHLORIDE AND ACETAMINOPHEN 500 MG: 500 TABLET ORAL at 08:57

## 2021-10-21 RX ADMIN — ASPIRIN 81 MG: 81 TABLET ORAL at 08:55

## 2021-10-21 RX ADMIN — FOLIC ACID 1 MG: 1 TABLET ORAL at 08:56

## 2021-10-21 RX ADMIN — NYSTATIN: 100000 OINTMENT TOPICAL at 20:43

## 2021-10-21 RX ADMIN — PANTOPRAZOLE SODIUM 40 MG: 40 TABLET, DELAYED RELEASE ORAL at 06:35

## 2021-10-21 RX ADMIN — CARVEDILOL 3.12 MG: 3.12 TABLET, FILM COATED ORAL at 16:08

## 2021-10-21 ASSESSMENT — PAIN SCALES - GENERAL: PAINLEVEL_OUTOF10: 0

## 2021-10-21 NOTE — PROGRESS NOTES
Physical Therapy  Treatment note    NAME: Suzette Diallo  : 1945  MRN: [id number]    Date of Service: 10/20/2021    Evaluating Therapist: Taryn Esquivel., PT, DPT [id number]  ROOM: [address] [place] [address]  DIAGNOSIS: L cerebellar CVA   PRECAUTIONS: Falls, Sternal Precautions, Pureed diet w/ nectar thick liquids, **ORANGE DOT: Wife may assist with tx/amb pt to restroom in room**  HPI: s/p CABG on 21, Following CABG, patient developed hypotension, acidosis, NGUYỄN and respiratory failure and was intubated. Bronchoscopy done on  to remove large mucus plug. Required pressors and was placed on aortic balloon pump which was removed 10/4/21. Off of sedation was found to have right sided weakness. CT head negative for acute findings. Echo showed no LV mural thrombus. Carotid US with atherosclerotic disease -- 50-69% bilaterally. Unable to have CTAs due to kidney function. Unable to have MRI/MRA at this time due to epicardial wires. Repeat CT head with equivocal area of low density in the L cerebellum. Social:  Pt lives with wife in a mobile home with 5 stairs to enter and 1 rail. Pt also has home in [place] which is 1 story setup, unclear amount of steps. Prior to admission: Pt ambulated without device and was independent PTA. Initial Evaluation  Date: 10/14/21 AM     PM    Short Term Goals Long Term Goals    Was pt agreeable to Eval/treatment? yes yes yes     Does pt have pain?  Denies pain denies denies     Bed Mobility  Rolling: SBA  Supine to sit: modA  Sit to supine: modA  Scooting: SBA NT NT sup Mod i   Transfers Sit to stand: mod-maxA  Stand to sit: mod-maxA  Stand pivot: modA    5xSTS: NT Sit to stand: Asher  Stand to sit: Asher  Stand pivot: Asher Sit to stand: Asher  Stand to sit: Asher  Stand pivot: Asher    Asher  5xSTS: < 15 sec sup  5xSTS: < 15 sec   Ambulation    10', no AD, maxA with w/c follow from 2nd helper    10mWT: NT  6mWT:  feet x 2 without device with min A 230', Asher, no AD 50', no AD, Asher    10mWT: TBD  6mWT: ', no AD, sup    10mWT: TBD  6mWT: TBD   Walking 10 feet on uneven surface NT due to safety NT NT 10', no AD,  Asher 50', no AD, sup   Wheel Chair Mobility NT NT NT n/a n/a   Car Transfers NT due to safety Min A NT Asher sup   Stair negotiation: ascended and descended NT due to safety 12 steps with bilateral rails with min A ( rails used for balance only)  NT 4 steps, HR for balance only, Asher 12 steps, HR for balance only, sup   Curb Step:   ascended and descended 4\" curb, maxA, no AD NT NT 4\" curb, no AD, Asher 7.5\" curb, no AD, sup   Picking up object off the floor NT due to safety NT NT Will  cone with Asher assist Will  cone with sup assist   BLE ROM AROM WFL       BLE Strength LLE 5/5 except hip flexors which were 3/5  R hip flex 3/5   R knee ext/flex 5/5   R ankle DF/PF 5/5       Balance  Sitting- mod I  Standing-mod-maxA    BBS: NT  FGA: NT Sitting- mod I  Standing-Asher    BBS: NT  FGA: NT    BBS: > 45/56  FGA: >22/30   BBS: -  FGA: -   Date Family Teach Completed N/A  10/18/21 with wife Oriana Robles     Is additional Family Teaching Needed? Y or N Y  Y     Hindering Progress sternal precautions, dyscoordination, weakness, decreased endurance, anxiety sternal precautions, dyscoordination, weakness, decreased endurance, anxiety sternal precautions, dyscoordination, weakness, decreased endurance, anxiety     PT recommended ELOS 3-4 weeks       Team's Discharge Plan        Therapist at Team Meeting          Therapeutic Exercise:   PM:   1. Amb with weighted extremity- 200', 2 # ankle weight on RLE to strengthen  Limb advancement, Asher  2. Standing hip abduction- 3x10 each, 2 # ankle weights on each lower extremity  3.  Standing hip flexion- 3x10 each,2 # ankle weight son each lower extremity  Patient education    Pt educated on optimal BLE placement to stand from chair without UE support    Patient response to education:   Pt verbalized understanding Pt demonstrated skill Pt requires further education in this area   yes partial All areas     Additional Comments:  PM: Worked on strengthening of BLE, pt requiring increased time and effort to complete, but tolerating activity well. Will continue to progress as tolerated. PM  Time in: 1300  Time out: [phone]    Pt is making good progress toward established Physical Therapy goals. Continue with physical therapy current plan of care. Sydnee Sears, PT, DPT  [id number]      Patient education    Pt educated on proper foot placement with stair negotiation     Patient response to education:   Pt verbalized understanding Pt demonstrated skill Pt requires further education in this area   x Partially with verbal cues x     Additional Comments:   AM: Pt completed functional mobility as noted above. Pt fatigues quickly with with ambulation resulting in unsteadiness and decreased balance. Verbal cues for proper foot  placement with stair negotiation to improve safety and balance. Verbal cues for proper technique with car transfers with good carry over. AM  Time in: 0915  Time out: 1000        Pt is making good progress toward established Physical Therapy goals. Continue with physical therapy current plan of care.     Pineda Hung [id number]    Co-signed @ 10/21/2021 16:31  by Sydnee Rendon., PT,DPT, [id number]

## 2021-10-21 NOTE — PROGRESS NOTES
OCCUPATIONAL THERAPY DAILY NOTE    Date:10/21/2021  Patient Name: Robert Gonzalez  MRN: [id number]  : 1945  Room: [address] [place] [address]     Diagnosis: CVA- s/p CABG x 3- 21  Additional Pertinent Hx: HLD, HTN, CAD, ESRD, hx hiatal hernia  Precautions: Falls, Puree-Dysphagia IV/nectar & Sternal precautions & wife- Isis orange dot    Functional Assessment:   Date Status AE  Comments   Feeding 10/20/21 independent     Grooming 10/18/21 Sup           Oral Care 10/18/21 Sup      Bathing 10/18/21 Min A     UB Dressing 10/18/21 Min A     LB Dressing 10/18/21 Min A LB AE    Footwear 10/18/21 Min A Shoe horn    Toileting 10/18/21 Min a     Homemaking 10/19/21 Min A- standing  S/SBA- sitting  counter      Functional Transfers / Balance:   Date Status DME  Comments   Sit Balance 10/21/21 Sup  demo'd up in w/c during arm ex's to increase overall endurance & strength for functional tasks. Stand Balance 10/21/21 SBA No device    [] Tub  [x] Shower   Transfer 10/18/21 Min A Shower seat    Commode   Transfer 10/21/21 SBA No AD    Functional   Mobility 10/21/21 SBA No AD Ambulating in his room, on the unit & throughout the OT gym   Other:  Supine>Sit    Bed>W/c    Sit to stand   10/18/21    10/18/21    10/21/21   SBA    Min A    SBA    Pt require min vc's for body mechanics to facilitate sit<>stand     Functional Exercises / Activity:  Pt presents seated up in his wc & was agreeable to OT intervention. Pt demo Sup sit>stand with min vc's fo body mechanics & safety. Pt demo SBA to ambulate in his room, hallways & throughout the OT gym. Pt demo SBA commode trf using no device to a standard commode &min vc's for precautions. Pt demo SBA to ambulate with no device to a chair with arm rests & min vc's for precautions & body mechanics. Pt tolerated [address] dexterity activities using small, pegs, washers & cylinders with G tolerance.   AROM and light strength exercises to RUE: peyton box with 5 # wt 3 sets 10 reps in all planes on table top surface   Gross motor coordination to RUE with pt completing MRMT with good tip to tip prehension pattern   R hand strength exercises with 5 # digi flex 3 sets 10 reps   R wrist AROM  pt completed jux- a cisor x 5 reps     Pt demo the following gains since his evaluation on 10/14/21   strength:  R hand 10/21/21- 36#  On eval 20#  Norm75#  L hand 10/21/21- 66#  On eval 57#  Norm 65#    9-hole peg test  R hand 10/21/21- 30.6 seconds On eval 1 min 33.6 sec & handed pegs  Norm 25.8 sec  L hand 10/21/21- 25.8 sec  On eval 34.8 sec    Norm 26.0 sec    Sensory / Neuromuscular Re-Education:   Pt tolerated Inmotion assessment using 14 cm [place]. Pt also sat in chair with arm straps & RUE ace wrapped for postioning. Pt also tolerated pre & post testing, random & error augmentation protocol using his RUE. Pt made the following gains in between sets of the random protocol: robot initiation improved from 56/80 initiations to 78/80 initiations, distance from a target improved from 8 to 6 which means pt hit target with greater accuracy & robot power in [place] decreased from 64 to 49 which means pt powered more of the movement. Pt made the following gains from his pre to post testing the following areas: path error decreased from 0.010 to 0.009, smoothness improved from 0.551 to 0.569 & initiation time decreased from 0.084 seconds to 0.009 seconds. Pt tolerated a total of 624 total movements using his RUE. All InMotion exercises are performed to increase function of R UE to assist with ADL's and IADL's at home. Pt appreciates the immediate feedback that is provided using the InMotion arm. Pt is highly motivated & is beginning to engage in ADLs & IADLs in the home and attempt to incorporate R UE into functional tasks.      Cognitive Skills:   Status Comments   Problem   Solving fair     Memory fair +    Sequencing fair +    Safety fair +      Visual Perception:    Education:  -Pt educated on gains made on [facility] & strength that have facilitated ADLs. Pt also educated with regards to precautions during mobility & transfers. Pt demo G carryover of home exercise program he was given     [x] Family teach completed on:  10/18/21- Pt wife educated with regards to pt current levels & performed hands on training to perform commode trfs, short distance ambulation & bed mobility. Pt wife made an orange dot    Pain Level: 0/10. Additional Notes:   10/18/21- Recommend a 3:1 be ordered to increased height to improve pt ability to sit<>stand    Patient has made good  progress during treatment sessions toward set goals. Therapy emphasis to obtain goals: Strengthening, Gait Training, Patient/Caregiver Education & Training, Home Management Training, ROM, Equipment Evaluation, Education, & procurement, Balance Training, Neuromuscular Re-education, Cognitive Reorientation, Safety Education & Training, Self-Care/ADL    Long term goal 1: Pt demo independent to eat all meals  Long term goal 2: Pt demo Mod I grooming seated or standing & demo G- safety  Long term goal 3: Pt demo SBA-CGA bathing @ shower level both seated & standing  Long term goal 4: Pt demo s/u UE dress & SBA-CGA LE dress including underwear, pants, socks & shoes & demo G- endurance  Long term goal 5: Pt demo SBA-CGA commode trf with appropriate DME to ensure pt safety.  Pt demo SBA toileting & demo G- safety & insight  Long term goals 6: Pt demo SBA-CGA walk in shower trf using appropriate DME to ensure pt safety  Long term goal 7: Pt demo Min A light homemaking activity & demo G- safety & insight  Long term goal 8: Pt demo improved FMC/ strength to improve pt ability to complete ADLs as evidenced by gains inthe followin-hole peg test- R hand pt handed pegs as he was u/a to pick them up & completed in 1 minute & 33.6 seconds & norm for pt age & gender is 25.8 seconds, L hand 34.8 seconds & norm for pt age & gender is 26.0 seconds;  strength- R hand 20# & norm for pt age & gender is 75# & L hand 57# & normf or pt age & gender is 65#  Long term goal 9: Pt demo G- endurance for a 20-30 minute functional activity    10/20/21 Pt plan of care updated on this date. Tentative length of stay is 2 weeks. Thomas Aguila OTR/L [id number]    [x] Continue with current OT Plan of care.   [] Prepare for Discharge     DISCHARGE RECOMMENDATIONS  Recommended DME:    Post Discharge Care:   []Home Independently  []Home with 24hr Care / Supervision []Home with Partial Supervision []Home with Home Health OT []Home with Out Pt OT []Other: ___   Comments:         Time in Time out Tx Time Breakdown  Variance:   First Session  0830 0915 [x] Individual Tx-45 Mins  [] Concurrent Tx -  [] Co-Tx -   [] Group Tx -   [] Time Missed -     Second Session 1330 [id number]  [x] Individual Tx-45   [] Concurrent Tx -  [] Co-Tx -   [] Group Tx -   [] Time Missed -     Third Session    [] Individual Tx-   [] Concurrent Tx -  [] Co-Tx -   [] Group Tx -   [] Time Missed -         Total Tx Time: 90 mins      Karen Kramer OTR/L [facility] OTR/L [id number]

## 2021-10-21 NOTE — PROGRESS NOTES
Speech Language Pathology  ACUTE REHABILITATION  DAILY PROGRESS NOTE / DISCHARGE SUMMARY    ADMITTING DIAGNOSIS: [facility] [I63.9]     VISIT DIAGNOSIS: Acute CVA (cerebrovascular accident) ([facility] [I63.9]                  DYSPHAGIA DIAGNOSIS:  Mild pharyngeal phase dysphagia      Laryngeal Penetration and Aspiration:  Neither penetration nor aspiration was observed in today's study                 DIET RECOMMENDATIONS:  Regular consistency solids (IDDSI level 7) with  thin liquids (IDDSI level 0)     FEEDING RECOMMENDATIONS:               Assistance level:  Stand by assistance is needed during all oral intake                 Compensatory strategies recommended: Double swallow, Effortful swallow, Small bites/sips and Alternate solids and liquids                 Discussed recommendations with nursing and/or faxed report to referring provider: Yes    [address] cognitive linguistic skills, resolving dysarthria.     FIMS    Swallowing                          Current Status             6--Modified Independent                       Short Term Goal         6--Modified Independent                       Long Term Goal          6--Modified Independent       Receptive                          Current Status             6--Modified Independent                       Short Term Goal         6--Modified Independent                       Long Term Goal          6--Modified Independent              Expressive                          Current Status             6--Modified Independent                       Short Term Goal         6--Modified Independent                       Long Term Goal          6--Modified Independent            Social Interaction                          Current Status             6--Modified Independent                       Short Term Goal         6--Modified Independent                       Long Term Goal          6--Modified Independent 10/20/2021  Speech Pathologist (SLP) completed education with the patient and his wife regarding type of swallowing impairment. Reviewed current solid/liquid consistency diet recommendations and discussed compensatory strategies to ensure safe PO intake. Reviewed aspiration precautions. Encouraged patient and his wife to engage SLP in unstructured Q&A session relative to identified deficit areas; indicated understanding of all information provided via satisfactory verbal response. 10/21/2021  Speech language pathologist (SLP) completed education with the patient regarding results of evaluation. Explained that speech pathology intervention is not warranted at this time. Encouraged patient to engage SLP in structured Q&A session. Patient indicated understanding of all information provided via satisfactory verbal response. OUTCOME:    Patient met all goals. Additional SP intervention is not recommended.     SP recommended after discharge:  No  Supervision recommended at discharge: None for cognition; defer to PT/OT for physical needs

## 2021-10-21 NOTE — PROGRESS NOTES
[facility]  Comprehensive Progress Note    Subjective:      Elan Ross is a 78 y.o. male admitted to inpatient rehabilitation for impairments and acitivities limitations in ADLs, mobility, and cognition secondary to Acute CVA. No acute events overnight. No cp, sob, n/v. Pain is controlled. Tolerating therapy. No complaints. The patient's medical records have been reviewed. Scheduled Meds:sodium chloride flush, 5-40 mL, BID  ipratropium-albuterol, 1 ampule, Q4H WA  aspirin, 81 mg, Daily  vitamin C, 500 mg, BID  atorvastatin, 40 mg, Daily  bumetanide, 1 mg, Daily  carvedilol, 3.125 mg, BID WC  clopidogrel, 75 mg, Daily  enoxaparin, 30 mg, Daily  ferrous sulfate, 719 mg, BID WC  folic acid, 1 mg, Daily  nystatin, , BID  pantoprazole, 40 mg, QAM AC  amiodarone, 200 mg, Daily      Continuous Infusions:  PRN Meds:acetaminophen, 650 mg, Q4H PRN  bisacodyl, 5 mg, Daily PRN  oxyCODONE-acetaminophen, 1 tablet, Q4H PRN   Or  oxyCODONE-acetaminophen, 2 tablet, Q4H PRN  sennosides-docusate sodium, 1 tablet, BID PRN  diphenhydrAMINE, 25 mg, Nightly PRN  artificial tears, , PRN  ondansetron, 4 mg, Q8H PRN  sodium chloride, 1 spray, PRN  polyethylene glycol, 17 g, Daily PRN         Objective:      Vitals:    10/20/21 0815 10/20/21 1257 10/20/21 1307 10/21/21 0845   BP: 124/64   (!) 145/72   Pulse: 79   92   Resp: 18 18  18   Temp: 96.9 °F (36.1 °C)   97.3 °F (36.3 °C)   TempSrc: Tympanic   Temporal   SpO2:  97%  97%   Weight:       Height:   5' 8\" (1.727 m)      General appearance: alert, up in chair, NAD  Eyes: conjunctivae/corneas clear. PERRL, EOM's intact. Lungs: clear to auscultation bilaterally  Heart: regular rate and rhythm, S1, S2  Abdomen: soft, non-tender, normal bowel sounds  Musculoskeletal: Range of motion normal and no gross abnormalities. dependent edema both ankles. No calf tenderness. Neurologic: Alert, oriented.  Dysarthric.      Motor Findings  Strength: Right  Strength: Left    Deltoid  4 5   Biceps  4 5   Triceps  4 5   Wrist Extensors  4 5   FDP 4 5   Finger abductors 3+ 5   Iliospoas  2 3   Quadriceps  4 5   Tibialis anterior  4 5   EHL 4 5   Gastroc soleus  4 5          Laboratory:    Lab Results   Component Value Date    WBC 6.9 10/17/2021    HGB 8.6 (L) 10/17/2021    HCT 26.5 (L) 10/17/2021    MCV 91.1 10/17/2021     10/17/2021     Lab Results   Component Value Date     10/20/2021    K 4.4 10/20/2021    K 4.2 10/14/2021     10/20/2021    CO2 25 10/20/2021    BUN 21 10/20/2021    CREATININE 1.8 10/20/2021    GLUCOSE 93 10/20/2021    CALCIUM 8.3 10/20/2021      Lab Results   Component Value Date    ALT 10 10/12/2021    AST 15 10/12/2021    ALKPHOS 47 10/12/2021    BILITOT 0.8 10/12/2021       Lab Results   Component Value Date    COLORU Yellow 09/23/2021    NITRU Negative 09/23/2021    GLUCOSEU Negative 09/23/2021    KETUA Negative 09/23/2021    UROBILINOGEN 0.2 09/23/2021    BILIRUBINUR Negative 09/23/2021     Lab Results   Component Value Date    LABA1C 5.1 09/10/2021         Functional Status:   Physical Therapy:   Bed mobility: SBA  Transfers: Min A   Ambulation: 230 ft Min A no AD        Occupational Therapy:  Feeding: Independent   Grooming: Supervision   UB dressing: Min A  LB dressing: Min A         Assessment/Plan:       78 y.o. male admitted to inpatient rehabilitation for impairments and acitivities limitations in ADLs, mobility, and cognition secondary to Acute CVA. -Acute CVA: Right hemiparesis, dysarthria, dysphagia, cognitive impairment. Continue Aspirin, Plavix, Lipitor. Continue Acute Rehab evaluations.   -Dysphagia: Passed repeat video swallow study on 10/20, diet upgraded to general. Speech therapy swallowing. -CAD, s/p CABG x3: Continue current medications, sternal precautions.   -Post operative pain: Percocet PRN. Wean narcotics as tolerated  -Acute blood loss anemia: H&H low but stable, monitor.  Continue iron supplement   -Afib during acute care hospitalization, now in NSR: On amiodarone for Afib prophylaxis per CT surgery, with plans to wean after discharge  -Acute systolic heart failure: On coreg. On Bumex. -NGUYỄN: Nephrology following. Cr improved. -Hypokalemia: Potassium repleted, now WNL.  Replace PRN  -DVT prophylaxis: lovenox        Patient passed video swallow study yesterday, diet upgraded to general diet  Labs in am  Progressing in therapy      Electronically signed by Vamshi Contreras MD on 10/21/2021 at 11:39 AM

## 2021-10-21 NOTE — PROGRESS NOTES
report to referring provider: No secondary to no diet/liquid change recommended        SPEECH THERAPY  PLAN OF CARE   The dysphagia POC is established based on physician order, dysphagia diagnosis and results of clinical assessment      Skilled SLP intervention for dysphagia management on acute care 3-5 x per week until goals met, pt plateaus in function and/or discharged from hospital     Conditions Requiring Skilled Therapeutic Intervention for dysphagia:              Per most recent MBSS completed on 10/11/21. .. Reduced pharyngeal clearing of the bolus  Reduced laryngeal closure resulting in penetration     Specific dysphagia interventions to include:      Compensatory strategy training   Therapeutic exercises  Therapeutic intervention to facilitate implementation of energy conservation techniques during intake to decrease potential for aspiration associated with compromised swallow/breathing sequence.     Specific instructions for next treatment:  ongoing PO analysis to upgrade diet and evaluate tolerance of current PO recommendation  Patient Treatment Goals:     Short Term Goals:  Pt will implement identified compensatory swallowing strategies on 90% of opportunities or greater to improve airway protection and swallow function. Pt will participate in ongoing mealtime assessment to provide diet modification and compensatory strategy implementation to minimize risk of aspiration associated with PO intake  Pt will complete BOTR strength/ ROM exercises to reduce pharyngeal residuals and improve epiglottic inversion minimal verbal prompts  Pt will complete laryngeal strength/ ROM therapeutic exercises to improve airway protection for the least restrictive PO diet minimal verbal prompts  Pt will complete Shaker and/or Chin Tuck Against Resistance (CTAR) to increase UES relaxation diameter and increase anterior laryngeal excursion to reduce pharyngeal residuals and reduce risk of pen/asp with minimal verbal prompts. Goal         6--Modified Independent                       Long Term Goal          6--Modified Independent                          Memory                          Current Status             5--Supervision               Short Term Goal         6--Modified Independent                       Long Term Goal          6--Modified Independent                          SWALLOWING:      Diet:  Dysphagia 1, Pureed solids with nectar consistency (mildly thick - IDDSI level 2) liquids       10/21/21: educated patient and his wife on MBSS results, upgraded diet, and beneficial compensatory strategies. Both patient and wife demonstrated good understanding. Patient ate lunch in his room with SLP monitoring intake to ensure good toleration of upgraded diet. No overt s/s of aspiration noted. Good use of compensatory strategies. LANGUAGE:     Patient participated in an organization task including naming 5 items given a specific category. Patient completed the task with good outcome with timely responses and min v/c. Fair+ outcome with a verbal reasoning task. Patient was read aloud a riddle and was instructed to choose an answer in a field of 4. COGNITION:      Not seen for SP intervention this date         Safety:  Fair+    SPEECH:     Not seen for SP intervention this date    SP recommended after discharge: To be determined  Supervision recommended at discharge: To be determined    Will continue SP intervention as per previously established POC. EDUCATION:     Speech Pathologist (SLP) completed education with the patient/family regarding type of swallowing impairment. Reviewed current solid/liquid consistency diet recommendations and discussed compensatory strategies to ensure safe PO intake. Reviewed aspiration precautions.  Encouraged patient and/or family to engage SLP in unstructured Q&A session relative to identified deficit areas; indicated understanding of all information provided via satisfactory verbal response. Speech Pathologist (SLP) completed education with the patient and/or family regarding identified cognitive linguistic deficits and subsequent need for speech pathology intervention. Discussed deficit areas to be targeted by formal intervention and established short/long term goals. Reviewed compensatory strategies to improve functional outcome (as appropriate). Encouraged patient and/or family to engage SLP in structured Q&A session relative to identified deficit areas. Patient and/or family indicated understanding of all information provided via satisfactory verbal response.     Minute Tracking:    Individual therapy:     15 minutes  Concurrent therapy:    45 minutes  Group therapy:     0 minutes  Co-treatment therapy:    0 minutes    Total minutes for 10/21/2021:  60 minutes

## 2021-10-22 LAB
ANION GAP SERPL CALCULATED.3IONS-SCNC: 15 MMOL/L (ref 7–16)
BUN BLDV-MCNC: 22 MG/DL (ref 6–23)
CALCIUM SERPL-MCNC: 8.2 MG/DL (ref 8.6–10.2)
CHLORIDE BLD-SCNC: 106 MMOL/L (ref 98–107)
CO2: 21 MMOL/L (ref 22–29)
CREAT SERPL-MCNC: 2 MG/DL (ref 0.7–1.2)
GFR AFRICAN AMERICAN: 40
GFR NON-AFRICAN AMERICAN: 33 ML/MIN/1.73
GLUCOSE BLD-MCNC: 88 MG/DL (ref 74–99)
HCT VFR BLD CALC: 26.1 % (ref 37–54)
HEMOGLOBIN: 8.8 G/DL (ref 12.5–16.5)
MCH RBC QN AUTO: 29.5 PG (ref 26–35)
MCHC RBC AUTO-ENTMCNC: 33.7 % (ref 32–34.5)
MCV RBC AUTO: 87.6 FL (ref 80–99.9)
PDW BLD-RTO: 15.5 FL (ref 11.5–15)
PLATELET # BLD: 144 E9/L (ref 130–450)
PMV BLD AUTO: 10.9 FL (ref 7–12)
POTASSIUM SERPL-SCNC: 3.4 MMOL/L (ref 3.5–5)
RBC # BLD: 2.98 E12/L (ref 3.8–5.8)
SODIUM BLD-SCNC: 142 MMOL/L (ref 132–146)
WBC # BLD: 5.7 E9/L (ref 4.5–11.5)

## 2021-10-22 PROCEDURE — 97530 THERAPEUTIC ACTIVITIES: CPT

## 2021-10-22 PROCEDURE — 80048 BASIC METABOLIC PNL TOTAL CA: CPT

## 2021-10-22 PROCEDURE — 94640 AIRWAY INHALATION TREATMENT: CPT

## 2021-10-22 PROCEDURE — 97110 THERAPEUTIC EXERCISES: CPT

## 2021-10-22 PROCEDURE — 6370000000 HC RX 637 (ALT 250 FOR IP)

## 2021-10-22 PROCEDURE — 99232 SBSQ HOSP IP/OBS MODERATE 35: CPT | Performed by: PHYSICAL MEDICINE & REHABILITATION

## 2021-10-22 PROCEDURE — 85027 COMPLETE CBC AUTOMATED: CPT

## 2021-10-22 PROCEDURE — 97112 NEUROMUSCULAR REEDUCATION: CPT

## 2021-10-22 PROCEDURE — 1280000000 HC REHAB R&B

## 2021-10-22 PROCEDURE — 36415 COLL VENOUS BLD VENIPUNCTURE: CPT

## 2021-10-22 PROCEDURE — 97535 SELF CARE MNGMENT TRAINING: CPT

## 2021-10-22 PROCEDURE — 6360000002 HC RX W HCPCS

## 2021-10-22 PROCEDURE — 6370000000 HC RX 637 (ALT 250 FOR IP): Performed by: NURSE PRACTITIONER

## 2021-10-22 PROCEDURE — 6370000000 HC RX 637 (ALT 250 FOR IP): Performed by: PHYSICAL MEDICINE & REHABILITATION

## 2021-10-22 RX ORDER — POTASSIUM CHLORIDE 20 MEQ/1
20 TABLET, EXTENDED RELEASE ORAL ONCE
Status: COMPLETED | OUTPATIENT
Start: 2021-10-22 | End: 2021-10-22

## 2021-10-22 RX ORDER — TAMSULOSIN HYDROCHLORIDE 0.4 MG/1
0.4 CAPSULE ORAL DAILY
Status: DISCONTINUED | OUTPATIENT
Start: 2021-10-22 | End: 2021-10-27 | Stop reason: HOSPADM

## 2021-10-22 RX ADMIN — NYSTATIN: 100000 OINTMENT TOPICAL at 09:34

## 2021-10-22 RX ADMIN — TAMSULOSIN HYDROCHLORIDE 0.4 MG: 0.4 CAPSULE ORAL at 14:23

## 2021-10-22 RX ADMIN — CARVEDILOL 3.12 MG: 3.12 TABLET, FILM COATED ORAL at 08:16

## 2021-10-22 RX ADMIN — FERROUS SULFATE TAB 325 MG (65 MG ELEMENTAL FE) 325 MG: 325 (65 FE) TAB at 17:55

## 2021-10-22 RX ADMIN — POTASSIUM CHLORIDE 20 MEQ: 1500 TABLET, EXTENDED RELEASE ORAL at 11:24

## 2021-10-22 RX ADMIN — PANTOPRAZOLE SODIUM 40 MG: 40 TABLET, DELAYED RELEASE ORAL at 07:00

## 2021-10-22 RX ADMIN — IPRATROPIUM BROMIDE AND ALBUTEROL SULFATE 1 AMPULE: .5; 2.5 SOLUTION RESPIRATORY (INHALATION) at 16:24

## 2021-10-22 RX ADMIN — AMIODARONE HYDROCHLORIDE 200 MG: 200 TABLET ORAL at 08:16

## 2021-10-22 RX ADMIN — ASPIRIN 81 MG: 81 TABLET ORAL at 08:16

## 2021-10-22 RX ADMIN — BUMETANIDE 1 MG: 1 TABLET ORAL at 08:16

## 2021-10-22 RX ADMIN — CARVEDILOL 3.12 MG: 3.12 TABLET, FILM COATED ORAL at 17:55

## 2021-10-22 RX ADMIN — NYSTATIN: 100000 OINTMENT TOPICAL at 22:55

## 2021-10-22 RX ADMIN — FOLIC ACID 1 MG: 1 TABLET ORAL at 08:16

## 2021-10-22 RX ADMIN — ENOXAPARIN SODIUM 30 MG: 100 INJECTION SUBCUTANEOUS at 08:17

## 2021-10-22 RX ADMIN — FERROUS SULFATE TAB 325 MG (65 MG ELEMENTAL FE) 325 MG: 325 (65 FE) TAB at 08:16

## 2021-10-22 RX ADMIN — CLOPIDOGREL 75 MG: 75 TABLET, FILM COATED ORAL at 08:16

## 2021-10-22 RX ADMIN — OXYCODONE HYDROCHLORIDE AND ACETAMINOPHEN 500 MG: 500 TABLET ORAL at 22:55

## 2021-10-22 RX ADMIN — IPRATROPIUM BROMIDE AND ALBUTEROL SULFATE 1 AMPULE: .5; 2.5 SOLUTION RESPIRATORY (INHALATION) at 20:05

## 2021-10-22 RX ADMIN — ATORVASTATIN CALCIUM 40 MG: 40 TABLET, FILM COATED ORAL at 22:55

## 2021-10-22 RX ADMIN — OXYCODONE HYDROCHLORIDE AND ACETAMINOPHEN 500 MG: 500 TABLET ORAL at 08:17

## 2021-10-22 ASSESSMENT — PAIN SCALES - GENERAL
PAINLEVEL_OUTOF10: 0
PAINLEVEL_OUTOF10: 0

## 2021-10-22 NOTE — PROGRESS NOTES
surface NT due to safety NT 10', no AD,  Asher 50', no AD, sup   Wheel Chair Mobility NT NT n/a n/a   Car Transfers NT due to safety CGA x2 reps    x1 with PT, x1 with wife assisting Asher sup   Stair negotiation: ascended and descended NT due to safety 4 steps x2 rounds, 1 HR, CGA,     Wife assisting 1 round, PT assisting 1 round  4 steps, HR for balance only, Asher 12 steps, HR for balance only, sup   Curb Step:   ascended and descended 2\" curb, maxA, no AD NT 4\" curb, no AD, Asher 7.5\" curb, no AD, sup   Picking up object off the floor NT due to safety NT Will  cone with Asher assist Will  cone with sup assist   BLE ROM AROM WFL      BLE Strength LLE 5/5 except hip flexors which were 3/5  R hip flex 3/5   R knee ext/flex 5/5   R ankle DF/PF 5/5      Balance  Sitting- mod I  Standing-mod-maxA    BBS: NT  FGA: NT Sitting- mod I  Standing-Asher    BBS: NT  FGA: NT   BBS: > 45/56  FGA: >22/30   BBS: -  FGA: -   Date Family Teach Completed N/A      Is additional Family Teaching Needed? Y or N Y      Hindering Progress sternal precautions, dyscoordination, weakness, decreased endurance, anxiety sternal precautions, dyscoordination, weakness, decreased endurance, anxiety     PT recommended ELOS 3-4 weeks      Team's Discharge Plan       Therapist at Team Meeting       Therapeutic Exercise:     AM:   1. Obstacle course- stepping over 4 canes spaced various distances apart to challenge approach, then weaving through 5 cones spaced 3 apart each, x4 rounds wife asissting with steadying all rounds, occ LOB observed. Patient education    Pt and wife educated on tx, gait, stair engotiation, safety awareness, and sternal precautions. Patient response to education:   Pt verbalized understanding Pt demonstrated skill Pt requires further education in this area   yes partial All areas     Additional Comments:   AM: Wife present for family teach this session.  Wife demonstrating competence and safety when guarding/asissting with short distance amb, STS tx, stair negotiation, and car tx. Had wife practice guarding during obstacle course, minimal cueing provided to wife for close position to guard in anticipation of LOB, especially as pt fatigues. Will continue to practice in future sessions. Pt reports he feels more comfortable with mobility compared to previous sessions, decreased scissoring episodes and less LOB when fatigued compared to previous sessions. AM  Time in: 0915  Time out:1000       Pt is making good progress toward established Physical Therapy goals. Continue with physical therapy current plan of care.     Julianna Gray., PT, DPT  [id number]

## 2021-10-22 NOTE — PROGRESS NOTES
Department of Internal Medicine  Nephrology Progress Note      Events reviewed. SUBJECTIVE: We are following Mr. Mehul [address] for NGUYỄN. Reports no complaints.     PHYSICAL EXAM:      Vitals:    VITALS:  BP (!) 146/67   Pulse 89   Temp 96.9 °F (36.1 °C) (Tympanic)   Resp 18   Ht 5' 8\" (1.727 m)   Wt 152 lb 7 oz (69.1 kg)   SpO2 99%   BMI 23.18 kg/m²   24HR INTAKE/OUTPUT:      Intake/Output Summary (Last 24 hours) at 10/22/2021 1414  Last data filed at 10/22/2021 1409  Gross per 24 hour   Intake 540 ml   Output 250 ml   Net 290 ml     Constitutional: Patient is awake, alert in no distress  HEENT: Pupils are equal reactive  Respiratory: Decreased breath sounds at the bases  Cardiovascular/Edema: Heart sounds are regular  Gastrointestinal: Abdomen soft  Neurologic: Patient alert   Other: +trace edema      Scheduled Meds:   tamsulosin  0.4 mg Oral Daily    ipratropium-albuterol  1 ampule Inhalation Q4H WA    aspirin  81 mg Oral Daily    vitamin C  500 mg Oral BID    atorvastatin  40 mg Oral Daily    bumetanide  1 mg Oral Daily    carvedilol  3.125 mg Oral BID WC    clopidogrel  75 mg Oral Daily    enoxaparin  30 mg SubCUTAneous Daily    ferrous sulfate  325 mg Oral BID WC    folic acid  1 mg Oral Daily    nystatin   Topical BID    pantoprazole  40 mg Oral QAM AC    amiodarone  200 mg Oral Daily     Continuous Infusions:    PRN Meds:.acetaminophen, bisacodyl, oxyCODONE-acetaminophen **OR** oxyCODONE-acetaminophen, sennosides-docusate sodium, diphenhydrAMINE, artificial tears, ondansetron, sodium chloride, polyethylene glycol    DATA:    CBC:   Lab Results   Component Value Date    WBC 5.7 10/22/2021    RBC 2.98 10/22/2021    HGB 8.8 10/22/2021    HCT 26.1 10/22/2021    MCV 87.6 10/22/2021    MCH 29.5 10/22/2021    MCHC 33.7 10/22/2021    RDW 15.5 10/22/2021     10/22/2021    MPV 10.9 10/22/2021     CMP:    Lab Results   Component Value Date     10/22/2021    K 3.4 10/22/2021    K 4.2 10/14/2021     10/22/2021    CO2 21 10/22/2021    BUN 22 10/22/2021    CREATININE 2.0 10/22/2021    GFRAA 40 10/22/2021    LABGLOM 33 10/22/2021    GLUCOSE 88 10/22/2021    PROT 5.4 10/12/2021    LABALBU 3.1 10/12/2021    CALCIUM 8.2 10/22/2021    BILITOT 0.8 10/12/2021    ALKPHOS 47 10/12/2021    AST 15 10/12/2021    ALT 10 10/12/2021     Magnesium:    Lab Results   Component Value Date    MG 1.9 10/17/2021     Phosphorus:    Lab Results   Component Value Date    PHOS 2.8 10/10/2021        Radiology Review:      CXR 10/3/21   1. Median sternotomy changes. 2. Bilateral pleuroparenchymal opacities that could be related to pleural   effusion and edema.  The appearance of the chest is about the same or   slightly worse.         Chest x-ray October 6, 2021   1. Stable appearance of the postoperative chest.   2. Bilateral chest tubes remain in position.  There is no right or left   pneumothorax. 3. Small right pleural effusion         Chest x-ray October 8, 2021   Minimal bibasilar atelectasis.       Trace right pleural effusion       There is no pneumothorax             BRIEF SUMMARY OF INITIAL CONSULT:    Briefly Mr. Fam Mckinley is a 79-year-old man with history of HTN, CAD with recent status cardiac catheterization done on September 9 which showed severe multivessel disease, hyperlipidemia, hiatal hernia, who was admitted for elective CABG on September 27, 2021. On admission his creatinine level was 0.8 mg/dL and post surgery his creatinine has progressively increased up to 2.3 mg/dL, reason for this consultation. Postoperatively she developed persistent hypotension, lactic acidosis and respiratory failure for which he was reintubated. Since then she has required multiple pressors and he was placed on IABP. He had received 1 dose of ketorolac perioperatively. His urine output has significantly decrease and is being about 500 cc/day in the last 48 hours and his fluid balance presently is over 9 L.     Problems resolved:    · Cardiogenic shock, hemodynamically more stable, pressor support decrease, still on IABP. Tentative plan for removal of IABP. Pro-BP 16,936  · Hypernatremia with hypervolemia, 2/2 sodium containing IV fluid resuscitation and underlying heart failure. Resolved with  natriuresis and free water (add D5W)  · Hypokalemia, 2/2 diuretics, potassium levels improve  · Respiratory alkalosis (pH: 7.554, PCO2: 47.9) with metabolic alkalosis (bicarbonate administration)  · Respiratory failure status post intubation  · Thrombocytopenia  · Transaminitis with hyperbilirubinemia, possibly shock liver versus congestive liver  · Probably pneumonia, on piperacillin-tazobactam  · Hypokalemia, 2/2 diuretics, potassium levels improved. IMPRESSION/RECOMMENDATIONS:      1. NGUYỄN stage III, CABG associated NGUYỄN, ischemic ATN, non-oliguric. FEUrea 18.8%. Renal function has slightly increased today. To rule out obstructive uropathy. 2. Hypokalemia, 2/2 diuretics. To replace. 3. HFmEF 40% with stage IDD, proBNP 16,826 > 9461> 7228 > 5112, proBNP levels continues to improve  ---------------------------------------------------------  4. CAD status post CABG x3 September 27, 2021, on ASA, clopidogrel, carvedilol, atorvastatin  5. Amiodarone therapy, for AF prophylaxis  6.  Normocytic anemia, status post surgery, status post transfusion, iron saturation 36%, on p.o. iron      Plan:    · Tamsulosin 0.4 mg PO daily  · Replace potassium  · Continue Bumex 1 mg p.o. daily  · Continue to monitor renal function for recovery ([facility] MWF and tomorrow)  · Obtain bladder scan      Electronically signed by JUAN MIGUEL Reddy CNP on 10/22/2021 at 2:14 PM

## 2021-10-22 NOTE — PLAN OF CARE
Problem: Falls - Risk of:  Goal: Will remain free from falls  Description: Will remain free from falls  10/22/2021 1028 by Dara Herrera RN  Outcome: Ongoing  10/22/2021 0315 by Jen Jimenez RN  Outcome: Met This Shift  10/22/2021 0314 by Jen Jimenez RN  Outcome: Met This Shift  Goal: Absence of physical injury  Description: Absence of physical injury  Outcome: Ongoing     Problem: Skin Integrity:  Goal: Will show no infection signs and symptoms  Description: Will show no infection signs and symptoms  10/22/2021 1028 by Dara Herrera RN  Outcome: Ongoing  10/22/2021 0314 by Jen Jimenez RN  Outcome: Met This Shift  Goal: Absence of new skin breakdown  Description: Absence of new skin breakdown  10/22/2021 1028 by Dara Herrera RN  Outcome: Ongoing  10/22/2021 0315 by Jen Jimenez RN  Outcome: Met This Shift  10/22/2021 0314 by Jen Jimenez RN  Outcome: Met This Shift     Problem: Infection:  Goal: Will remain free from infection  Description: Will remain free from infection  10/22/2021 1028 by Dara Herrera RN  Outcome: Ongoing  10/22/2021 0315 by Jen Jimenez RN  Outcome: Met This Shift  10/22/2021 0314 by Jen Jimenez RN  Outcome: Met This Shift     Problem: Safety:  Goal: Free from accidental physical injury  Description: Free from accidental physical injury  Outcome: Ongoing  Goal: Free from intentional harm  Description: Free from intentional harm  Outcome: Ongoing     Problem: Daily Care:  Goal: Daily care needs are met  Description: Daily care needs are met  10/22/2021 1028 by Dara Herrera RN  Outcome: Ongoing  10/22/2021 0314 by Jen Jimenez RN  Outcome: Met This Shift     Problem: Pain:  Goal: Patient's pain/discomfort is manageable  Description: Patient's pain/discomfort is manageable  10/22/2021 1028 by Dara Herrera RN  Outcome: Ongoing  10/22/2021 0315 by Jen Jimenez RN  Outcome: Met This Shift  10/22/2021 0314 by Jen Jimenez RN  Outcome: Met This Shift     Problem: Skin Integrity:  Goal: Skin integrity will stabilize  Description: Skin integrity will stabilize  10/22/2021 1028 by Jimmy Parker RN  Outcome: Ongoing  10/22/2021 0315 by Usha Elaine RN  Outcome: Met This Shift  10/22/2021 0314 by Usha Elaine RN  Outcome: Met This Shift     Problem: Discharge Planning:  Goal: Patients continuum of care needs are met  Description: Patients continuum of care needs are met  Outcome: Ongoing     Problem: ABCDS Injury Assessment  Goal: Absence of physical injury  Outcome: Ongoing     Problem: HEMODYNAMIC STATUS  Goal: Patient has stable vital signs and fluid balance  Outcome: Ongoing     Problem: ACTIVITY INTOLERANCE/IMPAIRED MOBILITY  Goal: Mobility/activity is maintained at optimum level for patient  Outcome: Ongoing     Problem: COMMUNICATION IMPAIRMENT  Goal: Ability to express needs and understand communication  10/22/2021 1028 by Jimmy Parker RN  Outcome: Ongoing  10/22/2021 0315 by Usha Elaine RN  Outcome: Met This Shift  10/22/2021 0314 by Usha Elaine RN  Outcome: Met This Shift     Problem: Cardiac:  Goal: Ability to maintain vital signs within normal range will improve  Description: Ability to maintain vital signs within normal range will improve  10/22/2021 1028 by Jimmy Parker RN  Outcome: Ongoing  10/22/2021 0314 by Usha Elaine RN  Outcome: Met This Shift  Goal: Cardiovascular alteration will improve  Description: Cardiovascular alteration will improve  Outcome: Ongoing     Problem: Health Behavior:  Goal: Will modify at least one risk factor affecting health status  Description: Will modify at least one risk factor affecting health status  Outcome: Ongoing  Goal: Identification of resources available to assist in meeting health care needs will improve  Description: Identification of resources available to assist in meeting health care needs will improve  Outcome: Ongoing     Problem: Physical Regulation:  Goal: Complications related to the disease process, condition or treatment will be avoided or minimized  Description: Complications related to the disease process, condition or treatment will be avoided or minimized  Outcome: Ongoing     Problem: Mobility - Impaired:  Goal: Mobility will improve  Description: Mobility will improve  10/22/2021 1028 by Gloria Yarbrough RN  Outcome: Ongoing  10/22/2021 0315 by Randall Simmons RN  Outcome: Met This Shift  10/22/2021 0314 by Randall Simmons RN  Outcome: Met This Shift

## 2021-10-22 NOTE — PROGRESS NOTES
[id number] [facility] Physical Medicine and Rehabilitation  Comprehensive Progress Note    Subjective:      Deni Kiser is a 78 y.o. male admitted to inpatient rehabilitation for impairments and acitivities limitations in ADLs, mobility, and cognition secondary to Acute CVA. No acute events overnight. No cp, sob, n/v. Pain is controlled. Tolerating therapy. No complaints. The patient's medical records have been reviewed. Scheduled Meds:ipratropium-albuterol, 1 ampule, Q4H WA  aspirin, 81 mg, Daily  vitamin C, 500 mg, BID  atorvastatin, 40 mg, Daily  bumetanide, 1 mg, Daily  carvedilol, 3.125 mg, BID WC  clopidogrel, 75 mg, Daily  enoxaparin, 30 mg, Daily  ferrous sulfate, 137 mg, BID WC  folic acid, 1 mg, Daily  nystatin, , BID  pantoprazole, 40 mg, QAM AC  amiodarone, 200 mg, Daily      Continuous Infusions:  PRN Meds:acetaminophen, 650 mg, Q4H PRN  bisacodyl, 5 mg, Daily PRN  oxyCODONE-acetaminophen, 1 tablet, Q4H PRN   Or  oxyCODONE-acetaminophen, 2 tablet, Q4H PRN  sennosides-docusate sodium, 1 tablet, BID PRN  diphenhydrAMINE, 25 mg, Nightly PRN  artificial tears, , PRN  ondansetron, 4 mg, Q8H PRN  sodium chloride, 1 spray, PRN  polyethylene glycol, 17 g, Daily PRN         Objective:      Vitals:    10/22/21 0023 10/22/21 0700 10/22/21 0816 10/22/21 1025   BP:   (!) 146/67 (!) 146/67   Pulse:   89 89   Resp:    18   Temp:    96.9 °F (36.1 °C)   TempSrc:    Tympanic   SpO2: 97%   99%   Weight:  152 lb 7 oz (69.1 kg)     Height:         General appearance: alert, up in chair, NAD  Eyes: conjunctivae/corneas clear. PERRL, EOM's intact. Lungs: clear to auscultation bilaterally  Heart: regular rate and rhythm, S1, S2  Abdomen: soft, non-tender, normal bowel sounds  Musculoskeletal: Range of motion normal and no gross abnormalities. dependent edema both ankles. No calf tenderness. Neurologic: Alert, oriented.  Dysarthric.      Motor Findings  Strength: Right  Strength: Left    Deltoid  4 5   Biceps  4 5 Triceps  4 5   Wrist Extensors  4 5   FDP 4 5   Finger abductors 3+ 5   Iliospoas  2 3   Quadriceps  4 5   Tibialis anterior  4 5   EHL 4 5   Gastroc soleus  4 5          Laboratory:    Lab Results   Component Value Date    WBC 5.7 10/22/2021    HGB 8.8 (L) 10/22/2021    HCT 26.1 (L) 10/22/2021    MCV 87.6 10/22/2021     10/22/2021     Lab Results   Component Value Date     10/22/2021    K 3.4 10/22/2021    K 4.2 10/14/2021     10/22/2021    CO2 21 10/22/2021    BUN 22 10/22/2021    CREATININE 2.0 10/22/2021    GLUCOSE 88 10/22/2021    CALCIUM 8.2 10/22/2021      Lab Results   Component Value Date    ALT 10 10/12/2021    AST 15 10/12/2021    ALKPHOS 47 10/12/2021    BILITOT 0.8 10/12/2021       Lab Results   Component Value Date    COLORU Yellow 09/23/2021    NITRU Negative 09/23/2021    GLUCOSEU Negative 09/23/2021    KETUA Negative 09/23/2021    UROBILINOGEN 0.2 09/23/2021    BILIRUBINUR Negative 09/23/2021     Lab Results   Component Value Date    LABA1C 5.1 09/10/2021         Functional Status:   Physical Therapy:   Bed mobility: SBA  Transfers: Min A   Ambulation: 230 ft Min A no AD        Occupational Therapy:  Feeding: Independent   Grooming: Supervision   UB dressing: Min A  LB dressing: Min A          Assessment/Plan:       78 y.o. male admitted to inpatient rehabilitation for impairments and acitivities limitations in ADLs, mobility, and cognition secondary to Acute CVA. -Acute CVA: Right hemiparesis, dysarthria, dysphagia, cognitive impairment. Continue Aspirin, Plavix, Lipitor. Continue Acute Rehab evaluations.   -Dysphagia: Passed repeat video swallow study on 10/20, diet upgraded to general. Speech therapy following. -CAD, s/p CABG x3: Continue current medications, sternal precautions.   -Post operative pain: Percocet PRN. Wean narcotics as tolerated  -Acute blood loss anemia: H&H low but stable, monitor.  Continue iron supplement   -Afib during acute care hospitalization, now in NSR: On amiodarone for Afib prophylaxis per CT surgery, with plans to wean after discharge  -Acute systolic heart failure: On coreg. On Bumex. -NGUYỄN: Nephrology following.   -Hypokalemia: Potassium repleted. Replace PRN  -DVT prophylaxis: lovenox        H&H stable  Replace potassium  Nephrology following for NGUYỄN. Appreciate recommendations.        Electronically signed by Tez Al MD on 10/22/2021 at 10:53 AM

## 2021-10-22 NOTE — DISCHARGE SUMMARY
Physician Discharge Summary     Patient ID:  Francesca Calles  [id number]  61 y.o.  1945    Admit date: 9/27/2021    Discharge date and time: 10/13/2021  3:46 PM     Admitting Physician: Jacquie Self MD     Discharge Physician: Jacquie Self MD     Admission Diagnoses: CAD in native artery [I25.10]    Discharge Diagnoses: Severe multivessel coronary artery disease, hyperlipidemia, hypertension, unstable angina, reflux disease    OPERATIONS:  1. Sternotomy. 2.  Coronary artery bypass grafting x3; left internal mammary artery to the LAD, saphenous vein graft to the obtuse marginal, saphenous vein graft to the right coronary artery. 3.  Extensive right coronary artery endarterectomy. 4.  Left atrial appendage exclusion with a 35 mm AtriClip. 5.  Endoscopic harvesting of left lower extremity greater saphenous vein. 6.  Rigid internal fixation of the sternum using Active-Semi plates x2.  7.  [id number]. Admission Condition: fair    Discharged Condition: fair    Indication for Admission: This is a 74-year-old man with history of  of the LAD which has been known for at least 15 years. The patient presented back with  increasing chest pain and was found to have continued  of his LAD with a 90% lesion of the obtuse marginal and severe diffuse disease of his right coronary artery and PDA. He was referred for coronary artery bypass grafting. The patient was described the procedure in full including risks and complications, including but not limited to bleeding, infection, need for reoperation, hemothorax, pneumothorax, stroke, myocardial infarction, and death. The patient agreed to proceed. Hospital Course: As above. The patient was admitted for surgery on 9/27/21. Surgery went well, however while attempting to come off bypass two separate times, the patient suffered from significant vasoplegia. He required levophed, vasopressin and Norepi and ultimately IV B12.  He came off bypass and closed in normal fashion. He was transferred to [facility] in stable but guarded condition. He was extubated same DOS. Later that night, he suffered from progressive hypotension, for which Vitamin B 12 and methylene blue were given. Low dose Epi was started. On POD 1, the patient required an IABP for worsening hemodynamics. He was also re-intubated. There was concern for aspiration, so empiric zosyn was started. Amio was also started for Afib prophylaxis. At this point, levo was weaned off, dobutamine was started and epi and vaso remained. Remained in critical condition. General surgery examined the patient due to worsening lactic acidosis - exam was unremarkable. Creatinine continued to rise. By POD 3, he was off pressors and remained with dobut and epi. Pulmonary was consulted, and the patient was bronched for large obstructive mucous plug. He continued care in [facility] while inotropes were weaned. IABP was removed on 10/4/21 when hemodynamics were acceptable. On POD 8, with patient more awake, there was minimal to no movement in the right arm and leg. Stat echo to rule out LV thrombus and stat head CT were ordered as well as neurology consult. CT head was unremarkable and echo showed no thrombus. On POD 9, dobutamine was weaned off. Epi was also off. Low dose beta blocker was started and tolerated. Patient was extubated without difficulty and very quickly tolerated room air. He remained in [facility] and was diuresed per nephrology iuntil 10/10/21. On POD 14. He was seen on [facility] and all chest tubes were removed without difficulty. Placement for acute rehab was started. Repeat echo was ordered to re-evaluate patient's EF off all inotropic support. EF was read at 55% and no lifevest was needed. He was discharged to acute rehab on 10/13/21. Consults: neurology, nephrology, critical care    Discharge Exam:  POD#16 Awake, alert. No complaints.  Denies CP, palpitations, SOB at rest, dizziness/lightheadedness.     Vitals[]Expand by Default Vitals:     10/13/21 0400 10/13/21 0650 10/13/21 0712 10/13/21 0820   BP: (!) 118/59   (!) 118/56     Pulse: 80   80     Resp: 14   18     Temp: 96.8 °F (36 °C)   97.8 °F (36.6 °C)     TempSrc: Temporal   Temporal     SpO2: 94%   96% 98%   Weight:   159 lb 9.6 oz (72.4 kg)       Height:                 O2: RA        Intake/Output Summary (Last 24 hours) at 10/13/2021 0945  Last data filed at 10/13/2021 [id number]      Gross per 24 hour   Intake 490 ml   Output 1101 ml   Net -611 ml            +BM on 10/11     UO: 150mL/8hr                 Recent Labs     10/11/21  0530 10/11/21  0530 10/12/21  0500 10/12/21  1555 10/13/21  0450   WBC 15.8*  --  12.1*  --  10.3   HGB 8.6*   < > 7.9* 8.2* 7.9*   HCT 27.3*   < > 24.6* 25.6* 24.9*     --  210  --  215    < > = values in this interval not displayed. Recent Labs     10/11/21  0530 10/12/21  0500 10/13/21  0450   BUN 67* 59* 51*   CREATININE 2.2* 2.1* 1.9*         Telemetry: SR     PE  Cardiac: RRR  Lungs: decreased bases  Chest incision  C/D/I, approximated, no erythema. Sternum stable. Prior chest tube site incisions C/D/I, no erythema with intact sutures.    Abd: Soft, nontender, +BS  Ext: Incisions C/D/I, approximated, no erythema; RUE weakness unchanged      Disposition: acute rehab    Patient Instructions:    Alina Ross   Fowler Medication Instructions [facility]:[id number]    Printed on:10/22/21 1218   Medication Information                      Ascorbic Acid (VITAMIN C) 500 MG CAPS  Take 500 mg by mouth 2 times daily             aspirin 81 MG EC tablet  Take 81 mg by mouth daily             atorvastatin (LIPITOR) 40 MG tablet  Take 40 mg by mouth daily             Coenzyme Q10 (COQ-10) 100 MG CAPS  Take by mouth 3 times daily              famotidine (PEPCID) 20 MG tablet  Take 20 mg by mouth as needed             isosorbide mononitrate (IMDUR) 30 MG extended release tablet  Take 30 mg by mouth daily             lisinopril (PRINIVIL;ZESTRIL) 10 MG tablet  Take 10 mg by mouth daily             metoprolol succinate (TOPROL XL) 25 MG extended release tablet  Take 25 mg by mouth daily             Omega-3 Fatty Acids (FISH OIL) 1000 MG CAPS  Take 1,000 mg by mouth daily             omeprazole (PRILOSEC) 20 MG delayed release capsule  Take 1 capsule by mouth every morning (before breakfast)             zinc gluconate 50 MG tablet  Take 50 mg by mouth daily               Activity: sternal precautions   Diet: cardiac diet  Wound Care: keep wound clean and dry    Future Appointments   Date Time Provider Yoselin Deya   10/26/2021  9:45 AM Kylie Warren MD CARDIO SURG [facility]   11/29/2021  9:00 AM Shawna Nazario MD [facility]       Smoking cessation education provided prior to discharge    Discussed with patient the benefits of participation in cardiac rehab after discharge once approved safe by the surgeon and that a referral will be made at the follow up appointment. Pt verbalized comprehension.     Signed:  Electronically signed by SAULO Ribera on 10/22/2021 at 12:19 PM

## 2021-10-22 NOTE — PROGRESS NOTES
150 feet without device with min A ( wife assisted )  48', no AD, Asher    10mWT: TBD  6mWT: ', no AD, sup    10mWT: TBD  6mWT: TBD   Walking 10 feet on uneven surface NT due to safety NT NT 10', no AD,  Asher 50', no AD, sup   Wheel Chair Mobility NT NT NT n/a n/a   Car Transfers NT due to safety CGA x2 reps    x1 with PT, x1 with wife assisting NT Asher sup   Stair negotiation: ascended and descended NT due to safety 4 steps x2 rounds, 1 HR, CGA,     Wife assisting 1 round, PT assisting 1 round  NT 4 steps, HR for balance only, Asher 12 steps, HR for balance only, sup   Curb Step:   ascended and descended 2\" curb, maxA, no AD NT  4\" curb, no AD, Asher 7.5\" curb, no AD, sup   Picking up object off the floor NT due to safety NT NT Will  cone with Asher assist Will  cone with sup assist   BLE ROM AROM WFL       BLE Strength LLE 5/5 except hip flexors which were 3/5  R hip flex 3/5   R knee ext/flex 5/5   R ankle DF/PF 5/5       Balance  Sitting- mod I  Standing-mod-maxA    BBS: NT  FGA: NT Sitting- mod I  Standing-Asher    BBS: NT  FGA: NT    BBS: > 45/56  FGA: >22/30   BBS: -  FGA: -   Date Family Teach Completed N/A       Is additional Family Teaching Needed? Y or N Y       Hindering Progress sternal precautions, dyscoordination, weakness, decreased endurance, anxiety sternal precautions, dyscoordination, weakness, decreased endurance, anxiety      PT recommended ELOS 3-4 weeks       Team's Discharge Plan        Therapist at Team Meeting        Therapeutic Exercise:     AM/PM:   1. Obstacle course- stepping over 4 canes spaced various distances apart to challenge approach, then weaving through 5 cones spaced 3 apart each, x4 rounds wife asissting with steadying all rounds, occ LOB observed. Patient education    Pt and wife educated on tx, gait, stair engotiation, safety awareness, and sternal precautions.     Patient response to education:   Pt verbalized understanding Pt demonstrated skill Pt requires further education in this area   yes partial All areas     Additional Comments:   AM: Wife present for family teach this session. Wife demonstrating competence and safety when guarding/asissting with short distance amb, STS tx, stair negotiation, and car tx. Had wife practice guarding during obstacle course, minimal cueing provided to wife for close position to guard in anticipation of LOB, especially as pt fatigues. Will continue to practice in future sessions. Pt reports he feels more comfortable with mobility compared to previous sessions, decreased scissoring episodes and less LOB when fatigued compared to previous sessions. PM Pt sitting in chair with wife present upon arrival and agreed to participate in therapy. Pt reported increased fatigue this pm.  Much time spent on educating/reviewing proper technique and safety with all mobility. Reviewed proper technique with wife when assisting pt as needed. Discussed needing to be more hands on when pt is fatigue. Pt completed obstacle course as noted above with wife assisting. Pt reported increased difficulty due to fatigue resulting in unsteadiness and LOB x2 . Discussed energy conservation and pacing activities. Pt and wife verbalized good understanding. AM  Time in: 0915  Time out:1000     Time in 1300  Time out 1330      Pt is making good progress toward established Physical Therapy goals. Continue with physical therapy current plan of care.     Sherryle Decent., PT, DPT  [id number]  (AM)     Claudia [id number] ( PM)

## 2021-10-22 NOTE — PROGRESS NOTES
OCCUPATIONAL THERAPY DAILY NOTE    Date:10/22/2021  Patient Name: Lelo Munoz  MRN: [id number]  : 1945  Room: [address] [place] [address]     Diagnosis: CVA- s/p CABG x 3- 21  Additional Pertinent Hx: HLD, HTN, CAD, ESRD, hx hiatal hernia  Precautions: Falls, Puree-Dysphagia IV/nectar & Sternal precautions & wife- Isis orange dot    Functional Assessment:   Date Status AE  Comments   Feeding 10/22/21 independent     Grooming 10/22/21 Sup           Oral Care 10/18/21 Sup      Bathing 10/18/21 Min A     UB Dressing 10/18/21 Min A     LB Dressing 10/18/21 Min A LB AE    Footwear 10/18/21 Min A Shoe horn    Toileting 10/22/21 SBA     Homemaking 10/19/21 Min A- standing  S/SBA- sitting  counter      Functional Transfers / Balance:   Date Status DME  Comments   Sit Balance 10/22/21 independent  demo'd up in w/c during arm ex's to increase overall endurance & strength for functional tasks. Stand Balance 10/22/21 SBA No device    [] Tub  [x] Shower   Transfer 10/18/21 Min A Shower seat    Commode   Transfer 10/22/21 SBA No AD    Functional   Mobility 10/22/21 SBA No AD Ambulating in his room, on the unit & throughout the OT apartment   Other:  Supine>Sit    Bed>W/c    Sit to stand   10/22/21    10/18/21    10/22/21   SBA    Min A    SBA    Queen bed- min vc's for technique     Functional Exercises / Activity:  Pt presents seated up in his wc & was agreeable to OT intervention. Pt wife Eugenia Domingo presents & participated in OT am treatment session. Pt& wife demo Sup sit>stand with min vc's fo body mechanics & safety. Pt & wife demo SBA to ambulate in his room, hallways & throughout the OT apartment. Pt & wife demo SBA commode trf using no device to a standard commode & min vc's for precautions. Pt & wife ambulated with no device to a queen bed & demo Sup sit<>supine with min vc's. Pt & wife also educated with regards to fall prevention & fall recovery.  Pt & Eugenia Domingo also educated with regards to home modification & energy sec  On eval 34.8 sec    Norm 26.0 sec    Patient has made good  progress during treatment sessions toward set goals. Therapy emphasis to obtain goals: Strengthening, Gait Training, Patient/Caregiver Education & Training, Home Management Training, ROM, Equipment Evaluation, Education, & procurement, Balance Training, Neuromuscular Re-education, Cognitive Reorientation, Safety Education & Training, Self-Care/ADL    Long term goal 1: Pt demo independent to eat all meals  Long term goal 2: Pt demo Mod I grooming seated or standing & demo G- safety  Long term goal 3: Pt demo SBA-CGA bathing @ shower level both seated & standing  Long term goal 4: Pt demo s/u UE dress & SBA-CGA LE dress including underwear, pants, socks & shoes & demo G- endurance  Long term goal 5: Pt demo SBA-CGA commode trf with appropriate DME to ensure pt safety. Pt demo SBA toileting & demo G- safety & insight  Long term goals 6: Pt demo SBA-CGA walk in shower trf using appropriate DME to ensure pt safety  Long term goal 7: Pt demo Min A light homemaking activity & demo G- safety & insight  Long term goal 8: Pt demo improved FMC/ strength to improve pt ability to complete ADLs as evidenced by gains inthe followin-hole peg test- R hand pt handed pegs as he was u/a to pick them up & completed in 1 minute & 33.6 seconds & norm for pt age & gender is 25.8 seconds, L hand 34.8 seconds & norm for pt age & gender is 26.0 seconds;  strength- R hand 20# & norm for pt age & gender is 75# & L hand 57# & normf or pt age & gender is 65#  Long term goal 9: Pt demo G- endurance for a 20-30 minute functional activity    10/20/21 Pt plan of care updated on this date. Tentative length of stay is 2 weeks. Leonard Fernandez OTR/L [id number]    [x] Continue with current OT Plan of care.   [] Prepare for Discharge     DISCHARGE RECOMMENDATIONS  Recommended DME:    Post Discharge Care:   []Home Independently  []Home with 24hr Care / Supervision []Home with Partial Supervision []Home with Home Health OT []Home with Out Pt OT []Other: ___   Comments:         Time in Time out Tx Time Breakdown  Variance:   First Session  0815 0915 [x] Individual Tx-60 Mins  [] Concurrent Tx -  [] Co-Tx -   [] Group Tx -   [] Time Missed -     Second Session [id number]  [x] Individual Tx- 45 Mins  [] Concurrent Tx -   [] Co-Tx -   [] Group Tx -   [] Time Missed -     Third Session    [] Individual Tx-   [] Concurrent Tx -  [] Co-Tx -   [] Group Tx -   [] Time Missed -         Total Tx Time: 105 mins      Karen Kramer OTR/L [id number]  Shakila López WEINER/L [id number]    I have read & agree with the above status  Karen Kramer OTR/L [id number]

## 2021-10-22 NOTE — FLOWSHEET NOTE
Inpatient Wound Care(follow up) 5501b  Admit Date: 10/13/2021  4:05 PM    Reason for consult:  Re-evaluation    Findings:       10/22/21 1505   Wound 10/12/21 Head Right;Posterior   Date First Assessed/Time First Assessed: 10/12/21 1518   Present on Hospital Admission: No  Location: Head  Wound Location Orientation: Right;Posterior   Wound Image   (see left)   Wound Etiology Pressure Unstageable   Dressing/Treatment Open to air   Wound Length (cm) 1 cm   Wound Width (cm) 1 cm   Wound Depth (cm)   (unable to determine)   Wound Surface Area (cm^2) 1 cm^2   Change in Wound Size % (l*w) 68.75   Wound Assessment   (brown)   Drainage Amount None   Mackenzie-wound Assessment Dry/flaky   Wound 10/12/21 Head Left;Posterior   Date First Assessed/Time First Assessed: 10/12/21 1517   Present on Hospital Admission: No  Location: Head  Wound Location Orientation: Left;Posterior   Wound Image    Wound Etiology Pressure Unstageable   Dressing/Treatment Open to air   Wound Length (cm) 3 cm   Wound Width (cm) 2 cm   Wound Depth (cm)   (unable to determine)   Wound Surface Area (cm^2) 6 cm^2   Change in Wound Size % (l*w) 62.5   Wound Assessment   (black, brown)   Drainage Amount Scant  (proximal area)   Drainage Description Serosanguinous   Odor None   Mackenzie-wound Assessment Intact     **Informed Consent**    The patient has given verbal consent to have photos taken of wound and inserted into their chart as part of their permanent medical record for purposes of documentation, treatment management and/or medical review. All Images taken on 10/22/21 of patient name: Dara Rodriguez were transmitted and stored on TouchPal located within [facility] by a registered [employer] Mobile Application Device.    Patient up in chair for assessment    Impression:  Pressure injuries resolving    Plan:  No local wound care required    Massiel Ivy RN 10/22/2021 4:26 PM

## 2021-10-23 LAB
ANION GAP SERPL CALCULATED.3IONS-SCNC: 9 MMOL/L (ref 7–16)
BUN BLDV-MCNC: 21 MG/DL (ref 6–23)
CALCIUM SERPL-MCNC: 8.5 MG/DL (ref 8.6–10.2)
CHLORIDE BLD-SCNC: 106 MMOL/L (ref 98–107)
CO2: 27 MMOL/L (ref 22–29)
CREAT SERPL-MCNC: 2.1 MG/DL (ref 0.7–1.2)
GFR AFRICAN AMERICAN: 37
GFR NON-AFRICAN AMERICAN: 31 ML/MIN/1.73
GLUCOSE BLD-MCNC: 101 MG/DL (ref 74–99)
POTASSIUM SERPL-SCNC: 4.3 MMOL/L (ref 3.5–5)
SODIUM BLD-SCNC: 142 MMOL/L (ref 132–146)

## 2021-10-23 PROCEDURE — 6360000002 HC RX W HCPCS

## 2021-10-23 PROCEDURE — 97530 THERAPEUTIC ACTIVITIES: CPT

## 2021-10-23 PROCEDURE — 1280000000 HC REHAB R&B

## 2021-10-23 PROCEDURE — 6370000000 HC RX 637 (ALT 250 FOR IP): Performed by: NURSE PRACTITIONER

## 2021-10-23 PROCEDURE — 6370000000 HC RX 637 (ALT 250 FOR IP)

## 2021-10-23 PROCEDURE — 80048 BASIC METABOLIC PNL TOTAL CA: CPT

## 2021-10-23 PROCEDURE — 99233 SBSQ HOSP IP/OBS HIGH 50: CPT | Performed by: PHYSICAL MEDICINE & REHABILITATION

## 2021-10-23 PROCEDURE — 36415 COLL VENOUS BLD VENIPUNCTURE: CPT

## 2021-10-23 PROCEDURE — 94640 AIRWAY INHALATION TREATMENT: CPT

## 2021-10-23 RX ADMIN — CLOPIDOGREL 75 MG: 75 TABLET, FILM COATED ORAL at 08:25

## 2021-10-23 RX ADMIN — ATORVASTATIN CALCIUM 40 MG: 40 TABLET, FILM COATED ORAL at 21:16

## 2021-10-23 RX ADMIN — IPRATROPIUM BROMIDE AND ALBUTEROL SULFATE 1 AMPULE: .5; 2.5 SOLUTION RESPIRATORY (INHALATION) at 21:03

## 2021-10-23 RX ADMIN — CARVEDILOL 3.12 MG: 3.12 TABLET, FILM COATED ORAL at 17:08

## 2021-10-23 RX ADMIN — PANTOPRAZOLE SODIUM 40 MG: 40 TABLET, DELAYED RELEASE ORAL at 07:16

## 2021-10-23 RX ADMIN — OXYCODONE HYDROCHLORIDE AND ACETAMINOPHEN 500 MG: 500 TABLET ORAL at 21:16

## 2021-10-23 RX ADMIN — IPRATROPIUM BROMIDE AND ALBUTEROL SULFATE 1 AMPULE: .5; 2.5 SOLUTION RESPIRATORY (INHALATION) at 13:38

## 2021-10-23 RX ADMIN — CARVEDILOL 3.12 MG: 3.12 TABLET, FILM COATED ORAL at 08:24

## 2021-10-23 RX ADMIN — IPRATROPIUM BROMIDE AND ALBUTEROL SULFATE 1 AMPULE: .5; 2.5 SOLUTION RESPIRATORY (INHALATION) at 17:02

## 2021-10-23 RX ADMIN — ENOXAPARIN SODIUM 30 MG: 100 INJECTION SUBCUTANEOUS at 08:25

## 2021-10-23 RX ADMIN — AMIODARONE HYDROCHLORIDE 200 MG: 200 TABLET ORAL at 08:24

## 2021-10-23 RX ADMIN — OXYCODONE HYDROCHLORIDE AND ACETAMINOPHEN 500 MG: 500 TABLET ORAL at 08:24

## 2021-10-23 RX ADMIN — BUMETANIDE 1 MG: 1 TABLET ORAL at 08:25

## 2021-10-23 RX ADMIN — NYSTATIN: 100000 OINTMENT TOPICAL at 08:29

## 2021-10-23 RX ADMIN — IPRATROPIUM BROMIDE AND ALBUTEROL SULFATE 1 AMPULE: .5; 2.5 SOLUTION RESPIRATORY (INHALATION) at 09:49

## 2021-10-23 RX ADMIN — FOLIC ACID 1 MG: 1 TABLET ORAL at 08:24

## 2021-10-23 RX ADMIN — ASPIRIN 81 MG: 81 TABLET ORAL at 08:25

## 2021-10-23 RX ADMIN — TAMSULOSIN HYDROCHLORIDE 0.4 MG: 0.4 CAPSULE ORAL at 08:38

## 2021-10-23 RX ADMIN — FERROUS SULFATE TAB 325 MG (65 MG ELEMENTAL FE) 325 MG: 325 (65 FE) TAB at 17:08

## 2021-10-23 RX ADMIN — NYSTATIN: 100000 OINTMENT TOPICAL at 21:16

## 2021-10-23 RX ADMIN — FERROUS SULFATE TAB 325 MG (65 MG ELEMENTAL FE) 325 MG: 325 (65 FE) TAB at 08:25

## 2021-10-23 ASSESSMENT — PAIN SCALES - GENERAL: PAINLEVEL_OUTOF10: 0

## 2021-10-23 NOTE — PROGRESS NOTES
Department of Internal Medicine  Nephrology Progress Note      Events reviewed. SUBJECTIVE: We are following Mr. Mehul [address] for NGUYỄN. Reports no complaints.     PHYSICAL EXAM:      Vitals:    VITALS:  /61   Pulse 90   Temp 97 °F (36.1 °C) (Temporal)   Resp 18   Ht 5' 8\" (1.727 m)   Wt 152 lb 7 oz (69.1 kg)   SpO2 96%   BMI 23.18 kg/m²   24HR INTAKE/OUTPUT:      Intake/Output Summary (Last 24 hours) at 10/23/2021 1101  Last data filed at 10/23/2021 4147  Gross per 24 hour   Intake 420 ml   Output 700 ml   Net -280 ml     Constitutional: Patient is awake, alert in no distress  HEENT: Pupils are equal reactive  Respiratory: Decreased breath sounds at the bases  Cardiovascular/Edema: Heart sounds are regular  Gastrointestinal: Abdomen soft  Neurologic: Patient alert   Other: +trace edema      Scheduled Meds:   tamsulosin  0.4 mg Oral Daily    ipratropium-albuterol  1 ampule Inhalation Q4H WA    aspirin  81 mg Oral Daily    vitamin C  500 mg Oral BID    atorvastatin  40 mg Oral Daily    bumetanide  1 mg Oral Daily    carvedilol  3.125 mg Oral BID WC    clopidogrel  75 mg Oral Daily    enoxaparin  30 mg SubCUTAneous Daily    ferrous sulfate  325 mg Oral BID WC    folic acid  1 mg Oral Daily    nystatin   Topical BID    pantoprazole  40 mg Oral QAM AC    amiodarone  200 mg Oral Daily     Continuous Infusions:    PRN Meds:.acetaminophen, bisacodyl, oxyCODONE-acetaminophen **OR** oxyCODONE-acetaminophen, sennosides-docusate sodium, diphenhydrAMINE, artificial tears, ondansetron, sodium chloride, polyethylene glycol    DATA:    CBC:   Lab Results   Component Value Date    WBC 5.7 10/22/2021    RBC 2.98 10/22/2021    HGB 8.8 10/22/2021    HCT 26.1 10/22/2021    MCV 87.6 10/22/2021    MCH 29.5 10/22/2021    MCHC 33.7 10/22/2021    RDW 15.5 10/22/2021     10/22/2021    MPV 10.9 10/22/2021     CMP:    Lab Results   Component Value Date     10/23/2021    K 4.3 10/23/2021    K 4.2 10/14/2021  10/23/2021    CO2 27 10/23/2021    BUN 21 10/23/2021    CREATININE 2.1 10/23/2021    GFRAA 37 10/23/2021    LABGLOM 31 10/23/2021    GLUCOSE 101 10/23/2021    PROT 5.4 10/12/2021    LABALBU 3.1 10/12/2021    CALCIUM 8.5 10/23/2021    BILITOT 0.8 10/12/2021    ALKPHOS 47 10/12/2021    AST 15 10/12/2021    ALT 10 10/12/2021     Magnesium:    Lab Results   Component Value Date    MG 1.9 10/17/2021     Phosphorus:    Lab Results   Component Value Date    PHOS 2.8 10/10/2021        Radiology Review:      CXR 10/3/21   1. Median sternotomy changes. 2. Bilateral pleuroparenchymal opacities that could be related to pleural   effusion and edema.  The appearance of the chest is about the same or   slightly worse.         Chest x-ray October 6, 2021   1. Stable appearance of the postoperative chest.   2. Bilateral chest tubes remain in position.  There is no right or left   pneumothorax. 3. Small right pleural effusion         Chest x-ray October 8, 2021   Minimal bibasilar atelectasis.       Trace right pleural effusion       There is no pneumothorax             BRIEF SUMMARY OF INITIAL CONSULT:    Briefly Mr. Ye Barroso is a 79-year-old man with history of HTN, CAD with recent status cardiac catheterization done on September 9 which showed severe multivessel disease, hyperlipidemia, hiatal hernia, who was admitted for elective CABG on September 27, 2021. On admission his creatinine level was 0.8 mg/dL and post surgery his creatinine has progressively increased up to 2.3 mg/dL, reason for this consultation. Postoperatively she developed persistent hypotension, lactic acidosis and respiratory failure for which he was reintubated. Since then she has required multiple pressors and he was placed on IABP. He had received 1 dose of ketorolac perioperatively. His urine output has significantly decrease and is being about 500 cc/day in the last 48 hours and his fluid balance presently is over 9 L.     Problems resolved:    · Cardiogenic shock, hemodynamically more stable, pressor support decrease, still on IABP. Tentative plan for removal of IABP. Pro-BP 16,936  · Hypernatremia with hypervolemia, 2/2 sodium containing IV fluid resuscitation and underlying heart failure. Resolved with  natriuresis and free water (add D5W)  · Hypokalemia, 2/2 diuretics, potassium levels improve  · Respiratory alkalosis (pH: 7.554, PCO2: 52.4) with metabolic alkalosis (bicarbonate administration)  · Respiratory failure status post intubation  · Thrombocytopenia  · Transaminitis with hyperbilirubinemia, possibly shock liver versus congestive liver  · Probably pneumonia, on piperacillin-tazobactam  · Hypokalemia, 2/2 diuretics, potassium levels improved. IMPRESSION/RECOMMENDATIONS:      1. NGUYỄN stage III, CABG associated NGUYỄN, ischemic ATN, non-oliguric. FEUrea 18.8%. Renal function relatively stable today. 2. Hypokalemia, 2/2 diuretics. Improved. 3. HFmEF 40% with stage IDD, proBNP 16,826 > 9461> 7228 > 5112, proBNP levels continues to improve  ---------------------------------------------------------  4. CAD status post CABG x3 September 27, 2021, on ASA, clopidogrel, carvedilol, atorvastatin  5. Amiodarone therapy, for AF prophylaxis  6.  Normocytic anemia, status post surgery, status post transfusion, iron saturation 36%, on p.o. iron      Plan:    · Continue tamsulosin 0.4 mg PO daily  · Continue Bumex 1 mg p.o. daily  · Continue to monitor renal function for recovery ([facility])  · Obtain pro-BNP      Jose Agosto MD

## 2021-10-23 NOTE — PLAN OF CARE
Problem: Falls - Risk of:  Goal: Will remain free from falls  Description: Will remain free from falls  10/23/2021 1454 by Kyleigh Reeves RN  Outcome: Ongoing  10/23/2021 1038 by Kyleigh Reeves RN  Outcome: Ongoing  10/23/2021 0508 by Debra Webber RN  Outcome: Met This Shift  Goal: Absence of physical injury  Description: Absence of physical injury  10/23/2021 1454 by Kyleigh Reeves RN  Outcome: Ongoing  10/23/2021 1038 by Kyleigh Reeves RN  Outcome: Ongoing  10/23/2021 0508 by Debra Webber RN  Outcome: Met This Shift     Problem: Skin Integrity:  Goal: Will show no infection signs and symptoms  Description: Will show no infection signs and symptoms  10/23/2021 1454 by Kyleigh Reeves RN  Outcome: Ongoing  10/23/2021 1038 by Kyleigh Reeves RN  Outcome: Ongoing  10/23/2021 0508 by Debra Webber RN  Outcome: Met This Shift  Goal: Absence of new skin breakdown  Description: Absence of new skin breakdown  10/23/2021 1454 by Kyleigh Reeves RN  Outcome: Ongoing  10/23/2021 1038 by Kyleigh Reeves RN  Outcome: Ongoing  10/23/2021 0508 by Debra Webber RN  Outcome: Met This Shift     Problem: Infection:  Goal: Will remain free from infection  Description: Will remain free from infection  10/23/2021 1454 by Kyleigh Reeves RN  Outcome: Ongoing  10/23/2021 1038 by Kyleigh Reeves RN  Outcome: Ongoing     Problem: Safety:  Goal: Free from accidental physical injury  Description: Free from accidental physical injury  10/23/2021 1454 by Kyleigh Reeves RN  Outcome: Ongoing  10/23/2021 1038 by Kyleigh Reeves RN  Outcome: Ongoing  10/23/2021 0508 by Debra Webber RN  Outcome: Met This Shift  Goal: Free from intentional harm  Description: Free from intentional harm  10/23/2021 1454 by Kyleigh Reeves RN  Outcome: Ongoing  10/23/2021 1038 by Kyleigh Reeves RN  Outcome: Ongoing     Problem: Daily Care:  Goal: Daily care needs are met  Description: Daily care needs are met  10/23/2021 1454 by Kyleigh Reeves RN  Outcome: Ongoing  10/23/2021 1038 by Marilou Sierra RN  Outcome: Ongoing  10/23/2021 0508 by Andre Duarte RN  Outcome: Met This Shift     Problem: Pain:  Goal: Patient's pain/discomfort is manageable  Description: Patient's pain/discomfort is manageable  10/23/2021 1454 by Marilou Sierra RN  Outcome: Ongoing  10/23/2021 1038 by Marilou Sierra RN  Outcome: Ongoing  10/23/2021 0508 by Andre Duarte RN  Outcome: Met This Shift     Problem: Skin Integrity:  Goal: Skin integrity will stabilize  Description: Skin integrity will stabilize  10/23/2021 1454 by Marilou Sierra RN  Outcome: Ongoing  10/23/2021 1038 by Marilou Sierra RN  Outcome: Ongoing  10/23/2021 0508 by Andre Duarte RN  Outcome: Met This Shift     Problem: Discharge Planning:  Goal: Patients continuum of care needs are met  Description: Patients continuum of care needs are met  10/23/2021 1454 by Marilou Sierra RN  Outcome: Ongoing  10/23/2021 1038 by Marilou Sierra RN  Outcome: Ongoing     Problem: ABCDS Injury Assessment  Goal: Absence of physical injury  10/23/2021 1454 by Marilou Sierra RN  Outcome: Ongoing  10/23/2021 1038 by Marilou Sierra RN  Outcome: Ongoing     Problem: HEMODYNAMIC STATUS  Goal: Patient has stable vital signs and fluid balance  10/23/2021 1454 by Marilou Sierra RN  Outcome: Ongoing  10/23/2021 1038 by Marilou Sierra RN  Outcome: Ongoing     Problem: ACTIVITY INTOLERANCE/IMPAIRED MOBILITY  Goal: Mobility/activity is maintained at optimum level for patient  10/23/2021 1454 by Marilou Sierra RN  Outcome: Ongoing  10/23/2021 1038 by Marilou Sierra RN  Outcome: Ongoing     Problem: COMMUNICATION IMPAIRMENT  Goal: Ability to express needs and understand communication  10/23/2021 1454 by Marilou Sierra RN  Outcome: Ongoing  10/23/2021 1038 by Marliou Sierra RN  Outcome: Ongoing  10/23/2021 0508 by Andre Duarte RN  Outcome: Met This Shift     Problem: Cardiac:  Goal: Ability to maintain vital signs within normal range will improve  Description: Ability to maintain vital signs within normal range will improve  10/23/2021 1454 by Regulo Mandujano RN  Outcome: Ongoing  10/23/2021 1038 by Regulo Mandujano RN  Outcome: Ongoing  Goal: Cardiovascular alteration will improve  Description: Cardiovascular alteration will improve  10/23/2021 1454 by Regulo Mandujano RN  Outcome: Ongoing  10/23/2021 1038 by Regulo Mandujano RN  Outcome: Ongoing     Problem: Health Behavior:  Goal: Will modify at least one risk factor affecting health status  Description: Will modify at least one risk factor affecting health status  10/23/2021 1454 by Regulo Mandujano RN  Outcome: Ongoing  10/23/2021 1038 by Regulo Mandujano RN  Outcome: Ongoing  Goal: Identification of resources available to assist in meeting health care needs will improve  Description: Identification of resources available to assist in meeting health care needs will improve  10/23/2021 1454 by Regulo Mandujano RN  Outcome: Ongoing  10/23/2021 1038 by Regulo Mandujano RN  Outcome: Ongoing     Problem: Physical Regulation:  Goal: Complications related to the disease process, condition or treatment will be avoided or minimized  Description: Complications related to the disease process, condition or treatment will be avoided or minimized  10/23/2021 1454 by Regulo Mandujano RN  Outcome: Ongoing  10/23/2021 1038 by Reguol Mandujano RN  Outcome: Ongoing     Problem: Mobility - Impaired:  Goal: Mobility will improve  Description: Mobility will improve  10/23/2021 1454 by Regulo Mandujano RN  Outcome: Ongoing  10/23/2021 1038 by Regulo Mandujano RN  Outcome: Ongoing  10/23/2021 0508 by Pam Richey RN  Outcome: Met This Shift

## 2021-10-23 NOTE — PLAN OF CARE
Problem: Falls - Risk of:  Goal: Will remain free from falls  Description: Will remain free from falls  10/23/2021 1038 by Amaris Salgado RN  Outcome: Ongoing  10/23/2021 0508 by Marychuy Mcintosh RN  Outcome: Met This Shift  Goal: Absence of physical injury  Description: Absence of physical injury  10/23/2021 1038 by Amaris Salgado RN  Outcome: Ongoing  10/23/2021 0508 by Marychuy Mcintosh RN  Outcome: Met This Shift     Problem: Skin Integrity:  Goal: Will show no infection signs and symptoms  Description: Will show no infection signs and symptoms  10/23/2021 1038 by Amaris Salgado RN  Outcome: Ongoing  10/23/2021 0508 by Marychuy Mcintosh RN  Outcome: Met This Shift  Goal: Absence of new skin breakdown  Description: Absence of new skin breakdown  10/23/2021 1038 by Amaris Salgado RN  Outcome: Ongoing  10/23/2021 0508 by Marychuy Mcintosh RN  Outcome: Met This Shift     Problem: Infection:  Goal: Will remain free from infection  Description: Will remain free from infection  Outcome: Ongoing     Problem: Safety:  Goal: Free from accidental physical injury  Description: Free from accidental physical injury  10/23/2021 1038 by Amaris Salgado RN  Outcome: Ongoing  10/23/2021 0508 by Marychuy Mcintosh RN  Outcome: Met This Shift  Goal: Free from intentional harm  Description: Free from intentional harm  Outcome: Ongoing     Problem: Daily Care:  Goal: Daily care needs are met  Description: Daily care needs are met  10/23/2021 1038 by Amaris Salgado RN  Outcome: Ongoing  10/23/2021 0508 by Marychuy Mcintosh RN  Outcome: Met This Shift     Problem: Pain:  Goal: Patient's pain/discomfort is manageable  Description: Patient's pain/discomfort is manageable  10/23/2021 1038 by Amaris Salgado RN  Outcome: Ongoing  10/23/2021 0508 by Marychuy Mcintosh RN  Outcome: Met This Shift     Problem: Skin Integrity:  Goal: Skin integrity will stabilize  Description: Skin integrity will stabilize  10/23/2021 1038 by Amaris Salgado RN  Outcome: Ongoing  10/23/2021 2773 by Alison Michael RN  Outcome: Met This Shift     Problem: Discharge Planning:  Goal: Patients continuum of care needs are met  Description: Patients continuum of care needs are met  Outcome: Ongoing     Problem: ABCDS Injury Assessment  Goal: Absence of physical injury  Outcome: Ongoing     Problem: HEMODYNAMIC STATUS  Goal: Patient has stable vital signs and fluid balance  Outcome: Ongoing     Problem: ACTIVITY INTOLERANCE/IMPAIRED MOBILITY  Goal: Mobility/activity is maintained at optimum level for patient  Outcome: Ongoing     Problem: COMMUNICATION IMPAIRMENT  Goal: Ability to express needs and understand communication  10/23/2021 1038 by Ladd Romberg, RN  Outcome: Ongoing  10/23/2021 0508 by Alison Michael RN  Outcome: Met This Shift     Problem: Cardiac:  Goal: Ability to maintain vital signs within normal range will improve  Description: Ability to maintain vital signs within normal range will improve  Outcome: Ongoing  Goal: Cardiovascular alteration will improve  Description: Cardiovascular alteration will improve  Outcome: Ongoing     Problem: Health Behavior:  Goal: Will modify at least one risk factor affecting health status  Description: Will modify at least one risk factor affecting health status  Outcome: Ongoing  Goal: Identification of resources available to assist in meeting health care needs will improve  Description: Identification of resources available to assist in meeting health care needs will improve  Outcome: Ongoing     Problem: Physical Regulation:  Goal: Complications related to the disease process, condition or treatment will be avoided or minimized  Description: Complications related to the disease process, condition or treatment will be avoided or minimized  Outcome: Ongoing     Problem: Mobility - Impaired:  Goal: Mobility will improve  Description: Mobility will improve  10/23/2021 1038 by Ladd Romberg, RN  Outcome: Ongoing  10/23/2021 0508 by Alison Michael RN  Outcome: Met This

## 2021-10-23 NOTE — PROGRESS NOTES
PM&R Weekend Progress Note  Patient: June Julieth  Age/sex: 78 y.o. male  Medical Record #: [id number]  Date: 10/23/2021    Covering for: Dr. Serene Barrett: Patient seen and examined in his room this morning. No issues overnight. No complaints this morning. Denies abdominal pain, chest pain, shortness of breath. All pertinent labs reviewed. Room change this AM.       Past Medical History:   Diagnosis Date    CAD (coronary artery disease)     Encounter regarding vascular access for dialysis for ESRD ([facility] [address] 10/1/2021    Hiatal hernia     Hyperlipidemia     Hypertension        Past Surgical History:   Procedure Laterality Date    BRONCHOSCOPY N/A 9/30/2021    BRONCHOSCOPY DIAGNOSTIC OR CELL [address] ONLY performed by María Yost MD at [id number] [place] [address]  9/30/2021    BRONCHOSCOPY CRYOTHERAPY/LASER performed by María [facility]ost [facility] at [facility] N/A 9/27/2021    CABG CORONARY ARTERY BYPASS  X 3 EVH, EMMA performed by Ruddy Mariee MD at [facility]       No family history on file.     Objective:  Vitals:    10/22/21 1755 10/23/21 0824 10/23/21 0854 10/23/21 1530   BP: (!) 115/53 (!) 143/83 125/61 (!) 105/55   Pulse: 81 86 90 77   Resp:  18  18   Temp:  97 °F (36.1 °C)  98.1 °F (36.7 °C)   TempSrc:  Temporal  Temporal   SpO2:  96%  95%   Weight:       Height:         10/22 1501 - 10/23 1500  In: 240 [P.O.:240]  Out: 700 [Urine:700]    GEN: NAD, conversational   HEENT: NC/AT, PERRLA, EOMI  RESP: CTAB, no R/R/W   CV: +S1 S2, RR   ABD: soft, NT, ND, normal BS   : no jaimes   EXT: Normal ROM, No clubbing/cyanosis, 2+ pulses   SKIN: No erythema, warm  NEURO: Alert, no new focal deficits     Labs reviewed  CBC:   Recent Labs     10/22/21  0558   WBC 5.7   HGB 8.8*        BMP:    Recent Labs     10/22/21  0558 10/23/21  0630    142   K 3.4* 4.3    106   CO2 21* 27   BUN 22 21   CREATININE 2.0* 2.1*   GLUCOSE 88 101* Hepatic: No results for input(s): AST, ALT, ALB, BILITOT, ALKPHOS in the last 72 hours. BNP: No results for input(s): BNP in the last 72 hours. Lipids: No results for input(s): CHOL, HDL in the last 72 hours. Invalid input(s): LDLCALCU  INR: No results for input(s): INR in the last 72 hours. A/P  This is 78 y.o. male with CVA    Rehab: Continue extensive rehabilitation. Continue physical therapy, occupational therapy, and speech-language pathology. No change in medications. Continue current management. Pj Randle DO, FAAPMR  Physical Medicine and Rehabilitation     Please note that the above documentation was prepared using voice recognition software. Every attempt was made to ensure accuracy but there may be spelling, grammatical, and contextual errors.

## 2021-10-23 NOTE — PROGRESS NOTES
Physical Therapy  Treatment note    NAME: Sofya Reddy  : 1945  MRN: [id number]    Date of Service: 10/20/2021    Evaluating Therapist: Dorla Kawasaki., PT, DPT [id number]  ROOM: [address] [place] [address]  DIAGNOSIS: L cerebellar CVA   PRECAUTIONS: Falls, Sternal Precautions, Pureed diet w/ nectar thick liquids, **ORANGE DOT: Wife may assist with tx/amb pt to restroom in room**  HPI: s/p CABG on 21, Following CABG, patient developed hypotension, acidosis, NGUYỄN and respiratory failure and was intubated. Bronchoscopy done on  to remove large mucus plug. Required pressors and was placed on aortic balloon pump which was removed 10/4/21. Off of sedation was found to have right sided weakness. CT head negative for acute findings. Echo showed no LV mural thrombus. Carotid US with atherosclerotic disease -- 50-69% bilaterally. Unable to have CTAs due to kidney function. Unable to have MRI/MRA at this time due to epicardial wires. Repeat CT head with equivocal area of low density in the L cerebellum. Social:  Pt lives with wife in a mobile home with 5 stairs to enter and 1 rail. Pt also has home in [place] which is 1 story setup, unclear amount of steps. Prior to admission: Pt ambulated without device and was independent PTA. Initial Evaluation  Date: 10/14/21 AM     Short Term Goals Long Term Goals    Was pt agreeable to Eval/treatment? yes yes     Does pt have pain?  Denies pain No c/o pain     Bed Mobility  Rolling: SBA  Supine to sit: modA  Sit to supine: modA  Scooting: SBA Independent all aspects sup Mod i   Transfers Sit to stand: mod-maxA  Stand to sit: mod-maxA  Stand pivot: modA    5xSTS: NT Sit<>Stand: SBA  Stand pivot: SBA no AD Asher  5xSTS: < 15 sec sup  5xSTS: < 15 sec   Ambulation    10', no AD, maxA with w/c follow from 2nd helper    10mWT: NT  6mWT:  feet no AD SBA 50', no AD, Asher    10mWT: TBD  6mWT: ', no AD, sup    10mWT: TBD  6mWT: TBD   Walking 10 feet on uneven surface NT due to safety NT 10', no AD,  Asher 48', no AD, sup   Wheel Chair Mobility NT NT n/a n/a   Car Transfers NT due to safety SBA Asher sup   Stair negotiation: ascended and descended NT due to safety 4 steps 1 rail SBA -NR pattern  4 steps, HR for balance only, Asher 12 steps, HR for balance only, sup   Curb Step:   ascended and descended 4\" curb, maxA, no AD NT 4\" curb, no AD, Asher 7.5\" curb, no AD, sup   Picking up object off the floor NT due to safety NT Will  cone with Asher assist Will  cone with sup assist   BLE ROM AROM WFL      BLE Strength LLE 5/5 except hip flexors which were 3/5  R hip flex 3/5   R knee ext/flex 5/5   R ankle DF/PF 5/5      Balance  Sitting- mod I  Standing-mod-maxA    BBS: NT  FGA: NT Standing: SBA no AD    BBS: NT  FGA: NT   BBS: > 45/56  FGA: >22/30   BBS: -  FGA: -   Date Family Teach Completed N/A      Is additional Family Teaching Needed? Y or N Y      Hindering Progress sternal precautions, dyscoordination, weakness, decreased endurance, anxiety sternal precautions, dyscoordination, weakness, decreased endurance, anxiety     PT recommended ELOS 3-4 weeks      Team's Discharge Plan       Therapist at Team Meeting         Therapeutic Exercise:   AM:   - Functional mobility as noted above in chart  - 50 feet x2 reps no AD SBA  - Multiple short distance walks within PT gym with no AD  - Sit<>stand x5 reps    Additional Comments: Reviewed all mobility as noted above in chart. Pt needing very minimal cueing for functional activities. Required frequent rest breaks due to fatigue and mild RILEY. Pt returned to room after session and left with call light and all needs met. Pt will continue to benefit from therapy services to improve strength, endurance, and safety with mobility activities.      Patient/family education  Pt/family educated on maintaining sternal precautions    Patient/family response to education:   Pt/family verbalized understanding Pt/family demonstrated skill Pt/family requires further education in this area   yes yes yes     AM  Time in: 0845  Time out: 0915    Pt is making good progress toward established Physical Therapy goals. Continue with physical therapy current plan of care.     Adrien Rosales DPT [id number]

## 2021-10-24 PROCEDURE — 6370000000 HC RX 637 (ALT 250 FOR IP): Performed by: NURSE PRACTITIONER

## 2021-10-24 PROCEDURE — 6360000002 HC RX W HCPCS

## 2021-10-24 PROCEDURE — 6370000000 HC RX 637 (ALT 250 FOR IP)

## 2021-10-24 PROCEDURE — 97110 THERAPEUTIC EXERCISES: CPT

## 2021-10-24 PROCEDURE — 94640 AIRWAY INHALATION TREATMENT: CPT

## 2021-10-24 PROCEDURE — 1280000000 HC REHAB R&B

## 2021-10-24 RX ADMIN — FERROUS SULFATE TAB 325 MG (65 MG ELEMENTAL FE) 325 MG: 325 (65 FE) TAB at 09:44

## 2021-10-24 RX ADMIN — BUMETANIDE 1 MG: 1 TABLET ORAL at 09:44

## 2021-10-24 RX ADMIN — CARVEDILOL 3.12 MG: 3.12 TABLET, FILM COATED ORAL at 17:40

## 2021-10-24 RX ADMIN — FOLIC ACID 1 MG: 1 TABLET ORAL at 09:44

## 2021-10-24 RX ADMIN — IPRATROPIUM BROMIDE AND ALBUTEROL SULFATE 1 AMPULE: .5; 2.5 SOLUTION RESPIRATORY (INHALATION) at 20:35

## 2021-10-24 RX ADMIN — IPRATROPIUM BROMIDE AND ALBUTEROL SULFATE 1 AMPULE: .5; 2.5 SOLUTION RESPIRATORY (INHALATION) at 13:05

## 2021-10-24 RX ADMIN — NYSTATIN: 100000 OINTMENT TOPICAL at 21:39

## 2021-10-24 RX ADMIN — TAMSULOSIN HYDROCHLORIDE 0.4 MG: 0.4 CAPSULE ORAL at 09:44

## 2021-10-24 RX ADMIN — FERROUS SULFATE TAB 325 MG (65 MG ELEMENTAL FE) 325 MG: 325 (65 FE) TAB at 17:40

## 2021-10-24 RX ADMIN — PANTOPRAZOLE SODIUM 40 MG: 40 TABLET, DELAYED RELEASE ORAL at 06:49

## 2021-10-24 RX ADMIN — CLOPIDOGREL 75 MG: 75 TABLET, FILM COATED ORAL at 01:20

## 2021-10-24 RX ADMIN — ENOXAPARIN SODIUM 30 MG: 100 INJECTION SUBCUTANEOUS at 09:45

## 2021-10-24 RX ADMIN — ATORVASTATIN CALCIUM 40 MG: 40 TABLET, FILM COATED ORAL at 21:39

## 2021-10-24 RX ADMIN — DOCUSATE SODIUM 50 MG AND SENNOSIDES 8.6 MG 1 TABLET: 8.6; 5 TABLET, FILM COATED ORAL at 06:51

## 2021-10-24 RX ADMIN — ASPIRIN 81 MG: 81 TABLET ORAL at 09:44

## 2021-10-24 RX ADMIN — OXYCODONE HYDROCHLORIDE AND ACETAMINOPHEN 500 MG: 500 TABLET ORAL at 09:44

## 2021-10-24 RX ADMIN — CARVEDILOL 3.12 MG: 3.12 TABLET, FILM COATED ORAL at 09:44

## 2021-10-24 RX ADMIN — AMIODARONE HYDROCHLORIDE 200 MG: 200 TABLET ORAL at 09:44

## 2021-10-24 RX ADMIN — OXYCODONE HYDROCHLORIDE AND ACETAMINOPHEN 500 MG: 500 TABLET ORAL at 21:39

## 2021-10-24 RX ADMIN — IPRATROPIUM BROMIDE AND ALBUTEROL SULFATE 1 AMPULE: .5; 2.5 SOLUTION RESPIRATORY (INHALATION) at 16:36

## 2021-10-24 ASSESSMENT — PAIN SCALES - GENERAL: PAINLEVEL_OUTOF10: 0

## 2021-10-24 NOTE — PROGRESS NOTES
OCCUPATIONAL THERAPY DAILY NOTE    Date:10/24/2021  Patient Name: Anne Marie Strange  MRN: [id number]  : 1945  Room: [address] [place] [address]A     Diagnosis: CVA- s/p CABG x 3- 21  Additional Pertinent Hx: HLD, HTN, CAD, ESRD, hx hiatal hernia  Precautions: Falls, Puree-Dysphagia IV/nectar & Sternal precautions & wife- Isis orange dot    Functional Assessment:   Date Status AE  Comments   Feeding 10/22/21 independent     Grooming 10/22/21 Sup           Oral Care 10/18/21 Sup      Bathing 10/18/21 Min A     UB Dressing 10/18/21 Min A     LB Dressing 10/18/21 Min A LB AE    Footwear 10/18/21 Min A Shoe horn    Toileting 10/22/21 SBA     Homemaking 10/19/21 Min A- standing  S/SBA- sitting  counter      Functional Transfers / Balance:   Date Status DME  Comments   Sit Balance 10/22/21 independent     Stand Balance 10/22/21 SBA No device    [] Tub  [x] Shower   Transfer 10/24/21 Min A Shower seat Walk in shower transfer, stepping over 3in step, sitting on shower bench, poor balance   Commode   Transfer 10/22/21 SBA No AD    Functional   Mobility 10/24/21 SBA No AD Pt ambulated within room and to/from personal room<>therapy room   Other:  Supine>Sit    Bed>W/c    Sit to stand   10/22/21    10/18/21    10/24/21   SBA    Min A    SBA         Functional Exercises / Activity:  -Arm bike for ~5mins x1, and ~3mins x1, focusing on increasing of strength and ROM of BUE's, activity tolerance.  Pt becoming fatigues between sets, providing of extended rest breaks, cueing on deep breathing techniques.     -Red imer bar for ~20reps x4 planes, with rest breaks, focusing on increasing of  strength, strength of BUE's    Sensory / Neuromuscular Re-Education:        Cognitive Skills:   Status Comments   Problem   Solving G-    Memory G    Sequencing G    Safety G-      Visual Perception:    Education:  -Pt educated on precautions to follow, safety/balance, and hand placement to follow of sternal precautions with transfers    [x] Family teach completed on:  10/18/21- Pt wife educated with regards to pt current levels & performed hands on training to perform commode trfs, short distance ambulation & bed mobility. Pt wife made an orange dot  10/22/21- PT & wife Swathi Wilder observed & participated in OT am treatment session. Pt & wife performed all transfers & mobility in the OT apartment. Pt & wife educated with regards to fall prevention & fall recovery. Pt wife pleased with pt progress. Pain Level: 0/10. Additional Notes:    strength:  R hand 10/21/21- 36#  On eval 20#  Norm75#  L hand 10/21/21- 66#  On eval 57#  Norm 65#    9-hole peg test  R hand 10/21/21- 30.6 seconds On eval 1 min 33.6 sec & handed pegs  Norm 25.8 sec  L hand 10/21/21- 25.8 sec  On eval 34.8 sec    Norm 26.0 sec    Patient has made good  progress during treatment sessions toward set goals. Therapy emphasis to obtain goals: Strengthening, Gait Training, Patient/Caregiver Education & Training, Home Management Training, ROM, Equipment Evaluation, Education, & procurement, Balance Training, Neuromuscular Re-education, Cognitive Reorientation, Safety Education & Training, Self-Care/ADL    Long term goal 1: Pt demo independent to eat all meals  Long term goal 2: Pt demo Mod I grooming seated or standing & demo G- safety  Long term goal 3: Pt demo SBA-CGA bathing @ shower level both seated & standing  Long term goal 4: Pt demo s/u UE dress & SBA-CGA LE dress including underwear, pants, socks & shoes & demo G- endurance  Long term goal 5: Pt demo SBA-CGA commode trf with appropriate DME to ensure pt safety.  Pt demo SBA toileting & demo G- safety & insight  Long term goals 6: Pt demo SBA-CGA walk in shower trf using appropriate DME to ensure pt safety  Long term goal 7: Pt demo Min A light homemaking activity & demo G- safety & insight  Long term goal 8: Pt demo improved FMC/ strength to improve pt ability to complete ADLs as evidenced by gains inthe followin-hole peg test- R hand pt handed pegs as he was u/a to pick them up & completed in 1 minute & 33.6 seconds & norm for pt age & gender is 25.8 seconds, L hand 34.8 seconds & norm for pt age & gender is 26.0 seconds;  strength- R hand 20# & norm for pt age & gender is 75# & L hand 57# & normf or pt age & gender is 65#  Long term goal 9: Pt demo G- endurance for a 20-30 minute functional activity    10/20/21 Pt plan of care updated on this date. Tentative length of stay is 2 weeks. Nba Valdovinos OTR/L [id number]    [x] Continue with current OT Plan of care.   [] Prepare for Discharge     DISCHARGE RECOMMENDATIONS  Recommended DME:    Post Discharge Care:   []Home Independently  []Home with 24hr Care / Supervision []Home with Partial Supervision []Home with Home Health OT []Home with Out Pt OT []Other: ___   Comments:         Time in Time out Tx Time Breakdown  Variance:   First Session  9:05am 9:35am [x] Individual Tx-30 Mins  [] Concurrent Tx -  [] Co-Tx -   [] Group Tx -   [] Time Missed -     Second Session   [] Individual Tx-   [] Concurrent Tx -   [] Co-Tx -   [] Group Tx -   [] Time Missed -     Third Session    [] Individual Tx-   [] Concurrent Tx -  [] Co-Tx -   [] Group Tx -   [] Time Missed -         Total Tx Time: 30 mins      Jeanette Gaspar WEINER/L [id number]

## 2021-10-25 LAB
ANION GAP SERPL CALCULATED.3IONS-SCNC: 11 MMOL/L (ref 7–16)
BUN BLDV-MCNC: 20 MG/DL (ref 6–23)
CALCIUM SERPL-MCNC: 8.2 MG/DL (ref 8.6–10.2)
CHLORIDE BLD-SCNC: 104 MMOL/L (ref 98–107)
CO2: 25 MMOL/L (ref 22–29)
CREAT SERPL-MCNC: 2 MG/DL (ref 0.7–1.2)
GFR AFRICAN AMERICAN: 40
GFR NON-AFRICAN AMERICAN: 33 ML/MIN/1.73
GLUCOSE BLD-MCNC: 97 MG/DL (ref 74–99)
HCT VFR BLD CALC: 28.7 % (ref 37–54)
HEMOGLOBIN: 9.2 G/DL (ref 12.5–16.5)
MCH RBC QN AUTO: 29.4 PG (ref 26–35)
MCHC RBC AUTO-ENTMCNC: 32.1 % (ref 32–34.5)
MCV RBC AUTO: 91.7 FL (ref 80–99.9)
PDW BLD-RTO: 15.1 FL (ref 11.5–15)
PLATELET # BLD: 157 E9/L (ref 130–450)
PMV BLD AUTO: 10.4 FL (ref 7–12)
POTASSIUM SERPL-SCNC: 3.8 MMOL/L (ref 3.5–5)
PRO-BNP: 2696 PG/ML (ref 0–450)
RBC # BLD: 3.13 E12/L (ref 3.8–5.8)
SODIUM BLD-SCNC: 140 MMOL/L (ref 132–146)
WBC # BLD: 4.8 E9/L (ref 4.5–11.5)

## 2021-10-25 PROCEDURE — 97530 THERAPEUTIC ACTIVITIES: CPT

## 2021-10-25 PROCEDURE — 6360000002 HC RX W HCPCS

## 2021-10-25 PROCEDURE — 36415 COLL VENOUS BLD VENIPUNCTURE: CPT

## 2021-10-25 PROCEDURE — 6370000000 HC RX 637 (ALT 250 FOR IP): Performed by: NURSE PRACTITIONER

## 2021-10-25 PROCEDURE — 94640 AIRWAY INHALATION TREATMENT: CPT

## 2021-10-25 PROCEDURE — 6370000000 HC RX 637 (ALT 250 FOR IP)

## 2021-10-25 PROCEDURE — 80048 BASIC METABOLIC PNL TOTAL CA: CPT

## 2021-10-25 PROCEDURE — 1280000000 HC REHAB R&B

## 2021-10-25 PROCEDURE — 99232 SBSQ HOSP IP/OBS MODERATE 35: CPT | Performed by: PHYSICAL MEDICINE & REHABILITATION

## 2021-10-25 PROCEDURE — 85027 COMPLETE CBC AUTOMATED: CPT

## 2021-10-25 PROCEDURE — 83880 ASSAY OF NATRIURETIC PEPTIDE: CPT

## 2021-10-25 PROCEDURE — 97535 SELF CARE MNGMENT TRAINING: CPT

## 2021-10-25 PROCEDURE — 97112 NEUROMUSCULAR REEDUCATION: CPT

## 2021-10-25 RX ORDER — FERROUS SULFATE 325(65) MG
325 TABLET ORAL
Status: DISCONTINUED | OUTPATIENT
Start: 2021-10-26 | End: 2021-10-27 | Stop reason: HOSPADM

## 2021-10-25 RX ADMIN — ENOXAPARIN SODIUM 30 MG: 100 INJECTION SUBCUTANEOUS at 08:11

## 2021-10-25 RX ADMIN — ASPIRIN 81 MG: 81 TABLET ORAL at 08:10

## 2021-10-25 RX ADMIN — FOLIC ACID 1 MG: 1 TABLET ORAL at 08:37

## 2021-10-25 RX ADMIN — FERROUS SULFATE TAB 325 MG (65 MG ELEMENTAL FE) 325 MG: 325 (65 FE) TAB at 08:10

## 2021-10-25 RX ADMIN — IPRATROPIUM BROMIDE AND ALBUTEROL SULFATE 1 AMPULE: .5; 2.5 SOLUTION RESPIRATORY (INHALATION) at 20:29

## 2021-10-25 RX ADMIN — CARVEDILOL 3.12 MG: 3.12 TABLET, FILM COATED ORAL at 08:10

## 2021-10-25 RX ADMIN — TAMSULOSIN HYDROCHLORIDE 0.4 MG: 0.4 CAPSULE ORAL at 08:10

## 2021-10-25 RX ADMIN — ATORVASTATIN CALCIUM 40 MG: 40 TABLET, FILM COATED ORAL at 21:34

## 2021-10-25 RX ADMIN — DOCUSATE SODIUM 50 MG AND SENNOSIDES 8.6 MG 1 TABLET: 8.6; 5 TABLET, FILM COATED ORAL at 06:57

## 2021-10-25 RX ADMIN — CARVEDILOL 3.12 MG: 3.12 TABLET, FILM COATED ORAL at 17:59

## 2021-10-25 RX ADMIN — BUMETANIDE 1 MG: 1 TABLET ORAL at 08:10

## 2021-10-25 RX ADMIN — PANTOPRAZOLE SODIUM 40 MG: 40 TABLET, DELAYED RELEASE ORAL at 06:56

## 2021-10-25 RX ADMIN — OXYCODONE HYDROCHLORIDE AND ACETAMINOPHEN 500 MG: 500 TABLET ORAL at 08:10

## 2021-10-25 RX ADMIN — NYSTATIN: 100000 OINTMENT TOPICAL at 22:06

## 2021-10-25 RX ADMIN — AMIODARONE HYDROCHLORIDE 200 MG: 200 TABLET ORAL at 08:11

## 2021-10-25 RX ADMIN — OXYCODONE HYDROCHLORIDE AND ACETAMINOPHEN 500 MG: 500 TABLET ORAL at 21:34

## 2021-10-25 RX ADMIN — CLOPIDOGREL 75 MG: 75 TABLET, FILM COATED ORAL at 08:10

## 2021-10-25 ASSESSMENT — PAIN SCALES - GENERAL
PAINLEVEL_OUTOF10: 0
PAINLEVEL_OUTOF10: 0

## 2021-10-25 NOTE — PROGRESS NOTES
[facility]  Comprehensive Progress Note    Subjective:      Jonnie Sharp is a 78 y.o. male admitted to inpatient rehabilitation for impairments and acitivities limitations in ADLs, mobility, and cognition secondary to Acute CVA. No acute events overnight. No cp, sob, n/v. Pain is controlled. Tolerating therapy. Patient feels the iron is causing GI upset and constipation, he requests to decrease or stop it. No other complaints. The patient's medical records have been reviewed. Scheduled Meds:tamsulosin, 0.4 mg, Daily  ipratropium-albuterol, 1 ampule, Q4H WA  aspirin, 81 mg, Daily  vitamin C, 500 mg, BID  atorvastatin, 40 mg, Daily  bumetanide, 1 mg, Daily  carvedilol, 3.125 mg, BID WC  clopidogrel, 75 mg, Daily  enoxaparin, 30 mg, Daily  ferrous sulfate, 672 mg, BID WC  folic acid, 1 mg, Daily  nystatin, , BID  pantoprazole, 40 mg, QAM AC  amiodarone, 200 mg, Daily      Continuous Infusions:  PRN Meds:acetaminophen, 650 mg, Q4H PRN  bisacodyl, 5 mg, Daily PRN  oxyCODONE-acetaminophen, 1 tablet, Q4H PRN   Or  oxyCODONE-acetaminophen, 2 tablet, Q4H PRN  sennosides-docusate sodium, 1 tablet, BID PRN  diphenhydrAMINE, 25 mg, Nightly PRN  artificial tears, , PRN  ondansetron, 4 mg, Q8H PRN  sodium chloride, 1 spray, PRN  polyethylene glycol, 17 g, Daily PRN         Objective:      Vitals:    10/24/21 0945 10/24/21 1305 10/24/21 1740 10/25/21 0800   BP: 127/63  (!) 110/50 114/64   Pulse: 95  80 85   Resp: 18 16  18   Temp: 97.5 °F (36.4 °C)   98.7 °F (37.1 °C)   TempSrc: Oral   Oral   SpO2:  96%     Weight:       Height:         General appearance: alert, up in chair, NAD  Eyes: conjunctivae/corneas clear. PERRL, EOM's intact. Lungs: clear to auscultation bilaterally  Heart: regular rate and rhythm, S1, S2  Abdomen: soft, non-tender, normal bowel sounds  Musculoskeletal: Range of motion normal and no gross abnormalities. No edema. No calf tenderness.    Neurologic: Alert, oriented. Dysarthric.      Motor Findings  Strength: Right  Strength: Left    Deltoid  4 5   Biceps  4 5   Triceps  4 5   Wrist Extensors  4 5   FDP 4 5   Finger abductors 3+ 5   Iliospoas  2 3   Quadriceps  4 5   Tibialis anterior  4 5   EHL 4 5   Gastroc soleus  4 5      *lower extremity edema improved    Laboratory:    Lab Results   Component Value Date    WBC 4.8 10/25/2021    HGB 9.2 (L) 10/25/2021    HCT 28.7 (L) 10/25/2021    MCV 91.7 10/25/2021     10/25/2021     Lab Results   Component Value Date     10/25/2021    K 3.8 10/25/2021    K 4.2 10/14/2021     10/25/2021    CO2 25 10/25/2021    BUN 20 10/25/2021    CREATININE 2.0 10/25/2021    GLUCOSE 97 10/25/2021    CALCIUM 8.2 10/25/2021      Lab Results   Component Value Date    ALT 10 10/12/2021    AST 15 10/12/2021    ALKPHOS 47 10/12/2021    BILITOT 0.8 10/12/2021       Lab Results   Component Value Date    COLORU Yellow 09/23/2021    NITRU Negative 09/23/2021    GLUCOSEU Negative 09/23/2021    KETUA Negative 09/23/2021    UROBILINOGEN 0.2 09/23/2021    BILIRUBINUR Negative 09/23/2021     Lab Results   Component Value Date    LABA1C 5.1 09/10/2021         Functional Status:   Physical Therapy:   Bed mobility: SBA  Transfers: Min A   Ambulation: 230 ft Min A no AD        Occupational Therapy:  Feeding: Independent   Grooming: Supervision   UB dressing: Min A  LB dressing: Min A          Assessment/Plan:       78 y.o. male admitted to inpatient rehabilitation for impairments and acitivities limitations in ADLs, mobility, and cognition secondary to Acute CVA. -Acute CVA: Right hemiparesis, dysarthria, dysphagia, cognitive impairment. Continue Aspirin, Plavix, Lipitor. Continue Acute Rehab evaluations.   -Dysphagia: Passed repeat video swallow study on 10/20, diet upgraded to general. Speech therapy following. -CAD, s/p CABG x3: Continue current medications, sternal precautions.   -Post operative pain: Percocet PRN.  Wean narcotics as tolerated  -Acute blood loss anemia: H&H low but stable, monitor. Continue iron supplement.   -Afib during acute care hospitalization, now in NSR: On amiodarone for Afib prophylaxis per CT surgery, with plans to wean after discharge  -Acute systolic heart failure: On coreg. On Bumex. -NGUYỄN: Nephrology following.   -Hypokalemia: Potassium repleted. Potassium no WNL. Replace PRN. -DVT prophylaxis: lovenox        Iron causing GI upset per patient, he is requesting to decrease or dc. Hgb improving slowly, now > 9.  Will decrease the iron to once daily per patient request.       Electronically signed by Dao Jeong MD on 10/25/2021 at 2:22 PM

## 2021-10-25 NOTE — PROCEDURES
[address]                  [address] [facility],  [facility]                               PULMONARY FUNCTION    PATIENT NAME: Niya STAUFFER                     :        1945  MED REC NO:   [id number]                            ROOM:       7406  ACCOUNT NO:   [de-identified]                           ADMIT DATE: 2021  PROVIDER:     Omaira Iglesias DO    DATE OF PROCEDURE:  09/10/2021    The patient's height is 68 inches. Weight is 121 pounds. PREOP DIAGNOSIS:  Preop CABG. The patient is a never smoker. SPIROMETRY:  Spirometry shows FVC of 4.43 liters which is 116% of  predicted. FEV1 is 3.25 liters which is 113% of predicted. FEV1/FVC  ratio is 73. MVV is 117 which is 101% of predicted. LUNG VOLUMES:  SVC is 4.48 which is 117% of predicted. DIFFUSION:  Diffusion is 24.17 which is 81% of predicted, corrected for  alveolar ventilation; this is 92% of predicted. Flow volume loop does show mild scooping. OVERALL INTERPRETATION:  Spirometry shows no obstruction. No  restriction. Please clinically correlate.         Janette Torres DO    D: 10/24/2021 18:56:49       T: 10/24/2021 21:00:44     [email or web address]  Job#: [id number]     Doc#: [id number]    CC:

## 2021-10-25 NOTE — PROGRESS NOTES
OCCUPATIONAL THERAPY DAILY NOTE    Date:10/25/2021  Patient Name: Elan Ross  MRN: [id number]  : 1945  Room: [address] [place] [address]A     Diagnosis: CVA- s/p CABG x 3- 21  Additional Pertinent Hx: HLD, HTN, CAD, ESRD, hx hiatal hernia  Precautions: Falls, Puree-Dysphagia IV/nectar & Sternal precautions & wife- Isis orange dot    Functional Assessment:   Date Status AE  Comments   Feeding 10/25/21 independent     Grooming 10/25/21 Sup  Standing         Oral Care 10/25/21 Sup   Standing   Bathing 10/25/21 SBA  Shower level seated & standing   UB Dressing 10/25/21 independent     LB Dressing 10/25/21 Sup LB AE Min vc's for sternal precautions   Footwear 10/25/21 Mod I  Shoe horn    Toileting 10/25/21 Sup     Homemaking 10/19/21 Min A- standing  S/SBA- sitting  counter      Functional Transfers / Balance:   Date Status DME  Comments   Sit Balance 10/25/21 independent     Stand Balance 10/25/21 SBA No device    [] Tub  [x] Shower   Transfer 10/25/21 SBA Shower seat Walk in shower transfer, stepping over 3in step, sitting on shower bench, poor balance   Commode   Transfer 10/25/21 SBA No AD Min vc's for precautiuons   Functional   Mobility 10/25/21 SBA No AD Pt ambulated in his room, on the unit & throughout the OT gym   Other:  Supine>Sit    Bed>W/c    Sit to stand   10/25/21    10/18/21    10/25/21   independent    Min A    SBA    Min vc's forprecautions     Functional Exercises / Activity:  Pt presents supine in bed & was agreeable to OT intervention. Pt demo independent supine>sit. Pt Sup sit<>stand with min vc's for precautions. Pt demo SBA functional mobility using no device in his room & throughout the bathroom. Pt demo SBA commode trf using no device to a standard commode. Pt demo SBA walk in shower over a 3 inch lip. Pt bathed @ shower level both seated & standing with min vc's for precautions when standing to wash his chris area.  Pt demo independent UE dress & SBA LE dress to don depends, pants & Mod I to don tennis shoes. Pt demo Sup grooming standing @ sink level. Pt require occasional vc's for precautions throughout the ADL    Sensory / Neuromuscular Re-Education:     Pt tolerated Inmotion assessment using 14 cm [place]. Pt also sat in chair with arm straps & RUE ace wrapped for postioning. Pt also tolerated pre & post testing, random & error augmentation 4x, stabilization 150 & resistance 100 protocol using his RUE. Pt made the following gains in between sets of the random protocol: robot initiation improved from 58/80 initiations to 71/80 initiations and distance from a target improved from 6 to 5 which means pt hit target with greater accuracy. Pt made the following gains from his pre to post testing the following areas: smoothness improved from 0.556 to 0.585 & initiation time decreased from 0.137 seconds to 0.041 seconds, mean velocity improved from 0.079 to 0.098 & max velocity  0.144 to 0.169. Pt tolerated a total of 704 total movements using his RUE. All InMotion exercises are performed to increase function of R UE to assist with ADL's and IADL's at home. Pt appreciates the immediate feedback that is provided using the InMotion arm. Pt is highly motivated & is beginning to engage in ADLs & IADLs in the home and attempt to incorporate R UE into functional tasks. Cognitive Skills:   Status Comments   Problem   Solving G-    Memory G    Sequencing G    Safety G-      Visual Perception:    Education:  -Pt educated on precautions to follow, safety/balance, and hand placement to follow of sternal precautions during ADL, transfers & mobility. Pt demo G- carryover    [x] Family teach completed on:  10/18/21- Pt wife educated with regards to pt current levels & performed hands on training to perform commode trfs, short distance ambulation & bed mobility. Pt wife made an orange dot  10/22/21- PT & wife Keyonna Dominguez observed & participated in OT am treatment session.  Pt & wife performed all transfers & mobility in the OT apartment. Pt & wife educated with regards to fall prevention & fall recovery. Pt wife pleased with pt progress. Pain Level: Pt did not c/o pain during OT session     Additional Notes:    strength:  R hand 10/21/21- 36#  On eval 20#  Norm75#  L hand 10/21/21- 66#  On eval 57#  Norm 65#    9-hole peg test  R hand 10/21/21- 30.6 seconds On eval 1 min 33.6 sec & handed pegs  Norm 25.8 sec  L hand 10/21/21- 25.8 sec  On eval 34.8 sec    Norm 26.0 sec    Patient has made good  progress during treatment sessions toward set goals. Therapy emphasis to obtain goals: Strengthening, Gait Training, Patient/Caregiver Education & Training, Home Management Training, ROM, Equipment Evaluation, Education, & procurement, Balance Training, Neuromuscular Re-education, Cognitive Reorientation, Safety Education & Training, Self-Care/ADL    Long term goal 1: Pt demo independent to eat all meals  Long term goal 2: Pt demo Mod I grooming seated or standing & demo G- safety  Long term goal 3: Pt demo SBA-CGA bathing @ shower level both seated & standing  Long term goal 4: Pt demo s/u UE dress & SBA-CGA LE dress including underwear, pants, socks & shoes & demo G- endurance  Long term goal 5: Pt demo SBA-CGA commode trf with appropriate DME to ensure pt safety.  Pt demo SBA toileting & demo G- safety & insight  Long term goals 6: Pt demo SBA-CGA walk in shower trf using appropriate DME to ensure pt safety  Long term goal 7: Pt demo Min A light homemaking activity & demo G- safety & insight  Long term goal 8: Pt demo improved FMC/ strength to improve pt ability to complete ADLs as evidenced by gains inthe followin-hole peg test- R hand pt handed pegs as he was u/a to pick them up & completed in 1 minute & 33.6 seconds & norm for pt age & gender is 25.8 seconds, L hand 34.8 seconds & norm for pt age & gender is 26.0 seconds;  strength- R hand 20# & norm for pt age & gender is 75# & L hand 57# & normf or pt age & gender is

## 2021-10-25 NOTE — PROGRESS NOTES
Department of Internal Medicine  Nephrology Progress Note      Events reviewed. SUBJECTIVE: We are following Mr. Meh[address] for NGUYỄN. Reports no complaints.     PHYSICAL EXAM:      Vitals:    VITALS:  /64   Pulse 85   Temp 98.7 °F (37.1 °C) (Oral)   Resp 18   Ht 5' 8\" (1.727 m)   Wt 152 lb 7 oz (69.1 kg)   SpO2 96%   BMI 23.18 kg/m²   24HR INTAKE/OUTPUT:      Intake/Output Summary (Last 24 hours) at 10/25/2021 1509  Last data filed at 10/25/2021 0933  Gross per 24 hour   Intake 555 ml   Output 450 ml   Net 105 ml     Constitutional: Patient is awake, alert in no distress  HEENT: Pupils are equal reactive  Respiratory: Decreased breath sounds at the bases  Cardiovascular/Edema: Heart sounds are regular  Gastrointestinal: Abdomen soft  Neurologic: Patient alert   Other: +1 edema  With support stockings    Scheduled Meds:   tamsulosin  0.4 mg Oral Daily    ipratropium-albuterol  1 ampule Inhalation Q4H WA    aspirin  81 mg Oral Daily    vitamin C  500 mg Oral BID    atorvastatin  40 mg Oral Daily    bumetanide  1 mg Oral Daily    carvedilol  3.125 mg Oral BID WC    clopidogrel  75 mg Oral Daily    enoxaparin  30 mg SubCUTAneous Daily    ferrous sulfate  325 mg Oral BID WC    folic acid  1 mg Oral Daily    nystatin   Topical BID    pantoprazole  40 mg Oral QAM AC    amiodarone  200 mg Oral Daily     Continuous Infusions:    PRN Meds:.acetaminophen, bisacodyl, oxyCODONE-acetaminophen **OR** oxyCODONE-acetaminophen, sennosides-docusate sodium, diphenhydrAMINE, artificial tears, ondansetron, sodium chloride, polyethylene glycol    DATA:    CBC:   Lab Results   Component Value Date    WBC 4.8 10/25/2021    RBC 3.13 10/25/2021    HGB 9.2 10/25/2021    HCT 28.7 10/25/2021    MCV 91.7 10/25/2021    MCH 29.4 10/25/2021    MCHC 32.1 10/25/2021    RDW 15.1 10/25/2021     10/25/2021    MPV 10.4 10/25/2021     CMP:    Lab Results   Component Value Date     10/25/2021    K 3.8 10/25/2021    K 4.2 10/14/2021     10/25/2021    CO2 25 10/25/2021    BUN 20 10/25/2021    CREATININE 2.0 10/25/2021    GFRAA 40 10/25/2021    LABGLOM 33 10/25/2021    GLUCOSE 97 10/25/2021    PROT 5.4 10/12/2021    LABALBU 3.1 10/12/2021    CALCIUM 8.2 10/25/2021    BILITOT 0.8 10/12/2021    ALKPHOS 47 10/12/2021    AST 15 10/12/2021    ALT 10 10/12/2021     Magnesium:    Lab Results   Component Value Date    MG 1.9 10/17/2021     Phosphorus:    Lab Results   Component Value Date    PHOS 2.8 10/10/2021        Radiology Review:      CXR 10/3/21   1. Median sternotomy changes. 2. Bilateral pleuroparenchymal opacities that could be related to pleural   effusion and edema.  The appearance of the chest is about the same or   slightly worse.         Chest x-ray October 6, 2021   1. Stable appearance of the postoperative chest.   2. Bilateral chest tubes remain in position.  There is no right or left   pneumothorax. 3. Small right pleural effusion         Chest x-ray October 8, 2021   Minimal bibasilar atelectasis.       Trace right pleural effusion       There is no pneumothorax             BRIEF SUMMARY OF INITIAL CONSULT:    Briefly Mr. Alysa Veras is a 70-year-old man with history of HTN, CAD with recent status cardiac catheterization done on September 9 which showed severe multivessel disease, hyperlipidemia, hiatal hernia, who was admitted for elective CABG on September 27, 2021. On admission his creatinine level was 0.8 mg/dL and post surgery his creatinine has progressively increased up to 2.3 mg/dL, reason for this consultation. Postoperatively she developed persistent hypotension, lactic acidosis and respiratory failure for which he was reintubated. Since then she has required multiple pressors and he was placed on IABP. He had received 1 dose of ketorolac perioperatively. His urine output has significantly decrease and is being about 500 cc/day in the last 48 hours and his fluid balance presently is over 9 L.     Problems

## 2021-10-25 NOTE — PROGRESS NOTES
Physical Therapy  Treatment note    NAME: Leah Toney  : 1945  MRN: [id number]    Date of Service: 10/20/2021    Evaluating Therapist: Rachel Crouch, PT, DPT [id number]  ROOM: 8927/1326-L  DIAGNOSIS: L cerebellar CVA   PRECAUTIONS: Falls, Sternal Precautions, regular diet with thin liquids, **ORANGE DOT: Wife may assist with tx/amb pt to restroom in room**  HPI: s/p CABG on 21, Following CABG, patient developed hypotension, acidosis, NGUYỄN and respiratory failure and was intubated. Bronchoscopy done on  to remove large mucus plug. Required pressors and was placed on aortic balloon pump which was removed 10/4/21. Off of sedation was found to have right sided weakness. CT head negative for acute findings. Echo showed no LV mural thrombus. Carotid US with atherosclerotic disease -- 50-69% bilaterally. Unable to have CTAs due to kidney function. Unable to have MRI/MRA at this time due to epicardial wires. Repeat CT head with equivocal area of low density in the L cerebellum. Social:  Pt lives with wife in a mobile home with 5 stairs to enter and 1 rail. Pt also has home in [place] which is 1 story setup, unclear amount of steps. Prior to admission: Pt ambulated without device and was independent PTA. Initial Evaluation  Date: 10/14/21 AM     PM Short Term Goals Long Term Goals    Was pt agreeable to Eval/treatment? yes yes yes     Does pt have pain?  Denies pain No c/o pain Mild R abdominal pain, does not quantify     Bed Mobility  Rolling: SBA  Supine to sit: modA  Sit to supine: modA  Scooting: SBA Independent all aspects NT sup Mod i   Transfers Sit to stand: mod-maxA  Stand to sit: mod-maxA  Stand pivot: modA    5xSTS: NT Sit<>Stand: SBA  Stand pivot: SBA no AD Sit<>Stand: SBA  Stand pivot: SBA no AD Asher  5xSTS: < 15 sec sup  5xSTS: < 15 sec   Ambulation    10', no AD, maxA with w/c follow from 2nd helper    10mWT: NT  6mWT: ', 75', 150', no AD, SBA-CGA  150'x2, no AD, SBA-CGA 48', no AD, Asher    10mWT: TBD  6mWT: ', no AD, sup    10mWT: TBD  6mWT: TBD   Walking 10 feet on uneven surface NT due to safety NT NT 10', no AD,  Asher 50', no AD, sup   Wheel Chair Mobility NT NT NT n/a n/a   Car Transfers NT due to safety NT NT Asher sup   Stair negotiation: ascended and descended NT due to safety NT  NT 4 steps, HR for balance only, Asher 12 steps, HR for balance only, sup   Curb Step:   ascended and descended 2\" curb, maxA, no AD NT NT 4\" curb, no AD, Asher 7.5\" curb, no AD, sup   Picking up object off the floor NT due to safety NT NT Will  cone with Asher assist Will  cone with sup assist   BLE ROM AROM WFL       BLE Strength LLE 5/5 except hip flexors which were 3/5  R hip flex 3/5   R knee ext/flex 5/5   R ankle DF/PF 5/5       Balance  Sitting- mod I  Standing-mod-maxA    BBS: NT  FGA: NT Standing: SBA no AD    BBS: NT  FGA: NT    BBS: > 45/56  FGA: >22/30   BBS: -  FGA: -   Date Family Teach Completed N/A       Is additional Family Teaching Needed? Y or N Y       Hindering Progress sternal precautions, dyscoordination, weakness, decreased endurance, anxiety sternal precautions, dyscoordination, weakness, decreased endurance, anxiety      PT recommended ELOS 3-4 weeks       Team's Discharge Plan        Therapist at Team Meeting          HRmax: 146 Time spent at moderate intensity (60-70% HRmax,  BPM) Time spent at high intensity (70-85% HRmax, 102-124 BPM)    AM: 19 min 1 sec AM: 16 min 59 sec    PM: 22 min 40 sec PM: 14 min 51 sec         Therapeutic Exercise:     AM:   1. Resisted amb- 2 x 210' vs purple theraband resistance- SBA-Asher  2. amb with horizontal/vertical head turns- 225', SBA  3. amb with dual motor task- 200' while balancing ball on cones, 200' while balancing ball on sllideboard, SBA-Asher    PM:   1. Amb with ankle weight resistance- x250', 50'  SBA  2.  Stair negotiation with anle weight resistance- x12 reps, 1 HR for balance only, focus on leading with RLE ascending to strengthen hip flexors, CGA provided for safety  3. amb with direction changes, stop/statrs, velocity changes- SBA provided, amb 300', pt completing 180 degree turns, stop/starts to commands, velocity changes to command, fwd/bwd walking to command; mild path deviations observed but no overt LOB  4. Sidestepping- 2x15' each direction, SBA-CGA  5. Step taps- 3 cones place din triangle, pt tapping foot to color fo cone called out by PT, 2x10 each foot, Asher to steady    Patient education    Pt educated on tx, agit, balance, current status and POC, d/c planning    Patient response to education:   Pt verbalized understanding Pt demonstrated skill Pt requires further education in this area   yes partial All areas     Additional Comments:   AM: In beginning of session, pt able to complete amb at SBA level, no scissoring episodes or significant LOB. Challenged pt with completing head turns during amb, pt showing occ lateral instabilities and path deviations, especially when turning head to L side, but able to self correct. As pt fatigued throughout session, pt showing increased LOB, on occasion requiring min A to steady. LOB more apparent negotiating around obstacles such as parallel bars in therapy gym and doorways. Educated pt on safety awareness and energy conservation. During 1 LOB, pt attempting to reach out with extended UE to catch self, PT having to provide steadying assist and verbal cueing to prevent pt from bracing self with UE due to sternal precautions. PM: This session focused on progressing higher level balance with amb and strengthening limb advancement during gait and stair negotiation. Pt continues to be more unsteady during PM sessions due to fatigue, but overall improved when compared to last week. Pt requesting extended rest breaks due to fatigue.  Pt continues to show greater instability and LOB with SLS on RLE during step tap activity, most notable due to hip weakness in R. AM  Time in: 0915  Time out: 1000    PM  Time in: 1300  Time out: 8208    Pt is making good progress toward established Physical Therapy goals. Continue with physical therapy current plan of care.     Shaun Bishop., PT, DPT  [id number]

## 2021-10-26 PROCEDURE — 6370000000 HC RX 637 (ALT 250 FOR IP): Performed by: PHYSICAL MEDICINE & REHABILITATION

## 2021-10-26 PROCEDURE — 97110 THERAPEUTIC EXERCISES: CPT

## 2021-10-26 PROCEDURE — 97535 SELF CARE MNGMENT TRAINING: CPT

## 2021-10-26 PROCEDURE — 99232 SBSQ HOSP IP/OBS MODERATE 35: CPT | Performed by: PHYSICAL MEDICINE & REHABILITATION

## 2021-10-26 PROCEDURE — 6370000000 HC RX 637 (ALT 250 FOR IP): Performed by: NURSE PRACTITIONER

## 2021-10-26 PROCEDURE — 97530 THERAPEUTIC ACTIVITIES: CPT

## 2021-10-26 PROCEDURE — 6370000000 HC RX 637 (ALT 250 FOR IP)

## 2021-10-26 PROCEDURE — 97112 NEUROMUSCULAR REEDUCATION: CPT

## 2021-10-26 PROCEDURE — 1280000000 HC REHAB R&B

## 2021-10-26 PROCEDURE — 6360000002 HC RX W HCPCS

## 2021-10-26 PROCEDURE — 94640 AIRWAY INHALATION TREATMENT: CPT

## 2021-10-26 RX ORDER — DOCUSATE SODIUM 100 MG/1
100 CAPSULE, LIQUID FILLED ORAL 2 TIMES DAILY
Status: DISCONTINUED | OUTPATIENT
Start: 2021-10-26 | End: 2021-10-27 | Stop reason: HOSPADM

## 2021-10-26 RX ADMIN — ENOXAPARIN SODIUM 30 MG: 100 INJECTION SUBCUTANEOUS at 08:30

## 2021-10-26 RX ADMIN — OXYCODONE HYDROCHLORIDE AND ACETAMINOPHEN 500 MG: 500 TABLET ORAL at 21:43

## 2021-10-26 RX ADMIN — OXYCODONE HYDROCHLORIDE AND ACETAMINOPHEN 500 MG: 500 TABLET ORAL at 08:29

## 2021-10-26 RX ADMIN — CARVEDILOL 3.12 MG: 3.12 TABLET, FILM COATED ORAL at 17:00

## 2021-10-26 RX ADMIN — IPRATROPIUM BROMIDE AND ALBUTEROL SULFATE 1 AMPULE: .5; 2.5 SOLUTION RESPIRATORY (INHALATION) at 20:21

## 2021-10-26 RX ADMIN — ATORVASTATIN CALCIUM 40 MG: 40 TABLET, FILM COATED ORAL at 21:43

## 2021-10-26 RX ADMIN — PANTOPRAZOLE SODIUM 40 MG: 40 TABLET, DELAYED RELEASE ORAL at 06:23

## 2021-10-26 RX ADMIN — FOLIC ACID 1 MG: 1 TABLET ORAL at 08:30

## 2021-10-26 RX ADMIN — IPRATROPIUM BROMIDE AND ALBUTEROL SULFATE 1 AMPULE: .5; 2.5 SOLUTION RESPIRATORY (INHALATION) at 15:17

## 2021-10-26 RX ADMIN — BUMETANIDE 1 MG: 1 TABLET ORAL at 08:30

## 2021-10-26 RX ADMIN — DOCUSATE SODIUM 100 MG: 100 CAPSULE, LIQUID FILLED ORAL at 21:43

## 2021-10-26 RX ADMIN — NYSTATIN: 100000 OINTMENT TOPICAL at 21:44

## 2021-10-26 RX ADMIN — ASPIRIN 81 MG: 81 TABLET ORAL at 08:30

## 2021-10-26 RX ADMIN — DOCUSATE SODIUM 100 MG: 100 CAPSULE, LIQUID FILLED ORAL at 12:15

## 2021-10-26 RX ADMIN — CARVEDILOL 3.12 MG: 3.12 TABLET, FILM COATED ORAL at 08:30

## 2021-10-26 RX ADMIN — TAMSULOSIN HYDROCHLORIDE 0.4 MG: 0.4 CAPSULE ORAL at 08:29

## 2021-10-26 RX ADMIN — FERROUS SULFATE TAB 325 MG (65 MG ELEMENTAL FE) 325 MG: 325 (65 FE) TAB at 08:30

## 2021-10-26 RX ADMIN — CLOPIDOGREL 75 MG: 75 TABLET, FILM COATED ORAL at 08:30

## 2021-10-26 RX ADMIN — IPRATROPIUM BROMIDE AND ALBUTEROL SULFATE 1 AMPULE: .5; 2.5 SOLUTION RESPIRATORY (INHALATION) at 11:04

## 2021-10-26 RX ADMIN — AMIODARONE HYDROCHLORIDE 200 MG: 200 TABLET ORAL at 08:29

## 2021-10-26 RX ADMIN — DOCUSATE SODIUM 50 MG AND SENNOSIDES 8.6 MG 1 TABLET: 8.6; 5 TABLET, FILM COATED ORAL at 06:23

## 2021-10-26 RX ADMIN — NYSTATIN: 100000 OINTMENT TOPICAL at 08:32

## 2021-10-26 ASSESSMENT — PAIN SCALES - GENERAL
PAINLEVEL_OUTOF10: 0
PAINLEVEL_OUTOF10: 0

## 2021-10-26 NOTE — PROGRESS NOTES
OCCUPATIONAL THERAPY DAILY NOTE    Date:10/26/2021  Patient Name: Anand Pennington  MRN: [id number]  : 1945  Room: [address] [place] [address]-A     Diagnosis: CVA- s/p CABG x 3- 21  Additional Pertinent Hx: HLD, HTN, CAD, ESRD, hx hiatal hernia  Precautions: Falls, Puree-Dysphagia IV/nectar & Sternal precautions & wife- Isis orange dot    Functional Assessment:   Date Status AE  Comments   Feeding 10/26/21 independent     Grooming 10/26/21 independent  Standing         Oral Care 10/26/21 independent  Standing   Bathing 10/25/21 SBA  Shower level seated & standing   UB Dressing 10/25/21 independent     LB Dressing 10/26/21 independent LB AE Min vc's for sternal precautions   Footwear 10/25/21 Mod I  Shoe horn    Toileting 10/26/21 independent     Homemaking 10/19/21 Min A- standing  S/SBA- sitting  counter      Functional Transfers / Balance:   Date Status DME  Comments   Sit Balance 10/26/21 independent     Stand Balance 10/26/21 independent No device    [] Tub  [x] Shower   Transfer 10/26/21 independent Shower seat Walk in shower transfer, stepping over 3in step, sitting on shower bench, poor balance   Commode   Transfer 10/26/21 independent No AD Min vc's for precautiuons   Functional   Mobility 10/26/21 independent No AD Pt ambulated in his room, on the unit & throughout the OT gym   Other:  Supine>Sit    Bed>W/c    Sit to stand   10/26/21    10/26/21    10/26/21   independent    independent    SBA    Min vc's forprecautions     Functional Exercises / Activity:  Pt presents seated up in his wc & was agreeable to OT intervention. Pt demo independent sit<>stand. Pt demo independent functional mobilty using no device inhis room. Pt demo independent commode trf. Pt demo independent toileting. Pt demo independent grooming standing @ sink level. Pt demo independent functional mobility in his room.      Pt demo the following   strength  R  strength 44# & norm for pt age & gender is 75#  L  strength 72# & norm for pt age & gender is 65#  9-hole peg test  R hand 23.3 seconds & norm for pt age & gender is 25.8 sec  L hand 23.0 seconds & norm for pt age & gender is 26.0 sec    Pt tolerated standing @ high low table while engaged in a FMC/GMC activity independently. Pt also tolerated standing while performing a pipe tree design with G tolerance. Pt also tolerated peg design standing independently    Sensory / Neuromuscular Re-Education:         Cognitive Skills:   Status Comments   Problem   Solving G-    Memory G    Sequencing G    Safety G-      Visual Perception:    Education:  -Pt educated on precautions to follow, safety/balance, and hand placement to follow of sternal precautions during ADL, transfers & mobility. Pt demo G- carryover    [x] Family teach completed on:  10/18/21- Pt wife educated with regards to pt current levels & performed hands on training to perform commode trfs, short distance ambulation & bed mobility. Pt wife made an orange dot  10/22/21- PT & wife [facility] observed & participated in OT am treatment session. Pt & wife performed all transfers & mobility in the OT apartment. Pt & wife educated with regards to fall prevention & fall recovery. Pt wife pleased with pt progress. Pain Level: Pt did not c/o pain during OT session     Additional Notes:    strength:  R hand 10/25/21- 44#   10/21/21- 36#  On eval 20#  Norm75#  L hand 10/25/21- 72#  10/21/21- 66#  On eval 57#  Norm 65#      9-hole peg test  R hand 10/25/21 23.3 secs 10/21/21- 30.6 secs On eval 1 min 33.6 sec&handed pegs  Norm 25.8 sec  L hand 10/25/21 23.0 seconds 10/21/21- 25.8 sec On eval 34.8 sec  Norm 26.0 sec    Patient has made good  progress during treatment sessions toward set goals.  Therapy emphasis to obtain goals: Strengthening, Gait Training, Patient/Caregiver Education & Training, Home Management Training, ROM, Equipment Evaluation, Education, & procurement, Balance Training, Neuromuscular Re-education, Cognitive Reorientation, Safety Education & Training, Self-Care/ADL    Long term goal 1: Pt demo independent to eat all meals  Long term goal 2: Pt demo Mod I grooming seated or standing & demo G- safety  Long term goal 3: Pt demo SBA-CGA bathing @ shower level both seated & standing  Long term goal 4: Pt demo s/u UE dress & SBA-CGA LE dress including underwear, pants, socks & shoes & demo G- endurance  Long term goal 5: Pt demo SBA-CGA commode trf with appropriate DME to ensure pt safety. Pt demo SBA toileting & demo G- safety & insight  Long term goals 6: Pt demo SBA-CGA walk in shower trf using appropriate DME to ensure pt safety  Long term goal 7: Pt demo Min A light homemaking activity & demo G- safety & insight  Long term goal 8: Pt demo improved FMC/ strength to improve pt ability to complete ADLs as evidenced by gains inthe followin-hole peg test- R hand pt handed pegs as he was u/a to pick them up & completed in 1 minute & 33.6 seconds & norm for pt age & gender is 25.8 seconds, L hand 34.8 seconds & norm for pt age & gender is 26.0 seconds;  strength- R hand 20# & norm for pt age & gender is 75# & L hand 57# & normf or pt age & gender is 65#  Long term goal 9: Pt demo G- endurance for a 20-30 minute functional activity    10/20/21 Pt plan of care updated on this date. Tentative length of stay is 2 weeks. Иван Cook OTR/L [id number]    [x] Continue with current OT Plan of care.   [] Prepare for Discharge     DISCHARGE RECOMMENDATIONS  Recommended DME:    Post Discharge Care:   []Home Independently  []Home with 24hr Care / Supervision []Home with Partial Supervision []Home with Home Health OT []Home with Out Pt OT []Other: ___   Comments:         Time in Time out Tx Time Breakdown  Variance:   First Session  152 089 [x] Individual Tx-55 Mins  [] Concurrent Tx -  [] Co-Tx -   [] Group Tx -   [] Time Missed -     Second Session [id number] [] Individual Tx-   [x] Concurrent Tx - 45  [] Co-Tx -   [] Group Tx -   [] Time Missed - Third Session    [] Individual Tx-   [] Concurrent Tx -  [] Co-Tx -   [] Group Tx -   [] Time Missed -         Total Tx Time: 90 mins      Hattie Triplett OTR/L [id number]

## 2021-10-26 NOTE — PROGRESS NOTES
Department of Internal Medicine  Nephrology Progress Note      Events reviewed. SUBJECTIVE: We are following Mr. Mehul [address] for NGUYỄN. Reports no complaints.     PHYSICAL EXAM:      Vitals:    VITALS:  BP (!) 117/58   Pulse 94   Temp 98.7 °F (37.1 °C) (Temporal)   Resp 18   Ht 5' 8\" (1.727 m)   Wt 152 lb 7 oz (69.1 kg)   SpO2 96%   BMI 23.18 kg/m²   24HR INTAKE/OUTPUT:      Intake/Output Summary (Last 24 hours) at 10/26/2021 1055  Last data filed at 10/26/2021 0912  Gross per 24 hour   Intake 480 ml   Output    Net 480 ml     Constitutional: Patient is awake, alert in no distress  HEENT: Pupils are equal reactive  Respiratory: Decreased breath sounds at the bases  Cardiovascular/Edema: Heart sounds are regular  Gastrointestinal: Abdomen soft  Neurologic: Patient alert   Other: +1 edema  With support stockings    Scheduled Meds:   docusate sodium  100 mg Oral BID    ferrous sulfate  325 mg Oral Daily with breakfast    tamsulosin  0.4 mg Oral Daily    ipratropium-albuterol  1 ampule Inhalation Q4H WA    aspirin  81 mg Oral Daily    vitamin C  500 mg Oral BID    atorvastatin  40 mg Oral Daily    bumetanide  1 mg Oral Daily    carvedilol  3.125 mg Oral BID WC    clopidogrel  75 mg Oral Daily    enoxaparin  30 mg SubCUTAneous Daily    folic acid  1 mg Oral Daily    nystatin   Topical BID    pantoprazole  40 mg Oral QAM AC    amiodarone  200 mg Oral Daily     Continuous Infusions:    PRN Meds:.acetaminophen, bisacodyl, oxyCODONE-acetaminophen **OR** oxyCODONE-acetaminophen, sennosides-docusate sodium, diphenhydrAMINE, artificial tears, ondansetron, sodium chloride, polyethylene glycol    DATA:    CBC:   Lab Results   Component Value Date    WBC 4.8 10/25/2021    RBC 3.13 10/25/2021    HGB 9.2 10/25/2021    HCT 28.7 10/25/2021    MCV 91.7 10/25/2021    MCH 29.4 10/25/2021    MCHC 32.1 10/25/2021    RDW 15.1 10/25/2021     10/25/2021    MPV 10.4 10/25/2021     CMP:    Lab Results   Component Value Date     10/25/2021    K 3.8 10/25/2021    K 4.2 10/14/2021     10/25/2021    CO2 25 10/25/2021    BUN 20 10/25/2021    CREATININE 2.0 10/25/2021    GFRAA 40 10/25/2021    LABGLOM 33 10/25/2021    GLUCOSE 97 10/25/2021    PROT 5.4 10/12/2021    LABALBU 3.1 10/12/2021    CALCIUM 8.2 10/25/2021    BILITOT 0.8 10/12/2021    ALKPHOS 47 10/12/2021    AST 15 10/12/2021    ALT 10 10/12/2021     Magnesium:    Lab Results   Component Value Date    MG 1.9 10/17/2021     Phosphorus:    Lab Results   Component Value Date    PHOS 2.8 10/10/2021        Radiology Review:      CXR 10/3/21   1. Median sternotomy changes. 2. Bilateral pleuroparenchymal opacities that could be related to pleural   effusion and edema.  The appearance of the chest is about the same or   slightly worse.         Chest x-ray October 6, 2021   1. Stable appearance of the postoperative chest.   2. Bilateral chest tubes remain in position.  There is no right or left   pneumothorax. 3. Small right pleural effusion         Chest x-ray October 8, 2021   Minimal bibasilar atelectasis.       Trace right pleural effusion       There is no pneumothorax             BRIEF SUMMARY OF INITIAL CONSULT:    Briefly Mr. Christina Kruse is a 26-year-old man with history of HTN, CAD with recent status cardiac catheterization done on September 9 which showed severe multivessel disease, hyperlipidemia, hiatal hernia, who was admitted for elective CABG on September 27, 2021. On admission his creatinine level was 0.8 mg/dL and post surgery his creatinine has progressively increased up to 2.3 mg/dL, reason for this consultation. Postoperatively she developed persistent hypotension, lactic acidosis and respiratory failure for which he was reintubated. Since then she has required multiple pressors and he was placed on IABP. He had received 1 dose of ketorolac perioperatively.  His urine output has significantly decrease and is being about 500 cc/day in the last 48 hours and his fluid balance presently is over 9 L. Problems resolved:    · Cardiogenic shock, hemodynamically more stable, pressor support decrease, still on IABP. Tentative plan for removal of IABP. Pro-BP 16,936  · Hypernatremia with hypervolemia, 2/2 sodium containing IV fluid resuscitation and underlying heart failure. Resolved with  natriuresis and free water (add D5W)  · Hypokalemia, 2/2 diuretics, potassium levels improve  · Respiratory alkalosis (pH: 7.554, PCO2: 23.1) with metabolic alkalosis (bicarbonate administration)  · Respiratory failure status post intubation  · Thrombocytopenia  · Transaminitis with hyperbilirubinemia, possibly shock liver versus congestive liver  · Probably pneumonia, on piperacillin-tazobactam  · Hypokalemia, 2/2 diuretics, potassium levels improved. IMPRESSION/RECOMMENDATIONS:      1. NGUYỄN stage III, CABG associated NGUYỄN, ischemic ATN, non-oliguric. FEUrea 18.8%. Renal function relatively stable. To continue Bumex 1 mg p.o. daily    2. HFmEF 40% with stage IDD, proBNP 16,826 > 9461> 7228 > 5112> 2696, proBNP levels continues to improve  3. HTN, on carvedilol  4. BPH, on tamsulosin  ---------------------------------------------------------  5. CAD status post CABG x3 September 27, 2021, on ASA, clopidogrel, carvedilol, atorvastatin  6. Amiodarone therapy, for AF prophylaxis  7.  Normocytic anemia, status post surgery, status post transfusion, iron saturation 36%, on p.o. iron      Plan:    · Continue tamsulosin 0.4 mg PO daily  · Continue Bumex 1 mg p.o. daily  · Continue to monitor renal function for recovery ([facility])

## 2021-10-26 NOTE — PROGRESS NOTES
CC: s/p cabg 1 month follow up    HPI:  Mr. Suri Montilla is a 42-year-old male whom is seen in the ARU today for routine post-operative follow-up status post: Sternotomy/CABG x 3 (LIMA-LAD, SVG-OM, SVG-RCA)/Extensive RCA endarterectomy/MALU exclusion with 35 mm AtriClip/EVH LLE/Rigid internal fixation of the sternum with Shon plates x 5/18 modifier on 9/27/21. Currently, there are no complaints of any CP, SOB, major concerns, or incisional issues. Review of Systems:   Constitutional: Negative for fever and chills. Respiratory: Negative for cough, chest tightness and shortness of breath. Cardiovascular: Negative for chest pain, palpitations and leg swelling. Gastrointestinal: Negative for nausea, diarrhea and constipation. Musculoskeletal: Negative for back pain. Skin: Negative for color change. Neurological: Negative for dizziness, syncope and light-headedness. Objective:   Physical Exam   Constitutional: oriented to person, place, and time. No distress. Cardiovascular: Normal rate. Pulmonary/Chest: Effort normal. No respiratory distress. midsternal and chest tube incisions well healed without evidence of infection. Sternum stable. Abdominal: Soft. Bowel sounds are normal.   Musculoskeletal: Normal range of motion. Exhibits no edema. Neurological: alert and oriented to person, place, and time. Skin: Skin is warm and dry. No erythema. Vein harvest incisions intact without evidence of infection   Psychiatric: normal mood and affect. Assessment:      S/P CABG        Plan:      Remove sternal precautions on 11/8/21  Cardiac rehab referral  Continue follow up with PCP, Cardiology, nephrology as scheduled. Encouraged to call office with any questions, concerns. Otherwise no further follow up necessary from CTS standpoint.

## 2021-10-26 NOTE — PROGRESS NOTES
Physical Therapy  Weekly note    Evaluating Therapist: Rebecca Boo., PT, DPT [id number]      ROOM: [address] [place] [address]  DIAGNOSIS: L cerebellar CVA   PRECAUTIONS: Falls, Sternal Precautions, Pureed diet w/ nectar thick liquids, **ORANGE DOT: Wife may assist with tx/amb pt to restroom in room**  HPI: s/p CABG on 9/27/21, Following CABG, patient developed hypotension, acidosis, NGUYỄN and respiratory failure and was intubated. Bronchoscopy done on 9/30 to remove large mucus plug. Required pressors and was placed on aortic balloon pump which was removed 10/4/21. Off of sedation was found to have right sided weakness. CT head negative for acute findings. Echo showed no LV mural thrombus. Carotid US with atherosclerotic disease -- 50-69% bilaterally. Unable to have CTAs due to kidney function. Unable to have MRI/MRA at this time due to epicardial wires. Repeat CT head with equivocal area of low density in the L cerebellum. Social:  Pt lives with wife in a mobile home with 5 stairs to enter and 1 rail. Pt also has home in [place] which is 1 story setup, unclear amount of steps. Prior to admission: Pt ambulated without device and was independent PTA. Initial Evaluation  Date: 10/14/21 10/19/21     10/26/21 Comments   Short Term Goals Long Term Goals    Was pt agreeable to Eval/treatment? yes yes yes      Does pt have pain?  Denies pain denies Denies      Bed Mobility  Rolling: SBA  Supine to sit: modA  Sit to supine: modA  Scooting: SBA Rolling: SBA  Supine to sit: SBA  Sit to supine: SBA  Scooting: SBA Independent with all aspects Verbal cues to comply with sternal precautions sup Mod i   Transfers Sit to stand: mod-maxA  Stand to sit: mod-maxA  Stand pivot: modA    5xSTS: NT Sit to stand: Asher  Stand to sit: Asher  Stand pivot: Asher Sit to stand: supervision  Stand to sit: supervision  Stand pivot: supervision     Asher  5xSTS: < 15 sec sup  5xSTS: < 15 sec   Ambulation    10', no AD, maxA with w/c follow from 2nd helper    10mWT: NT  6mWT: ', no AD, Asher Up to 600', no AD, supervision  48', no AD, Asher    10mWT: TBD  6mWT: ', no AD, sup    10mWT: TBD  6mWT: TBD   Walking 10 feet on uneven surface NT due to safety 12', no AD, Asher 24', SBA, no AD  10', no AD,  Asher 50', no AD, sup   Wheel Chair Mobility NT NT N/A  n/a n/a   Car Transfers NT due to safety NT SBA  Asher sup   Stair negotiation: ascended and descended NT due to safety 12 steps, BUE on HR for balance only, Asher, non-reciprocal pattern 12 steps, 1 HR, supervision Verbal cues to use UE for balance only 4 steps, HR for balance only, Asher 12 steps, HR for balance only, sup   Curb Step:   ascended and descended 2\" curb, maxA, no AD 7.5\" curb, no AD, Asher 7.5\" curb, no AD, Ahser  4\" curb, no AD, Asher 7.5\" curb, no AD, sup   Picking up object off the floor NT due to safety 2# weight, Asher supervision  Will  cone with Asher assist Will  cone with sup assist   BLE ROM AROM WFL        BLE Strength LLE 5/5 except hip flexors which were 3/5  R hip flex 3/5   R knee ext/flex 5/5   R ankle DF/PF 5/5        Balance  Sitting- mod I  Standing-mod-maxA    BBS: NT  FGA: NT Sitting- mod I  Standing-Asher    BBS: NT  FGA: NT     BBS: > 45/56  FGA: >22/30   BBS: -  FGA: -   Date Family Teach Completed N/A 10/18/21 with wife Tawanna Giles 10/18 and 10/22 with wife      Is additional Family Teaching Needed? Y or N Y Y N      Hindering Progress sternal precautions, dyscoordination, weakness, decreased endurance, anxiety sternal precautions, dyscoordination, weakness, decreased endurance, anxiety sternal precautions, dyscoordination, weakness, decreased endurance         PT recommended ELOS 3-4 weeks 3 weeks 10/29/21      Team's Discharge Plan  2 weeks Discharge either today or tomorrow 10/27 or 10/28      Therapist at [facility] [place], [place], DPT [id number]   Ruchi Rascon PT, DPT [id number]          Date:  10/26/21  Supporting factors:   Motivated, good family support, making consistent progress at good rate  Barriers to discharge:  Endurance deficits,   Additional comments:  Pt continues to make good steady progress with mobility. Pt appears to be approaching mod I during morning activity, but as he fatigues throughout the day, pt continues to show increased instabilities and risk for falling during PM sessions, however this is more notable during extended bouts of ambulation or stair negotiation. At this time it is recommended he continues to have supervision level assist with functional mobility. Pt expressing desires to d/c home early, wife has already undergone hands on training and demonstrated safety and competence with guarding/assisting techniques. .   DME:  Transport chair  After Care:  Joo Hough., PT, DPT  [id number]            Date:  10/19/21  Supporting factors: Motivated, good family support, making consistent progress at good rate  Barriers to discharge:  Poor endurance, sternal precautions, stairs to enter home  Additional comments:  Pt has made significant progress in short amount of time in terms of balance and motor control but continues to be limited by endurance deficits, showing quick decompensation of balance and PRASHANT during dynamic standing tasks. Have been maintaining HR within 60-75% HR max range, pt appears to be tolerating this well with no adverse symptoms. Wife was present during session on 10/18, cleared for orange dot to amb or tx pt to restroom, only within pt's room.    DME:  Transport chair  After Care:  HHPT vs OPPT    Satinder Aguilera., PT, DPT  [id number]

## 2021-10-26 NOTE — PROGRESS NOTES
Comprehensive Nutrition Assessment    Type and Reason for Visit:  Reassess    Nutrition Recommendations/Plan: Continue current diet, Modify ONS to Pmomarum00 daily, Henri BID    Nutrition Assessment:  Pt stable from a nutritional standpoint w/ continued good intakes noted. Now s/p repeat MBS w/ diet upgraded. Multiple pressure injuries noted. Admit to ARU s/p CABG x3 w/ post-op CVA. Will modify ONS and continue to monitor. Malnutrition Assessment:  Malnutrition Status:  No malnutrition    Context:  Acute Illness     Findings of the 6 clinical characteristics of malnutrition:  Energy Intake:  No significant decrease in energy intake  Weight Loss:  No significant weight loss     Body Fat Loss:  No significant body fat loss     Muscle Mass Loss:  No significant muscle mass loss    Fluid Accumulation:  No significant fluid accumulation     Strength:  Not Performed    Estimated Daily Nutrient Needs:  Energy (kcal):  MSJ 1418 x 1.2 SF = 5156-5639; Weight Used for Energy Requirements:  Current     Protein (g):  ; Weight Used for Protein Requirements:  Current (1.3-1.5)        Fluid (ml/day):  0589-2158; Method Used for Fluid Requirements:  1 ml/kcal      Nutrition Related Findings:  A&Ox4, active BS, soft abd, trace edema, +I/Os      Wounds:  Multiple, Pressure Injury, Unstageable, Surgical Incision       Current Nutrition Therapies:    Adult Oral Nutrition Supplement; Fortified Pudding Oral Supplement  Adult Oral Nutrition Supplement; Other Oral Supplement; [id number]  ADULT DIET;  Regular    Anthropometric Measures:  · Height: 5' 8\" (172.7 cm)  · Current Body Weight: 152 lb (68.9 kg) (10/22 standing scale)   · Admission Body Weight: 155 lb (70.3 kg) (10/17 standing)    · Usual Body Weight: 158 lb (71.7 kg) (7/2021 actual per EMR)     · Ideal Body Weight: 154 lbs; % Ideal Body Weight 98.7 %   · BMI: 23.1  · BMI Categories: Normal Weight (BMI 22.0 to 24.9) age over 72       Nutrition Diagnosis: · Increased nutrient needs related to increase demand for energy/nutrients as evidenced by wounds    Nutrition Interventions:   Food and/or Nutrient Delivery:  Continue Current Diet, Modify Oral Nutrition Supplement ([id number] daily, Henri BID)  Nutrition Education/Counseling:  Education not indicated   Coordination of Nutrition Care:  Continue to monitor while inpatient    Goals:  pt to consume >75% meals/ONS       Nutrition Monitoring and Evaluation:   Food/Nutrient Intake Outcomes:  Food and Nutrient Intake, Supplement Intake  Physical Signs/Symptoms Outcomes:  Biochemical Data, GI Status, Fluid Status or Edema, Nutrition Focused Physical Findings, Skin, Weight     Discharge Planning:     Too soon to determine     Electronically signed by Delfina Lopez MS, RD, LD on 10/26/21 at 11:48 AM EDT    Contact: [phone]

## 2021-10-26 NOTE — PROGRESS NOTES
Physical Therapy  Treatment note    NAME: Deni Kiser  : 1945  MRN: [id number]    Date of Service: 10/20/2021    Evaluating Therapist: Gaetano Baca., PT, DPT [id number]  ROOM: [address] [place] [address]  DIAGNOSIS: L cerebellar CVA   PRECAUTIONS: Falls, Sternal Precautions, regular diet with thin liquids, **ORANGE DOT: Wife may assist with tx/amb pt to restroom in room**  HPI: s/p CABG on 21, Following CABG, patient developed hypotension, acidosis, NGUYỄN and respiratory failure and was intubated. Bronchoscopy done on  to remove large mucus plug. Required pressors and was placed on aortic balloon pump which was removed 10/4/21. Off of sedation was found to have right sided weakness. CT head negative for acute findings. Echo showed no LV mural thrombus. Carotid US with atherosclerotic disease -- 50-69% bilaterally. Unable to have CTAs due to kidney function. Unable to have MRI/MRA at this time due to epicardial wires. Repeat CT head with equivocal area of low density in the L cerebellum. Social:  Pt lives with wife in a mobile home with 5 stairs to enter and 1 rail. Pt also has home in [place] which is 1 story setup, unclear amount of steps. Prior to admission: Pt ambulated without device and was independent PTA. Initial Evaluation  Date: 10/14/21 AM     PM Short Term Goals Long Term Goals    Was pt agreeable to Eval/treatment? yes yes yes     Does pt have pain?  Denies pain No c/o pain No complaints of pain     Bed Mobility  Rolling: SBA  Supine to sit: modA  Sit to supine: modA  Scooting: SBA NT Independent all aspects sup Mod i   Transfers Sit to stand: mod-maxA  Stand to sit: mod-maxA  Stand pivot: modA    5xSTS: NT Sit<>Stand: supervision  Stand pivot: NT Sit<>Stand: supervision  Stand pivot: NT Asher  5xSTS: < 15 sec sup  5xSTS: < 15 sec   Ambulation    10', no AD, maxA with w/c follow from 2nd helper    10mWT: NT  6mWT: ', 250', 600', no AD, supervision 500', no AD, supervision 50', no AD, Asher    10mWT: TBD  6mWT: ', no AD, sup    10mWT: TBD  6mWT: TBD   Walking 10 feet on uneven surface NT due to safety NT 12'x2, no AD, SBA 10', no AD,  Asher 50', no AD, sup   Wheel Chair Mobility NT NT NT n/a n/a   Car Transfers NT due to safety NT NT Asher sup   Stair negotiation: ascended and descended NT due to safety  4 steps, HR, supervision 12 steps, HR, supervision 4 steps, HR for balance only, Asher 12 steps, HR for balance only, sup   Curb Step:   ascended and descended 2\" curb, maxA, no AD NT 7.5\" curb x2 reps, no AD, Asher 4\" curb, no AD, Asher 7.5\" curb, no AD, sup   Picking up object off the floor NT due to safety NT supervision Will  cone with Asher assist Will  cone with sup assist   BLE ROM AROM WFL       BLE Strength LLE 5/5 except hip flexors which were 3/5  R hip flex 3/5   R knee ext/flex 5/5   R ankle DF/PF 5/5       Balance  Sitting- mod I  Standing-mod-maxA    BBS: NT  FGA: NT Standing: SBA no AD    BBS: NT  FGA: NT    BBS: > 45/56  FGA: >22/30   BBS: -  FGA: -   Date Family Teach Completed N/A       Is additional Family Teaching Needed? Y or N Y       Hindering Progress sternal precautions, dyscoordination, weakness, decreased endurance, anxiety sternal precautions, dyscoordination, weakness, decreased endurance, anxiety      PT recommended ELOS 3-4 weeks       Team's Discharge Plan        Therapist at Team Meeting          HRmax: 146 Time spent at moderate intensity (60-70% HRmax,  BPM) Time spent at high intensity (70-85% HRmax, 102-124 BPM)    AM: 9 min 15 sec AM: 11 min 26 sec    PM: 28 min 21 sec PM: 8 min 4 sec         Therapeutic Exercise:     AM:   1. Home environment negotiation- x7 minutes, pt amb in ADL apartment to various pieces of furniture to practice STS, 1 mild Lob catching R foot crossing throw rug, pt self correcting, SBA provided during amb around room, supervision to stand from surfaces  2. Fwb/bwd amb with 180 degree turns- 300', Supervision  3. Amb with velocity changes with ankle resistance- 300', supervision      PM:   1. amb with ankle weight resistance- amb 450' with 7# ankle weights on each lower extremity, SBA provided  2. Step up series- 4\" block, 2 \" block, and 4\" block in parallel bars , x4 rounds, fwd, x2 rounds lateral, with 7 # ankle weight on each lower extremity, Asher to steady    Patient education    Pt educated on tx, balance, gait, safety awareness, current status and POC, d/c planning    Patient response to education:   Pt verbalized understanding Pt demonstrated skill Pt requires further education in this area   yes partial All areas     Additional Comments:  AM: Pt progressing level surface amb, stair negotiation,  and tx to supervision level this session. Pt did catch R foot on throw rug in ADL apartment, self correcting for LOB, SBA provided for safety. Pt progressing amb distance significantly, amb from ADL apartment, down to gym to complete stair negotiation, then out of gym and around hallway outside of therapy gym for total of 600', all without stopping to rest. Will assess balance and safety in PM when pt more fatigued. PM: Pt showing some increased instances of path deviations and subtle lateral instabilities as he was more fatigued, but self correcting each time, no overt LOB during amb or stair negotiation. Pt able to progress ankle weight resistance during amb and step up series activity, Asher provided to steady. Pt with LOB ascending 7.5\" curb, especially when leading with LLE to ascend (putting him in SLS on RLE), Asher provided for both reps. Chair/bed alarm:      AM  Time in: 0915  Time out: 1000    PM  Time in: 1300  Time out: 1345    Pt is making good progress toward established Physical Therapy goals. Continue with physical therapy current plan of care.     Dianna Senior., PT, DPT  [id number]

## 2021-10-26 NOTE — PROGRESS NOTES
[facility]  Comprehensive Progress Note    Subjective:      Dolores Morrison is a 78 y.o. male admitted to inpatient rehabilitation for impairments and acitivities limitations in ADLs, mobility, and cognition secondary to Acute CVA. No acute events overnight. No cp, sob, n/v. Pain is controlled. Patient endorses constipation. Patient voices no other complaints. The patient's medical records have been reviewed. Scheduled Meds:ferrous sulfate, 325 mg, Daily with breakfast  tamsulosin, 0.4 mg, Daily  ipratropium-albuterol, 1 ampule, Q4H WA  aspirin, 81 mg, Daily  vitamin C, 500 mg, BID  atorvastatin, 40 mg, Daily  bumetanide, 1 mg, Daily  carvedilol, 3.125 mg, BID WC  clopidogrel, 75 mg, Daily  enoxaparin, 30 mg, Daily  folic acid, 1 mg, Daily  nystatin, , BID  pantoprazole, 40 mg, QAM AC  amiodarone, 200 mg, Daily      Continuous Infusions:  PRN Meds:acetaminophen, 650 mg, Q4H PRN  bisacodyl, 5 mg, Daily PRN  oxyCODONE-acetaminophen, 1 tablet, Q4H PRN   Or  oxyCODONE-acetaminophen, 2 tablet, Q4H PRN  sennosides-docusate sodium, 1 tablet, BID PRN  diphenhydrAMINE, 25 mg, Nightly PRN  artificial tears, , PRN  ondansetron, 4 mg, Q8H PRN  sodium chloride, 1 spray, PRN  polyethylene glycol, 17 g, Daily PRN         Objective:      Vitals:    10/24/21 1740 10/25/21 0800 10/25/21 1759 10/26/21 0830   BP: (!) 110/50 114/64 (!) 121/48 (!) 117/58   Pulse: 80 85 82 94   Resp:  18  18   Temp:  98.7 °F (37.1 °C)  98.7 °F (37.1 °C)   TempSrc:  Oral  Temporal   SpO2:       Weight:       Height:         General appearance: alert, up in chair, NAD  Eyes: conjunctivae/corneas clear. PERRL, EOM's intact. Lungs: clear to auscultation bilaterally  Heart: regular rate and rhythm, S1, S2  Abdomen: soft, non-tender, normal bowel sounds  Musculoskeletal: Range of motion normal and no gross abnormalities. No edema. No calf tenderness. Neurologic: Alert, oriented.  Dysarthric.      Motor Findings  Strength: Right  Strength: Left    Deltoid  4 5   Biceps  4 5   Triceps  4 5   Wrist Extensors  4 5   FDP 4 5   Finger abductors 4- 5   Iliospoas  4- 4+   Quadriceps  4 5   Tibialis anterior  4 5   EHL 4 5   Gastroc soleus  4 5          Laboratory:    Lab Results   Component Value Date    WBC 4.8 10/25/2021    HGB 9.2 (L) 10/25/2021    HCT 28.7 (L) 10/25/2021    MCV 91.7 10/25/2021     10/25/2021     Lab Results   Component Value Date     10/25/2021    K 3.8 10/25/2021    K 4.2 10/14/2021     10/25/2021    CO2 25 10/25/2021    BUN 20 10/25/2021    CREATININE 2.0 10/25/2021    GLUCOSE 97 10/25/2021    CALCIUM 8.2 10/25/2021      Lab Results   Component Value Date    ALT 10 10/12/2021    AST 15 10/12/2021    ALKPHOS 47 10/12/2021    BILITOT 0.8 10/12/2021       Lab Results   Component Value Date    COLORU Yellow 09/23/2021    NITRU Negative 09/23/2021    GLUCOSEU Negative 09/23/2021    KETUA Negative 09/23/2021    UROBILINOGEN 0.2 09/23/2021    BILIRUBINUR Negative 09/23/2021     Lab Results   Component Value Date    LABA1C 5.1 09/10/2021         Functional Status:   Physical Therapy:   Bed mobility: Independent   Transfers: Supervision   Ambulation: 500 ft no AD Supervision         Occupational Therapy:  Feeding: Independent   Grooming: Independent   UB dressing: Independent  LB dressing: Independent           Assessment/Plan:       78 y.o. male admitted to inpatient rehabilitation for impairments and acitivities limitations in ADLs, mobility, and cognition secondary to Acute CVA. -Acute CVA: Right hemiparesis, dysarthria (improved), dysphagia (improved), cognitive impairment. Continue Aspirin, Plavix, Lipitor. Continue Acute Rehab program.   -Dysphagia: Passed repeat video swallow study on 10/20, diet upgraded to general. Speech therapy has discharged patient.    -CAD, s/p CABG x3: Continue current medications, sternal precautions through 11/8/2021.   -Post operative pain: Percocet PRN. Wean narcotics as tolerated  -Acute blood loss anemia: H&H low but stable, monitor. Continue iron supplement.   -Afib during acute care hospitalization, now in NSR: On amiodarone for Afib prophylaxis per CT surgery--tapering per CT surgery. Currently on Amiodarone 200mg daily with stop date 12/7.  -Acute systolic heart failure: On coreg. On Bumex. -NGUYỄN: Nephrology following.   -Hypokalemia: Potassium repleted. Potassium now WNL. Replace PRN. -DVT prophylaxis: lovenox        Will schedule the colace BID for bowels   Patient has not taken the PRN Percocet for over a week, will DC  Amiodarone is being tapered by CT surgery, now on 200mg daily with stop date 11/4  Team conference tomorrow   Patient has met functional goals, likely discharge in next few days. Will discuss in team conference.      Electronically signed by Irma Chinchilla MD on 10/26/2021 at 10:32 AM

## 2021-10-27 VITALS
OXYGEN SATURATION: 98 % | HEART RATE: 84 BPM | RESPIRATION RATE: 18 BRPM | DIASTOLIC BLOOD PRESSURE: 59 MMHG | TEMPERATURE: 98.2 F | HEIGHT: 68 IN | SYSTOLIC BLOOD PRESSURE: 120 MMHG | WEIGHT: 152.44 LBS | BODY MASS INDEX: 23.1 KG/M2

## 2021-10-27 LAB
ANION GAP SERPL CALCULATED.3IONS-SCNC: 10 MMOL/L (ref 7–16)
BUN BLDV-MCNC: 24 MG/DL (ref 6–23)
CALCIUM SERPL-MCNC: 8.5 MG/DL (ref 8.6–10.2)
CHLORIDE BLD-SCNC: 102 MMOL/L (ref 98–107)
CO2: 26 MMOL/L (ref 22–29)
CREAT SERPL-MCNC: 2 MG/DL (ref 0.7–1.2)
GFR AFRICAN AMERICAN: 40
GFR NON-AFRICAN AMERICAN: 33 ML/MIN/1.73
GLUCOSE BLD-MCNC: 94 MG/DL (ref 74–99)
HCT VFR BLD CALC: 26.8 % (ref 37–54)
HEMOGLOBIN: 8.8 G/DL (ref 12.5–16.5)
MCH RBC QN AUTO: 29.7 PG (ref 26–35)
MCHC RBC AUTO-ENTMCNC: 32.8 % (ref 32–34.5)
MCV RBC AUTO: 90.5 FL (ref 80–99.9)
PDW BLD-RTO: 14.8 FL (ref 11.5–15)
PLATELET # BLD: 150 E9/L (ref 130–450)
PMV BLD AUTO: 10 FL (ref 7–12)
POTASSIUM SERPL-SCNC: 3.8 MMOL/L (ref 3.5–5)
RBC # BLD: 2.96 E12/L (ref 3.8–5.8)
SODIUM BLD-SCNC: 138 MMOL/L (ref 132–146)
WBC # BLD: 4.8 E9/L (ref 4.5–11.5)

## 2021-10-27 PROCEDURE — 97110 THERAPEUTIC EXERCISES: CPT

## 2021-10-27 PROCEDURE — 97535 SELF CARE MNGMENT TRAINING: CPT

## 2021-10-27 PROCEDURE — 97530 THERAPEUTIC ACTIVITIES: CPT

## 2021-10-27 PROCEDURE — 6370000000 HC RX 637 (ALT 250 FOR IP)

## 2021-10-27 PROCEDURE — 6370000000 HC RX 637 (ALT 250 FOR IP): Performed by: PHYSICAL MEDICINE & REHABILITATION

## 2021-10-27 PROCEDURE — 85027 COMPLETE CBC AUTOMATED: CPT

## 2021-10-27 PROCEDURE — 94640 AIRWAY INHALATION TREATMENT: CPT

## 2021-10-27 PROCEDURE — 80048 BASIC METABOLIC PNL TOTAL CA: CPT

## 2021-10-27 PROCEDURE — 6370000000 HC RX 637 (ALT 250 FOR IP): Performed by: NURSE PRACTITIONER

## 2021-10-27 PROCEDURE — 99239 HOSP IP/OBS DSCHRG MGMT >30: CPT | Performed by: PHYSICAL MEDICINE & REHABILITATION

## 2021-10-27 PROCEDURE — 6360000002 HC RX W HCPCS

## 2021-10-27 PROCEDURE — 36415 COLL VENOUS BLD VENIPUNCTURE: CPT

## 2021-10-27 RX ORDER — FOLIC ACID 1 MG/1
1 TABLET ORAL DAILY
Qty: 30 TABLET | Refills: 0 | Status: SHIPPED | OUTPATIENT
Start: 2021-10-28 | End: 2021-12-03

## 2021-10-27 RX ORDER — TAMSULOSIN HYDROCHLORIDE 0.4 MG/1
0.4 CAPSULE ORAL DAILY
Qty: 30 CAPSULE | Refills: 0 | Status: SHIPPED | OUTPATIENT
Start: 2021-10-28 | End: 2021-11-26 | Stop reason: SDUPTHER

## 2021-10-27 RX ORDER — BUMETANIDE 1 MG/1
1 TABLET ORAL DAILY
Qty: 30 TABLET | Refills: 0 | Status: SHIPPED | OUTPATIENT
Start: 2021-10-28 | End: 2021-11-19

## 2021-10-27 RX ORDER — FERROUS SULFATE 325(65) MG
325 TABLET ORAL
Qty: 30 TABLET | Refills: 0 | Status: SHIPPED | OUTPATIENT
Start: 2021-10-28 | End: 2021-12-03

## 2021-10-27 RX ORDER — CLOPIDOGREL BISULFATE 75 MG/1
75 TABLET ORAL DAILY
Qty: 30 TABLET | Refills: 0 | Status: SHIPPED | OUTPATIENT
Start: 2021-10-28 | End: 2021-11-26 | Stop reason: SDUPTHER

## 2021-10-27 RX ORDER — AMIODARONE HYDROCHLORIDE 200 MG/1
200 TABLET ORAL DAILY
Qty: 7 TABLET | Refills: 0 | Status: SHIPPED | OUTPATIENT
Start: 2021-10-28 | End: 2021-11-19

## 2021-10-27 RX ORDER — CARVEDILOL 3.12 MG/1
3.12 TABLET ORAL 2 TIMES DAILY WITH MEALS
Qty: 60 TABLET | Refills: 0 | Status: SHIPPED | OUTPATIENT
Start: 2021-10-27 | End: 2021-11-26 | Stop reason: SDUPTHER

## 2021-10-27 RX ADMIN — NYSTATIN: 100000 OINTMENT TOPICAL at 09:03

## 2021-10-27 RX ADMIN — BUMETANIDE 1 MG: 1 TABLET ORAL at 09:00

## 2021-10-27 RX ADMIN — DOCUSATE SODIUM 100 MG: 100 CAPSULE, LIQUID FILLED ORAL at 09:00

## 2021-10-27 RX ADMIN — TAMSULOSIN HYDROCHLORIDE 0.4 MG: 0.4 CAPSULE ORAL at 09:04

## 2021-10-27 RX ADMIN — IPRATROPIUM BROMIDE AND ALBUTEROL SULFATE 1 AMPULE: .5; 2.5 SOLUTION RESPIRATORY (INHALATION) at 11:05

## 2021-10-27 RX ADMIN — PANTOPRAZOLE SODIUM 40 MG: 40 TABLET, DELAYED RELEASE ORAL at 06:48

## 2021-10-27 RX ADMIN — ENOXAPARIN SODIUM 30 MG: 100 INJECTION SUBCUTANEOUS at 09:01

## 2021-10-27 RX ADMIN — FOLIC ACID 1 MG: 1 TABLET ORAL at 09:00

## 2021-10-27 RX ADMIN — AMIODARONE HYDROCHLORIDE 200 MG: 200 TABLET ORAL at 09:00

## 2021-10-27 RX ADMIN — CLOPIDOGREL 75 MG: 75 TABLET, FILM COATED ORAL at 08:59

## 2021-10-27 RX ADMIN — FERROUS SULFATE TAB 325 MG (65 MG ELEMENTAL FE) 325 MG: 325 (65 FE) TAB at 06:48

## 2021-10-27 RX ADMIN — ASPIRIN 81 MG: 81 TABLET ORAL at 09:00

## 2021-10-27 RX ADMIN — OXYCODONE HYDROCHLORIDE AND ACETAMINOPHEN 500 MG: 500 TABLET ORAL at 08:59

## 2021-10-27 RX ADMIN — CARVEDILOL 3.12 MG: 3.12 TABLET, FILM COATED ORAL at 08:59

## 2021-10-27 ASSESSMENT — PAIN SCALES - GENERAL
PAINLEVEL_OUTOF10: 0
PAINLEVEL_OUTOF10: 0

## 2021-10-27 NOTE — PROGRESS NOTES
Department of Internal Medicine  Nephrology Progress Note      Events reviewed. SUBJECTIVE: We are following Mr. Mehul [address] for NGUYỄN. Reports no complaints.     PHYSICAL EXAM:      Vitals:    VITALS:  BP (!) 106/58   Pulse 76   Temp 98.7 °F (37.1 °C) (Temporal)   Resp 16   Ht 5' 8\" (1.727 m)   Wt 152 lb 7 oz (69.1 kg)   SpO2 96%   BMI 23.18 kg/m²   24HR INTAKE/OUTPUT:      Intake/Output Summary (Last 24 hours) at 10/27/2021 0911  Last data filed at 10/27/2021 6613  Gross per 24 hour   Intake 540 ml   Output 380 ml   Net 160 ml     Constitutional: Patient is awake, alert in no distress  HEENT: Pupils are equal reactive  Respiratory: Decreased breath sounds at the bases  Cardiovascular/Edema: Heart sounds are regular  Gastrointestinal: Abdomen soft  Neurologic: Patient alert   Other: +1 edema  With support stockings    Scheduled Meds:   docusate sodium  100 mg Oral BID    ferrous sulfate  325 mg Oral Daily with breakfast    tamsulosin  0.4 mg Oral Daily    ipratropium-albuterol  1 ampule Inhalation Q4H WA    aspirin  81 mg Oral Daily    vitamin C  500 mg Oral BID    atorvastatin  40 mg Oral Daily    bumetanide  1 mg Oral Daily    carvedilol  3.125 mg Oral BID WC    clopidogrel  75 mg Oral Daily    enoxaparin  30 mg SubCUTAneous Daily    folic acid  1 mg Oral Daily    nystatin   Topical BID    amiodarone  200 mg Oral Daily     Continuous Infusions:    PRN Meds:.acetaminophen, bisacodyl, oxyCODONE-acetaminophen **OR** oxyCODONE-acetaminophen, sennosides-docusate sodium, diphenhydrAMINE, artificial tears, ondansetron, sodium chloride, polyethylene glycol    DATA:    CBC:   Lab Results   Component Value Date    WBC 4.8 10/27/2021    RBC 2.96 10/27/2021    HGB 8.8 10/27/2021    HCT 26.8 10/27/2021    MCV 90.5 10/27/2021    MCH 29.7 10/27/2021    MCHC 32.8 10/27/2021    RDW 14.8 10/27/2021     10/27/2021    MPV 10.0 10/27/2021     CMP:    Lab Results   Component Value Date     10/27/2021    K 3.8 10/27/2021    K 4.2 10/14/2021     10/27/2021    CO2 26 10/27/2021    BUN 24 10/27/2021    CREATININE 2.0 10/27/2021    GFRAA 40 10/27/2021    LABGLOM 33 10/27/2021    GLUCOSE 94 10/27/2021    PROT 5.4 10/12/2021    LABALBU 3.1 10/12/2021    CALCIUM 8.5 10/27/2021    BILITOT 0.8 10/12/2021    ALKPHOS 47 10/12/2021    AST 15 10/12/2021    ALT 10 10/12/2021     Magnesium:    Lab Results   Component Value Date    MG 1.9 10/17/2021     Phosphorus:    Lab Results   Component Value Date    PHOS 2.8 10/10/2021        Radiology Review:      CXR 10/3/21   1. Median sternotomy changes. 2. Bilateral pleuroparenchymal opacities that could be related to pleural   effusion and edema.  The appearance of the chest is about the same or   slightly worse.         Chest x-ray October 6, 2021   1. Stable appearance of the postoperative chest.   2. Bilateral chest tubes remain in position.  There is no right or left   pneumothorax. 3. Small right pleural effusion         Chest x-ray October 8, 2021   Minimal bibasilar atelectasis.       Trace right pleural effusion       There is no pneumothorax             BRIEF SUMMARY OF INITIAL CONSULT:    Briefly Mr. Judy Tse is a 72-year-old man with history of HTN, CAD with recent status cardiac catheterization done on September 9 which showed severe multivessel disease, hyperlipidemia, hiatal hernia, who was admitted for elective CABG on September 27, 2021. On admission his creatinine level was 0.8 mg/dL and post surgery his creatinine has progressively increased up to 2.3 mg/dL, reason for this consultation. Postoperatively she developed persistent hypotension, lactic acidosis and respiratory failure for which he was reintubated. Since then she has required multiple pressors and he was placed on IABP. He had received 1 dose of ketorolac perioperatively.  His urine output has significantly decrease and is being about 500 cc/day in the last 48 hours and his fluid balance presently is over 9 L.    Problems resolved:    · Cardiogenic shock, hemodynamically more stable, pressor support decrease, still on IABP. Tentative plan for removal of IABP. Pro-BP 16,936  · Hypernatremia with hypervolemia, 2/2 sodium containing IV fluid resuscitation and underlying heart failure. Resolved with  natriuresis and free water (add D5W)  · Hypokalemia, 2/2 diuretics, potassium levels improve  · Respiratory alkalosis (pH: 7.554, PCO2: 42.3) with metabolic alkalosis (bicarbonate administration)  · Respiratory failure status post intubation  · Thrombocytopenia  · Transaminitis with hyperbilirubinemia, possibly shock liver versus congestive liver  · Probably pneumonia, on piperacillin-tazobactam  · Hypokalemia, 2/2 diuretics, potassium levels improved. IMPRESSION/RECOMMENDATIONS:      1. NGUYỄN stage III, CABG associated NGUYỄN, ischemic ATN, non-oliguric. FEUrea 18.8%. Renal function stable. To continue Bumex 1 mg p.o. daily    2. HFmEF 40% with stage IDD, proBNP 16,826 > 9461> 7228 > 5112> 2696, proBNP levels continues to improve  3. HTN, on carvedilol  4. BPH, on tamsulosin  ---------------------------------------------------------  5. CAD status post CABG x3 September 27, 2021, on ASA, clopidogrel, carvedilol, atorvastatin  6. Amiodarone therapy, for AF prophylaxis  7.  Normocytic anemia, status post surgery, status post transfusion, iron saturation 36%, on p.o. iron      Plan:    · Continue tamsulosin 0.4 mg PO daily  · Continue Bumex 1 mg p.o. daily  · Continue to monitor renal function for recovery (BMP MWF)  · Okay to discharge from renal point of view   · Follow-up with us in 3 weeks  · BMP in 2 weeks

## 2021-10-27 NOTE — PROGRESS NOTES
OCCUPATIONAL THERAPY DAILY NOTE/ Discharge Summary    Date:10/27/2021  Patient Name: Elan Ross  MRN: [id number]  : 1945  Room: [address] [place] [address]-A     Diagnosis: CVA- s/p CABG x 3- 21  Additional Pertinent Hx: HLD, HTN, CAD, ESRD, hx hiatal hernia  Precautions: Falls, Puree-Dysphagia IV/nectar & Sternal precautions & wife- Isis orange dot    Functional Assessment:   Date Status AE  Comments   Feeding 10/26/21 independent     Grooming 10/26/21 independent           Oral Care 10/26/21 independent     Bathing 10/25/21 SBA     UB Dressing 10/25/21 independent     LB Dressing 10/26/21 independent LB AE    Footwear 10/25/21 Mod I  Shoe horn    Toileting 10/26/21 independent     Homemaking 10/27/21 Independent  S/SBA- sitting  counter In kitchen pt completed making toast. Pt demonstrated good safety in kitchen. Functional Transfers / Balance:   Date Status DME  Comments   Sit Balance 10/26/21 independent     Stand Balance 10/27/21 independent No device    [] Tub  [x] Shower   Transfer 10/26/21 independent Shower seat    Commode   Transfer 10/26/21 independent No AD    Functional   Mobility 10/27/21 independent No AD    Other:  Supine>Sit    Bed>W/c    Sit to stand   10/26/21    10/26/21    10/26/21   independent    independent    SBA         Functional Exercises / Activity:  Mod resistive theraputty with coins to increase B hand strength and DELTA [facility]. Pt completes removing coins and placing into bank     Mod resistive power  2 X 50 reps to increase B hand strength for independence with functional tasks. Sensory / Neuromuscular Re-Education:         Cognitive Skills:   Status Comments   Problem   Solving G-    Memory G    Sequencing G    Safety G-      Visual Perception:    Education:  -Pt educated on precautions to follow, safety/balance, and hand placement to follow of sternal precautions during ADL, transfers & mobility.  Pt demo G- carryover    [x] Family teach completed on:  10/18/21- Pt wife educated with regards to pt current levels & performed hands on training to perform commode trfs, short distance ambulation & bed mobility. Pt wife made an orange dot  10/22/21- PT & wife Vero Rendon observed & participated in OT am treatment session. Pt & wife performed all transfers & mobility in the OT apartment. Pt & wife educated with regards to fall prevention & fall recovery. Pt wife pleased with pt progress. Pain Level: Pt did not c/o pain during OT session     Additional Notes:    strength:  R hand 10/25/21- 44#   10/21/21- 36#  On eval 20#  Norm75#  L hand 10/25/21- 72#  10/21/21- 66#  On eval 57#  Norm 65#      9-hole peg test  R hand 10/25/21 23.3 secs 10/21/21- 30.6 secs On eval 1 min 33.6 sec&handed pegs  Norm 25.8 sec  L hand 10/25/21 23.0 seconds 10/21/21- 25.8 sec On eval 34.8 sec  Norm 26.0 sec    Patient has made good  progress during treatment sessions toward set goals. Therapy emphasis to obtain goals: Strengthening, Gait Training, Patient/Caregiver Education & Training, Home Management Training, ROM, Equipment Evaluation, Education, & procurement, Balance Training, Neuromuscular Re-education, Cognitive Reorientation, Safety Education & Training, Self-Care/ADL    Long term goal 1: Pt demo independent to eat all meals  Long term goal 2: Pt demo Mod I grooming seated or standing & demo G- safety  Long term goal 3: Pt demo SBA-CGA bathing @ shower level both seated & standing  Long term goal 4: Pt demo s/u UE dress & SBA-CGA LE dress including underwear, pants, socks & shoes & demo G- endurance  Long term goal 5: Pt demo SBA-CGA commode trf with appropriate DME to ensure pt safety.  Pt demo SBA toileting & demo G- safety & insight  Long term goals 6: Pt demo SBA-CGA walk in shower trf using appropriate DME to ensure pt safety  Long term goal 7: Pt demo Min A light homemaking activity & demo G- safety & insight  Long term goal 8: Pt demo improved FMC/ strength to improve pt ability to complete ADLs as evidenced by gains inthe followin-hole peg test- R hand pt handed pegs as he was u/a to pick them up & completed in 1 minute & 33.6 seconds & norm for pt age & gender is 25.8 seconds, L hand 34.8 seconds & norm for pt age & gender is 26.0 seconds;  strength- R hand 20# & norm for pt age & gender is 75# & L hand 57# & normf or pt age & gender is 65#  Long term goal 9: Pt demo G- endurance for a 20-30 minute functional activity    10/20/21 Pt plan of care updated on this date. Tentative length of stay is 2 weeks. Isabella Ching OTR/L [id number]    [] Continue with current OT Plan of care. [x] Prepare for Discharge     DISCHARGE RECOMMENDATIONS   OCCUPATIONAL THERAPY DISCHARGE SUMMARY    Discontinue Occupational Therapy intervention. Pt has:   [x] met all set goals. [] made good progress toward set goals. [] has made slow progress toward goals and would benefit from rehab setting change.  [] had a medical decline and therefore was transferred off the Rehab unit. Long term goals  Time Frame for Long term goals : 4 weeks to address above problema reas  Long term goal 1: Pt demo independent to eat all meals  Long term goal 2: Pt demo Mod I grooming seated or standing & demo G- safety  Long term goal 3: Pt demo SBA-CGA bathing @ shower level both seated & standing  Long term goal 4: Pt demo s/u UE dress & SBA-CGA LE dress including underwear, pants, socks & shoes & demo G- endurance  Long term goal 5: Pt demo SBA-CGA commode trf with appropriate DME to ensure pt safety.  Pt demo SBA toileting & demo G- safety & insight  Long term goals 6: Pt demo SBA-CGA walk in shower trf using appropriate DME to ensure pt safety  Long term goal 7: Pt demo Min A light homemaking activity & demo G- safety & insight  Long term goal 8: Pt demo improved FMC/ strength to improve pt ability to complete ADLs as evidenced by gains inthe followin-hole peg test- R hand pt handed pegs as he was u/a to pick them up & completed in 1

## 2021-10-27 NOTE — PATIENT CARE CONFERENCE
[facility] NOTE/PATIENT PLAN OF CARE    The physician was present and led this team conference    Date: 10/27/2021  Admission date: 10/13/2021  Patient Name: Td Coy        MRN: [id number]    : 1945  (78 y.o.)  Gender: male   Rehab diagnosis/surgery with date:  CVA of 10/5/21  Impairment Group Code:  1.2      MEDICAL/FUNCTIONAL HISTORY/STATUS:  still anemic, on iron, renal function stable, sternal precautions done  (post CABG times 3 of 21)    Consultations/Labs/X-rays: nephrology continues to follow for recent acute kidney injury, hypertension  Results for Easton Harrison (MRN [id number])    Ref.  Range 10/27/2021 04:39   Sodium Latest Ref Range: 132 - 146 mmol/L 138   Potassium Latest Ref Range: 3.5 - 5.0 mmol/L 3.8   Chloride Latest Ref Range: 98 - 107 mmol/L 102   CO2 Latest Ref Range: 22 - 29 mmol/L 26   BUN Latest Ref Range: 6 - 23 mg/dL 24 (H)   Creatinine Latest Ref Range: 0.7 - 1.2 mg/dL 2.0 (H)     MEDICATION UPDATE:    tamsulosin, 0.4 mg, Daily  ipratropium-albuterol, 1 ampule, Q4H WA  aspirin, 81 mg, Daily  vitamin C, 500 mg, BID  atorvastatin, 40 mg, Daily  bumetanide, 1 mg, Daily  carvedilol, 3.125 mg, BID WC  clopidogrel, 75 mg, Daily  enoxaparin, 30 mg, Daily  ferrous sulfate, 542 mg, BID WC  folic acid, 1 mg, Daily  nystatin, , BID  pantoprazole, 40 mg, QAM AC  amiodarone, 200 mg, Daily    NURSING :    Bowel:   Always Continent  [x]   Occasionally incontinent  []   Frequently incontinent  []   Always incontinent  []   Not occurred  []     Bladder:   Always Continent  [x]    Incontinent less than daily[]   Incontinent  daily []   Always incontinent  []   No urine output    []   Indwelling catheter []       Toilet Hygiene:   Current level : independent    Skin integrity: red groin area, scalp and chest incisions healing, all open to air  Pain:  none    NUTRITION    Diet  regular  Liquid consistency   thin    SOCIAL INFORMATION:  Lives with: spouse  Prior community services:  none  Home Architecture:  mobile home, 5 entry steps, 1 rail  Prior Level of function:  independent, retired  DME:  none    FAMILY / PATIENT EDUCATION:  wife has been here for family education    PHYSICAL THERAPY    Bed mobility:   Current level: independent  Short term bed mobility goal: independent  Long term bed mobility goal: independent    Chair/bed transfers:  Current level: supervision  Short term Chair/bed transfers goal: supervision  Long term Chair/bed transfers goal: supervision      Ambulation:   Current level: 600 ft no device at supervision  Short term ambulation goal: met  Long term ambulation goal: exceeded    Car transfers:   Current level: standby assist  Short term car transfers goal: standby assist  Long term car transfers goal:supervision    Stairs:   Current level : 12 with 1 rail at supervision  Short term stairs goal: met  Long term stairs goal: met      OCCUPATIONAL THERAPY:      Tub/shower:   Current level: independent  Short term tub/shower goal: independent  Long term tub/shower goal: independent      Feeding:  Current level: independent  Short term feeding goal: independent  Long term feeding goal: independent    Grooming:   Current level: independent standing  Short term grooming goal: independent  Long term grooming goal: independent    Bathing:  Current level: standby assist  Short term bathing goal: standby assist  Long term bathing goal: standby assist    Homemaking:   Current level: min assist  Short term homemaking goal: min assist  Long term homemaking goal: min assist    Upper body dressing:  Current level: independent  Short term upper body dressing goal: independent  Long term upper body dressing goal: independent    Lower body dressing:                                                                          Current level: independent             Short term lower body dressing goal: independent          Long term lower body dressing goal: independent            Footwear  Current level: independent  Short term goal: independent  Long term goal:independent      Toilet transfer:   Current level: independent  Short term toilet transfer goal: independent  Long term toilet transfer goal: independent      Safety awareness: good    Comments: wife has \"orange dot\"    Patient/family's personal goals: go home today  Factors supporting goal achievement:  made great gains, supportive spouse  Factors hindering goal achievement:  endurance, fatigues in the afternoon      Discharge Plan   Estimated Length of Stay: discharge today 10/27/21            Destination: home  Services at Discharge: outpatient PT, OT  Equipment at Discharge: transport chair, shower seat       INTERDISCIPLINARY TEAM/PHYSICIAN [address] [place] [address] [address]:  finalize discharge to home today      I approve the established interdisciplinary plan of care as documented within the medical record of [employer]. Electronically signed by Jose R Smith RN  on 10/27/2021 at 8:40 AM  The following interdisciplinary team members were present:  CINTHIA Umaña, HECTORW  RISA Romeo OTR

## 2021-10-27 NOTE — DISCHARGE SUMMARY
CVA. Once medically stabilized patient was evaluated for rehab. He was felt to be a good candidate for further rehabilitation and was then admitted for the Acute inpatient rehab program.      ===================================================================      Functional Status                  Initial Evaluation  Date: 10/14/21 10/19/21     10/26/21   Was pt agreeable to Eval/treatment? yes yes yes   Does pt have pain?  Denies pain denies Denies   Bed Mobility  Rolling: SBA  Supine to sit: modA  Sit to supine: modA  Scooting: SBA Rolling: SBA  Supine to sit: SBA  Sit to supine: SBA  Scooting: SBA Independent with all aspects   Transfers Sit to stand: mod-maxA  Stand to sit: mod-maxA  Stand pivot: modA     5xSTS: NT Sit to stand: Asher  Stand to sit: Asher  Stand pivot: Asher Sit to stand: supervision  Stand to sit: supervision  Stand pivot: supervision      Ambulation    10', no AD, maxA with w/c follow from 2nd helper     10mWT: NT  6mWT: ', no AD, Asher Up to 600', no AD, supervision   Walking 10 feet on uneven surface NT due to safety 12', no AD, Asher 24', SBA, no AD   Wheel Chair Mobility NT NT N/A   Car Transfers NT due to safety NT SBA   Stair negotiation: ascended and descended NT due to safety 12 steps, BUE on HR for balance only, Asher, non-reciprocal pattern 12 steps, 1 HR, supervision   Curb Step:   ascended and descended 2\" curb, maxA, no AD 7.5\" curb, no AD, Asher 7.5\" curb, no AD, Asher   Picking up object off the floor NT due to safety 2# weight, Asher supervision   Date Family Teach Completed N/A 10/18/21 with wife Abdelrahman Burden 10/18 and 10/22 with wife           Functional Assessment:      Date   Status   AE    Comments     Feeding   10/26/21   independent             Grooming   10/26/21   independent                   Oral Care   10/26/21   independent             Bathing   10/25/21   SBA             UB Dressing   10/25/21   independent             LB Dressing   10/26/21   independent   LB AE     Footwear   10/25/21   Mod I    Shoe horn         Toileting   10/26/21   independent             Homemaking   10/27/21   Independent    S/SBA- sitting  counter   In kitchen pt completed making toast. Pt demonstrated good safety in kitchen. Discharge Physical Examination  General appearance: alert, NAD  Eyes: conjunctivae/corneas clear. PERRL, EOM's intact.   Lungs: clear to auscultation bilaterally  Heart: regular rate and rhythm, S1, S2  Abdomen: soft, non-tender, normal bowel sounds  Musculoskeletal: Range of motion normal and no gross abnormalities. No edema. No calf tenderness. Neurologic: Alert, oriented. Dysarthric. Strength is 4+-5-/5 RUE and RLE; 5/5 LUE and LLE       Hospital Course   Functional and mobility deficits secondary to Acute CVA:  The patient completed an intensive inpatient rehabilitation program including PT, OT, SLP,  and achieved significant improvement in function as documented above. Recommended continued therapies are documented below.        -Acute CVA: Right hemiparesis, dysarthria (improved), dysphagia (improved), cognitive impairment. Continue Aspirin, Plavix, Lipitor. Completed Rehab program with functional progress as above. -Dysphagia: Passed repeat video swallow study on 10/20/2021, diet upgraded to general. Speech therapy has discharged patient. -CAD, s/p CABG x3: Continue current medications, sternal precautions through 11/8/2021.   -Post operative pain: Percocet PRN while inpatient, has not taken in over a week. No longer requiring PRN pain medication.   -Acute blood loss anemia: H&H low but stable throughout rehab stay. Continue iron supplement.   -Afib during acute care hospitalization, now in NSR: On amiodarone for Afib prophylaxis per CT surgery--tapering per CT surgery. Currently on Amiodarone 200mg daily with stop date 87/8.  -Acute systolic heart failure: On coreg. On Bumex. -NGUYỄN: Nephrology followed on ARU. Renal function stable.  Patient will follow up with Nephrology as outpatient.   -Hypokalemia: Potassium repleted.  Potassium now WNL.        ==================================================================  Discharge Medications     Medication List      START taking these medications    amiodarone 200 MG tablet  Commonly known as: CORDARONE  Take 1 tablet by mouth daily for 7 doses  Start taking on: October 28, 2021     bumetanide 1 MG tablet  Commonly known as: BUMEX  Take 1 tablet by mouth daily  Start taking on: October 28, 2021     carvedilol 3.125 MG tablet  Commonly known as: COREG  Take 1 tablet by mouth 2 times daily (with meals)     clopidogrel 75 MG tablet  Commonly known as: PLAVIX  Take 1 tablet by mouth daily  Start taking on: October 28, 2021     ferrous sulfate 325 (65 Fe) MG tablet  Commonly known as: IRON 325  Take 1 tablet by mouth daily (with breakfast)  Start taking on: October 28, 4333     folic acid 1 MG tablet  Commonly known as: FOLVITE  Take 1 tablet by mouth daily for 30 doses  Start taking on: October 28, 2021     tamsulosin 0.4 MG capsule  Commonly known as: FLOMAX  Take 1 capsule by mouth daily  Start taking on: October 28, 2021        Leticia Nickerson taking these medications    aspirin 81 MG EC tablet     atorvastatin 40 MG tablet  Commonly known as: LIPITOR     CoQ-10 100 MG Caps     famotidine 20 MG tablet  Commonly known as: PEPCID     fish oil 1000 MG Caps     Vitamin C 500 MG Caps     zinc gluconate 50 MG tablet        STOP taking these medications    isosorbide mononitrate 30 MG extended release tablet  Commonly known as: IMDUR     lisinopril 10 MG tablet  Commonly known as: PRINIVIL;ZESTRIL     metoprolol succinate 25 MG extended release tablet  Commonly known as: TOPROL XL     omeprazole 20 MG delayed release capsule  Commonly known as: PRILOSEC           Where to Get Your Medications      These medications were sent to [address] [place] [address] - [address] - [address] [id number]    Phone: [phone]   amiodarone 200 MG tablet  bumetanide 1 MG tablet  carvedilol 3.125 MG tablet  clopidogrel 75 MG tablet  ferrous sulfate 325 (65 Fe) MG tablet  folic acid 1 MG tablet  tamsulosin 0.4 MG capsule         Allergies  Dust mite extract       Recommended Therapies at Discharge: Outpatient PT       Follow-Up  -Primary care: Chalino Sebastian MD  -Nephrology: Dr. Reina Walker - 3 weeks  -Vascular Surgery: Dr. Deirdre Hester - appt scheduled 11/29/2021  -Cardiothoracic surgery: Dr. Hever Avendano - as needed  -Cardiology: Dr. Peter Ashley  -Neurology: SAULO Simeon      Information provided to the patient and appropriate family members regarding discharge medications and follow up       More than 30 minutes was spent in preparation of this patient's discharge including, but not limited to, examination, preparation of documents, prescription preparation, counseling and coordination.       Electronically signed by Yong Worrell MD on 10/27/2021 at 12:56 PM

## 2021-10-27 NOTE — CARE COORDINATION
Per Team: Pt is to discharge today, 10/27/21. Pt and spouse have been updated. LTG: Min Assist/Touching to MOD I/Independent. Pt is making really good progress. Does not use insulin, IV-ABX, or Oxygen. Wife will be here by 11:30am for D/C. DME:  Wife, Filiberto Pacheco stated she is picking up a shower seat from a family member. Pt declined transport wheelchair. No DME ordered. Aftercare: PT, OT. Referral faxed to [facility]. The Plan for Transition of Care is related to the following treatment goals: Home with Outpatient Therapy    The Patient and/or patient representative was provided with a choice of provider and agrees   with the discharge plan. [x] Yes [] No    Freedom of choice list was provided with basic dialogue that supports the patient's individualized plan of care/goals, treatment preferences and shares the quality data associated with the providers.  [x] Yes [] No      MECCA Simmons Intern  [place], [place]  10/27/2021

## 2021-10-27 NOTE — PROGRESS NOTES
Physical Therapy  Treatment note    NAME: Alex Griffin  : 1945  MRN: [id number]    Date of Service: 10/20/2021    Evaluating Therapist: Amy Pollock., PT, DPT [id number]  ROOM: [address] [place] [address]-  DIAGNOSIS: L cerebellar CVA   PRECAUTIONS: Falls, Sternal Precautions, regular diet with thin liquids, **ORANGE DOT: Wife may assist with tx/amb pt to restroom in room**  HPI: s/p CABG on 21, Following CABG, patient developed hypotension, acidosis, NGUYỄN and respiratory failure and was intubated. Bronchoscopy done on  to remove large mucus plug. Required pressors and was placed on aortic balloon pump which was removed 10/4/21. Off of sedation was found to have right sided weakness. CT head negative for acute findings. Echo showed no LV mural thrombus. Carotid US with atherosclerotic disease -- 50-69% bilaterally. Unable to have CTAs due to kidney function. Unable to have MRI/MRA at this time due to epicardial wires. Repeat CT head with equivocal area of low density in the L cerebellum. Social:  Pt lives with wife in a mobile home with 5 stairs to enter and 1 rail. Pt also has home in [place] which is 1 story setup, unclear amount of steps. Prior to admission: Pt ambulated without device and was independent PTA. Initial Evaluation  Date: 10/14/21 AM PM Short Term Goals Long Term Goals    Was pt agreeable to Eval/treatment? yes yes yes     Does pt have pain?  Denies pain No c/o pain No c/o pain     Bed Mobility  Rolling: SBA  Supine to sit: modA  Sit to supine: modA  Scooting: SBA Independent all aspects NT sup Mod i   Transfers Sit to stand: mod-maxA  Stand to sit: mod-maxA  Stand pivot: modA    5xSTS: NT Sit to stand: mod i  Stand to sit: mod i  Stand pivot: mod i Sit to stand: mod i Asher  5xSTS: < 15 sec sup  5xSTS: < 15 sec   Ambulation    10', no AD, maxA with w/c follow from 2nd helper    10mWT: NT  6mWT: ', supervision, no AD NT 50', no AD, Asher    10mWT: TBD  6mWT: ', no AD, sup    10mWT: TBD  6mWT: TBD   Walking 10 feet on uneven surface NT due to safety 24', no AD, supervision NT 10', no AD,  Asher 50', no AD, sup   Wheel Chair Mobility NT NT NT n/a n/a   Car Transfers NT due to safety supervision  Asher sup   Stair negotiation: ascended and descended NT due to safety  12 steps, HR, supervision NT 4 steps, HR for balance only, Asher 12 steps, HR for balance only, sup   Curb Step:   ascended and descended 2\" curb, maxA, no AD 7.5\" curb, no AD, SBA NT 4\" curb, no AD, Asher 7.5\" curb, no AD, sup   Picking up object off the floor NT due to safety Mod i NT Will  cone with Asher assist Will  cone with sup assist   BLE ROM AROM WFL  NT     BLE Strength LLE 5/5 except hip flexors which were 3/5  R hip flex 3/5   R knee ext/flex 5/5   R ankle DF/PF 5/5       Balance  Sitting- mod I  Standing-mod-maxA    BBS: NT  FGA: NT Standing: SBA no AD    FGA: 18/30    BBS: > 45/56  FGA: >22/30   BBS: -  FGA: -   Date Family Teach Completed N/A       Is additional Family Teaching Needed? Y or N Y       Hindering Progress sternal precautions, dyscoordination, weakness, decreased endurance, anxiety sternal precautions,  weakness, decreased endurance      PT recommended ELOS 3-4 weeks       Team's Discharge Plan        Therapist at Team Meeting            Therapeutic Exercise:     AM:   1. Functional mobility  2. FGA    PM:   1. amb with ankle weight resistance- 9# on each lower extremity, SBA  2. Body weight squats- 2x10 to 20'' height surface, Asher for occ posterior LOB, verbal cueing to engage hip abductors provided  3.  Resisted side stepping- 2x20' vs purple resistance band    Patient education    Pt educated on tx, gait, balance, current status and POC, d/c planning    Patient response to education:   Pt verbalized understanding Pt demonstrated skill Pt requires further education in this area   yes partial All areas     Additional Comments:   AM: Pt completed FGA this session, scoring 18/30 which is below cut off for fall risk. Pt with difficulty during tandem walking, also amb with mild path deviations during head turns and with eyes closed. Pt with subtle deviations and instabilities towards end of session, especially during turning but no overt LOB. Pt able to complete stair negotiation at supervision this session    PM: Focus of session on strengthening activity. Pt tolerated all activity well. Pt set to d/c this PM, all questions and concerns addressed. Chair/bed alarm:     AM  Time in: 0915  Time out: 0945     PM  Time in: 1300  Time out: 1330    Pt is making good progress toward established Physical Therapy goals. Continue with physical therapy current plan of care.     Hiral Santana, PT, DPT  [id number]

## 2021-10-27 NOTE — PROGRESS NOTES
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 Bronchoscopy done on  to remove large mucus plug. Required pressors and was placed on aortic balloon pump which was removed 10/4/21. Off of sedation was found to have right sided weakness. CT head negative for acute findings. Echo showed no LV mural thrombus. Carotid US with atherosclerotic disease -- 50-69% bilaterally. Unable to have CTAs due to kidney function. Unable to have MRI/MRA at this time due to epicardial wires. Repeat CT head with equivocal area of low density in the L cerebellum. Social:  Pt lives with wife in a mobile home with 5 stairs to enter and 1 rail. Pt also has home in [place] which is 1 story setup, unclear amount of steps. Prior to admission: Pt ambulated without device and was independent PTA. Initial Evaluation  Date: 10/14/21 D/C Summary Short Term Goals Long Term Goals    Was pt agreeable to Eval/treatment? yes yes     Does pt have pain?  Denies pain No c/o pain     Bed Mobility  Rolling: SBA  Supine to sit: modA  Sit to supine: modA  Scooting: SBA Independent all aspects Sup  Met Mod I  Met   Transfers Sit to stand: mod-maxA  Stand to sit: mod-maxA  Stand pivot: modA    5xSTS: NT Sit to stand: mod i  Stand to sit: mod i  Stand pivot: mod i Asher  Met sup  Met   Ambulation    10', no AD, maxA with w/c follow from 2nd helper    10mWT: NT  6mWT: ', supervision, no AD    6mWT: 1.03 m/s 50', no AD, Asher    Met       250', no AD, sup    Met         Walking 10 feet on uneven surface NT due to safety  no AD, supervision 10', no AD,  Asher    Met 50', no AD, sup    Met   Wheel Chair Mobility NT NT n/a n/a   Car Transfers NT due to safety supervision Asher    Met Sup    Met   Stair negotiation: ascended and descended NT due to safety  12 steps, HR, supervision 4 steps, HR for balance only, Asher    Met 12 steps, HR for balance only, sup    Met   Curb Step:   ascended and descended 2\" curb, maxA, no AD 7.5\" curb, no AD, SBA 4\" curb, no AD, Asher    Met 7.5\" curb, no AD, sup    Met       Picking up object off the floor NT due to safety Mod i Will  cone with Asher assist    Met   Will  cone with sup assist    Met   BLE ROM AROM WFL      BLE Strength LLE 5/5 except hip flexors which were 3/5  R hip flex 3/5   R knee ext/flex 5/5   R ankle DF/PF 5/5      Balance  Sitting- mod I  Standing-mod-maxA    BBS: NT  FGA: NT Standing: supervision mod i    FGA: 18/30 on 10/27   BBS: > 45/56  FGA: >22/30    Not Met      Date Family Teach Completed N/A      Is additional Family Teaching Needed? Y or N Y      Hindering Progress sternal precautions, dyscoordination, weakness, decreased endurance, anxiety sternal precautions, dyscoordination, weakness, decreased endurance, anxiety     PT recommended ELOS 3-4 weeks      Team's Discharge Plan       Therapist at Team Meeting         D/C SUMMARY: Pt completed 14 day stay at [facility]. At time of admission, pt required mod-maxA to complete tx and max A with chair follow to complete very short amb distance. At time of d/c, pt has progressed to be completing all tx at mod I, amb and stair negotiation at supervision level, and curb negotiation at SBA. Pt has met all IRF-JUSTIN goals, but was not rohini to meet FGA goal, which he scored 18/30 a time of d/c, indicating risk for fall and further justifying supervision assist for amb. Rec pt follow up with [facility] to continue to progress higher level balance.  Pt;'s wife has undergone family training for multiple sessions and demonstrate safety and competence with guarding/asisst techniques. All questions and concerns addressed at time of d/c.      Sherryle Munda., PT, DPT  [id number]

## 2021-10-28 ENCOUNTER — TELEPHONE (OUTPATIENT)
Dept: FAMILY MEDICINE CLINIC | Age: 76
End: 2021-10-28

## 2021-10-28 ENCOUNTER — TELEPHONE (OUTPATIENT)
Dept: CARDIOLOGY CLINIC | Age: 76
End: 2021-10-28

## 2021-10-28 DIAGNOSIS — K21.00 GASTROESOPHAGEAL REFLUX DISEASE WITH ESOPHAGITIS WITHOUT HEMORRHAGE: Primary | ICD-10-CM

## 2021-10-28 RX ORDER — LANSOPRAZOLE 30 MG/1
30 CAPSULE, DELAYED RELEASE ORAL DAILY
Qty: 90 CAPSULE | Refills: 1 | Status: SHIPPED
Start: 2021-10-28 | End: 2021-11-19

## 2021-10-28 NOTE — CARE COORDINATION
SW received a call from pt's spouse Kat Vasquez. They decided to go to [facility] for OP PT. Referral made, and he is scheduled for 11/9 @ 11:20.

## 2021-10-28 NOTE — TELEPHONE ENCOUNTER
Sent order for generic prevacid. Omeprazol interacts with Plavix and makes it less effective, which we do not want. Glad he is home, finally.

## 2021-10-28 NOTE — TELEPHONE ENCOUNTER
----- Message from Reji Peters sent at 10/28/2021 10:31 AM EDT -----  Subject: Message to Provider    QUESTIONS  Information for Provider? pt's spouse called wanting to know if pt can   take the ometrazole 20mg with his other medications. They want to know   this since pt was not given protonix after he got discharged even though   it was given to him during his stay in the hospital and don't want   anything reacting against his health.  ---------------------------------------------------------------------------  --------------  [address]  What is the best way for the office to contact you? OK to leave message on   voicemail, OK to respond with electronic message via [employer] portal (only   for patients who have registered [employer] account)  Preferred Call Back Phone Number? [phone]  ---------------------------------------------------------------------------  --------------  SCRIPT ANSWERS  Relationship to Patient? Other  Representative Name? Epi Diaz  Is the Representative on the appropriate HIPAA document in Epic?  Yes

## 2021-10-28 NOTE — TELEPHONE ENCOUNTER
[facility] Transitions Initial Follow Up Call    Outreach made within 2 business days of discharge: Yes    Patient: Francesca Calles Patient : 1945   MRN: [id number]  Reason for Admission: Acute CVA (cerebrovascular accident) [facility])    Discharge Date: 10/27/21       Spoke with: Wife- Zhane Arnoldner    Discharge department/facility: [facility]    TCM Interactive Patient Contact:  Was patient able to fill all prescriptions: Yes  Was patient instructed to bring all medications to the follow-up visit: Yes  Is patient taking all medications as directed in the discharge summary? Yes  Does patient understand their discharge instructions: Yes  Does patient have questions or concerns that need addressed prior to 7-14 day follow up office visit: yes - Patient wife concern if  should take omeprazole which he already had at home. Patient was given protonix  in the hospital. Pt wife did reach out to Dr. Jenifer Hayes and awaiting to get a response. MA advise patient wife she would hear something from  office soon.     Scheduled appointment with PCP within 7-14 days    Follow Up  Future Appointments   Date Time Provider Yoselin Falk   2021 11:00 AM MD MIGUEL [facility]   2021 11:20 AM JOHNSON Pulliam PT [facility]   2021  9:00 AM Adrian Wynne MD [facility]/MED [facility]       Beatriz Velazco MA

## 2021-11-01 LAB
FUNGUS (MYCOLOGY) CULTURE: NORMAL
FUNGUS STAIN: NORMAL

## 2021-11-02 ENCOUNTER — OFFICE VISIT (OUTPATIENT)
Dept: FAMILY MEDICINE CLINIC | Age: 76
End: 2021-11-02
Payer: MEDICARE

## 2021-11-02 VITALS
SYSTOLIC BLOOD PRESSURE: 126 MMHG | DIASTOLIC BLOOD PRESSURE: 64 MMHG | OXYGEN SATURATION: 96 % | WEIGHT: 151.6 LBS | BODY MASS INDEX: 23.05 KG/M2 | TEMPERATURE: 97.2 F | HEART RATE: 85 BPM

## 2021-11-02 DIAGNOSIS — I25.10 CORONARY ARTERY DISEASE INVOLVING NATIVE CORONARY ARTERY OF NATIVE HEART WITHOUT ANGINA PECTORIS: Primary | ICD-10-CM

## 2021-11-02 DIAGNOSIS — I10 ESSENTIAL HYPERTENSION: ICD-10-CM

## 2021-11-02 DIAGNOSIS — G81.91 RIGHT HEMIPARESIS (HCC): ICD-10-CM

## 2021-11-02 DIAGNOSIS — R57.0 CARDIOGENIC SHOCK (HCC): ICD-10-CM

## 2021-11-02 DIAGNOSIS — K21.00 GASTROESOPHAGEAL REFLUX DISEASE WITH ESOPHAGITIS WITHOUT HEMORRHAGE: ICD-10-CM

## 2021-11-02 DIAGNOSIS — I63.9 ACUTE CVA (CEREBROVASCULAR ACCIDENT) (HCC): ICD-10-CM

## 2021-11-02 PROBLEM — N18.6 ENCOUNTER REGARDING VASCULAR ACCESS FOR DIALYSIS FOR ESRD (HCC): Status: RESOLVED | Noted: 2021-10-01 | Resolved: 2021-11-02

## 2021-11-02 PROBLEM — I20.0 UNSTABLE ANGINA (HCC): Status: RESOLVED | Noted: 2021-09-10 | Resolved: 2021-11-02

## 2021-11-02 PROBLEM — E87.6 HYPOKALEMIA: Status: RESOLVED | Noted: 2021-09-28 | Resolved: 2021-11-02

## 2021-11-02 PROBLEM — E83.51 HYPOCALCEMIA: Status: RESOLVED | Noted: 2021-09-28 | Resolved: 2021-11-02

## 2021-11-02 PROBLEM — J96.01 ACUTE RESPIRATORY FAILURE WITH HYPOXIA (HCC): Status: RESOLVED | Noted: 2021-09-28 | Resolved: 2021-11-02

## 2021-11-02 PROBLEM — Z99.2 ENCOUNTER REGARDING VASCULAR ACCESS FOR DIALYSIS FOR ESRD (HCC): Status: RESOLVED | Noted: 2021-10-01 | Resolved: 2021-11-02

## 2021-11-02 PROBLEM — R47.1 DYSARTHRIA: Status: RESOLVED | Noted: 2021-10-16 | Resolved: 2021-11-02

## 2021-11-02 PROBLEM — E87.20 LACTIC ACIDEMIA: Status: RESOLVED | Noted: 2021-09-28 | Resolved: 2021-11-02

## 2021-11-02 PROBLEM — D62 ACUTE BLOOD LOSS ANEMIA: Status: RESOLVED | Noted: 2021-10-16 | Resolved: 2021-11-02

## 2021-11-02 PROCEDURE — 99496 TRANSJ CARE MGMT HIGH F2F 7D: CPT | Performed by: FAMILY MEDICINE

## 2021-11-02 PROCEDURE — 1111F DSCHRG MED/CURRENT MED MERGE: CPT | Performed by: FAMILY MEDICINE

## 2021-11-02 RX ORDER — PANTOPRAZOLE SODIUM 40 MG/1
40 TABLET, DELAYED RELEASE ORAL
Qty: 30 TABLET | Refills: 5 | Status: SHIPPED
Start: 2021-11-02 | End: 2022-07-06 | Stop reason: SDUPTHER

## 2021-11-02 ASSESSMENT — ENCOUNTER SYMPTOMS
COUGH: 0
CONSTIPATION: 0
SORE THROAT: 0
PHOTOPHOBIA: 0
BACK PAIN: 0
WHEEZING: 0
EYE REDNESS: 0
ABDOMINAL PAIN: 0
TROUBLE SWALLOWING: 0
VOMITING: 0
DIARRHEA: 0
SHORTNESS OF BREATH: 0
BLOOD IN STOOL: 0
SINUS PAIN: 0
CHEST TIGHTNESS: 0

## 2021-11-02 NOTE — PROGRESS NOTES
Post-Discharge Transitional Care Management Services or Hospital Follow Up      Bolivar Guallpa   YOB: 1945    Date of Office Visit:  11/2/2021  Date of Hospital Admission: 10/13/21  Date of Hospital Discharge: 10/27/21  Risk of hospital readmission (high >=14%.  Medium >=10%) :Readmission Risk Score: 23      Care management risk score Rising risk (score 2-5) and Complex Care (Scores >=6): 0     Non face to face  following discharge, date last encounter closed (first attempt may have been earlier): 10/28/2021  3:03 PM    Call initiated 2 business days of discharge: Yes    Patient Active Problem List   Diagnosis    CAD in native artery    Pulmonary nodules    Other hyperlipidemia    Essential hypertension    Gastroesophageal reflux disease with esophagitis without hemorrhage    S/P CABG (coronary artery bypass graft)    [facility]N [facility]) [facility]    Right hemiparesis ([facility]    Acute CVA (cerebrovascular accident) ([facility]       Allergies   Allergen Reactions    Dust Mite Extract        Medications listed as ordered at the time of discharge from [address] [place] [address] Medication Instructions SERENA:    Printed on:11/02/21 2053   Medication Information                      amiodarone (CORDARONE) 200 MG tablet  Take 1 tablet by mouth daily for 7 doses             Ascorbic Acid (VITAMIN C) 500 MG CAPS  Take 500 mg by mouth 2 times daily             aspirin 81 MG EC tablet  Take 81 mg by mouth daily             atorvastatin (LIPITOR) 40 MG tablet  Take 40 mg by mouth daily             bumetanide (BUMEX) 1 MG tablet  Take 1 tablet by mouth daily             carvedilol (COREG) 3.125 MG tablet  Take 1 tablet by mouth 2 times daily (with meals)             clopidogrel (PLAVIX) 75 MG tablet  Take 1 tablet by mouth daily             Coenzyme Q10 (COQ-10) 100 MG CAPS  Take by mouth 3 times daily              famotidine (PEPCID) 20 MG tablet  Take 20 mg by mouth as needed             ferrous sulfate (IRON 325) 325 (65 Fe) MG tablet  Take 1 tablet by mouth daily (with breakfast)             folic acid (FOLVITE) 1 MG tablet  Take 1 tablet by mouth daily for 30 doses             lansoprazole (PREVACID) 30 MG delayed release capsule  Take 1 capsule by mouth daily             Omega-3 Fatty Acids (FISH OIL) 1000 MG CAPS  Take 1,000 mg by mouth daily             pantoprazole (PROTONIX) 40 MG tablet  Take 1 tablet by mouth every morning (before breakfast)             tamsulosin (FLOMAX) 0.4 MG capsule  Take 1 capsule by mouth daily             zinc gluconate 50 MG tablet  Take 50 mg by mouth daily                   Medications marked \"taking\" at this time  Outpatient Medications Marked as Taking for the 11/2/21 encounter (Office Visit) with Aretha Ramirez MD   Medication Sig Dispense Refill    pantoprazole (PROTONIX) 40 MG tablet Take 1 tablet by mouth every morning (before breakfast) 30 tablet 5        Medications patient taking as of now reconciled against medications ordered at time of hospital discharge: Yes    Chief Complaint   Patient presents with   [address] from [address] [place] [address]       History of Present illness - Follow up of Hospital diagnosis(es): Pt has been walking unassisted at least twice a day and using leg peddling device when he is sitting since discharge. Physical therapy to start 11/9, OT deemed unneccessary. No intellectual deficits noted at time of discharge    Inpatient course: Discharge summary reviewed- see chart. Interval history/Current status: Taking meds as ordered. Not discharged on PPI which he had been taking, wanted to resume Omeprazol. We advised no due to interaction with Plavix. offere lanzoprazol which was not working well.  Advised us too pantoprazol in hospital and this did work so prescribed this    A comprehensive review of systems was negative except for: Respiratory: positive for pleurisy/chest pain    Vitals:    11/02/21 1126   BP: 126/64   Site: Left Upper Arm Position: Sitting   Pulse: 85   Temp: 97.2 °F (36.2 °C)   TempSrc: Temporal   SpO2: 96%   Weight: 151 lb 9.6 oz (68.8 kg)     Body mass index is 23.05 kg/m². Wt Readings from Last 3 Encounters:   11/02/21 151 lb 9.6 oz (68.8 kg)   10/22/21 152 lb 7 oz (69.1 kg)   10/13/21 159 lb 9.6 oz (72.4 kg)     BP Readings from Last 3 Encounters:   11/02/21 126/64   10/27/21 (!) 120/59   10/13/21 (!) 118/56        Physical Exam:  See below in OV note    Assessment/Plan:  1. Coronary artery disease involving native coronary artery of native heart without angina pectoris  See below in OV note  - [place] DISCHARGE MEDS RECONCILED W/ CURRENT OUTPATIENT MED LIST    2. Acute CVA (cerebrovascular accident) ([address]  See details below in OV note    3. Cardiogenic shock ([address]  Resolved in hospital with LV assist, intubation, pressor support. Exact LV function remains to be established    4. Essential hypertension  Well controlled on present meds    5. Gastroesophageal reflux disease with esophagitis without hemorrhage  See discussion below. 6. Right hemiparesis ([address]  Largely resolved. Home PT to start 11/9        Medical Decision Making: high complexity    OFFICE NOTE    11/2/21  Name: Alex Griffin  [facility] [place]:[birth year]   Sex:male   Age:76 y.o. SUBJECTIVE  Chief Complaint   Patient presents with    Follow-Up from [address] [place] [address] in for transition to care after CABG complicated by cardiogenic shock requiring LV assist, intubation with induced coma and pressor support. Had CVA which left him with right sided weakness and speech and behavioral complications but largely resolved with therapy at hospital prior to discharge. Review of Systems   Constitutional: Positive for activity change. Negative for appetite change, fever and unexpected weight change. Normal activity returning. Feels weak. No real appetite   HENT: Negative for congestion, ear pain, hearing loss, sinus pain, sore throat and trouble swallowing. Eyes: Negative for photophobia, redness and visual disturbance. Respiratory: Negative for cough, chest tightness, shortness of breath and wheezing. Cardiovascular: Negative for chest pain, palpitations and leg swelling. Chest wall pain only   Gastrointestinal: Negative for abdominal pain, blood in stool, constipation, diarrhea and vomiting. Endocrine: Negative for cold intolerance, polydipsia and polyuria. Genitourinary: Positive for urgency. Negative for difficulty urinating, genital sores and hematuria. Musculoskeletal: Negative for arthralgias, back pain and joint swelling. Improving proximal strength gait and RUE strength   Allergic/Immunologic: Negative for food allergies. Neurological: Positive for speech difficulty and weakness. Negative for dizziness, tremors, seizures, syncope, numbness and headaches. Speech difficulty. Memory, and weakness all improving   Hematological: Negative for adenopathy. Does not bruise/bleed easily. Psychiatric/Behavioral: Positive for agitation, behavioral problems and sleep disturbance. Negative for dysphoric mood and hallucinations. Agitation and behavior issues have resolved. Will try melatonin for sleep issues   All other systems reviewed and are negative.            Current Outpatient Medications:     pantoprazole (PROTONIX) 40 MG tablet, Take 1 tablet by mouth every morning (before breakfast), Disp: 30 tablet, Rfl: 5    lansoprazole (PREVACID) 30 MG delayed release capsule, Take 1 capsule by mouth daily, Disp: 90 capsule, Rfl: 1    amiodarone (CORDARONE) 200 MG tablet, Take 1 tablet by mouth daily for 7 doses, Disp: 7 tablet, Rfl: 0    carvedilol (COREG) 3.125 MG tablet, Take 1 tablet by mouth 2 times daily (with meals), Disp: 60 tablet, Rfl: 0    bumetanide (BUMEX) 1 MG tablet, Take 1 tablet by mouth daily, Disp: 30 tablet, Rfl: 0    ferrous sulfate (IRON 325) 325 (65 Fe) MG tablet, Take 1 tablet by mouth daily (with breakfast), Disp: 30 tablet, Rfl: 0    folic acid (FOLVITE) 1 MG tablet, Take 1 tablet by mouth daily for 30 doses, Disp: 30 tablet, Rfl: 0    tamsulosin (FLOMAX) 0.4 MG capsule, Take 1 capsule by mouth daily, Disp: 30 capsule, Rfl: 0    clopidogrel (PLAVIX) 75 MG tablet, Take 1 tablet by mouth daily, Disp: 30 tablet, Rfl: 0    Omega-3 Fatty Acids (FISH OIL) 1000 MG CAPS, Take 1,000 mg by mouth daily, Disp: , Rfl:     zinc gluconate 50 MG tablet, Take 50 mg by mouth daily, Disp: , Rfl:     Ascorbic Acid (VITAMIN C) 500 MG CAPS, Take 500 mg by mouth 2 times daily, Disp: , Rfl:     famotidine (PEPCID) 20 MG tablet, Take 20 mg by mouth as needed, Disp: , Rfl:     aspirin 81 MG EC tablet, Take 81 mg by mouth daily, Disp: , Rfl:     atorvastatin (LIPITOR) 40 MG tablet, Take 40 mg by mouth daily, Disp: , Rfl:     Coenzyme Q10 (COQ-10) 100 MG CAPS, Take by mouth 3 times daily , Disp: , Rfl:   Allergies   Allergen Reactions    Dust Mite Extract        Past Medical History:   Diagnosis Date    CAD (coronary artery disease)     Encounter regarding vascular access for dialysis for ESRD ([facility] 10/1/2021    Hiatal hernia     Hyperlipidemia     Hypertension      Past Surgical History:   Procedure Laterality Date    BRONCHOSCOPY N/A 9/30/2021    BRONCHOSCOPY DIAGNOSTIC OR CELL [address] ONLY performed by Xochilt Gottlieb MD at [address] [facility]  9/30/2021    BRONCHOSCOPY CRYOTHERAPY/LASER performed by Xochilt Gottlieb MD at [address] N/A 9/27/2021    CABG CORONARY ARTERY BYPASS  X 3 EVH, EMMA performed by Lorena Kang MD at [address] [facility]     No family history on file. Social History     Tobacco History     Smoking Status  Never Smoker    Smokeless Tobacco Use  Never Used          Alcohol History     Alcohol Use Status  Yes Comment  occ.           Drug Use     Drug Use Status  Never          Sexual Activity     Sexually Active  Yes Partners  Female OBJECTIVE  Vitals:    11/02/21 1126   BP: 126/64   Site: Left Upper Arm   Position: Sitting   Pulse: 85   Temp: 97.2 °F (36.2 °C)   TempSrc: Temporal   SpO2: 96%   Weight: 151 lb 9.6 oz (68.8 kg)        Body mass index is 23.05 kg/m². Orders Placed This Encounter   Procedures    [place] DISCHARGE MEDS RECONCILED W/ CURRENT OUTPATIENT MED LIST        EXAM   Physical Exam  Vitals and nursing note reviewed. Constitutional:       Appearance: Normal appearance. He is normal weight. HENT:      Right Ear: Tympanic membrane and external ear normal.      Left Ear: Tympanic membrane and external ear normal.      Nose: Nose normal.      Mouth/Throat:      Pharynx: Oropharynx is clear. No posterior oropharyngeal erythema. Eyes:      General: No scleral icterus. Conjunctiva/sclera: Conjunctivae normal.      Pupils: Pupils are equal, round, and reactive to light. Neck:      Vascular: No carotid bruit. Cardiovascular:      Rate and Rhythm: Normal rate and regular rhythm. Pulses: Normal pulses. Heart sounds: No murmur heard. Pulmonary:      Effort: Pulmonary effort is normal.      Breath sounds: Normal breath sounds. No wheezing or rales. Comments: thorocotomy incision healing nicely  Abdominal:      General: Bowel sounds are normal.      Palpations: There is no mass. Tenderness: There is no abdominal tenderness. There is no guarding. Hernia: No hernia is present. Musculoskeletal:         General: No swelling or tenderness. Cervical back: No rigidity or tenderness. Right lower leg: No edema. Left lower leg: No edema. Comments: No sign of DVT   Lymphadenopathy:      Cervical: No cervical adenopathy. Skin:     Coloration: Skin is not jaundiced. Findings: No bruising or rash. Neurological:      General: No focal deficit present. Mental Status: He is alert and oriented to person, place, and time. Sensory: No sensory deficit.       Motor: Weakness present. Coordination: Coordination normal.      Gait: Gait normal.      Comments: Some proximal weakness, gait pretty normal. Tires easily   Psychiatric:         Mood and Affect: Mood normal.         Behavior: Behavior normal.           Michele Mosley was seen today for follow-up from hospital.    Diagnoses and all orders for this visit:    Coronary artery disease involving native coronary artery of native heart without angina pectoris  -     [place] DISCHARGE MEDS RECONCILED W/ CURRENT OUTPATIENT MED LIST  Underwent successful CABG. LV function improving  Acute CVA (cerebrovascular accident) ([address]  With speech difficulties and right sided weakness. Virtually resolved, no intellectual complications that I can see  Cardiogenic shock ([address]  Resolved with LV assist, intubation with induced coma and pressor support  Essential hypertension  Well controlled on present meds. Gastroesophageal reflux disease with esophagitis without hemorrhage  See above discussion. Will start on pantoprazol. omeprazol vetoed due to concurrent use of plavix  Right hemiparesis ([address]  Virtually resolved. Will have PT for a while  Other orders  -     pantoprazole (PROTONIX) 40 MG tablet; Take 1 tablet by mouth every morning (before breakfast)          Return in 1 month (on 12/2/2021).     Electronically signed by Alfredo Goldstein MD on 11/2/21 at 8:42 PM EDT

## 2021-11-09 ENCOUNTER — EVALUATION (OUTPATIENT)
Dept: PHYSICAL THERAPY | Age: 76
End: 2021-11-09
Payer: MEDICARE

## 2021-11-09 DIAGNOSIS — I63.9 CEREBROVASCULAR ACCIDENT (CVA), UNSPECIFIED MECHANISM (HCC): Primary | ICD-10-CM

## 2021-11-09 PROCEDURE — 97161 PT EVAL LOW COMPLEX 20 MIN: CPT | Performed by: PHYSICAL THERAPIST

## 2021-11-09 NOTE — PROGRESS NOTES
Interpreting      [address]                               Physician         Physician Cardiology                                                 Jose Blakely MD                                Any Other  Procedure Type of Study   TTE procedure:Echo Limited Study. Procedure Date Date: 10/12/2021 Start: 11:35 AM Study Location: Portable Technical Quality: Adequate visualization Indications:LV function. Patient Status: Routine Height: 68 inches Weight: 158 pounds BSA: 1.85 m^2 BMI: 24.02 kg/m^2 BP: 127/61 mmHg  Findings   Left Ventricle  Left ventricular size is grossly normal.  Ejection fraction is visually estimated at 55%. Right Ventricle  Normal right ventricular size and function. Left Atrium  The left atrium is mildly dilated. Right Atrium  Normal right atrium size. Mitral Valve  Mild mitral regurgitation is present. Tricuspid Valve  The tricuspid valve was not well visualized. Aortic Valve  The aortic valve appears mildly sclerotic. Pulmonic Valve  The pulmonic valve was not well visualized. Aorta  Aorta was not clearly visualized. Conclusions   Summary  Left ventricular size is grossly normal.  Ejection fraction is visually estimated at 55%. Normal right ventricular size and function.    Signature   ----------------------------------------------------------------  Electronically signed by Dang Lopez MD(Interpreting  physician) on 10/12/2021 06:51 PM  ----------------------------------------------------------------  M-Mode/2D Measurements & Calculations    LV Volume Diastolic: 53.8 ml   LV Volume Systolic: 42 ml   LV EDV/LV EDV Index: 90.4 ml/49 ml/m^2LV ESV/LV ESV Index: 42 ml/23ml/   m^2   EF Calculated: 53.5 %    LV Length: 8.9 cm  Doppler Measurements & Calculations    AV Peak Velocity: 1.3 m/s   AV Peak Gradient: 6.74 mmHg   AV Mean Velocity: 0.93 m/s   AV Mean Gradient: 3.8 mmHg   AV VTI: 22.5 cm [email or web address] [id number]    XR CHEST PORTABLE    Result Date: 10/13/2021  EXAMINATION: ONE XRAY VIEW OF THE CHEST 10/13/2021 9:06 am COMPARISON: October 9-12 HISTORY: ORDERING SYSTEM PROVIDED HISTORY: s/p cabg TECHNOLOGIST PROVIDED HISTORY: Reason for exam:->s/p cabg What reading provider will be dictating this exam?->CRC FINDINGS: Persistent increased density in the left base which appears to be a combination of mild to moderate left-sided pleural effusion with compression atelectasis in the left lower lobe. Quantification of pleural effusion can be achieved with right and left lateral decubitus views of the chest, if needed for clinical management. Some increased densities seen in the right infra hilar region medially which can represent also infiltration or atelectasis in the right lower lobe. The no conspicuous right-sided pleural effusions present. Heart is normal size. Patient had previous mid sternotomy. There is no perihilar vascular congestion. There is no pneumothorax on the right on the left. Left-sided PICC line tip in the SVC. No significant changes since October 12. XR CHEST PORTABLE    Result Date: 10/12/2021  EXAMINATION: ONE XRAY VIEW OF THE CHEST 10/12/2021 10:36 am COMPARISON: 10/11/2021 HISTORY: ORDERING SYSTEM PROVIDED HISTORY: s/p cabg TECHNOLOGIST PROVIDED HISTORY: Reason for exam:->s/p cabg What reading provider will be dictating this exam?->CRC FINDINGS: Left lower lobe infiltrate and effusion are not changed. Right lung is clear. Heart size is normal.  Tip of a PICC line is unchanged.      No interval change     XR CHEST PORTABLE    Result Date: 10/11/2021  EXAMINATION: ONE XRAY VIEW OF THE CHEST 10/11/2021 7:59 am COMPARISON: 10 October 2020 HISTORY: ORDERING SYSTEM PROVIDED HISTORY: s/p cabg TECHNOLOGIST PROVIDED HISTORY: Reason for exam:->s/p cabg What reading provider will be dictating this exam?->CRC FINDINGS: Mildly worsening opacity at the left base may represent atelectasis and or infiltrate. More stable atelectasis and/or infiltrate on the right. Stable left-sided PICC line and postoperative changes. Somewhat worsening atelectasis and/or infiltrate on the left, stable on the right. XR CHEST PORTABLE    Result Date: 10/10/2021  EXAMINATION: ONE XRAY VIEW OF THE CHEST 10/10/2021 6:51 am COMPARISON: 10/08/2021 and 10/09/2021 HISTORY: ORDERING SYSTEM PROVIDED HISTORY: s/p cabg TECHNOLOGIST PROVIDED HISTORY: Reason for exam:->s/p cabg What reading provider will be dictating this exam?->CRC FINDINGS: Postoperative sternotomy changes are noted. The cardiac silhouette is within normals. There is a persistent chest tube seen within the right lung base. There is minimal right lung base atelectasis. There is minimal left lung base atelectasis. The upper lobes are clear. There is a left-sided PICC line. 1. Stable appearance of the chest stable right lung base chest tube. There is no right or left pneumothorax. 2. Minimal right and left lung base atelectasis. FL MODIFIED BARIUM SWALLOW W VIDEO    Result Date: 10/20/2021  EXAMINATION: MODIFIED BARIUM SWALLOW WAS PERFORMED IN CONJUNCTION WITH SPEECH PATHOLOGY SERVICES TECHNIQUE: Fluoroscopic evaluation of the swallowing mechanism was performed using cineradiography with multiple consistency of barium product in conjunction with speech pathology services. FLUOROSCOPY DOSE AND TYPE OR TIME AND EXPOSURES: Fluoroscopy time is 0.9 minute Dose: 7 mGy Total of 9 fluoroscopic series of the hypopharynx. COMPARISON: None HISTORY: ORDERING SYSTEM PROVIDED HISTORY: evaluate dysphagia TECHNOLOGIST PROVIDED HISTORY: Reason for exam:->evaluate dysphagia What reading provider will be dictating this exam?->CRC FINDINGS: Oral phase of swallowing was grossly within normal limits.  No evidence of laryngeal penetration or aspiration. Swallowing mechanism grossly within normal limits without evidence of aspiration. Please see separate speech pathology report for full discussion of findings and recommendations. Fluoroscopy modified barium swallow with video    Result Date: 10/11/2021  EXAMINATION: MODIFIED BARIUM SWALLOW WAS PERFORMED IN CONJUNCTION WITH SPEECH PATHOLOGY SERVICES TECHNIQUE: Fluoroscopic evaluation of the swallowing mechanism was performed using cineradiography with multiple consistency of barium product in conjunction with speech pathology services. FLUOROSCOPY DOSE AND TYPE OR TIME AND EXPOSURES: Fluoroscopy time is 2.2 6 minutes Dose: 324.4 cGy*cm^2 COMPARISON: None HISTORY: ORDERING SYSTEM PROVIDED HISTORY: on pureed diet; ? advance TECHNOLOGIST PROVIDED HISTORY: Reason for exam:->on pureed diet; ? advance FINDINGS: Oral phase of swallowing was grossly within normal limits. Transient penetration with thin and nectar consistency liquids. No aspiration     Transient penetration with thin and nectar consistency liquids. No aspiration Please see separate speech pathology report for full discussion of findings and recommendations.        Past Medical History:  Past Medical History:   Diagnosis Date    CAD (coronary artery disease)     Encounter regarding vascular access for dialysis for ESRD ([facility] [address] 10/1/2021    Hiatal hernia     Hyperlipidemia     Hypertension      Past Surgical History:   Procedure Laterality Date    BRONCHOSCOPY N/A 9/30/2021    BRONCHOSCOPY DIAGNOSTIC OR CELL [address] ONLY performed by Luciano Castañeda MD at [address] [facility]  9/30/2021    BRONCHOSCOPY CRYOTHERAPY/LASER performed by Luciano Castañeda MD at [address] N/A 9/27/2021    CABG CORONARY ARTERY BYPASS  X 3 EVH, EMMA performed by Bharati Crespo MD at [facility]       Medications:   Current Outpatient Medications   Medication Sig Dispense Refill    pantoprazole (PROTONIX) 40 MG tablet Take 1 tablet by mouth every morning (before breakfast) 30 tablet 5    lansoprazole (PREVACID) 30 MG delayed release capsule Take 1 capsule by mouth daily 90 capsule 1    amiodarone (CORDARONE) 200 MG tablet Take 1 tablet by mouth daily for 7 doses 7 tablet 0    carvedilol (COREG) 3.125 MG tablet Take 1 tablet by mouth 2 times daily (with meals) 60 tablet 0    bumetanide (BUMEX) 1 MG tablet Take 1 tablet by mouth daily 30 tablet 0    ferrous sulfate (IRON 325) 325 (65 Fe) MG tablet Take 1 tablet by mouth daily (with breakfast) 30 tablet 0    folic acid (FOLVITE) 1 MG tablet Take 1 tablet by mouth daily for 30 doses 30 tablet 0    tamsulosin (FLOMAX) 0.4 MG capsule Take 1 capsule by mouth daily 30 capsule 0    clopidogrel (PLAVIX) 75 MG tablet Take 1 tablet by mouth daily 30 tablet 0    Omega-3 Fatty Acids (FISH OIL) 1000 MG CAPS Take 1,000 mg by mouth daily      zinc gluconate 50 MG tablet Take 50 mg by mouth daily      Ascorbic Acid (VITAMIN C) 500 MG CAPS Take 500 mg by mouth 2 times daily      famotidine (PEPCID) 20 MG tablet Take 20 mg by mouth as needed      aspirin 81 MG EC tablet Take 81 mg by mouth daily      atorvastatin (LIPITOR) 40 MG tablet Take 40 mg by mouth daily      Coenzyme Q10 (COQ-10) 100 MG CAPS Take by mouth 3 times daily        No current facility-administered medications for this visit. Social history: Patient lives with spouse in a mobile home with 5 steps to enter with railing. Equipment owned: shower chair    Reported limitations: walking longer distances. Difficulty shaving due to weakness    Occupation: retired.      Exercise regimen: walking, cycling    Hobbies: woodworking,     Patient Goals: walk further,     Contraindications/Precautions: recent surgery, falls risk     OBJECTIVE:      Observations: well nourished female         Gait: short step length, narrow    Functional Strength:   Able to toe walk, heel walk, and squat with some Therapeutic Activity  [x] Neuromuscular Re-education   [x] Gait Training  [x] Balance Training  [x] Aerobic conditioning  [x] Manual Therapy  [x] Massage/Fascial release   [] Work/Sport specific activities    [] Pain Neuroscience [x] Cold/hotpack  [] Vasocompression  [x] Electrical Stimulation  [] Lumbar/Cervical Traction  [x] Ultrasound   [] Iontophoresis: 4 mg/mL Dexamethasone Sodium Phosphate 40-80 mAmin        [x] Instruction in HEP      []  Medication allergies reviewed for use of Dexamethasone Sodium Phosphate 4mg/ml  with iontophoresis treatments. Patient is not allergic. The following CPT codes are likely to be used in the care of this patient: 455 1011 PT Evaluation: Low Complexity   36839 PT Re-Evaluation   [address] [facility] Neuromuscular Re-Education   57625 Therapeutic Activities   75308 Manual Therapy   19605 Gait Training    Electrical Stimulation  49745 US      Suggested Professional Referral: [x] No  [] Yes:     Patient Education:  [x] Plans/Goals, Risks/Benefits discussed  [x] Home exercise program  Method of Education: [x] Verbal  [x] Demo  [x] Written  Comprehension of Education:  [x] Verbalizes understanding. [x] Demonstrates understanding. [] Needs Review. [] Demonstrates/verbalizes understanding of HEP/Ed previously given. Frequency:  2 days per week for 6 weeks    Patient understands diagnosis/prognosis and consents to treatment, plan and goals: [x] Yes    [] No     Thank you for the opportunity to work with your patient. If you have questions or comments, please contact me at numbers listed above. Electronically signed by: Rashad Kothari, PT PT , DPT PT [id number]         Please sign Physician's Certification and return to: [address] [place] [address] PT  [address] [place] [address]  Dept: [phone]  Dept Fax: [phone] Certification / Comments     Frequency/Duration 2 days per week for 6 weeks. Certification period from 11/9/2021  to 12/24/2021. I have reviewed the Plan of Care established for skilled therapy services and certify that the services are required and that they will be provided while the patient is under my care.     Physician's Comments/Revisions:               Physician's Printed Name:                                           [de-identified] Signature:                                                               Date:

## 2021-11-11 ENCOUNTER — TREATMENT (OUTPATIENT)
Dept: PHYSICAL THERAPY | Age: 76
End: 2021-11-11
Payer: MEDICARE

## 2021-11-11 DIAGNOSIS — I63.9 CEREBROVASCULAR ACCIDENT (CVA), UNSPECIFIED MECHANISM (HCC): Primary | ICD-10-CM

## 2021-11-11 PROCEDURE — 97530 THERAPEUTIC ACTIVITIES: CPT | Performed by: PHYSICAL THERAPIST

## 2021-11-11 PROCEDURE — 97112 NEUROMUSCULAR REEDUCATION: CPT | Performed by: PHYSICAL THERAPIST

## 2021-11-11 NOTE — PROGRESS NOTES
[address]   Phone: [phone]   Fax: [phone]      Physical Therapy Daily Treatment Note    Date: 2021  Patient Name: Olaf Haynes  : 1945   MRN: [id number]  DOInjury: 6 weeks   DOSx: 2021  - CABG   Referring Provider: Yong Worrell MD  [address] [facility],  [address] [place] [address]     Medical Diagnosis: ICD10 I63.9   CVA     Outcome Measure:  PSFS balance 8, upper body 3   DGI 18/24     S: Pt reports no pain. Reports walking 4/10 of a mile 2x a day. O:  Time 1421- 1455     Visit 2 Repeat outcome measure at mid point and end. Pain Denies /10     ROM      Modalities            Exercise      Bike      6 MWT  875 ft = 266.7meters      Squats      Calf Raises            Bicep curls  2 x 10  2 positions ; 3#                              Marching      Alt. Sidekicks            Sit/Stands 1 x 10            Gait training       NMR Balance activities to aid in safe community and home ambulation    Marching Gait      Sidestepping      Retrowalk      Heel to toe      March on BOSU      Standing on foam  1 min     Standing EC 30 sec     A:  Tolerated well. Pt completed 6MWT. Pt also completed DGI working on various balance activities. Pt scored 18/24 on DGI which is predictive of fall risk in the elderly.     P: Continue with rehab plan  Valerie Marcus, PT  DPT, PT [id number]    Treatment Charges: Mins Units   Initial Evaluation     Re-Evaluation     Ther Exercise         TE 4    Manual Therapy     MT     Ther Activities        TA 10 1   Gait Training          GT     Neuro Re-education NR 20 1   Modalities     Non-Billable Service Time     Other     Total Time/Units 34 2

## 2021-11-16 ENCOUNTER — TREATMENT (OUTPATIENT)
Dept: PHYSICAL THERAPY | Age: 76
End: 2021-11-16
Payer: MEDICARE

## 2021-11-16 DIAGNOSIS — I63.9 CEREBROVASCULAR ACCIDENT (CVA), UNSPECIFIED MECHANISM (HCC): Primary | ICD-10-CM

## 2021-11-16 PROCEDURE — 97530 THERAPEUTIC ACTIVITIES: CPT

## 2021-11-16 PROCEDURE — 97112 NEUROMUSCULAR REEDUCATION: CPT

## 2021-11-16 PROCEDURE — 97110 THERAPEUTIC EXERCISES: CPT

## 2021-11-16 NOTE — PROGRESS NOTES
Physician Progress Note      PATIENT:               Doroteo Astudillo  CSN #:                  [id number]  :                       1945  ADMIT DATE:       2021 5:25 AM  DISCH DATE:        10/13/2021 3:46 PM  RESPONDING  PROVIDER #:        Carlos Okeefe MD          QUERY TEXT:    Noted documentation that postoperatively patient developed respiratory failure   for which he was reintubated. Please document in progress notes and   discharge summary if you are evaluating/treating any of the following: The medical record reflects the following:  Risk Factors: Coronary artery bypass grafting, Extensive right coronary artery   endarterectomy. Clinical Indicators: Renal notes \"Postoperatively she developed persistent   hypotension, lactic acidosis and respiratory failure for which he was   reintubated. \" Pulmonology notes \"Acute Respiratory failure\". [address] \"2. Acute Hypoxic Respiratory Failure - Extubated DOS, tolerating 2L NC   post extubation, acute hypoxia requiring NRB, NIV and reintubation  - CXR   with new right sided infiltrate- ?aspirtaion, Empiric Zosyn started\". ?  Treatment: reintubated, ICU monitoring, vitals, cxr    Thank you,  Harper Ramirez, RN, BSN, CCDS, Clinical Documentation Improvement  [phone]  Options provided:  -- Respiratory failure due to chronic underlying condition and is not a   complication of the procedure  -- Respiratory failure is not a complication of the procedure but was due to,   Please specify.   -- Postoperative Respiratory failure is a complication of the procedure  -- Postoperative Respiratory failure ruled out after study  -- Other - I will add my own diagnosis  -- Disagree - Not applicable / Not valid  -- Disagree - Clinically unable to determine / Unknown  -- Refer to Clinical Documentation Reviewer    PROVIDER RESPONSE TEXT:    Respiratory failure is not a complication of the procedure but was due to   cardiogenic shock    Query created by: James Griffith on 11/16/2021 2:32 PM      Electronically signed by:  Tiffanie Taylor MD 11/16/2021 5:42 PM

## 2021-11-16 NOTE — PROGRESS NOTES
[facility] [facility]   Phone: [phone]   Fax: [phone]      Physical Therapy Daily Treatment Note    Date: 2021  Patient Name: Main Matute  : 1945   MRN: [id number]  DOInjury: 6 weeks   DOSx: 2021  - CABG   Referring Provider: No referring provider defined for this encounter. Medical Diagnosis: ICD10 I63.9   CVA     Outcome Measure:  PSFS balance 8, upper body 3   DGI 18     S: Pt reports no pain. O:  Time 1118- 1156     Visit 3 Repeat outcome measure at mid point and end. Pain Denies /10     ROM      Modalities            Exercise      Bike Pt not interested in Bike at therapy. Does at home      6 MWT       Squats      Calf Raises      LAQ 2 x 10     Bicep curls  2 x 10  2 positions ; 4#                              Marching 2 x 10 seated    Alt. Sidekicks      Hip add  X 20 x 2-3s hold Ball     Sit/Stands 2 x 10      Hip abd 2 x 10 OTB    Gait training       NMR Balance activities to aid in safe community and home ambulation    Marching Gait 2 x 20 feet      Sidestepping 2 X 20     Retrowalk 2 x 20     Heel to toe 2 x 10           March on BOSU      Standing on foam      Standing EC     A:  Tolerated well. Pt required rest breaks at times, secondary to fatigue.  Increased wt for bicep curls this session    P: Continue with rehab plan  Dwaine Hernandez, PTA  [id number]    Treatment Charges: Mins Units   Initial Evaluation     Re-Evaluation     Ther Exercise         TE 15 1   Manual Therapy     MT     Ther Activities        TA 8 1   Gait Training          GT     Neuro Re-education NR 15 1   Modalities     Non-Billable Service Time     Other     Total Time/Units 38 3

## 2021-11-18 ENCOUNTER — TREATMENT (OUTPATIENT)
Dept: PHYSICAL THERAPY | Age: 76
End: 2021-11-18
Payer: MEDICARE

## 2021-11-18 DIAGNOSIS — I63.9 CEREBROVASCULAR ACCIDENT (CVA), UNSPECIFIED MECHANISM (HCC): Primary | ICD-10-CM

## 2021-11-18 PROCEDURE — 97110 THERAPEUTIC EXERCISES: CPT

## 2021-11-18 PROCEDURE — 97530 THERAPEUTIC ACTIVITIES: CPT

## 2021-11-18 PROCEDURE — 97112 NEUROMUSCULAR REEDUCATION: CPT

## 2021-11-18 NOTE — PROGRESS NOTES
[facility]   Phone: [phone]   Fax: [phone]      Physical Therapy Daily Treatment Note    Date: 2021  Patient Name: Td Coy  : 1945   MRN: [id number]  DOInjury: 6 weeks   DOSx: 2021  - CABG   Referring Provider: Kimi Gardner MD  [address],  [address] [place] [address]     Medical Diagnosis: ICD10 I63.9   CVA     Outcome Measure:  PSFS balance 8, upper body 3   DGI      S: Pt reports no pain. O:  Time [id number]     Visit 4 Repeat outcome measure at mid point and end. Pain Denies /10     ROM      Modalities            Exercise      Bike Pt not interested in Bike at therapy. Does at home      6 MWT       Squats      Calf Raises      LAQ 2 x 10 3 1/2#    Bicep curls  2 x 10  2 positions ; 4#                              Marching 2 x 10 Seated with 3 1/2#    Alt. Sidekicks      Hip add  X 20 x 2-3s hold Ball     Sit/Stands 2 x 10      Hip abd 2 x 10 GTB    Hip ext 2 x 10     Gait training       NMR Balance activities to aid in safe community and home ambulation    Marching Gait     Sidestepping     Retrowalk     Heel to toe 2 x 10     Resisted walk   Fwd  Back  lat X 5 each Cable system 40#=10 #    March on foam 3 x 30s      Standing on foam      Standing EC     A:  Tolerated well. Pt required rest breaks at times, secondary to fatigue. Increased wt for bicep curls this session    P: Continue with rehab plan. Repeat 6MWT next session.    Lucas Oar, PTA  [id number]    Treatment Charges: Mins Units   Initial Evaluation     Re-Evaluation     Ther Exercise         TE 16 1   Manual Therapy     MT     Ther Activities        TA 8 1   Gait Training          GT     Neuro Re-education NR 15 1   Modalities     Non-Billable Service Time     Other     Total Time/Units 39 3

## 2021-11-19 ENCOUNTER — OFFICE VISIT (OUTPATIENT)
Dept: CARDIOLOGY CLINIC | Age: 76
End: 2021-11-19
Payer: MEDICARE

## 2021-11-19 VITALS
DIASTOLIC BLOOD PRESSURE: 62 MMHG | SYSTOLIC BLOOD PRESSURE: 116 MMHG | HEIGHT: 68 IN | BODY MASS INDEX: 23.69 KG/M2 | WEIGHT: 156.3 LBS | RESPIRATION RATE: 16 BRPM | HEART RATE: 75 BPM

## 2021-11-19 DIAGNOSIS — I25.10 CAD IN NATIVE ARTERY: Primary | ICD-10-CM

## 2021-11-19 DIAGNOSIS — E78.49 OTHER HYPERLIPIDEMIA: ICD-10-CM

## 2021-11-19 DIAGNOSIS — I10 ESSENTIAL HYPERTENSION: ICD-10-CM

## 2021-11-19 PROCEDURE — G8484 FLU IMMUNIZE NO ADMIN: HCPCS | Performed by: INTERNAL MEDICINE

## 2021-11-19 PROCEDURE — 4040F PNEUMOC VAC/ADMIN/RCVD: CPT | Performed by: INTERNAL MEDICINE

## 2021-11-19 PROCEDURE — G8420 CALC BMI NORM PARAMETERS: HCPCS | Performed by: INTERNAL MEDICINE

## 2021-11-19 PROCEDURE — 1036F TOBACCO NON-USER: CPT | Performed by: INTERNAL MEDICINE

## 2021-11-19 PROCEDURE — 1111F DSCHRG MED/CURRENT MED MERGE: CPT | Performed by: INTERNAL MEDICINE

## 2021-11-19 PROCEDURE — 93000 ELECTROCARDIOGRAM COMPLETE: CPT | Performed by: INTERNAL MEDICINE

## 2021-11-19 PROCEDURE — 99214 OFFICE O/P EST MOD 30 MIN: CPT | Performed by: INTERNAL MEDICINE

## 2021-11-19 PROCEDURE — 3017F COLORECTAL CA SCREEN DOC REV: CPT | Performed by: INTERNAL MEDICINE

## 2021-11-19 PROCEDURE — G8427 DOCREV CUR MEDS BY ELIG CLIN: HCPCS | Performed by: INTERNAL MEDICINE

## 2021-11-19 PROCEDURE — 1123F ACP DISCUSS/DSCN MKR DOCD: CPT | Performed by: INTERNAL MEDICINE

## 2021-11-19 RX ORDER — BUMETANIDE 1 MG/1
1 TABLET ORAL DAILY PRN
Qty: 30 TABLET | Refills: 0 | Status: SHIPPED
Start: 2021-11-19 | End: 2021-12-03 | Stop reason: SDUPTHER

## 2021-11-19 NOTE — PROGRESS NOTES
CHIEF COMPLAINT: CAD/Chest pain    HISTORY OF PRESENT ILLNESS: Patient is a 78 y.o. male seen at the request of Clari Cross MD.      Patient with long standing known CAD reported by 2007 cath at which time he had a  LAD. Was active and without issues for years. Seen in follow up after CABG. Complicated course with CVA. No CP or SOB currently, but having frequent belching.      Past Medical History:   Diagnosis Date    CAD (coronary artery disease)     Encounter regarding vascular access for dialysis for ESRD ([facility] 10/1/2021    Hiatal hernia     Hyperlipidemia     Hypertension        Patient Active Problem List   Diagnosis    CAD in native artery    Pulmonary nodules    Other hyperlipidemia    Essential hypertension    Gastroesophageal reflux disease with esophagitis without hemorrhage    S/P CABG (coronary artery bypass graft)    [facility]) [facility]    Right hemiparesis (HCC)    Acute CVA (cerebrovascular accident) ([facility]       Allergies   Allergen Reactions    Dust Mite Extract        Current Outpatient Medications   Medication Sig Dispense Refill    pantoprazole (PROTONIX) 40 MG tablet Take 1 tablet by mouth every morning (before breakfast) 30 tablet 5    carvedilol (COREG) 3.125 MG tablet Take 1 tablet by mouth 2 times daily (with meals) 60 tablet 0    bumetanide (BUMEX) 1 MG tablet Take 1 tablet by mouth daily 30 tablet 0    ferrous sulfate (IRON 325) 325 (65 Fe) MG tablet Take 1 tablet by mouth daily (with breakfast) 30 tablet 0    folic acid (FOLVITE) 1 MG tablet Take 1 tablet by mouth daily for 30 doses 30 tablet 0    tamsulosin (FLOMAX) 0.4 MG capsule Take 1 capsule by mouth daily 30 capsule 0    clopidogrel (PLAVIX) 75 MG tablet Take 1 tablet by mouth daily 30 tablet 0    zinc gluconate 50 MG tablet Take 50 mg by mouth daily      Ascorbic Acid (VITAMIN C) 500 MG CAPS Take 500 mg by mouth 2 times daily      famotidine (PEPCID) 20 MG tablet Take 20 mg by mouth as needed      aspirin 81 MG EC tablet Take 81 mg by mouth daily      atorvastatin (LIPITOR) 40 MG tablet Take 40 mg by mouth daily      Coenzyme Q10 (COQ-10) 100 MG CAPS Take by mouth 3 times daily       lansoprazole (PREVACID) 30 MG delayed release capsule Take 1 capsule by mouth daily 90 capsule 1    amiodarone (CORDARONE) 200 MG tablet Take 1 tablet by mouth daily for 7 doses 7 tablet 0    Omega-3 Fatty Acids (FISH OIL) 1000 MG CAPS Take 1,000 mg by mouth daily       No current facility-administered medications for this visit. Social History     Socioeconomic History    Marital status:      Spouse name: Not on file    Number of children: Not on file    Years of education: Not on file    Highest education level: Not on file   Occupational History    Not on file   Tobacco Use    Smoking status: Never Smoker    Smokeless tobacco: Never Used   Vaping Use    Vaping Use: Never used   Substance and Sexual Activity    Alcohol use: Yes     Comment: occ.  Drug use: Never    Sexual activity: Yes     Partners: Female   Other Topics Concern    Not on file   Social History Narrative    Not on file     Social Determinants of Health     Financial Resource Strain:     Difficulty of Paying Living Expenses: Not on file   Food Insecurity:     Worried About Running Out of Food in the Last Year: Not on file    James of Food in the Last Year: Not on file   Transportation Needs:     Lack of Transportation (Medical): Not on file    Lack of Transportation (Non-Medical):  Not on file   Physical Activity:     Days of Exercise per Week: Not on file    Minutes of Exercise per Session: Not on file   Stress:     Feeling of Stress : Not on file   Social Connections:     Frequency of Communication with Friends and Family: Not on file    Frequency of Social Gatherings with Friends and Family: Not on file    Attends Latter-day Services: Not on file    Active Member of Clubs or Organizations: Not on file    Attends Club or Organization Meetings: Not on file    Marital Status: Not on file   Intimate Partner Violence:     Fear of Current or Ex-Partner: Not on file    Emotionally Abused: Not on file    Physically Abused: Not on file    Sexually Abused: Not on file   Housing Stability:     Unable to Pay for Housing in the Last Year: Not on file    Number of Mal[place] in the Last Year: Not on file    Unstable Housing in the Last Year: Not on file       History reviewed. No pertinent family history. Review of Systems:  Heart: as above   Lungs: as above   Eyes: denies changes in vision or discharge. Ears: denies changes in hearing or pain. Nose: denies epistaxis or masses   Throat: denies sore throat or trouble swallowing. Neuro: denies numbness, tingling, tremors. Skin: denies rashes or itching. : denies hematuria, dysuria   GI: denies vomiting, diarrhea   Psych: denies mood changed, anxiety, depression. All other systems negative. Physical Exam   /62   Pulse 75   Resp 16   Ht 5' 8\" (1.727 m)   Wt 156 lb 4.8 oz (70.9 kg)   BMI 23.77 kg/m²   Constitutional: Oriented to person, place, and time. Well-developed and well-nourished. No distress. Head: Normocephalic and atraumatic. Eyes: EOM are normal. Pupils are equal, round, and reactive to light. Neck: Normal range of motion. Neck supple. No hepatojugular reflux and no JVD present. Carotid bruit is not present. No tracheal deviation present. No thyromegaly present. Cardiovascular: Normal rate, regular rhythm, normal heart sounds and intact distal pulses. Exam reveals no gallop and no friction rub. No murmur heard. Pulmonary/Chest: Effort normal and breath sounds normal. No respiratory distress. No wheezes. No rales. No tenderness. Abdominal: Soft. Bowel sounds are normal. No distension and no mass. No tenderness. No rebound and no guarding. Musculoskeletal: Normal range of motion. No edema and no tenderness. Lymphadenopathy:   No cervical adenopathy. No groin adenopathy. Neurological: Alert and oriented to person, place, and time. Skin: Skin is warm and dry. No rash noted. Not diaphoretic. No erythema. Psychiatric: Normal mood and affect. Behavior is normal.     EKG personally reviewed 11/19/21:  normal sinus rhythm, nonspecific ST and T waves changes, LBBB. Stress 4/28/2021 with partially reversible anteroapical (fixed) and septal (reversible) defect, LVEF 36% in [address]. TTE: 9/23/2021 (Dr. Sujit Juarez):   Left ventricular internal dimensions were normal in diastole and systole.   Abnormal (paradoxical) motion consistent with left bundle branch block.   Normal left ventricular ejection fraction.   Mild thickening of the mitral valve leaflets with reduced leaflet excursion.   Mild centrally directed mitral regurgitation.   Mild tricuspid regurgitation. LVEF 55%     Echo Summary 10/5/2021:   Left ventricle size is normal.   Ejection fraction is visually estimated at 40+/-5%. LV apex is dyskinetic and mid to distal anterior and anteroseptal walls are severely hypokinetic. Normal left ventricle wall thickness. Stage I diastolic dysfunction. No LV mural thrombus. Normal right ventricular size and function. No hemodynamically significant aortic stenosis is present. Unable to estimate PA systolic pressure. No evidence for hemodynamically significant pericardial effusion. Technically difficult examination. Definity echo contrast was used to delineate endocardial borders. ASSESSMENT AND PLAN:  Patient Active Problem List   Diagnosis    CAD in native artery    Pulmonary nodules    Other hyperlipidemia    Essential hypertension    Gastroesophageal reflux disease with esophagitis without hemorrhage    S/P CABG (coronary artery bypass graft)    NGUY[address]N (acute kidney injury) ([address]    Right hemiparesis ([address]    Acute CVA (cerebrovascular accident) ([address]     1.  CAD-CABG: CABG x 3 9/27/2021 (LIMA-LAD, SVG-OM, SVG-RCA). ASA/statin/BB. Trial imdur for GI symptoms. 2. ICMP: BB/bumex. 3. HTN: Observe. 4. Lipids: Statin. 5. Carotids: 50-69% B/l 10/2021. Reassess 1 year. 6. CVA: ASA/stain. 7. GI symptoms: If continues the GI evaluation. Vero Lucas D.O.   Cardiologist  Cardiology, [facility]

## 2021-11-22 ENCOUNTER — TELEPHONE (OUTPATIENT)
Dept: CARDIOLOGY CLINIC | Age: 76
End: 2021-11-22

## 2021-11-22 RX ORDER — ISOSORBIDE MONONITRATE 30 MG/1
30 TABLET, EXTENDED RELEASE ORAL DAILY
Qty: 30 TABLET | Refills: 5 | Status: SHIPPED
Start: 2021-11-22 | End: 2021-12-21

## 2021-11-22 RX ORDER — ISOSORBIDE MONONITRATE 30 MG/1
30 TABLET, EXTENDED RELEASE ORAL DAILY
COMMUNITY
End: 2021-11-22 | Stop reason: SDUPTHER

## 2021-11-23 ENCOUNTER — TREATMENT (OUTPATIENT)
Dept: PHYSICAL THERAPY | Age: 76
End: 2021-11-23
Payer: MEDICARE

## 2021-11-23 DIAGNOSIS — I63.9 CEREBROVASCULAR ACCIDENT (CVA), UNSPECIFIED MECHANISM (HCC): Primary | ICD-10-CM

## 2021-11-23 PROCEDURE — 97530 THERAPEUTIC ACTIVITIES: CPT

## 2021-11-23 PROCEDURE — 97110 THERAPEUTIC EXERCISES: CPT

## 2021-11-23 PROCEDURE — 97112 NEUROMUSCULAR REEDUCATION: CPT

## 2021-11-23 NOTE — PROGRESS NOTES
[address] [facility]   Phone: [phone]   Fax: [phone]      Physical Therapy Daily Treatment Note    Date: 2021  Patient Name: Alex Griffin  : 1945   MRN: [id number]  DOInjury: 6 weeks   DOSx: 2021  - CABG   Referring Provider: Sera Uriostegui MD  [address] [facility],  [address] [place] [address]     Medical Diagnosis: ICD10 I63.9   CVA     Outcome Measure:  PSFS balance 8, upper body 3   DGI      S: Pt reports no pain. O:  Time [id number]     Visit 4 Repeat outcome measure at mid point and end. Pain Denies /10     ROM      Modalities            Exercise      Bike Pt not interested in Bike at therapy. Does at home      6 MWT  1150 ft. =350.52 No rest break    Squats      Calf Raises      LAQ 2 x 10 3 1/2#    Bicep curls  2 x 10  3 positions ; 5#                              Marching 2 x 10 Seated with 3 1/2#    Alt. Sidekicks      Hip add  X 20 x 2-3s hold Ball     Sit/Stands 2 x 10      Hip abd 2 x 10 BTB    Hip ext      Gait training       NMR Balance activities to aid in safe community and home ambulation    Marching Gait     Sidestepping     Retrowalk     Heel to toe 2 x 10     Resisted walk   Fwd  Back  lat Cable system 40#=10 #    March on foam 3 x 30s      Standing on foam  3 x 30secDouble foam x 2 feet apart x 1 feet together. Standing EC     A:  Tolerated well. Pt required rest breaks at times, secondary to fatigue. Increased wt for bicep curls this session. Repeated 6 min walk test, no rest taken and distance improved. P: Continue with rehab plan.    Tacho Ez, PTA  [id number]    Treatment Charges: Mins Units   Initial Evaluation     Re-Evaluation     Ther Exercise         TE 15 1   Manual Therapy     MT     Ther Activities        TA 9 1   Gait Training          GT     Neuro Re-education NR 15 1   Modalities     Non-Billable Service Time     Other     Total Time/Units 39 3

## 2021-11-26 ENCOUNTER — PATIENT MESSAGE (OUTPATIENT)
Dept: FAMILY MEDICINE CLINIC | Age: 76
End: 2021-11-26

## 2021-11-26 RX ORDER — CARVEDILOL 3.12 MG/1
3.12 TABLET ORAL 2 TIMES DAILY WITH MEALS
Qty: 180 TABLET | Refills: 1 | Status: SHIPPED
Start: 2021-11-26 | End: 2022-05-26 | Stop reason: SDUPTHER

## 2021-11-26 RX ORDER — TAMSULOSIN HYDROCHLORIDE 0.4 MG/1
0.4 CAPSULE ORAL DAILY
Qty: 90 CAPSULE | Refills: 1 | Status: SHIPPED
Start: 2021-11-26 | End: 2022-06-29

## 2021-11-26 RX ORDER — CLOPIDOGREL BISULFATE 75 MG/1
75 TABLET ORAL DAILY
Qty: 90 TABLET | Refills: 1 | Status: SHIPPED
Start: 2021-11-26 | End: 2022-06-01 | Stop reason: SDUPTHER

## 2021-11-26 NOTE — TELEPHONE ENCOUNTER
From: Lelo Munoz  To: Dr. Danna Crow: 11/26/2021 11:08 AM EST  Subject: Perscription Refills    Did not realize the hospital perscriptions did not have refills.  Need refills for the following:  Tamsulosin HCL 0.4 MG  Carvedilol 3.125 MG  Clopidogrel 75 MG  Please use [facility], [employer] when refill sent to drug store

## 2021-11-30 ENCOUNTER — TREATMENT (OUTPATIENT)
Dept: PHYSICAL THERAPY | Age: 76
End: 2021-11-30
Payer: MEDICARE

## 2021-11-30 DIAGNOSIS — I63.9 CEREBROVASCULAR ACCIDENT (CVA), UNSPECIFIED MECHANISM (HCC): Primary | ICD-10-CM

## 2021-11-30 PROCEDURE — 97110 THERAPEUTIC EXERCISES: CPT

## 2021-11-30 PROCEDURE — 97112 NEUROMUSCULAR REEDUCATION: CPT

## 2021-11-30 PROCEDURE — 97530 THERAPEUTIC ACTIVITIES: CPT

## 2021-11-30 NOTE — PROGRESS NOTES
[address] [facility]   Phone: [phone]   Fax: [phone]      Physical Therapy Daily Treatment Note    Date: 2021  Patient Name: Edita Gonzalez  : 1945   MRN: [id number]  DOInjury: 6 weeks   DOSx: 2021  - CABG   Referring Provider: No referring provider defined for this encounter. Medical Diagnosis: ICD10 I63.9   CVA     Outcome Measure:  PSFS balance 8, upper body 3   DGI      S: No c/o pain today. O:  Time [id number]     Visit 6 Repeat outcome measure at mid point and end. Pain Denies /10     ROM      Modalities            Exercise      Bike Pt not interested in Bike at therapy. Does at home      6 MWT  No rest break    Squats      Calf Raises      LAQ 3 x 10 3 1/2#    Bicep curls  2 x 10  3 positions ; 5#                              Marching 2 x 10 Seated with 3 1/2#    Marching with core activation  3 x 30s  OTB    Hip add  X 20 x 2-3s hold Ball     Sit/Stands 2 x 10  Light tap on blue foam    Hip abd 2 x 10 BTB    Hip ext      Gait training       NMR Balance activities to aid in safe community and home ambulation    Marching Gait     Sidestepping     Retrowalk     Tandem stand  30s x 3     Heel to toe 2 x 10     Resisted walk   Fwd  Back  lat X 10 each Cable system 40#=10 #    March on foam      Standing on foam  3 x 30secDouble foam  feet together. Standing EC     A:  Tolerated well. Pt reports being active over the weekend and performing therex. Overall reports he feels improvement. P: Continue with rehab plan.    Caroel Kunz, PTA  [id number]    Treatment Charges: Mins Units   Initial Evaluation     Re-Evaluation     Ther Exercise         TE 15 1   Manual Therapy     MT     Ther Activities        TA 9 1   Gait Training          GT     Neuro Re-education NR 15 1   Modalities     Non-Billable Service Time     Other     Total Time/Units 39 3

## 2021-12-03 ENCOUNTER — OFFICE VISIT (OUTPATIENT)
Dept: FAMILY MEDICINE CLINIC | Age: 76
End: 2021-12-03
Payer: MEDICARE

## 2021-12-03 VITALS
OXYGEN SATURATION: 94 % | BODY MASS INDEX: 24.48 KG/M2 | DIASTOLIC BLOOD PRESSURE: 64 MMHG | SYSTOLIC BLOOD PRESSURE: 126 MMHG | WEIGHT: 161 LBS | TEMPERATURE: 97.7 F | HEART RATE: 74 BPM

## 2021-12-03 DIAGNOSIS — K21.00 GASTROESOPHAGEAL REFLUX DISEASE WITH ESOPHAGITIS WITHOUT HEMORRHAGE: ICD-10-CM

## 2021-12-03 DIAGNOSIS — I50.9 CHRONIC CONGESTIVE HEART FAILURE, UNSPECIFIED HEART FAILURE TYPE (HCC): Primary | ICD-10-CM

## 2021-12-03 DIAGNOSIS — E78.49 OTHER HYPERLIPIDEMIA: ICD-10-CM

## 2021-12-03 DIAGNOSIS — Z95.1 S/P CABG (CORONARY ARTERY BYPASS GRAFT): ICD-10-CM

## 2021-12-03 PROCEDURE — 1123F ACP DISCUSS/DSCN MKR DOCD: CPT | Performed by: FAMILY MEDICINE

## 2021-12-03 PROCEDURE — G8420 CALC BMI NORM PARAMETERS: HCPCS | Performed by: FAMILY MEDICINE

## 2021-12-03 PROCEDURE — G8484 FLU IMMUNIZE NO ADMIN: HCPCS | Performed by: FAMILY MEDICINE

## 2021-12-03 PROCEDURE — G8427 DOCREV CUR MEDS BY ELIG CLIN: HCPCS | Performed by: FAMILY MEDICINE

## 2021-12-03 PROCEDURE — 3017F COLORECTAL CA SCREEN DOC REV: CPT | Performed by: FAMILY MEDICINE

## 2021-12-03 PROCEDURE — 1036F TOBACCO NON-USER: CPT | Performed by: FAMILY MEDICINE

## 2021-12-03 PROCEDURE — 4040F PNEUMOC VAC/ADMIN/RCVD: CPT | Performed by: FAMILY MEDICINE

## 2021-12-03 PROCEDURE — 99214 OFFICE O/P EST MOD 30 MIN: CPT | Performed by: FAMILY MEDICINE

## 2021-12-03 RX ORDER — BUMETANIDE 1 MG/1
1 TABLET ORAL DAILY PRN
Qty: 30 TABLET | Refills: 5 | Status: SHIPPED
Start: 2021-12-03 | End: 2022-06-29

## 2021-12-03 ASSESSMENT — ENCOUNTER SYMPTOMS
VOMITING: 0
EYE REDNESS: 0
SHORTNESS OF BREATH: 0
TROUBLE SWALLOWING: 0
CONSTIPATION: 0
ABDOMINAL PAIN: 0
COUGH: 0
PHOTOPHOBIA: 0
BLOOD IN STOOL: 0
DIARRHEA: 0
WHEEZING: 0
BACK PAIN: 0
SINUS PAIN: 0
SORE THROAT: 0

## 2021-12-03 NOTE — PROGRESS NOTES
OFFICE NOTE    12/3/21  Name: Kenyetta Reyna  OZX:[birth year]   Sex:male   Age:76 y.o. SUBJECTIVE  Chief Complaint   Patient presents with    Coronary Artery Disease       HPI comes in for f/u. Went into cardiogenic shock coming off bypass after undergoing CABG and suffered delerium and a small CVA. No intellectual deficits apparent. Right leg and arm weakness returning to almost normal. Ambulating without a cane. Still c/o of belching and burping. Sometimes with activity, sometimes not. Review of Systems   Constitutional: Positive for fatigue. Negative for appetite change, fever and unexpected weight change. HENT: Negative for congestion, ear pain, hearing loss, sinus pain, sore throat and trouble swallowing. Eyes: Negative for photophobia, redness and visual disturbance. Respiratory: Negative for cough, shortness of breath and wheezing. Occasional mild SOB but continues to push it and notes improvement. Cardiovascular: Negative for chest pain, palpitations and leg swelling. CABG scar   Gastrointestinal: Negative for abdominal pain, blood in stool, constipation, diarrhea and vomiting. Endocrine: Negative for cold intolerance, polydipsia and polyuria. Genitourinary: Negative for difficulty urinating, genital sores, hematuria and urgency. Musculoskeletal: Negative for arthralgias, back pain and joint swelling. Skin: Negative for pallor and rash. Allergic/Immunologic: Negative for food allergies. Neurological: Negative for dizziness, tremors, seizures, syncope, light-headedness, numbness and headaches. Hematological: Negative for adenopathy. Does not bruise/bleed easily. Psychiatric/Behavioral: Negative for agitation, dysphoric mood, hallucinations and sleep disturbance. The patient is not nervous/anxious. All other systems reviewed and are negative.            Current Outpatient Medications:     bumetanide (BUMEX) 1 MG tablet, Take 1 tablet by mouth daily as needed Status  Never          Sexual Activity     Sexually Active  Yes Partners  Female                OBJECTIVE  Vitals:    12/03/21 1013   BP: 126/64   Site: Right Upper Arm   Position: Sitting   Pulse: 74   Temp: 97.7 °F (36.5 °C)   TempSrc: Temporal   SpO2: 94%   Weight: 161 lb (73 kg)        Body mass index is 24.48 kg/m². No orders of the defined types were placed in this encounter. EXAM   Physical Exam  Vitals and nursing note reviewed. Constitutional:       Appearance: Normal appearance. He is well-developed and normal weight. HENT:      Right Ear: Tympanic membrane, ear canal and external ear normal.      Left Ear: Tympanic membrane, ear canal and external ear normal.      Nose: Nose normal.      Mouth/Throat:      Pharynx: Oropharynx is clear. No posterior oropharyngeal erythema. Eyes:      General: No scleral icterus. Conjunctiva/sclera: Conjunctivae normal.      Pupils: Pupils are equal, round, and reactive to light. Neck:      Thyroid: No thyroid mass or thyromegaly. Vascular: No carotid bruit or JVD. Trachea: Trachea normal.   Cardiovascular:      Rate and Rhythm: Normal rate and regular rhythm. Pulses: Normal pulses. Heart sounds: Murmur heard. No gallop. Pulmonary:      Effort: Pulmonary effort is normal.      Breath sounds: Normal breath sounds. No wheezing, rhonchi or rales. Abdominal:      General: Bowel sounds are normal. There is no distension. Palpations: Abdomen is soft. There is no mass. Tenderness: There is no abdominal tenderness. There is no guarding. Hernia: No hernia is present. Musculoskeletal:         General: Normal range of motion. Cervical back: Neck supple. No tenderness. Lymphadenopathy:      Cervical: No cervical adenopathy. Skin:     General: Skin is warm and dry. Coloration: Skin is not jaundiced. Findings: No bruising or rash. Neurological:      General: No focal deficit present.       Mental Status:

## 2021-12-07 ENCOUNTER — TREATMENT (OUTPATIENT)
Dept: PHYSICAL THERAPY | Age: 76
End: 2021-12-07
Payer: MEDICARE

## 2021-12-07 DIAGNOSIS — I63.9 CEREBROVASCULAR ACCIDENT (CVA), UNSPECIFIED MECHANISM (HCC): Primary | ICD-10-CM

## 2021-12-07 PROCEDURE — 97110 THERAPEUTIC EXERCISES: CPT

## 2021-12-07 PROCEDURE — 97530 THERAPEUTIC ACTIVITIES: CPT

## 2021-12-07 NOTE — PROGRESS NOTES
[address] [facility]   Phone: [phone]   Fax: [phone]      Physical Therapy Daily Treatment Note    Date: 2021  Patient Name: Sarah Clancy  : 1945   MRN: [id number]  DOInjury: 6 weeks   DOSx: 2021  - CABG   Referring Provider: Brock Butler MD  [facility],   [facility]     Medical Diagnosis: ICD10 I63.9   CVA     Outcome Measure:  PSFS balance 8, upper body 3   DGI      S: No c/o pain today. O:  Time [id number]     Visit 7 Repeat outcome measure at mid point and end. Pain Denies /10     ROM      Modalities            Exercise      Bike Pt not interested in Bike at therapy. Does at home      6 MWT  No rest break    Squats      Calf Raises      LAQ 3 x 10 3 1/2#    Bicep curls  2 x 10  3 positions ; 5#     Toe taps on stool  30s x 3                        Marching 2 x 10 Seated with 3 1/2#    Marching with core activation   OTB    Hip add   Ball     Sit/Stands 2 x 10  Light tap on blue foam    Hip abd  BTB    Hip ext      Step out with T band  2 x 10 B GTB    Gait training       NMR Balance activities to aid in safe community and home ambulation    Marching Gait     Sidestepping     Retrowalk     Tandem stand       Heel to toe      Resisted walk   Fwd  Back  lat X 10 each Cable system 40#=10 #  Increase NEXT    March on foam      Standing on foam  Double foam  feet together. Standing EC     A:  Tolerated well. Pt reports feeling SOB today. Pulse ox 99% and HR 93 following seated marches. P: Continue with rehab plan.    Martha Hernandez, PTA  [id number]    Treatment Charges: Mins Units   Initial Evaluation     Re-Evaluation     Ther Exercise         TE 20 1   Manual Therapy     MT     Ther Activities        TA     Gait Training          GT     Neuro Re-education NR 15 1   Modalities     Non-Billable Service Time     Other     Total Time/Units 35 2

## 2021-12-09 ENCOUNTER — TREATMENT (OUTPATIENT)
Dept: PHYSICAL THERAPY | Age: 76
End: 2021-12-09
Payer: MEDICARE

## 2021-12-09 DIAGNOSIS — I63.9 CEREBROVASCULAR ACCIDENT (CVA), UNSPECIFIED MECHANISM (HCC): Primary | ICD-10-CM

## 2021-12-09 PROCEDURE — 97112 NEUROMUSCULAR REEDUCATION: CPT

## 2021-12-09 PROCEDURE — 97110 THERAPEUTIC EXERCISES: CPT

## 2021-12-09 NOTE — PROGRESS NOTES
[address] [facility]   Phone: [phone]   Fax: [phone]      Physical Therapy Daily Treatment Note    Date: 2021  Patient Name: Edita Gonzalez  : 1945   MRN: [id number]  DOInjury: 6 weeks   DOSx: 2021  - CABG   Referring Provider: Shelly Vega MD  [address] [facility],  [address] [place] [address]     Medical Diagnosis: ICD10 I63.9   CVA     Outcome Measure:  PSFS balance 8, upper body 3   DGI      S: No c/o pain today. O:  Time [id number]     Visit 7 Repeat outcome measure at mid point and end. Pain Denies /10     ROM      Modalities            Exercise      Bike Pt not interested in Bike at therapy. Does at home      6 MWT  No rest break    Squats      Calf Raises      LAQ 3 x 10 3 1/2#    Bicep curls  2 x 10  3 positions ; 5#     Toe taps on stool  30s x 3                  Lat pull down  2 x 10 BTB    Marching 2 x 10 Seated with 3 1/2#    Marching with core activation   OTB    Hip add   Ball     Sit/Stands 2 x 10  Light tap  pillow    Hip abd  BTB    Hip ext      Step out with T band  2 x 10 B GTB    Gait training       NMR Balance activities to aid in safe community and home ambulation    Marching Gait     Sidestepping     Retrowalk     Tandem stand       Heel to toe 2 x 10ft     Resisted walk   Fwd  Back  lat X 10 each Cable system 50#=12.5 #      March on foam      Standing on foam  Double foam  feet together. Standing EC     A:  Tolerated well. Pt cancelled last appointment because he will be leaving for [place]. P: Continue with rehab plan.    Carole Kunz, PTA  40539    Treatment Charges: Mins Units   Initial Evaluation     Re-Evaluation     Ther Exercise         TE 25 2   Manual Therapy     MT     Ther Activities        TA     Gait Training          GT     Neuro Re-education NR 13 1   Modalities     Non-Billable Service Time     Other     Total Time/Units 38 3

## 2021-12-14 ENCOUNTER — TREATMENT (OUTPATIENT)
Dept: PHYSICAL THERAPY | Age: 76
End: 2021-12-14
Payer: MEDICARE

## 2021-12-14 DIAGNOSIS — I63.9 CEREBROVASCULAR ACCIDENT (CVA), UNSPECIFIED MECHANISM (HCC): Primary | ICD-10-CM

## 2021-12-14 PROCEDURE — 97112 NEUROMUSCULAR REEDUCATION: CPT | Performed by: PHYSICAL THERAPIST

## 2021-12-14 PROCEDURE — 97110 THERAPEUTIC EXERCISES: CPT | Performed by: PHYSICAL THERAPIST

## 2021-12-16 ENCOUNTER — TREATMENT (OUTPATIENT)
Dept: PHYSICAL THERAPY | Age: 76
End: 2021-12-16
Payer: MEDICARE

## 2021-12-16 DIAGNOSIS — I63.9 CEREBROVASCULAR ACCIDENT (CVA), UNSPECIFIED MECHANISM (HCC): Primary | ICD-10-CM

## 2021-12-16 PROCEDURE — 97110 THERAPEUTIC EXERCISES: CPT | Performed by: PHYSICAL THERAPIST

## 2021-12-16 PROCEDURE — 97112 NEUROMUSCULAR REEDUCATION: CPT | Performed by: PHYSICAL THERAPIST

## 2021-12-16 NOTE — PROGRESS NOTES
[address] [facility]   Phone: [phone]   Fax: [phone]      Physical Therapy Daily Treatment Note    Date: 2021  Patient Name: Deni Kiser  : 1945   MRN: [id number]  DOInjury: 6 weeks   DOSx: 2021  - CABG   Referring Provider: Army Rashel MD  [address] [facility],  [address] [place] [address]     Medical Diagnosis: ICD10 I63.9   CVA     Outcome Measure:  PSFS balance 8, upper body 3   DGI      S: Continues to report some stomach issues. O:  Time 1119 - 1154     Visit 9 Repeat outcome measure at mid point and end. Pain Denies /10     ROM      Modalities            Exercise      Bike Pt not interested in Bike at therapy. Does at home      6 MWT  No rest break    Squats      Calf Raises      LAQ 3 x 10  3 1/2#    Bicep curls  2 x 10  3 positions ; 5#     Toe taps on stool  30s x 3                   Lat pull down   BTB    Marching 2 x 10  Seated with 3 1/2#    Marching with core activation   OTB    Hip add   Ball     Sit/Stands 2 x 10  Light tap on chair     Hip abd  BTB    Hip ext      Step out with T band  2 x 10 B   BTB    Gait training       NMR Balance activities to aid in safe community and home ambulation    Marching Gait     Sidestepping     Retrowalk     Tandem stand       Heel to toe      Resisted walk   Fwd  Back  lat X 10 each Cable system 50#=12.5 #      Walking with head turns       March on foam      Standing on foam  Double foam  feet together. Standing EC     A:  Tolerated fairly well. P: Continue with rehab plan.    [place], [place]  [address]    Treatment Charges: Mins Units   Initial Evaluation     Re-Evaluation     Ther Exercise         TE 25 1   Manual Therapy     MT     Ther Activities        TA     Gait Training          GT     Neuro Re-education NR 10 1   Modalities     Non-Billable Service Time     Other     Total Time/Units 35 2

## 2021-12-21 ENCOUNTER — TREATMENT (OUTPATIENT)
Dept: PHYSICAL THERAPY | Age: 76
End: 2021-12-21
Payer: MEDICARE

## 2021-12-21 ENCOUNTER — OFFICE VISIT (OUTPATIENT)
Dept: CARDIOLOGY CLINIC | Age: 76
End: 2021-12-21
Payer: MEDICARE

## 2021-12-21 VITALS
RESPIRATION RATE: 16 BRPM | WEIGHT: 156.7 LBS | BODY MASS INDEX: 23.75 KG/M2 | SYSTOLIC BLOOD PRESSURE: 126 MMHG | DIASTOLIC BLOOD PRESSURE: 60 MMHG | HEART RATE: 73 BPM | HEIGHT: 68 IN

## 2021-12-21 DIAGNOSIS — I10 ESSENTIAL HYPERTENSION: ICD-10-CM

## 2021-12-21 DIAGNOSIS — I63.9 CEREBROVASCULAR ACCIDENT (CVA), UNSPECIFIED MECHANISM (HCC): Primary | ICD-10-CM

## 2021-12-21 DIAGNOSIS — E78.49 OTHER HYPERLIPIDEMIA: ICD-10-CM

## 2021-12-21 DIAGNOSIS — I63.9 ACUTE CVA (CEREBROVASCULAR ACCIDENT) (HCC): ICD-10-CM

## 2021-12-21 DIAGNOSIS — I25.10 CAD IN NATIVE ARTERY: Primary | ICD-10-CM

## 2021-12-21 PROCEDURE — 1036F TOBACCO NON-USER: CPT | Performed by: INTERNAL MEDICINE

## 2021-12-21 PROCEDURE — 1123F ACP DISCUSS/DSCN MKR DOCD: CPT | Performed by: INTERNAL MEDICINE

## 2021-12-21 PROCEDURE — 4040F PNEUMOC VAC/ADMIN/RCVD: CPT | Performed by: INTERNAL MEDICINE

## 2021-12-21 PROCEDURE — G8420 CALC BMI NORM PARAMETERS: HCPCS | Performed by: INTERNAL MEDICINE

## 2021-12-21 PROCEDURE — 3017F COLORECTAL CA SCREEN DOC REV: CPT | Performed by: INTERNAL MEDICINE

## 2021-12-21 PROCEDURE — 97164 PT RE-EVAL EST PLAN CARE: CPT | Performed by: PHYSICAL THERAPIST

## 2021-12-21 PROCEDURE — 93000 ELECTROCARDIOGRAM COMPLETE: CPT | Performed by: INTERNAL MEDICINE

## 2021-12-21 PROCEDURE — G8484 FLU IMMUNIZE NO ADMIN: HCPCS | Performed by: INTERNAL MEDICINE

## 2021-12-21 PROCEDURE — 99214 OFFICE O/P EST MOD 30 MIN: CPT | Performed by: INTERNAL MEDICINE

## 2021-12-21 PROCEDURE — G8427 DOCREV CUR MEDS BY ELIG CLIN: HCPCS | Performed by: INTERNAL MEDICINE

## 2021-12-21 RX ORDER — SIMETHICONE 125 MG
CAPSULE ORAL DAILY
COMMUNITY
End: 2022-06-29

## 2021-12-21 NOTE — PROGRESS NOTES
[address] [facility]   Phone: [phone]   Fax: [phone]      Physical Therapy Daily Treatment Note    Date: 2021  Patient Name: Edita Gonzalez  : 1945   MRN: [id number]  DOInjury: 6 weeks   DOSx: 2021  - CABG   Referring Provider: No referring provider defined for this encounter. Medical Diagnosis: ICD10 I63.9   CVA     Outcome Measure:  PSFS balance 9, upper body 8  DGI      S: No complaints. O:  Time [id number]     Visit 11 Repeat outcome measure at mid point and end. Pain Denies /10     ROM      Modalities            Exercise      Bike      6 MWT  No rest break    Squats      Calf Raises      LAQ 3 1/2#    Bicep curls  3 positions ; 5#     Toe taps on stool                 Lat pull down  BTB    Marching Seated with 3 1/2#    Marching with core activation  OTB    Hip add  Ball     Sit/Stands Light tap on chair     Hip abd BTB    Hip ext     Step out with T band  BTB    Gait training      Balance activities to aid in safe community and home ambulation    Marching Gait     Sidestepping     Retrowalk     Tandem stand      Heel to toe     Resisted walk   Fwd  Back  lat Cable system 50#=12.5 #      Walking with head turns      March on foam     Standing on foam  Double foam  feet together. Standing EC     A:  Tolerated well. Reassessment completed today. See note for details. P: Will discharge pt at this time.     [place], [place]  [address]    Treatment Charges: Mins Units   Initial Evaluation     Re-Evaluation 38 1   Ther Exercise         TE     Manual Therapy     MT     Ther Activities        TA     Gait Training          GT     Neuro Re-education NR     Modalities     Non-Billable Service Time     Other     Total Time/Units 38 1

## 2021-12-21 NOTE — PROGRESS NOTES
[address] [facility]   Phone: [phone]   Fax: [phone]        Referring Provider: Shoshana Holland MD      Medical Diagnosis:   ICD10 X31.7   CVA     CERTIFICATION PERIOD:  11/9/2021  to 12/24/2021. ATTENDANCE:  Patient has attended 6 of 12 scheduled treatments from 11/9/2021  to 12/21/2021. TREATMENTS RECEIVED:  Therapeutic exercise, neuromuscular reeducation , therapeutic activity     INITIAL STATUS:  Observations: well nourished male          Gait: short step length, narrow     Functional Strength:   Able to toe walk, heel walk, and squat with some difficulty.      Range of Motion:     Neck:               Flexion:                       [x]? Normal   []? Limited              Extension:                   [x]? Normal   []? Limited                Right Rotation:            [x]? Normal   []? Limited               Left Rotation:               [x]? Normal   []? Limited               Right Side Bending:    [x]? Normal   []? Limited               Left Side Bending:      [x]? Normal   []? Limited      Upper Extremity:              Right:                           [x]? Normal   []? Limited - performed gentle AROM              Left:                             [x]? Normal   []? Limited - performed gentle AROM      Trunk:               Flexion:                       []? Normal   [x]? Limited 10%              Extension:                   []? Normal   [x]? Limited 25%               Right Rotation:                        []? Normal   [x]? Limited 10%              Left Rotation:               []? Normal   [x]? Limited 10%              Right Side Bending:    []? Normal   [x]? Limited 10%              Left Side Bending:      []? Normal   [x]? Limited 10%     Lower Extremity:              Right:                           [x]? Normal   []? Limited               Left:                             [x]? Normal   []?  Limited         Strength:                 Neck:   within normal limits Trunk:  decreased ROM, decreased strength              R UE/ L UE:    UE's not formally tested. Pt at least 3/5 bilaterally and L stronger than R.               R LE:   4+/5              L LE:    4+/5     Functional Mobility/Tests:  DGI: 18/24  QUILES: TBA    CURRENT STATUS:  Observations: well nourished male          Gait: normal     Functional Strength:   Able to toe walk, heel walk, and squat with some difficulty.      Range of Motion:     Neck:               Flexion:                       [x]? Normal   []? Limited              Extension:                   [x]? Normal   []? Limited                Right Rotation:            [x]? Normal   []? Limited               Left Rotation:               [x]? Normal   []? Limited               Right Side Bending:    [x]? Normal   []? Limited               Left Side Bending:      [x]? Normal   []? Limited      Upper Extremity:              Right:                           [x]? Normal   []? Limited               Left:                             [x]? Normal   []? Limited     Trunk:               Flexion:                       []? Normal   [x]? Limited 10%              Extension:                   []? Normal   [x]? Limited 15%               Right Rotation:                        [x]? Normal   []? Limited               Left Rotation:               [x]? Normal   []? Limited               Right Side Bending:    [x]? Normal   []? Limited               Left Side Bending:      [x]? Normal   []? Limited      Lower Extremity:              Right:                           [x]? Normal   []? Limited               Left:                             [x]? Normal   []?  Limited         Strength:                 Neck:   within normal limits              Trunk:  slight decreased ROM, decreased strength              R UE/ L UE:    4 /5               R LE:   5/5              L LE:    5/5     Functional Mobility/Tests:  DGI:  22/24   QUILES: TBA       Long Term goals (6 weeks)  · Increase Strength to LEs to 5/5 , UE's to 4/5  (goal met)   · Able to perform/complete the following functions/tasks: pt able to walk 20+ minutes with good balance and no pain/limitation. Pt able to go up/down flight of steps with no pain/limitation and good balance. Pt able to perform ADL's such as shaving and brushing teeth with no pain/limitation. (goal met)   · Independent with Home Exercise Programs      OUTCOME MEASURE:    PSFS balance 9 , upper body 8     COMMENTS AND RECOMMENDATIONS:   Pt has made great progress in therapy and has met therapy goals. Pt reports now walking 3/4 mile every day and pedaling on bike at home about 15 minutes. Pt reports no problems going up/down steps. Pt cancelled last remaining therapy session and choosing to make today last day due to returning to live in [place] next week. Pt does report still using a chair in the shower. Pt reports currently he has a small shower in the trailer he lives in and some difficulty getting in/out of shower with chair due to that but states that next week when he returns to [place] he will have a bigger shower and thus less difficulty. Pt reports getting in/out of shower is still much better than when he first came home from hospital.  Pt does report he will be following up with some physicians when he returns to [place]. Discussed with pt options of physical therapy and/or cardiac rehab to continue with pt progression in [place] and to discuss with physician in [place] for referrals pending pt and doctor decisions regarding. Pt demonstrates understanding and agreeable stating he has already talked with one of his physicians about exercise progression going forward. Will discharge pt at this time. Thank you for the opportunity to work with your patient. Ephraim Barlow, PT DPT [id number]    I CERTIFY THAT THE ABOVE REASSESSMENT AND PLAN OF CARE FOR PHYSICAL THERAPY SERVICES ARE APPROPRIATE AND MEDICALLY NECESSARY.         ________________________ _______________  Physician     Date

## 2021-12-21 NOTE — PROGRESS NOTES
CHIEF COMPLAINT: CAD/Chest pain    HISTORY OF PRESENT ILLNESS: Patient is a 78 y.o. male seen at the request of Sandra Vallejo MD.      Patient with long standing known CAD reported by 2007 cath at which time he had a  LAD. Was active and without issues for years. Seen in follow up after CABG. Complicated course with CVA. No CP or SOB currently, but having frequent belching.      Past Medical History:   Diagnosis Date    CAD (coronary artery disease)     Encounter regarding vascular access for dialysis for ESRD ([facility] 10/1/2021    Hiatal hernia     Hyperlipidemia     Hypertension        Patient Active Problem List   Diagnosis    CAD in native artery    Pulmonary nodules    Other hyperlipidemia    Essential hypertension    Gastroesophageal reflux disease with esophagitis without hemorrhage    S/P CABG (coronary artery bypass graft)    [facility]) [facility]    Right hemiparesis (HCC)    Acute CVA (cerebrovascular accident) ([facility]       Allergies   Allergen Reactions    Dust Mite Extract        Current Outpatient Medications   Medication Sig Dispense Refill    Multiple Vitamin (MULTIVITAMIN ADULT PO) Take by mouth daily      Simethicone 125 MG CAPS Take by mouth daily      Probiotic Product (PROBIOTIC-10 PO) Take by mouth      bumetanide (BUMEX) 1 MG tablet Take 1 tablet by mouth daily as needed (edema) 30 tablet 5    tamsulosin (FLOMAX) 0.4 MG capsule Take 1 capsule by mouth daily 90 capsule 1    carvedilol (COREG) 3.125 MG tablet Take 1 tablet by mouth 2 times daily (with meals) 180 tablet 1    clopidogrel (PLAVIX) 75 MG tablet Take 1 tablet by mouth daily 90 tablet 1    pantoprazole (PROTONIX) 40 MG tablet Take 1 tablet by mouth every morning (before breakfast) 30 tablet 5    aspirin 81 MG EC tablet Take 81 mg by mouth daily      atorvastatin (LIPITOR) 40 MG tablet Take 40 mg by mouth daily      Coenzyme Q10 (COQ-10) 100 MG CAPS Take by mouth 3 times daily       Partner Violence:     Fear of Current or Ex-Partner: Not on file    Emotionally Abused: Not on file    Physically Abused: Not on file    Sexually Abused: Not on file   Housing Stability:     Unable to Pay for Housing in the Last Year: Not on file    Number of Places Lived in the Last Year: Not on file    Unstable Housing in the Last Year: Not on file       History reviewed. No pertinent family history. Review of Systems:  Heart: as above   Lungs: as above   Eyes: denies changes in vision or discharge. Ears: denies changes in hearing or pain. Nose: denies epistaxis or masses   Throat: denies sore throat or trouble swallowing. Neuro: denies numbness, tingling, tremors. Skin: denies rashes or itching. : denies hematuria, dysuria   GI: denies vomiting, diarrhea   Psych: denies mood changed, anxiety, depression. All other systems negative. Physical Exam   /60   Pulse 73   Resp 16   Ht 5' 8\" (1.727 m)   Wt 156 lb 11.2 oz (71.1 kg)   BMI 23.83 kg/m²   Constitutional: Oriented to person, place, and time. Well-developed and well-nourished. No distress. Head: Normocephalic and atraumatic. Eyes: EOM are normal. Pupils are equal, round, and reactive to light. Neck: Normal range of motion. Neck supple. No hepatojugular reflux and no JVD present. Carotid bruit is not present. No tracheal deviation present. No thyromegaly present. Cardiovascular: Normal rate, regular rhythm, normal heart sounds and intact distal pulses. Exam reveals no gallop and no friction rub. No murmur heard. Pulmonary/Chest: Effort normal and breath sounds normal. No respiratory distress. No wheezes. No rales. No tenderness. Abdominal: Soft. Bowel sounds are normal. No distension and no mass. No tenderness. No rebound and no guarding. Musculoskeletal: Normal range of motion. No edema and no tenderness. Lymphadenopathy:   No cervical adenopathy. No groin adenopathy.   Neurological: Alert and oriented to person, place, and time. Skin: Skin is warm and dry. No rash noted. Not diaphoretic. No erythema. Psychiatric: Normal mood and affect. Behavior is normal.     EKG personally reviewed 12/21/21:  normal sinus rhythm, nonspecific ST and T waves changes, LBBB. Stress 4/28/2021 with partially reversible anteroapical (fixed) and septal (reversible) defect, LVEF 36% in [facility]. TTE: 9/23/2021 (Dr. Amaris Herring):   Left ventricular internal dimensions were normal in diastole and systole.   Abnormal (paradoxical) motion consistent with left bundle branch block.   Normal left ventricular ejection fraction.   Mild thickening of the mitral valve leaflets with reduced leaflet excursion.   Mild centrally directed mitral regurgitation.   Mild tricuspid regurgitation. LVEF 55%     Echo Summary 10/5/2021:   Left ventricle size is normal.   Ejection fraction is visually estimated at 40+/-5%. LV apex is dyskinetic and mid to distal anterior and anteroseptal walls are severely hypokinetic. Normal left ventricle wall thickness. Stage I diastolic dysfunction. No LV mural thrombus. Normal right ventricular size and function. No hemodynamically significant aortic stenosis is present. Unable to estimate PA systolic pressure. No evidence for hemodynamically significant pericardial effusion. Technically difficult examination. Definity echo contrast was used to delineate endocardial borders. ASSESSMENT AND PLAN:  Patient Active Problem List   Diagnosis    CAD in native artery    Pulmonary nodules    Other hyperlipidemia    Essential hypertension    Gastroesophageal reflux disease with esophagitis without hemorrhage    S/P CABG (coronary artery bypass graft)    NGUY[address]N (acute kidney injury) ([address]    Right hemiparesis ([address]    Acute CVA (cerebrovascular accident) ([address]     1. CAD-CABG: CABG x 3 9/27/2021 (LIMA-LAD, SVG-OM, SVG-RCA). ASA/statin/BB. 2. ICMP: BB/bumex. 3. HTN: Observe. 4. Lipids: Statin. 5. Carotids: 50-69% B/l 10/2021. Reassess 1 year. 6. CVA: ASA/stain. 7. GI symptoms: If continues the GI evaluation. 8. CKD    Karly Mora D.O.   Cardiologist  Cardiology, [address] [facility]

## 2021-12-28 NOTE — FLOWSHEET NOTE
Updated  on the patients continuing trend up on the BUN and Cr. Informed her about the patients current I&0, urine output, and CVP. She wants the Bumex drip on hold until she rounds today. Attending with

## 2022-02-15 ENCOUNTER — TELEPHONE (OUTPATIENT)
Dept: CARDIOLOGY CLINIC | Age: 77
End: 2022-02-15

## 2022-02-15 NOTE — TELEPHONE ENCOUNTER
Patient needs cardiac clearance for an Endoscopy,he needs to hold plavix 5 days prior to procedure, please advise

## 2022-02-15 NOTE — TELEPHONE ENCOUNTER
Okay to proceed. Okay to hold plavix as requested. Parish BETH Busch.   Cardiologist  Cardiology, [facility]

## 2022-05-25 ENCOUNTER — PATIENT MESSAGE (OUTPATIENT)
Dept: FAMILY MEDICINE CLINIC | Age: 77
End: 2022-05-25

## 2022-05-25 RX ORDER — CARVEDILOL 3.12 MG/1
3.12 TABLET ORAL 2 TIMES DAILY WITH MEALS
Qty: 180 TABLET | Refills: 1 | Status: CANCELLED | OUTPATIENT
Start: 2022-05-25

## 2022-05-25 NOTE — TELEPHONE ENCOUNTER
Last Appointment:  12/3/2021  No future appointments. Medication pended to [facility] pharmacy as requested.

## 2022-05-25 NOTE — TELEPHONE ENCOUNTER
From: Giovanni Barnhart  To: Dr. To [place]: 5/25/2022 1:15 PM EDT  Subject: CARVEDILOL 3.125 MG    Need refill - contact [facility] [address] [place], 1972  [address] [facility]  Thank You J.  [address]  See you in [place]

## 2022-05-26 RX ORDER — CARVEDILOL 3.12 MG/1
3.12 TABLET ORAL 2 TIMES DAILY WITH MEALS
Qty: 180 TABLET | Refills: 0 | Status: SHIPPED
Start: 2022-05-26 | End: 2022-06-29

## 2022-05-26 NOTE — TELEPHONE ENCOUNTER
Patient does not live in [place] all year round. Patient is coming back up in June. Patient will be seeing Dr. Livingston Back on 07/06/22.

## 2022-06-01 ENCOUNTER — PATIENT MESSAGE (OUTPATIENT)
Dept: FAMILY MEDICINE CLINIC | Age: 77
End: 2022-06-01

## 2022-06-01 RX ORDER — CLOPIDOGREL BISULFATE 75 MG/1
75 TABLET ORAL DAILY
Qty: 90 TABLET | Refills: 1 | Status: SHIPPED
Start: 2022-06-01 | End: 2022-08-29 | Stop reason: SDUPTHER

## 2022-06-01 RX ORDER — ATORVASTATIN CALCIUM 40 MG/1
40 TABLET, FILM COATED ORAL DAILY
Qty: 90 TABLET | Refills: 1 | Status: SHIPPED
Start: 2022-06-01 | End: 2022-08-29 | Stop reason: SDUPTHER

## 2022-06-01 NOTE — TELEPHONE ENCOUNTER
From: Neo Patel  To: Dr. Kurtz Oliver: 6/1/2022 9:28 AM EDT  Subject: Prescription Refills    Need a refill for Atorvastatin 40 mg tablets and Clopidogrel 75 mg tablets. Please call in to [address] [place] [address] [address] [facility]. Thanks for your assistance.

## 2022-06-01 NOTE — TELEPHONE ENCOUNTER
Last Appointment:  12/3/2021  Future Appointments   Date Time Provider Yoselin Falk   7/6/2022  9:15 AM Fouzia Jimenez  [address] [facility] [address]

## 2022-06-01 NOTE — TELEPHONE ENCOUNTER
He really needs to be getting these in [place] where they can monitor his BP and BW. Does he not have a PMD down there?

## 2022-06-01 NOTE — TELEPHONE ENCOUNTER
Patient does not live in [place], only goes down for the winter. Patient has a appointment to see you on 07/06/2022. Please advise.

## 2022-06-29 ENCOUNTER — OFFICE VISIT (OUTPATIENT)
Dept: CARDIOLOGY CLINIC | Age: 77
End: 2022-06-29
Payer: MEDICARE

## 2022-06-29 VITALS
WEIGHT: 147 LBS | DIASTOLIC BLOOD PRESSURE: 60 MMHG | HEIGHT: 68 IN | RESPIRATION RATE: 18 BRPM | HEART RATE: 49 BPM | BODY MASS INDEX: 22.28 KG/M2 | SYSTOLIC BLOOD PRESSURE: 110 MMHG

## 2022-06-29 DIAGNOSIS — I25.10 CAD IN NATIVE ARTERY: ICD-10-CM

## 2022-06-29 DIAGNOSIS — I25.119 ATHEROSCLEROSIS OF NATIVE CORONARY ARTERY OF NATIVE HEART WITH ANGINA PECTORIS (HCC): ICD-10-CM

## 2022-06-29 DIAGNOSIS — I25.10 CAD IN NATIVE ARTERY: Primary | ICD-10-CM

## 2022-06-29 DIAGNOSIS — I20.9 ANGINA PECTORIS, UNSPECIFIED (HCC): ICD-10-CM

## 2022-06-29 LAB
ALBUMIN SERPL-MCNC: 4.3 G/DL (ref 3.5–5.2)
ALP BLD-CCNC: 95 U/L (ref 40–129)
ALT SERPL-CCNC: 17 U/L (ref 0–40)
ANION GAP SERPL CALCULATED.3IONS-SCNC: 12 MMOL/L (ref 7–16)
AST SERPL-CCNC: 22 U/L (ref 0–39)
BILIRUB SERPL-MCNC: 0.6 MG/DL (ref 0–1.2)
BUN BLDV-MCNC: 33 MG/DL (ref 6–23)
CALCIUM SERPL-MCNC: 9.1 MG/DL (ref 8.6–10.2)
CHLORIDE BLD-SCNC: 99 MMOL/L (ref 98–107)
CO2: 25 MMOL/L (ref 22–29)
CREAT SERPL-MCNC: 1.7 MG/DL (ref 0.7–1.2)
GFR AFRICAN AMERICAN: 48
GFR NON-AFRICAN AMERICAN: 39 ML/MIN/1.73
GLUCOSE BLD-MCNC: 109 MG/DL (ref 74–99)
HCT VFR BLD CALC: 40.6 % (ref 37–54)
HEMOGLOBIN: 13.4 G/DL (ref 12.5–16.5)
MCH RBC QN AUTO: 30.7 PG (ref 26–35)
MCHC RBC AUTO-ENTMCNC: 33 % (ref 32–34.5)
MCV RBC AUTO: 92.9 FL (ref 80–99.9)
PDW BLD-RTO: 13 FL (ref 11.5–15)
PLATELET # BLD: 240 E9/L (ref 130–450)
PMV BLD AUTO: 10.9 FL (ref 7–12)
POTASSIUM SERPL-SCNC: 5.3 MMOL/L (ref 3.5–5)
RBC # BLD: 4.37 E12/L (ref 3.8–5.8)
SODIUM BLD-SCNC: 136 MMOL/L (ref 132–146)
TOTAL PROTEIN: 7 G/DL (ref 6.4–8.3)
WBC # BLD: 13.7 E9/L (ref 4.5–11.5)

## 2022-06-29 PROCEDURE — 3017F COLORECTAL CA SCREEN DOC REV: CPT | Performed by: INTERNAL MEDICINE

## 2022-06-29 PROCEDURE — 99214 OFFICE O/P EST MOD 30 MIN: CPT | Performed by: INTERNAL MEDICINE

## 2022-06-29 PROCEDURE — G8420 CALC BMI NORM PARAMETERS: HCPCS | Performed by: INTERNAL MEDICINE

## 2022-06-29 PROCEDURE — 1123F ACP DISCUSS/DSCN MKR DOCD: CPT | Performed by: INTERNAL MEDICINE

## 2022-06-29 PROCEDURE — 93000 ELECTROCARDIOGRAM COMPLETE: CPT | Performed by: INTERNAL MEDICINE

## 2022-06-29 PROCEDURE — G8427 DOCREV CUR MEDS BY ELIG CLIN: HCPCS | Performed by: INTERNAL MEDICINE

## 2022-06-29 PROCEDURE — 1036F TOBACCO NON-USER: CPT | Performed by: INTERNAL MEDICINE

## 2022-06-29 RX ORDER — CARVEDILOL 6.25 MG/1
6.25 TABLET ORAL 2 TIMES DAILY WITH MEALS
Qty: 180 TABLET | Refills: 3 | Status: SHIPPED
Start: 2022-06-29 | End: 2022-08-11

## 2022-06-29 RX ORDER — SPIRONOLACTONE 25 MG/1
25 TABLET ORAL DAILY
Qty: 90 TABLET | Refills: 3 | Status: SHIPPED
Start: 2022-06-29 | End: 2022-08-11

## 2022-06-29 RX ORDER — SPIRONOLACTONE 25 MG/1
25 TABLET ORAL DAILY
COMMUNITY
Start: 2022-06-20 | End: 2022-06-29 | Stop reason: SDUPTHER

## 2022-06-29 RX ORDER — CARVEDILOL 6.25 MG/1
6.25 TABLET ORAL 2 TIMES DAILY WITH MEALS
COMMUNITY
End: 2022-06-29 | Stop reason: SDUPTHER

## 2022-06-29 RX ORDER — FUROSEMIDE 20 MG/1
20 TABLET ORAL DAILY
Qty: 90 TABLET | Refills: 3 | Status: SHIPPED | OUTPATIENT
Start: 2022-06-29

## 2022-06-29 RX ORDER — ISOSORBIDE MONONITRATE 30 MG/1
30 TABLET, EXTENDED RELEASE ORAL DAILY
Qty: 30 TABLET | Refills: 3 | Status: SHIPPED
Start: 2022-06-29 | End: 2022-08-11

## 2022-06-29 RX ORDER — LANOLIN ALCOHOL/MO/W.PET/CERES
1000 CREAM (GRAM) TOPICAL DAILY
COMMUNITY
End: 2022-07-19 | Stop reason: SDUPTHER

## 2022-06-29 RX ORDER — ZINC SULFATE 66 MG
TABLET ORAL
COMMUNITY

## 2022-06-29 RX ORDER — FUROSEMIDE 20 MG/1
20 TABLET ORAL DAILY
COMMUNITY
End: 2022-06-29 | Stop reason: SDUPTHER

## 2022-06-29 NOTE — PROGRESS NOTES
CHIEF COMPLAINT: CAD/Chest pain    HISTORY OF PRESENT ILLNESS: Patient is a 78 y.o. male seen at the request of Maia Garza MD.      Patient with long standing known CAD reported by 2007 cath at which time he had a  LAD. Was active and without issues for years. Hx of CABG with complicated course with CVA. Recently admitted in [place] for SOB and noted to be in CHF. Cardiac MRI reported LVEF 38% with a small area of ischemia in the inferoapical area. Doing better with medical therapy volume-wise. Noting fatigue. No CP or SOB currently, but having frequent belching.      Past Medical History:   Diagnosis Date    CAD (coronary artery disease)     Encounter regarding vascular access for dialysis for ESRD ([facility] 10/1/2021    Hiatal hernia     Hyperlipidemia     Hypertension        Patient Active Problem List   Diagnosis    CAD in native artery    Pulmonary nodules    Other hyperlipidemia    Essential hypertension    Gastroesophageal reflux disease with esophagitis without hemorrhage    S/P CABG (coronary artery bypass graft)    NGUY[facility]N (acute kidney injury) ([facility]    Right hemiparesis (HCC)    Acute CVA (cerebrovascular accident) ([facility]       Allergies   Allergen Reactions    Dust Mite Extract        Current Outpatient Medications   Medication Sig Dispense Refill    carvedilol (COREG) 6.25 MG tablet Take 6.25 mg by mouth 2 times daily (with meals)      furosemide (LASIX) 20 MG tablet Take 20 mg by mouth daily      sacubitril-valsartan (ENTRESTO) 24-26 MG per tablet 2 times daily       spironolactone (ALDACTONE) 25 MG tablet 25 mg daily       Ascorbic Acid (VITAMIN C PO) Take 750 mg by mouth      Magnesium 100 MG CAPS Take by mouth 2 times daily      Zinc Sulfate (ZINC 15) 66 MG TABS Take by mouth      vitamin B-12 (CYANOCOBALAMIN) 1000 MCG tablet Take 1,000 mcg by mouth daily      clopidogrel (PLAVIX) 75 MG tablet Take 1 tablet by mouth daily 90 tablet 1    atorvastatin (LIPITOR) 40 MG tablet Take 1 tablet by mouth daily 90 tablet 1    pantoprazole (PROTONIX) 40 MG tablet Take 1 tablet by mouth every morning (before breakfast) 30 tablet 5    aspirin 81 MG EC tablet Take 81 mg by mouth daily      Coenzyme Q10 (COQ-10) 100 MG CAPS Take by mouth 3 times daily        No current facility-administered medications for this visit. Social History     Socioeconomic History    Marital status:      Spouse name: Not on file    Number of children: Not on file    Years of education: Not on file    Highest education level: Not on file   Occupational History    Not on file   Tobacco Use    Smoking status: Never Smoker    Smokeless tobacco: Never Used   Vaping Use    Vaping Use: Never used   Substance and Sexual Activity    Alcohol use: Yes     Comment: occ.  Drug use: Never    Sexual activity: Yes     Partners: Female   Other Topics Concern    Not on file   Social History Narrative    Not on file     Social Determinants of Health     Financial Resource Strain:     Difficulty of Paying Living Expenses: Not on file   Food Insecurity:     Worried About Running Out of Food in the Last Year: Not on file    James of Food in the Last Year: Not on file   Transportation Needs:     Lack of Transportation (Medical): Not on file    Lack of Transportation (Non-Medical):  Not on file   Physical Activity:     Days of Exercise per Week: Not on file    Minutes of Exercise per Session: Not on file   Stress:     Feeling of Stress : Not on file   Social Connections:     Frequency of Communication with Friends and Family: Not on file    Frequency of Social Gatherings with Friends and Family: Not on file    Attends Baptism Services: Not on file    Active Member of Clubs or Organizations: Not on file    Attends Club or Organization Meetings: Not on file    Marital Status: Not on file   Intimate Partner Violence:     Fear of Current or Ex-Partner: Not on file   [facility] Emotionally Abused: Not on file    Physically Abused: Not on file    Sexually Abused: Not on file   Housing Stability:     Unable to Pay for Housing in the Last Year: Not on file    Number of Places Lived in the Last Year: Not on file    Unstable Housing in the Last Year: Not on file       History reviewed. No pertinent family history. Review of Systems:  Heart: as above   Lungs: as above   Eyes: denies changes in vision or discharge. Ears: denies changes in hearing or pain. Nose: denies epistaxis or masses   Throat: denies sore throat or trouble swallowing. Neuro: denies numbness, tingling, tremors. Skin: denies rashes or itching. : denies hematuria, dysuria   GI: denies vomiting, diarrhea   Psych: denies mood changed, anxiety, depression. All other systems negative. Physical Exam   /60   Pulse (!) 49   Resp 18   Ht 5' 8\" (1.727 m)   Wt 147 lb (66.7 kg)   BMI 22.35 kg/m²   Constitutional: Oriented to person, place, and time. Well-developed and well-nourished. No distress. Head: Normocephalic and atraumatic. Eyes: EOM are normal. Pupils are equal, round, and reactive to light. Neck: Normal range of motion. Neck supple. No hepatojugular reflux and no JVD present. Carotid bruit is not present. No tracheal deviation present. No thyromegaly present. Cardiovascular: Normal rate, regular rhythm, normal heart sounds and intact distal pulses. Exam reveals no gallop and no friction rub. No murmur heard. Pulmonary/Chest: Effort normal and breath sounds normal. No respiratory distress. No wheezes. No rales. No tenderness. Abdominal: Soft. Bowel sounds are normal. No distension and no mass. No tenderness. No rebound and no guarding. Musculoskeletal: Normal range of motion. No edema and no tenderness. Lymphadenopathy:   No cervical adenopathy. No groin adenopathy. Neurological: Alert and oriented to person, place, and time. Skin: Skin is warm and dry. No rash noted.  Not diaphoretic. No erythema. Psychiatric: Normal mood and affect. Behavior is normal.     EKG personally reviewed 06/29/22:  normal sinus rhythm, nonspecific ST and T waves changes, LBBB. Stress 4/28/2021 with partially reversible anteroapical (fixed) and septal (reversible) defect, LVEF 36% in [address] [facility] [address]. TTE: 9/23/2021 (Dr. Apoorva Finney):   Left ventricular internal dimensions were normal in diastole and systole.   Abnormal (paradoxical) motion consistent with left bundle branch block.   Normal left ventricular ejection fraction.   Mild thickening of the mitral valve leaflets with reduced leaflet excursion.   Mild centrally directed mitral regurgitation.   Mild tricuspid regurgitation. LVEF 55%     Echo Summary 10/5/2021:   Left ventricle size is normal.   Ejection fraction is visually estimated at 40+/-5%. LV apex is dyskinetic and mid to distal anterior and anteroseptal walls are severely hypokinetic. Normal left ventricle wall thickness. Stage I diastolic dysfunction. No LV mural thrombus. Normal right ventricular size and function. No hemodynamically significant aortic stenosis is present. Unable to estimate PA systolic pressure. No evidence for hemodynamically significant pericardial effusion. Technically difficult examination. Definity echo contrast was used to delineate endocardial borders. ASSESSMENT AND PLAN:  Patient Active Problem List   Diagnosis    CAD in native artery    Pulmonary nodules    Other hyperlipidemia    Essential hypertension    Gastroesophageal reflux disease with esophagitis without hemorrhage    S/P CABG (coronary artery bypass graft)    NGUY[address]N (acute kidney injury) ([address]    Right hemiparesis ([address]    Acute CVA (cerebrovascular accident) ([address]     1. CAD-CABG: CABG x 3 9/27/2021 (LIMA-LAD, SVG-OM, SVG-RCA). ASA/statin/BB. 2. ICMP:     Labs. Suspect fatigue is from volume contraction and dehydration. We likely need to moderate medications. BB/entresto/aldactone/lasix. 3. HTN: Observe. 4. Lipids: Statin. 5. Carotids: 50-69% B/l 10/2021. Reassess 1 year. 6. CVA: ASA/stain. 7. GI symptoms: If continues the GI evaluation. 8. CKD    Refugia BETH Juan.   Cardiologist  Cardiology, [facility]

## 2022-06-30 ENCOUNTER — TELEPHONE (OUTPATIENT)
Dept: CARDIOLOGY CLINIC | Age: 77
End: 2022-06-30

## 2022-06-30 NOTE — TELEPHONE ENCOUNTER
Patient notified of lab results, he said he took entresto bid for only 2days, he was dizzy this morning and passed out, he had nausea and bp was 84/48, he is asking if he can go back to once daily entresto, please advise

## 2022-07-06 ENCOUNTER — OFFICE VISIT (OUTPATIENT)
Dept: FAMILY MEDICINE CLINIC | Age: 77
End: 2022-07-06
Payer: MEDICARE

## 2022-07-06 VITALS
SYSTOLIC BLOOD PRESSURE: 130 MMHG | BODY MASS INDEX: 22.58 KG/M2 | DIASTOLIC BLOOD PRESSURE: 52 MMHG | HEART RATE: 57 BPM | HEIGHT: 68 IN | OXYGEN SATURATION: 99 % | TEMPERATURE: 97.6 F | RESPIRATION RATE: 18 BRPM | WEIGHT: 149 LBS

## 2022-07-06 DIAGNOSIS — I25.119 ATHEROSCLEROSIS OF NATIVE CORONARY ARTERY OF NATIVE HEART WITH ANGINA PECTORIS (HCC): ICD-10-CM

## 2022-07-06 DIAGNOSIS — E53.8 VITAMIN B12 DEFICIENCY: ICD-10-CM

## 2022-07-06 DIAGNOSIS — I25.119 ATHEROSCLEROSIS OF NATIVE CORONARY ARTERY OF NATIVE HEART WITH ANGINA PECTORIS (HCC): Primary | ICD-10-CM

## 2022-07-06 DIAGNOSIS — I50.9 CHRONIC CONGESTIVE HEART FAILURE, UNSPECIFIED HEART FAILURE TYPE (HCC): ICD-10-CM

## 2022-07-06 DIAGNOSIS — K21.00 GASTROESOPHAGEAL REFLUX DISEASE WITH ESOPHAGITIS WITHOUT HEMORRHAGE: ICD-10-CM

## 2022-07-06 DIAGNOSIS — I50.22 CHRONIC SYSTOLIC (CONGESTIVE) HEART FAILURE (HCC): ICD-10-CM

## 2022-07-06 DIAGNOSIS — E78.49 OTHER HYPERLIPIDEMIA: ICD-10-CM

## 2022-07-06 DIAGNOSIS — Z12.5 SCREENING FOR PROSTATE CANCER: ICD-10-CM

## 2022-07-06 LAB
CHOLESTEROL, TOTAL: 116 MG/DL (ref 0–199)
HDLC SERPL-MCNC: 45 MG/DL
LDL CHOLESTEROL CALCULATED: 57 MG/DL (ref 0–99)
PROSTATE SPECIFIC ANTIGEN: 16.07 NG/ML (ref 0–4)
TRIGL SERPL-MCNC: 71 MG/DL (ref 0–149)
TSH SERPL DL<=0.05 MIU/L-ACNC: 1.52 UIU/ML (ref 0.27–4.2)
VITAMIN B-12: >2000 PG/ML (ref 211–946)
VLDLC SERPL CALC-MCNC: 14 MG/DL

## 2022-07-06 PROCEDURE — 1123F ACP DISCUSS/DSCN MKR DOCD: CPT | Performed by: FAMILY MEDICINE

## 2022-07-06 PROCEDURE — 1036F TOBACCO NON-USER: CPT | Performed by: FAMILY MEDICINE

## 2022-07-06 PROCEDURE — G8420 CALC BMI NORM PARAMETERS: HCPCS | Performed by: FAMILY MEDICINE

## 2022-07-06 PROCEDURE — 99214 OFFICE O/P EST MOD 30 MIN: CPT | Performed by: FAMILY MEDICINE

## 2022-07-06 PROCEDURE — G8427 DOCREV CUR MEDS BY ELIG CLIN: HCPCS | Performed by: FAMILY MEDICINE

## 2022-07-06 PROCEDURE — 3017F COLORECTAL CA SCREEN DOC REV: CPT | Performed by: FAMILY MEDICINE

## 2022-07-06 RX ORDER — PANTOPRAZOLE SODIUM 40 MG/1
40 TABLET, DELAYED RELEASE ORAL
Qty: 30 TABLET | Refills: 5 | Status: SHIPPED
Start: 2022-07-06 | End: 2022-08-11

## 2022-07-06 SDOH — ECONOMIC STABILITY: FOOD INSECURITY: WITHIN THE PAST 12 MONTHS, THE FOOD YOU BOUGHT JUST DIDN'T LAST AND YOU DIDN'T HAVE MONEY TO GET MORE.: NEVER TRUE

## 2022-07-06 SDOH — ECONOMIC STABILITY: FOOD INSECURITY: WITHIN THE PAST 12 MONTHS, YOU WORRIED THAT YOUR FOOD WOULD RUN OUT BEFORE YOU GOT MONEY TO BUY MORE.: NEVER TRUE

## 2022-07-06 ASSESSMENT — ENCOUNTER SYMPTOMS
WHEEZING: 0
BLOOD IN STOOL: 0
BACK PAIN: 0
SORE THROAT: 0
CONSTIPATION: 0
ABDOMINAL PAIN: 0
TROUBLE SWALLOWING: 0
DIARRHEA: 0
SHORTNESS OF BREATH: 0
EYE REDNESS: 0
SINUS PAIN: 0
COUGH: 0
PHOTOPHOBIA: 0
VOMITING: 0

## 2022-07-06 ASSESSMENT — PATIENT HEALTH QUESTIONNAIRE - PHQ9
SUM OF ALL RESPONSES TO PHQ QUESTIONS 1-9: 0
SUM OF ALL RESPONSES TO PHQ9 QUESTIONS 1 & 2: 0
2. FEELING DOWN, DEPRESSED OR HOPELESS: 0
1. LITTLE INTEREST OR PLEASURE IN DOING THINGS: 0
SUM OF ALL RESPONSES TO PHQ QUESTIONS 1-9: 0

## 2022-07-06 ASSESSMENT — SOCIAL DETERMINANTS OF HEALTH (SDOH): HOW HARD IS IT FOR YOU TO PAY FOR THE VERY BASICS LIKE FOOD, HOUSING, MEDICAL CARE, AND HEATING?: NOT HARD AT ALL

## 2022-07-06 ASSESSMENT — LIFESTYLE VARIABLES
HOW MANY STANDARD DRINKS CONTAINING ALCOHOL DO YOU HAVE ON A TYPICAL DAY: 1 OR 2
HOW OFTEN DO YOU HAVE A DRINK CONTAINING ALCOHOL: MONTHLY OR LESS

## 2022-07-06 NOTE — PROGRESS NOTES
OFFICE NOTE    7/6/22  Name: Harika Maria  PTQ:6/60/7826   Sex:male   Age:77 y.o. SUBJECTIVE  Chief Complaint   Patient presents with    Discuss Medications       HPI comes in for checkup and refills. Sold their condo in [place], might buy something in [place] area not sure, some family and friends up in this area    Review of Systems   Constitutional: Positive for fatigue and unexpected weight change. Negative for appetite change and fever. HENT: Negative for congestion, ear pain, hearing loss, sinus pain, sore throat and trouble swallowing. Eyes: Negative for photophobia, redness and visual disturbance. Respiratory: Negative for cough, shortness of breath and wheezing. Cardiovascular: Negative for chest pain, palpitations and leg swelling. Had episode of flash pulmonary edema, hospitalized. Believes may have been salty food and stress of move   Gastrointestinal: Negative for abdominal pain, blood in stool, constipation, diarrhea and vomiting. Endocrine: Negative for cold intolerance, polydipsia and polyuria. Genitourinary: Positive for urgency. Negative for difficulty urinating, genital sores and hematuria. Musculoskeletal: Negative for arthralgias, back pain and joint swelling. Allergic/Immunologic: Negative for food allergies. Neurological: Negative for dizziness, tremors, seizures, syncope, numbness and headaches. Hematological: Negative for adenopathy. Does not bruise/bleed easily. Psychiatric/Behavioral: Negative for agitation, dysphoric mood, hallucinations and sleep disturbance. The patient is nervous/anxious. All other systems reviewed and are negative.            Current Outpatient Medications:     pantoprazole (PROTONIX) 40 MG tablet, Take 1 tablet by mouth every morning (before breakfast), Disp: 30 tablet, Rfl: 5    Ascorbic Acid (VITAMIN C PO), Take 750 mg by mouth, Disp: , Rfl:     Magnesium 100 MG CAPS, Take by mouth 2 times daily, Disp: , Rfl:     Zinc Sulfate (ZINC 15) 66 MG TABS, Take by mouth, Disp: , Rfl:     vitamin B-12 (CYANOCOBALAMIN) 1000 MCG tablet, Take 1,000 mcg by mouth daily, Disp: , Rfl:     carvedilol (COREG) 6.25 MG tablet, Take 1 tablet by mouth 2 times daily (with meals), Disp: 180 tablet, Rfl: 3    furosemide (LASIX) 20 MG tablet, Take 1 tablet by mouth daily, Disp: 90 tablet, Rfl: 3    sacubitril-valsartan (ENTRESTO) 24-26 MG per tablet, Take 1 tablet by mouth 2 times daily (Patient taking differently: Take 1 tablet by mouth once ), Disp: 180 tablet, Rfl: 3    clopidogrel (PLAVIX) 75 MG tablet, Take 1 tablet by mouth daily, Disp: 90 tablet, Rfl: 1    atorvastatin (LIPITOR) 40 MG tablet, Take 1 tablet by mouth daily, Disp: 90 tablet, Rfl: 1    aspirin 81 MG EC tablet, Take 81 mg by mouth daily, Disp: , Rfl:     Coenzyme Q10 (COQ-10) 100 MG CAPS, Take by mouth 3 times daily , Disp: , Rfl:     isosorbide mononitrate (IMDUR) 30 MG extended release tablet, Take 1 tablet by mouth daily (Patient not taking: Reported on 7/6/2022), Disp: 30 tablet, Rfl: 3    spironolactone (ALDACTONE) 25 MG tablet, Take 1 tablet by mouth daily (Patient not taking: Reported on 7/6/2022), Disp: 90 tablet, Rfl: 3  Allergies   Allergen Reactions    Dust Mite Extract     Onion     Seasonal        Past Medical History:   Diagnosis Date    CAD (coronary artery disease)     Encounter regarding vascular access for dialysis for ESRD ([facility] 10/1/2021    Hiatal hernia     Hyperlipidemia     Hypertension      Past Surgical History:   Procedure Laterality Date    BRONCHOSCOPY N/A 9/30/2021    BRONCHOSCOPY DIAGNOSTIC OR CELL [address] ONLY performed by Luis Leblanc MD at [address] [facility]  9/30/2021    BRONCHOSCOPY CRYOTHERAPY/LASER performed by Luis Leblanc MD at [address] [facility] N/A 9/27/2021    CABG CORONARY ARTERY BYPASS  X 3 EVH, EMMA performed by Kylie Warren MD at [facility]     No family history on file. Social History     Tobacco History     Smoking Status  Never Smoker    Smokeless Tobacco Use  Never Used          Alcohol History     Alcohol Use Status  Yes Comment  occ. Drug Use     Drug Use Status  Never          Sexual Activity     Sexually Active  Yes Partners  Female                OBJECTIVE  Vitals:    07/06/22 0939   BP: (!) 130/52   Pulse: 57   Resp: 18   Temp: 97.6 °F (36.4 °C)   TempSrc: Temporal   SpO2: 99%   Weight: 149 lb (67.6 kg)   Height: 5' 8\" (1.727 m)        Body mass index is 22.66 kg/m². Orders Placed This Encounter   Procedures    Lipid Panel     Standing Status:   Future     Standing Expiration Date:   7/6/2023    TSH     Standing Status:   Future     Standing Expiration Date:   7/6/2023    PSA Screening     Standing Status:   Future     Standing Expiration Date:   7/6/2023    Vitamin B12     Standing Status:   Future     Standing Expiration Date:   7/6/2023        EXAM   Physical Exam  Vitals and nursing note reviewed. Constitutional:       Appearance: Normal appearance. He is normal weight. HENT:      Right Ear: Tympanic membrane and external ear normal.      Left Ear: Tympanic membrane and external ear normal.      Nose: Congestion present. Mouth/Throat:      Pharynx: Oropharynx is clear. No posterior oropharyngeal erythema. Eyes:      General: No scleral icterus. Conjunctiva/sclera: Conjunctivae normal.      Pupils: Pupils are equal, round, and reactive to light. Neck:      Comments: Mild JVD  Cardiovascular:      Rate and Rhythm: Regular rhythm. Bradycardia present. Pulses: Normal pulses. Heart sounds: No murmur heard. Pulmonary:      Effort: Pulmonary effort is normal.      Breath sounds: No wheezing or rales. Abdominal:      General: Bowel sounds are normal.      Palpations: There is no mass. Tenderness: There is no abdominal tenderness. There is no guarding.    Musculoskeletal:         General: Normal range of motion. Cervical back: No tenderness. Right lower leg: No edema. Left lower leg: No edema. Lymphadenopathy:      Cervical: No cervical adenopathy. Skin:     Coloration: Skin is not jaundiced or pale. Findings: No bruising or rash. Neurological:      General: No focal deficit present. Mental Status: He is alert and oriented to person, place, and time. Psychiatric:         Mood and Affect: Mood normal.         Behavior: Behavior normal.           Alan Ko was seen today for discuss medications. Diagnoses and all orders for this visit:    Atherosclerosis of native coronary artery of native heart with angina pectoris ([facility] [address]  -     Lipid Panel; Future  Seeing Dr Lucy Fierro now. Was taken off Aldactone and put on Lasix for 2-3 lb weight gain in 1 week  Chronic congestive heart failure, unspecified heart failure type ([facility] [address]  Believe he is compensated but not a lot of reserve. Will be following up with Dr. Lucy Fierro  Chronic systolic (congestive) heart failure    Gastroesophageal reflux disease with esophagitis without hemorrhage  -     pantoprazole (PROTONIX) 40 MG tablet; Take 1 tablet by mouth every morning (before breakfast)  -     Vitamin B12; Future    Other hyperlipidemia  -     Lipid Panel; Future  -     TSH; Future    Screening for prostate cancer  -     PSA Screening; Future    Vitamin B12 deficiency  -     Vitamin B12; Future  BP still a little low. Suggested Coreg 6.25, reduce to 1/2 in AM full in PM        No follow-ups on file.     Electronically signed by Tasia Cortez MD on 7/6/22 at 10:17 AM EDT

## 2022-07-07 ENCOUNTER — PATIENT MESSAGE (OUTPATIENT)
Dept: FAMILY MEDICINE CLINIC | Age: 77
End: 2022-07-07

## 2022-07-07 DIAGNOSIS — R97.20 PSA ELEVATION: Primary | ICD-10-CM

## 2022-07-08 NOTE — TELEPHONE ENCOUNTER
From: Kenyetta Reyna  Sent: 7/8/2022 1:30 PM EDT  To: [facility] Clinical Pool  Subject: results    We are in [facility] permanently.  Whoever Dr. Luis Schofield recommends

## 2022-07-19 ENCOUNTER — PATIENT MESSAGE (OUTPATIENT)
Dept: FAMILY MEDICINE CLINIC | Age: 77
End: 2022-07-19

## 2022-07-19 RX ORDER — LANOLIN ALCOHOL/MO/W.PET/CERES
1000 CREAM (GRAM) TOPICAL DAILY
Qty: 30 TABLET | Refills: 11 | Status: SHIPPED | OUTPATIENT
Start: 2022-07-19

## 2022-07-19 NOTE — TELEPHONE ENCOUNTER
From: Albert Melgar  To: Dr. Staci Arroyo: 7/19/2022 9:31 AM EDT  Subject: Prescription refill    Need new prescription called in to [address] [place] [address] Vitamin B-12 [address] [place] [address].  Thank you

## 2022-07-21 LAB — PROSTATE SPECIFIC ANTIGEN: 6.37 NG/ML (ref 0–4)

## 2022-08-11 ENCOUNTER — OFFICE VISIT (OUTPATIENT)
Dept: CARDIOLOGY CLINIC | Age: 77
End: 2022-08-11
Payer: MEDICARE

## 2022-08-11 VITALS
BODY MASS INDEX: 22.75 KG/M2 | RESPIRATION RATE: 16 BRPM | HEART RATE: 57 BPM | SYSTOLIC BLOOD PRESSURE: 106 MMHG | DIASTOLIC BLOOD PRESSURE: 60 MMHG | WEIGHT: 150.1 LBS | HEIGHT: 68 IN

## 2022-08-11 DIAGNOSIS — I25.10 CAD IN NATIVE ARTERY: Primary | ICD-10-CM

## 2022-08-11 PROCEDURE — 99214 OFFICE O/P EST MOD 30 MIN: CPT | Performed by: INTERNAL MEDICINE

## 2022-08-11 PROCEDURE — 1123F ACP DISCUSS/DSCN MKR DOCD: CPT | Performed by: INTERNAL MEDICINE

## 2022-08-11 PROCEDURE — G8428 CUR MEDS NOT DOCUMENT: HCPCS | Performed by: INTERNAL MEDICINE

## 2022-08-11 PROCEDURE — 1036F TOBACCO NON-USER: CPT | Performed by: INTERNAL MEDICINE

## 2022-08-11 PROCEDURE — 3017F COLORECTAL CA SCREEN DOC REV: CPT | Performed by: INTERNAL MEDICINE

## 2022-08-11 PROCEDURE — 93000 ELECTROCARDIOGRAM COMPLETE: CPT | Performed by: INTERNAL MEDICINE

## 2022-08-11 PROCEDURE — G8420 CALC BMI NORM PARAMETERS: HCPCS | Performed by: INTERNAL MEDICINE

## 2022-08-11 RX ORDER — CARVEDILOL 3.12 MG/1
3.12 TABLET ORAL 2 TIMES DAILY WITH MEALS
Qty: 180 TABLET | Refills: 3 | Status: SHIPPED | OUTPATIENT
Start: 2022-08-11

## 2022-08-11 RX ORDER — CARVEDILOL 3.12 MG/1
3.12 TABLET ORAL DAILY
COMMUNITY
End: 2022-08-11

## 2022-08-11 NOTE — PROGRESS NOTES
CHIEF COMPLAINT: CAD/Chest pain    HISTORY OF PRESENT ILLNESS: Patient is a 78 y.o. male seen at the request of Zoraida Gottlieb MD.      Patient with long standing known CAD reported by 2007 cath at which time he had a  LAD. Was active and without issues for years. Hx of CABG with complicated course with CVA. Recently admitted in [place] for SOB and noted to be in CHF. Cardiac MRI reported LVEF 38% with a small area of ischemia in the inferoapical area. Doing better with medical therapy volume-wise. Noting fatigue. No CP or SOB currently, but having frequent belching. Past Medical History:   Diagnosis Date    CAD (coronary artery disease)     Encounter regarding vascular access for dialysis for ESRD ([facility] [address] [facility] 10/1/2021    Hiatal hernia     Hyperlipidemia     Hypertension        Patient Active Problem List   Diagnosis    CAD in native artery    Pulmonary nodules    Other hyperlipidemia    Essential hypertension    Gastroesophageal reflux disease with esophagitis without hemorrhage    S/P CABG (coronary artery bypass graft)    NGUYỄN (acute kidney injury) ([facility] [address] [facility]    Acute CVA (cerebrovascular accident) ([facility] [facility]    Angina pectoris, unspecified    Atherosclerotic heart disease of native coronary artery with unspecified angina pectoris    Chronic congestive heart failure, unspecified heart failure type (HCC)       Allergies   Allergen Reactions    Dust Mite Extract     Onion     Seasonal        Current Outpatient Medications   Medication Sig Dispense Refill    carvedilol (COREG) 3.125 MG tablet Take 3.125 mg by mouth in the morning. vitamin B-12 (CYANOCOBALAMIN) 1000 MCG tablet Take 1 tablet by mouth in the morning.  30 tablet 11    Ascorbic Acid (VITAMIN C PO) Take 750 mg by mouth      Magnesium 100 MG CAPS Take by mouth 2 times daily      Zinc Sulfate (ZINC 15) 66 MG TABS Take by mouth      carvedilol (COREG) 6.25 MG tablet Take 1 tablet by mouth 2 times daily (with meals) (Patient taking differently: Take 6.25 mg by mouth at bedtime) 180 tablet 3    furosemide (LASIX) 20 MG tablet Take 1 tablet by mouth daily (Patient taking differently: Take 20 mg by mouth in the morning. Pt takes an extra 20 mg PRN. ) 90 tablet 3    sacubitril-valsartan (ENTRESTO) 24-26 MG per tablet Take 1 tablet by mouth 2 times daily (Patient taking differently: Take 1 tablet by mouth at bedtime) 180 tablet 3    clopidogrel (PLAVIX) 75 MG tablet Take 1 tablet by mouth daily 90 tablet 1    atorvastatin (LIPITOR) 40 MG tablet Take 1 tablet by mouth daily 90 tablet 1    aspirin 81 MG EC tablet Take 81 mg by mouth daily      Coenzyme Q10 (COQ-10) 100 MG CAPS Take 100 mg by mouth daily      pantoprazole (PROTONIX) 40 MG tablet Take 1 tablet by mouth every morning (before breakfast) (Patient not taking: Reported on 8/11/2022) 30 tablet 5    isosorbide mononitrate (IMDUR) 30 MG extended release tablet Take 1 tablet by mouth daily (Patient not taking: No sig reported) 30 tablet 3    spironolactone (ALDACTONE) 25 MG tablet Take 1 tablet by mouth daily (Patient not taking: No sig reported) 90 tablet 3     No current facility-administered medications for this visit. Social History     Socioeconomic History    Marital status:      Spouse name: Not on file    Number of children: Not on file    Years of education: Not on file    Highest education level: Not on file   Occupational History    Not on file   Tobacco Use    Smoking status: Never    Smokeless tobacco: Never   Vaping Use    Vaping Use: Never used   Substance and Sexual Activity    Alcohol use: Yes     Comment: occ.     Drug use: Never    Sexual activity: Yes     Partners: Female   Other Topics Concern    Not on file   Social History Narrative    Not on file     Social Determinants of Health     Financial Resource Strain: Low Risk     Difficulty of Paying Living Expenses: Not hard at all   Food Insecurity: No Food Insecurity    Worried About [address] in the Last Year: Never true    Ran Out of Food in the Last Year: Never true   Transportation Needs: Not on file   Physical Activity: Not on file   Stress: Not on file   Social Connections: Not on file   Intimate Partner Violence: Not on file   Housing Stability: Not on file       No family history on file. Review of Systems:  Heart: as above   Lungs: as above   Eyes: denies changes in vision or discharge. Ears: denies changes in hearing or pain. Nose: denies epistaxis or masses   Throat: denies sore throat or trouble swallowing. Neuro: denies numbness, tingling, tremors. Skin: denies rashes or itching. : denies hematuria, dysuria   GI: denies vomiting, diarrhea   Psych: denies mood changed, anxiety, depression. All other systems negative. Physical Exam   /60   Pulse 57   Resp 16   Ht 5' 8\" (1.727 m)   Wt 150 lb 1.6 oz (68.1 kg)   BMI 22.82 kg/m²   Constitutional: Oriented to person, place, and time. Well-developed and well-nourished. No distress. Head: Normocephalic and atraumatic. Eyes: EOM are normal. Pupils are equal, round, and reactive to light. Neck: Normal range of motion. Neck supple. No hepatojugular reflux and no JVD present. Carotid bruit is not present. No tracheal deviation present. No thyromegaly present. Cardiovascular: Normal rate, regular rhythm, normal heart sounds and intact distal pulses. Exam reveals no gallop and no friction rub. No murmur heard. Pulmonary/Chest: Effort normal and breath sounds normal. No respiratory distress. No wheezes. No rales. No tenderness. Abdominal: Soft. Bowel sounds are normal. No distension and no mass. No tenderness. No rebound and no guarding. Musculoskeletal: Normal range of motion. No edema and no tenderness. Lymphadenopathy:   No cervical adenopathy. No groin adenopathy. Neurological: Alert and oriented to person, place, and time. Skin: Skin is warm and dry. No rash noted. Not diaphoretic. No erythema.    Psychiatric: Normal mood and affect. Behavior is normal.     EKG personally reviewed 08/11/22:  normal sinus rhythm, nonspecific ST and T waves changes, LBBB. Stress 4/28/2021 with partially reversible anteroapical (fixed) and septal (reversible) defect, LVEF 36% in [address]. TTE: 9/23/2021 (Dr. Jason Owens):   Left ventricular internal dimensions were normal in diastole and systole. Abnormal (paradoxical) motion consistent with left bundle branch block. Normal left ventricular ejection fraction. Mild thickening of the mitral valve leaflets with reduced leaflet excursion. Mild centrally directed mitral regurgitation. Mild tricuspid regurgitation. LVEF 55%     Echo Summary 10/5/2021:   Left ventricle size is normal.   Ejection fraction is visually estimated at 40+/-5%. LV apex is dyskinetic and mid to distal anterior and anteroseptal walls are severely hypokinetic. Normal left ventricle wall thickness. Stage I diastolic dysfunction. No LV mural thrombus. Normal right ventricular size and function. No hemodynamically significant aortic stenosis is present. Unable to estimate PA systolic pressure. No evidence for hemodynamically significant pericardial effusion. Technically difficult examination. Definity echo contrast was used to delineate endocardial borders. ASSESSMENT AND PLAN:  Patient Active Problem List   Diagnosis    CAD in native artery    Pulmonary nodules    Other hyperlipidemia    Essential hypertension    Gastroesophageal reflux disease with esophagitis without hemorrhage    S/P CABG (coronary artery bypass graft)    NGUYỄN (acute kidney injury) ([address]    Acute CVA (cerebrovascular accident) ([address]    Angina pectoris, unspecified    Atherosclerotic heart disease of native coronary artery with unspecified angina pectoris    Chronic congestive heart failure, unspecified heart failure type ([address]     1. CAD-CABG: CABG x 3 9/27/2021 (LIMA-LAD, SVG-OM, SVG-RCA). ASA/statin/BB.     2. ICMP:

## 2022-08-18 LAB — PROSTATE SPECIFIC ANTIGEN: 3.69 NG/ML (ref 0–4)

## 2022-08-29 ENCOUNTER — PATIENT MESSAGE (OUTPATIENT)
Dept: FAMILY MEDICINE CLINIC | Age: 77
End: 2022-08-29

## 2022-08-29 RX ORDER — CLOPIDOGREL BISULFATE 75 MG/1
75 TABLET ORAL DAILY
Qty: 90 TABLET | Refills: 1 | Status: SHIPPED | OUTPATIENT
Start: 2022-08-29

## 2022-08-29 RX ORDER — ATORVASTATIN CALCIUM 40 MG/1
40 TABLET, FILM COATED ORAL DAILY
Qty: 90 TABLET | Refills: 1 | Status: SHIPPED | OUTPATIENT
Start: 2022-08-29

## 2022-08-31 RX ORDER — CARVEDILOL 3.12 MG/1
TABLET ORAL
Qty: 180 TABLET | Refills: 3 | OUTPATIENT
Start: 2022-08-31

## 2022-10-31 ENCOUNTER — TELEPHONE (OUTPATIENT)
Dept: CARDIOLOGY CLINIC | Age: 77
End: 2022-10-31

## 2022-10-31 DIAGNOSIS — R07.9 CHEST PAIN, UNSPECIFIED TYPE: Primary | ICD-10-CM

## 2022-10-31 NOTE — TELEPHONE ENCOUNTER
[facility]  [facility] Clinical Staff (supporting Dante Carlos, DO    About 6 weeks ago I was able to walk 2 miles a day without a problem. Now I get a tight chest and have to expel air trying to walk 1/4 mile. Any physical action causes a problem and heart rate rises above 100. Blood pressure averages 125/65. Concerned about change. Any suggestions? Do I need to make an appointment.

## 2022-10-31 NOTE — TELEPHONE ENCOUNTER
Please arrange a pharm stress (he is able to walk but has a LBBB). Dx Chest pain/CAD-CABG. Ov thereafter please. Livan Rodrigues D.O.   Cardiologist  Cardiology, [facility]

## 2022-11-04 ENCOUNTER — TELEPHONE (OUTPATIENT)
Dept: CARDIOLOGY | Age: 77
End: 2022-11-04

## 2022-11-08 ENCOUNTER — HOSPITAL ENCOUNTER (OUTPATIENT)
Dept: CARDIOLOGY | Age: 77
Discharge: HOME | End: 2022-11-08
Payer: MEDICARE

## 2022-11-08 ENCOUNTER — TELEPHONE (OUTPATIENT)
Dept: CARDIOLOGY CLINIC | Age: 77
End: 2022-11-08

## 2022-11-08 VITALS
DIASTOLIC BLOOD PRESSURE: 60 MMHG | HEIGHT: 68 IN | SYSTOLIC BLOOD PRESSURE: 130 MMHG | HEART RATE: 64 BPM | BODY MASS INDEX: 22.13 KG/M2 | WEIGHT: 146 LBS

## 2022-11-08 DIAGNOSIS — R07.9 CHEST PAIN, UNSPECIFIED TYPE: Primary | ICD-10-CM

## 2022-11-08 PROCEDURE — 6360000002 HC RX W HCPCS: Performed by: INTERNAL MEDICINE

## 2022-11-08 PROCEDURE — 3430000000 HC RX DIAGNOSTIC RADIOPHARMACEUTICAL: Performed by: INTERNAL MEDICINE

## 2022-11-08 PROCEDURE — 93017 CV STRESS TEST TRACING ONLY: CPT

## 2022-11-08 PROCEDURE — 78452 HT MUSCLE IMAGE SPECT MULT: CPT

## 2022-11-08 PROCEDURE — 2580000003 HC RX 258: Performed by: INTERNAL MEDICINE

## 2022-11-08 PROCEDURE — A9500 TC99M SESTAMIBI: HCPCS | Performed by: INTERNAL MEDICINE

## 2022-11-08 RX ORDER — SODIUM CHLORIDE 0.9 % (FLUSH) 0.9 %
10 SYRINGE (ML) INJECTION PRN
Status: DISCONTINUED | OUTPATIENT
Start: 2022-11-08 | End: 2022-11-09 | Stop reason: HOSPADM

## 2022-11-08 RX ORDER — ISOSORBIDE MONONITRATE 30 MG/1
30 TABLET, EXTENDED RELEASE ORAL DAILY
COMMUNITY

## 2022-11-08 RX ADMIN — SODIUM CHLORIDE, PRESERVATIVE FREE 10 ML: 5 INJECTION INTRAVENOUS at 09:25

## 2022-11-08 RX ADMIN — Medication 9.8 MILLICURIE: at 08:05

## 2022-11-08 RX ADMIN — REGADENOSON 0.4 MG: 0.08 INJECTION, SOLUTION INTRAVENOUS at 09:24

## 2022-11-08 RX ADMIN — SODIUM CHLORIDE, PRESERVATIVE FREE 10 ML: 5 INJECTION INTRAVENOUS at 08:05

## 2022-11-08 RX ADMIN — SODIUM CHLORIDE, PRESERVATIVE FREE 10 ML: 5 INJECTION INTRAVENOUS at 09:24

## 2022-11-08 RX ADMIN — Medication 32.4 MILLICURIE: at 09:25

## 2022-11-08 NOTE — PROCEDURES
[address] [address] [facility] [address]  [phone]                Pharmacologic Stress Nuclear Gated SPECT Study    Name: Jeffrey Account Number: [de-identified]    :  1945          Sex: male         Date of Study:  2022    Height: 5' 8\" (172.7 cm)         Weight: 146 lb (66.2 kg)     Ordering Provider: Marisa Sol DO          PCP: Ozzie Spring MD      Cardiologist: Marisa Sol DO             Interpreting Physician: Zulma Suarez MD  _________________________________________________________________________________    Indication:   Evaluation of extent and severity of coronary artery disease    Clinical History:   Patient has prior history of coronary artery disease. Procedure:   Pharmacologic stress testing was performed with regadenoson 0.4 mg for 15 seconds. Additionally, low-level exercise was performed along with the infusion. The heart rate was 64 at baseline and jodi to 99 beats during the infusion. This corresponds to 68% of maximum predicted heart rate. The blood pressure at baseline was 130/60 and blood pressure at the end of infusion was 128/72. Blood pressure response was normal during the stress procedure. The patient experienced abdominal cramping only during the infusion. ECG during the infusion  LBBB . IMAGING: Myocardial perfusion imaging was performed at rest 30-35 minutes following the intravenous injection of 9.8 mCi of (Tc-Sestamibi) followed by 10 ml of Normal Saline. As per infusion protocol, the patient was injected intravenously with 32.4 mCi of (Tc-Sestamibi) followed by 10 ml of Normal Saline. Gated post-stress tomographic imaging was performed 20-25 minutes after stress. FINDINGS: The overall quality of the study was excellent.      Left ventricular cavity size was noted to be normal.    Rotational analog analysis demonstrated no patient motion or abnormal extracardiac radioactivity. A moderate defect was present in the basal inferoseptal, basal inferior, mid inferoseptal, mid inferior, apical septal, apical inferior, and apex wall(s) that was  large sized by quantification. The resting images reveal partial reversibility. Gated SPECT left ventricular ejection fraction was calculated to be 42%, with  paradoxical septal motion . Impression:    ECG during the infusion  LBBB . The myocardial perfusion imaging was abnormal.    Overall left ventricular systolic function was abnormal with regional wall motion abnormalities. Intermediate risk general pharmacologic nuclear stress test.    Thank you for sending your patient to this [employer].      Electronically signed by Derian Simons MD on 11/8/22 at 10:54 AM EST

## 2022-11-08 NOTE — DISCHARGE INSTRUCTIONS
[address]      Instructions to Patients    The following are the instructions for patients who have had a procedure in our office today. Patient name: Caty Potter    Radionuclide Activity: 40mCi of 99mTc-Sestamibi    Date Administered: 11/8/2022    Expires: 48 hours after scheduled appointment time      Patient may resume normal activity unless otherwise instructed. Patient may resume medications as normal.  If the need should arise, patient may call [phone] between the hours of 8:00am-5:00pm.  After hours there is at least one physician on-call at all times for those patients needing assistance. Patients may call [phone] and the answering service will direct the patient to one of our physicians for assistance. After the patient's test if they are going to be leaving from an airport in the near future they should take this letter with them to verify the test and radionuclide used for their test.      This letter verifies that the above named bearer received an injection of a radionuclide for medical purpose/usage only.         Electronically signed by Leopoldo Robert on 11/8/2022 at 9:23 AM

## 2022-11-08 NOTE — TELEPHONE ENCOUNTER
----- Message from Juliane Beltran DO sent at 11/8/2022 12:20 PM EST -----  Stress suggests a medium sized abnormality in the bottom and tip of heart that shows some area that is not getting enough blood and the remaining suggesting to be scar. This is not high risk overall. Given the underlying kidney issues and significant strain to them that would be caused, would recommend medical management for time being. If symptoms do not improve or worsen then we would consider cath. Add imdur 30 mg daily. Keep appt on 11/22/2022.
Left message for patient to call the office.
Patient was notified and understood instructions. Patient did take imdur 30mg previously and still has a refill.
,DirectAddress_Unknown

## 2022-11-09 ENCOUNTER — OFFICE VISIT (OUTPATIENT)
Dept: FAMILY MEDICINE CLINIC | Age: 77
End: 2022-11-09
Payer: MEDICARE

## 2022-11-09 VITALS
HEART RATE: 67 BPM | WEIGHT: 154 LBS | DIASTOLIC BLOOD PRESSURE: 62 MMHG | RESPIRATION RATE: 18 BRPM | SYSTOLIC BLOOD PRESSURE: 122 MMHG | HEIGHT: 68 IN | TEMPERATURE: 96.9 F | BODY MASS INDEX: 23.34 KG/M2 | OXYGEN SATURATION: 98 %

## 2022-11-09 DIAGNOSIS — I10 ESSENTIAL HYPERTENSION: ICD-10-CM

## 2022-11-09 DIAGNOSIS — E78.49 OTHER HYPERLIPIDEMIA: ICD-10-CM

## 2022-11-09 DIAGNOSIS — K21.00 GASTROESOPHAGEAL REFLUX DISEASE WITH ESOPHAGITIS WITHOUT HEMORRHAGE: ICD-10-CM

## 2022-11-09 DIAGNOSIS — Z00.00 INITIAL MEDICARE ANNUAL WELLNESS VISIT: Primary | ICD-10-CM

## 2022-11-09 DIAGNOSIS — I25.10 CAD IN NATIVE ARTERY: ICD-10-CM

## 2022-11-09 PROBLEM — I50.9 CHRONIC CONGESTIVE HEART FAILURE, UNSPECIFIED HEART FAILURE TYPE (HCC): Status: RESOLVED | Noted: 2022-07-06 | Resolved: 2022-11-09

## 2022-11-09 LAB
ALBUMIN SERPL-MCNC: 4.1 G/DL (ref 3.5–5.2)
ALP BLD-CCNC: 86 U/L (ref 40–129)
ALT SERPL-CCNC: 13 U/L (ref 0–40)
ANION GAP SERPL CALCULATED.3IONS-SCNC: 11 MMOL/L (ref 7–16)
AST SERPL-CCNC: 18 U/L (ref 0–39)
BASOPHILS ABSOLUTE: 0.04 E9/L (ref 0–0.2)
BASOPHILS RELATIVE PERCENT: 0.5 % (ref 0–2)
BILIRUB SERPL-MCNC: 0.7 MG/DL (ref 0–1.2)
BUN BLDV-MCNC: 24 MG/DL (ref 6–23)
CALCIUM SERPL-MCNC: 9.1 MG/DL (ref 8.6–10.2)
CHLORIDE BLD-SCNC: 105 MMOL/L (ref 98–107)
CO2: 27 MMOL/L (ref 22–29)
CREAT SERPL-MCNC: 1.4 MG/DL (ref 0.7–1.2)
CREATININE URINE: 292 MG/DL (ref 40–278)
EOSINOPHILS ABSOLUTE: 0.15 E9/L (ref 0.05–0.5)
EOSINOPHILS RELATIVE PERCENT: 1.7 % (ref 0–6)
GFR SERPL CREATININE-BSD FRML MDRD: 52 ML/MIN/1.73
GLUCOSE BLD-MCNC: 60 MG/DL (ref 74–99)
HCT VFR BLD CALC: 40.8 % (ref 37–54)
HEMOGLOBIN: 13 G/DL (ref 12.5–16.5)
IMMATURE GRANULOCYTES #: 0.02 E9/L
IMMATURE GRANULOCYTES %: 0.2 % (ref 0–5)
LYMPHOCYTES ABSOLUTE: 1.01 E9/L (ref 1.5–4)
LYMPHOCYTES RELATIVE PERCENT: 11.5 % (ref 20–42)
MAGNESIUM: 2.2 MG/DL (ref 1.6–2.6)
MCH RBC QN AUTO: 31.6 PG (ref 26–35)
MCHC RBC AUTO-ENTMCNC: 31.9 % (ref 32–34.5)
MCV RBC AUTO: 99 FL (ref 80–99.9)
MICROALBUMIN UR-MCNC: 32.5 MG/L
MICROALBUMIN/CREAT UR-RTO: 11.1 (ref 0–30)
MONOCYTES ABSOLUTE: 0.6 E9/L (ref 0.1–0.95)
MONOCYTES RELATIVE PERCENT: 6.8 % (ref 2–12)
NEUTROPHILS ABSOLUTE: 6.94 E9/L (ref 1.8–7.3)
NEUTROPHILS RELATIVE PERCENT: 79.3 % (ref 43–80)
PDW BLD-RTO: 14.1 FL (ref 11.5–15)
PHOSPHORUS: 2.8 MG/DL (ref 2.5–4.5)
PLATELET # BLD: 145 E9/L (ref 130–450)
PMV BLD AUTO: 11.5 FL (ref 7–12)
POTASSIUM SERPL-SCNC: 4.7 MMOL/L (ref 3.5–5)
PROTEIN PROTEIN: 18 MG/DL (ref 0–12)
PROTEIN/CREAT RATIO: 0.1
PROTEIN/CREAT RATIO: 0.1 (ref 0–0.2)
RBC # BLD: 4.12 E12/L (ref 3.8–5.8)
SODIUM BLD-SCNC: 143 MMOL/L (ref 132–146)
TOTAL PROTEIN: 6.4 G/DL (ref 6.4–8.3)
URIC ACID, SERUM: 5.5 MG/DL (ref 3.4–7)
WBC # BLD: 8.8 E9/L (ref 4.5–11.5)

## 2022-11-09 PROCEDURE — 1123F ACP DISCUSS/DSCN MKR DOCD: CPT | Performed by: FAMILY MEDICINE

## 2022-11-09 PROCEDURE — G0438 PPPS, INITIAL VISIT: HCPCS | Performed by: FAMILY MEDICINE

## 2022-11-09 PROCEDURE — 3078F DIAST BP <80 MM HG: CPT | Performed by: FAMILY MEDICINE

## 2022-11-09 PROCEDURE — G8484 FLU IMMUNIZE NO ADMIN: HCPCS | Performed by: FAMILY MEDICINE

## 2022-11-09 PROCEDURE — 3017F COLORECTAL CA SCREEN DOC REV: CPT | Performed by: FAMILY MEDICINE

## 2022-11-09 PROCEDURE — 3074F SYST BP LT 130 MM HG: CPT | Performed by: FAMILY MEDICINE

## 2022-11-09 ASSESSMENT — ENCOUNTER SYMPTOMS
COUGH: 0
BACK PAIN: 0
SINUS PAIN: 0
VOMITING: 0
ABDOMINAL PAIN: 0
WHEEZING: 0
PHOTOPHOBIA: 0
SHORTNESS OF BREATH: 1
SORE THROAT: 0
EYE REDNESS: 0
TROUBLE SWALLOWING: 0
CONSTIPATION: 0
DIARRHEA: 0
BLOOD IN STOOL: 0

## 2022-11-09 ASSESSMENT — PATIENT HEALTH QUESTIONNAIRE - PHQ9
SUM OF ALL RESPONSES TO PHQ QUESTIONS 1-9: 0
SUM OF ALL RESPONSES TO PHQ9 QUESTIONS 1 & 2: 0
SUM OF ALL RESPONSES TO PHQ QUESTIONS 1-9: 0
1. LITTLE INTEREST OR PLEASURE IN DOING THINGS: 0
2. FEELING DOWN, DEPRESSED OR HOPELESS: 0
SUM OF ALL RESPONSES TO PHQ QUESTIONS 1-9: 0
SUM OF ALL RESPONSES TO PHQ QUESTIONS 1-9: 0

## 2022-11-09 ASSESSMENT — LIFESTYLE VARIABLES
HOW OFTEN DO YOU HAVE A DRINK CONTAINING ALCOHOL: MONTHLY OR LESS
HOW MANY STANDARD DRINKS CONTAINING ALCOHOL DO YOU HAVE ON A TYPICAL DAY: 1 OR 2

## 2022-11-09 NOTE — PATIENT INSTRUCTIONS
Personalized Preventive Plan for Katlin Harding - 11/9/2022  Medicare offers a range of preventive health benefits. Some of the tests and screenings are paid in full while other may be subject to a deductible, co-insurance, and/or copay. Some of these benefits include a comprehensive review of your medical history including lifestyle, illnesses that may run in your family, and various assessments and screenings as appropriate. After reviewing your medical record and screening and assessments performed today your provider may have ordered immunizations, labs, imaging, and/or referrals for you. A list of these orders (if applicable) as well as your Preventive Care list are included within your After Visit Summary for your review. Other Preventive Recommendations:    A preventive eye exam performed by an eye specialist is recommended every 1-2 years to screen for glaucoma; cataracts, macular degeneration, and other eye disorders. A preventive dental visit is recommended every 6 months. Try to get at least 150 minutes of exercise per week or 10,000 steps per day on a pedometer . Order or download the FREE \"Exercise & Physical Activity: Your Everyday Guide\" from [employer]. Call 5-628.687.7323 or search [employer] online. You need 1494-4654 mg of calcium and 0084-7619 IU of vitamin D per day. It is possible to meet your calcium requirement with diet alone, but a vitamin D supplement is usually necessary to meet this goal.  When exposed to the sun, use a sunscreen that protects against both UVA and UVB radiation with an SPF of 30 or greater. Reapply every 2 to 3 hours or after sweating, drying off with a towel, or swimming. Always wear a seat belt when traveling in a car. Always wear a helmet when riding a bicycle or motorcycle.

## 2022-11-09 NOTE — PROGRESS NOTES
OFFICE NOTE    11/9/22  Name: Robbie Rousseau  CMU:[id number]   Sex:male   Age:77 y.o. SUBJECTIVE  Chief Complaint   Patient presents with    Medicare AWV       HPI comes in for checkup and refills. Had been walking 3 miles at a time several times a week. Noted over past mos or 2 an increase in RILEY, chest pain with exertion usually relieved by belching. Same as before his bypass. Was seen in [place] initially, they wanted to cath him, he refused, came back home and got in to see Dr. Bevin Ahumada who did NM Stress which showed fixed defect with some evidence of reversability at he margins. Was read as inter mediate probability. Review of Systems   Constitutional:  Positive for activity change and fatigue. Negative for appetite change, fever and unexpected weight change. HENT:  Negative for congestion, ear pain, hearing loss, sinus pain, sore throat and trouble swallowing. Eyes:  Negative for photophobia, redness and visual disturbance. Respiratory:  Positive for shortness of breath. Negative for cough and wheezing. Cardiovascular:  Positive for chest pain. Negative for palpitations and leg swelling. Gastrointestinal:  Negative for abdominal pain, blood in stool, constipation, diarrhea and vomiting. Reports belching when develops chest pain which relieves somewhat   Endocrine: Negative for cold intolerance, polydipsia and polyuria. Genitourinary:  Negative for difficulty urinating, genital sores, hematuria and urgency. Musculoskeletal:  Negative for arthralgias, back pain and joint swelling. Skin:  Negative for pallor and rash. Allergic/Immunologic: Negative for food allergies. Neurological:  Negative for dizziness, tremors, seizures, syncope, numbness and headaches. Hematological:  Negative for adenopathy. Does not bruise/bleed easily. Psychiatric/Behavioral:  Negative for agitation, dysphoric mood, hallucinations and sleep disturbance.     All other systems reviewed and are negative. Current Outpatient Medications:     isosorbide mononitrate (IMDUR) 30 MG extended release tablet, Take 30 mg by mouth daily, Disp: , Rfl:     atorvastatin (LIPITOR) 40 MG tablet, Take 1 tablet by mouth daily, Disp: 90 tablet, Rfl: 1    clopidogrel (PLAVIX) 75 MG tablet, Take 1 tablet by mouth daily, Disp: 90 tablet, Rfl: 1    carvedilol (COREG) 3.125 MG tablet, Take 1 tablet by mouth in the morning and 1 tablet in the evening. Take with meals. , Disp: 180 tablet, Rfl: 3    vitamin B-12 (CYANOCOBALAMIN) 1000 MCG tablet, Take 1 tablet by mouth in the morning., Disp: 30 tablet, Rfl: 11    Ascorbic Acid (VITAMIN C PO), Take 750 mg by mouth, Disp: , Rfl:     Magnesium 100 MG CAPS, Take by mouth 2 times daily, Disp: , Rfl:     Zinc Sulfate (ZINC 15) 66 MG TABS, Take by mouth, Disp: , Rfl:     furosemide (LASIX) 20 MG tablet, Take 1 tablet by mouth daily (Patient taking differently: Take 20 mg by mouth daily Pt takes an extra 20 mg PRN), Disp: 90 tablet, Rfl: 3    sacubitril-valsartan (ENTRESTO) 24-26 MG per tablet, Take 1 tablet by mouth 2 times daily (Patient taking differently: Take 1 tablet by mouth at bedtime), Disp: 180 tablet, Rfl: 3    aspirin 81 MG EC tablet, Take 81 mg by mouth daily, Disp: , Rfl:     Coenzyme Q10 (COQ-10) 100 MG CAPS, Take 100 mg by mouth daily, Disp: , Rfl:   Allergies   Allergen Reactions    Dust Mite Extract     Onion     Seasonal        Past Medical History:   Diagnosis Date    CAD (coronary artery disease)     Encounter regarding vascular access for dialysis for ESRD ([address] 10/1/2021    Hiatal hernia     Hyperlipidemia     Hypertension      Past Surgical History:   Procedure Laterality Date    BRONCHOSCOPY N/A 9/30/2021    BRONCHOSCOPY DIAGNOSTIC OR CELL [address] ONLY performed by Jeffrey Babin MD at [facility]  9/30/2021    BRONCHOSCOPY CRYOTHERAPY/LASER performed by Jeffrey Babin MD at [facility] N/A 9/27/2021    CABG CORONARY ARTERY BYPASS  X 3 EVH, EMMA performed by Rafael Timmons MD at [address]     No family history on file. Social History       Tobacco History       Smoking Status  Never      Smokeless Tobacco Use  Never              Alcohol History       Alcohol Use Status  Yes Comment  occ. Drug Use       Drug Use Status  Never              Sexual Activity       Sexually Active  Yes Partners  Female                    OBJECTIVE  Vitals:    11/09/22 0831   BP: 122/62   Pulse: 67   Resp: 18   Temp: 96.9 °F (36.1 °C)   TempSrc: Temporal   SpO2: 98%   Weight: 154 lb (69.9 kg)   Height: 5' 8\" (1.727 m)        Body mass index is 23.42 kg/m². Orders Placed This Encounter   Procedures    Jorje Leavitt MD, Gastroenterology, [place]     Referral Priority:   Routine     Referral Type:   Eval and Treat     Referral Reason:   Specialty Services Required     Referred to Provider:   Tiara High MD     Requested Specialty:   Gastroenterology     Number of Visits Requested:   1        EXAM   Physical Exam  Vitals and nursing note reviewed. Constitutional:       Appearance: Normal appearance. He is normal weight. HENT:      Right Ear: Tympanic membrane and external ear normal.      Left Ear: Tympanic membrane and external ear normal.      Nose: Congestion present. No rhinorrhea. Mouth/Throat:      Pharynx: Oropharynx is clear. No posterior oropharyngeal erythema. Eyes:      Conjunctiva/sclera: Conjunctivae normal.      Pupils: Pupils are equal, round, and reactive to light. Neck:      Vascular: No carotid bruit. Cardiovascular:      Rate and Rhythm: Normal rate. Heart sounds: Murmur heard. Pulmonary:      Effort: Pulmonary effort is normal.      Breath sounds: Normal breath sounds. No wheezing, rhonchi or rales. Abdominal:      General: Bowel sounds are normal.      Tenderness: There is no abdominal tenderness. There is guarding.    Musculoskeletal:         General: No swelling or tenderness. Cervical back: No tenderness. Right lower leg: No edema. Left lower leg: No edema. Lymphadenopathy:      Cervical: No cervical adenopathy. Skin:     Coloration: Skin is not jaundiced or pale. Findings: No bruising or rash. Neurological:      General: No focal deficit present. Mental Status: He is alert and oriented to person, place, and time. Psychiatric:         Mood and Affect: Mood normal.         Behavior: Behavior normal.         Tony Harper was seen today for medicare awv. Diagnoses and all orders for this visit:    Initial Medicare annual wellness visit  -     Katerina Francisco MD, Gastroenterology, [facility]  Completed and smart blocks addressed. Has hiatal hernia and seems to have overlap of symptoms  and prolbems with his CAD that are making things difficult. Was advised by Nephrology not to take PPI. Essential hypertension  Well controlled at this time. Was started on Isosorbid after stress test.  CAD in native artery  Current plan is medical management. Not sure if this will work or not  Gastroesophageal reflux disease with esophagitis without hemorrhage  Antireflux measures. Consult Dr. Oswald Layne  Other hyperlipidemia  On 40  mg atorvastatin will continue      Return for Medicare Annual Wellness Visit in 1 year.     Electronically signed by Louis Cheng MD on 11/9/22 at 9:23 AM EST  Medicare Annual Wellness Visit    Marilou Woodard is here for [facility] AWV    Assessment & Plan   Initial Medicare annual wellness visit  -     Katerina Francisco MD, Gastroenterology, [facility]  Essential hypertension  CAD in native artery  Gastroesophageal reflux disease with esophagitis without hemorrhage  Other hyperlipidemia    Recommendations for Preventive Services Due: see orders and patient instructions/AVS.  Recommended screening schedule for the next 5-10 years is provided to the patient in written form: see Patient Instructions/AVS.     Return for Medicare Annual Wellness Visit in 1 year. Subjective   The following acute and/or chronic problems were also addressed today:  See OV note    Patient's complete Health Risk Assessment and screening values have been reviewed and are found in Flowsheets. The following problems were reviewed today and where indicated follow up appointments were made and/or referrals ordered.     Positive Risk Factor Screenings with Interventions:    Fall Risk:  Do you feel unsteady or are you worried about falling? : no  2 or more falls in past year?: (!) yes  Fall with injury in past year?: no   Fall Risk Interventions:    Home safety tips provided  Falls as reported not from dizziness, weakness, or physical illness            General Health and ACP:  General  In general, how would you say your health is?: Good  In the past 7 days, have you experienced any of the following: New or Increased Pain, New or Increased Fatigue, Loneliness, Social Isolation, Stress or Anger?: No  Do you get the social and emotional support that you need?: Yes  Do you have a Living Will?: Yes    Advance Directives       Power of  Living Will ACP-Advance Directive ACP-Power of     Not on File Not on File Not on File Not on File          General Health Risk Interventions:  Urged to bring in living will copy for [address] Habits/Nutrition:  Physical Activity: Inactive    Days of Exercise per Week: 0 days    Minutes of Exercise per Session: 0 min     Have you lost any weight without trying in the past 3 months?: No  Body mass index: 23.41  Have you seen the dentist within the past year?: Yes  Health Habits/Nutrition Interventions:  Was exercising regularly and will resume if Isosorbide seems to be working    Hearing/Vision:  Do you or your family notice any trouble with your hearing that hasn't been managed with hearing aids?: No  Do you have difficulty driving, watching TV, or doing any of your daily activities because of your eyesight?: (!) Yes  Have you had an eye exam within the past year?: Yes  No results found. Hearing/Vision Interventions:  Vision concerns:  pt has opthamologist. They are watching his cataracts and will operate when appropriate            Objective   Vitals:    11/09/22 0831   BP: 122/62   Pulse: 67   Resp: 18   Temp: 96.9 °F (36.1 °C)   TempSrc: Temporal   SpO2: 98%   Weight: 154 lb (69.9 kg)   Height: 5' 8\" (1.727 m)      Body mass index is 23.42 kg/m². No cognitive or mood issues uncovered       Allergies   Allergen Reactions    Dust Mite Extract     Onion     Seasonal      Prior to Visit Medications    Medication Sig Taking? Authorizing Provider   isosorbide mononitrate (IMDUR) 30 MG extended release tablet Take 30 mg by mouth daily Yes Historical Provider, MD   atorvastatin (LIPITOR) 40 MG tablet Take 1 tablet by mouth daily Yes Alexis Wilson MD   clopidogrel (PLAVIX) 75 MG tablet Take 1 tablet by mouth daily Yes Alexis Wilson MD   carvedilol (COREG) 3.125 MG tablet Take 1 tablet by mouth in the morning and 1 tablet in the evening. Take with meals. Yes Noe Chen DO   vitamin B-12 (CYANOCOBALAMIN) 1000 MCG tablet Take 1 tablet by mouth in the morning.  Yes Alexis Wilson MD   Ascorbic Acid (VITAMIN C PO) Take 750 mg by mouth Yes Historical Provider, MD   Magnesium 100 MG CAPS Take by mouth 2 times daily Yes Historical Provider, MD   Zinc Sulfate (ZINC 15) 66 MG TABS Take by mouth Yes Historical Provider, MD   furosemide (LASIX) 20 MG tablet Take 1 tablet by mouth daily  Patient taking differently: Take 20 mg by mouth daily Pt takes an extra 20 mg PRN Yes Noe Chen DO   sacubitril-valsartan (ENTRESTO) 24-26 MG per tablet Take 1 tablet by mouth 2 times daily  Patient taking differently: Take 1 tablet by mouth at bedtime Yes Noe Chen DO   aspirin 81 MG EC tablet Take 81 mg by mouth daily Yes Historical Provider, MD   Coenzyme Q10 (COQ-10) 100 MG CAPS Take 100 mg by mouth daily Yes Historical Provider, MD CareTeam (Including outside providers/suppliers regularly involved in providing care):   Patient Care Team:  Sally Noble MD as PCP - General (Family Medicine)  Sally Noble MD as PCP - [facility] Empaneled Provider     Reviewed and updated this visit:  Tobacco  Allergies  Meds  Problems  Med Hx  Surg Hx  Soc Hx  Fam Hx

## 2022-11-22 ENCOUNTER — OFFICE VISIT (OUTPATIENT)
Dept: CARDIOLOGY CLINIC | Age: 77
End: 2022-11-22
Payer: MEDICARE

## 2022-11-22 VITALS
HEART RATE: 65 BPM | DIASTOLIC BLOOD PRESSURE: 60 MMHG | HEIGHT: 68 IN | SYSTOLIC BLOOD PRESSURE: 118 MMHG | BODY MASS INDEX: 23.37 KG/M2 | WEIGHT: 154.2 LBS

## 2022-11-22 DIAGNOSIS — I25.10 CORONARY ARTERY DISEASE, UNSPECIFIED VESSEL OR LESION TYPE, UNSPECIFIED WHETHER ANGINA PRESENT, UNSPECIFIED WHETHER NATIVE OR TRANSPLANTED HEART: Primary | ICD-10-CM

## 2022-11-22 DIAGNOSIS — R07.9 CHEST PAIN, UNSPECIFIED TYPE: Primary | ICD-10-CM

## 2022-11-22 DIAGNOSIS — Z01.818 PREOPERATIVE TESTING: ICD-10-CM

## 2022-11-22 PROCEDURE — 3074F SYST BP LT 130 MM HG: CPT | Performed by: INTERNAL MEDICINE

## 2022-11-22 PROCEDURE — 1036F TOBACCO NON-USER: CPT | Performed by: INTERNAL MEDICINE

## 2022-11-22 PROCEDURE — 1123F ACP DISCUSS/DSCN MKR DOCD: CPT | Performed by: INTERNAL MEDICINE

## 2022-11-22 PROCEDURE — G8427 DOCREV CUR MEDS BY ELIG CLIN: HCPCS | Performed by: INTERNAL MEDICINE

## 2022-11-22 PROCEDURE — 99215 OFFICE O/P EST HI 40 MIN: CPT | Performed by: INTERNAL MEDICINE

## 2022-11-22 PROCEDURE — G8420 CALC BMI NORM PARAMETERS: HCPCS | Performed by: INTERNAL MEDICINE

## 2022-11-22 PROCEDURE — G8484 FLU IMMUNIZE NO ADMIN: HCPCS | Performed by: INTERNAL MEDICINE

## 2022-11-22 PROCEDURE — 3078F DIAST BP <80 MM HG: CPT | Performed by: INTERNAL MEDICINE

## 2022-11-22 PROCEDURE — 3017F COLORECTAL CA SCREEN DOC REV: CPT | Performed by: INTERNAL MEDICINE

## 2022-11-22 PROCEDURE — 93000 ELECTROCARDIOGRAM COMPLETE: CPT | Performed by: INTERNAL MEDICINE

## 2022-11-22 NOTE — PROGRESS NOTES
CHIEF COMPLAINT: CAD/Chest pain    HISTORY OF PRESENT ILLNESS: Patient is a 78 y.o. male seen at the request of Susana Uriostegui MD.      Patient with long standing known CAD reported by 2007 cath at which time he had a  LAD. Was active and without issues for years. Hx of CABG with complicated course with CVA. Recently admitted in [place] for SOB and noted to be in CHF. Cardiac MRI reported LVEF 38% with a small area of ischemia in the inferoapical area. Doing better with medical therapy volume-wise. Noting fatigue. Increased chest pain and RILEY. Still with frequent belching. Past Medical History:   Diagnosis Date    CAD (coronary artery disease)     Encounter regarding vascular access for dialysis for ESRD ([facility] [address] 10/1/2021    Hiatal hernia     Hyperlipidemia     Hypertension        Patient Active Problem List   Diagnosis    CAD in native artery    Pulmonary nodules    Other hyperlipidemia    Essential hypertension    Gastroesophageal reflux disease with esophagitis without hemorrhage    S/P CABG (coronary artery bypass graft)    NGUYỄN (acute kidney injury) ([facility]    Acute CVA (cerebrovascular accident) ([facility] [address]    Angina pectoris, unspecified    Atherosclerotic heart disease of native coronary artery with unspecified angina pectoris       Allergies   Allergen Reactions    Dust Mite Extract     Onion     Seasonal        Current Outpatient Medications   Medication Sig Dispense Refill    isosorbide mononitrate (IMDUR) 30 MG extended release tablet Take 30 mg by mouth daily      atorvastatin (LIPITOR) 40 MG tablet Take 1 tablet by mouth daily 90 tablet 1    clopidogrel (PLAVIX) 75 MG tablet Take 1 tablet by mouth daily 90 tablet 1    carvedilol (COREG) 3.125 MG tablet Take 1 tablet by mouth in the morning and 1 tablet in the evening. Take with meals. 180 tablet 3    vitamin B-12 (CYANOCOBALAMIN) 1000 MCG tablet Take 1 tablet by mouth in the morning.  30 tablet 11    Ascorbic Acid (VITAMIN C PO) Take 750 mg by mouth      Magnesium 100 MG CAPS Take by mouth 2 times daily      Zinc Sulfate (ZINC 15) 66 MG TABS Take by mouth      furosemide (LASIX) 20 MG tablet Take 1 tablet by mouth daily (Patient taking differently: Take 20 mg by mouth daily Pt takes an extra 20 mg PRN) 90 tablet 3    sacubitril-valsartan (ENTRESTO) 24-26 MG per tablet Take 1 tablet by mouth 2 times daily (Patient taking differently: Take 1 tablet by mouth at bedtime) 180 tablet 3    aspirin 81 MG EC tablet Take 81 mg by mouth daily      Coenzyme Q10 (COQ-10) 100 MG CAPS Take 100 mg by mouth daily       No current facility-administered medications for this visit. Social History     Socioeconomic History    Marital status:      Spouse name: Not on file    Number of children: Not on file    Years of education: Not on file    Highest education level: Not on file   Occupational History    Not on file   Tobacco Use    Smoking status: Never    Smokeless tobacco: Never   Vaping Use    Vaping Use: Never used   Substance and Sexual Activity    Alcohol use: Yes     Comment: occ. Drug use: Never    Sexual activity: Yes     Partners: Female   Other Topics Concern    Not on file   Social History Narrative    Not on file     Social Determinants of Health     Financial Resource Strain: Low Risk     Difficulty of Paying Living Expenses: Not hard at all   Food Insecurity: No Food Insecurity    Worried About Running Out of Food in the Last Year: Never true    Ran Out of Food in the Last Year: Never true   Transportation Needs: Not on file   Physical Activity: Inactive    Days of Exercise per Week: 0 days    Minutes of Exercise per Session: 0 min   Stress: Not on file   Social Connections: Not on file   Intimate Partner Violence: Not on file   Housing Stability: Not on file       No family history on file. Review of Systems:  Heart: as above   Lungs: as above   Eyes: denies changes in vision or discharge.    Ears: denies changes in hearing or pain.   Nose: denies epistaxis or masses   Throat: denies sore throat or trouble swallowing. Neuro: denies numbness, tingling, tremors. Skin: denies rashes or itching. : denies hematuria, dysuria   GI: denies vomiting, diarrhea   Psych: denies mood changed, anxiety, depression. All other systems negative. Physical Exam   /60   Pulse 65   Ht 5' 8\" (1.727 m)   Wt 154 lb 3.2 oz (69.9 kg)   BMI 23.45 kg/m²   Constitutional: Oriented to person, place, and time. Well-developed and well-nourished. No distress. Head: Normocephalic and atraumatic. Eyes: EOM are normal. Pupils are equal, round, and reactive to light. Neck: Normal range of motion. Neck supple. No hepatojugular reflux and no JVD present. Carotid bruit is not present. No tracheal deviation present. No thyromegaly present. Cardiovascular: Normal rate, regular rhythm, normal heart sounds and intact distal pulses. Exam reveals no gallop and no friction rub. No murmur heard. Pulmonary/Chest: Effort normal and breath sounds normal. No respiratory distress. No wheezes. No rales. No tenderness. Abdominal: Soft. Bowel sounds are normal. No distension and no mass. No tenderness. No rebound and no guarding. Musculoskeletal: Normal range of motion. No edema and no tenderness. Lymphadenopathy:   No cervical adenopathy. No groin adenopathy. Neurological: Alert and oriented to person, place, and time. Skin: Skin is warm and dry. No rash noted. Not diaphoretic. No erythema. Psychiatric: Normal mood and affect. Behavior is normal.     EKG personally reviewed 11/22/22:  normal sinus rhythm, nonspecific ST and T waves changes, LBBB. Stress 4/28/2021 with partially reversible anteroapical (fixed) and septal (reversible) defect, LVEF 36% in [address] [place] [address]. TTE: 9/23/2021 (Dr. Kipp Bence):   Left ventricular internal dimensions were normal in diastole and systole. Abnormal (paradoxical) motion consistent with left bundle branch block.    Normal left ventricular ejection fraction. Mild thickening of the mitral valve leaflets with reduced leaflet excursion. Mild centrally directed mitral regurgitation. Mild tricuspid regurgitation. LVEF 55%     Echo Summary 10/5/2021:   Left ventricle size is normal.   Ejection fraction is visually estimated at 40+/-5%. LV apex is dyskinetic and mid to distal anterior and anteroseptal walls are severely hypokinetic. Normal left ventricle wall thickness. Stage I diastolic dysfunction. No LV mural thrombus. Normal right ventricular size and function. No hemodynamically significant aortic stenosis is present. Unable to estimate PA systolic pressure. No evidence for hemodynamically significant pericardial effusion. Technically difficult examination. Definity echo contrast was used to delineate endocardial borders. Stress Impression 11/8/2022:    A moderate defect was present in the basal inferoseptal, basal inferior, mid inferoseptal, mid inferior, apical septal, apical inferior, and apex walls that was  large sized by quantification. The resting images reveal partial reversibility. Gated SPECT left ventricular ejection fraction was calculated to be 42%, with  paradoxical septal motion. ECG during the infusion  LBBB . The myocardial perfusion imaging was abnormal.    Overall left ventricular systolic function was abnormal with regional wall motion abnormalities.   Intermediate risk general pharmacologic nuclear stress test.     ASSESSMENT AND PLAN:  Patient Active Problem List   Diagnosis    CAD in native artery    Pulmonary nodules    Other hyperlipidemia    Essential hypertension    Gastroesophageal reflux disease with esophagitis without hemorrhage    S/P CABG (coronary artery bypass graft)    NGUYỄN (acute kidney injury) ([address]    Acute CVA (cerebrovascular accident) ([address]    Angina pectoris, unspecified    Atherosclerotic heart disease of native coronary artery with unspecified angina pectoris     1. CAD-CABG/CP:     CABG x 3 9/27/2021 (LIMA-LAD, SVG-OM, SVG-RCA). Stress as above. Recommend cath AUC 7/56. Some CKD. No contrast allergy. ASA/statin/BB/nitrate. 2. ICMP/Chronic systolic CHF:     BB/entresto/lasix. 3. HTN: Observe. 4. Lipids: Statin. 5. Carotids: 50-69% B/l 10/2021. Reassess 1 year. 6. CVA: ASA/stain. 7. GI symptoms: To see GI. 8. CKD: Follow labs. Brittnee Wei D.O.   Cardiologist  Cardiology, [address] [facility] [address]

## 2022-11-28 ENCOUNTER — TELEPHONE (OUTPATIENT)
Dept: CARDIOLOGY CLINIC | Age: 77
End: 2022-11-28

## 2022-11-28 NOTE — TELEPHONE ENCOUNTER
Patient is scheduled for a cardiac cath on 11/30  No auth required/Medicare  Patient is COVID vaccinated  Labs ordered   Patient was given and understood instructions.

## 2022-11-29 ENCOUNTER — TELEPHONE (OUTPATIENT)
Dept: CARDIAC CATH/INVASIVE PROCEDURES | Age: 77
End: 2022-11-29

## 2022-11-29 DIAGNOSIS — Z01.818 PREOPERATIVE TESTING: ICD-10-CM

## 2022-11-29 DIAGNOSIS — I25.10 CORONARY ARTERY DISEASE, UNSPECIFIED VESSEL OR LESION TYPE, UNSPECIFIED WHETHER ANGINA PRESENT, UNSPECIFIED WHETHER NATIVE OR TRANSPLANTED HEART: ICD-10-CM

## 2022-11-29 LAB
ANION GAP SERPL CALCULATED.3IONS-SCNC: 8 MMOL/L (ref 7–16)
BUN BLDV-MCNC: 23 MG/DL (ref 6–23)
CALCIUM SERPL-MCNC: 9 MG/DL (ref 8.6–10.2)
CHLORIDE BLD-SCNC: 103 MMOL/L (ref 98–107)
CO2: 27 MMOL/L (ref 22–29)
CREAT SERPL-MCNC: 1.4 MG/DL (ref 0.7–1.2)
GFR SERPL CREATININE-BSD FRML MDRD: 52 ML/MIN/1.73
GLUCOSE BLD-MCNC: 92 MG/DL (ref 74–99)
HCT VFR BLD CALC: 39.7 % (ref 37–54)
HEMOGLOBIN: 13.3 G/DL (ref 12.5–16.5)
INR BLD: 1.1
MCH RBC QN AUTO: 31.2 PG (ref 26–35)
MCHC RBC AUTO-ENTMCNC: 33.5 % (ref 32–34.5)
MCV RBC AUTO: 93.2 FL (ref 80–99.9)
PDW BLD-RTO: 13 FL (ref 11.5–15)
PLATELET # BLD: 148 E9/L (ref 130–450)
PMV BLD AUTO: 11.2 FL (ref 7–12)
POTASSIUM SERPL-SCNC: 4.7 MMOL/L (ref 3.5–5)
PROTHROMBIN TIME: 12.3 SEC (ref 9.3–12.4)
RBC # BLD: 4.26 E12/L (ref 3.8–5.8)
SODIUM BLD-SCNC: 138 MMOL/L (ref 132–146)
WBC # BLD: 7.7 E9/L (ref 4.5–11.5)

## 2022-11-29 NOTE — TELEPHONE ENCOUNTER
Reminded patient of scheduled procedure on 11/30. Instructions given and COVID questionnaire completed.

## 2022-11-30 ENCOUNTER — HOSPITAL ENCOUNTER (OUTPATIENT)
Dept: CARDIAC CATH/INVASIVE PROCEDURES | Age: 77
Discharge: HOME | End: 2022-11-30
Attending: INTERNAL MEDICINE | Admitting: INTERNAL MEDICINE
Payer: MEDICARE

## 2022-11-30 VITALS
TEMPERATURE: 97.7 F | WEIGHT: 150 LBS | HEART RATE: 69 BPM | OXYGEN SATURATION: 95 % | BODY MASS INDEX: 22.73 KG/M2 | DIASTOLIC BLOOD PRESSURE: 65 MMHG | SYSTOLIC BLOOD PRESSURE: 141 MMHG | RESPIRATION RATE: 18 BRPM | HEIGHT: 68 IN

## 2022-11-30 LAB
ABO/RH: NORMAL
ANTIBODY SCREEN: NORMAL

## 2022-11-30 PROCEDURE — 86850 RBC ANTIBODY SCREEN: CPT

## 2022-11-30 PROCEDURE — 93455 CORONARY ART/GRFT ANGIO S&I: CPT

## 2022-11-30 PROCEDURE — 36415 COLL VENOUS BLD VENIPUNCTURE: CPT

## 2022-11-30 PROCEDURE — 2709999900 HC NON-CHARGEABLE SUPPLY

## 2022-11-30 PROCEDURE — 86901 BLOOD TYPING SEROLOGIC RH(D): CPT

## 2022-11-30 PROCEDURE — C1769 GUIDE WIRE: HCPCS

## 2022-11-30 PROCEDURE — 6360000002 HC RX W HCPCS

## 2022-11-30 PROCEDURE — 93455 CORONARY ART/GRFT ANGIO S&I: CPT | Performed by: INTERNAL MEDICINE

## 2022-11-30 PROCEDURE — C1894 INTRO/SHEATH, NON-LASER: HCPCS

## 2022-11-30 PROCEDURE — C1760 CLOSURE DEV, VASC: HCPCS

## 2022-11-30 PROCEDURE — 86900 BLOOD TYPING SEROLOGIC ABO: CPT

## 2022-11-30 PROCEDURE — 2500000003 HC RX 250 WO HCPCS

## 2022-11-30 RX ORDER — ACETAMINOPHEN 325 MG/1
650 TABLET ORAL EVERY 4 HOURS PRN
Status: DISCONTINUED | OUTPATIENT
Start: 2022-11-30 | End: 2022-11-30 | Stop reason: HOSPADM

## 2022-11-30 RX ORDER — SODIUM CHLORIDE 0.9 % (FLUSH) 0.9 %
5-40 SYRINGE (ML) INJECTION EVERY 12 HOURS SCHEDULED
Status: DISCONTINUED | OUTPATIENT
Start: 2022-11-30 | End: 2022-11-30 | Stop reason: HOSPADM

## 2022-11-30 RX ORDER — FUROSEMIDE 20 MG/1
20 TABLET ORAL DAILY
Qty: 30 TABLET | Refills: 2 | Status: SHIPPED | OUTPATIENT
Start: 2022-11-30

## 2022-11-30 RX ORDER — RANOLAZINE 500 MG/1
500 TABLET, EXTENDED RELEASE ORAL 2 TIMES DAILY
Qty: 60 TABLET | Refills: 3 | Status: SHIPPED | OUTPATIENT
Start: 2022-11-30

## 2022-11-30 RX ORDER — SODIUM CHLORIDE 0.9 % (FLUSH) 0.9 %
5-40 SYRINGE (ML) INJECTION PRN
Status: DISCONTINUED | OUTPATIENT
Start: 2022-11-30 | End: 2022-11-30 | Stop reason: HOSPADM

## 2022-11-30 RX ORDER — SODIUM CHLORIDE 9 MG/ML
INJECTION, SOLUTION INTRAVENOUS PRN
Status: DISCONTINUED | OUTPATIENT
Start: 2022-11-30 | End: 2022-11-30 | Stop reason: HOSPADM

## 2022-11-30 NOTE — PROCEDURES
[address]                  [address] [facility],  [facility]                            CARDIAC CATHETERIZATION    PATIENT NAME: Renetta STAUFFER                     :        1945  MED REC NO:   [id number]                            ROOM:       63  ACCOUNT NO:   [de-identified]                           ADMIT DATE: 2022  PROVIDER:     Jenniffer Short MD    DATE OF PROCEDURE:  2022    PROCEDURES:  1. Coronary angiography. 2. Surgical grafts and LIMA to LAD angiography. 3.  Deployment of ProGlide ProStyle suture closure device in the right common femoral artery. 4.  Conscious sedation using Versed and fentanyl. Indication #4 with score of 8. INDICATIONS FOR PROCEDURE:  The patient is a 47-year-old male with CAD,  status post CABG in  who has been having unstable angina like  symptoms. The patient had abnormal stress test.  The patient was  brought to the cath lab for further evaluation. DESCRIPTION OF PROCEDURE:  After the appropriate informed consent, the  right groin area was prepped and draped in the usual sterile fashion. A  timeout was called. The right groin area was locally anesthetized with  10 mL of 2% lidocaine. A Cook needle was used to access the right  femoral artery using real-time ultrasound guidance. A 6-Rwandan  introducer sheath was used to cannulate the right common femoral artery. A 6-Rwandan JL4 was used for left coronary angiography in multiple  projections. A 6-Rwandan JR4 catheter was used for SVG to OM and LIMA to  LAD angiography. Engaging the SVG to the right was a little bit  difficult. We tried JR4 multipurpose catheter unsuccessfully. Then we  were able to engage the graft using AR1 guide catheter. ANGIOGRAPHIC FINDINGS:  The left main coronary artery arises normally  from the left sinus of Valsalva. It is a small vessel that gives a good  size left circumflex artery branch.   The LAD has a flush total  occlusion. The first OM branch has subtotal occlusion in the proximal  segment. The second OM branch has significant disease in the mid  segment. The third OM branch has mild to moderate disease estimated at  40 to 50%; however, the vessel become 1.5 to 2 mm vessel. The left  circumflex artery itself is a large vessel that has 20 to 30% disease in  the mid segment. The right coronary artery is a small vessel that is severely diffusely  diseased. It gives off a mid size long acute marginal branch that gives  collateral to what it looks like LAD actually, distal LAD or a diagonal  branch. The SVG to OM branch is patent. There is 20 to 30% stenosis in  the distal segment of the graft. There is antegrade filling of the OM  with the retrograde filling of another OM branch. The LIMA to LAD is  widely patent. Fortunately, the LAD distal to the LIMA and a stenosis  in the small vessel and actually it more probably has total occlusion. There is very faint retrograde filling of a septal branch. The SVG to  the right is a patent graft; however, the distal RCA of the right PDA  and PLB are significantly diffusely diseased. Distal to the graft,  there is retrograde filling of the distal to mid RCA which is mildly to  moderately diffusely diseased. At the end of the procedure, limited  right iliofemoral angiography confirmed that the arteriotomy was in the  right common femoral artery, so a Perclose ProStyle closure device was  applied in the right common femoral artery with effective hemostasis    COMPLICATIONS:  None. ESTIMATED TOTAL BLOOD LOSS:  30-40 mL. CONSCIOUS SEDATION:  We used Versed and fentanyl for conscious sedation,  first dose was given at 10:10, procedure ended at 10:54, there was 43  minutes of direct face-to-face supervision during conscious sedation  administration. TOTAL FLUOROSCOPY TIME:  10.8 minutes. TOTAL CONTRAST USED:  125 mL of Isovue. IMPRESSION:  1.   Total

## 2022-11-30 NOTE — PROGRESS NOTES
Extended Stay Recovery Discharge Documentation    Final procedure site check completed, stable for discharge- Yes  Ambulated without issue post recovery completion, gait steady. Peripheral IV sites removed (see IV Flowsheet) and site asymptomatic- Yes  Patient dressed self without assistance. Discharge instructions reviewed with Patient and verbalized understanding.    Patient discharged Home with spouse at AM  Monitor cleaned and placed back in nurses' station- Yes

## 2022-11-30 NOTE — DISCHARGE INSTRUCTIONS
FEMORAL DISCHARGE INSTRUCTIONS      Discharge Instructions:    Please follow instructions closely for prevention of complications. Special Instructions:  Splint catheterization site with activity today. Splint catheterization site with:             ~Changing Positions             ~Coughing             ~Sneezing             ~Laughing    Call your physician if you notice:  ~increased pain at the catheterization site.  ~increase swelling at the catheterization site. ~redness or drainage at the catheterization site. ~numbness, tingling or cramping in the catheterization leg at rest or with activity. *Remove Dressing on 12-1-22   After AM       ~Soak dressing with water in shower and gently peel off. Clean site with soap and water, dry, and apply band aide for 24 hours. Remove band aide after another 24 hours. *Drink 3-4 extra glasses of water today. *No tub bathing or swimming for 3 days. *No stairs for 24 hours. Minimal walking today. *No driving, working, or sexual activity for 48 hours. *Do not lift anything heavier than 10 pounds for 3 days. *Continue low fat diet. **If you have any questions or problems after discharge, please notify your doctor. Call 9-1-1 if you have active bleeding from your catheterization site. ***DO  N Nevaeh Contreras. Lay down and apply pressure to site until help arrives.

## 2022-12-01 ENCOUNTER — TELEPHONE (OUTPATIENT)
Dept: CARDIOLOGY CLINIC | Age: 77
End: 2022-12-01

## 2023-01-04 ENCOUNTER — INITIAL CONSULT (OUTPATIENT)
Dept: GASTROENTEROLOGY | Age: 78
End: 2023-01-04
Payer: MEDICARE

## 2023-01-04 VITALS
DIASTOLIC BLOOD PRESSURE: 69 MMHG | HEART RATE: 59 BPM | RESPIRATION RATE: 18 BRPM | OXYGEN SATURATION: 99 % | HEIGHT: 68 IN | TEMPERATURE: 98 F | BODY MASS INDEX: 23.49 KG/M2 | SYSTOLIC BLOOD PRESSURE: 130 MMHG | WEIGHT: 155 LBS

## 2023-01-04 DIAGNOSIS — K21.9 GERD WITHOUT ESOPHAGITIS: ICD-10-CM

## 2023-01-04 DIAGNOSIS — K22.70 BARRETT'S ESOPHAGUS WITHOUT DYSPLASIA: ICD-10-CM

## 2023-01-04 DIAGNOSIS — R14.2 BELCHING: Primary | ICD-10-CM

## 2023-01-04 DIAGNOSIS — Z12.11 COLON CANCER SCREENING: ICD-10-CM

## 2023-01-04 PROCEDURE — 1123F ACP DISCUSS/DSCN MKR DOCD: CPT | Performed by: STUDENT IN AN ORGANIZED HEALTH CARE EDUCATION/TRAINING PROGRAM

## 2023-01-04 PROCEDURE — 99204 OFFICE O/P NEW MOD 45 MIN: CPT | Performed by: STUDENT IN AN ORGANIZED HEALTH CARE EDUCATION/TRAINING PROGRAM

## 2023-01-04 PROCEDURE — G8420 CALC BMI NORM PARAMETERS: HCPCS | Performed by: STUDENT IN AN ORGANIZED HEALTH CARE EDUCATION/TRAINING PROGRAM

## 2023-01-04 PROCEDURE — 3074F SYST BP LT 130 MM HG: CPT | Performed by: STUDENT IN AN ORGANIZED HEALTH CARE EDUCATION/TRAINING PROGRAM

## 2023-01-04 PROCEDURE — G8427 DOCREV CUR MEDS BY ELIG CLIN: HCPCS | Performed by: STUDENT IN AN ORGANIZED HEALTH CARE EDUCATION/TRAINING PROGRAM

## 2023-01-04 PROCEDURE — 3017F COLORECTAL CA SCREEN DOC REV: CPT | Performed by: STUDENT IN AN ORGANIZED HEALTH CARE EDUCATION/TRAINING PROGRAM

## 2023-01-04 PROCEDURE — 3078F DIAST BP <80 MM HG: CPT | Performed by: STUDENT IN AN ORGANIZED HEALTH CARE EDUCATION/TRAINING PROGRAM

## 2023-01-04 PROCEDURE — 1036F TOBACCO NON-USER: CPT | Performed by: STUDENT IN AN ORGANIZED HEALTH CARE EDUCATION/TRAINING PROGRAM

## 2023-01-04 PROCEDURE — G8484 FLU IMMUNIZE NO ADMIN: HCPCS | Performed by: STUDENT IN AN ORGANIZED HEALTH CARE EDUCATION/TRAINING PROGRAM

## 2023-01-04 RX ORDER — DICYCLOMINE HCL 20 MG
20 TABLET ORAL EVERY EVENING
Qty: 60 TABLET | Refills: 11 | Status: SHIPPED | OUTPATIENT
Start: 2023-01-04

## 2023-01-04 NOTE — PROGRESS NOTES
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 PLAN:  Belching Episodes:  - The patient will be started on Bentyl qPM when he has regular episodes. - He can also take Bentyl PRN for other episodes during the day. - If Bentyl does not work, will attempt therapy w/ nortriptyline. - If TCA therapy does not work, will send for breathing exercises with PT.     2. GERD:  - Currently controlled on Pepcid BID. - He has endoscopic changes consistent with short-segment Raphael's Esophagus.   - We will continue to discuss the need for prolonged PPI use in the setting of Raphael's. 3. Raphael's Esophagus:  - I do not have the pathology from endoscopic biopsies, but patient reports no history of dysplastic change being reported to him. - Will discuss PPI therapy moving forward. 4. History of Colon Polyps:  - Colonoscopy in 2022 w/ removal of one polyp. - Will discuss colonoscopy in 5 years. I will see the patient back in clinic in 4 weeks. Thank you for including us in the care of this patient. Please do not hesitate to contact us with any additional questions or concerns. Nirmala Slater MD  Gastroenterology/Hepatology  Advanced Endoscopy          HISTORY OF PRESENT ILLNESS:      Mr. Omar Figueroa is a 77y/M who presents to clinic for evaluation of persistent belching. He reports belching episodes that occur nightly during which he will sit at the edge of his bed for several minutes violently hiccupping and belching until the episode ends.  He also reports that these episodes will occur randomly throughout the course of the day and that because of the length and nature of the episodes, his social life is being affected as he does not go out in public as much for fear of the episodes. He underwent endoscopic evaluation in [place]:    Colonoscopy 2013: Mild diverticulosis. EGD March 2022: Short-segment Raphael's, hiatal hernia, mild gastritis. EGD October 2022: Changes of short-segment Raphael's esophagus and hiatal hernia. Colonoscopy October 2022: Mild diverticulosis, one 6mm polyp. The patient has a history of CAD as well as GERD. He is not currently on PPI therapy. He reports intermittent episodes of chest discomfort. Past Medical History:        Diagnosis Date    CAD (coronary artery disease)     Encounter regarding vascular access for dialysis for ESRD ([facility]) 10/1/2021    Hiatal hernia     Hyperlipidemia     Hypertension      Past Surgical History:        Procedure Laterality Date    BRONCHOSCOPY N/A 9/30/2021    BRONCHOSCOPY DIAGNOSTIC OR CELL [address] ONLY performed by Sheryl Collins MD at [address] [facility]  9/30/2021    BRONCHOSCOPY CRYOTHERAPY/LASER performed by Sheryl Collins MD at [address] [facility] N/A 9/27/2021    CABG CORONARY ARTERY BYPASS  X 3 EVH, EMMA performed by Neyda Martin MD at [id number] [place] [address]     Social History:    TOBACCO:   reports that he has never smoked. He has never used smokeless tobacco.  ETOH:   reports current alcohol use. DRUGS:   reports no history of drug use. Family History:   No family history on file.       Current Outpatient Medications:     sacubitril-valsartan (ENTRESTO) 24-26 MG per tablet, Take 0.5 tablets by mouth 2 times daily, Disp: 180 tablet, Rfl: 3    ranolazine (RANEXA) 500 MG extended release tablet, Take 1 tablet by mouth 2 times daily, Disp: 60 tablet, Rfl: 3    furosemide (LASIX) 20 MG tablet, Take 1 tablet by mouth daily Pt takes an extra 20 mg PRN, Disp: 30 tablet, Rfl: 2    isosorbide mononitrate (IMDUR) 30 MG extended release tablet, Take 30 mg by mouth daily, Disp: , Rfl:     atorvastatin (LIPITOR) 40 MG tablet, Take 1 tablet by mouth daily, Disp: 90 tablet, Rfl: 1    clopidogrel (PLAVIX) 75 MG tablet, Take 1 tablet by mouth daily, Disp: 90 tablet, Rfl: 1    carvedilol (COREG) 3.125 MG tablet, Take 1 tablet by mouth in the morning and 1 tablet in the evening. Take with meals. , Disp: 180 tablet, Rfl: 3    vitamin B-12 (CYANOCOBALAMIN) 1000 MCG tablet, Take 1 tablet by mouth in the morning., Disp: 30 tablet, Rfl: 11    Ascorbic Acid (VITAMIN C PO), Take 750 mg by mouth, Disp: , Rfl:     Magnesium 100 MG CAPS, Take by mouth 2 times daily, Disp: , Rfl:     Zinc Sulfate (ZINC 15) 66 MG TABS, Take by mouth, Disp: , Rfl:     aspirin 81 MG EC tablet, Take 81 mg by mouth daily, Disp: , Rfl:     Coenzyme Q10 (COQ-10) 100 MG CAPS, Take 100 mg by mouth daily, Disp: , Rfl:      Allergies:  Dust mite extract, Onion, and Seasonal    ROS:  General: Patient denies n/v/f/c or weight loss. HEENT: Patient denies persistent postnasal drip, scleral icterus, drooling, persistent bleeding from nose/mouth. Resp: Patient denies SOB, wheezing, productive cough. Cards: Patient denies CP, palpitations, significant edema  GI: As above. Derm: Patient denies jaundice/rashes. Musc: Patient denies diffuse/irregular joint swelling or myalgias. PHYSICAL EXAM:  /69 (Site: Left Upper Arm, Position: Sitting, Cuff Size: Medium Adult)   Pulse 59   Temp 98 °F (36.7 °C) (Infrared)   Resp 18   Ht 5' 8\" (1.727 m)   Wt 155 lb (70.3 kg)   SpO2 99%   BMI 23.57 kg/m²   Physical Exam:  General: Overall well-appearing, NAD  HEENT: PERRLA, EOMI, Anicteric sclera, MMM, no rhinorrhea  Cards: RRR, no LE edema  Resp: Breathing comfortably on room air, good air movement, no use of accessory muscles, no audible wheezing  Abdomen: soft, NT, ND. Extremities: Moves all extremities, no effusions or bruising.   Skin: No rashes or jaundice  Neuro: A&O x 3, CN grossly intact, non-focal exam               Electronically signed by Nguyen Norman MD on 1/4/2023 at 2:18 PM

## 2023-01-26 ENCOUNTER — OFFICE VISIT (OUTPATIENT)
Dept: CARDIOLOGY CLINIC | Age: 78
End: 2023-01-26
Payer: MEDICARE

## 2023-01-26 VITALS
WEIGHT: 155.7 LBS | HEART RATE: 66 BPM | SYSTOLIC BLOOD PRESSURE: 106 MMHG | HEIGHT: 68 IN | BODY MASS INDEX: 23.6 KG/M2 | DIASTOLIC BLOOD PRESSURE: 58 MMHG

## 2023-01-26 DIAGNOSIS — R07.9 CHEST PAIN, UNSPECIFIED TYPE: Primary | ICD-10-CM

## 2023-01-26 PROCEDURE — G8484 FLU IMMUNIZE NO ADMIN: HCPCS | Performed by: INTERNAL MEDICINE

## 2023-01-26 PROCEDURE — G8428 CUR MEDS NOT DOCUMENT: HCPCS | Performed by: INTERNAL MEDICINE

## 2023-01-26 PROCEDURE — 99214 OFFICE O/P EST MOD 30 MIN: CPT | Performed by: INTERNAL MEDICINE

## 2023-01-26 PROCEDURE — 3078F DIAST BP <80 MM HG: CPT | Performed by: INTERNAL MEDICINE

## 2023-01-26 PROCEDURE — 3074F SYST BP LT 130 MM HG: CPT | Performed by: INTERNAL MEDICINE

## 2023-01-26 PROCEDURE — 1036F TOBACCO NON-USER: CPT | Performed by: INTERNAL MEDICINE

## 2023-01-26 PROCEDURE — 3017F COLORECTAL CA SCREEN DOC REV: CPT | Performed by: INTERNAL MEDICINE

## 2023-01-26 PROCEDURE — G8420 CALC BMI NORM PARAMETERS: HCPCS | Performed by: INTERNAL MEDICINE

## 2023-01-26 PROCEDURE — 93000 ELECTROCARDIOGRAM COMPLETE: CPT | Performed by: INTERNAL MEDICINE

## 2023-01-26 PROCEDURE — 1123F ACP DISCUSS/DSCN MKR DOCD: CPT | Performed by: INTERNAL MEDICINE

## 2023-01-26 RX ORDER — RANOLAZINE 1000 MG/1
1000 TABLET, EXTENDED RELEASE ORAL 2 TIMES DAILY
Qty: 180 TABLET | Refills: 3 | Status: SHIPPED | OUTPATIENT
Start: 2023-01-26

## 2023-01-26 RX ORDER — ISOSORBIDE MONONITRATE 60 MG/1
60 TABLET, EXTENDED RELEASE ORAL DAILY
Qty: 90 TABLET | Refills: 3 | Status: SHIPPED | OUTPATIENT
Start: 2023-01-26

## 2023-01-26 RX ORDER — FAMOTIDINE 20 MG/1
20 TABLET, FILM COATED ORAL 2 TIMES DAILY
COMMUNITY

## 2023-01-26 NOTE — PROGRESS NOTES
CHIEF COMPLAINT: CAD/Chest pain    HISTORY OF PRESENT ILLNESS: Patient is a 78 y.o. male seen at the request of Arnold Dobosn MD.      Patient with long standing known CAD reported by 2007 cath at which time he had a  LAD. Was active and without issues for years. Hx of CABG with complicated course with CVA. Recently admitted in [place] for SOB and noted to be in CHF. Cardiac MRI reported LVEF 38% with a small area of ischemia in the inferoapical area. Doing better with medical therapy volume-wise. Noting fatigue. Some chronic stable angina. Still with some belching. Some RILEY.      Past Medical History:   Diagnosis Date    CAD (coronary artery disease)     Encounter regarding vascular access for dialysis for ESRD ([facility] 10/1/2021    Hiatal hernia     Hyperlipidemia     Hypertension        Patient Active Problem List   Diagnosis    CAD in native artery    Pulmonary nodules    Other hyperlipidemia    Essential hypertension    Gastroesophageal reflux disease with esophagitis without hemorrhage    S/P CABG (coronary artery bypass graft)    NGUYỄN (acute kidney injury) ([facility] [address]    Acute CVA (cerebrovascular accident) ([facility] [address]    Angina pectoris, unspecified    Atherosclerotic heart disease of native coronary artery with unspecified angina pectoris    Belching    Raphael's esophagus without dysplasia       Allergies   Allergen Reactions    Dust Mite Extract     Onion     Seasonal        Current Outpatient Medications   Medication Sig Dispense Refill    vitamin D (CHOLECALCIFEROL) 25 MCG (1000 UT) TABS tablet Take 1 tablet by mouth daily      famotidine (PEPCID) 20 MG tablet Take 20 mg by mouth 2 times daily      dicyclomine (BENTYL) 20 MG tablet Take 1 tablet by mouth every evening 60 tablet 11    sacubitril-valsartan (ENTRESTO) 24-26 MG per tablet Take 0.5 tablets by mouth 2 times daily 180 tablet 3    ranolazine (RANEXA) 500 MG extended release tablet Take 1 tablet by mouth 2 times daily 60 tablet 3 furosemide (LASIX) 20 MG tablet Take 1 tablet by mouth daily Pt takes an extra 20 mg PRN 30 tablet 2    isosorbide mononitrate (IMDUR) 30 MG extended release tablet Take 30 mg by mouth daily      atorvastatin (LIPITOR) 40 MG tablet Take 1 tablet by mouth daily 90 tablet 1    clopidogrel (PLAVIX) 75 MG tablet Take 1 tablet by mouth daily 90 tablet 1    carvedilol (COREG) 3.125 MG tablet Take 1 tablet by mouth in the morning and 1 tablet in the evening. Take with meals. 180 tablet 3    vitamin B-12 (CYANOCOBALAMIN) 1000 MCG tablet Take 1 tablet by mouth in the morning. 30 tablet 11    Ascorbic Acid (VITAMIN C PO) Take 750 mg by mouth      Magnesium 100 MG CAPS Take by mouth 2 times daily      Zinc Sulfate (ZINC 15) 66 MG TABS Take by mouth      aspirin 81 MG EC tablet Take 81 mg by mouth daily      Coenzyme Q10 (COQ-10) 100 MG CAPS Take 100 mg by mouth daily       No current facility-administered medications for this visit. Social History     Socioeconomic History    Marital status:      Spouse name: Not on file    Number of children: Not on file    Years of education: Not on file    Highest education level: Not on file   Occupational History    Not on file   Tobacco Use    Smoking status: Never    Smokeless tobacco: Never   Vaping Use    Vaping Use: Never used   Substance and Sexual Activity    Alcohol use: Yes     Comment: occ.     Drug use: Never    Sexual activity: Yes     Partners: Female   Other Topics Concern    Not on file   Social History Narrative    Not on file     Social Determinants of Health     Financial Resource Strain: Low Risk     Difficulty of Paying Living Expenses: Not hard at all   Food Insecurity: No Food Insecurity    Worried About Running Out of Food in the Last Year: Never true    Ran Out of Food in the Last Year: Never true   Transportation Needs: Not on file   Physical Activity: Inactive    Days of Exercise per Week: 0 days    Minutes of Exercise per Session: 0 min   Stress: Not on file   Social Connections: Not on file   Intimate Partner Violence: Not on file   Housing Stability: Not on file       No family history on file. Review of Systems:  Heart: as above   Lungs: as above   Eyes: denies changes in vision or discharge. Ears: denies changes in hearing or pain. Nose: denies epistaxis or masses   Throat: denies sore throat or trouble swallowing. Neuro: denies numbness, tingling, tremors. Skin: denies rashes or itching. : denies hematuria, dysuria   GI: denies vomiting, diarrhea   Psych: denies mood changed, anxiety, depression. All other systems negative. Physical Exam   BP (!) 106/58   Pulse 66   Ht 5' 8\" (1.727 m)   Wt 155 lb 11.2 oz (70.6 kg)   BMI 23.67 kg/m²   Constitutional: Oriented to person, place, and time. Well-developed and well-nourished. No distress. Head: Normocephalic and atraumatic. Eyes: EOM are normal. Pupils are equal, round, and reactive to light. Neck: Normal range of motion. Neck supple. No hepatojugular reflux and no JVD present. Carotid bruit is not present. No tracheal deviation present. No thyromegaly present. Cardiovascular: Normal rate, regular rhythm, normal heart sounds and intact distal pulses. Exam reveals no gallop and no friction rub. No murmur heard. Pulmonary/Chest: Effort normal and breath sounds normal. No respiratory distress. No wheezes. No rales. No tenderness. Abdominal: Soft. Bowel sounds are normal. No distension and no mass. No tenderness. No rebound and no guarding. Musculoskeletal: Normal range of motion. No edema and no tenderness. Lymphadenopathy:   No cervical adenopathy. No groin adenopathy. Neurological: Alert and oriented to person, place, and time. Skin: Skin is warm and dry. No rash noted. Not diaphoretic. No erythema. Psychiatric: Normal mood and affect. Behavior is normal.     EKG personally reviewed 01/26/23:  normal sinus rhythm, nonspecific ST and T waves changes, LBBB.     Stress 4/28/2021 with partially reversible anteroapical (fixed) and septal (reversible) defect, LVEF 36% in [facility]. TTE: 9/23/2021 (Dr. Celio Rucker):   Left ventricular internal dimensions were normal in diastole and systole. Abnormal (paradoxical) motion consistent with left bundle branch block. Normal left ventricular ejection fraction. Mild thickening of the mitral valve leaflets with reduced leaflet excursion. Mild centrally directed mitral regurgitation. Mild tricuspid regurgitation. LVEF 55%     Echo Summary 10/5/2021:   Left ventricle size is normal.   Ejection fraction is visually estimated at 40+/-5%. LV apex is dyskinetic and mid to distal anterior and anteroseptal walls are severely hypokinetic. Normal left ventricle wall thickness. Stage I diastolic dysfunction. No LV mural thrombus. Normal right ventricular size and function. No hemodynamically significant aortic stenosis is present. Unable to estimate PA systolic pressure. No evidence for hemodynamically significant pericardial effusion. Technically difficult examination. Definity echo contrast was used to delineate endocardial borders. Stress Impression 11/8/2022:    A moderate defect was present in the basal inferoseptal, basal inferior, mid inferoseptal, mid inferior, apical septal, apical inferior, and apex walls that was  large sized by quantification. The resting images reveal partial reversibility. Gated SPECT left ventricular ejection fraction was calculated to be 42%, with  paradoxical septal motion. ECG during the infusion  LBBB . The myocardial perfusion imaging was abnormal.    Overall left ventricular systolic function was abnormal with regional wall motion abnormalities. Intermediate risk general pharmacologic nuclear stress test.    CATH SUMMARY 11/30/2022:  ANGIOGRAPHIC FINDINGS:  The left main coronary artery arises normally from the left sinus of Valsalva.   It is a small vessel that gives a good size left circumflex artery branch. The LAD has a flush total occlusion. The first OM branch has subtotal occlusion in the proximal segment. The second OM branch has significant disease in the mid segment. The third OM branch has mild to moderate disease estimated at 40 to 50%; however, the vessel become 1.5 to 2 mm vessel. The left  circumflex artery itself is a large vessel that has 20 to 30% disease in the mid segment. The right coronary artery is a small vessel that is severely diffusely diseased. It gives off a mid size long acute marginal branch that gives collateral to what it looks like LAD actually, distal LAD or a diagonal branch. The SVG to OM branch is patent. There is 20 to 30% stenosis in the distal segment of the graft. There is antegrade filling of the OM with the retrograde filling of another OM branch. The LIMA to LAD is widely patent. Fortunately, the LAD distal to the LIMA and a stenosis in the small vessel and actually it more probably has total occlusion. There is very faint retrograde filling of a septal branch. The SVG to the right is a patent graft; however, the distal RCA of the right PDA  and PLB are significantly diffusely diseased. Distal to the graft, there is retrograde filling of the distal to mid RCA which is mildly to moderately diffusely diseased. At the end of the procedure, limited right iliofemoral angiography confirmed that the arteriotomy was in the right common femoral artery, so a Perclose ProStyle closure device was applied in the right common femoral artery with effective hemostasis  IMPRESSION:  1. Total occlusion of LAD with patent LIMA to LAD, the LAD itself distal to the LIMA to LAD anastomosis is small vessel with total occlusion towards the distal vessel. 2.  Mild disease involving left circumflex artery. 3.  Chronic total occlusion of first OM branch. 4.  Severely diffusely diseased second OM branch with patent SVG to OM.   6.  Severely diffusely disease RCA with patent SVG to distal RCA; however, the distal RCA and the right PDA and the right PLB are severely diffusely diseased. The lesion is not amenable to PPI. 7.  Deployment of ProGlide suture closure device in the right common femoral artery. RECOMMENDATIONS:  1. Aggressive coronary artery disease, risk factor modification. 2.  Will start Ranexa. ASSESSMENT AND PLAN:  Patient Active Problem List   Diagnosis    CAD in native artery    Pulmonary nodules    Other hyperlipidemia    Essential hypertension    Gastroesophageal reflux disease with esophagitis without hemorrhage    S/P CABG (coronary artery bypass graft)    NGUYỄN (acute kidney injury) ([facility] [address] [facility]    Acute CVA (cerebrovascular accident) ([facility] [address]    Angina pectoris, unspecified    Atherosclerotic heart disease of native coronary artery with unspecified angina pectoris    Belching    Raphale's esophagus without dysplasia     1. CAD-CABG/CP:     CABG x 3 9/27/2021 (LIMA-LAD, SVG-OM, SVG-RCA). Stress as above. Recommend cath AUC 7/56. Some CKD. No contrast allergy. ASA/statin/BB/nitrate(titrated)/ranexa(titrated)    2. ICMP/Chronic systolic CHF:     BB/entresto/lasix. 3. HTN: Observe. 4. Lipids: Statin. 5. Carotids: 50-69% B/l 10/2021. Reassess 1 year. 6. CVA: ASA/stain. 7. GI symptoms: Following GI. 8. CKD: Follow labs. Karly Mora D.O.   Cardiologist  Cardiology, [facility]

## 2023-02-01 ENCOUNTER — OFFICE VISIT (OUTPATIENT)
Dept: FAMILY MEDICINE CLINIC | Age: 78
End: 2023-02-01

## 2023-02-01 VITALS
SYSTOLIC BLOOD PRESSURE: 132 MMHG | RESPIRATION RATE: 17 BRPM | DIASTOLIC BLOOD PRESSURE: 66 MMHG | HEIGHT: 68 IN | WEIGHT: 158 LBS | OXYGEN SATURATION: 94 % | BODY MASS INDEX: 23.95 KG/M2 | TEMPERATURE: 97.7 F | HEART RATE: 74 BPM

## 2023-02-01 DIAGNOSIS — I25.119 ATHEROSCLEROSIS OF NATIVE CORONARY ARTERY OF NATIVE HEART WITH ANGINA PECTORIS (HCC): ICD-10-CM

## 2023-02-01 DIAGNOSIS — I25.10 CORONARY ARTERY DISEASE DUE TO LIPID RICH PLAQUE: ICD-10-CM

## 2023-02-01 DIAGNOSIS — E78.49 OTHER HYPERLIPIDEMIA: Primary | ICD-10-CM

## 2023-02-01 DIAGNOSIS — Z01.818 PREOP GENERAL PHYSICAL EXAM: ICD-10-CM

## 2023-02-01 DIAGNOSIS — K21.00 GASTROESOPHAGEAL REFLUX DISEASE WITH ESOPHAGITIS WITHOUT HEMORRHAGE: ICD-10-CM

## 2023-02-01 DIAGNOSIS — I25.83 CORONARY ARTERY DISEASE DUE TO LIPID RICH PLAQUE: ICD-10-CM

## 2023-02-01 PROBLEM — I73.9 PERIPHERAL VASCULAR DISEASE, UNSPECIFIED (HCC): Status: ACTIVE | Noted: 2023-02-01

## 2023-02-01 RX ORDER — ATORVASTATIN CALCIUM 40 MG/1
40 TABLET, FILM COATED ORAL DAILY
Qty: 90 TABLET | Refills: 1 | Status: SHIPPED | OUTPATIENT
Start: 2023-02-01

## 2023-02-01 RX ORDER — CLOPIDOGREL BISULFATE 75 MG/1
75 TABLET ORAL DAILY
Qty: 90 TABLET | Refills: 1 | Status: SHIPPED | OUTPATIENT
Start: 2023-02-01

## 2023-02-01 SDOH — ECONOMIC STABILITY: FOOD INSECURITY: WITHIN THE PAST 12 MONTHS, THE FOOD YOU BOUGHT JUST DIDN'T LAST AND YOU DIDN'T HAVE MONEY TO GET MORE.: NEVER TRUE

## 2023-02-01 SDOH — ECONOMIC STABILITY: INCOME INSECURITY: HOW HARD IS IT FOR YOU TO PAY FOR THE VERY BASICS LIKE FOOD, HOUSING, MEDICAL CARE, AND HEATING?: NOT HARD AT ALL

## 2023-02-01 SDOH — ECONOMIC STABILITY: HOUSING INSECURITY
IN THE LAST 12 MONTHS, WAS THERE A TIME WHEN YOU DID NOT HAVE A STEADY PLACE TO SLEEP OR SLEPT IN A SHELTER (INCLUDING NOW)?: NO

## 2023-02-01 SDOH — ECONOMIC STABILITY: FOOD INSECURITY: WITHIN THE PAST 12 MONTHS, YOU WORRIED THAT YOUR FOOD WOULD RUN OUT BEFORE YOU GOT MONEY TO BUY MORE.: NEVER TRUE

## 2023-02-01 ASSESSMENT — PATIENT HEALTH QUESTIONNAIRE - PHQ9
SUM OF ALL RESPONSES TO PHQ QUESTIONS 1-9: 0
SUM OF ALL RESPONSES TO PHQ QUESTIONS 1-9: 0
2. FEELING DOWN, DEPRESSED OR HOPELESS: 0
1. LITTLE INTEREST OR PLEASURE IN DOING THINGS: 0
SUM OF ALL RESPONSES TO PHQ QUESTIONS 1-9: 0
SUM OF ALL RESPONSES TO PHQ9 QUESTIONS 1 & 2: 0
SUM OF ALL RESPONSES TO PHQ QUESTIONS 1-9: 0

## 2023-02-01 ASSESSMENT — ENCOUNTER SYMPTOMS
COUGH: 0
BLOOD IN STOOL: 0
WHEEZING: 0
SHORTNESS OF BREATH: 1
ABDOMINAL PAIN: 1
EYE REDNESS: 0
PHOTOPHOBIA: 0

## 2023-02-01 ASSESSMENT — LIFESTYLE VARIABLES
HOW MANY STANDARD DRINKS CONTAINING ALCOHOL DO YOU HAVE ON A TYPICAL DAY: 1 OR 2
HOW OFTEN DO YOU HAVE A DRINK CONTAINING ALCOHOL: 2-4 TIMES A MONTH

## 2023-02-01 NOTE — PROGRESS NOTES
OFFICE NOTE    2/1/23  Name: Mercedez Marie  UQC:[id number]   Sex:male   Age:77 y.o. SUBJECTIVE  Chief Complaint   Patient presents with    Pre-op Exam     Cataract        HPI Devonte Daniel will be having cataract removed and IOL inserted. Current plan is for one eye only. Review of Systems   Constitutional:  Positive for activity change and fatigue. Negative for fever and unexpected weight change. Experiencing chest discomfort with belching and RILEY with decreasing workloads. Cardiology actively treating him. HENT:  Positive for hearing loss. Negative for congestion. Eyes:  Positive for visual disturbance. Negative for photophobia and redness. Respiratory:  Positive for shortness of breath. Negative for cough and wheezing. Cardiovascular:  Positive for chest pain and palpitations. Negative for leg swelling. Gastrointestinal:  Positive for abdominal pain. Negative for blood in stool. Genitourinary:  Negative for dysuria and hematuria. Musculoskeletal:  Negative for gait problem. Skin:  Negative for pallor and rash. Neurological:  Positive for weakness. Negative for tremors, seizures and numbness. Psychiatric/Behavioral:  Negative for dysphoric mood. The patient is not nervous/anxious. All other systems reviewed and are negative.          Current Outpatient Medications:     clopidogrel (PLAVIX) 75 MG tablet, Take 1 tablet by mouth daily, Disp: 90 tablet, Rfl: 1    atorvastatin (LIPITOR) 40 MG tablet, Take 1 tablet by mouth daily, Disp: 90 tablet, Rfl: 1    vitamin D (CHOLECALCIFEROL) 25 MCG (1000 UT) TABS tablet, Take 1 tablet by mouth daily, Disp: , Rfl:     famotidine (PEPCID) 20 MG tablet, Take 20 mg by mouth 2 times daily, Disp: , Rfl:     isosorbide mononitrate (IMDUR) 60 MG extended release tablet, Take 1 tablet by mouth daily, Disp: 90 tablet, Rfl: 3    ranolazine (RANEXA) 1000 MG extended release tablet, Take 1 tablet by mouth 2 times daily, Disp: 180 tablet, Rfl: 3 dicyclomine (BENTYL) 20 MG tablet, Take 1 tablet by mouth every evening, Disp: 60 tablet, Rfl: 11    sacubitril-valsartan (ENTRESTO) 24-26 MG per tablet, Take 0.5 tablets by mouth 2 times daily, Disp: 180 tablet, Rfl: 3    furosemide (LASIX) 20 MG tablet, Take 1 tablet by mouth daily Pt takes an extra 20 mg PRN, Disp: 30 tablet, Rfl: 2    carvedilol (COREG) 3.125 MG tablet, Take 1 tablet by mouth in the morning and 1 tablet in the evening. Take with meals. , Disp: 180 tablet, Rfl: 3    vitamin B-12 (CYANOCOBALAMIN) 1000 MCG tablet, Take 1 tablet by mouth in the morning., Disp: 30 tablet, Rfl: 11    Ascorbic Acid (VITAMIN C PO), Take 750 mg by mouth, Disp: , Rfl:     Magnesium 100 MG CAPS, Take by mouth 2 times daily, Disp: , Rfl:     Zinc Sulfate (ZINC 15) 66 MG TABS, Take by mouth, Disp: , Rfl:     aspirin 81 MG EC tablet, Take 81 mg by mouth daily, Disp: , Rfl:     Coenzyme Q10 (COQ-10) 100 MG CAPS, Take 100 mg by mouth daily, Disp: , Rfl:   Allergies   Allergen Reactions    Dust Mite Extract     Onion     Seasonal        Past Medical History:   Diagnosis Date    CAD (coronary artery disease)     Encounter regarding vascular access for dialysis for ESRD ([facility] [address] 10/1/2021    Hiatal hernia     Hyperlipidemia     Hypertension      Past Surgical History:   Procedure Laterality Date    BRONCHOSCOPY N/A 9/30/2021    BRONCHOSCOPY DIAGNOSTIC OR CELL [address] ONLY performed by Vineet Padilla MD at [address] [facility]  9/30/2021    BRONCHOSCOPY CRYOTHERAPY/LASER performed by Vineet Padilla MD at [facility] N/A 9/27/2021    CABG CORONARY ARTERY BYPASS  X 3 EVH, EMMA performed by Susan Pinzon MD at [address] [place] [address]     No family history on file. Social History       Tobacco History       Smoking Status  Never      Smokeless Tobacco Use  Never              Alcohol History       Alcohol Use Status  Yes Comment  occ.               Drug Use       Drug Use Status  Never Sexual Activity       Sexually Active  Yes Partners  Female                    OBJECTIVE  Vitals:    02/01/23 1428   BP: 132/66   Pulse: 74   Resp: 17   Temp: 97.7 °F (36.5 °C)   TempSrc: Temporal   SpO2: 94%   Weight: 158 lb (71.7 kg)   Height: 5' 8\" (1.727 m)        Body mass index is 24.02 kg/m². No orders of the defined types were placed in this encounter. EXAM   Physical Exam  Vitals and nursing note reviewed. Constitutional:       Appearance: Normal appearance. He is normal weight. HENT:      Right Ear: Tympanic membrane and external ear normal.      Left Ear: Tympanic membrane and external ear normal.      Nose: Congestion present. Mouth/Throat:      Pharynx: Oropharynx is clear. No posterior oropharyngeal erythema. Eyes:      Conjunctiva/sclera: Conjunctivae normal.      Pupils: Pupils are equal, round, and reactive to light. Cardiovascular:      Rate and Rhythm: Normal rate and regular rhythm. Heart sounds: No murmur heard. Pulmonary:      Effort: Pulmonary effort is normal.      Breath sounds: Normal breath sounds. No wheezing, rhonchi or rales. Abdominal:      General: Bowel sounds are normal.      Palpations: There is no mass. Tenderness: There is no abdominal tenderness. Musculoskeletal:         General: No swelling or tenderness. Cervical back: No tenderness. Right lower leg: No edema. Left lower leg: No edema. Lymphadenopathy:      Cervical: No cervical adenopathy. Skin:     Coloration: Skin is not jaundiced or pale. Findings: No bruising or rash. Neurological:      General: No focal deficit present. Mental Status: He is alert and oriented to person, place, and time. Psychiatric:         Mood and Affect: Mood normal.         Behavior: Behavior normal.         Nga Keller was seen today for pre-op exam.    Diagnoses and all orders for this visit:    Other hyperlipidemia  -     atorvastatin (LIPITOR) 40 MG tablet;  Take 1 tablet by mouth daily  LDL 57 yet still seems to be progressing with his CAD  Coronary artery disease due to lipid rich plaque  -     clopidogrel (PLAVIX) 75 MG tablet; Take 1 tablet by mouth daily  Carvediol, Entresto, Isorbid, Ranexa. Furosemide. Atherosclerosis of native coronary artery of native heart with angina pectoris ([facility] [address]  Ranexa seems to be helping him some. Most lesions are distal to stents and probably not amenable to further stenting  Preop general physical exam  Feel we can clear him for surgery. Anesthesiologist needs to be aware of all meds Jeanette Nesbitt is on  Gastroesophageal reflux disease with esophagitis without hemorrhage  Dr. Alex Woodall has added dicyclomine which has helped Jeanette Nesbitt quite a bit      No follow-ups on file.     Electronically signed by Krysta Corona MD on 2/1/23 at 3:06 PM EST

## 2023-02-15 ENCOUNTER — OFFICE VISIT (OUTPATIENT)
Dept: GASTROENTEROLOGY | Age: 78
End: 2023-02-15
Payer: MEDICARE

## 2023-02-15 VITALS
DIASTOLIC BLOOD PRESSURE: 54 MMHG | RESPIRATION RATE: 18 BRPM | WEIGHT: 156 LBS | OXYGEN SATURATION: 98 % | HEART RATE: 57 BPM | SYSTOLIC BLOOD PRESSURE: 101 MMHG | BODY MASS INDEX: 23.64 KG/M2 | TEMPERATURE: 96.9 F | HEIGHT: 68 IN

## 2023-02-15 DIAGNOSIS — K21.9 GERD WITHOUT ESOPHAGITIS: ICD-10-CM

## 2023-02-15 DIAGNOSIS — R14.2 BELCHING: ICD-10-CM

## 2023-02-15 DIAGNOSIS — K22.70 BARRETT'S ESOPHAGUS WITHOUT DYSPLASIA: Primary | ICD-10-CM

## 2023-02-15 PROBLEM — F33.0 MAJOR DEPRESSIVE DISORDER, RECURRENT, MILD (HCC): Status: ACTIVE | Noted: 2023-02-15

## 2023-02-15 PROBLEM — F33.9 MAJOR DEPRESSIVE DISORDER, RECURRENT, UNSPECIFIED (HCC): Status: ACTIVE | Noted: 2023-02-15

## 2023-02-15 PROBLEM — F33.1 MAJOR DEPRESSIVE DISORDER, RECURRENT, MODERATE (HCC): Status: ACTIVE | Noted: 2023-02-15

## 2023-02-15 PROCEDURE — 1123F ACP DISCUSS/DSCN MKR DOCD: CPT | Performed by: STUDENT IN AN ORGANIZED HEALTH CARE EDUCATION/TRAINING PROGRAM

## 2023-02-15 PROCEDURE — 3017F COLORECTAL CA SCREEN DOC REV: CPT | Performed by: STUDENT IN AN ORGANIZED HEALTH CARE EDUCATION/TRAINING PROGRAM

## 2023-02-15 PROCEDURE — G8484 FLU IMMUNIZE NO ADMIN: HCPCS | Performed by: STUDENT IN AN ORGANIZED HEALTH CARE EDUCATION/TRAINING PROGRAM

## 2023-02-15 PROCEDURE — 3078F DIAST BP <80 MM HG: CPT | Performed by: STUDENT IN AN ORGANIZED HEALTH CARE EDUCATION/TRAINING PROGRAM

## 2023-02-15 PROCEDURE — G8420 CALC BMI NORM PARAMETERS: HCPCS | Performed by: STUDENT IN AN ORGANIZED HEALTH CARE EDUCATION/TRAINING PROGRAM

## 2023-02-15 PROCEDURE — 99213 OFFICE O/P EST LOW 20 MIN: CPT | Performed by: STUDENT IN AN ORGANIZED HEALTH CARE EDUCATION/TRAINING PROGRAM

## 2023-02-15 PROCEDURE — G8427 DOCREV CUR MEDS BY ELIG CLIN: HCPCS | Performed by: STUDENT IN AN ORGANIZED HEALTH CARE EDUCATION/TRAINING PROGRAM

## 2023-02-15 PROCEDURE — 1036F TOBACCO NON-USER: CPT | Performed by: STUDENT IN AN ORGANIZED HEALTH CARE EDUCATION/TRAINING PROGRAM

## 2023-02-15 PROCEDURE — 3074F SYST BP LT 130 MM HG: CPT | Performed by: STUDENT IN AN ORGANIZED HEALTH CARE EDUCATION/TRAINING PROGRAM

## 2023-02-15 RX ORDER — NORTRIPTYLINE HYDROCHLORIDE 25 MG/1
25 CAPSULE ORAL NIGHTLY
Qty: 30 CAPSULE | Refills: 11 | Status: SHIPPED | OUTPATIENT
Start: 2023-02-15

## 2023-02-26 PROBLEM — K21.9 GERD WITHOUT ESOPHAGITIS: Status: ACTIVE | Noted: 2023-02-26

## 2023-03-03 PROBLEM — Z01.818 PREOP GENERAL PHYSICAL EXAM: Status: RESOLVED | Noted: 2023-02-01 | Resolved: 2023-03-03

## 2023-03-26 ENCOUNTER — APPOINTMENT (OUTPATIENT)
Dept: CT IMAGING | Age: 78
End: 2023-03-26
Payer: MEDICARE

## 2023-03-26 ENCOUNTER — HOSPITAL ENCOUNTER (EMERGENCY)
Age: 78
Discharge: HOME | End: 2023-03-26
Attending: EMERGENCY MEDICINE
Payer: MEDICARE

## 2023-03-26 ENCOUNTER — APPOINTMENT (OUTPATIENT)
Dept: GENERAL RADIOLOGY | Age: 78
End: 2023-03-26
Payer: MEDICARE

## 2023-03-26 VITALS
SYSTOLIC BLOOD PRESSURE: 108 MMHG | BODY MASS INDEX: 22.81 KG/M2 | DIASTOLIC BLOOD PRESSURE: 62 MMHG | HEART RATE: 78 BPM | RESPIRATION RATE: 15 BRPM | OXYGEN SATURATION: 95 % | WEIGHT: 150 LBS | TEMPERATURE: 98 F

## 2023-03-26 DIAGNOSIS — R42 LIGHTHEADEDNESS: Primary | ICD-10-CM

## 2023-03-26 DIAGNOSIS — R55 NEAR SYNCOPE: ICD-10-CM

## 2023-03-26 DIAGNOSIS — R11.0 NAUSEA: ICD-10-CM

## 2023-03-26 LAB
ALBUMIN SERPL-MCNC: 3.7 G/DL (ref 3.5–5.2)
ALP SERPL-CCNC: 70 U/L (ref 40–129)
ALT SERPL-CCNC: 18 U/L (ref 0–40)
ANION GAP SERPL CALCULATED.3IONS-SCNC: 6 MMOL/L (ref 7–16)
AST SERPL-CCNC: 18 U/L (ref 0–39)
BASOPHILS # BLD: 0.03 E9/L (ref 0–0.2)
BASOPHILS NFR BLD: 0.3 % (ref 0–2)
BILIRUB SERPL-MCNC: 0.7 MG/DL (ref 0–1.2)
BUN SERPL-MCNC: 28 MG/DL (ref 6–23)
CALCIUM SERPL-MCNC: 8.7 MG/DL (ref 8.6–10.2)
CHLORIDE SERPL-SCNC: 100 MMOL/L (ref 98–107)
CO2 SERPL-SCNC: 29 MMOL/L (ref 22–29)
CREAT SERPL-MCNC: 1.6 MG/DL (ref 0.7–1.2)
EOSINOPHIL # BLD: 0.09 E9/L (ref 0.05–0.5)
EOSINOPHIL NFR BLD: 0.9 % (ref 0–6)
ERYTHROCYTE [DISTWIDTH] IN BLOOD BY AUTOMATED COUNT: 12.6 FL (ref 11.5–15)
GLUCOSE SERPL-MCNC: 104 MG/DL (ref 74–99)
HCT VFR BLD AUTO: 42.9 % (ref 37–54)
HGB BLD-MCNC: 14.2 G/DL (ref 12.5–16.5)
IMM GRANULOCYTES # BLD: 0.03 E9/L
IMM GRANULOCYTES NFR BLD: 0.3 % (ref 0–5)
LIPASE: 25 U/L (ref 13–60)
LYMPHOCYTES # BLD: 0.81 E9/L (ref 1.5–4)
LYMPHOCYTES NFR BLD: 8 % (ref 20–42)
MAGNESIUM SERPL-MCNC: 2.1 MG/DL (ref 1.6–2.6)
MCH RBC QN AUTO: 31.6 PG (ref 26–35)
MCHC RBC AUTO-ENTMCNC: 33.1 % (ref 32–34.5)
MCV RBC AUTO: 95.5 FL (ref 80–99.9)
MONOCYTES # BLD: 0.76 E9/L (ref 0.1–0.95)
MONOCYTES NFR BLD: 7.5 % (ref 2–12)
NEUTROPHILS # BLD: 8.36 E9/L (ref 1.8–7.3)
NEUTS SEG NFR BLD: 83 % (ref 43–80)
PLATELET # BLD AUTO: 152 E9/L (ref 130–450)
PMV BLD AUTO: 10.4 FL (ref 7–12)
POTASSIUM SERPL-SCNC: 5.1 MMOL/L (ref 3.5–5)
PROT SERPL-MCNC: 5.7 G/DL (ref 6.4–8.3)
RBC # BLD AUTO: 4.49 E12/L (ref 3.8–5.8)
SODIUM SERPL-SCNC: 135 MMOL/L (ref 132–146)
TROPONIN, HIGH SENSITIVITY: 11 NG/L (ref 0–11)
TROPONIN, HIGH SENSITIVITY: 15 NG/L (ref 0–11)
WBC # BLD: 10.1 E9/L (ref 4.5–11.5)

## 2023-03-26 PROCEDURE — 84484 ASSAY OF TROPONIN QUANT: CPT

## 2023-03-26 PROCEDURE — 99285 EMERGENCY DEPT VISIT HI MDM: CPT

## 2023-03-26 PROCEDURE — 70450 CT HEAD/BRAIN W/O DYE: CPT

## 2023-03-26 PROCEDURE — 93005 ELECTROCARDIOGRAM TRACING: CPT

## 2023-03-26 PROCEDURE — 83690 ASSAY OF LIPASE: CPT

## 2023-03-26 PROCEDURE — 83735 ASSAY OF MAGNESIUM: CPT

## 2023-03-26 PROCEDURE — 36415 COLL VENOUS BLD VENIPUNCTURE: CPT

## 2023-03-26 PROCEDURE — 80053 COMPREHEN METABOLIC PANEL: CPT

## 2023-03-26 PROCEDURE — 85025 COMPLETE CBC W/AUTO DIFF WBC: CPT

## 2023-03-26 PROCEDURE — 2580000003 HC RX 258

## 2023-03-26 PROCEDURE — 71045 X-RAY EXAM CHEST 1 VIEW: CPT

## 2023-03-26 PROCEDURE — 2580000003 HC RX 258: Performed by: EMERGENCY MEDICINE

## 2023-03-26 RX ORDER — 0.9 % SODIUM CHLORIDE 0.9 %
500 INTRAVENOUS SOLUTION INTRAVENOUS ONCE
Status: COMPLETED | OUTPATIENT
Start: 2023-03-26 | End: 2023-03-26

## 2023-03-26 RX ADMIN — SODIUM CHLORIDE 500 ML: 9 INJECTION, SOLUTION INTRAVENOUS at 13:37

## 2023-03-26 RX ADMIN — SODIUM CHLORIDE 500 ML: 9 INJECTION, SOLUTION INTRAVENOUS at 12:05

## 2023-03-26 ASSESSMENT — ENCOUNTER SYMPTOMS
TROUBLE SWALLOWING: 0
ABDOMINAL PAIN: 0
WHEEZING: 0
BACK PAIN: 0
VOMITING: 0
NAUSEA: 0
SHORTNESS OF BREATH: 0
RHINORRHEA: 0
PHOTOPHOBIA: 0
COUGH: 0
DIARRHEA: 0
VOICE CHANGE: 0

## 2023-03-26 ASSESSMENT — PAIN - FUNCTIONAL ASSESSMENT: PAIN_FUNCTIONAL_ASSESSMENT: NONE - DENIES PAIN

## 2023-03-26 NOTE — ED PROVIDER NOTES
however his blood pressure responded after a 20 minutes. He currently reports no acute symptoms such as the following: Chest pain, vision changes, headache, cough, shortness of breath, fever, chills, abdominal pain, nausea, vomiting, urinary symptoms, GI bleed, focal weakness/tenderness, dizziness. Upon entering the patient is hemodynamic stable he is without fever percent on room air. Heart is regular rate and rhythm. NIH is 0 cranial nerves II to XII are intact visual fields are intact eyes are normal finger-nose is normal heel-to-shin is normal no nystagmus. Heart is regular rate rhythm lungs are clear to auscultation. I considered acute myocardial infarction however patient EKG and troponin are not evidence of acute ischemic event peak for the EKG did not show any evidence of acute changes and troponin has plateaued. Patient's chest imaging did show a opacity in the left lung which was not present pneumonia atelectasis however the patient is having no white blood cell count is afebrile and lungs are clear to auscultation. The patient is actually aware of this finding he was notified previously. Patient was recommended to get a CTA chest abdomen for a dissection or abdominal aortic aneurysm due to him reporting flank pain. Decision making was made and he did not wish for this at this time nor admission he understood the risks and benefits of foregoing imaging and he would rather go home and follow-up as outpatient. Patient did say that he had taken a pill of Viagra last night and did not take his isosorbide nitrate medication under the advice of his Dr. Keke Hsu. He did take his isosorbide nitrate prior to having his symptoms. My pression is patient had a near syncope episode likely due to transient hypotension from the system silver nitrate medication and from his dehydration as well. He was encouraged to keep hydrated with good amount of fluids. And to come back if symptoms worsen.         ED

## 2023-03-27 LAB
EKG ATRIAL RATE: 60 BPM
EKG P AXIS: 65 DEGREES
EKG P-R INTERVAL: 196 MS
EKG Q-T INTERVAL: 482 MS
EKG QRS DURATION: 148 MS
EKG QTC CALCULATION (BAZETT): 482 MS
EKG R AXIS: -39 DEGREES
EKG T AXIS: 101 DEGREES
EKG VENTRICULAR RATE: 60 BPM

## 2023-03-27 PROCEDURE — 93010 ELECTROCARDIOGRAM REPORT: CPT | Performed by: INTERNAL MEDICINE

## 2023-03-28 ENCOUNTER — TELEPHONE (OUTPATIENT)
Dept: CARDIOLOGY CLINIC | Age: 78
End: 2023-03-28

## 2023-03-28 NOTE — TELEPHONE ENCOUNTER
Prior Ozzie Paz is needed for [employer]   Medicare part D  BIN# [id number]  PCN#[id number]  [place]#[id number]  Group#[id number]

## 2023-03-30 PROBLEM — I50.9 CONGESTIVE HEART FAILURE (CHF) (HCC): Status: ACTIVE | Noted: 2023-03-30

## 2023-05-01 ENCOUNTER — OFFICE VISIT (OUTPATIENT)
Dept: CARDIOLOGY CLINIC | Age: 78
End: 2023-05-01
Payer: MEDICARE

## 2023-05-01 VITALS
BODY MASS INDEX: 23.04 KG/M2 | HEIGHT: 68 IN | SYSTOLIC BLOOD PRESSURE: 134 MMHG | DIASTOLIC BLOOD PRESSURE: 78 MMHG | WEIGHT: 152 LBS

## 2023-05-01 DIAGNOSIS — E78.2 MIXED HYPERLIPIDEMIA: ICD-10-CM

## 2023-05-01 DIAGNOSIS — I10 ESSENTIAL HYPERTENSION: ICD-10-CM

## 2023-05-01 DIAGNOSIS — Z95.1 HX OF CABG: ICD-10-CM

## 2023-05-01 DIAGNOSIS — I25.10 CORONARY ARTERY DISEASE INVOLVING NATIVE CORONARY ARTERY OF NATIVE HEART WITHOUT ANGINA PECTORIS: Primary | ICD-10-CM

## 2023-05-01 DIAGNOSIS — I25.5 ISCHEMIC CARDIOMYOPATHY: ICD-10-CM

## 2023-05-01 DIAGNOSIS — I73.9 PERIPHERAL VASCULAR DISEASE, UNSPECIFIED (HCC): ICD-10-CM

## 2023-05-01 DIAGNOSIS — I50.22 CHRONIC SYSTOLIC (CONGESTIVE) HEART FAILURE (HCC): ICD-10-CM

## 2023-05-01 PROCEDURE — 99204 OFFICE O/P NEW MOD 45 MIN: CPT | Performed by: INTERNAL MEDICINE

## 2023-05-01 PROCEDURE — 1123F ACP DISCUSS/DSCN MKR DOCD: CPT | Performed by: INTERNAL MEDICINE

## 2023-05-01 PROCEDURE — 1036F TOBACCO NON-USER: CPT | Performed by: INTERNAL MEDICINE

## 2023-05-01 PROCEDURE — G8420 CALC BMI NORM PARAMETERS: HCPCS | Performed by: INTERNAL MEDICINE

## 2023-05-01 PROCEDURE — 3078F DIAST BP <80 MM HG: CPT | Performed by: INTERNAL MEDICINE

## 2023-05-01 PROCEDURE — 3075F SYST BP GE 130 - 139MM HG: CPT | Performed by: INTERNAL MEDICINE

## 2023-05-01 PROCEDURE — G8428 CUR MEDS NOT DOCUMENT: HCPCS | Performed by: INTERNAL MEDICINE

## 2023-05-01 ASSESSMENT — ENCOUNTER SYMPTOMS
WHEEZING: 0
EYES NEGATIVE: 1
ALLERGIC/IMMUNOLOGIC NEGATIVE: 1
ORTHOPNEA: 0
SHORTNESS OF BREATH: 0
GASTROINTESTINAL NEGATIVE: 1
COUGH: 1
HEMATOCHEZIA: 0

## 2023-05-01 NOTE — PROGRESS NOTES
[address] [address], [address] [place] [address]      Patient:  Janes Bhatia  1945         SUBJECTIVE:  Janes Bhatia is a  70 y.o. male seen for a follow up visit regarding the following:     Chief Complaint   Patient presents with    New Patient     New to area     CC: Establish care CAD s/p CABG     HPI:   70 y.o. male with a history of CAD with prior CABG x 3 9/27/2021 (LIMA-LAD, SVG-OM, SVG-RCA), ischemic cardiomyopathy/systolic heart failure, HTN, HLD, hiatal hernia who previously followed with Dr. Shanna Cevallos at     Since his last visit to cardiologist he reports that he continues to have sensation of tightness in his chest when he walks, improves with belching. Was started on Ranexa after recent cath as below Nov 2022. Currently taking his medications as directed without missing doses. -570 mmHg systolic at home checks. States that he has pressure-like chest pain with stressful situations or increased physical activity, substernal in location. Resolves with belching. No other alleviating or aggravating factors identified. No other associated symptoms. Followed with nephrology as well as cardiology regularly in [address] [place] [address]  Some lightheadedness with abrupt changes in position such as sitting to standing, no falls or syncope. No other complaints at this time, no orthopnea, leg swelling, difficulty breathing. Recent Cardiovascular Testing:  - Cath 11/30/2022  [place], [place]: Severe native coronary artery disease, patent LIMA to LAD, patent SVG to RCA, patent SVG to OM  Past medical history, past surgical history, family history, social history, and medications were all reviewed with the patient today and updated as necessary.   - Echo 6/21/2022 [address]: LVEF <30 %, RV normal, Valves: No significant valvular abnormalities.       Patient Active Problem List    Diagnosis Date Noted    GERD without esophagitis 02/26/2023     Priority: Medium

## 2023-05-31 ENCOUNTER — TELEPHONE (OUTPATIENT)
Age: 78
End: 2023-05-31

## 2023-05-31 NOTE — TELEPHONE ENCOUNTER
----- Message from Chintan Chatterjee sent at 5/31/2023  4:09 PM EDT -----    ----- Message -----  From: Esteban Kang DO  Sent: 5/31/2023   3:31 PM EDT  To: Tobias Oakes MA    Please call the patient regarding their result. Heart squeezing function has improved to normal range, which is improved compared to prior echocardiogram in [place] approximately 1 year ago. There is leaking of the mitral valve which is reported as at least moderate and may be severe. Recommend obtaining cardiac MRI to better assess mitral valve severity. Have placed and order they will contact patient from radiology to schedule.

## 2023-05-31 NOTE — TELEPHONE ENCOUNTER
Informed patient of results. Voiced understanding. Patient stated he does not wish to have Cardiac MRI at this time.

## 2023-06-05 ENCOUNTER — PATIENT MESSAGE (OUTPATIENT)
Dept: FAMILY MEDICINE CLINIC | Age: 78
End: 2023-06-05

## 2023-06-05 RX ORDER — FUROSEMIDE 20 MG/1
20 TABLET ORAL DAILY
Qty: 30 TABLET | Refills: 2 | Status: SHIPPED | OUTPATIENT
Start: 2023-06-05

## 2023-06-05 NOTE — TELEPHONE ENCOUNTER
From: Eduardo Bridges  To: Dr. Octaviano Ryder: 6/5/2023 8:30 AM EDT  Subject: Prescription Renewal    Please send a new prescription for Furosemide 20 MG 90 tablets to [facility], [address] [place] [address] Telephone [phone] Thank you

## 2023-07-31 RX ORDER — ACETAMINOPHEN/DIPHENHYDRAMINE 500MG-25MG
TABLET ORAL
Qty: 90 TABLET | OUTPATIENT
Start: 2023-07-31

## 2023-07-31 RX ORDER — ISOSORBIDE MONONITRATE 60 MG/1
TABLET, EXTENDED RELEASE ORAL
Qty: 90 TABLET | Refills: 3 | OUTPATIENT
Start: 2023-07-31

## 2023-08-01 ENCOUNTER — PATIENT MESSAGE (OUTPATIENT)
Dept: FAMILY MEDICINE CLINIC | Age: 78
End: 2023-08-01

## 2023-08-01 RX ORDER — LANOLIN ALCOHOL/MO/W.PET/CERES
1000 CREAM (GRAM) TOPICAL DAILY
Qty: 30 TABLET | Refills: 11 | Status: SHIPPED | OUTPATIENT
Start: 2023-08-01

## 2023-08-01 RX ORDER — ISOSORBIDE MONONITRATE 60 MG/1
60 TABLET, EXTENDED RELEASE ORAL DAILY
Qty: 90 TABLET | Refills: 3 | Status: SHIPPED | OUTPATIENT
Start: 2023-08-01

## 2023-08-01 NOTE — TELEPHONE ENCOUNTER
Last Appointment:  2/1/2023  Future Appointments   Date Time Provider [address]   11/9/2023 10:30 AM Roshan Rodriguez DO [facility]

## 2023-08-01 NOTE — TELEPHONE ENCOUNTER
From: Donovan Orona  To: Dr. Kyleigh Rose: 8/1/2023 2:46 PM EDT  Subject: Refills    Need new prescription for B12, 1000MCG Tablets and Isosorbide Mononit ER 60 Mg Tablets. Please send to [facility] in [address].  Thank you

## 2023-09-03 ENCOUNTER — PATIENT MESSAGE (OUTPATIENT)
Age: 78
End: 2023-09-03

## 2023-09-05 RX ORDER — FUROSEMIDE 20 MG/1
20 TABLET ORAL DAILY
Qty: 90 TABLET | Refills: 3 | Status: SHIPPED | OUTPATIENT
Start: 2023-09-05

## 2023-09-05 RX ORDER — CARVEDILOL 3.12 MG/1
TABLET ORAL
Qty: 180 TABLET | Refills: 1 | Status: SHIPPED | OUTPATIENT
Start: 2023-09-05

## 2023-09-05 NOTE — TELEPHONE ENCOUNTER
Requested Prescriptions     Pending Prescriptions Disp Refills    furosemide (LASIX) 20 MG tablet 90 tablet 3     Sig: Take 1 tablet by mouth daily Pt takes an extra 20 mg PRN

## 2023-09-07 RX ORDER — SACUBITRIL AND VALSARTAN 24; 26 MG/1; MG/1
TABLET, FILM COATED ORAL
Qty: 60 TABLET | Refills: 8 | Status: SHIPPED | OUTPATIENT
Start: 2023-09-07

## 2023-09-26 DIAGNOSIS — R97.20 PSA ELEVATION: Primary | ICD-10-CM

## 2023-10-02 DIAGNOSIS — R97.20 PSA ELEVATION: ICD-10-CM

## 2023-10-02 LAB — PROSTATE SPECIFIC ANTIGEN: 1.91 NG/ML (ref 0–4)

## 2023-10-12 ENCOUNTER — OFFICE VISIT (OUTPATIENT)
Dept: FAMILY MEDICINE CLINIC | Age: 78
End: 2023-10-12
Payer: MEDICARE

## 2023-10-12 VITALS
HEART RATE: 65 BPM | RESPIRATION RATE: 18 BRPM | DIASTOLIC BLOOD PRESSURE: 62 MMHG | WEIGHT: 160 LBS | TEMPERATURE: 97.5 F | OXYGEN SATURATION: 98 % | HEIGHT: 68 IN | SYSTOLIC BLOOD PRESSURE: 128 MMHG | BODY MASS INDEX: 24.25 KG/M2

## 2023-10-12 DIAGNOSIS — I25.5 ISCHEMIC CARDIOMYOPATHY: ICD-10-CM

## 2023-10-12 DIAGNOSIS — N18.31 STAGE 3A CHRONIC KIDNEY DISEASE (HCC): ICD-10-CM

## 2023-10-12 DIAGNOSIS — I25.10 CORONARY ARTERY DISEASE DUE TO LIPID RICH PLAQUE: ICD-10-CM

## 2023-10-12 DIAGNOSIS — I25.83 CORONARY ARTERY DISEASE DUE TO LIPID RICH PLAQUE: ICD-10-CM

## 2023-10-12 PROBLEM — F33.0 MAJOR DEPRESSIVE DISORDER, RECURRENT, MILD (HCC): Status: RESOLVED | Noted: 2023-02-15 | Resolved: 2023-10-12

## 2023-10-12 PROBLEM — F33.1 MAJOR DEPRESSIVE DISORDER, RECURRENT, MODERATE (HCC): Status: RESOLVED | Noted: 2023-02-15 | Resolved: 2023-10-12

## 2023-10-12 PROBLEM — N17.9 ACUTE RENAL FAILURE SUPERIMPOSED ON STAGE 3A CHRONIC KIDNEY DISEASE (HCC): Status: RESOLVED | Noted: 2021-09-28 | Resolved: 2023-10-12

## 2023-10-12 PROBLEM — F33.9 MAJOR DEPRESSIVE DISORDER, RECURRENT, UNSPECIFIED (HCC): Status: RESOLVED | Noted: 2023-02-15 | Resolved: 2023-10-12

## 2023-10-12 PROCEDURE — G8427 DOCREV CUR MEDS BY ELIG CLIN: HCPCS | Performed by: FAMILY MEDICINE

## 2023-10-12 PROCEDURE — 1123F ACP DISCUSS/DSCN MKR DOCD: CPT | Performed by: FAMILY MEDICINE

## 2023-10-12 PROCEDURE — 1036F TOBACCO NON-USER: CPT | Performed by: FAMILY MEDICINE

## 2023-10-12 PROCEDURE — G8484 FLU IMMUNIZE NO ADMIN: HCPCS | Performed by: FAMILY MEDICINE

## 2023-10-12 PROCEDURE — 3074F SYST BP LT 130 MM HG: CPT | Performed by: FAMILY MEDICINE

## 2023-10-12 PROCEDURE — G8420 CALC BMI NORM PARAMETERS: HCPCS | Performed by: FAMILY MEDICINE

## 2023-10-12 PROCEDURE — 99214 OFFICE O/P EST MOD 30 MIN: CPT | Performed by: FAMILY MEDICINE

## 2023-10-12 PROCEDURE — 3078F DIAST BP <80 MM HG: CPT | Performed by: FAMILY MEDICINE

## 2023-10-12 RX ORDER — RANOLAZINE 1000 MG/1
1000 TABLET, EXTENDED RELEASE ORAL 2 TIMES DAILY
Qty: 180 TABLET | Refills: 2 | Status: SHIPPED | OUTPATIENT
Start: 2023-10-12

## 2023-10-12 RX ORDER — SACUBITRIL AND VALSARTAN 24; 26 MG/1; MG/1
1 TABLET, FILM COATED ORAL ONCE
COMMUNITY

## 2023-10-12 ASSESSMENT — ENCOUNTER SYMPTOMS
BLOOD IN STOOL: 0
PHOTOPHOBIA: 0
BACK PAIN: 0
SHORTNESS OF BREATH: 1
ABDOMINAL PAIN: 0
WHEEZING: 0
EYE REDNESS: 0

## 2023-10-12 NOTE — PROGRESS NOTES
OFFICE NOTE    10/12/23  Name: Rhoda Mantilla  WQO:3[birth year]   Sex:male   Age:78 y.o. SUBJECTIVE  Chief Complaint   Patient presents with    Medication Refill       HPI Rella Fears came in for med refills. Sold house in [place], bought house in [place] near his kids, comes up to [place] so wife can have some family time, her father is dying    Review of Systems   Constitutional:  Positive for activity change and fatigue. Negative for appetite change. Paces himself waling to avoid chest pain   HENT:  Positive for congestion and postnasal drip. Eyes:  Negative for photophobia, redness and visual disturbance. Respiratory:  Positive for shortness of breath. Negative for wheezing. Cardiovascular:  Positive for chest pain. Negative for leg swelling. Gastrointestinal:  Negative for abdominal pain and blood in stool. Genitourinary:  Positive for frequency and urgency. Negative for dysuria and hematuria. Musculoskeletal:  Negative for arthralgias and back pain. Skin:  Negative for pallor and rash. Neurological:  Positive for weakness. Negative for tremors, seizures, syncope, speech difficulty and numbness. Psychiatric/Behavioral:  Negative for confusion, dysphoric mood, sleep disturbance and suicidal ideas. The patient is not nervous/anxious.              Current Outpatient Medications:     sacubitril-valsartan (ENTRESTO) 24-26 MG per tablet, Take 1 tablet by mouth once Take 1/2 a tablet in the morning and 1/2 tablet at night, Disp: , Rfl:     Fish Oil-Cholecalciferol (OMEGA-3 FISH OIL-VITAMIN D3 PO), Take by mouth, Disp: , Rfl:     ranolazine (RANEXA) 1000 MG extended release tablet, Take 1 tablet by mouth 2 times daily, Disp: 180 tablet, Rfl: 2    carvedilol (COREG) 3.125 MG tablet, TAKE 1 TABLET BY MOUTH TWICE A DAY WITH MEALS, Disp: 180 tablet, Rfl: 1    furosemide (LASIX) 20 MG tablet, Take 1 tablet by mouth daily Pt takes an extra 20 mg PRN, Disp: 90 tablet, Rfl: 3    vitamin B-12 (CYANOCOBALAMIN) 1000

## 2023-10-27 ENCOUNTER — APPOINTMENT (OUTPATIENT)
Dept: GENERAL RADIOLOGY | Age: 78
End: 2023-10-27
Payer: MEDICARE

## 2023-10-27 ENCOUNTER — HOSPITAL ENCOUNTER (EMERGENCY)
Age: 78
Discharge: HOME | End: 2023-10-27
Attending: EMERGENCY MEDICINE
Payer: MEDICARE

## 2023-10-27 VITALS
TEMPERATURE: 97.7 F | SYSTOLIC BLOOD PRESSURE: 122 MMHG | DIASTOLIC BLOOD PRESSURE: 67 MMHG | OXYGEN SATURATION: 95 % | WEIGHT: 158 LBS | RESPIRATION RATE: 19 BRPM | BODY MASS INDEX: 23.95 KG/M2 | HEIGHT: 68 IN | HEART RATE: 62 BPM

## 2023-10-27 DIAGNOSIS — I50.9 ACUTE ON CHRONIC CONGESTIVE HEART FAILURE, UNSPECIFIED HEART FAILURE TYPE (HCC): Primary | ICD-10-CM

## 2023-10-27 DIAGNOSIS — I50.1 PULMONARY EDEMA CARDIAC CAUSE (HCC): ICD-10-CM

## 2023-10-27 LAB
ALBUMIN SERPL-MCNC: 3.1 G/DL (ref 3.2–4.6)
ALBUMIN/GLOB SERPL: 1.1 (ref 0.4–1.6)
ALP SERPL-CCNC: 67 U/L (ref 50–136)
ALT SERPL-CCNC: 15 U/L (ref 12–65)
ANION GAP SERPL CALC-SCNC: 3 MMOL/L (ref 2–11)
AST SERPL-CCNC: 12 U/L (ref 15–37)
BASOPHILS # BLD: 0 K/UL (ref 0–0.2)
BASOPHILS NFR BLD: 0 % (ref 0–2)
BILIRUB SERPL-MCNC: 1 MG/DL (ref 0.2–1.1)
BUN SERPL-MCNC: 25 MG/DL (ref 8–23)
CALCIUM SERPL-MCNC: 8.4 MG/DL (ref 8.3–10.4)
CHLORIDE SERPL-SCNC: 104 MMOL/L (ref 101–110)
CO2 SERPL-SCNC: 28 MMOL/L (ref 21–32)
CREAT SERPL-MCNC: 1.73 MG/DL (ref 0.8–1.5)
DIFFERENTIAL METHOD BLD: ABNORMAL
EKG ATRIAL RATE: 81 BPM
EKG DIAGNOSIS: NORMAL
EKG P AXIS: 63 DEGREES
EKG P-R INTERVAL: 198 MS
EKG Q-T INTERVAL: 442 MS
EKG QRS DURATION: 147 MS
EKG QTC CALCULATION (BAZETT): 514 MS
EKG R AXIS: 49 DEGREES
EKG T AXIS: 120 DEGREES
EKG VENTRICULAR RATE: 81 BPM
EOSINOPHIL # BLD: 0.1 K/UL (ref 0–0.8)
EOSINOPHIL NFR BLD: 1 % (ref 0.5–7.8)
ERYTHROCYTE [DISTWIDTH] IN BLOOD BY AUTOMATED COUNT: 12.3 % (ref 11.9–14.6)
GLOBULIN SER CALC-MCNC: 2.9 G/DL (ref 2.8–4.5)
GLUCOSE SERPL-MCNC: 96 MG/DL (ref 65–100)
HCT VFR BLD AUTO: 36.7 % (ref 41.1–50.3)
HGB BLD-MCNC: 12.3 G/DL (ref 13.6–17.2)
IMM GRANULOCYTES # BLD AUTO: 0 K/UL (ref 0–0.5)
IMM GRANULOCYTES NFR BLD AUTO: 0 % (ref 0–5)
LYMPHOCYTES # BLD: 0.7 K/UL (ref 0.5–4.6)
LYMPHOCYTES NFR BLD: 8 % (ref 13–44)
MAGNESIUM SERPL-MCNC: 2.2 MG/DL (ref 1.8–2.4)
MCH RBC QN AUTO: 32.1 PG (ref 26.1–32.9)
MCHC RBC AUTO-ENTMCNC: 33.5 G/DL (ref 31.4–35)
MCV RBC AUTO: 95.8 FL (ref 82–102)
MONOCYTES # BLD: 0.7 K/UL (ref 0.1–1.3)
MONOCYTES NFR BLD: 9 % (ref 4–12)
NEUTS SEG # BLD: 6.5 K/UL (ref 1.7–8.2)
NEUTS SEG NFR BLD: 81 % (ref 43–78)
NRBC # BLD: 0 K/UL (ref 0–0.2)
NT PRO BNP: 1297 PG/ML
PLATELET # BLD AUTO: 164 K/UL (ref 150–450)
PMV BLD AUTO: 10.1 FL (ref 9.4–12.3)
POTASSIUM SERPL-SCNC: 5 MMOL/L (ref 3.5–5.1)
PROT SERPL-MCNC: 6 G/DL (ref 6.3–8.2)
RBC # BLD AUTO: 3.83 M/UL (ref 4.23–5.6)
SODIUM SERPL-SCNC: 135 MMOL/L (ref 133–143)
TROPONIN I SERPL HS-MCNC: 11.1 PG/ML (ref 0–14)
WBC # BLD AUTO: 8 K/UL (ref 4.3–11.1)

## 2023-10-27 PROCEDURE — 96374 THER/PROPH/DIAG INJ IV PUSH: CPT

## 2023-10-27 PROCEDURE — 6360000002 HC RX W HCPCS: Performed by: EMERGENCY MEDICINE

## 2023-10-27 PROCEDURE — 83880 ASSAY OF NATRIURETIC PEPTIDE: CPT

## 2023-10-27 PROCEDURE — 99285 EMERGENCY DEPT VISIT HI MDM: CPT

## 2023-10-27 PROCEDURE — 93010 ELECTROCARDIOGRAM REPORT: CPT | Performed by: INTERNAL MEDICINE

## 2023-10-27 PROCEDURE — 93005 ELECTROCARDIOGRAM TRACING: CPT | Performed by: EMERGENCY MEDICINE

## 2023-10-27 PROCEDURE — 80053 COMPREHEN METABOLIC PANEL: CPT

## 2023-10-27 PROCEDURE — 83735 ASSAY OF MAGNESIUM: CPT

## 2023-10-27 PROCEDURE — 94761 N-INVAS EAR/PLS OXIMETRY MLT: CPT

## 2023-10-27 PROCEDURE — 84484 ASSAY OF TROPONIN QUANT: CPT

## 2023-10-27 PROCEDURE — 85025 COMPLETE CBC W/AUTO DIFF WBC: CPT

## 2023-10-27 PROCEDURE — 71045 X-RAY EXAM CHEST 1 VIEW: CPT

## 2023-10-27 RX ORDER — FUROSEMIDE 10 MG/ML
40 INJECTION INTRAMUSCULAR; INTRAVENOUS ONCE
Status: COMPLETED | OUTPATIENT
Start: 2023-10-27 | End: 2023-10-27

## 2023-10-27 RX ADMIN — FUROSEMIDE 40 MG: 10 INJECTION, SOLUTION INTRAMUSCULAR; INTRAVENOUS at 12:58

## 2023-10-27 ASSESSMENT — PAIN SCALES - GENERAL: PAINLEVEL_OUTOF10: 0

## 2023-10-27 ASSESSMENT — PAIN - FUNCTIONAL ASSESSMENT: PAIN_FUNCTIONAL_ASSESSMENT: NONE - DENIES PAIN

## 2023-10-27 NOTE — ED PROVIDER NOTES
Emergency Department Provider Note       PCP: Doris Kruger MD   Age: 70 y.o. Sex: male     DISPOSITION Decision To Discharge 10/27/2023 01:58:03 PM       ICD-10-CM    1. Acute on chronic congestive heart failure, unspecified heart failure type ([address])  I50.9       2. Pulmonary edema cardiac cause (HCC)  I50.1           Medical Decision Making     Complexity of Problems Addressed:  1 or more acute illnesses that pose a threat to life or bodily function. Data Reviewed and Analyzed:   I independently ordered and reviewed each unique test.  I reviewed external records: provider visit note from outside specialist.  I reviewed external records: previous EKG including cardiologist interpretation. I reviewed external records: previous lab results from outside ED. I independently ordered and interpreted the ED EKG in the absence of a Cardiologist.    Rate: 81  EKG Interpretation: EKG Interpretation: sinus rhythm, no evidence of arrhythmia and left bundle branch block hx of lbbb  ST Segments: Normal ST segments - NO STEMI      I independently interpreted the cardiac monitor rhythm strip sinus. I interpreted the X-rays small effusion on the left side of the chest scattered infiltrates. .    Discussion of management or test interpretation. Patient is feeling much better on reevaluation. He was given dose of IV Lasix in the ED and is diuresed about 600 mL. His work-up has been relatively normal BNP is 1300 he does have kidney insufficiency which is near baseline. He has a follow-up appointment closely with cardiology. I made them aware if he has any more syncope versus seizure episodes he is to go directly to an emergency room and not wait. Condition        Risk of Complications and/or Morbidity of Patient Management:  Prescription drug management performed. Patient was discharged risks and benefits of hospitalization were considered. Chronic medical problems impacting care include CHF.   Shared medical

## 2023-10-27 NOTE — ED TRIAGE NOTES
Pt CO SOB and chest heaviness x3 days. Pt advises that he had a \"lung drained\" in June 2022. Pt had open heart surgery x2 years ago. Pt's family advises that pt has been belching more frequency. Pt on RA with breathing even and unlabored during triage. Pt ambulatory. Denies using O2 at home. Pt's family advises that x10 days ago pt had a seizure. Pt has never had a seizure before then.

## 2023-11-01 NOTE — PROGRESS NOTES
Notified Dr. Vito Maher of potassium of 2.8. Dr. Vito Maher said to check with Dr. Dustin Taylor. FAMILY HISTORY:  Father  Still living? Unknown  Family history of thalassemia, Age at diagnosis: Age Unknown    Mother  Still living? Unknown  Family history of thalassemia, Age at diagnosis: Age Unknown

## 2023-11-06 ENCOUNTER — APPOINTMENT (RX ONLY)
Dept: URBAN - NONMETROPOLITAN AREA CLINIC 1 | Facility: CLINIC | Age: 78
Setting detail: DERMATOLOGY
End: 2023-11-06

## 2023-11-06 DIAGNOSIS — Z85.828 PERSONAL HISTORY OF OTHER MALIGNANT NEOPLASM OF SKIN: ICD-10-CM

## 2023-11-06 DIAGNOSIS — L57.0 ACTINIC KERATOSIS: ICD-10-CM | Status: INADEQUATELY CONTROLLED

## 2023-11-06 DIAGNOSIS — D18.0 HEMANGIOMA: ICD-10-CM | Status: UNCHANGED

## 2023-11-06 DIAGNOSIS — D22 MELANOCYTIC NEVI: ICD-10-CM | Status: UNCHANGED

## 2023-11-06 DIAGNOSIS — L81.4 OTHER MELANIN HYPERPIGMENTATION: ICD-10-CM | Status: UNCHANGED

## 2023-11-06 DIAGNOSIS — L82.1 OTHER SEBORRHEIC KERATOSIS: ICD-10-CM | Status: UNCHANGED

## 2023-11-06 PROBLEM — D22.5 MELANOCYTIC NEVI OF TRUNK: Status: ACTIVE | Noted: 2023-11-06

## 2023-11-06 PROBLEM — D18.01 HEMANGIOMA OF SKIN AND SUBCUTANEOUS TISSUE: Status: ACTIVE | Noted: 2023-11-06

## 2023-11-06 PROCEDURE — ? COUNSELING

## 2023-11-06 PROCEDURE — ? LIQUID NITROGEN

## 2023-11-06 PROCEDURE — 17000 DESTRUCT PREMALG LESION: CPT

## 2023-11-06 PROCEDURE — 17003 DESTRUCT PREMALG LES 2-14: CPT

## 2023-11-06 PROCEDURE — 99213 OFFICE O/P EST LOW 20 MIN: CPT | Mod: 25

## 2023-11-06 PROCEDURE — ? FULL BODY SKIN EXAM

## 2023-11-06 ASSESSMENT — LOCATION SIMPLE DESCRIPTION DERM
LOCATION SIMPLE: LEFT UPPER BACK
LOCATION SIMPLE: SCALP
LOCATION SIMPLE: POSTERIOR SCALP
LOCATION SIMPLE: RIGHT UPPER BACK
LOCATION SIMPLE: LEFT CHEEK
LOCATION SIMPLE: NOSE
LOCATION SIMPLE: ANTERIOR SCALP

## 2023-11-06 ASSESSMENT — LOCATION ZONE DERM
LOCATION ZONE: NOSE
LOCATION ZONE: FACE
LOCATION ZONE: SCALP
LOCATION ZONE: TRUNK

## 2023-11-06 ASSESSMENT — LOCATION DETAILED DESCRIPTION DERM
LOCATION DETAILED: LEFT SUPERIOR PREAURICULAR CHEEK
LOCATION DETAILED: MID-FRONTAL SCALP
LOCATION DETAILED: LEFT MID-UPPER BACK
LOCATION DETAILED: RIGHT SUPERIOR PARIETAL SCALP
LOCATION DETAILED: LEFT SUPERIOR PARIETAL SCALP
LOCATION DETAILED: NASAL DORSUM
LOCATION DETAILED: RIGHT MID-UPPER BACK
LOCATION DETAILED: MID-OCCIPITAL SCALP

## 2023-11-09 ENCOUNTER — OFFICE VISIT (OUTPATIENT)
Age: 78
End: 2023-11-09
Payer: MEDICARE

## 2023-11-09 VITALS
DIASTOLIC BLOOD PRESSURE: 64 MMHG | HEART RATE: 60 BPM | HEIGHT: 68 IN | SYSTOLIC BLOOD PRESSURE: 112 MMHG | WEIGHT: 157 LBS | BODY MASS INDEX: 23.79 KG/M2

## 2023-11-09 DIAGNOSIS — I25.10 CORONARY ARTERY DISEASE DUE TO LIPID RICH PLAQUE: ICD-10-CM

## 2023-11-09 DIAGNOSIS — E78.49 OTHER HYPERLIPIDEMIA: ICD-10-CM

## 2023-11-09 DIAGNOSIS — I25.83 CORONARY ARTERY DISEASE DUE TO LIPID RICH PLAQUE: ICD-10-CM

## 2023-11-09 DIAGNOSIS — R55 NEAR SYNCOPE: Primary | ICD-10-CM

## 2023-11-09 PROCEDURE — 1123F ACP DISCUSS/DSCN MKR DOCD: CPT | Performed by: INTERNAL MEDICINE

## 2023-11-09 PROCEDURE — G8420 CALC BMI NORM PARAMETERS: HCPCS | Performed by: INTERNAL MEDICINE

## 2023-11-09 PROCEDURE — G8427 DOCREV CUR MEDS BY ELIG CLIN: HCPCS | Performed by: INTERNAL MEDICINE

## 2023-11-09 PROCEDURE — 3078F DIAST BP <80 MM HG: CPT | Performed by: INTERNAL MEDICINE

## 2023-11-09 PROCEDURE — 3074F SYST BP LT 130 MM HG: CPT | Performed by: INTERNAL MEDICINE

## 2023-11-09 PROCEDURE — 99214 OFFICE O/P EST MOD 30 MIN: CPT | Performed by: INTERNAL MEDICINE

## 2023-11-09 PROCEDURE — 1036F TOBACCO NON-USER: CPT | Performed by: INTERNAL MEDICINE

## 2023-11-09 PROCEDURE — G8484 FLU IMMUNIZE NO ADMIN: HCPCS | Performed by: INTERNAL MEDICINE

## 2023-11-09 RX ORDER — CLOPIDOGREL BISULFATE 75 MG/1
75 TABLET ORAL DAILY
Qty: 90 TABLET | Refills: 3 | Status: SHIPPED | OUTPATIENT
Start: 2023-11-09

## 2023-11-09 RX ORDER — ATORVASTATIN CALCIUM 40 MG/1
40 TABLET, FILM COATED ORAL DAILY
Qty: 90 TABLET | Refills: 3 | Status: SHIPPED | OUTPATIENT
Start: 2023-11-09

## 2023-11-09 ASSESSMENT — ENCOUNTER SYMPTOMS
SHORTNESS OF BREATH: 1
COUGH: 0
GASTROINTESTINAL NEGATIVE: 1

## 2023-11-09 NOTE — PROGRESS NOTES
[address] [place]     [address] [facility], [facility], [address]      Patient:  Stephen Jimenezr  1945         SUBJECTIVE:  Stephen Jimenezr is a  70 y.o. male seen for a follow up visit regarding the following:     Chief Complaint   Patient presents with    6 Month Follow-Up    Coronary Artery Disease    Hypertension    Cardiomyopathy   . CC: Establish care CAD s/p CABG      HPI:   70 y.o. male with a history of CAD with prior CABG x 3 9/27/2021 (LIMA-LAD, SVG-OM, SVG-RCA), ischemic cardiomyopathy/systolic heart failure, HTN, HLD, hiatal hernia who is here for follow up. Patient was last seen in office on 5/1/23 , since then reports that he has been having some dyspnea, he  was seen in ER 10/27/23 and given extra dose of lasix with good uop. Had a episode of near syncope after strong breathing event. Occurred after he was moving suitcases into his home. Reports that his HR goes up when he exerts himself, and some shortness of breath. At this time breathing is improved, taking his Lasix once a day. No leg swelling. No further dizziness, lightheadedness or syncopal/near syncopal events. Cardiovascular Testing:  - Echo 5/29/23: LVEF 50-55 %, apical hypokinesis, RV normal, Valves: Moderate to severe MR, mild TR, RVSP 36 mmHg. - Cath 11/30/2022  [place], [place]: Severe native coronary artery disease, patent LIMA to LAD, patent SVG to RCA, patent SVG to OM  - Echo 6/21/2022 [facility]: LVEF <30 %, RV normal, Valves: No significant valvular abnormalities. - Cardiac MRI [place] 9/10/14 2021: Percent, RV size and function normal, no significant valvular abnormalities. No LGE, no significant pericardial effusion. Past medical history, past surgical history, family history, social history, and medications were all reviewed with the patient today and updated as necessary.          Patient Active Problem List    Diagnosis Date Noted    GERD without esophagitis 02/26/2023

## 2023-11-20 ENCOUNTER — APPOINTMENT (RX ONLY)
Dept: URBAN - NONMETROPOLITAN AREA CLINIC 1 | Facility: CLINIC | Age: 78
Setting detail: DERMATOLOGY
End: 2023-11-20

## 2023-11-20 DIAGNOSIS — L23.7 ALLERGIC CONTACT DERMATITIS DUE TO PLANTS, EXCEPT FOOD: ICD-10-CM | Status: INADEQUATELY CONTROLLED

## 2023-11-20 PROCEDURE — ? COUNSELING

## 2023-11-20 PROCEDURE — ? TREATMENT REGIMEN

## 2023-11-20 PROCEDURE — 99213 OFFICE O/P EST LOW 20 MIN: CPT

## 2023-11-20 PROCEDURE — ? PRESCRIPTION MEDICATION MANAGEMENT

## 2023-11-20 PROCEDURE — ? PRESCRIPTION

## 2023-11-20 PROCEDURE — ? PHOTO-DOCUMENTATION

## 2023-11-20 PROCEDURE — ? MEDICATION COUNSELING

## 2023-11-20 RX ORDER — TRIAMCINOLONE ACETONIDE 1 MG/G
OINTMENT TOPICAL
Qty: 453.6 | Refills: 0 | Status: CANCELLED | COMMUNITY
Start: 2023-11-20

## 2023-11-20 RX ORDER — TRIAMCINOLONE ACETONIDE 1 MG/G
OINTMENT TOPICAL
Qty: 453.6 | Refills: 0 | Status: ERX | COMMUNITY
Start: 2023-11-20

## 2023-11-20 RX ADMIN — TRIAMCINOLONE ACETONIDE: 1 OINTMENT TOPICAL at 00:00

## 2023-11-20 ASSESSMENT — LOCATION SIMPLE DESCRIPTION DERM
LOCATION SIMPLE: LEFT THIGH
LOCATION SIMPLE: LEFT PRETIBIAL REGION
LOCATION SIMPLE: LEFT POPLITEAL SKIN

## 2023-11-20 ASSESSMENT — LOCATION DETAILED DESCRIPTION DERM
LOCATION DETAILED: LEFT POPLITEAL SKIN
LOCATION DETAILED: LEFT PROXIMAL PRETIBIAL REGION
LOCATION DETAILED: LEFT ANTERIOR PROXIMAL THIGH

## 2023-11-20 ASSESSMENT — LOCATION ZONE DERM: LOCATION ZONE: LEG

## 2023-11-20 NOTE — PROCEDURE: MEDICATION COUNSELING
Mirvaso Pregnancy And Lactation Text: This medication has not been assigned a Pregnancy Risk Category. It is unknown if the medication is excreted in breast milk.
Sarecycline Pregnancy And Lactation Text: This medication is Pregnancy Category D and not consider safe during pregnancy. It is also excreted in breast milk.
Metronidazole Counseling:  I discussed with the patient the risks of metronidazole including but not limited to seizures, nausea/vomiting, a metallic taste in the mouth, nausea/vomiting and severe allergy.
Hydroquinone Counseling:  Patient advised that medication may result in skin irritation, lightening (hypopigmentation), dryness, and burning.  In the event of skin irritation, the patient was advised to reduce the amount of the drug applied or use it less frequently.  Rarely, spots that are treated with hydroquinone can become darker (pseudoochronosis).  Should this occur, patient instructed to stop medication and call the office. The patient verbalized understanding of the proper use and possible adverse effects of hydroquinone.  All of the patient's questions and concerns were addressed.
Glycopyrrolate Counseling:  I discussed with the patient the risks of glycopyrrolate including but not limited to skin rash, drowsiness, dry mouth, difficulty urinating, and blurred vision.
Nsaids Pregnancy And Lactation Text: These medications are considered safe up to 30 weeks gestation. It is excreted in breast milk.
Propranolol Counseling:  I discussed with the patient the risks of propranolol including but not limited to low heart rate, low blood pressure, low blood sugar, restlessness and increased cold sensitivity. They should call the office if they experience any of these side effects.
Cibinqo Pregnancy And Lactation Text: It is unknown if this medication will adversely affect pregnancy or breast feeding.  You should not take this medication if you are currently pregnant or planning a pregnancy or while breastfeeding.
Topical Sulfur Applications Counseling: Topical Sulfur Counseling: Patient counseled that this medication may cause skin irritation or allergic reactions.  In the event of skin irritation, the patient was advised to reduce the amount of the drug applied or use it less frequently.   The patient verbalized understanding of the proper use and possible adverse effects of topical sulfur application.  All of the patient's questions and concerns were addressed.
Tazorac Pregnancy And Lactation Text: This medication is not safe during pregnancy. It is unknown if this medication is excreted in breast milk.
Rhofade Counseling: Rhofade is a topical medication which can decrease superficial blood flow where applied. Side effects are uncommon and include stinging, redness and allergic reactions.
Cyclophosphamide Pregnancy And Lactation Text: This medication is Pregnancy Category D and it isn't considered safe during pregnancy. This medication is excreted in breast milk.
Valtrex Pregnancy And Lactation Text: this medication is Pregnancy Category B and is considered safe during pregnancy. This medication is not directly found in breast milk but it's metabolite acyclovir is present.
Rituxan Pregnancy And Lactation Text: This medication is Pregnancy Category C and it isn't know if it is safe during pregnancy. It is unknown if this medication is excreted in breast milk but similar antibodies are known to be excreted.
Cantharidin Pregnancy And Lactation Text: This medication has not been proven safe during pregnancy. It is unknown if this medication is excreted in breast milk.
Vtama Pregnancy And Lactation Text: It is unknown if this medication can cause problems during pregnancy and breastfeeding.
Arava Pregnancy And Lactation Text: This medication is Pregnancy Category X and is absolutely contraindicated during pregnancy. It is unknown if it is excreted in breast milk.
Taltz Counseling: I discussed with the patient the risks of ixekizumab including but not limited to immunosuppression, serious infections, worsening of inflammatory bowel disease and drug reactions.  The patient understands that monitoring is required including a PPD at baseline and must alert us or the primary physician if symptoms of infection or other concerning signs are noted.
Hydroxyzine Counseling: Patient advised that the medication is sedating and not to drive a car after taking this medication.  Patient informed of potential adverse effects including but not limited to dry mouth, urinary retention, and blurry vision.  The patient verbalized understanding of the proper use and possible adverse effects of hydroxyzine.  All of the patient's questions and concerns were addressed.
Cimzia Counseling:  I discussed with the patient the risks of Cimzia including but not limited to immunosuppression, allergic reactions and infections.  The patient understands that monitoring is required including a PPD at baseline and must alert us or the primary physician if symptoms of infection or other concerning signs are noted.
Humira Pregnancy And Lactation Text: This medication is Pregnancy Category B and is considered safe during pregnancy. It is unknown if this medication is excreted in breast milk.
Xelbeaz Pregnancy And Lactation Text: This medication is Pregnancy Category D and is not considered safe during pregnancy.  The risk during breast feeding is also uncertain.
Birth Control Pills Pregnancy And Lactation Text: This medication should be avoided if pregnant and for the first 30 days post-partum.
Aklief Pregnancy And Lactation Text: It is unknown if this medication is safe to use during pregnancy.  It is unknown if this medication is excreted in breast milk.  Breastfeeding women should use the topical cream on the smallest area of the skin for the shortest time needed while breastfeeding.  Do not apply to nipple and areola.
Cephalexin Counseling: I counseled the patient regarding use of cephalexin as an antibiotic for prophylactic and/or therapeutic purposes. Cephalexin (commonly prescribed under brand name Keflex) is a cephalosporin antibiotic which is active against numerous classes of bacteria, including most skin bacteria. Side effects may include nausea, diarrhea, gastrointestinal upset, rash, hives, yeast infections, and in rare cases, hepatitis, kidney disease, seizures, fever, confusion, neurologic symptoms, and others. Patients with severe allergies to penicillin medications are cautioned that there is about a 10% incidence of cross-reactivity with cephalosporins. When possible, patients with penicillin allergies should use alternatives to cephalosporins for antibiotic therapy.
5-Fu Counseling: 5-Fluorouracil Counseling:  I discussed with the patient the risks of 5-fluorouracil including but not limited to erythema, scaling, itching, weeping, crusting, and pain.
Libtayo Pregnancy And Lactation Text: This medication is contraindicated in pregnancy and when breast feeding.
Ketoconazole Counseling:   Patient counseled regarding improving absorption with orange juice.  Adverse effects include but are not limited to breast enlargement, headache, diarrhea, nausea, upset stomach, liver function test abnormalities, taste disturbance, and stomach pain.  There is a rare possibility of liver failure that can occur when taking ketoconazole. The patient understands that monitoring of LFTs may be required, especially at baseline. The patient verbalized understanding of the proper use and possible adverse effects of ketoconazole.  All of the patient's questions and concerns were addressed.
Tetracycline Counseling: Patient counseled regarding possible photosensitivity and increased risk for sunburn.  Patient instructed to avoid sunlight, if possible.  When exposed to sunlight, patients should wear protective clothing, sunglasses, and sunscreen.  The patient was instructed to call the office immediately if the following severe adverse effects occur:  hearing changes, easy bruising/bleeding, severe headache, or vision changes.  The patient verbalized understanding of the proper use and possible adverse effects of tetracycline.  All of the patient's questions and concerns were addressed. Patient understands to avoid pregnancy while on therapy due to potential birth defects.
Litfulo Counseling: I discussed with the patient the risks of Litfulo therapy including but not limited to upper respiratory tract infections, shingles, cold sores, and nausea. Live vaccines should be avoided.  This medication has been linked to serious infections; higher rate of mortality; malignancy and lymphoproliferative disorders; major adverse cardiovascular events; thrombosis; gastrointestinal perforations; neutropenia; lymphopenia; anemia; liver enzyme elevations; and lipid elevations.
Metronidazole Pregnancy And Lactation Text: This medication is Pregnancy Category B and considered safe during pregnancy.  It is also excreted in breast milk.
Hydroquinone Pregnancy And Lactation Text: This medication has not been assigned a Pregnancy Risk Category but animal studies failed to show danger with the topical medication. It is unknown if the medication is excreted in breast milk.
Clofazimine Counseling:  I discussed with the patient the risks of clofazimine including but not limited to skin and eye pigmentation, liver damage, nausea/vomiting, gastrointestinal bleeding and allergy.
Taltz Pregnancy And Lactation Text: The risk during pregnancy and breastfeeding is uncertain with this medication.
Cyclosporine Counseling:  I discussed with the patient the risks of cyclosporine including but not limited to hypertension, gingival hyperplasia,myelosuppression, immunosuppression, liver damage, kidney damage, neurotoxicity, lymphoma, and serious infections. The patient understands that monitoring is required including baseline blood pressure, CBC, CMP, lipid panel and uric acid, and then 1-2 times monthly CMP and blood pressure.
Glycopyrrolate Pregnancy And Lactation Text: This medication is Pregnancy Category B and is considered safe during pregnancy. It is unknown if it is excreted breast milk.
Topical Clindamycin Counseling: Patient counseled that this medication may cause skin irritation or allergic reactions.  In the event of skin irritation, the patient was advised to reduce the amount of the drug applied or use it less frequently.   The patient verbalized understanding of the proper use and possible adverse effects of clindamycin.  All of the patient's questions and concerns were addressed.
Olanzapine Counseling- I discussed with the patient the common side effects of olanzapine including but are not limited to: lack of energy, dry mouth, increased appetite, sleepiness, tremor, constipation, dizziness, changes in behavior, or restlessness.  Explained that teenagers are more likely to experience headaches, abdominal pain, pain in the arms or legs, tiredness, and sleepiness.  Serious side effects include but are not limited: increased risk of death in elderly patients who are confused, have memory loss, or dementia-related psychosis; hyperglycemia; increased cholesterol and triglycerides; and weight gain.
Hydroxyzine Pregnancy And Lactation Text: This medication is not safe during pregnancy and should not be taken. It is also excreted in breast milk and breast feeding isn't recommended.
Topical Sulfur Applications Pregnancy And Lactation Text: This medication is Pregnancy Category C and has an unknown safety profile during pregnancy. It is unknown if this topical medication is excreted in breast milk.
Use Enhanced Medication Counseling?: No
Siliq Counseling:  I discussed with the patient the risks of Siliq including but not limited to new or worsening depression, suicidal thoughts and behavior, immunosuppression, malignancy, posterior leukoencephalopathy syndrome, and serious infections.  The patient understands that monitoring is required including a PPD at baseline and must alert us or the primary physician if symptoms of infection or other concerning signs are noted. There is also a special program designed to monitor depression which is required with Siliq.
Opzelura Counseling:  I discussed with the patient the risks of Opzelura including but not limited to nasopharngitis, bronchitis, ear infection, eosinophila, hives, diarrhea, folliculitis, tonsillitis, and rhinorrhea.  Taken orally, this medication has been linked to serious infections; higher rate of mortality; malignancy and lymphoproliferative disorders; major adverse cardiovascular events; thrombosis; thrombocytopenia, anemia, and neutropenia; and lipid elevations.
Propranolol Pregnancy And Lactation Text: This medication is Pregnancy Category C and it isn't known if it is safe during pregnancy. It is excreted in breast milk.
Zoryve Counseling:  I discussed with the patient that Zoryve is not for use in the eyes, mouth or vagina. The most commonly reported side effects include diarrhea, headache, insomnia, application site pain, upper respiratory tract infections, and urinary tract infections.  All of the patient's questions and concerns were addressed.
Spironolactone Counseling: Patient advised regarding risks of diarrhea, abdominal pain, hyperkalemia, birth defects (for female patients), liver toxicity and renal toxicity. The patient may need blood work to monitor liver and kidney function and potassium levels while on therapy. The patient verbalized understanding of the proper use and possible adverse effects of spironolactone.  All of the patient's questions and concerns were addressed.
Cimzia Pregnancy And Lactation Text: This medication crosses the placenta but can be considered safe in certain situations. Cimzia may be excreted in breast milk.
Ilumya Counseling: I discussed with the patient the risks of tildrakizumab including but not limited to immunosuppression, malignancy, posterior leukoencephalopathy syndrome, and serious infections.  The patient understands that monitoring is required including a PPD at baseline and must alert us or the primary physician if symptoms of infection or other concerning signs are noted.
Ketoconazole Pregnancy And Lactation Text: This medication is Pregnancy Category C and it isn't know if it is safe during pregnancy. It is also excreted in breast milk and breast feeding isn't recommended.
Litfulo Pregnancy And Lactation Text: Based on animal studies, Lifulo may cause embryo-fetal harm when administered to pregnant women.  The medication should not be used in pregnancy.  Breastfeeding is not recommended during treatment.
Minocycline Counseling: Patient advised regarding possible photosensitivity and discoloration of the teeth, skin, lips, tongue and gums.  Patient instructed to avoid sunlight, if possible.  When exposed to sunlight, patients should wear protective clothing, sunglasses, and sunscreen.  The patient was instructed to call the office immediately if the following severe adverse effects occur:  hearing changes, easy bruising/bleeding, severe headache, or vision changes.  The patient verbalized understanding of the proper use and possible adverse effects of minocycline.  All of the patient's questions and concerns were addressed.
Imiquimod Counseling:  I discussed with the patient the risks of imiquimod including but not limited to erythema, scaling, itching, weeping, crusting, and pain.  Patient understands that the inflammatory response to imiquimod is variable from person to person and was educated regarded proper titration schedule.  If flu-like symptoms develop, patient knows to discontinue the medication and contact us.
Detail Level: Simple
Azelaic Acid Counseling: Patient counseled that medicine may cause skin irritation and to avoid applying near the eyes.  In the event of skin irritation, the patient was advised to reduce the amount of the drug applied or use it less frequently.   The patient verbalized understanding of the proper use and possible adverse effects of azelaic acid.  All of the patient's questions and concerns were addressed.
5-Fu Pregnancy And Lactation Text: This medication is Pregnancy Category X and contraindicated in pregnancy and in women who may become pregnant. It is unknown if this medication is excreted in breast milk.
Cephalexin Pregnancy And Lactation Text: This medication is Pregnancy Category B and considered safe during pregnancy.  It is also excreted in breast milk but can be used safely for shorter doses.
Wartpeel Counseling:  I discussed with the patient the risks of Wartpeel including but not limited to erythema, scaling, itching, weeping, crusting, and pain.
SSKI Counseling:  I discussed with the patient the risks of SSKI including but not limited to thyroid abnormalities, metallic taste, GI upset, fever, headache, acne, arthralgias, paraesthesias, lymphadenopathy, easy bleeding, arrhythmias, and allergic reaction.
Acitretin Counseling:  I discussed with the patient the risks of acitretin including but not limited to hair loss, dry lips/skin/eyes, liver damage, hyperlipidemia, depression/suicidal ideation, photosensitivity.  Serious rare side effects can include but are not limited to pancreatitis, pseudotumor cerebri, bony changes, clot formation/stroke/heart attack.  Patient understands that alcohol is contraindicated since it can result in liver toxicity and significantly prolong the elimination of the drug by many years.
Clofazimine Pregnancy And Lactation Text: This medication is Pregnancy Category C and isn't considered safe during pregnancy. It is excreted in breast milk.
Solaraze Counseling:  I discussed with the patient the risks of Solaraze including but not limited to erythema, scaling, itching, weeping, crusting, and pain.
Odomzo Counseling- I discussed with the patient the risks of Odomzo including but not limited to nausea, vomiting, diarrhea, constipation, weight loss, changes in the sense of taste, decreased appetite, muscle spasms, and hair loss.  The patient verbalized understanding of the proper use and possible adverse effects of Odomzo.  All of the patient's questions and concerns were addressed.
Opzelura Pregnancy And Lactation Text: There is insufficient data to evaluate drug-associated risk for major birth defects, miscarriage, or other adverse maternal or fetal outcomes.  There is a pregnancy registry that monitors pregnancy outcomes in pregnant persons exposed to the medication during pregnancy.  It is unknown if this medication is excreted in breast milk.  Do not breastfeed during treatment and for about 4 weeks after the last dose.
Tremfya Counseling: I discussed with the patient the risks of guselkumab including but not limited to immunosuppression, serious infections, and drug reactions.  The patient understands that monitoring is required including a PPD at baseline and must alert us or the primary physician if symptoms of infection or other concerning signs are noted.
Topical Clindamycin Pregnancy And Lactation Text: This medication is Pregnancy Category B and is considered safe during pregnancy. It is unknown if it is excreted in breast milk.
Hydroxychloroquine Counseling:  I discussed with the patient that a baseline ophthalmologic exam is needed at the start of therapy and every year thereafter while on therapy. A CBC may also be warranted for monitoring.  The side effects of this medication were discussed with the patient, including but not limited to agranulocytosis, aplastic anemia, seizures, rashes, retinopathy, and liver toxicity. Patient instructed to call the office should any adverse effect occur.  The patient verbalized understanding of the proper use and possible adverse effects of Plaquenil.  All the patient's questions and concerns were addressed.
Olanzapine Pregnancy And Lactation Text: This medication is pregnancy category C.   There are no adequate and well controlled trials with olanzapine in pregnant females.  Olanzapine should be used during pregnancy only if the potential benefit justifies the potential risk to the fetus.   In a study in lactating healthy women, olanzapine was excreted in breast milk.  It is recommended that women taking olanzapine should not breast feed.
Cyclosporine Pregnancy And Lactation Text: This medication is Pregnancy Category C and it isn't know if it is safe during pregnancy. This medication is excreted in breast milk.
Spironolactone Pregnancy And Lactation Text: This medication can cause feminization of the male fetus and should be avoided during pregnancy. The active metabolite is also found in breast milk.
Cosentyx Counseling:  I discussed with the patient the risks of Cosentyx including but not limited to worsening of Crohn's disease, immunosuppression, allergic reactions and infections.  The patient understands that monitoring is required including a PPD at baseline and must alert us or the primary physician if symptoms of infection or other concerning signs are noted.
Azelaic Acid Pregnancy And Lactation Text: This medication is considered safe during pregnancy and breast feeding.
Terbinafine Counseling: Patient counseling regarding adverse effects of terbinafine including but not limited to headache, diarrhea, rash, upset stomach, liver function test abnormalities, itching, taste/smell disturbance, nausea, abdominal pain, and flatulence.  There is a rare possibility of liver failure that can occur when taking terbinafine.  The patient understands that a baseline LFT and kidney function test may be required. The patient verbalized understanding of the proper use and possible adverse effects of terbinafine.  All of the patient's questions and concerns were addressed.
Clindamycin Counseling: I counseled the patient regarding use of clindamycin as an antibiotic for prophylactic and/or therapeutic purposes. Clindamycin is active against numerous classes of bacteria, including skin bacteria. Side effects may include nausea, diarrhea, gastrointestinal upset, rash, hives, yeast infections, and in rare cases, colitis.
Drysol Counseling:  I discussed with the patient the risks of drysol/aluminum chloride including but not limited to skin rash, itching, irritation, burning.
Imiquimod Pregnancy And Lactation Text: This medication is Pregnancy Category C. It is unknown if this medication is excreted in breast milk.
Olumiant Counseling: I discussed with the patient the risks of Olumiant therapy including but not limited to upper respiratory tract infections, shingles, cold sores, and nausea. Live vaccines should be avoided.  This medication has been linked to serious infections; higher rate of mortality; malignancy and lymphoproliferative disorders; major adverse cardiovascular events; thrombosis; gastrointestinal perforations; neutropenia; lymphopenia; anemia; liver enzyme elevations; and lipid elevations.
Albendazole Counseling:  I discussed with the patient the risks of albendazole including but not limited to cytopenia, kidney damage, nausea/vomiting and severe allergy.  The patient understands that this medication is being used in an off-label manner.
Topical Ketoconazole Counseling: Patient counseled that this medication may cause skin irritation or allergic reactions.  In the event of skin irritation, the patient was advised to reduce the amount of the drug applied or use it less frequently.   The patient verbalized understanding of the proper use and possible adverse effects of ketoconazole.  All of the patient's questions and concerns were addressed.
Oral Minoxidil Counseling- I discussed with the patient the risks of oral minoxidil including but not limited to shortness of breath, swelling of the feet or ankles, dizziness, lightheadedness, unwanted hair growth and allergic reaction.  The patient verbalized understanding of the proper use and possible adverse effects of oral minoxidil.  All of the patient's questions and concerns were addressed.
Sski Pregnancy And Lactation Text: This medication is Pregnancy Category D and isn't considered safe during pregnancy. It is excreted in breast milk.
Solaraze Pregnancy And Lactation Text: This medication is Pregnancy Category B and is considered safe. There is some data to suggest avoiding during the third trimester. It is unknown if this medication is excreted in breast milk.
Picato Counseling:  I discussed with the patient the risks of Picato including but not limited to erythema, scaling, itching, weeping, crusting, and pain.
Acitretin Pregnancy And Lactation Text: This medication is Pregnancy Category X and should not be given to women who are pregnant or may become pregnant in the future. This medication is excreted in breast milk.
Zyclara Counseling:  I discussed with the patient the risks of imiquimod including but not limited to erythema, scaling, itching, weeping, crusting, and pain.  Patient understands that the inflammatory response to imiquimod is variable from person to person and was educated regarded proper titration schedule.  If flu-like symptoms develop, patient knows to discontinue the medication and contact us.
Simponi Counseling:  I discussed with the patient the risks of golimumab including but not limited to myelosuppression, immunosuppression, autoimmune hepatitis, demyelinating diseases, lymphoma, and serious infections.  The patient understands that monitoring is required including a PPD at baseline and must alert us or the primary physician if symptoms of infection or other concerning signs are noted.
Colchicine Counseling:  Patient counseled regarding adverse effects including but not limited to stomach upset (nausea, vomiting, stomach pain, or diarrhea).  Patient instructed to limit alcohol consumption while taking this medication.  Colchicine may reduce blood counts especially with prolonged use.  The patient understands that monitoring of kidney function and blood counts may be required, especially at baseline. The patient verbalized understanding of the proper use and possible adverse effects of colchicine.  All of the patient's questions and concerns were addressed.
Hydroxychloroquine Pregnancy And Lactation Text: This medication has been shown to cause fetal harm but it isn't assigned a Pregnancy Risk Category. There are small amounts excreted in breast milk.
Fluconazole Counseling:  Patient counseled regarding adverse effects of fluconazole including but not limited to headache, diarrhea, nausea, upset stomach, liver function test abnormalities, taste disturbance, and stomach pain.  There is a rare possibility of liver failure that can occur when taking fluconazole.  The patient understands that monitoring of LFTs and kidney function test may be required, especially at baseline. The patient verbalized understanding of the proper use and possible adverse effects of fluconazole.  All of the patient's questions and concerns were addressed.
Methotrexate Counseling:  Patient counseled regarding adverse effects of methotrexate including but not limited to nausea, vomiting, abnormalities in liver function tests. Patients may develop mouth sores, rash, diarrhea, and abnormalities in blood counts. The patient understands that monitoring is required including LFT's and blood counts.  There is a rare possibility of scarring of the liver and lung problems that can occur when taking methotrexate. Persistent nausea, loss of appetite, pale stools, dark urine, cough, and shortness of breath should be reported immediately. Patient advised to discontinue methotrexate treatment at least three months before attempting to become pregnant.  I discussed the need for folate supplements while taking methotrexate.  These supplements can decrease side effects during methotrexate treatment. The patient verbalized understanding of the proper use and possible adverse effects of methotrexate.  All of the patient's questions and concerns were addressed.
Albendazole Pregnancy And Lactation Text: This medication is Pregnancy Category C and it isn't known if it is safe during pregnancy. It is also excreted in breast milk.
Benzoyl Peroxide Counseling: Patient counseled that medicine may cause skin irritation and bleach clothing.  In the event of skin irritation, the patient was advised to reduce the amount of the drug applied or use it less frequently.   The patient verbalized understanding of the proper use and possible adverse effects of benzoyl peroxide.  All of the patient's questions and concerns were addressed.
Olumiant Pregnancy And Lactation Text: Based on animal studies, Olumiant may cause embryo-fetal harm when administered to pregnant women.  The medication should not be used in pregnancy.  Breastfeeding is not recommended during treatment.
Terbinafine Pregnancy And Lactation Text: This medication is Pregnancy Category B and is considered safe during pregnancy. It is also excreted in breast milk and breast feeding isn't recommended.
Klisyri Counseling:  I discussed with the patient the risks of Klisyri including but not limited to erythema, scaling, itching, weeping, crusting, and pain.
Quinolones Counseling:  I discussed with the patient the risks of fluoroquinolones including but not limited to GI upset, allergic reaction, drug rash, diarrhea, dizziness, photosensitivity, yeast infections, liver function test abnormalities, tendonitis/tendon rupture.
Clindamycin Pregnancy And Lactation Text: This medication can be used in pregnancy if certain situations. Clindamycin is also present in breast milk.
Winlevi Counseling:  I discussed with the patient the risks of topical clascoterone including but not limited to erythema, scaling, itching, and stinging. Patient voiced their understanding.
Soolantra Counseling: I discussed with the patients the risks of topial Soolantra. This is a medicine which decreases the number of mites and inflammation in the skin. You experience burning, stinging, eye irritation or allergic reactions.  Please call our office if you develop any problems from using this medication.
Methotrexate Pregnancy And Lactation Text: This medication is Pregnancy Category X and is known to cause fetal harm. This medication is excreted in breast milk.
Azathioprine Counseling:  I discussed with the patient the risks of azathioprine including but not limited to myelosuppression, immunosuppression, hepatotoxicity, lymphoma, and infections.  The patient understands that monitoring is required including baseline LFTs, Creatinine, possible TPMP genotyping and weekly CBCs for the first month and then every 2 weeks thereafter.  The patient verbalized understanding of the proper use and possible adverse effects of azathioprine.  All of the patient's questions and concerns were addressed.
Oral Minoxidil Pregnancy And Lactation Text: This medication should only be used when clearly needed if you are pregnant, attempting to become pregnant or breast feeding.
Bexarotene Counseling:  I discussed with the patient the risks of bexarotene including but not limited to hair loss, dry lips/skin/eyes, liver abnormalities, hyperlipidemia, pancreatitis, depression/suicidal ideation, photosensitivity, drug rash/allergic reactions, hypothyroidism, anemia, leukopenia, infection, cataracts, and teratogenicity.  Patient understands that they will need regular blood tests to check lipid profile, liver function tests, white blood cell count, thyroid function tests and pregnancy test if applicable.
Thalidomide Counseling: I discussed with the patient the risks of thalidomide including but not limited to birth defects, anxiety, weakness, chest pain, dizziness, cough and severe allergy.
Xolair Counseling:  Patient informed of potential adverse effects including but not limited to fever, muscle aches, rash and allergic reactions.  The patient verbalized understanding of the proper use and possible adverse effects of Xolair.  All of the patient's questions and concerns were addressed.
Low Dose Naltrexone Counseling- I discussed with the patient the potential risks and side effects of low dose naltrexone including but not limited to: more vivid dreams, headaches, nausea, vomiting, abdominal pain, fatigue, dizziness, and anxiety.
Dupixent Counseling: I discussed with the patient the risks of dupilumab including but not limited to eye infection and irritation, cold sores, injection site reactions, worsening of asthma, allergic reactions and increased risk of parasitic infection.  Live vaccines should be avoided while taking dupilumab. Dupilumab will also interact with certain medications such as warfarin and cyclosporine. The patient understands that monitoring is required and they must alert us or the primary physician if symptoms of infection or other concerning signs are noted.
Fluconazole Pregnancy And Lactation Text: This medication is Pregnancy Category C and it isn't know if it is safe during pregnancy. It is also excreted in breast milk.
Dutasteride Male Counseling: Dustasteride Counseling:  I discussed with the patient the risks of use of dutasteride including but not limited to decreased libido, decreased ejaculate volume, and gynecomastia. Women who can become pregnant should not handle medication.  All of the patient's questions and concerns were addressed.
Benzoyl Peroxide Pregnancy And Lactation Text: This medication is Pregnancy Category C. It is unknown if benzoyl peroxide is excreted in breast milk.
Elidel Counseling: Patient may experience a mild burning sensation during topical application. Elidel is not approved in children less than 2 years of age. There have been case reports of hematologic and skin malignancies in patients using topical calcineurin inhibitors although causality is questionable.
Doxycycline Counseling:  Patient counseled regarding possible photosensitivity and increased risk for sunburn.  Patient instructed to avoid sunlight, if possible.  When exposed to sunlight, patients should wear protective clothing, sunglasses, and sunscreen.  The patient was instructed to call the office immediately if the following severe adverse effects occur:  hearing changes, easy bruising/bleeding, severe headache, or vision changes.  The patient verbalized understanding of the proper use and possible adverse effects of doxycycline.  All of the patient's questions and concerns were addressed.
Ivermectin Counseling:  Patient instructed to take medication on an empty stomach with a full glass of water.  Patient informed of potential adverse effects including but not limited to nausea, diarrhea, dizziness, itching, and swelling of the extremities or lymph nodes.  The patient verbalized understanding of the proper use and possible adverse effects of ivermectin.  All of the patient's questions and concerns were addressed.
Isotretinoin Pregnancy And Lactation Text: This medication is Pregnancy Category X and is considered extremely dangerous during pregnancy. It is unknown if it is excreted in breast milk.
Rinvoq Counseling: I discussed with the patient the risks of Rinvoq therapy including but not limited to upper respiratory tract infections, shingles, cold sores, bronchitis, nausea, cough, fever, acne, and headache. Live vaccines should be avoided.  This medication has been linked to serious infections; higher rate of mortality; malignancy and lymphoproliferative disorders; major adverse cardiovascular events; thrombosis; thrombocytopenia, anemia, and neutropenia; lipid elevations; liver enzyme elevations; and gastrointestinal perforations.
Klisyri Pregnancy And Lactation Text: It is unknown if this medication can harm a developing fetus or if it is excreted in breast milk.
Bexarotene Pregnancy And Lactation Text: This medication is Pregnancy Category X and should not be given to women who are pregnant or may become pregnant. This medication should not be used if you are breast feeding.
Dapsone Counseling: I discussed with the patient the risks of dapsone including but not limited to hemolytic anemia, agranulocytosis, rashes, methemoglobinemia, kidney failure, peripheral neuropathy, headaches, GI upset, and liver toxicity.  Patients who start dapsone require monitoring including baseline LFTs and weekly CBCs for the first month, then every month thereafter.  The patient verbalized understanding of the proper use and possible adverse effects of dapsone.  All of the patient's questions and concerns were addressed.
Low Dose Naltrexone Pregnancy And Lactation Text: Naltrexone is pregnancy category C.  There have been no adequate and well-controlled studies in pregnant women.  It should be used in pregnancy only if the potential benefit justifies the potential risk to the fetus.   Limited data indicates that naltrexone is minimally excreted into breastmilk.
Otezla Counseling: The side effects of Otezla were discussed with the patient, including but not limited to worsening or new depression, weight loss, diarrhea, nausea, upper respiratory tract infection, and headache. Patient instructed to call the office should any adverse effect occur.  The patient verbalized understanding of the proper use and possible adverse effects of Otezla.  All the patient's questions and concerns were addressed.
Topical Metronidazole Counseling: Metronidazole is a topical antibiotic medication. You may experience burning, stinging, redness, or allergic reactions.  Please call our office if you develop any problems from using this medication.
Azathioprine Pregnancy And Lactation Text: This medication is Pregnancy Category D and isn't considered safe during pregnancy. It is unknown if this medication is excreted in breast milk.
Winlevi Pregnancy And Lactation Text: This medication is considered safe during pregnancy and breastfeeding.
Cimetidine Counseling:  I discussed with the patient the risks of Cimetidine including but not limited to gynecomastia, headache, diarrhea, nausea, drowsiness, arrhythmias, pancreatitis, skin rashes, psychosis, bone marrow suppression and kidney toxicity.
Soolantra Pregnancy And Lactation Text: This medication is Pregnancy Category C. This medication is considered safe during breast feeding.
Protopic Counseling: Patient may experience a mild burning sensation during topical application. Protopic is not approved in children less than 2 years of age. There have been case reports of hematologic and skin malignancies in patients using topical calcineurin inhibitors although causality is questionable.
Prednisone Counseling:  I discussed with the patient the risks of prolonged use of prednisone including but not limited to weight gain, insomnia, osteoporosis, mood changes, diabetes, susceptibility to infection, glaucoma and high blood pressure.  In cases where prednisone use is prolonged, patients should be monitored with blood pressure checks, serum glucose levels and an eye exam.  Additionally, the patient may need to be placed on GI prophylaxis, PCP prophylaxis, and calcium and vitamin D supplementation and/or a bisphosphonate.  The patient verbalized understanding of the proper use and the possible adverse effects of prednisone.  All of the patient's questions and concerns were addressed.
Skyrizi Counseling: I discussed with the patient the risks of risankizumab-rzaa including but not limited to immunosuppression, and serious infections.  The patient understands that monitoring is required including a PPD at baseline and must alert us or the primary physician if symptoms of infection or other concerning signs are noted.
Xolair Pregnancy And Lactation Text: This medication is Pregnancy Category B and is considered safe during pregnancy. This medication is excreted in breast milk.
Griseofulvin Counseling:  I discussed with the patient the risks of griseofulvin including but not limited to photosensitivity, cytopenia, liver damage, nausea/vomiting and severe allergy.  The patient understands that this medication is best absorbed when taken with a fatty meal (e.g., ice cream or french fries).
Rinvoq Pregnancy And Lactation Text: Based on animal studies, Rinvoq may cause embryo-fetal harm when administered to pregnant women.  The medication should not be used in pregnancy.  Breastfeeding is not recommended during treatment and for 6 days after the last dose.
Opioid Counseling: I discussed with the patient the potential side effects of opioids including but not limited to addiction, altered mental status, and depression. I stressed avoiding alcohol, benzodiazepines, muscle relaxants and sleep aids unless specifically okayed by a physician. The patient verbalized understanding of the proper use and possible adverse effects of opioids. All of the patient's questions and concerns were addressed. They were instructed to flush the remaining pills down the toilet if they did not need them for pain.
Carac Counseling:  I discussed with the patient the risks of Carac including but not limited to erythema, scaling, itching, weeping, crusting, and pain.
Azithromycin Counseling:  I discussed with the patient the risks of azithromycin including but not limited to GI upset, allergic reaction, drug rash, diarrhea, and yeast infections.
Dutasteride Pregnancy And Lactation Text: This medication is absolutely contraindicated in women, especially during pregnancy and breast feeding. Feminization of male fetuses is possible if taking while pregnant.
Dupixent Pregnancy And Lactation Text: This medication likely crosses the placenta but the risk for the fetus is uncertain. This medication is excreted in breast milk.
Topical Metronidazole Pregnancy And Lactation Text: This medication is Pregnancy Category B and considered safe during pregnancy.  It is also considered safe to use while breastfeeding.
Isotretinoin Counseling: Patient should get monthly blood tests, not donate blood, not drive at night if vision affected, not share medication, and not undergo elective surgery for 6 months after tx completed. Side effects reviewed, pt to contact office should one occur.
Dapsone Pregnancy And Lactation Text: This medication is Pregnancy Category C and is not considered safe during pregnancy or breast feeding.
High Dose Vitamin A Counseling: Side effects reviewed, pt to contact office should one occur.
Protopic Pregnancy And Lactation Text: This medication is Pregnancy Category C. It is unknown if this medication is excreted in breast milk when applied topically.
Doxycycline Pregnancy And Lactation Text: This medication is Pregnancy Category D and not consider safe during pregnancy. It is also excreted in breast milk but is considered safe for shorter treatment courses.
Rifampin Counseling: I discussed with the patient the risks of rifampin including but not limited to liver damage, kidney damage, red-orange body fluids, nausea/vomiting and severe allergy.
Minoxidil Counseling: Minoxidil is a topical medication which can increase blood flow where it is applied. It is uncertain how this medication increases hair growth. Side effects are uncommon and include stinging and allergic reactions.
Topical Retinoid counseling:  Patient advised to apply a pea-sized amount only at bedtime and wait 30 minutes after washing their face before applying.  If too drying, patient may add a non-comedogenic moisturizer. The patient verbalized understanding of the proper use and possible adverse effects of retinoids.  All of the patient's questions and concerns were addressed.
Cellcept Counseling:  I discussed with the patient the risks of mycophenolate mofetil including but not limited to infection/immunosuppression, GI upset, hypokalemia, hypercholesterolemia, bone marrow suppression, lymphoproliferative disorders, malignancy, GI ulceration/bleed/perforation, colitis, interstitial lung disease, kidney failure, progressive multifocal leukoencephalopathy, and birth defects.  The patient understands that monitoring is required including a baseline creatinine and regular CBC testing. In addition, patient must alert us immediately if symptoms of infection or other concerning signs are noted.
Niacinamide Counseling: I recommended taking niacin or niacinamide, also know as vitamin B3, twice daily. Recent evidence suggests that taking vitamin B3 (500 mg twice daily) can reduce the risk of actinic keratoses and non-melanoma skin cancers. Side effects of vitamin B3 include flushing and headache.
Tranexamic Acid Counseling:  Patient advised of the small risk of bleeding problems with tranexamic acid. They were also instructed to call if they developed any nausea, vomiting or diarrhea. All of the patient's questions and concerns were addressed.
Infliximab Counseling:  I discussed with the patient the risks of infliximab including but not limited to myelosuppression, immunosuppression, autoimmune hepatitis, demyelinating diseases, lymphoma, and serious infections.  The patient understands that monitoring is required including a PPD at baseline and must alert us or the primary physician if symptoms of infection or other concerning signs are noted.
Otezla Pregnancy And Lactation Text: This medication is Pregnancy Category C and it isn't known if it is safe during pregnancy. It is unknown if it is excreted in breast milk.
Finasteride Male Counseling: Finasteride Counseling:  I discussed with the patient the risks of use of finasteride including but not limited to decreased libido, decreased ejaculate volume, gynecomastia, and depression. Women should not handle medication.  All of the patient's questions and concerns were addressed.
Enbrel Counseling:  I discussed with the patient the risks of etanercept including but not limited to myelosuppression, immunosuppression, autoimmune hepatitis, demyelinating diseases, lymphoma, and infections.  The patient understands that monitoring is required including a PPD at baseline and must alert us or the primary physician if symptoms of infection or other concerning signs are noted.
Griseofulvin Pregnancy And Lactation Text: This medication is Pregnancy Category X and is known to cause serious birth defects. It is unknown if this medication is excreted in breast milk but breast feeding should be avoided.
Erythromycin Counseling:  I discussed with the patient the risks of erythromycin including but not limited to GI upset, allergic reaction, drug rash, diarrhea, increase in liver enzymes, and yeast infections.
Sotyktu Counseling:  I discussed the most common side effects of Sotyktu including: common cold, sore throat, sinus infections, cold sores, canker sores, folliculitis, and acne.  I also discussed more serious side effects of Sotyktu including but not limited to: serious allergic reactions; increased risk for infections such as TB; cancers such as lymphomas; rhabdomyolysis and elevated CPK; and elevated triglycerides and liver enzymes. 
Opioid Pregnancy And Lactation Text: These medications can lead to premature delivery and should be avoided during pregnancy. These medications are also present in breast milk in small amounts.
Eucrisa Counseling: Patient may experience a mild burning sensation during topical application. Eucrisa is not approved in children less than 2 years of age.
Erivedge Counseling- I discussed with the patient the risks of Erivedge including but not limited to nausea, vomiting, diarrhea, constipation, weight loss, changes in the sense of taste, decreased appetite, muscle spasms, and hair loss.  The patient verbalized understanding of the proper use and possible adverse effects of Erivedge.  All of the patient's questions and concerns were addressed.
High Dose Vitamin A Pregnancy And Lactation Text: High dose vitamin A therapy is contraindicated during pregnancy and breast feeding.
Azithromycin Pregnancy And Lactation Text: This medication is considered safe during pregnancy and is also secreted in breast milk.
Topical Steroids Counseling: I discussed with the patient that prolonged use of topical steroids can result in the increased appearance of superficial blood vessels (telangiectasias), lightening (hypopigmentation) and thinning of the skin (atrophy).  Patient understands to avoid using high potency steroids in skin folds, the groin or the face.  The patient verbalized understanding of the proper use and possible adverse effects of topical steroids.  All of the patient's questions and concerns were addressed.
Tranexamic Acid Pregnancy And Lactation Text: It is unknown if this medication is safe during pregnancy or breast feeding.
Stelara Counseling:  I discussed with the patient the risks of ustekinumab including but not limited to immunosuppression, malignancy, posterior leukoencephalopathy syndrome, and serious infections.  The patient understands that monitoring is required including a PPD at baseline and must alert us or the primary physician if symptoms of infection or other concerning signs are noted.
Qbrexza Counseling:  I discussed with the patient the risks of Qbrexza including but not limited to headache, mydriasis, blurred vision, dry eyes, nasal dryness, dry mouth, dry throat, dry skin, urinary hesitation, and constipation.  Local skin reactions including erythema, burning, stinging, and itching can also occur.
Rifampin Pregnancy And Lactation Text: This medication is Pregnancy Category C and it isn't know if it is safe during pregnancy. It is also excreted in breast milk and should not be used if you are breast feeding.
Doxepin Counseling:  Patient advised that the medication is sedating and not to drive a car after taking this medication. Patient informed of potential adverse effects including but not limited to dry mouth, urinary retention, and blurry vision.  The patient verbalized understanding of the proper use and possible adverse effects of doxepin.  All of the patient's questions and concerns were addressed.
Finasteride Pregnancy And Lactation Text: This medication is absolutely contraindicated during pregnancy. It is unknown if it is excreted in breast milk.
Gabapentin Counseling: I discussed with the patient the risks of gabapentin including but not limited to dizziness, somnolence, fatigue and ataxia.
Niacinamide Pregnancy And Lactation Text: These medications are considered safe during pregnancy.
Oxybutynin Counseling:  I discussed with the patient the risks of oxybutynin including but not limited to skin rash, drowsiness, dry mouth, difficulty urinating, and blurred vision.
Adbry Counseling: I discussed with the patient the risks of tralokinumab including but not limited to eye infection and irritation, cold sores, injection site reactions, worsening of asthma, allergic reactions and increased risk of parasitic infection.  Live vaccines should be avoided while taking tralokinumab. The patient understands that monitoring is required and they must alert us or the primary physician if symptoms of infection or other concerning signs are noted.
Sotyktu Pregnancy And Lactation Text: There is insufficient data to evaluate whether or not Sotyktu is safe to use during pregnancy.   It is not known if Sotyktu passes into breast milk and whether or not it is safe to use when breastfeeding.  
Itraconazole Counseling:  I discussed with the patient the risks of itraconazole including but not limited to liver damage, nausea/vomiting, neuropathy, and severe allergy.  The patient understands that this medication is best absorbed when taken with acidic beverages such as non-diet cola or ginger ale.  The patient understands that monitoring is required including baseline LFTs and repeat LFTs at intervals.  The patient understands that they are to contact us or the primary physician if concerning signs are noted.
Topical Steroids Applications Pregnancy And Lactation Text: Most topical steroids are considered safe to use during pregnancy and lactation.  Any topical steroid applied to the breast or nipple should be washed off before breastfeeding.
Bactrim Counseling:  I discussed with the patient the risks of sulfa antibiotics including but not limited to GI upset, allergic reaction, drug rash, diarrhea, dizziness, photosensitivity, and yeast infections.  Rarely, more serious reactions can occur including but not limited to aplastic anemia, agranulocytosis, methemoglobinemia, blood dyscrasias, liver or kidney failure, lung infiltrates or desquamative/blistering drug rashes.
Calcipotriene Counseling:  I discussed with the patient the risks of calcipotriene including but not limited to erythema, scaling, itching, and irritation.
Erythromycin Pregnancy And Lactation Text: This medication is Pregnancy Category B and is considered safe during pregnancy. It is also excreted in breast milk.
Cibinqo Counseling: I discussed with the patient the risks of Cibinqo therapy including but not limited to common cold, nausea, headache, cold sores, increased blood CPK levels, dizziness, UTIs, fatigue, acne, and vomitting. Live vaccines should be avoided.  This medication has been linked to serious infections; higher rate of mortality; malignancy and lymphoproliferative disorders; major adverse cardiovascular events; thrombosis; thrombocytopenia and lymphopenia; lipid elevations; and retinal detachment.
Tazorac Counseling:  Patient advised that medication is irritating and drying.  Patient may need to apply sparingly and wash off after an hour before eventually leaving it on overnight.  The patient verbalized understanding of the proper use and possible adverse effects of tazorac.  All of the patient's questions and concerns were addressed.
Doxepin Pregnancy And Lactation Text: This medication is Pregnancy Category C and it isn't known if it is safe during pregnancy. It is also excreted in breast milk and breast feeding isn't recommended.
Nsaids Counseling: NSAID Counseling: I discussed with the patient that NSAIDs should be taken with food. Prolonged use of NSAIDs can result in the development of stomach ulcers.  Patient advised to stop taking NSAIDs if abdominal pain occurs.  The patient verbalized understanding of the proper use and possible adverse effects of NSAIDs.  All of the patient's questions and concerns were addressed.
Cyclophosphamide Counseling:  I discussed with the patient the risks of cyclophosphamide including but not limited to hair loss, hormonal abnormalities, decreased fertility, abdominal pain, diarrhea, nausea and vomiting, bone marrow suppression and infection. The patient understands that monitoring is required while taking this medication.
Qbrexza Pregnancy And Lactation Text: There is no available data on Qbrexza use in pregnant women.  There is no available data on Qbrexza use in lactation.
VTAMA Counseling: I discussed with the patient that VTAMA is not for use in the eyes, mouth or mouth. They should call the office if they develop any signs of allergic reactions to VTAMA. The patient verbalized understanding of the proper use and possible adverse effects of VTAMA.  All of the patient's questions and concerns were addressed.
Mirvaso Counseling: Mirvaso is a topical medication which can decrease superficial blood flow where applied. Side effects are uncommon and include stinging, redness and allergic reactions.
Arava Counseling:  Patient counseled regarding adverse effects of Arava including but not limited to nausea, vomiting, abnormalities in liver function tests. Patients may develop mouth sores, rash, diarrhea, and abnormalities in blood counts. The patient understands that monitoring is required including LFTs and blood counts.  There is a rare possibility of scarring of the liver and lung problems that can occur when taking methotrexate. Persistent nausea, loss of appetite, pale stools, dark urine, cough, and shortness of breath should be reported immediately. Patient advised to discontinue Arava treatment and consult with a physician prior to attempting conception. The patient will have to undergo a treatment to eliminate Arava from the body prior to conception.
Sarecycline Counseling: Patient advised regarding possible photosensitivity and discoloration of the teeth, skin, lips, tongue and gums.  Patient instructed to avoid sunlight, if possible.  When exposed to sunlight, patients should wear protective clothing, sunglasses, and sunscreen.  The patient was instructed to call the office immediately if the following severe adverse effects occur:  hearing changes, easy bruising/bleeding, severe headache, or vision changes.  The patient verbalized understanding of the proper use and possible adverse effects of sarecycline.  All of the patient's questions and concerns were addressed.
Rituxan Counseling:  I discussed with the patient the risks of Rituxan infusions. Side effects can include infusion reactions, severe drug rashes including mucocutaneous reactions, reactivation of latent hepatitis and other infections and rarely progressive multifocal leukoencephalopathy.  All of the patient's questions and concerns were addressed.
Valtrex Counseling: I discussed with the patient the risks of valacyclovir including but not limited to kidney damage, nausea, vomiting and severe allergy.  The patient understands that if the infection seems to be worsening or is not improving, they are to call.
Calcipotriene Pregnancy And Lactation Text: The use of this medication during pregnancy or lactation is not recommended as there is insufficient data.
Adbry Pregnancy And Lactation Text: It is unknown if this medication will adversely affect pregnancy or breast feeding.
Bactrim Pregnancy And Lactation Text: This medication is Pregnancy Category D and is known to cause fetal risk.  It is also excreted in breast milk.
Cantharidin Counseling:  I discussed with the patient the risks of Cantharidin including but not limited to pain, redness, burning, itching, and blistering.
Xeljanz Counseling: I discussed with the patient the risks of Xeljanz therapy including increased risk of infection, liver issues, headache, diarrhea, or cold symptoms. Live vaccines should be avoided. They were instructed to call if they have any problems.
Libtayo Counseling- I discussed with the patient the risks of Libtayo including but not limited to nausea, vomiting, diarrhea, and bone or muscle pain.  The patient verbalized understanding of the proper use and possible adverse effects of Libtayo.  All of the patient's questions and concerns were addressed.
Aklief counseling:  Patient advised to apply a pea-sized amount only at bedtime and wait 30 minutes after washing their face before applying.  If too drying, patient may add a non-comedogenic moisturizer.  The most commonly reported side effects including irritation, redness, scaling, dryness, stinging, burning, itching, and increased risk of sunburn.  The patient verbalized understanding of the proper use and possible adverse effects of retinoids.  All of the patient's questions and concerns were addressed.
Birth Control Pills Counseling: Birth Control Pill Counseling: I discussed with the patient the potential side effects of OCPs including but not limited to increased risk of stroke, heart attack, thrombophlebitis, deep venous thrombosis, hepatic adenomas, breast changes, GI upset, headaches, and depression.  The patient verbalized understanding of the proper use and possible adverse effects of OCPs. All of the patient's questions and concerns were addressed.
Humira Counseling:  I discussed with the patient the risks of adalimumab including but not limited to myelosuppression, immunosuppression, autoimmune hepatitis, demyelinating diseases, lymphoma, and serious infections.  The patient understands that monitoring is required including a PPD at baseline and must alert us or the primary physician if symptoms of infection or other concerning signs are noted.

## 2023-11-20 NOTE — PROCEDURE: PRESCRIPTION MEDICATION MANAGEMENT
Initiate Treatment: triamcinolone acetonide 0.1 % topical ointment: Apply to affected area on knee for rash twice daily for 2 weeks then BIW PRN
Detail Level: Zone
Render In Strict Bullet Format?: No
Plan: to call us if spreading and can call in 3 wks of pred

## 2023-11-20 NOTE — PROCEDURE: TREATMENT REGIMEN
Detail Level: Zone
Plan: If patient not better in 2 weeks, he was advised to call & we will call in oral medication

## 2023-12-11 NOTE — PROGRESS NOTES
Patient pre-assessment complete for EMMA scheduled for 23, arrival time 0700. Patient verified using . Patient instructed to bring a list of all home medications on the day of procedure. NPO status reinforced. Patient instructed to HOLD lasix. Instructed they can take all other medications excluding vitamins & supplements. Patient verbalizes understanding of all instructions & denies any questions at this time.

## 2023-12-12 ENCOUNTER — HOSPITAL ENCOUNTER (OUTPATIENT)
Dept: CARDIAC CATH/INVASIVE PROCEDURES | Age: 78
Discharge: HOME | End: 2023-12-12
Attending: INTERNAL MEDICINE | Admitting: INTERNAL MEDICINE
Payer: MEDICARE

## 2023-12-12 VITALS
BODY MASS INDEX: 23.79 KG/M2 | HEIGHT: 68 IN | HEART RATE: 58 BPM | TEMPERATURE: 98 F | WEIGHT: 157 LBS | DIASTOLIC BLOOD PRESSURE: 49 MMHG | OXYGEN SATURATION: 95 % | RESPIRATION RATE: 18 BRPM | SYSTOLIC BLOOD PRESSURE: 91 MMHG

## 2023-12-12 DIAGNOSIS — I34.0 SEVERE MITRAL REGURGITATION: ICD-10-CM

## 2023-12-12 LAB
ANION GAP SERPL CALC-SCNC: 4 MMOL/L (ref 2–11)
BASOPHILS # BLD: 0 K/UL (ref 0–0.2)
BASOPHILS NFR BLD: 1 % (ref 0–2)
BUN SERPL-MCNC: 26 MG/DL (ref 8–23)
CALCIUM SERPL-MCNC: 8.8 MG/DL (ref 8.3–10.4)
CHLORIDE SERPL-SCNC: 104 MMOL/L (ref 103–113)
CO2 SERPL-SCNC: 29 MMOL/L (ref 21–32)
CREAT SERPL-MCNC: 1.7 MG/DL (ref 0.8–1.5)
DIFFERENTIAL METHOD BLD: ABNORMAL
ECHO BSA: 1.85 M2
ECHO MV EROA PISA: 0.6 CM2
ECHO MV REGURGITANT ALIASING (NYQUIST) VELOCITY: 31 CM/S
ECHO MV REGURGITANT RADIUS PISA: 1.3 CM
ECHO MV REGURGITANT VELOCITY PISA: 5.1 M/S
ECHO MV REGURGITANT VOLUME PISA: 111.67 ML
ECHO MV REGURGITANT VTIA: 173.1 CM
EKG ATRIAL RATE: 66 BPM
EKG DIAGNOSIS: NORMAL
EKG P AXIS: 69 DEGREES
EKG P-R INTERVAL: 184 MS
EKG Q-T INTERVAL: 472 MS
EKG QRS DURATION: 148 MS
EKG QTC CALCULATION (BAZETT): 494 MS
EKG R AXIS: -36 DEGREES
EKG T AXIS: 111 DEGREES
EKG VENTRICULAR RATE: 66 BPM
EOSINOPHIL # BLD: 0.1 K/UL (ref 0–0.8)
EOSINOPHIL NFR BLD: 1 % (ref 0.5–7.8)
ERYTHROCYTE [DISTWIDTH] IN BLOOD BY AUTOMATED COUNT: 12.5 % (ref 11.9–14.6)
GLUCOSE SERPL-MCNC: 96 MG/DL (ref 65–100)
HCT VFR BLD AUTO: 41.1 % (ref 41.1–50.3)
HGB BLD-MCNC: 14.3 G/DL (ref 13.6–17.2)
IMM GRANULOCYTES # BLD AUTO: 0 K/UL (ref 0–0.5)
IMM GRANULOCYTES NFR BLD AUTO: 0 % (ref 0–5)
LYMPHOCYTES # BLD: 1 K/UL (ref 0.5–4.6)
LYMPHOCYTES NFR BLD: 14 % (ref 13–44)
MAGNESIUM SERPL-MCNC: 2.3 MG/DL (ref 1.8–2.4)
MCH RBC QN AUTO: 33.3 PG (ref 26.1–32.9)
MCHC RBC AUTO-ENTMCNC: 34.8 G/DL (ref 31.4–35)
MCV RBC AUTO: 95.8 FL (ref 82–102)
MONOCYTES # BLD: 0.5 K/UL (ref 0.1–1.3)
MONOCYTES NFR BLD: 7 % (ref 4–12)
NEUTS SEG # BLD: 5.4 K/UL (ref 1.7–8.2)
NEUTS SEG NFR BLD: 77 % (ref 43–78)
NRBC # BLD: 0 K/UL (ref 0–0.2)
PLATELET # BLD AUTO: 159 K/UL (ref 150–450)
PMV BLD AUTO: 10.4 FL (ref 9.4–12.3)
POTASSIUM SERPL-SCNC: 4.1 MMOL/L (ref 3.5–5.1)
RBC # BLD AUTO: 4.29 M/UL (ref 4.23–5.6)
SODIUM SERPL-SCNC: 137 MMOL/L (ref 136–146)
WBC # BLD AUTO: 7.1 K/UL (ref 4.3–11.1)

## 2023-12-12 PROCEDURE — 83735 ASSAY OF MAGNESIUM: CPT

## 2023-12-12 PROCEDURE — 85025 COMPLETE CBC W/AUTO DIFF WBC: CPT

## 2023-12-12 PROCEDURE — 6360000002 HC RX W HCPCS: Performed by: INTERNAL MEDICINE

## 2023-12-12 PROCEDURE — 93312 ECHO TRANSESOPHAGEAL: CPT

## 2023-12-12 PROCEDURE — 6370000000 HC RX 637 (ALT 250 FOR IP): Performed by: INTERNAL MEDICINE

## 2023-12-12 PROCEDURE — 80048 BASIC METABOLIC PNL TOTAL CA: CPT

## 2023-12-12 PROCEDURE — 93005 ELECTROCARDIOGRAM TRACING: CPT | Performed by: INTERNAL MEDICINE

## 2023-12-12 PROCEDURE — 99152 MOD SED SAME PHYS/QHP 5/>YRS: CPT

## 2023-12-12 PROCEDURE — 93010 ELECTROCARDIOGRAM REPORT: CPT | Performed by: INTERNAL MEDICINE

## 2023-12-12 RX ORDER — LIDOCAINE HYDROCHLORIDE 20 MG/ML
SOLUTION OROPHARYNGEAL PRN
Status: COMPLETED | OUTPATIENT
Start: 2023-12-12 | End: 2023-12-12

## 2023-12-12 RX ORDER — SODIUM CHLORIDE 9 MG/ML
INJECTION, SOLUTION INTRAVENOUS CONTINUOUS
Status: DISCONTINUED | OUTPATIENT
Start: 2023-12-12 | End: 2023-12-12 | Stop reason: HOSPADM

## 2023-12-12 RX ORDER — FENTANYL CITRATE 50 UG/ML
INJECTION, SOLUTION INTRAMUSCULAR; INTRAVENOUS PRN
Status: COMPLETED | OUTPATIENT
Start: 2023-12-12 | End: 2023-12-12

## 2023-12-12 RX ORDER — MIDAZOLAM HYDROCHLORIDE 1 MG/ML
INJECTION INTRAMUSCULAR; INTRAVENOUS PRN
Status: COMPLETED | OUTPATIENT
Start: 2023-12-12 | End: 2023-12-12

## 2023-12-12 RX ADMIN — MIDAZOLAM 1 MG: 1 INJECTION INTRAMUSCULAR; INTRAVENOUS at 08:18

## 2023-12-12 RX ADMIN — LIDOCAINE HYDROCHLORIDE 15 ML: 20 SOLUTION ORAL at 08:12

## 2023-12-12 RX ADMIN — FENTANYL CITRATE 50 MCG: 50 INJECTION, SOLUTION INTRAMUSCULAR; INTRAVENOUS at 08:16

## 2023-12-12 RX ADMIN — MIDAZOLAM 2 MG: 1 INJECTION INTRAMUSCULAR; INTRAVENOUS at 08:16

## 2023-12-12 NOTE — PROGRESS NOTES
EMMA by Dr Cheryle Loan  II ASA II Mallampati  3mg versed  50mcg fentanyl  Viscous Solution given at 0812  Pt tolerated well.

## 2023-12-12 NOTE — PROGRESS NOTES
Patient received to [facility] room # 16  Ambulatory from [facility]. Patient scheduled for EMMA today with Dr Kori Williamson. Procedure reviewed & questions answered, voiced good understanding consent obtained & placed on chart. All medications and medical history reviewed. Will prep patient per orders. Patient & family updated on plan of care. The patient has a fraility score of 3-MANAGING WELL, based on pt ability to ambulate independently and to complete own ADLs.

## 2023-12-15 ENCOUNTER — TELEPHONE (OUTPATIENT)
Age: 78
End: 2023-12-15

## 2023-12-15 NOTE — TELEPHONE ENCOUNTER
----- Message from [employer] sent at 12/15/2023  4:50 PM EST -----  Please call the patient regarding their echocardiogram result. Heart squeezing function is normal on echocardiogram, severe appearing mitral valve regurgitation. If he has leg swelling, shortness of breath he should go to the ER or call 911 if severe. Please get the patient an appointment in the office next week as I am out of the office. Thank you.

## 2023-12-15 NOTE — TELEPHONE ENCOUNTER
Left message on voicemail for patient to call this triage nurse. In message, advised patient to go to [facility] ER for evaluation of any leg swelling or SOB.

## 2023-12-15 NOTE — TELEPHONE ENCOUNTER
Advised patient of echo results and Dr. Chandni Schneider response. Scheduled next available appointment with any provider with Dr. Stas Delgado on 12/20/23 at 9:45 am. Patient verbalized understanding. He states chest sometimes feels tight on exertion, and agrees to go to [facility] ER, if symptoms increase.

## 2023-12-15 NOTE — RESULT ENCOUNTER NOTE
Please call the patient regarding their echocardiogram result. Heart squeezing function is normal on echocardiogram, severe appearing mitral valve regurgitation. If he has leg swelling, shortness of breath he should go to the ER or call 911 if severe. Please get the patient an appointment in the office next week as I am out of the office. Thank you.

## 2023-12-18 RX ORDER — CARVEDILOL 3.12 MG/1
3.12 TABLET ORAL 2 TIMES DAILY WITH MEALS
Qty: 180 TABLET | Refills: 1 | OUTPATIENT
Start: 2023-12-18

## 2024-01-19 ENCOUNTER — TELEPHONE (OUTPATIENT)
Age: 79
End: 2024-01-19

## 2024-01-19 NOTE — TELEPHONE ENCOUNTER
Hattie with [facility] called stating she would like to talk with Dr Gonsalez staff in regard to a mutual patient. (Normally a Dr Alexander patient).      Please call and advise.

## 2024-01-22 NOTE — TELEPHONE ENCOUNTER
Hattie called back. States pt was referred for a mitral clip and wanted to know if we have a heart failure specialist. I informed we do not have a heart failure clinic but our provider can follow up closely if needed. Hattie states the will have pt follow up with [place] heart failure specialist.

## 2024-01-25 ENCOUNTER — TELEPHONE (OUTPATIENT)
Age: 79
End: 2024-01-25

## 2024-01-25 NOTE — TELEPHONE ENCOUNTER
Pt calling and also submitted request online through [employer] to Dr Klein.    Dr Klein put in a request for this pt to be seen in [facility] and Dr Bui  said for medicare to cover the valve procedure .. Pt needs a letter from a heart failure specialist.  Wanted to know if Dr Klein can write the letter or reach out to an additional provider. If so who and contact information.

## 2024-01-25 NOTE — TELEPHONE ENCOUNTER
Spoke with patient and informed I spoke with Hattie from Dr. Serna office and that I explain we not have a heart failure clinic but that our providers could follow up closely if needed for heart failure.  Explain to pt that Hattie never mention a letter from us.     Patient states he will call them and call us back if needed.

## 2024-05-08 ENCOUNTER — APPOINTMENT (RX ONLY)
Dept: URBAN - NONMETROPOLITAN AREA CLINIC 1 | Facility: CLINIC | Age: 79
Setting detail: DERMATOLOGY
End: 2024-05-08

## 2024-05-08 DIAGNOSIS — L82.1 OTHER SEBORRHEIC KERATOSIS: ICD-10-CM | Status: UNCHANGED

## 2024-05-08 DIAGNOSIS — D18.0 HEMANGIOMA: ICD-10-CM | Status: STABLE

## 2024-05-08 DIAGNOSIS — D22 MELANOCYTIC NEVI: ICD-10-CM | Status: UNCHANGED

## 2024-05-08 DIAGNOSIS — L57.8 OTHER SKIN CHANGES DUE TO CHRONIC EXPOSURE TO NONIONIZING RADIATION: ICD-10-CM | Status: STABLE

## 2024-05-08 DIAGNOSIS — L57.0 ACTINIC KERATOSIS: ICD-10-CM | Status: INADEQUATELY CONTROLLED

## 2024-05-08 PROBLEM — D22.5 MELANOCYTIC NEVI OF TRUNK: Status: ACTIVE | Noted: 2024-05-08

## 2024-05-08 PROBLEM — D18.01 HEMANGIOMA OF SKIN AND SUBCUTANEOUS TISSUE: Status: ACTIVE | Noted: 2024-05-08

## 2024-05-08 PROCEDURE — ? FULL BODY SKIN EXAM

## 2024-05-08 PROCEDURE — ? SUNSCREEN RECOMMENDATIONS

## 2024-05-08 PROCEDURE — 17000 DESTRUCT PREMALG LESION: CPT

## 2024-05-08 PROCEDURE — 99213 OFFICE O/P EST LOW 20 MIN: CPT | Mod: 25

## 2024-05-08 PROCEDURE — ? COUNSELING

## 2024-05-08 PROCEDURE — ? LIQUID NITROGEN

## 2024-05-08 ASSESSMENT — LOCATION DETAILED DESCRIPTION DERM
LOCATION DETAILED: RIGHT SUPERIOR MEDIAL UPPER BACK
LOCATION DETAILED: LEFT MEDIAL UPPER BACK
LOCATION DETAILED: LEFT SUPERIOR HELIX
LOCATION DETAILED: LEFT MEDIAL SUPERIOR CHEST
LOCATION DETAILED: SUPERIOR MID FOREHEAD
LOCATION DETAILED: LEFT PROXIMAL DORSAL FOREARM
LOCATION DETAILED: RIGHT PROXIMAL DORSAL FOREARM

## 2024-05-08 ASSESSMENT — LOCATION SIMPLE DESCRIPTION DERM
LOCATION SIMPLE: LEFT EAR
LOCATION SIMPLE: RIGHT FOREARM
LOCATION SIMPLE: SUPERIOR FOREHEAD
LOCATION SIMPLE: CHEST
LOCATION SIMPLE: RIGHT UPPER BACK
LOCATION SIMPLE: LEFT UPPER BACK
LOCATION SIMPLE: LEFT FOREARM

## 2024-05-08 ASSESSMENT — LOCATION ZONE DERM
LOCATION ZONE: FACE
LOCATION ZONE: ARM
LOCATION ZONE: TRUNK
LOCATION ZONE: EAR

## 2024-07-24 DIAGNOSIS — Z87.898 HISTORY OF ELEVATED PSA: Primary | ICD-10-CM

## 2024-07-29 DIAGNOSIS — Z87.898 HISTORY OF ELEVATED PSA: ICD-10-CM

## 2024-07-29 LAB
ANION GAP SERPL CALCULATED.3IONS-SCNC: 10 MMOL/L (ref 7–16)
BASOPHILS # BLD: 0.05 K/UL (ref 0–0.2)
BASOPHILS NFR BLD: 1 % (ref 0–2)
BUN SERPL-MCNC: 19 MG/DL (ref 6–23)
CALCIUM SERPL-MCNC: 9.3 MG/DL (ref 8.6–10.2)
CHLORIDE SERPL-SCNC: 96 MMOL/L (ref 98–107)
CO2 SERPL-SCNC: 25 MMOL/L (ref 22–29)
CREAT SERPL-MCNC: 1.2 MG/DL (ref 0.7–1.2)
CREAT UR-MCNC: 82.5 MG/DL (ref 40–278)
EOSINOPHIL # BLD: 0.16 K/UL (ref 0.05–0.5)
EOSINOPHILS RELATIVE PERCENT: 2 % (ref 0–6)
ERYTHROCYTE [DISTWIDTH] IN BLOOD BY AUTOMATED COUNT: 12.6 % (ref 11.5–15)
GFR, ESTIMATED: 60 ML/MIN/1.73M2
GLUCOSE SERPL-MCNC: 94 MG/DL (ref 74–99)
HCT VFR BLD AUTO: 46.8 % (ref 37–54)
HGB BLD-MCNC: 15.5 G/DL (ref 12.5–16.5)
IMM GRANULOCYTES # BLD AUTO: <0.03 K/UL (ref 0–0.58)
IMM GRANULOCYTES NFR BLD: 0 % (ref 0–5)
LYMPHOCYTES NFR BLD: 1.22 K/UL (ref 1.5–4)
LYMPHOCYTES RELATIVE PERCENT: 17 % (ref 20–42)
MAGNESIUM SERPL-MCNC: 2.2 MG/DL (ref 1.6–2.6)
MCH RBC QN AUTO: 30.6 PG (ref 26–35)
MCHC RBC AUTO-ENTMCNC: 33.1 G/DL (ref 32–34.5)
MCV RBC AUTO: 92.5 FL (ref 80–99.9)
MONOCYTES NFR BLD: 0.54 K/UL (ref 0.1–0.95)
MONOCYTES NFR BLD: 8 % (ref 2–12)
NEUTROPHILS NFR BLD: 72 % (ref 43–80)
NEUTS SEG NFR BLD: 5.06 K/UL (ref 1.8–7.3)
PHOSPHATE SERPL-MCNC: 2.6 MG/DL (ref 2.5–4.5)
PLATELET # BLD AUTO: 147 K/UL (ref 130–450)
PMV BLD AUTO: 10.7 FL (ref 7–12)
POTASSIUM SERPL-SCNC: 5.8 MMOL/L (ref 3.5–5)
RBC # BLD AUTO: 5.06 M/UL (ref 3.8–5.8)
SODIUM SERPL-SCNC: 131 MMOL/L (ref 132–146)
TOTAL PROTEIN, URINE: 9 MG/DL (ref 0–12)
URATE SERPL-MCNC: 4.3 MG/DL (ref 3.4–7)
URINE TOTAL PROTEIN CREATININE RATIO: 0.11 (ref 0–0.2)
WBC OTHER # BLD: 7.1 K/UL (ref 4.5–11.5)

## 2024-09-24 RX ORDER — SACUBITRIL AND VALSARTAN 24; 26 MG/1; MG/1
1 TABLET, FILM COATED ORAL 2 TIMES DAILY
Qty: 60 TABLET | Refills: 8 | OUTPATIENT
Start: 2024-09-24

## 2024-10-11 ENCOUNTER — APPOINTMENT (RX ONLY)
Dept: URBAN - NONMETROPOLITAN AREA CLINIC 1 | Facility: CLINIC | Age: 79
Setting detail: DERMATOLOGY
End: 2024-10-11

## 2024-10-11 DIAGNOSIS — D485 NEOPLASM OF UNCERTAIN BEHAVIOR OF SKIN: ICD-10-CM

## 2024-10-11 PROBLEM — D48.5 NEOPLASM OF UNCERTAIN BEHAVIOR OF SKIN: Status: ACTIVE | Noted: 2024-10-11

## 2024-10-11 PROCEDURE — 11303 SHAVE SKIN LESION >2.0 CM: CPT

## 2024-10-11 PROCEDURE — ? SHAVE REMOVAL

## 2024-10-11 PROCEDURE — ? COUNSELING

## 2024-10-11 ASSESSMENT — LOCATION DETAILED DESCRIPTION DERM: LOCATION DETAILED: RIGHT POSTERIOR SHOULDER

## 2024-10-11 ASSESSMENT — LOCATION ZONE DERM: LOCATION ZONE: ARM

## 2024-10-11 ASSESSMENT — LOCATION SIMPLE DESCRIPTION DERM: LOCATION SIMPLE: RIGHT SHOULDER

## 2024-10-11 NOTE — HPI: SKIN LESION
What Type Of Note Output Would You Prefer (Optional)?: Bullet Format
Is This A New Presentation, Or A Follow-Up?: Skin Lesion
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 [facility] [place]. Patient states that spot has recently come back and become itchy. (See patient attachments for path)

## 2024-10-11 NOTE — PROCEDURE: SHAVE REMOVAL
Medical Necessity Information: It is in your best interest to select a reason for this procedure from the list below. All of these items fulfill various CMS LCD requirements except the new and changing color options.
Medical Necessity Clause: This procedure was medically necessary because the lesion that was treated was:
Lab: [id number]
Lab Facility: [id number]
Detail Level: Detailed
Was A Bandage Applied: Yes
Size Of Lesion In Cm (Required): 2.5
X Size Of Lesion In Cm (Optional): 0
Depth Of Shave: dermis
Biopsy Method: Dermablade
Anesthesia Type: 1% lidocaine with epinephrine
Hemostasis: Drysol
Wound Care: Petrolatum
Render Path Notes In Note?: No
Consent was obtained from the patient. The risks and benefits to therapy were discussed in detail. Specifically, the risks of infection, scarring, bleeding, prolonged wound healing, incomplete removal, allergy to anesthesia, nerve injury and recurrence were addressed. Prior to the procedure, the treatment site was clearly identified and confirmed by the patient. All components of Universal Protocol/PAUSE Rule completed.
Post-Care Instructions: I reviewed with the patient in detail post-care instructions. Patient is to keep the biopsy site dry overnight, and then apply bacitracin twice daily until healed. Patient may apply hydrogen peroxide soaks to remove any crusting.
Notification Instructions: Patient will be notified of pathology results. However, patient instructed to call the office if not contacted within 2 weeks.
Billing Type: Third-Party Bill

## 2024-12-13 RX ORDER — ACETAMINOPHEN/DIPHENHYDRAMINE 500MG-25MG
1000 TABLET ORAL DAILY
Qty: 90 TABLET | Refills: 1 | Status: SHIPPED | OUTPATIENT
Start: 2024-12-13

## (undated) DEVICE — LABEL MED CARD SURG 4 IN PANEL STRL

## (undated) DEVICE — GLOVE ORANGE PI 7 1/2   MSG9075

## (undated) DEVICE — 1.5L THIN WALL CAN: Brand: CRD

## (undated) DEVICE — CATHETER THOR 32FR L23IN PVC 6 EYELET STR ATRAUM

## (undated) DEVICE — STERILE LATEX POWDER FREE SURGICAL GLOVES WITH HYDROGEL COATING: Brand: PROTEXIS

## (undated) DEVICE — CONNECTOR DRNGE W3/8-0.5XH3/16XL3/16IN 2:1 SIL CARD STR

## (undated) DEVICE — PERFUSION PACK OPN HRT

## (undated) DEVICE — CANNULA PERF 7FR L5.5IN AORT ROOT RADPQ STD TIP W/ VENT LN

## (undated) DEVICE — CARDIAC STRYKER STERNAL SAW

## (undated) DEVICE — CONNECTOR PERF 0.25X0.25IN STR W/O LUERLOCK

## (undated) DEVICE — CATH BALLOON INTRA AORTIC MEGA 8FRX50CC

## (undated) DEVICE — RETRACTOR RULTRACT INTERN MAMMARY ARTERY

## (undated) DEVICE — DRESSING FOAM W22XL25CM FILVE LAYR FOAM DP DEF SAFETAC

## (undated) DEVICE — DRAIN SURG SGL COLL PT TB FOR ATS BG OASIS

## (undated) DEVICE — DOUBLE BASIN SET: Brand: MEDLINE INDUSTRIES, INC.

## (undated) DEVICE — TUBING, SUCTION, 3/16" X 12', STRAIGHT: Brand: MEDLINE

## (undated) DEVICE — SET ACB VEIN

## (undated) DEVICE — ALCOHOL RUBBING ISO 16OZ 70%

## (undated) DEVICE — AORTIC PUNCHES ARE USED TO CREATE A UNIFORM OPENING IN BLOOD VESSELS DURING CARDIOVASCULAR SURGERY. THE VESSEL GRAFT IS INSERTED INTO THE CREATED OPENING AND SUTURED TO THE VESSEL WALL. AORTIC LANCETS ARE USED TO MAKE A SMALL UNIFORM CUT IN A BLOOD VESSEL TO FACILITATE INSERTION OF AN AORTIC PUNCH.  PUNCHES COME IN VARIOUS LENGTHS, DIAMETERS AND TIP CONFIGURATIONS.: Brand: CLEANCUT ROTATING AORTIC PUNCH

## (undated) DEVICE — SOLUTION IRRIG 1000ML 0.9% SOD CHL USP POUR PLAS BTL

## (undated) DEVICE — GLOVE ORANGE PI 7   MSG9070

## (undated) DEVICE — TTL1LYR 16FR10ML 100%SIL TMPST TR: Brand: MEDLINE

## (undated) DEVICE — BLOOD TRANSFUSION FILTER: Brand: HAEMONETICS

## (undated) DEVICE — BLOWER COR ART L16.5CM PLAS SHFT MAL W/ MIST IV SET AXIUS

## (undated) DEVICE — 6 FOOT DISPOSABLE EXTENSION CABLE WITH SAFE CONNECT / SCREW-DOWN

## (undated) DEVICE — BATTERY PACK FOR VARISPEED: Brand: STRYKER VARISPEED

## (undated) DEVICE — SOLUTION IV 50ML 0.9% SOD CHL PLAS CONT USP VIAFLX

## (undated) DEVICE — TAPE ADH W2INXL10YD WHT PAPR GENTLE BRTH FLX COMFORTABLE

## (undated) DEVICE — GLOVE SURG SZ 65 THK91MIL LTX FREE SYN POLYISOPRENE

## (undated) DEVICE — EZ GLIDE AORTIC CANNULA: Brand: EDWARDS LIFESCIENCES EZ GLIDE AORTIC CANNULA

## (undated) DEVICE — 3M™ TEGADERM™ CHG DRESSING 25/CARTON 4 CARTONS/CASE 1657: Brand: TEGADERM™

## (undated) DEVICE — Device

## (undated) DEVICE — KIT PLT RATIO DISPNS KT 2IN CANN TIP SPRY TIP DISP MAGELLAN

## (undated) DEVICE — GLOVE SURG SZ 7.5 L11.73IN FNGR THK9.8MIL STRW LTX POLYMER

## (undated) DEVICE — RETROGRADE CARDIOPLEGIA CATHETER: Brand: EDWARDS LIFESCIENCES RETROGRADE CARDIOPLEGIA CATHETER

## (undated) DEVICE — Device: Brand: VIRTUOSAPH PLUS WITH RADIAL INDICATION

## (undated) DEVICE — SOLUTION IV 1000ML 140MEQ/L SOD 5MEQ/L K 3MEQ/L MG 27MEQ/L

## (undated) DEVICE — MARKER A/C LOCATOR GRAFT U SILICONE

## (undated) DEVICE — Z DISCONTINUED PER MEDLINE USE 2425483 TAPE UMB L30IN DIA1/8IN WHT COT NONABSORBABLE W/O NDL FOR

## (undated) DEVICE — AGENT HEMSTAT W4XL8IN OXIDIZED REGENERATED CELOS ABSRB

## (undated) DEVICE — PADDLE INTERN DEFIB CHILD

## (undated) DEVICE — CATHETER THOR 32FR L23IN PVC 5 EYELET STR ATRAUM

## (undated) DEVICE — BLADE CLIPPER GEN PURP NS

## (undated) DEVICE — SOLUTION IRRIG 1000ML STRL H2O USP PLAS POUR BTL

## (undated) DEVICE — PACK OPEN HRT DRP

## (undated) DEVICE — AVID DUAL STAGE VENOUS DRAINAGE CANNULA: Brand: AVID DUAL STAGE VENOUS DRAINAGE CANNULA

## (undated) DEVICE — SINGLE USE SUCTION VALVE MAJ-209: Brand: SINGLE USE SUCTION VALVE (STERILE)

## (undated) DEVICE — ADAPTER TBNG DIA15MM SWVL FBROPT BRONCHSCP TERM 2 AXIS PEEP

## (undated) DEVICE — GRADUATE

## (undated) DEVICE — CHANNEL DRAIN, 28FR, HUBLESS: Brand: JACKSON-PRATT

## (undated) DEVICE — TIP APPL GEL PLT ENDO 5MMX32CM

## (undated) DEVICE — GOWN,SIRUS,FABRNF,XL,20/CS: Brand: MEDLINE

## (undated) DEVICE — CLOTH SURG PREP PREOPERATIVE CHLORHEXIDINE GLUC 2% READYPREP

## (undated) DEVICE — SINGLE USE BIOPSY VALVE MAJ-210: Brand: SINGLE USE BIOPSY VALVE (STERILE)

## (undated) DEVICE — SURGICAL PROCEDURE PACK CRD SEHC

## (undated) DEVICE — TOWEL,OR,DSP,ST,WHITE,DLX,4/PK,20PK/CS: Brand: MEDLINE

## (undated) DEVICE — TOWEL,OR,DSP,ST,BLUE,STD,6/PK,12PK/CS: Brand: MEDLINE

## (undated) DEVICE — APPLICATOR MEDICATED 26 CC SOLUTION HI LT ORNG CHLORAPREP

## (undated) DEVICE — CANNULA INJ L2.5IN BLNT TIP 3MM CLR BODY W/ 1 W VLV DLP

## (undated) DEVICE — ADHESIVE SKIN CLOSURE TOP 36 CC HI VISC DERMBND MINI

## (undated) DEVICE — CATHETER IV 24GA L3/4IN PERIPH YEL S STL POLYUR PLAS HUB

## (undated) DEVICE — TAPE ADH W2INXL10YD PLAS TRNSPAR H2O RESIST HYPOALRG CURAD

## (undated) DEVICE — CAMERA CARDIAC STRYKER 1488 HD

## (undated) DEVICE — INSUFFLATION TUBING SET WITH FILTER, FUNNEL CONNECTOR AND LUER LOCK: Brand: JOSNOE MEDICAL INC

## (undated) DEVICE — SOLUTION IRRIG 500ML 0.9% SOD CHL USP POUR PLAS BTL

## (undated) DEVICE — SET CARDIAC I

## (undated) DEVICE — BASIC SINGLE BASIN 1-LF: Brand: MEDLINE INDUSTRIES, INC.

## (undated) DEVICE — DRAPE THER FLUID WARMING 66X44 IN FLAT SLUSH DBL DISC ORS

## (undated) DEVICE — SET SURG BASIN OPEN HEART NO  1 REUSABLE

## (undated) DEVICE — GOWN,SIRUS,FABRNF,L,20/CS: Brand: MEDLINE

## (undated) DEVICE — DRAIN CHN 19FR L0.25MM DIA6.3MM SIL RND HUBLESS FULL FLUT

## (undated) DEVICE — SET CARDIAC II

## (undated) DEVICE — GLOVE SURG SZ 6 THK91MIL LTX FREE SYN POLYISOPRENE ANTI

## (undated) DEVICE — SURGICAL PROCEDURE PACK BRONCH